# Patient Record
Sex: FEMALE | Race: WHITE | NOT HISPANIC OR LATINO | Employment: OTHER | URBAN - METROPOLITAN AREA
[De-identification: names, ages, dates, MRNs, and addresses within clinical notes are randomized per-mention and may not be internally consistent; named-entity substitution may affect disease eponyms.]

---

## 2017-01-03 ENCOUNTER — OFFICE VISIT (OUTPATIENT)
Dept: INTERNAL MEDICINE | Facility: CLINIC | Age: 72
End: 2017-01-03
Payer: COMMERCIAL

## 2017-01-03 VITALS
RESPIRATION RATE: 12 BRPM | HEART RATE: 76 BPM | HEIGHT: 62 IN | TEMPERATURE: 98.5 F | DIASTOLIC BLOOD PRESSURE: 60 MMHG | OXYGEN SATURATION: 99 % | BODY MASS INDEX: 22.26 KG/M2 | WEIGHT: 121 LBS | SYSTOLIC BLOOD PRESSURE: 120 MMHG

## 2017-01-03 DIAGNOSIS — M72.0 DUPUYTREN'S CONTRACTURE OF RIGHT HAND: ICD-10-CM

## 2017-01-03 DIAGNOSIS — Z01.818 PREOP GENERAL PHYSICAL EXAM: Primary | ICD-10-CM

## 2017-01-03 PROCEDURE — 93000 ELECTROCARDIOGRAM COMPLETE: CPT | Performed by: INTERNAL MEDICINE

## 2017-01-03 PROCEDURE — 99214 OFFICE O/P EST MOD 30 MIN: CPT | Performed by: INTERNAL MEDICINE

## 2017-01-03 NOTE — PATIENT INSTRUCTIONS
Before Your Surgery      Call your surgeon if there is any change in your health. This includes signs of a cold or flu (such as a sore throat, runny nose, cough, rash or fever).    Do not smoke, drink alcohol or take over the counter medicine (unless your surgeon or primary care doctor tells you to) for the 24 hours before and after surgery.    If you take prescribed drugs: Follow your doctor s orders about which medicines to take and which to stop until after surgery.    Eating and drinking prior to surgery: follow the instructions from your surgeon    Take a shower or bath the night before surgery. Use the soap your surgeon gave you to gently clean your skin. If you do not have soap from your surgeon, use your regular soap. Do not shave or scrub the surgery site.  Wear clean pajamas and have clean sheets on your bed.       Okay to stop aspirin for one week prior to surgery.   Okay to skip any morning medications the morning of surgery.   Everything looks fine to go ahead with surgery.

## 2017-01-03 NOTE — MR AVS SNAPSHOT
After Visit Summary   1/3/2017    Danielle Bryan    MRN: [id number]           Patient Information     Date Of Birth          1945        Visit Information        Provider Department      1/3/2017 9:00 AM Colt Riley MD [facility]        Today's Diagnoses     Preop general physical exam    -  1     Dupuytren's contracture of right hand           Care Instructions      Before Your Surgery      Call your surgeon if there is any change in your health. This includes signs of a cold or flu (such as a sore throat, runny nose, cough, rash or fever).    Do not smoke, drink alcohol or take over the counter medicine (unless your surgeon or primary care doctor tells you to) for the 24 hours before and after surgery.    If you take prescribed drugs: Follow your doctor s orders about which medicines to take and which to stop until after surgery.    Eating and drinking prior to surgery: follow the instructions from your surgeon    Take a shower or bath the night before surgery. Use the soap your surgeon gave you to gently clean your skin. If you do not have soap from your surgeon, use your regular soap. Do not shave or scrub the surgery site.  Wear clean pajamas and have clean sheets on your bed.       Okay to stop aspirin for one week prior to surgery.   Okay to skip any morning medications the morning of surgery.   Everything looks fine to go ahead with surgery.         Follow-ups after your visit        Your next 10 appointments already scheduled     Jan 09, 2017  8:30 AM   Nurse [place] with [place]UR[place]   [facility] [place]    [address]  [facility] [address]   [phone]            Jan 24, 2017  8:30 AM   N[place] with [place] NURSE   [facility] [place]    [address]  [facility] [address]   [phone]              Who to contact     If you have questions or need follow up  "information about today's clinic visit or your schedule please contact [facility] directly at [phone].  Normal or non-critical lab and imaging results will be communicated to you by MyChart, letter or phone within 4 business days after the clinic has received the results. If you do not hear from us within 7 days, please contact the clinic through [employer] or phone. If you have a critical or abnormal lab result, we will notify you by phone as soon as possible.  Submit refill requests through [employer] or call your pharmacy and they will forward the refill request to us. Please allow 3 business days for your refill to be completed.          Additional Information About Your Visit        [employer] Information     [employer] gives you secure access to your electronic health record. If you see a primary care provider, you can also send messages to your care team and make appointments. If you have questions, please call your primary care clinic.  If you do not have a primary care provider, please call [phone] and they will assist you.        Your Vitals Were     Pulse Temperature Respirations Height BMI (Body Mass Index) Pulse Oximetry    76 98.5  F (36.9  C) (Oral) 12 5' 2\" (1.575 m) 22.13 kg/m2 99%    Breastfeeding?                   No            Blood Pressure from Last 3 Encounters:   01/03/17 120/60   12/14/16 114/60   11/14/16 94/62    Weight from Last 3 Encounters:   01/03/17 121 lb (54.885 kg)   12/14/16 119 lb (53.978 kg)   11/14/16 118 lb 8 oz (53.751 kg)              We Performed the Following     EKG 12-lead complete w/read - Clinics        Primary Care Provider Office Phone # Fax #    Colt Riley [phone]       [facility] [address]   [facility] [address]        Thank you!     Thank you for choosing [facility]  for your care. Our goal is always to provide you with excellent care. Hearing back from our patients is one way we " can continue to improve our services. Please take a few minutes to complete the written survey that you may receive in the mail after your visit with us. Thank you!             Your Updated Medication List - Protect others around you: Learn how to safely use, store and throw away your medicines at www.disposemymeds.org.          This list is accurate as of: 1/3/17  9:18 AM.  Always use your most recent med list.                   Brand Name Dispense Instructions for use    aspirin 81 MG tablet     30 tablet    Take 1 tablet (81 mg) by mouth daily       cetirizine 10 MG tablet    zyrTEC     Take 10 mg by mouth as needed for allergies       cyanocobalamin 1000 MCG/ML injection    VITAMIN B12     Inject 1 mL into the muscle every 14 days       docusate sodium 100 MG tablet    COLACE     Take 100 mg by mouth as needed       ipratropium 0.06 % spray    ATROVENT    1 Box    Spray 2 sprays into both nostrils 4 times daily as needed for rhinitis       order for DME     2 Units    Equipment being ordered: Wigs       polyethylene glycol 0.4%- propylene glycol 0.3% 0.4-0.3 % Soln ophthalmic solution    SYSTANE ULTRA     Place 1 drop into both eyes every hour as needed for dry eyes       pravastatin 20 MG tablet    PRAVACHOL    90 tablet    Take 1 tablet (20 mg) by mouth daily       ranitidine 150 MG tablet    ZANTAC     Take 150 mg by mouth as needed       sodium chloride 0.65 % nasal spray    OCEAN     Spray 1 spray into both nostrils daily as needed for congestion

## 2017-01-03 NOTE — PROGRESS NOTES
[place] [address]  [facility] [address]  [phone]  Dept: [phone]    PRE-OP EVALUATION:  Today's date: 1/3/2017    Danielle Bryan (: 1945) presents for pre-operative evaluation assessment as requested by Dr. Velazquez.  She requires evaluation and anesthesia risk assessment prior to undergoing surgery/procedure for treatment of Dupuytren's contracture.  Proposed procedure: right thumb, middle, ring Dupuytren's release    Date of Surgery/ Procedure: 2017  Time of Surgery/ Procedure: 6am  [facility]  Fax number for surgical facility: [phone]  Primary Physician: Colt Riley  Type of Anesthesia Anticipated: to be determined    Patient has a Health Care Directive or Living Will:  NO    1. YES - DO YOU HAVE A HISTORY OF HEART ATTACK, STROKE, STENT, BYPASS OR SURGERY ON AN ARTERY IN THE HEAD, NECK, HEART OR LEG?   2. NO - Do you ever have any pain or discomfort in your chest?  3. NO - Do you have a history of  Heart Failure?  4. YES - ARE YOUR TROUBLED BY SHORTNESS OF BREATH WHEN WALKING ON THE LEVEL, UP A SLIGHT HILL OR AT NIGHT? With exertion  5. YES - DO YOU CURRENTLY HAVE A COLD, BRONCHITIS OR OTHER RESPIRATORY INFECTION? Rhinorrhea, improving.   6. YES - DO YOU HAVE A COUGH, SHORTNESS OF BREATH OR WHEEZING? SOB with exertion (walking)  7. NO - Do you sometimes get pains in the calves of your legs when you walk?  8. NO - Do you or anyone in your family have previous history of blood clots?  9. NO - Do you or does anyone in your family have a serious bleeding problem such as prolonged bleeding following surgeries or cuts?  10. NO - Have you ever had problems with anemia or been told to take iron pills?  11. NO - Have you had any abnormal blood loss such as black, tarry or bloody stools, or abnormal vaginal bleeding?  12. NO - Have you ever had a blood transfusion?  13. NO - Have you or any of your relatives  ever had problems with anesthesia?  14. NO - Do you have sleep apnea, excessive snoring or daytime drowsiness?  15. NO - Do you have any prosthetic heart valves?  16. NO - Do you have prosthetic joints?  17. NO - Is there any chance that you may be pregnant?      HPI:                                                      Brief HPI related to upcoming procedure: Danielle reported to the clinic for a pre-operative examination to treat a right 1st, 3rd and 4th fingers Dupuytren's Contracture via release procedure.       See problem list for active medical problems.  Problems all longstanding and stable, except as noted/documented.  See ROS for pertinent symptoms related to these conditions.                                            Noted SOB with exertion. Noted recent rhinorrhea. Noted intermittent heartburn.     MEDICAL HISTORY:                                                      Patient Active Problem List    Diagnosis Date Noted     Osteoporosis 12/14/2016     Priority: Medium     Vitamin B12 deficiency (non anemic) 08/11/2015     Priority: Medium     Hyperlipidemia with target LDL less than 100 03/05/2015     Diagnosis updated by automated process. Provider to review and confirm.       Transient cerebral ischemia 12/18/2014     Diagnosis updated by automated process. Provider to review and confirm.       Allergic rhinitis 07/13/2014     Bartholin's cyst 04/09/2014     Alopecia areata 03/21/2013     Advanced directives, counseling/discussion 03/21/2013     Patient states has Advance Directive and will bring in a copy to clinic.         Sprain of jaw 02/06/2008      Past Medical History   Diagnosis Date     PONV (postoperative nausea and vomiting)      Hyperlipemia      Alopecia areata      Requires a wig now     Dysphagia      Botox/EGD dilation at [place], most recent 05/2013     Recurrent UTI      Believed related in part to atrophic vaginitis     Past Surgical History   Procedure Laterality Date     Gyn surgery        TUBAL PREGNANCY, D&C,  X 2     Release trigger finger  2012     Procedure: RELEASE TRIGGER FINGER;  RIGHT THUMB, MIDDLE AND RING TRIGGER RELEASES;  Surgeon: Deniz Velazquez MD;  Location: [facility]     Colonoscopy  1/15/2014     Dr. Henry [facility]     Head & neck surgery  1983     THYROIDECTOMY - partial-right side     Eye surgery  2013     bilateral cataract surgery     Colonoscopy  1/15/2014     Procedure: COLONOSCOPY;  Colonoscopy ;  Surgeon: Esteban Henry MD;  Location: [facility]     Current Outpatient Prescriptions   Medication Sig Dispense Refill     pravastatin (PRAVACHOL) 20 MG tablet Take 1 tablet (20 mg) by mouth daily 90 tablet 3     polyethylene glycol 0.4%- propylene glycol 0.3% (SYSTANE ULTRA) 0.4-0.3 % SOLN ophthalmic solution Place 1 drop into both eyes every hour as needed for dry eyes       aspirin 81 MG tablet Take 1 tablet (81 mg) by mouth daily 30 tablet 11     cyanocobalamin (VITAMIN B12) 1000 MCG/ML injection Inject 1 mL into the muscle every 14 days       order for DME Equipment being ordered: Wigs 2 Units 0     cetirizine (ZYRTEC) 10 MG tablet Take 10 mg by mouth as needed for allergies       ipratropium (ATROVENT) 0.06 % nasal spray Spray 2 sprays into both nostrils 4 times daily as needed for rhinitis 1 Box 1     sodium chloride (OCEAN) 0.65 % nasal spray Spray 1 spray into both nostrils daily as needed for congestion       ranitidine (ZANTAC) 150 MG tablet Take 150 mg by mouth as needed        docusate sodium 100 MG tablet Take 100 mg by mouth as needed       [DISCONTINUED] ORDER FOR DME Equipment being ordered: Wigs 2 Units 0     OTC products: None, except as noted above    Allergies   Allergen Reactions     Ciprofloxacin      Sores in mouth and throat felt funny      Latex Allergy: NO    Social History   Substance Use Topics     Smoking status: Never Smoker      Smokeless tobacco: Never Used     Alcohol Use: Yes      Comment: Rare.      History   Drug Use No       REVIEW OF  "SYSTEMS:                                                    REVIEW OF SYSTEMS: The following systems have been completely reviewed and are negative except as noted above:   Constitutional, HEENT, respiratory, cardiovascular, gastrointestinal, genitourinary, musculoskeletal, dermatologic, hematologic, endocrine, psychiatric, and neurologic systems.     EXAM:                                                    /60 mmHg  Pulse 76  Temp(Src) 98.5  F (36.9  C) (Oral)  Resp 12  Ht 1.575 m (5' 2\")  Wt 54.885 kg (121 lb)  BMI 22.13 kg/m2  SpO2 99%  Breastfeeding? No    GENERAL APPEARANCE: healthy, alert and no distress     EYES: EOMI,- PERRL     HENT: ear canals and TM's normal with impacted cerumen and nose and mouth without ulcers or lesions     NECK: no adenopathy, no asymmetry, masses, or scars and thyroid normal to palpation     RESP: lungs clear to auscultation - no rales, rhonchi or wheezes     CV: regular rates and rhythm, normal S1 S2, no S3 or S4 and no murmur, click or rub -     ABDOMEN:  soft, nontender, no HSM or masses and bowel sounds normal     MS: extremities normal- no gross deformities noted, no evidence of inflammation in joints, FROM in all extremities.     SKIN: no suspicious lesions or rashes     Back: no CVA tenderness      NEURO: Normal strength and tone, sensory exam grossly normal, mentation intact and speech normal     PSYCH: mentation appears normal. and affect normal/bright    DIAGNOSTICS:                                                    EKG: Normal Sinus Rhythm, normal axis, normal intervals, no acute ST/T changes c/w ischemia, no LVH by voltage criteria, unchanged from previous tracings    Recent Labs   Lab Test  11/25/16   0800  11/16/15   0937   12/18/14   1000   HGB  12.8  12.7   --   13.0   PLT  205  190   --   232   INR   --    --    --   0.97   NA  146*  143   < >  143   POTASSIUM  4.0  4.0   < >  4.1   CR  0.79  0.72   < >  0.78    < > = values in this interval not " displayed.      IMPRESSION:                                                    Diagnosis/reason for consult: dupuytren's contracture right hand, 1st, 3rd, 4th fingers    The proposed surgical procedure is considered LOW risk.    REVISED CARDIAC RISK INDEX  The patient has the following serious cardiovascular risks for perioperative complications such as (MI, PE, VFib and 3  AV Block):  Cerebrovascular Disease (TIA or CVA)  INTERPRETATION: 0 risks: Class I (very low risk - 0.4% complication rate)    The patient has the following additional risks for perioperative complications:  [place] 10-year CHD Risk Score: 3% (15 Total Points)   Values used to calculate score:     Age: 71 years -- Points: 14     Total Cholesterol: 198 mg/dL -- Points: 1     HDL Cholesterol: 88 mg/dL -- Points: -1     Systolic BP (untreated): 120 mmHg -- Points: 1     The patient is not a smoker. -- Points: 0     The patient has not been diagnosed with diabetes. -- Points: 0     The patient does not have a family history of CHD. -- Points:0       ICD-10-CM    1. Preop general physical exam Z01.818 EKG 12-lead complete w/read - Clinics   2. Dupuytren's contracture of right hand M72.0        RECOMMENDATIONS:                                                      --Patient is to take all scheduled medications on the day of surgery EXCEPT for modifications listed below.    D/c aspirin one week prior to surgery  Skip morning medications on day of surgery    APPROVAL GIVEN to proceed with proposed procedure, without further diagnostic evaluation     Patient Instructions     Before Your Surgery      Call your surgeon if there is any change in your health. This includes signs of a cold or flu (such as a sore throat, runny nose, cough, rash or fever).    Do not smoke, drink alcohol or take over the counter medicine (unless your surgeon or primary care doctor tells you to) for the 24 hours before and after surgery.    If you take prescribed drugs: Follow  your doctor s orders about which medicines to take and which to stop until after surgery.    Eating and drinking prior to surgery: follow the instructions from your surgeon    Take a shower or bath the night before surgery. Use the soap your surgeon gave you to gently clean your skin. If you do not have soap from your surgeon, use your regular soap. Do not shave or scrub the surgery site.  Wear clean pajamas and have clean sheets on your bed.       Okay to stop aspirin for one week prior to surgery.   Okay to skip any morning medications the morning of surgery.   Everything looks fine to go ahead with surgery.           This document serves as a record of the services and decisions personally performed and made by Colt Riley MD. It was created on their behalf by Ellis Cardenas, a trained medical scribe. The creation of this document is based the provider's statements to the medical scribe.  Ellis Cardenas January 3, 2017 9:12 AM         Signed Electronically by: Colt Riley MD    Copy of this evaluation report is provided to requesting physician.    [facility] Preop Guidelines

## 2017-01-09 ENCOUNTER — ALLIED HEALTH/NURSE VISIT (OUTPATIENT)
Dept: NURSING | Facility: CLINIC | Age: 72
End: 2017-01-09
Payer: COMMERCIAL

## 2017-01-09 DIAGNOSIS — R79.89 LOW VITAMIN B12 LEVEL: ICD-10-CM

## 2017-01-09 PROCEDURE — 96372 THER/PROPH/DIAG INJ SC/IM: CPT

## 2017-01-12 ENCOUNTER — HOSPITAL PATHOLOGY (OUTPATIENT)
Dept: OTHER | Facility: CLINIC | Age: 72
End: 2017-01-12

## 2017-01-17 LAB — COPATH REPORT: NORMAL

## 2017-01-24 ENCOUNTER — ALLIED HEALTH/NURSE VISIT (OUTPATIENT)
Dept: NURSING | Facility: CLINIC | Age: 72
End: 2017-01-24
Payer: COMMERCIAL

## 2017-01-24 DIAGNOSIS — R79.89 LOW VITAMIN B12 LEVEL: ICD-10-CM

## 2017-01-24 PROCEDURE — 96372 THER/PROPH/DIAG INJ SC/IM: CPT

## 2017-02-07 ENCOUNTER — ALLIED HEALTH/NURSE VISIT (OUTPATIENT)
Dept: NURSING | Facility: CLINIC | Age: 72
End: 2017-02-07
Payer: COMMERCIAL

## 2017-02-07 DIAGNOSIS — R79.89 LOW VITAMIN B12 LEVEL: ICD-10-CM

## 2017-02-07 PROCEDURE — 96372 THER/PROPH/DIAG INJ SC/IM: CPT

## 2017-02-21 ENCOUNTER — ALLIED HEALTH/NURSE VISIT (OUTPATIENT)
Dept: NURSING | Facility: CLINIC | Age: 72
End: 2017-02-21
Payer: COMMERCIAL

## 2017-02-21 VITALS — WEIGHT: 116.2 LBS | BODY MASS INDEX: 21.25 KG/M2

## 2017-02-21 DIAGNOSIS — R79.89 LOW VITAMIN B12 LEVEL: ICD-10-CM

## 2017-02-21 PROCEDURE — 96372 THER/PROPH/DIAG INJ SC/IM: CPT

## 2017-03-06 ENCOUNTER — ALLIED HEALTH/NURSE VISIT (OUTPATIENT)
Dept: NURSING | Facility: CLINIC | Age: 72
End: 2017-03-06
Payer: COMMERCIAL

## 2017-03-06 DIAGNOSIS — R79.89 LOW VITAMIN B12 LEVEL: ICD-10-CM

## 2017-03-06 PROCEDURE — 96372 THER/PROPH/DIAG INJ SC/IM: CPT

## 2017-03-22 ENCOUNTER — ALLIED HEALTH/NURSE VISIT (OUTPATIENT)
Dept: NURSING | Facility: CLINIC | Age: 72
End: 2017-03-22
Payer: COMMERCIAL

## 2017-03-22 DIAGNOSIS — R79.89 LOW VITAMIN B12 LEVEL: ICD-10-CM

## 2017-03-22 PROCEDURE — 96372 THER/PROPH/DIAG INJ SC/IM: CPT

## 2017-04-03 ENCOUNTER — ALLIED HEALTH/NURSE VISIT (OUTPATIENT)
Dept: NURSING | Facility: CLINIC | Age: 72
End: 2017-04-03
Payer: COMMERCIAL

## 2017-04-03 DIAGNOSIS — R79.89 LOW VITAMIN B12 LEVEL: ICD-10-CM

## 2017-04-03 PROCEDURE — 96372 THER/PROPH/DIAG INJ SC/IM: CPT

## 2017-04-17 ENCOUNTER — ALLIED HEALTH/NURSE VISIT (OUTPATIENT)
Dept: NURSING | Facility: CLINIC | Age: 72
End: 2017-04-17
Payer: COMMERCIAL

## 2017-04-17 DIAGNOSIS — R79.89 LOW VITAMIN B12 LEVEL: ICD-10-CM

## 2017-04-17 PROCEDURE — 96372 THER/PROPH/DIAG INJ SC/IM: CPT

## 2017-05-01 ENCOUNTER — ALLIED HEALTH/NURSE VISIT (OUTPATIENT)
Dept: NURSING | Facility: CLINIC | Age: 72
End: 2017-05-01
Payer: COMMERCIAL

## 2017-05-01 DIAGNOSIS — R79.89 LOW VITAMIN B12 LEVEL: ICD-10-CM

## 2017-05-01 PROCEDURE — 96372 THER/PROPH/DIAG INJ SC/IM: CPT

## 2017-05-19 ENCOUNTER — ALLIED HEALTH/NURSE VISIT (OUTPATIENT)
Dept: NURSING | Facility: CLINIC | Age: 72
End: 2017-05-19
Payer: COMMERCIAL

## 2017-05-19 DIAGNOSIS — R79.89 LOW VITAMIN B12 LEVEL: ICD-10-CM

## 2017-05-19 PROCEDURE — 96372 THER/PROPH/DIAG INJ SC/IM: CPT

## 2017-05-19 NOTE — NURSING NOTE
"Chief Complaint   Patient presents with     Allied Health Visit     b 12      initial There were no vitals taken for this visit. Estimated body mass index is 21.25 kg/(m^2) as calculated from the following:    Height as of 1/3/17: 5' 2\" (1.575 m).    Weight as of 2/21/17: 116 lb 3.2 oz (52.7 kg)..  bp completed using cuff size NA (Not Taken)    "

## 2017-05-19 NOTE — MR AVS SNAPSHOT
After Visit Summary   5/19/2017    Danielle Bryan    MRN: [id number]           Patient Information     Date Of Birth          1945        Visit Information        Provider Department      5/19/2017 9:30 AM RI IM NURSE [facility]        Today's Diagnoses     Low vitamin B12 level           Follow-ups after your visit        Your next 10 appointments already scheduled     Jun 02, 2017  9:30 AM CDT   Nurse Only with [place] IM NURSE   [facility]    [address]  [facility] [id number] [phone]              Who to contact     If you have questions or need follow up information about today's clinic visit or your schedule please contact [facility] directly at [phone].  Normal or non-critical lab and imaging results will be communicated to you by MyChart, letter or phone within 4 business days after the clinic has received the results. If you do not hear from us within 7 days, please contact the clinic through [employer] or phone. If you have a critical or abnormal lab result, we will notify you by phone as soon as possible.  Submit refill requests through [employer] or call your pharmacy and they will forward the refill request to us. Please allow 3 business days for your refill to be completed.          Additional Information About Your Visit        MyChart Information     [employer] gives you secure access to your electronic health record. If you see a primary care provider, you can also send messages to your care team and make appointments. If you have questions, please call your primary care clinic.  If you do not have a primary care provider, please call [phone] and they will assist you.        Care EveryWhere ID     This is your Care EveryWhere ID. This could be used by other organizations to access your [place] medical records  [id number]         Blood Pressure from Last 3 Encounters:   01/03/17  120/60   12/14/16 114/60   11/14/16 94/62    Weight from Last 3 Encounters:   02/21/17 116 lb 3.2 oz (52.7 kg)   01/03/17 121 lb (54.9 kg)   12/14/16 119 lb (54 kg)              We Performed the Following     VITAMIN B12 INJ /1000MCG        Primary Care Provider Office Phone # Fax #    Colt Riley [phone]       [facility] [address] [facility] [address]  [facility] [address]        Thank you!     Thank you for choosing [facility]  for your care. Our goal is always to provide you with excellent care. Hearing back from our patients is one way we can continue to improve our services. Please take a few minutes to complete the written survey that you may receive in the mail after your visit with us. Thank you!             Your Updated Medication List - Protect others around you: Learn how to safely use, store and throw away your medicines at www.disposemymeds.org.          This list is accurate as of: 5/19/17  9:56 AM.  Always use your most recent med list.                   Brand Name Dispense Instructions for use    aspirin 81 MG tablet     30 tablet    Take 1 tablet (81 mg) by mouth daily       cetirizine 10 MG tablet    zyrTEC     Take 10 mg by mouth as needed for allergies       cyanocobalamin 1000 MCG/ML injection    VITAMIN B12     Inject 1 mL into the muscle every 14 days       docusate sodium 100 MG tablet    COLACE     Take 100 mg by mouth as needed       ipratropium 0.06 % spray    ATROVENT    1 Box    Spray 2 sprays into both nostrils 4 times daily as needed for rhinitis       order for DME     2 Units    Equipment being ordered: Wigs       polyethylene glycol 0.4%- propylene glycol 0.3% 0.4-0.3 % Soln ophthalmic solution    SYSTANE ULTRA     Place 1 drop into both eyes every hour as needed for dry eyes       pravastatin 20 MG tablet    PRAVACHOL    90 tablet    Take 1 tablet (20 mg) by mouth daily       ranitidine 150 MG tablet    ZANTAC     Take 150 mg by mouth  as needed       sodium chloride 0.65 % nasal spray    OCEAN     Spray 1 spray into both nostrils daily as needed for congestion

## 2017-06-02 ENCOUNTER — ALLIED HEALTH/NURSE VISIT (OUTPATIENT)
Dept: NURSING | Facility: CLINIC | Age: 72
End: 2017-06-02
Payer: COMMERCIAL

## 2017-06-02 DIAGNOSIS — E53.8 VITAMIN B12 DEFICIENCY (NON ANEMIC): Primary | ICD-10-CM

## 2017-06-02 PROCEDURE — 96372 THER/PROPH/DIAG INJ SC/IM: CPT

## 2017-06-02 NOTE — MR AVS SNAPSHOT
After Visit Summary   6/2/2017    Danielle Bryan    MRN: [id number]           Patient Information     Date Of Birth          1945        Visit Information        Provider Department      6/2/2017 9:30 AM RI IM NURSE [facility]        Today's Diagnoses     Vitamin B12 deficiency (non anemic)    -  1       Follow-ups after your visit        Your next 10 appointments already scheduled     Jun 16, 2017  8:30 AM CDT   Nurse Only with RI IM NURSE   [facility] ([facility])    [address]  [facility] [address] [phone]            Jun 29, 2017  8:30 AM CDT   Nurse Only with RI IM NURSE   [facility] ([facility])    [address]  [facility] [address] [phone]            Jul 13, 2017  8:30 AM CDT   Nurse Only with RI IM NURSE   [facility] ([facility])    [address]  [facility] [id number] [phone]            Jul 27, 2017  8:30 AM CDT   Nurse Only with RI IM NURSE   [facility] ([facility])    [address]  [facility] [id number] [phone]              Who to contact     If you have questions or need follow up information about today's clinic visit or your schedule please contact [facility] directly at [phone].  Normal or non-critical lab and imaging results will be communicated to you by MyChart, letter or phone within 4 business days after the clinic has received the results. If you do not hear from us within 7 days, please contact the clinic through MyChart or phone. If you have a critical or abnormal lab result, we will notify you by phone as soon as possible.  Submit refill requests through [email or web address] or call your pharmacy and they will forward the refill request to us. Please allow 3 business days for your refill to be completed.          Additional  Information About Your Visit        [employer] Information     LookMedBook gives you secure access to your electronic health record. If you see a primary care provider, you can also send messages to your care team and make appointments. If you have questions, please call your primary care clinic.  If you do not have a primary care provider, please call [phone] and they will assist you.        Care EveryWhere ID     This is your Care EveryWhere ID. This could be used by other organizations to access your [place] medical records  [id number]         Blood Pressure from Last 3 Encounters:   01/03/17 120/60   12/14/16 114/60   11/14/16 94/62    Weight from Last 3 Encounters:   02/21/17 116 lb 3.2 oz (52.7 kg)   01/03/17 121 lb (54.9 kg)   12/14/16 119 lb (54 kg)              Today, you had the following     No orders found for display       Primary Care Provider Office Phone # Fax #    Colt Riley [phone]       [facility] [address]  [facility] [address]        Thank you!     Thank you for choosing [facility]  for your care. Our goal is always to provide you with excellent care. Hearing back from our patients is one way we can continue to improve our services. Please take a few minutes to complete the written survey that you may receive in the mail after your visit with us. Thank you!             Your Updated Medication List - Protect others around you: Learn how to safely use, store and throw away your medicines at www.disposemymeds.org.          This list is accurate as of: 6/2/17  9:44 AM.  Always use your most recent med list.                   Brand Name Dispense Instructions for use    aspirin 81 MG tablet     30 tablet    Take 1 tablet (81 mg) by mouth daily       cetirizine 10 MG tablet    zyrTEC     Take 10 mg by mouth as needed for allergies       cyanocobalamin 1000 MCG/ML injection    VITAMIN B12     Inject 1 mL into the muscle every 14 days        docusate sodium 100 MG tablet    COLACE     Take 100 mg by mouth as needed       ipratropium 0.06 % spray    ATROVENT    1 Box    Spray 2 sprays into both nostrils 4 times daily as needed for rhinitis       order for DME     2 Units    Equipment being ordered: Wigs       polyethylene glycol 0.4%- propylene glycol 0.3% 0.4-0.3 % Soln ophthalmic solution    SYSTANE ULTRA     Place 1 drop into both eyes every hour as needed for dry eyes       pravastatin 20 MG tablet    PRAVACHOL    90 tablet    Take 1 tablet (20 mg) by mouth daily       ranitidine 150 MG tablet    ZANTAC     Take 150 mg by mouth as needed       sodium chloride 0.65 % nasal spray    OCEAN     Spray 1 spray into both nostrils daily as needed for congestion

## 2017-06-09 ENCOUNTER — OFFICE VISIT (OUTPATIENT)
Dept: INTERNAL MEDICINE | Facility: CLINIC | Age: 72
End: 2017-06-09
Payer: COMMERCIAL

## 2017-06-09 VITALS
HEART RATE: 99 BPM | HEIGHT: 62 IN | WEIGHT: 115 LBS | SYSTOLIC BLOOD PRESSURE: 104 MMHG | TEMPERATURE: 98.7 F | BODY MASS INDEX: 21.16 KG/M2 | RESPIRATION RATE: 12 BRPM | OXYGEN SATURATION: 98 % | DIASTOLIC BLOOD PRESSURE: 68 MMHG

## 2017-06-09 DIAGNOSIS — M81.0 AGE-RELATED OSTEOPOROSIS WITHOUT CURRENT PATHOLOGICAL FRACTURE: Primary | ICD-10-CM

## 2017-06-09 DIAGNOSIS — R63.4 LOSS OF WEIGHT: ICD-10-CM

## 2017-06-09 DIAGNOSIS — Z87.19 HISTORY OF ESOPHAGEAL STRICTURE: ICD-10-CM

## 2017-06-09 PROCEDURE — 99214 OFFICE O/P EST MOD 30 MIN: CPT | Performed by: INTERNAL MEDICINE

## 2017-06-09 NOTE — PATIENT INSTRUCTIONS
"Your last bone density test in 2014 did show osteoporosis.    The treatment for this condition is to start a \"bisphosphonate\" medication, which may be given orally or in IV form.     It would be wise to try to get this approved by insurance for IV form, due to previous esophageal stricture problems.     I will place an order for an IV bisphosphonate. Someone should be calling you to schedule. If you want to defer a few months, that would be fine.     Meanwhile, let's update a new bone density test to get a more current baseline.   "

## 2017-06-09 NOTE — NURSING NOTE
"Chief Complaint   Patient presents with     Recheck Medication     discuss osteoporosis       Initial /68 (BP Location: Right arm, Patient Position: Sitting, Cuff Size: Adult Regular)  Pulse 99  Temp 98.7  F (37.1  C) (Oral)  Resp 12  Ht 5' 2\" (1.575 m)  Wt 115 lb (52.2 kg)  SpO2 98%  Breastfeeding? No  BMI 21.03 kg/m2 Estimated body mass index is 21.03 kg/(m^2) as calculated from the following:    Height as of this encounter: 5' 2\" (1.575 m).    Weight as of this encounter: 115 lb (52.2 kg).  Medication Reconciliation: complete   Roxanna TRAYLOR      "

## 2017-06-09 NOTE — PROGRESS NOTES
"  SUBJECTIVE:                                                    Danielle Bryan is a 72 year old female who presents to clinic today for several health issues.       Osteoporosis  The patient's last dexa scan in 1/20/2014 - impression per report \"Osteoporosis, possible severe osteoporosis with vertebral fracture\". She was recommended to have IV injection for treatment of osteoporosis as she has a history of esophageal stricture. She had esophageal sphincter stretching done about 3 and 5 years ago. No current symptoms.     Weight Loss   The patient's  inquires about the patient's weight loss. Current weight is 115. She endorses lack of appetite. She cut soda out of her diet.    Post op  Patient relates her surgery went \"so so\". She states the trigger finger and dupuytren symptoms have returned in one of the fingers. She is following up next month with Dr Velazquez at [facility].     Past/recent records reviewed and discussed for:  -Colonoscopy up to date.  -Mammogram due.  -Recurrent vaginal cyst  -Recurrent hemorrhoids   Problem list and histories reviewed & adjusted, as indicated.  Additional history: as documented    Reviewed and updated as needed this visit by clinical staff  Tobacco  Allergies  Meds  Med Hx  Surg Hx  Fam Hx  Soc Hx      Reviewed and updated as needed this visit by Provider         ROS:  Constitutional, ENT, pulmonary, cardiovascular, GI,  musculoskeletal, neuro systems are negative, except as otherwise noted.    This document serves as a record of the services and decisions personally performed and made by Colt Riley MD. It was created on his behalf by Nenita Waters, a trained medical scribe. The creation of this document is based on the provider's statements to the medical scribe.  Nenita Waters June 9, 2017 8:54 AM       OBJECTIVE:                                                    /68 (BP Location: Right arm, Patient Position: Sitting, Cuff Size: " "Adult Regular)  Pulse 99  Temp 98.7  F (37.1  C) (Oral)  Resp 12  Ht 1.575 m (5' 2\")  Wt 52.2 kg (115 lb)  SpO2 98%  Breastfeeding? No  BMI 21.03 kg/m2  Body mass index is 21.03 kg/(m^2).     GENERAL: healthy, alert and no distress  MS: no gross musculoskeletal defects noted, no edema       ASSESSMENT/PLAN:                                                    (M81.0) Age-related osteoporosis without current pathological fracture  (primary encounter diagnosis)  Comment: Diagnosed with osteoporosis in 2014. Advised patient to have repeat dexa scan within the next couple of months after potential surgery on trigger finger.   Plan: DX Hip/Pelvis/Spine          (Z87.19) History of esophageal stricture--will need to avoid oral use of bisphosphonates.     (R63.4) loss of weight--does not appear pathologic. Reassurance.       Patient Instructions   Your last bone density test in 2014 did show osteoporosis.    The treatment for this condition is to start a \"bisphosphonate\" medication, which may be given orally or in IV form.     It would be wise to try to get this approved by insurance for IV form, due to previous esophageal stricture problems.     I will place an order for an IV bisphosphonate. Someone should be calling you to schedule. If you want to defer a few months, that would be fine.     Meanwhile, let's update a new bone density test to get a more current baseline.       The information in this document, created by the medical scribe for me, accurately reflects the services I personally performed and the decisions made by me. I have reviewed and approved this document for accuracy prior to leaving the patient care area.  June 9, 2017 8:58 AM    Colt Riley MD,   [facility]    "

## 2017-06-09 NOTE — MR AVS SNAPSHOT
"              After Visit Summary   6/9/2017    Danielle Bryan    MRN: [id number]           Patient Information     Date Of Birth          1945        Visit Information        Provider Department      6/9/2017 8:20 AM Colt Riley MD [facility]        Today's Diagnoses     Age-related osteoporosis without current pathological fracture    -  1    History of esophageal stricture          Care Instructions    Your last bone density test in 2014 did show osteoporosis.    The treatment for this condition is to start a \"bisphosphonate\" medication, which may be given orally or in IV form.     It would be wise to try to get this approved by insurance for IV form, due to previous esophageal stricture problems.     I will place an order for an IV bisphosphonate. Someone should be calling you to schedule. If you want to defer a few months, that would be fine.     Meanwhile, let's update a new bone density test to get a more current baseline.           Follow-ups after your visit        Your next 10 appointments already scheduled     Jun 19, 2017  9:15 AM CDT   Nurse Only with RI IM NURSE   [facility] ([facility]    [address]  [facility] [address]   [phone]            Jun 21, 2017  8:35 AM CDT   MA SCREENING DIGITAL BILATERAL with [id number]   [facility] ([facility])    [address]  [facility] [address]   [phone]           Do not use any powder, lotion or deodorant under your arms or on your breast. If you do, we will ask you to remove it before your exam.  Wear comfortable, two-piece clothing.  If you have any allergies, tell your care team.  Bring any previous mammograms from other facilities or have them mailed to the breast center. This mammogram location, [facility], now offers 3D mammography. It doesn't replace a screening mammogram and can be done with a regular " screening mammogram. It is optional and not all insurances will pay for it. 3D mammography is a special kind of mammogram that produces a three-dimensional image of the breast by using low dose-xrays. 3D allows the radiologist to see the breast tissue differently from 2D, which reduces the chance of repeat testing due to overlapping breast tissue. If you are interested in have a 3D mammogram, please check with your insurance before you arrive for your exam. On the day of your exam you will be asked if you would like 3D imaging.            Jul 13, 2017  8:30 AM CDT   Nurse Only with RI IM NURSE   [facility] ([facility])    [address]  [facility] [address]   [phone]            Jul 27, 2017  8:30 AM CDT   Nurse Only with RI IM NURSE   [facility] ([facility])    [address]  [facility] [address]   [phone]              Future tests that were ordered for you today     Open Future Orders        Priority Expected Expires Ordered    DX Hip/Pelvis/Spine Routine  6/9/2018 6/9/2017            Who to contact     If you have questions or need follow up information about today's clinic visit or your schedule please contact [facility] directly at [phone].  Normal or non-critical lab and imaging results will be communicated to you by Cars[employer], letter or phone within 4 business days after the clinic has received the results. If you do not hear from us within 7 days, please contact the clinic through Cars[employer] or phone. If you have a critical or abnormal lab result, we will notify you by phone as soon as possible.  Submit refill requests through [employer] or call your pharmacy and they will forward the refill request to us. Please allow 3 business days for your refill to be completed.          Additional Information About Your Visit        [employer] Information     [employer] gives you secure access to your  "electronic health record. If you see a primary care provider, you can also send messages to your care team and make appointments. If you have questions, please call your primary care clinic.  If you do not have a primary care provider, please call [phone] and they will assist you.        Care EveryWhere ID     This is your Care EveryWhere ID. This could be used by other organizations to access your [place] medical records  [id number]        Your Vitals Were     Pulse Temperature Respirations Height Pulse Oximetry Breastfeeding?    99 98.7  F (37.1  C) (Oral) 12 5' 2\" (1.575 m) 98% No    BMI (Body Mass Index)                   21.03 kg/m2            Blood Pressure from Last 3 Encounters:   06/09/17 104/68   01/03/17 120/60   12/14/16 114/60    Weight from Last 3 Encounters:   06/09/17 115 lb (52.2 kg)   02/21/17 116 lb 3.2 oz (52.7 kg)   01/03/17 121 lb (54.9 kg)               Primary Care Provider Office Phone # Fax #    Colt Riley [phone]       [facility] [address] [place] [address]        Thank you!     Thank you for choosing [facility]  for your care. Our goal is always to provide you with excellent care. Hearing back from our patients is one way we can continue to improve our services. Please take a few minutes to complete the written survey that you may receive in the mail after your visit with us. Thank you!             Your Updated Medication List - Protect others around you: Learn how to safely use, store and throw away your medicines at www.disposemymeds.org.          This list is accurate as of: 6/9/17  8:58 AM.  Always use your most recent med list.                   Brand Name Dispense Instructions for use    aspirin 81 MG tablet     30 tablet    Take 1 tablet (81 mg) by mouth daily       cetirizine 10 MG tablet    zyrTEC     Take 10 mg by mouth as needed for allergies       cyanocobalamin 1000 MCG/ML injection    VITAMIN B12 "     Inject 1 mL into the muscle every 14 days       docusate sodium 100 MG tablet    COLACE     Take 100 mg by mouth as needed       ipratropium 0.06 % spray    ATROVENT    1 Box    Spray 2 sprays into both nostrils 4 times daily as needed for rhinitis       order for DME     2 Units    Equipment being ordered: Wigs       polyethylene glycol 0.4%- propylene glycol 0.3% 0.4-0.3 % Soln ophthalmic solution    SYSTANE ULTRA     Place 1 drop into both eyes every hour as needed for dry eyes       pravastatin 20 MG tablet    PRAVACHOL    90 tablet    Take 1 tablet (20 mg) by mouth daily       ranitidine 150 MG tablet    ZANTAC     Take 150 mg by mouth as needed       sodium chloride 0.65 % nasal spray    OCEAN     Spray 1 spray into both nostrils daily as needed for congestion

## 2017-06-19 ENCOUNTER — ALLIED HEALTH/NURSE VISIT (OUTPATIENT)
Dept: NURSING | Facility: CLINIC | Age: 72
End: 2017-06-19
Payer: COMMERCIAL

## 2017-06-19 DIAGNOSIS — R79.89 LOW VITAMIN B12 LEVEL: ICD-10-CM

## 2017-06-19 PROCEDURE — 96372 THER/PROPH/DIAG INJ SC/IM: CPT

## 2017-06-19 NOTE — MR AVS SNAPSHOT
After Visit Summary   6/19/2017    Danielle Bryan    MRN: [id number]           Patient Information     Date Of Birth          1945        Visit Information        Provider Department      6/19/2017 9:15 AM RI IM NURSE [facility]        Today's Diagnoses     Low vitamin B12 level           Follow-ups after your visit        Your next 10 appointments already scheduled     Jun 21, 2017  8:35 AM CDT   MA SCREENING DIGITAL BILATERAL with RHBCMA1   [facility] ([facility])    [address]  [facility] [address]   [phone]           Do not use any powder, lotion or deodorant under your arms or on your breast. If you do, we will ask you to remove it before your exam.  Wear comfortable, two-piece clothing.  If you have any allergies, tell your care team.  Bring any previous mammograms from other facilities or have them mailed to the breast center. This mammogram location, [facility], now offers 3D mammography. It doesn't replace a screening mammogram and can be done with a regular screening mammogram. It is optional and not all insurances will pay for it. 3D mammography is a special kind of mammogram that produces a three-dimensional image of the breast by using low dose-xrays. 3D allows the radiologist to see the breast tissue differently from 2D, which reduces the chance of repeat testing due to overlapping breast tissue. If you are interested in have a 3D mammogram, please check with your insurance before you arrive for your exam. On the day of your exam you will be asked if you would like 3D imaging.            Jul 13, 2017  8:30 AM CDT   Nurse Only with RI IM NURSE   [facility] ([facility])    [address]  [facility] [address]   [phone]            Jul 25, 2017  8:45 AM CDT   ESAU with Ghazala Christensen,    [facility] ([facility]    [address]  [place] [id number] [phone]            Jul 27, 2017  8:30 AM CDT   Nurse Only with RI IM NURSE   [facility] ([facility])    [address]  [place] [address] [phone]              Who to contact     If you have questions or need follow up information about today's clinic visit or your schedule please contact [facility] directly at [phone].  Normal or non-critical lab and imaging results will be communicated to you by [facility], letter or phone within 4 business days after the clinic has received the results. If you do not hear from us within 7 days, please contact the clinic through [email or web address] or phone. If you have a critical or abnormal lab result, we will notify you by phone as soon as possible.  Submit refill requests through [employer] or call your pharmacy and they will forward the refill request to us. Please allow 3 business days for your refill to be completed.          Additional Information About Your Visit        [employer] Information     [employer] gives you secure access to your electronic health record. If you see a primary care provider, you can also send messages to your care team and make appointments. If you have questions, please call your primary care clinic.  If you do not have a primary care provider, please call [phone] and they will assist you.        Care EveryWhere ID     This is your Care EveryWhere ID. This could be used by other organizations to access your [place] medical records  [id number]         Blood Pressure from Last 3 Encounters:   06/09/17 104/68   01/03/17 120/60   12/14/16 114/60    Weight from Last 3 Encounters:   06/09/17 115 lb (52.2 kg)   02/21/17 116 lb 3.2 oz (52.7 kg)   01/03/17 121 lb (54.9 kg)              We Performed the Following     VITAMIN B12 INJ /1000MCG        Primary Care Provider Office Phone # Fax #    Colt Riley [phone]  [phone]       [facility] [address]  [facility] [address]        Thank you!     Thank you for choosing [facility]  for your care. Our goal is always to provide you with excellent care. Hearing back from our patients is one way we can continue to improve our services. Please take a few minutes to complete the written survey that you may receive in the mail after your visit with us. Thank you!             Your Updated Medication List - Protect others around you: Learn how to safely use, store and throw away your medicines at www.disposemymeds.org.          This list is accurate as of: 6/19/17 10:21 AM.  Always use your most recent med list.                   Brand Name Dispense Instructions for use    aspirin 81 MG tablet     30 tablet    Take 1 tablet (81 mg) by mouth daily       cetirizine 10 MG tablet    zyrTEC     Take 10 mg by mouth as needed for allergies       cyanocobalamin 1000 MCG/ML injection    VITAMIN B12     Inject 1 mL into the muscle every 14 days       docusate sodium 100 MG tablet    COLACE     Take 100 mg by mouth as needed       ipratropium 0.06 % spray    ATROVENT    1 Box    Spray 2 sprays into both nostrils 4 times daily as needed for rhinitis       order for DME     2 Units    Equipment being ordered: Wigs       polyethylene glycol 0.4%- propylene glycol 0.3% 0.4-0.3 % Soln ophthalmic solution    SYSTANE ULTRA     Place 1 drop into both eyes every hour as needed for dry eyes       pravastatin 20 MG tablet    PRAVACHOL    90 tablet    Take 1 tablet (20 mg) by mouth daily       ranitidine 150 MG tablet    ZANTAC     Take 150 mg by mouth as needed       sodium chloride 0.65 % nasal spray    OCEAN     Spray 1 spray into both nostrils daily as needed for congestion

## 2017-06-19 NOTE — NURSING NOTE
"Chief Complaint   Patient presents with     Allied Health Visit     B12       Initial There were no vitals taken for this visit. Estimated body mass index is 21.03 kg/(m^2) as calculated from the following:    Height as of 6/9/17: 5' 2\" (1.575 m).    Weight as of 6/9/17: 115 lb (52.2 kg).  Medication Reconciliation: complete     LAI Wong      "

## 2017-06-21 ENCOUNTER — HOSPITAL ENCOUNTER (OUTPATIENT)
Dept: MAMMOGRAPHY | Facility: CLINIC | Age: 72
Discharge: HOME | End: 2017-06-21
Attending: INTERNAL MEDICINE | Admitting: INTERNAL MEDICINE
Payer: MEDICARE

## 2017-06-21 DIAGNOSIS — Z12.31 VISIT FOR SCREENING MAMMOGRAM: ICD-10-CM

## 2017-06-21 PROCEDURE — G0202 SCR MAMMO BI INCL CAD: HCPCS

## 2017-07-12 ENCOUNTER — RADIANT APPOINTMENT (OUTPATIENT)
Dept: BONE DENSITY | Facility: CLINIC | Age: 72
End: 2017-07-12
Payer: COMMERCIAL

## 2017-07-12 DIAGNOSIS — M81.0 AGE-RELATED OSTEOPOROSIS WITHOUT CURRENT PATHOLOGICAL FRACTURE: ICD-10-CM

## 2017-07-12 PROCEDURE — 77085 DXA BONE DENSITY AXL VRT FX: CPT | Performed by: INTERNAL MEDICINE

## 2017-07-13 ENCOUNTER — ALLIED HEALTH/NURSE VISIT (OUTPATIENT)
Dept: NURSING | Facility: CLINIC | Age: 72
End: 2017-07-13
Payer: COMMERCIAL

## 2017-07-13 DIAGNOSIS — E53.8 VITAMIN B12 DEFICIENCY (NON ANEMIC): Primary | ICD-10-CM

## 2017-07-13 PROCEDURE — 96372 THER/PROPH/DIAG INJ SC/IM: CPT

## 2017-07-13 NOTE — MR AVS SNAPSHOT
After Visit Summary   7/13/2017    Danielle Bryan    MRN: [id number]           Patient Information     Date Of Birth          1945        Visit Information        Provider Department      7/13/2017 8:30 AM RI IM NURSE [facility]        Today's Diagnoses     Vitamin B12 deficiency (non anemic)    -  1       Follow-ups after your visit        Your next 10 appointments already scheduled     Jul 25, 2017  8:45 AM CDT   SHORT with Ghazala Christensen, DO   [facility] ([facility]    [address]  [facility] [place] [address] [phone]            Jul 27, 2017  8:30 AM CDT   Nurse Only with RI IM NURSE   [facility] ([facility])    [address]  [facility] [address] [phone]              Who to contact     If you have questions or need follow up information about today's clinic visit or your schedule please contact [facility] directly at [phone].  Normal or non-critical lab and imaging results will be communicated to you by Windeln.dehart, letter or phone within 4 business days after the clinic has received the results. If you do not hear from us within 7 days, please contact the clinic through MOF Technologiest or phone. If you have a critical or abnormal lab result, we will notify you by phone as soon as possible.  Submit refill requests through SyndicatePlus or call your pharmacy and they will forward the refill request to us. Please allow 3 business days for your refill to be completed.          Additional Information About Your Visit        Windeln.dehart Information     SyndicatePlus gives you secure access to your electronic health record. If you see a primary care provider, you can also send messages to your care team and make appointments. If you have questions, please call your primary care clinic.  If you do not have a primary care provider, please call [phone] and they will assist  you.        Care EveryWhere ID     This is your Care EveryWhere ID. This could be used by other organizations to access your [place] medical records  [id number]         Blood Pressure from Last 3 Encounters:   06/09/17 104/68   01/03/17 120/60   12/14/16 114/60    Weight from Last 3 Encounters:   06/09/17 115 lb (52.2 kg)   02/21/17 116 lb 3.2 oz (52.7 kg)   01/03/17 121 lb (54.9 kg)              Today, you had the following     No orders found for display       Primary Care Provider Office Phone # Fax #    Colt Riley [phone]       [facility] [address]  [facility] [address]        Equal Access to Services     GLENYS ENGLISH : Hadii param marsh hadasho Soomaali, waaxda luqadaha, qaybta kaalmada adeegyada, waxay idiin haycarlos a ladd . So [facility] [phone].    ATENCIÓN: Si habla español, tiene a moore disposición servicios gratuitos de asistencia lingüística. Adonisame al [phone].    We comply with applicable federal civil rights laws and [place] laws. We do not discriminate on the basis of race, color, national origin, age, disability sex, sexual orientation or gender identity.            Thank you!     Thank you for choosing [facility]  for your care. Our goal is always to provide you with excellent care. Hearing back from our patients is one way we can continue to improve our services. Please take a few minutes to complete the written survey that you may receive in the mail after your visit with us. Thank you!             Your Updated Medication List - Protect others around you: Learn how to safely use, store and throw away your medicines at www.disposemymeds.org.          This list is accurate as of: 7/13/17  9:01 AM.  Always use your most recent med list.                   Brand Name Dispense Instructions for use Diagnosis    aspirin 81 MG tablet     30 tablet    Take 1 tablet (81 mg) by mouth daily        cetirizine 10 MG tablet     zyrTEC     Take 10 mg by mouth as needed for allergies        cyanocobalamin 1000 MCG/ML injection    VITAMIN B12     Inject 1 mL into the muscle every 14 days        docusate sodium 100 MG tablet    COLACE     Take 100 mg by mouth as needed        ipratropium 0.06 % spray    ATROVENT    1 Box    Spray 2 sprays into both nostrils 4 times daily as needed for rhinitis    Chronic rhinitis       order for DME     2 Units    Equipment being ordered: Wigs    Alopecia areata       polyethylene glycol 0.4%- propylene glycol 0.3% 0.4-0.3 % Soln ophthalmic solution    SYSTANE ULTRA     Place 1 drop into both eyes every hour as needed for dry eyes        pravastatin 20 MG tablet    PRAVACHOL    90 tablet    Take 1 tablet (20 mg) by mouth daily    Hyperlipidemia LDL goal <100       ranitidine 150 MG tablet    ZANTAC     Take 150 mg by mouth as needed        sodium chloride 0.65 % nasal spray    OCEAN     Spray 1 spray into both nostrils daily as needed for congestion

## 2017-07-25 ENCOUNTER — OFFICE VISIT (OUTPATIENT)
Dept: OBGYN | Facility: CLINIC | Age: 72
End: 2017-07-25
Payer: COMMERCIAL

## 2017-07-25 VITALS
DIASTOLIC BLOOD PRESSURE: 60 MMHG | SYSTOLIC BLOOD PRESSURE: 106 MMHG | HEIGHT: 62 IN | WEIGHT: 115 LBS | TEMPERATURE: 97.6 F | BODY MASS INDEX: 21.16 KG/M2

## 2017-07-25 DIAGNOSIS — N95.2 VAGINAL ATROPHY: Primary | ICD-10-CM

## 2017-07-25 PROCEDURE — 99213 OFFICE O/P EST LOW 20 MIN: CPT | Performed by: FAMILY MEDICINE

## 2017-07-25 RX ORDER — GLYCERIN/MIN OIL/POLYCARBOPHIL
1 GEL WITH APPLICATOR (GRAM) VAGINAL DAILY
Qty: 70 G | Refills: 11 | Status: SHIPPED | OUTPATIENT
Start: 2017-07-25 | End: 2019-01-30

## 2017-07-25 NOTE — NURSING NOTE
"Chief Complaint   Patient presents with     Consult     pain with intercourse--noticed dark spot inside vagina       Initial /60  Temp 97.6  F (36.4  C) (Oral)  Ht 5' 2\" (1.575 m)  Wt 115 lb (52.2 kg)  BMI 21.03 kg/m2 Estimated body mass index is 21.03 kg/(m^2) as calculated from the following:    Height as of this encounter: 5' 2\" (1.575 m).    Weight as of this encounter: 115 lb (52.2 kg).  Medication Reconciliation: complete   Radha Rivera CMA      "

## 2017-07-25 NOTE — PROGRESS NOTES
"S:  Danielle Bryan is a 72 year old  woman who presents to the clinic for dyspareunia and dark spot noticed inside vaginal area. Today patient notes she has noticed increased pain during intercourse and is unsure if related to previous bartholin cyst. Patient notes she is having intercourse several times per week and is using lubrication with some relief. Denies dysuria. Denies use of hormonal vaginal cream due to hx of TIA. Of note, patient had bartholin cyst/ hematoma treated in 2013.     This document serves as a record of the services and decisions personally performed and made by Ghazala Christensen DO. It was created on his/her behalf by Susan Simmons, a trained medical scribe. The creation of this document is based the provider's statements to the medical scribe.  Beltran Simmons 8:53 AM, 2017       O: /60  Temp 97.6  F (36.4  C) (Oral)  Ht 1.575 m (5' 2\")  Wt 52.2 kg (115 lb)  BMI 21.03 kg/m2   General appearance: healthy, alert, no distress  GYN PELVIC: External genitalia normal   and vagina normal rugatted not atrophic  Examination of urethra  normal no masses, tenderness, scarring  bladder, no masses or tenderness  Bimanual exam Normal, no masses       Assessment/Plan:   1) Dyspareunia, vaginal atrophy: Rx vaginal replens gel daily, add to lubrication, discussed dilators  Will prefer to continue regular intercourse.   2) Mass she is seeing with mirror is urethra, and the anatomy is entirely normal.   3) Return in 3 months      The information in this document, created by the medical scribe for me, accurately reflects the services I personally performed and the decisions made by me. I have reviewed and approved this document for accuracy prior to leaving the patient care area.  Ghazala Christensen DO  8:53 AM, 17       Dr. Ghazala Christensen DO    Obstetrics and Gynecology  [facility]     "

## 2017-07-25 NOTE — MR AVS SNAPSHOT
After Visit Summary   7/25/2017    Danielle Bryan    MRN: [id number]           Patient Information     Date Of Birth          1945        Visit Information        Provider Department      7/25/2017 8:45 AM Ghazala Christensen, DO [facility]        Today's Diagnoses     Vaginal atrophy    -  1      Care Instructions    astroglide with intercourse     Add replens daily           Follow-ups after your visit        Your next 10 appointments already scheduled     Jul 27, 2017  8:30 AM CDT   Nurse Only with RI IM NURSE   [facility] ([facility]    [address]  [facility] [id number] [phone]              Who to contact     If you have questions or need follow up information about today's clinic visit or your schedule please contact [facility] directly at [phone].  Normal or non-critical lab and imaging results will be communicated to you by BalaBithart, letter or phone within 4 business days after the clinic has received the results. If you do not hear from us within 7 days, please contact the clinic through BalaBithart or phone. If you have a critical or abnormal lab result, we will notify you by phone as soon as possible.  Submit refill requests through Towergate or call your pharmacy and they will forward the refill request to us. Please allow 3 business days for your refill to be completed.          Additional Information About Your Visit        MyChart Information     Towergate gives you secure access to your electronic health record. If you see a primary care provider, you can also send messages to your care team and make appointments. If you have questions, please call your primary care clinic.  If you do not have a primary care provider, please call [phone] and they will assist you.        Care EveryWhere ID     This is your Care EveryWhere ID. This could be used by other organizations to access your  "[place] medical records  [id number]        Your Vitals Were     Temperature Height BMI (Body Mass Index)             97.6  F (36.4  C) (Oral) 5' 2\" (1.575 m) 21.03 kg/m2          Blood Pressure from Last 3 Encounters:   07/25/17 106/60   06/09/17 104/68   01/03/17 120/60    Weight from Last 3 Encounters:   07/25/17 115 lb (52.2 kg)   06/09/17 115 lb (52.2 kg)   02/21/17 116 lb 3.2 oz (52.7 kg)              Today, you had the following     No orders found for display         Today's Medication Changes          These changes are accurate as of: 7/25/17  9:09 AM.  If you have any questions, ask your nurse or doctor.               Start taking these medicines.        Dose/Directions    replens Gel   Used for:  Vaginal atrophy   Started by:  Ghazala Christensen,         Dose:  1 Dose   Place 1 Dose vaginally daily   Quantity:  70 g   Refills:  11            Where to get your medicines      These medications were sent to [id number] [address] [address]  [id number] [place] [address]     Phone:  [phone]     replens Gel                Primary Care Provider Office Phone # Fax #    Colt Riley [phone]       [facility] [address]  [facility] [address]        Equal Access to Services     GLENYS ENGLISH AH: Hadii param ku hadasho Soomaali, waaxda luqadaha, qaybta kaalmada adeegyada, pinky ladd . So [facility] [phone].    ATENCIÓN: Si habla español, tiene a moore disposición servicios gratuitos de asistencia lingüística. Llame al [phone].    We comply with applicable federal civil rights laws and [place] laws. We do not discriminate on the basis of race, color, national origin, age, disability sex, sexual orientation or gender identity.            Thank you!     Thank you for choosing [facility]  for your care. Our goal is always to provide you with excellent care. Hearing back from our " patients is one way we can continue to improve our services. Please take a few minutes to complete the written survey that you may receive in the mail after your visit with us. Thank you!             Your Updated Medication List - Protect others around you: Learn how to safely use, store and throw away your medicines at www.disposemymeds.org.          This list is accurate as of: 7/25/17  9:09 AM.  Always use your most recent med list.                   Brand Name Dispense Instructions for use Diagnosis    aspirin 81 MG tablet     30 tablet    Take 1 tablet (81 mg) by mouth daily        cetirizine 10 MG tablet    zyrTEC     Take 10 mg by mouth as needed for allergies        cyanocobalamin 1000 MCG/ML injection    VITAMIN B12     Inject 1 mL into the muscle every 14 days        docusate sodium 100 MG tablet    COLACE     Take 100 mg by mouth as needed        ipratropium 0.06 % spray    ATROVENT    1 Box    Spray 2 sprays into both nostrils 4 times daily as needed for rhinitis    Chronic rhinitis       order for DME     2 Units    Equipment being ordered: Wigs    Alopecia areata       polyethylene glycol 0.4%- propylene glycol 0.3% 0.4-0.3 % Soln ophthalmic solution    SYSTANE ULTRA     Place 1 drop into both eyes every hour as needed for dry eyes        pravastatin 20 MG tablet    PRAVACHOL    90 tablet    Take 1 tablet (20 mg) by mouth daily    Hyperlipidemia LDL goal <100       ranitidine 150 MG tablet    ZANTAC     Take 150 mg by mouth as needed        replens Gel     70 g    Place 1 Dose vaginally daily    Vaginal atrophy       sodium chloride 0.65 % nasal spray    OCEAN     Spray 1 spray into both nostrils daily as needed for congestion

## 2017-07-27 ENCOUNTER — TELEPHONE (OUTPATIENT)
Dept: OBGYN | Facility: CLINIC | Age: 72
End: 2017-07-27

## 2017-07-27 ENCOUNTER — ALLIED HEALTH/NURSE VISIT (OUTPATIENT)
Dept: NURSING | Facility: CLINIC | Age: 72
End: 2017-07-27
Payer: COMMERCIAL

## 2017-07-27 DIAGNOSIS — R79.89 LOW VITAMIN B12 LEVEL: ICD-10-CM

## 2017-07-27 PROCEDURE — 96372 THER/PROPH/DIAG INJ SC/IM: CPT

## 2017-07-27 NOTE — TELEPHONE ENCOUNTER
Pt stopped by [facility] to leave a note for Dr. Christensen. She wanted her to know that she has been using [place] Jelly Lubricant.    Radha Rivera CMA

## 2017-07-27 NOTE — MR AVS SNAPSHOT
After Visit Summary   7/27/2017    Danielle Bryan    MRN: [id number]           Patient Information     Date Of Birth          1945        Visit Information        Provider Department      7/27/2017 8:30 AM RI IM NURSE [facility]        Today's Diagnoses     Low vitamin B12 level           Follow-ups after your visit        Your next 10 appointments already scheduled     Aug 10, 2017  8:30 AM CDT   Nurse Only with RI IM NURSE   [facility] ([facility])    [address]  [facility] [address] [phone]            Aug 24, 2017  8:30 AM CDT   Nurse Only with RI IM NURSE   [facility] ([facility])    [address]  [facility] [address] [phone]            Sep 07, 2017  8:30 AM CDT   Nurse Only with RI IM NURSE   [facility] ([facility])    [address]  [facility] [address] [phone]              Future tests that were ordered for you today     Open Standing Orders        Priority Remaining Interval Expires Ordered    INJECTION INTRAMUSCULAR OR SUB-Q Routine 24/24 7/27/2018 7/27/2017            Who to contact     If you have questions or need follow up information about today's clinic visit or your schedule please contact [facility] directly at [phone].  Normal or non-critical lab and imaging results will be communicated to you by MyChart, letter or phone within 4 business days after the clinic has received the results. If you do not hear from us within 7 days, please contact the clinic through MyChart or phone. If you have a critical or abnormal lab result, we will notify you by phone as soon as possible.  Submit refill requests through BookitNow! or call your pharmacy and they will forward the refill request to us. Please allow 3 business days for your refill to be completed.          Additional Information  About Your Visit        Lang Mahart Information     Emtrics gives you secure access to your electronic health record. If you see a primary care provider, you can also send messages to your care team and make appointments. If you have questions, please call your primary care clinic.  If you do not have a primary care provider, please call [phone] and they will assist you.        Care EveryWhere ID     This is your Care EveryWhere ID. This could be used by other organizations to access your [place] medical records  [id number]         Blood Pressure from Last 3 Encounters:   07/25/17 106/60   06/09/17 104/68   01/03/17 120/60    Weight from Last 3 Encounters:   07/25/17 115 lb (52.2 kg)   06/09/17 115 lb (52.2 kg)   02/21/17 116 lb 3.2 oz (52.7 kg)              We Performed the Following     VITAMIN B12 INJ /1000MCG        Primary Care Provider Office Phone # Fax #    Colt Riley [phone]       [facility] [address]  [place] [id number]        Equal Access to Services     [facility] : Hadii aad ku hadasho Soomaali, waaxda luqadaha, qaybta kaalmada adesantoshyastaci, pinky ladd . So [facility] [phone].    ATENCIÓN: Si habla español, tiene a moore disposición servicios gratuitos de asistencia lingüística. Dylon al [phone].    We comply with applicable federal civil rights laws and [place] laws. We do not discriminate on the basis of race, color, national origin, age, disability sex, sexual orientation or gender identity.            Thank you!     Thank you for choosing [facility]  for your care. Our goal is always to provide you with excellent care. Hearing back from our patients is one way we can continue to improve our services. Please take a few minutes to complete the written survey that you may receive in the mail after your visit with us. Thank you!             Your Updated Medication List - Protect others around you:  Learn how to safely use, store and throw away your medicines at www.disposemymeds.org.          This list is accurate as of: 7/27/17  9:03 AM.  Always use your most recent med list.                   Brand Name Dispense Instructions for use Diagnosis    aspirin 81 MG tablet     30 tablet    Take 1 tablet (81 mg) by mouth daily        cetirizine 10 MG tablet    zyrTEC     Take 10 mg by mouth as needed for allergies        cyanocobalamin 1000 MCG/ML injection    VITAMIN B12     Inject 1 mL into the muscle every 14 days        docusate sodium 100 MG tablet    COLACE     Take 100 mg by mouth as needed        ipratropium 0.06 % spray    ATROVENT    1 Box    Spray 2 sprays into both nostrils 4 times daily as needed for rhinitis    Chronic rhinitis       order for DME     2 Units    Equipment being ordered: Wigs    Alopecia areata       polyethylene glycol 0.4%- propylene glycol 0.3% 0.4-0.3 % Soln ophthalmic solution    SYSTANE ULTRA     Place 1 drop into both eyes every hour as needed for dry eyes        pravastatin 20 MG tablet    PRAVACHOL    90 tablet    Take 1 tablet (20 mg) by mouth daily    Hyperlipidemia LDL goal <100       ranitidine 150 MG tablet    ZANTAC     Take 150 mg by mouth as needed        replens Gel     70 g    Place 1 Dose vaginally daily    Vaginal atrophy       sodium chloride 0.65 % nasal spray    OCEAN     Spray 1 spray into both nostrils daily as needed for congestion

## 2017-08-10 ENCOUNTER — ALLIED HEALTH/NURSE VISIT (OUTPATIENT)
Dept: NURSING | Facility: CLINIC | Age: 72
End: 2017-08-10
Payer: COMMERCIAL

## 2017-08-10 DIAGNOSIS — R79.89 LOW VITAMIN B12 LEVEL: ICD-10-CM

## 2017-08-10 DIAGNOSIS — M81.0 OSTEOPOROSIS: Primary | ICD-10-CM

## 2017-08-10 PROCEDURE — 99207 ZZC NO CHARGE NURSE ONLY: CPT

## 2017-08-10 PROCEDURE — 96372 THER/PROPH/DIAG INJ SC/IM: CPT

## 2017-08-10 NOTE — MR AVS SNAPSHOT
After Visit Summary   8/10/2017    Danielle Bryan    MRN: [id number]           Patient Information     Date Of Birth          1945        Visit Information        Provider Department      8/10/2017 9:00 AM Rn, Ri [facility]        Today's Diagnoses     Osteoporosis    -  1       Follow-ups after your visit        Your next 10 appointments already scheduled     Aug 24, 2017  8:30 AM CDT   Nurse Only with RI IM NURSE   [facility] ([facility])    [address]  [facility] [address] [phone]            Sep 07, 2017  8:30 AM CDT   Nurse Only with RI IM NURSE   [facility] ([facility])    [address]  [facility] [address] [phone]            Oct 18, 2017 11:20 AM CDT   SHORT with Colt Riley MD   [facility] ([facility])    [address]  [facility] [address] [phone]              Who to contact     If you have questions or need follow up information about today's clinic visit or your schedule please contact [facility] directly at [phone].  Normal or non-critical lab and imaging results will be communicated to you by MyChart, letter or phone within 4 business days after the clinic has received the results. If you do not hear from us within 7 days, please contact the clinic through MyChart or phone. If you have a critical or abnormal lab result, we will notify you by phone as soon as possible.  Submit refill requests through Leadformance or call your pharmacy and they will forward the refill request to us. Please allow 3 business days for your refill to be completed.          Additional Information About Your Visit        Southern Alphahart Information     Leadformance gives you secure access to your electronic health record. If you see a primary care provider, you can also send messages to your care team and make  appointments. If you have questions, please call your primary care clinic.  If you do not have a primary care provider, please call [phone] and they will assist you.        Care EveryWhere ID     This is your Care EveryWhere ID. This could be used by other organizations to access your [place] medical records  [id number]         Blood Pressure from Last 3 Encounters:   07/25/17 106/60   06/09/17 104/68   01/03/17 120/60    Weight from Last 3 Encounters:   07/25/17 115 lb (52.2 kg)   06/09/17 115 lb (52.2 kg)   02/21/17 116 lb 3.2 oz (52.7 kg)              Today, you had the following     No orders found for display       Primary Care Provider Office Phone # Fax #    Colt Riley [phone]       [address] [address]        Equal Access to Services     [facility] : Hadii aad ku hadasho Soomaali, waaxda luqadaha, qaybta kaalmada adeegyada, waxay idiin hayaan benjamin kharatoney farrisn . So [facility] [phone].    ATENCIÓN: Si habla español, tiene a moore disposición servicios gratuitos de asistencia lingüística. Adonismer al [phone].    We comply with applicable federal civil rights laws and [place] laws. We do not discriminate on the basis of race, color, national origin, age, disability sex, sexual orientation or gender identity.            Thank you!     Thank you for choosing [facility]  for your care. Our goal is always to provide you with excellent care. Hearing back from our patients is one way we can continue to improve our services. Please take a few minutes to complete the written survey that you may receive in the mail after your visit with us. Thank you!             Your Updated Medication List - Protect others around you: Learn how to safely use, store and throw away your medicines at www.disposemymeds.org.          This list is accurate as of: 8/10/17  3:14 PM.  Always use your most recent med list.                   Brand Name Dispense  Instructions for use Diagnosis    aspirin 81 MG tablet     30 tablet    Take 1 tablet (81 mg) by mouth daily        cetirizine 10 MG tablet    zyrTEC     Take 10 mg by mouth as needed for allergies        cyanocobalamin 1000 MCG/ML injection    VITAMIN B12     Inject 1 mL into the muscle every 14 days        docusate sodium 100 MG tablet    COLACE     Take 100 mg by mouth as needed        ipratropium 0.06 % spray    ATROVENT    1 Box    Spray 2 sprays into both nostrils 4 times daily as needed for rhinitis    Chronic rhinitis       order for DME     2 Units    Equipment being ordered: Wigs    Alopecia areata       polyethylene glycol 0.4%- propylene glycol 0.3% 0.4-0.3 % Soln ophthalmic solution    SYSTANE ULTRA     Place 1 drop into both eyes every hour as needed for dry eyes        pravastatin 20 MG tablet    PRAVACHOL    90 tablet    Take 1 tablet (20 mg) by mouth daily    Hyperlipidemia LDL goal <100       ranitidine 150 MG tablet    ZANTAC     Take 150 mg by mouth as needed        replens Gel     70 g    Place 1 Dose vaginally daily    Vaginal atrophy       sodium chloride 0.65 % nasal spray    OCEAN     Spray 1 spray into both nostrils daily as needed for congestion

## 2017-08-10 NOTE — NURSING NOTE
Pt walks into clinic today for her Vit B12 injection and with questions about Dr Riley's recommendation for Alendronate.    She has questions because she hasn't been taking Calcium and Vit D.  Nurse explained the importance of every post menopausal woman taking Calcium and Vit D.  We looked at different formulations and figured that if she used 500mg calcium tablet, she'd need to take if tid.  Depending on the amt of Vit D in each pill, she also may have to supplement with Vit D cap.    She will keep Vitamins at her kitchen table so she remembers to take them.    She doesn't want to start the Alendronate until after she talks with Dr Riley.  She plans to get used to taking the calcium/Vit D first.  She says she has a touchy stomach, so she wants to make changes slowly.   She has an appt with Dr Riley 10/10/17, and refuses to start Alendronate before talking to him.

## 2017-08-24 ENCOUNTER — TELEPHONE (OUTPATIENT)
Dept: INTERNAL MEDICINE | Facility: CLINIC | Age: 72
End: 2017-08-24

## 2017-08-24 ENCOUNTER — ALLIED HEALTH/NURSE VISIT (OUTPATIENT)
Dept: NURSING | Facility: CLINIC | Age: 72
End: 2017-08-24
Payer: COMMERCIAL

## 2017-08-24 DIAGNOSIS — R79.89 LOW VITAMIN B12 LEVEL: ICD-10-CM

## 2017-08-24 PROCEDURE — 96372 THER/PROPH/DIAG INJ SC/IM: CPT

## 2017-08-24 PROCEDURE — 99207 ZZC NO CHARGE NURSE ONLY: CPT

## 2017-08-24 NOTE — TELEPHONE ENCOUNTER
Reason for Call:  Other appointment    Detailed comments: Pt is wondering if Dr. Riley will work her in on his schedule for a pre-op. Patients DOS is 10/26/17.  Please advise    Phone Number Patient can be reached at: Cell number on file:    Telephone Information:   Mobile [phone]       Best Time: anytime    Can we leave a detailed message on this number? YES    Call taken on 8/24/2017 at 8:26 AM by Judy Castro

## 2017-09-07 ENCOUNTER — ALLIED HEALTH/NURSE VISIT (OUTPATIENT)
Dept: NURSING | Facility: CLINIC | Age: 72
End: 2017-09-07
Payer: COMMERCIAL

## 2017-09-07 DIAGNOSIS — R79.89 LOW VITAMIN B12 LEVEL: ICD-10-CM

## 2017-09-07 DIAGNOSIS — E53.8 VITAMIN B12 DEFICIENCY (NON ANEMIC): Primary | ICD-10-CM

## 2017-09-07 PROCEDURE — 99207 ZZC NO CHARGE NURSE ONLY: CPT | Mod: 25

## 2017-09-07 PROCEDURE — 96372 THER/PROPH/DIAG INJ SC/IM: CPT

## 2017-09-07 NOTE — TELEPHONE ENCOUNTER
Spoke with pt.  Offered appt but didn't work with her schedule.  She will schedule preop appt with diff provider.

## 2017-09-07 NOTE — TELEPHONE ENCOUNTER
Routed to PCP.    Please advise if ok to work pt into schedule or schedule preop appt with partner.    Thanks.

## 2017-09-18 ENCOUNTER — OFFICE VISIT (OUTPATIENT)
Dept: INTERNAL MEDICINE | Facility: CLINIC | Age: 72
End: 2017-09-18
Payer: COMMERCIAL

## 2017-09-18 VITALS
OXYGEN SATURATION: 100 % | BODY MASS INDEX: 20.52 KG/M2 | TEMPERATURE: 98 F | HEIGHT: 62 IN | DIASTOLIC BLOOD PRESSURE: 58 MMHG | HEART RATE: 87 BPM | WEIGHT: 111.5 LBS | SYSTOLIC BLOOD PRESSURE: 106 MMHG

## 2017-09-18 DIAGNOSIS — R63.4 WEIGHT LOSS: ICD-10-CM

## 2017-09-18 DIAGNOSIS — K59.00 CONSTIPATION, UNSPECIFIED CONSTIPATION TYPE: Primary | ICD-10-CM

## 2017-09-18 LAB — TSH SERPL DL<=0.005 MIU/L-ACNC: 1.63 MU/L (ref 0.4–4)

## 2017-09-18 PROCEDURE — 36415 COLL VENOUS BLD VENIPUNCTURE: CPT | Performed by: INTERNAL MEDICINE

## 2017-09-18 PROCEDURE — 99214 OFFICE O/P EST MOD 30 MIN: CPT | Performed by: INTERNAL MEDICINE

## 2017-09-18 PROCEDURE — 84443 ASSAY THYROID STIM HORMONE: CPT | Performed by: INTERNAL MEDICINE

## 2017-09-18 NOTE — PROGRESS NOTES
SUBJECTIVE:   Danielle Bryan is a 72 year old female who presents to clinic today for the following health issues:       Constipation: this started about a month ago after she started taking calcium supplements. She is taking 2 citrical bid but not sure what the dose is. She was told to take 1500 mg and one of the nurses told her to take that all in supplement form.  She has 2-3 servings dairy per day. She is taking colace one a day.   She stopped the calcium 10-14 days ago and the past 2-3 days her bowels are starting to work better. The stool is coming more frequently and not as hard.   She was not having any pain associated with this. No blood, no rectal pain.       Other concern is she has lost some weight. She does not think her diet has changed and she is eating protein.   No tremor, hair loss. She is cold all the time. No other symptoms.         Patient Active Problem List   Diagnosis     Sprain of jaw     Alopecia areata     Advanced directives, counseling/discussion     Bartholin's cyst     Allergic rhinitis     Transient cerebral ischemia     Hyperlipidemia with target LDL less than 100     Vitamin B12 deficiency (non anemic)     Osteoporosis     Current Outpatient Prescriptions   Medication Sig Dispense Refill     Vaginal Lubricant (REPLENS) GEL Place 1 Dose vaginally daily 70 g 11     pravastatin (PRAVACHOL) 20 MG tablet Take 1 tablet (20 mg) by mouth daily 90 tablet 3     polyethylene glycol 0.4%- propylene glycol 0.3% (SYSTANE ULTRA) 0.4-0.3 % SOLN ophthalmic solution Place 1 drop into both eyes every hour as needed for dry eyes       aspirin 81 MG tablet Take 1 tablet (81 mg) by mouth daily 30 tablet 11     cyanocobalamin (VITAMIN B12) 1000 MCG/ML injection Inject 1 mL into the muscle every 14 days       order for DME Equipment being ordered: Wigs 2 Units 0     cetirizine (ZYRTEC) 10 MG tablet Take 10 mg by mouth as needed for allergies       ipratropium (ATROVENT) 0.06 % nasal spray Spray 2  "sprays into both nostrils 4 times daily as needed for rhinitis 1 Box 1     sodium chloride (OCEAN) 0.65 % nasal spray Spray 1 spray into both nostrils daily as needed for congestion       docusate sodium 100 MG tablet Take 100 mg by mouth as needed       ranitidine (ZANTAC) 150 MG tablet Take 150 mg by mouth as needed        [DISCONTINUED] ORDER FOR DME Equipment being ordered: Wigs 2 Units 0      Social History   Substance Use Topics     Smoking status: Never Smoker     Smokeless tobacco: Never Used     Alcohol use Yes      Comment: Rare.         Reviewed and updated as needed this visit by clinical staff  Tobacco  Allergies  Meds  Med Hx  Surg Hx  Fam Hx  Soc Hx      Reviewed and updated as needed this visit by Provider         ROS:  No nausea, vomiting, blood in stool    OBJECTIVE:     /58 (BP Location: Right arm, Patient Position: Sitting, Cuff Size: Adult Regular)  Pulse 87  Temp 98  F (36.7  C) (Oral)  Ht 5' 2\" (1.575 m)  Wt 111 lb 8 oz (50.6 kg)  LMP  (Exact Date)  SpO2 100%  Breastfeeding? No  BMI 20.39 kg/m2  Body mass index is 20.39 kg/(m^2).    Not examined.     Wt Readings from Last 10 Encounters:   09/18/17 111 lb 8 oz (50.6 kg)   07/25/17 115 lb (52.2 kg)   06/09/17 115 lb (52.2 kg)   02/21/17 116 lb 3.2 oz (52.7 kg)   01/03/17 121 lb (54.9 kg)   12/14/16 119 lb (54 kg)   11/14/16 118 lb 8 oz (53.8 kg)   06/09/16 121 lb (54.9 kg)   03/24/16 122 lb (55.3 kg)   12/02/15 120 lb (54.4 kg)         ASSESSMENT/PLAN:     (K59.00) Constipation, unspecified constipation type  (primary encounter diagnosis)  Comment: due to calcium. She was told to take more than necessary and did not take into account her diet. Total calcium goal is 1200 mg.   Plan: continue the colace once daily unless stools get loose. Wait until working well before starting back on calcium then work it up gradually.  Consider increasing stool softener to bid, adding fiber. If still issues use calcium with magnesium or " miralax.    recommend consider getting the calcium in diet with 4 servings. If only 2-3, take only one supplement.   Take a Vitamin D 6815-3270 unit supplement daily.     (R63.4) Weight loss  Comment: very mild loss but 10 pounds in 9 months  Plan: TSH with free T4 reflex       Watch diet to be sure getting protein, follow up with primary for any new symptoms.      [facility]    25 minutes spent with the patient, >50% of time spent counseling about calcium amounts, supplements, bowel medications.

## 2017-09-18 NOTE — PATIENT INSTRUCTIONS
Total calcium about 1200 mg per day.   4 servings of dairy or fortified juice would be enough to reach this.   If you are taking just 2 servings, try to add back calcium supplements by taking every other day at first and adjusting fiber, stool softener to help.   You can consider getting the calcium with magnesium if the bowels are still a problem.   Check the dose of the calcium: if you get 2 servings of dairy you want 500-600 mg. If you are taking 3 servings, you would only need 250-300 mg supplement.     Take Vitamin D 1000 or 2000 unit capsule once a day.

## 2017-09-18 NOTE — NURSING NOTE
"Chief Complaint   Patient presents with     Constipation     one month, at the time also started vit D and calcium, stopped still constipated, has good fluid intake including prune jose, past 3 days is better       Initial /58 (BP Location: Right arm, Patient Position: Sitting, Cuff Size: Adult Regular)  Pulse 87  Temp 98  F (36.7  C) (Oral)  Ht 5' 2\" (1.575 m)  Wt 111 lb 8 oz (50.6 kg)  LMP  (Exact Date)  SpO2 100%  Breastfeeding? No  BMI 20.39 kg/m2 Estimated body mass index is 20.39 kg/(m^2) as calculated from the following:    Height as of this encounter: 5' 2\" (1.575 m).    Weight as of this encounter: 111 lb 8 oz (50.6 kg).  Medication Reconciliation: complete     Italia Campbell, KYMBERLY      "

## 2017-09-18 NOTE — MR AVS SNAPSHOT
After Visit Summary   9/18/2017    Danielle Bryan    MRN: [id number]           Patient Information     Date Of Birth          1945        Visit Information        Provider Department      9/18/2017 8:40 AM Caroline Casper MD [facility]        Care Instructions    Total calcium about 1200 mg per day.   4 servings of dairy or fortified juice would be enough to reach this.   If you are taking just 2 servings, try to add back calcium supplements by taking every other day at first and adjusting fiber, stool softener to help.   You can consider getting the calcium with magnesium if the bowels are still a problem.   Check the dose of the calcium: if you get 2 servings of dairy you want 500-600 mg. If you are taking 3 servings, you would only need 250-300 mg supplement.     Take Vitamin D 1000 or 2000 unit capsule once a day.           Follow-ups after your visit        Your next 10 appointments already scheduled     Sep 20, 2017  8:30 AM CDT   Nurse Only with RI IM NURSE   [facility]    [address]  [facility] [address]   [phone]            Oct 05, 2017  8:30 AM CDT   Nurse Only with RI IM NURSE   [facility]    [address]  [facility] [address]   [phone]            Oct 18, 2017 11:20 AM CDT   SHORT with Colt Riley MD   [facility]    [address]  [facility] [address]   [phone]            Oct 19, 2017  7:40 AM CDT   Pre-Op physical with Caroline Casper MD   [facility]    [address]  [facility] [address]   [phone]            Nov 02, 2017  8:30 AM CDT   Nurse Only with RI IM NURSE   [facility]    [address]  [facility] [address]   [phone]            Nov 16,  "2017  8:30 AM CST   Nurse Only with RI IM NURSE   [facility]    [address]  [facility] [id number] [phone]            Nov 30, 2017  8:30 AM CST   Nurse Only with RI IM NURSE   [facility] ([facility])    [address]  [facility] [id number] [phone]              Who to contact     If you have questions or need follow up information about today's clinic visit or your schedule please contact [facility] directly at [phone].  Normal or non-critical lab and imaging results will be communicated to you by MyChart, letter or phone within 4 business days after the clinic has received the results. If you do not hear from us within 7 days, please contact the clinic through [employer] or phone. If you have a critical or abnormal lab result, we will notify you by phone as soon as possible.  Submit refill requests through [employer] or call your pharmacy and they will forward the refill request to us. Please allow 3 business days for your refill to be completed.          Additional Information About Your Visit        [employer] Information     [employer] gives you secure access to your electronic health record. If you see a primary care provider, you can also send messages to your care team and make appointments. If you have questions, please call your primary care clinic.  If you do not have a primary care provider, please call [phone] and they will assist you.        Care EveryWhere ID     This is your Care EveryWhere ID. This could be used by other organizations to access your [facility] medical records  [id number]        Your Vitals Were     Pulse Temperature Height Last Period Pulse Oximetry Breastfeeding?    87 98  F (36.7  C) (Oral) 5' 2\" (1.575 m) (Exact Date) 100% No    BMI (Body Mass Index)                   20.39 kg/m2            Blood Pressure from Last 3 Encounters:   09/18/17 106/58 "   07/25/17 106/60   06/09/17 104/68    Weight from Last 3 Encounters:   09/18/17 111 lb 8 oz (50.6 kg)   07/25/17 115 lb (52.2 kg)   06/09/17 115 lb (52.2 kg)              Today, you had the following     No orders found for display       Primary Care Provider Office Phone # Fax #    Colt Riley [phone]       [address] [facility] [address]  [facility] [address]        Equal Access to Services     [facility] : Hadii aad ku hadasho Soomaali, waaxda luqadaha, qaybta kaalmada adeegyada, waxay idiin hayaan adesantosh ladd . So [facility] [phone].    ATENCIÓN: Si habla español, tiene a moore disposición servicios gratuitos de asistencia lingüística. Ll[facility] [phone].    We comply with applicable federal civil rights laws and [place] laws. We do not discriminate on the basis of race, color, national origin, age, disability sex, sexual orientation or gender identity.            Thank you!     Thank you for choosing [facility]  for your care. Our goal is always to provide you with excellent care. Hearing back from our patients is one way we can continue to improve our services. Please take a few minutes to complete the written survey that you may receive in the mail after your visit with us. Thank you!             Your Updated Medication List - Protect others around you: Learn how to safely use, store and throw away your medicines at www.disposemymeds.org.          This list is accurate as of: 9/18/17  9:32 AM.  Always use your most recent med list.                   Brand Name Dispense Instructions for use Diagnosis    aspirin 81 MG tablet     30 tablet    Take 1 tablet (81 mg) by mouth daily        cetirizine 10 MG tablet    zyrTEC     Take 10 mg by mouth as needed for allergies        cyanocobalamin 1000 MCG/ML injection    VITAMIN B12     Inject 1 mL into the muscle every 14 days        docusate sodium 100 MG tablet    COLACE     Take 100 mg by mouth as needed         ipratropium 0.06 % spray    ATROVENT    1 Box    Spray 2 sprays into both nostrils 4 times daily as needed for rhinitis    Chronic rhinitis       order for DME     2 Units    Equipment being ordered: Wigs    Alopecia areata       polyethylene glycol 0.4%- propylene glycol 0.3% 0.4-0.3 % Soln ophthalmic solution    SYSTANE ULTRA     Place 1 drop into both eyes every hour as needed for dry eyes        pravastatin 20 MG tablet    PRAVACHOL    90 tablet    Take 1 tablet (20 mg) by mouth daily    Hyperlipidemia LDL goal <100       ranitidine 150 MG tablet    ZANTAC     Take 150 mg by mouth as needed        replens Gel     70 g    Place 1 Dose vaginally daily    Vaginal atrophy       sodium chloride 0.65 % nasal spray    OCEAN     Spray 1 spray into both nostrils daily as needed for congestion

## 2017-09-20 ENCOUNTER — ALLIED HEALTH/NURSE VISIT (OUTPATIENT)
Dept: NURSING | Facility: CLINIC | Age: 72
End: 2017-09-20
Payer: COMMERCIAL

## 2017-09-20 DIAGNOSIS — E53.8 VITAMIN B12 DEFICIENCY (NON ANEMIC): ICD-10-CM

## 2017-09-20 PROCEDURE — 96372 THER/PROPH/DIAG INJ SC/IM: CPT

## 2017-09-20 NOTE — NURSING NOTE
Prior to injection verified patient identity using patient's name and date of birth.    Patient was told to wait 15 minutes after B12 injection to make sure there's no reaction prior to leaving the clinic and to report any allergic reaction to me immediately.

## 2017-09-20 NOTE — MR AVS SNAPSHOT
After Visit Summary   9/20/2017    Danielle Bryan    MRN: [id number]           Patient Information     Date Of Birth          1945        Visit Information        Provider Department      9/20/2017 8:30 AM RI IM NURSE [facility]        Today's Diagnoses     Vitamin B12 deficiency (non anemic)           Follow-ups after your visit        Your next 10 appointments already scheduled     Oct 05, 2017  8:30 AM CDT   Nurse Only with RI IM NURSE   [facility]    [address]  [facility] [address]   [phone]            Oct 18, 2017 11:20 AM CDT   SHORT with Colt Riley MD   [facility]    [address]  [facility] [address]   [phone]            Oct 19, 2017  7:40 AM CDT   Pre-Op physical with Caroline Casper MD   [facility]    [address]  [facility] [address]   [phone]            Nov 02, 2017  8:30 AM CDT   Nurse Only with RI IM NURSE   [facility]    [address]  [facility] [address]   [phone]            Nov 16, 2017  8:30 AM CST   Nurse Only with RI IM NURSE   [facility]    [address]  [facility] [address]   [phone]            Nov 30, 2017  8:30 AM CST   Nurse Only with RI IM NURSE   [facility]    [address]  [facility] [address]   [phone]              Who to contact     If you have questions or need follow up information about today's clinic visit or your schedule please contact [facility] directly at [phone].  Normal or non-critical lab and imaging results will be communicated to you by MyChart, letter or phone within 4 business days after the clinic has received the  results. If you do not hear from us within 7 days, please contact the clinic through [employer] or phone. If you have a critical or abnormal lab result, we will notify you by phone as soon as possible.  Submit refill requests through [employer] or call your pharmacy and they will forward the refill request to us. Please allow 3 business days for your refill to be completed.          Additional Information About Your Visit        [employer] Information     [employer] gives you secure access to your electronic health record. If you see a primary care provider, you can also send messages to your care team and make appointments. If you have questions, please call your primary care clinic.  If you do not have a primary care provider, please call [phone] and they will assist you.        Care EveryWhere ID     This is your Care EveryWhere ID. This could be used by other organizations to access your [place] medical records  [id number]        Your Vitals Were     Last Period                   (Exact Date)            Blood Pressure from Last 3 Encounters:   09/18/17 106/58   07/25/17 106/60   06/09/17 104/68    Weight from Last 3 Encounters:   09/18/17 111 lb 8 oz (50.6 kg)   07/25/17 115 lb (52.2 kg)   06/09/17 115 lb (52.2 kg)              We Performed the Following     VITAMIN B12 1000 mcg (standing order with a count of 15)        Primary Care Provider Office Phone # Fax #    Colt Riley [phone]       [address] [facility] [address]  [facility] [address]        Equal Access to Services     [facility] : Hadii aad ku hadasho Soomaali, waaxda luqadaha, qaybta kaalmada adeegyastaci, pinky ladd . So [facility] [phone].    ATENCIÓN: Si habla español, tiene a moore disposición servicios gratuitos de asistencia lingüística. Llame al [phone].    We comply with applicable federal civil rights laws and [place] laws. We do not discriminate on the basis of race, color, national origin, age,  disability sex, sexual orientation or gender identity.            Thank you!     Thank you for choosing [facility]  for your care. Our goal is always to provide you with excellent care. Hearing back from our patients is one way we can continue to improve our services. Please take a few minutes to complete the written survey that you may receive in the mail after your visit with us. Thank you!             Your Updated Medication List - Protect others around you: Learn how to safely use, store and throw away your medicines at www.disposemymeds.org.          This list is accurate as of: 9/20/17  8:50 AM.  Always use your most recent med list.                   Brand Name Dispense Instructions for use Diagnosis    aspirin 81 MG tablet     30 tablet    Take 1 tablet (81 mg) by mouth daily        cetirizine 10 MG tablet    zyrTEC     Take 10 mg by mouth as needed for allergies        cyanocobalamin 1000 MCG/ML injection    VITAMIN B12     Inject 1 mL into the muscle every 14 days        docusate sodium 100 MG tablet    COLACE     Take 100 mg by mouth as needed        ipratropium 0.06 % spray    ATROVENT    1 Box    Spray 2 sprays into both nostrils 4 times daily as needed for rhinitis    Chronic rhinitis       order for DME     2 Units    Equipment being ordered: Wigs    Alopecia areata       polyethylene glycol 0.4%- propylene glycol 0.3% 0.4-0.3 % Soln ophthalmic solution    SYSTANE ULTRA     Place 1 drop into both eyes every hour as needed for dry eyes        pravastatin 20 MG tablet    PRAVACHOL    90 tablet    Take 1 tablet (20 mg) by mouth daily    Hyperlipidemia LDL goal <100       ranitidine 150 MG tablet    ZANTAC     Take 150 mg by mouth as needed        replens Gel     70 g    Place 1 Dose vaginally daily    Vaginal atrophy       sodium chloride 0.65 % nasal spray    OCEAN     Spray 1 spray into both nostrils daily as needed for congestion

## 2017-10-05 ENCOUNTER — ALLIED HEALTH/NURSE VISIT (OUTPATIENT)
Dept: NURSING | Facility: CLINIC | Age: 72
End: 2017-10-05
Payer: COMMERCIAL

## 2017-10-05 DIAGNOSIS — E53.8 VITAMIN B12 DEFICIENCY (NON ANEMIC): ICD-10-CM

## 2017-10-05 PROCEDURE — 96372 THER/PROPH/DIAG INJ SC/IM: CPT

## 2017-10-06 ENCOUNTER — TRANSFERRED RECORDS (OUTPATIENT)
Dept: HEALTH INFORMATION MANAGEMENT | Facility: CLINIC | Age: 72
End: 2017-10-06

## 2017-10-18 ENCOUNTER — OFFICE VISIT (OUTPATIENT)
Dept: INTERNAL MEDICINE | Facility: CLINIC | Age: 72
End: 2017-10-18
Payer: COMMERCIAL

## 2017-10-18 VITALS
TEMPERATURE: 98.2 F | BODY MASS INDEX: 20.8 KG/M2 | OXYGEN SATURATION: 99 % | DIASTOLIC BLOOD PRESSURE: 72 MMHG | HEIGHT: 62 IN | HEART RATE: 78 BPM | SYSTOLIC BLOOD PRESSURE: 110 MMHG | WEIGHT: 113 LBS

## 2017-10-18 DIAGNOSIS — R79.89 LOW VITAMIN B12 LEVEL: ICD-10-CM

## 2017-10-18 DIAGNOSIS — E53.8 VITAMIN B12 DEFICIENCY (NON ANEMIC): ICD-10-CM

## 2017-10-18 DIAGNOSIS — Z01.818 PREOPERATIVE EXAMINATION: Primary | ICD-10-CM

## 2017-10-18 DIAGNOSIS — E78.5 HYPERLIPIDEMIA WITH TARGET LDL LESS THAN 100: ICD-10-CM

## 2017-10-18 DIAGNOSIS — K59.00 CONSTIPATION, UNSPECIFIED CONSTIPATION TYPE: ICD-10-CM

## 2017-10-18 DIAGNOSIS — M65.30 TRIGGER FINGER, ACQUIRED: ICD-10-CM

## 2017-10-18 DIAGNOSIS — M72.0 DUPUYTREN'S CONTRACTURE OF RIGHT HAND: ICD-10-CM

## 2017-10-18 DIAGNOSIS — M81.0 AGE-RELATED OSTEOPOROSIS WITHOUT CURRENT PATHOLOGICAL FRACTURE: ICD-10-CM

## 2017-10-18 LAB
ERYTHROCYTE [DISTWIDTH] IN BLOOD BY AUTOMATED COUNT: 14 % (ref 10–15)
HCT VFR BLD AUTO: 38.6 % (ref 35–47)
HGB BLD-MCNC: 12.4 G/DL (ref 11.7–15.7)
MCH RBC QN AUTO: 30 PG (ref 26.5–33)
MCHC RBC AUTO-ENTMCNC: 32.1 G/DL (ref 31.5–36.5)
MCV RBC AUTO: 93 FL (ref 78–100)
PLATELET # BLD AUTO: 210 10E9/L (ref 150–450)
RBC # BLD AUTO: 4.14 10E12/L (ref 3.8–5.2)
WBC # BLD AUTO: 5.4 10E9/L (ref 4–11)

## 2017-10-18 PROCEDURE — 96372 THER/PROPH/DIAG INJ SC/IM: CPT | Performed by: INTERNAL MEDICINE

## 2017-10-18 PROCEDURE — 80061 LIPID PANEL: CPT | Performed by: INTERNAL MEDICINE

## 2017-10-18 PROCEDURE — 80053 COMPREHEN METABOLIC PANEL: CPT | Performed by: INTERNAL MEDICINE

## 2017-10-18 PROCEDURE — 85027 COMPLETE CBC AUTOMATED: CPT | Performed by: INTERNAL MEDICINE

## 2017-10-18 PROCEDURE — 36415 COLL VENOUS BLD VENIPUNCTURE: CPT | Performed by: INTERNAL MEDICINE

## 2017-10-18 PROCEDURE — 99214 OFFICE O/P EST MOD 30 MIN: CPT | Mod: 25 | Performed by: INTERNAL MEDICINE

## 2017-10-18 PROCEDURE — 93000 ELECTROCARDIOGRAM COMPLETE: CPT | Performed by: INTERNAL MEDICINE

## 2017-10-18 RX ORDER — AMOXICILLIN 250 MG
1-2 CAPSULE ORAL 2 TIMES DAILY PRN
Qty: 120 TABLET | Refills: 3 | Status: SHIPPED | OUTPATIENT
Start: 2017-10-18 | End: 2018-09-18

## 2017-10-18 NOTE — PROGRESS NOTES
[place] [address]  [place] [address]  [phone]  Dept: [phone]    PRE-OP EVALUATION:  Today's date: 10/18/2017    Danielle Bryan (: 1945) presents for pre-operative evaluation assessment as requested by Dr. Deniz Velazquez.  She requires evaluation and anesthesia risk assessment prior to undergoing surgery/procedure for treatment of right hand/fingers .  Proposed procedure: trigger finger release, z plasty including excision of dupuytren cord    Date of Surgery/ Procedure: 10/26/17  Time of Surgery/ Procedure:   Hospital/Surgical Facility: [facility] [place]  Fax number for surgical facility:   Primary Physician: Colt Riley  Type of Anesthesia Anticipated: General    Patient has a Health Care Directive or Living Will:  YES     1. YES - Do you have a history of heart attack, stroke, stent, bypass or surgery on an artery in the head, neck, heart or legs? TIA in   2. NO - Do you ever have any pain or discomfort in your chest?  3. NO - Do you have a history of  Heart Failure?  4. YES - Are you troubled by shortness of breath when: walking on the level, up a slight hill or at night? Sometimes notes dyspnea with more vigorous exertion  5. NO - Do you currently have a cold, bronchitis or other respiratory infection?  6. NO - Do you have a cough, shortness of breath or wheezing?  7. YES - Do you sometimes get pains in the calves of your legs when you walk? sometimes  8. NO - Do you or anyone in your family have previous history of blood clots?  9. NO - Do you or does anyone in your family have a serious bleeding problem such as prolonged bleeding following surgeries or cuts?  10. NO - Have you ever had problems with anemia or been told to take iron pills?  11. NO - Have you had any abnormal blood loss such as black, tarry or bloody stools, or abnormal vaginal bleeding?  12. YES - Have you ever had a blood transfusion? With an ectopic pregnancy in  1972  13. NO - Have you or any of your relatives ever had problems with anesthesia?  14. NO - Do you have sleep apnea, excessive snoring or daytime drowsiness?  15. NO - Do you have any prosthetic heart valves?  16. NO - Do you have prosthetic joints?  17. NO - Is there any chance that you may be pregnant?    HPI:                                                      Brief HPI related to upcoming procedure: trigger finger release, z plasty including excision of dupuytren cord    The trigger finger is on her right hand. Surgery will be performed by Dr. Deniz Velazquez.     SOB  Patient reports SOB with activity, mostly when going up hills. Last echocardiogram was 12/18/2014 and was normal.    Allergic rhinitis   Patient takes OTC generic Zyrtec for allergy symptoms.      Constipation  She uses a stool softener and eats prunes to help alleviate constipation. She will occasionally use miralax if needed.      Osteoporosis  Patient has not taken any medication in the past for the osteoporosis. She started taking Citracal but discontinued because it was making her constipated. She has previously required dilation at her lower esophageal sphincter, and we've agreed that she should avoid oral bisphosphonates. Suggested intravenous options and patient agreed if it is covered by insurance.     Past/recent records reviewed and discussed for:  -She reports occasional buzzing in the ears.   -Patient takes 81 mg of aspirin daily and reports bruising on the arms bilaterally.     See problem list for active medical problems.  Problems all longstanding and stable, except as noted/documented.  See ROS for pertinent symptoms related to these conditions.                                                                                                    MEDICAL HISTORY:                                                    Patient Active Problem List    Diagnosis Date Noted     Osteoporosis 12/14/2016     Priority: Medium     Vitamin B12  deficiency (non anemic) 2015     Priority: Medium     Hyperlipidemia with target LDL less than 100 2015     Priority: Medium     Diagnosis updated by automated process. Provider to review and confirm.       Transient cerebral ischemia 2014     Priority: Medium     Diagnosis updated by automated process. Provider to review and confirm.       Allergic rhinitis 2014     Priority: Medium     Bartholin's cyst 2014     Priority: Medium     Alopecia areata 2013     Priority: Medium     Advanced directives, counseling/discussion 2013     Priority: Medium     Patient states has Advance Directive and will bring in a copy to clinic.         Sprain of jaw 2008     Priority: Medium      Past Medical History:   Diagnosis Date     Alopecia areata     Requires a wig now     Cerebral infarction (H)     TIA     Dysphagia     Botox/EGD dilation at [facility], most recent 2013     Hyperlipemia      PONV (postoperative nausea and vomiting)      Recurrent UTI     Believed related in part to atrophic vaginitis     Past Surgical History:   Procedure Laterality Date     ABDOMEN SURGERY  1975    [facility]     COLONOSCOPY  1/15/2014    Dr. Henry [facility]     COLONOSCOPY  1/15/2014    Procedure: COLONOSCOPY;  Colonoscopy ;  Surgeon: Esteban Henry MD;  Location:  GI     EYE SURGERY      bilateral cataract surgery     GYN SURGERY      TUBAL PREGNANCY, D&C,  X 2     HEAD & NECK SURGERY      THYROIDECTOMY - partial-right side     RELEASE TRIGGER FINGER  2012    Procedure: RELEASE TRIGGER FINGER;  RIGHT THUMB, MIDDLE AND RING TRIGGER RELEASES;  Surgeon: Deniz Velazquez MD;  Location: [facility]     Current Outpatient Prescriptions   Medication Sig Dispense Refill     senna-docusate (SENOKOT-S;PERICOLACE) 8.6-50 MG per tablet Take 1-2 tablets by mouth 2 times daily as needed for constipation 120 tablet 3     Vaginal Lubricant (REPLENS) GEL Place 1 Dose vaginally daily 70 g 11      pravastatin (PRAVACHOL) 20 MG tablet Take 1 tablet (20 mg) by mouth daily 90 tablet 3     polyethylene glycol 0.4%- propylene glycol 0.3% (SYSTANE ULTRA) 0.4-0.3 % SOLN ophthalmic solution Place 1 drop into both eyes every hour as needed for dry eyes       aspirin 81 MG tablet Take 1 tablet (81 mg) by mouth daily 30 tablet 11     cyanocobalamin (VITAMIN B12) 1000 MCG/ML injection Inject 1 mL into the muscle every 14 days       order for DME Equipment being ordered: Wigs 2 Units 0     cetirizine (ZYRTEC) 10 MG tablet Take 10 mg by mouth as needed for allergies       ipratropium (ATROVENT) 0.06 % nasal spray Spray 2 sprays into both nostrils 4 times daily as needed for rhinitis 1 Box 1     sodium chloride (OCEAN) 0.65 % nasal spray Spray 1 spray into both nostrils daily as needed for congestion       ranitidine (ZANTAC) 150 MG tablet Take 150 mg by mouth as needed        [DISCONTINUED] ORDER FOR DME Equipment being ordered: Wigs 2 Units 0     OTC products: None, except as noted above    Allergies   Allergen Reactions     Ciprofloxacin      Sores in mouth and throat felt funny      Latex Allergy: NO    Social History   Substance Use Topics     Smoking status: Never Smoker     Smokeless tobacco: Never Used     Alcohol use Yes      Comment: Rare.      History   Drug Use No     REVIEW OF SYSTEMS:                                                    C: NEGATIVE for fever, chills, change in weight  I: NEGATIVE for worrisome rashes, moles or lesions  E: NEGATIVE for vision changes or irritation  E/M: NEGATIVE for ear, mouth and throat problems  R: POSITIVE for SOB with exertion NEGATIVE for significant cough   B: NEGATIVE for masses, tenderness or discharge  CV: NEGATIVE for chest pain, palpitations or peripheral edema  GI: POSITIVE for constipation NEGATIVE for nausea, abdominal pain, heartburn  : NEGATIVE for frequency, dysuria, or hematuria  M: NEGATIVE for significant arthralgias or myalgia  N: NEGATIVE for weakness,  "dizziness or paresthesias  E: NEGATIVE for temperature intolerance, skin/hair changes  H: NEGATIVE for bleeding problems  P: NEGATIVE for changes in mood or affect    This document serves as a record of the services and decisions personally performed and made by Colt Riley MD. It was created on his behalf by Maya Ulloa, a trained medical scribe. The creation of this document is based on the provider's statements to the medical scribe.  Maya Ulloa October 18, 2017 11:56 AM     EXAM:                                                    /72 (BP Location: Left arm, Cuff Size: Adult Regular)  Pulse 78  Temp 98.2  F (36.8  C) (Oral)  Ht 1.575 m (5' 2\")  Wt 51.3 kg (113 lb)  LMP  (Exact Date)  SpO2 99%  Breastfeeding? No  BMI 20.67 kg/m2      GENERAL APPEARANCE: healthy, alert and no distress     EYES: EOMI, PERRL     HENT: ear canals and TM's normal and nose and mouth without ulcers or lesions     NECK: no adenopathy, no asymmetry, masses, or scars and thyroid normal to palpation     RESP: lungs clear to auscultation - no rales, rhonchi or wheezes     CV: regular rates and rhythm, normal S1 S2, no S3 or S4 and no murmur, click or rub     ABDOMEN:  soft, nontender, no HSM or masses and bowel sounds normal        MS: extremities normal- no gross deformities noted, no evidence of inflammation in joints, FROM in all extremities.     SKIN: no suspicious lesions or rashes     NEURO: Normal strength and tone, sensory exam grossly normal, mentation intact and speech normal     PSYCH: mentation appears normal. and affect normal/bright     LYMPHATICS: No axillary, cervical, or supraclavicular nodes    DIAGNOSTICS:                                                      EKG: appears normal, NSR, normal axis, normal intervals, no acute ST/T changes c/w ischemia, no LVH by voltage criteria, unchanged from previous tracings  Labs Resulted Today:   Results for orders placed or performed in visit on 09/18/17   TSH " with free T4 reflex   Result Value Ref Range    TSH 1.63 0.40 - 4.00 mU/L     Labs Drawn and in Process:   Unresulted Labs Ordered in the Past 30 Days of this Admission     No orders found from 8/19/2017 to 10/19/2017.        Recent Labs   Lab Test  11/25/16   0800  11/16/15   0937   12/18/14   1000   HGB  12.8  12.7   --   13.0   PLT  205  190   --   232   INR   --    --    --   0.97   NA  146*  143   < >  143   POTASSIUM  4.0  4.0   < >  4.1   CR  0.79  0.72   < >  0.78    < > = values in this interval not displayed.      IMPRESSION:                                                    Reason for surgery/procedure: trigger finger release, z plasty including excision of dupuytren cord  Diagnosis/reason for consult: Clearance for surgery    The proposed surgical procedure is considered INTERMEDIATE risk.    REVISED CARDIAC RISK INDEX  The patient has the following serious cardiovascular risks for perioperative complications such as (MI, PE, VFib and 3  AV Block):  Cerebrovascular Disease (TIA or CVA)  INTERPRETATION: 1 risks: Class II (low risk - 0.9% complication rate)    The patient has the following additional risks for perioperative complications:  No identified additional risks      ICD-10-CM    1. Preoperative examination Z01.818 EKG 12-lead complete w/read - Clinics     Comprehensive metabolic panel     CBC with platelets   2. Dupuytren's contracture of right hand M72.0    3. Trigger finger, acquired M65.30    4. Hyperlipidemia with target LDL less than 100 E78.5 Comprehensive metabolic panel     Lipid panel reflex to direct LDL   5. Constipation, unspecified constipation type K59.00 senna-docusate (SENOKOT-S;PERICOLACE) 8.6-50 MG per tablet   6. Vitamin B12 deficiency (non anemic) E53.8 VITAMIN B12 1000 mcg (standing order with a count of 15)   7. Low vitamin B12 level E53.8 INJECTION INTRAMUSCULAR OR SUB-Q     Osteoporosis: appears to be a candidate for IV bisphosphonates.     RECOMMENDATIONS:                                                         APPROVAL GIVEN to proceed with proposed procedure, without further diagnostic evaluation    Dr Riley to investigate IV options for osteoporosis treatment.     Okay to go ahead with surgery as planned. May skip any morning medications the morning of surgery.     Stop by the lab (suite 120) to update blood work before you leave the clinic today.      The information in this document, created by the medical scribe for me, accurately reflects the services I personally performed and the decisions made by me. I have reviewed and approved this document for accuracy prior to leaving the patient care area.  October 18, 2017 11:55 AM    Signed Electronically by: Colt Riley MD    Copy of this evaluation report is provided to requesting physician.    [place] Preop Guidelines

## 2017-10-18 NOTE — PATIENT INSTRUCTIONS
Dr Riley to investigate IV options for osteoporosis treatment.     Okay to go ahead with surgery as planned. May skip any morning medications the morning of surgery.     Stop by the lab (suite 120) to update blood work before you leave the clinic today.

## 2017-10-18 NOTE — MR AVS SNAPSHOT
After Visit Summary   10/18/2017    Danielle Bryan    MRN: [id number]           Patient Information     Date Of Birth          1945        Visit Information        Provider Department      10/18/2017 11:20 AM Colt Riley MD [facility]        Today's Diagnoses     Preoperative examination    -  1    Dupuytren's contracture of right hand        Trigger finger, acquired        Hyperlipidemia with target LDL less than 100        Constipation, unspecified constipation type        Vitamin B12 deficiency (non anemic)        Low vitamin B12 level          Care Instructions    Dr Riley to investigate IV options for osteoporosis treatment.     Okay to go ahead with surgery as planned. May skip any morning medications the morning of surgery.     Stop by the lab (suite 120) to update blood work before you leave the clinic today.             Follow-ups after your visit        Your next 10 appointments already scheduled     Nov 02, 2017  8:30 AM CDT   Nurse Only with RI IM NURSE   [facility] [id number] [phone]            Nov 16, 2017  8:30 AM CST   Nurse Only with RI IM NURSE   [facility] ([facility])    [address] [facility] [id number] [phone]            Nov 30, 2017  8:30 AM CST   Nurse Only with RI IM NURSE   [facility]    [address] [facility] [address] [phone]              Who to contact     If you have questions or need follow up information about today's clinic visit or your schedule please contact [facility] directly at [phone].  Normal or non-critical lab and imaging results will be communicated to you by MyChart, letter or phone within 4 business days after the clinic has received the results. If you do not hear from us within 7 days,  "please contact the clinic through [facility] or phone. If you have a critical or abnormal lab result, we will notify you by phone as soon as possible.  Submit refill requests through [employer] or call your pharmacy and they will forward the refill request to us. Please allow 3 business days for your refill to be completed.          Additional Information About Your Visit        [facility] [employer] [facility] [employer] Information     [facility] gives you secure access to your electronic health record. If you see a primary care provider, you can also send messages to your care team and make appointments. If you have questions, please call your primary care clinic.  If you do not have a primary care provider, please call [phone] and they will assist you.        Care EveryWhere ID     This is your Care EveryWhere ID. This could be used by other organizations to access your [place] medical records  [id number]        Your Vitals Were     Pulse Temperature Height Last Period Pulse Oximetry Breastfeeding?    78 98.2  F (36.8  C) (Oral) 5' 2\" (1.575 m) (Exact Date) 99% No    BMI (Body Mass Index)                   20.67 kg/m2            Blood Pressure from Last 3 Encounters:   10/18/17 110/72   09/18/17 106/58   07/25/17 106/60    Weight from Last 3 Encounters:   10/18/17 113 lb (51.3 kg)   09/18/17 111 lb 8 oz (50.6 kg)   07/25/17 115 lb (52.2 kg)              We Performed the Following     CBC with platelets     Comprehensive metabolic panel     EKG 12-lead complete w/read - Clinics     INJECTION INTRAMUSCULAR OR SUB-Q     Lipid panel reflex to direct LDL     VITAMIN B12 1000 mcg (standing order with a count of 15)          Today's Medication Changes          These changes are accurate as of: 10/18/17 12:15 PM.  If you have any questions, ask your nurse or doctor.               Start taking these medicines.        Dose/Directions    senna-docusate 8.6-50 MG per tablet   Commonly known as:  SENOKOT-S;PERICOLACE   Used for:  Constipation, unspecified " constipation type   Started by:  Colt Riley MD        Dose:  1-2 tablet   Take 1-2 tablets by mouth 2 times daily as needed for constipation   Quantity:  120 tablet   Refills:  3         Stop taking these medicines if you haven't already. Please contact your care team if you have questions.     docusate sodium 100 MG tablet   Commonly known as:  COLACE   Stopped by:  Colt Riley MD                Where to get your medicines      These medications [place] [id number] [place] [facility] [place] [address] [address]  [address] [address] [address]     Phone:  [phone]     senna-docusate 8.6-50 MG per tablet                Primary Care Provider Office Phone # Fax #    Colt Riley [phone]       [address] [facility] [address]  [facility] [address]        Equal Access to Services     [facility]: Hadii param marsh hadasho Soomaali, waaxda luqadaha, qaybta kaalmada adeegyada, pinky ladd . [facility] [phone].    ATENCIÓN: Si habla español, tiene a moore disposición servicios gratuitos de asistencia lingüística. [facility] [phone].    We comply with applicable federal civil rights laws and [place] laws. We do not discriminate on the basis of race, color, national origin, age, disability, sex, sexual orientation, or gender identity.            Thank you!     Thank you for choosing [facility]  for your care. Our goal is always to provide you with excellent care. Hearing back from our patients is one way we can continue to improve our services. Please take a few minutes to complete the written survey that you may receive in the mail after your visit with us. Thank you!             Your Updated Medication List - Protect others around you: Learn how to safely use, store and throw away your medicines at www.disposemymeds.org.          This list is accurate as of: 10/18/17 12:15 PM.  Always use your most recent med list.                    Brand Name Dispense Instructions for use Diagnosis    aspirin 81 MG tablet     30 tablet    Take 1 tablet (81 mg) by mouth daily        cetirizine 10 MG tablet    zyrTEC     Take 10 mg by mouth as needed for allergies        cyanocobalamin 1000 MCG/ML injection    VITAMIN B12     Inject 1 mL into the muscle every 14 days        ipratropium 0.06 % spray    ATROVENT    1 Box    Spray 2 sprays into both nostrils 4 times daily as needed for rhinitis    Chronic rhinitis       order for DME     2 Units    Equipment being ordered: Wigs    Alopecia areata       polyethylene glycol 0.4%- propylene glycol 0.3% 0.4-0.3 % Soln ophthalmic solution    SYSTANE ULTRA     Place 1 drop into both eyes every hour as needed for dry eyes        pravastatin 20 MG tablet    PRAVACHOL    90 tablet    Take 1 tablet (20 mg) by mouth daily    Hyperlipidemia LDL goal <100       ranitidine 150 MG tablet    ZANTAC     Take 150 mg by mouth as needed        replens Gel     70 g    Place 1 Dose vaginally daily    Vaginal atrophy       senna-docusate 8.6-50 MG per tablet    SENOKOT-S;PERICOLACE    120 tablet    Take 1-2 tablets by mouth 2 times daily as needed for constipation    Constipation, unspecified constipation type       sodium chloride 0.65 % nasal spray    OCEAN     Spray 1 spray into both nostrils daily as needed for congestion

## 2017-10-18 NOTE — NURSING NOTE
"Chief Complaint   Patient presents with     Pre-Op Exam     Dr Velazquez, hand surgery-[facility]       Initial /72 (BP Location: Left arm, Cuff Size: Adult Regular)  Pulse 78  Temp 98.2  F (36.8  C) (Oral)  Ht 5' 2\" (1.575 m)  Wt 113 lb (51.3 kg)  LMP  (Exact Date)  SpO2 99%  Breastfeeding? No  BMI 20.67 kg/m2 Estimated body mass index is 20.67 kg/(m^2) as calculated from the following:    Height as of this encounter: 5' 2\" (1.575 m).    Weight as of this encounter: 113 lb (51.3 kg).  Medication Reconciliation: complete   Berna Choi CMA      "

## 2017-10-19 ENCOUNTER — TELEPHONE (OUTPATIENT)
Dept: INTERNAL MEDICINE | Facility: CLINIC | Age: 72
End: 2017-10-19

## 2017-10-19 LAB
ALBUMIN SERPL-MCNC: 3.7 G/DL (ref 3.4–5)
ALP SERPL-CCNC: 46 U/L (ref 40–150)
ALT SERPL W P-5'-P-CCNC: 32 U/L (ref 0–50)
ANION GAP SERPL CALCULATED.3IONS-SCNC: 8 MMOL/L (ref 3–14)
AST SERPL W P-5'-P-CCNC: 22 U/L (ref 0–45)
BILIRUB SERPL-MCNC: 0.6 MG/DL (ref 0.2–1.3)
BUN SERPL-MCNC: 15 MG/DL (ref 7–30)
CALCIUM SERPL-MCNC: 9.1 MG/DL (ref 8.5–10.1)
CHLORIDE SERPL-SCNC: 110 MMOL/L (ref 94–109)
CHOLEST SERPL-MCNC: 213 MG/DL
CO2 SERPL-SCNC: 25 MMOL/L (ref 20–32)
CREAT SERPL-MCNC: 0.74 MG/DL (ref 0.52–1.04)
GFR SERPL CREATININE-BSD FRML MDRD: 77 ML/MIN/1.7M2
GLUCOSE SERPL-MCNC: 82 MG/DL (ref 70–99)
HDLC SERPL-MCNC: 112 MG/DL
LDLC SERPL CALC-MCNC: 82 MG/DL
NONHDLC SERPL-MCNC: 101 MG/DL
POTASSIUM SERPL-SCNC: 4.5 MMOL/L (ref 3.4–5.3)
PROT SERPL-MCNC: 6.7 G/DL (ref 6.8–8.8)
SODIUM SERPL-SCNC: 143 MMOL/L (ref 133–144)
TRIGL SERPL-MCNC: 93 MG/DL

## 2017-10-19 NOTE — TELEPHONE ENCOUNTER
Patient appears to be a candidate for IV bisphosphonates, either Reclast or Boniva.   Please place order.

## 2017-10-26 ENCOUNTER — HOSPITAL PATHOLOGY (OUTPATIENT)
Dept: OTHER | Facility: CLINIC | Age: 72
End: 2017-10-26

## 2017-10-27 LAB — COPATH REPORT: NORMAL

## 2017-10-27 RX ORDER — ZOLEDRONIC ACID 5 MG/100ML
5 INJECTION, SOLUTION INTRAVENOUS ONCE
Status: CANCELLED
Start: 2017-10-27 | End: 2017-10-27

## 2017-10-27 NOTE — TELEPHONE ENCOUNTER
Patient would like to receive IV Reclast, but would like to defer for a while until she recovers from hand surgery (done yesterday).   We could put in future order for IV Reclast.   She wants to contact us later to schedule, and wait until she recovers a bit from hand surgery.

## 2017-10-27 NOTE — TELEPHONE ENCOUNTER
Orders placed for Reclast infusion. Sent [facility] [employer] message to pt with Infusion Center contact information to schedule when she is ready.

## 2017-11-02 ENCOUNTER — ALLIED HEALTH/NURSE VISIT (OUTPATIENT)
Dept: NURSING | Facility: CLINIC | Age: 72
End: 2017-11-02
Payer: COMMERCIAL

## 2017-11-02 DIAGNOSIS — E53.8 VITAMIN B12 DEFICIENCY (NON ANEMIC): ICD-10-CM

## 2017-11-02 PROCEDURE — 96372 THER/PROPH/DIAG INJ SC/IM: CPT

## 2017-11-16 ENCOUNTER — ALLIED HEALTH/NURSE VISIT (OUTPATIENT)
Dept: NURSING | Facility: CLINIC | Age: 72
End: 2017-11-16
Payer: COMMERCIAL

## 2017-11-16 DIAGNOSIS — R79.89 LOW VITAMIN B12 LEVEL: ICD-10-CM

## 2017-11-16 DIAGNOSIS — E53.8 VITAMIN B12 DEFICIENCY (NON ANEMIC): ICD-10-CM

## 2017-11-16 PROCEDURE — 99207 ZZC NO CHARGE NURSE ONLY: CPT | Mod: 25

## 2017-11-16 PROCEDURE — 96372 THER/PROPH/DIAG INJ SC/IM: CPT

## 2017-11-30 ENCOUNTER — ALLIED HEALTH/NURSE VISIT (OUTPATIENT)
Dept: NURSING | Facility: CLINIC | Age: 72
End: 2017-11-30
Payer: COMMERCIAL

## 2017-11-30 DIAGNOSIS — E53.8 VITAMIN B12 DEFICIENCY (NON ANEMIC): ICD-10-CM

## 2017-11-30 PROCEDURE — 96372 THER/PROPH/DIAG INJ SC/IM: CPT

## 2017-12-12 ENCOUNTER — TELEPHONE (OUTPATIENT)
Dept: INTERNAL MEDICINE | Facility: CLINIC | Age: 72
End: 2017-12-12

## 2017-12-12 NOTE — TELEPHONE ENCOUNTER
Pt calls, reports she's checking with her insurance on coverage of Reclast. Gave generic name and dx code to assist checking coverage.

## 2017-12-13 NOTE — TELEPHONE ENCOUNTER
Pt calls with additional question regarding Reclast. She asks how often this is given and who bills for it. Informed pt infusion is given once a year and the Infusion Center would submit charges to billing and then [facility] would send her a bill for anything not covered by insurance. Pt will call back with any further questions.

## 2017-12-14 ENCOUNTER — ALLIED HEALTH/NURSE VISIT (OUTPATIENT)
Dept: NURSING | Facility: CLINIC | Age: 72
End: 2017-12-14
Payer: COMMERCIAL

## 2017-12-14 DIAGNOSIS — Z23 NEED FOR PROPHYLACTIC VACCINATION AND INOCULATION AGAINST INFLUENZA: Primary | ICD-10-CM

## 2017-12-14 PROCEDURE — 90662 IIV NO PRSV INCREASED AG IM: CPT

## 2017-12-14 PROCEDURE — G0008 ADMIN INFLUENZA VIRUS VAC: HCPCS

## 2017-12-14 NOTE — MR AVS SNAPSHOT
After Visit Summary   12/14/2017    Danielle Bryan    MRN: [id number]           Patient Information     Date Of Birth          1945        Visit Information        Provider Department      12/14/2017 8:30 AM RI IM NURSE [facility]        Today's Diagnoses     Need for prophylactic vaccination and inoculation against influenza    -  1       Follow-ups after your visit        Your next 10 appointments already scheduled     Dec 15, 2017  9:00 AM CST   Level 1 with RH INFUSION CHAIR 1   [facility] ([facility])    [facility]  [address]  [facility] [address]   [phone]            Dec 28, 2017  8:30 AM CST   Nurse Only with RI IM NURSE   [facility]    [address]  [facility] [address] [phone]            Jan 11, 2018  8:30 AM CST   Nurse Only with RI IM NURSE   [facility]    [address]  [facility] [address]   [phone]            Jan 25, 2018  8:30 AM CST   Nurse Only with RI IM NURSE   [facility]    [address]  [facility] [address]   [phone]            Feb 08, 2018  8:30 AM CST   Nurse Only with RI IM NURSE   [facility]    [address]  [facility] [address]   [phone]            Feb 22, 2018  8:30 AM CST   Nurse Only with RI IM NURSE   [facility]    [address]  [facility] [address] [phone]              Who to contact     If you have questions or need follow up information about today's clinic visit or your schedule please contact [facility] directly at [phone].  Normal or non-critical lab and imaging results will be communicated to you by  MyChart, letter or phone within 4 business days after the clinic has received the results. If you do not hear from us within 7 days, please contact the clinic through [employer] or phone. If you have a critical or abnormal lab result, we will notify you by phone as soon as possible.  Submit refill requests through [employer] or call your pharmacy and they will forward the refill request to us. Please allow 3 business days for your refill to be completed.          Additional Information About Your Visit        [employer] Information     [employer] gives you secure access to your electronic health record. If you see a primary care provider, you can also send messages to your care team and make appointments. If you have questions, please call your primary care clinic.  If you do not have a primary care provider, please call [phone] and they will assist you.        Care EveryWhere ID     This is your Care EveryWhere ID. This could be used by other organizations to access your [facility] medical records  [id number]        Your Vitals Were     Last Period                   (Exact Date)            Blood Pressure from Last 3 Encounters:   10/18/17 110/72   09/18/17 106/58   07/25/17 106/60    Weight from Last 3 Encounters:   10/18/17 113 lb (51.3 kg)   09/18/17 111 lb 8 oz (50.6 kg)   07/25/17 115 lb (52.2 kg)              We Performed the Following     FLU VACCINE, INCREASED ANTIGEN, PRESV FREE, AGE 65+ [63721]     Vaccine Administration, Each Additional [27552]     Vaccine Administration, Initial [34290]        Primary Care Provider Office Phone # Fax #    Colt Riley [phone]       [address] [facility] [address]  [facility] [address]        Equal Access to Services     [facility]: Hadii parma Hughes, waaxda luqadaha, qaybta kaalmapinky esquivel. So [facility] [phone].    ATENCIÓN: Si habla español, tiene a moore disposición servicios gratuitos de asistencia  lingüísticaAnju Osborne al [phone].    We comply with applicable federal civil rights laws and [place] laws. We do not discriminate on the basis of race, color, national origin, age, disability, sex, sexual orientation, or gender identity.            Thank you!     Thank you for choosing [facility]  for your care. Our goal is always to provide you with excellent care. Hearing back from our patients is one way we can continue to improve our services. Please take a few minutes to complete the written survey that you may receive in the mail after your visit with us. Thank you!             Your Updated Medication List - Protect others around you: Learn how to safely use, store and throw away your medicines at www.disposemymeds.org.          This list is accurate as of: 12/14/17  9:09 AM.  Always use your most recent med list.                   Brand Name Dispense Instructions for use Diagnosis    aspirin 81 MG tablet     30 tablet    Take 1 tablet (81 mg) by mouth daily        cetirizine 10 MG tablet    zyrTEC     Take 10 mg by mouth as needed for allergies        cyanocobalamin 1000 MCG/ML injection    VITAMIN B12     Inject 1 mL into the muscle every 14 days        ipratropium 0.06 % spray    ATROVENT    1 Box    Spray 2 sprays into both nostrils 4 times daily as needed for rhinitis    Chronic rhinitis       order for DME     2 Units    Equipment being ordered: Wigs    Alopecia areata       polyethylene glycol 0.4%- propylene glycol 0.3% 0.4-0.3 % Soln ophthalmic solution    SYSTANE ULTRA     Place 1 drop into both eyes every hour as needed for dry eyes        pravastatin 20 MG tablet    PRAVACHOL    90 tablet    Take 1 tablet (20 mg) by mouth daily    Hyperlipidemia LDL goal <100       ranitidine 150 MG tablet    ZANTAC     Take 150 mg by mouth as needed        replens Gel     70 g    Place 1 Dose vaginally daily    Vaginal atrophy       senna-docusate 8.6-50 MG per tablet    SENOKOT-S;PERICOLACE     120 tablet    Take 1-2 tablets by mouth 2 times daily as needed for constipation    Constipation, unspecified constipation type       sodium chloride 0.65 % nasal spray    OCEAN     Spray 1 spray into both nostrils daily as needed for congestion

## 2017-12-14 NOTE — PROGRESS NOTES
2.Injectable Influenza Immunization Documentation    1.  Is the person to be vaccinated sick today?   No    2. Does the person to be vaccinated have an allergy to a component   of the vaccine?   No  Egg Allergy Algorithm Link    3. Has the person to be vaccinated ever had a serious reaction   to influenza vaccine in the past?   No    4. Has the person to be vaccinated ever had Guillain-Barré syndrome?   No    Form completed by Italia Campbell [facility]

## 2017-12-14 NOTE — NURSING NOTE
Prior to injection verified patient identity using patient's name and date of birth.  Screening Questionnaire for Adult Immunization    Are you sick today?   No   Do you have allergies to medications, food, a vaccine component or latex?   No   Have you ever had a serious reaction after receiving a vaccination?   No   Do you have a long-term health problem with heart disease, lung disease, asthma, kidney disease, metabolic disease (e.g. diabetes), anemia, or other blood disorder?   No   Do you have cancer, leukemia, HIV/AIDS, or any other immune system problem?   No   In the past 3 months, have you taken medications that affect  your immune system, such as prednisone, other steroids, or anticancer drugs; drugs for the treatment of rheumatoid arthritis, Crohn s disease, or psoriasis; or have you had radiation treatments?   No   Have you had a seizure, or a brain or other nervous system problem?   No   During the past year, have you received a transfusion of blood or blood     products, or been given immune (gamma) globulin or antiviral drug?   No   For women: Are you pregnant or is there a chance you could become        pregnant during the next month?   No   Have you received any vaccinations in the past 4 weeks?   No     Immunization questionnaire answers were all negative.        Per orders of Dr. Riley, injection of B12 and Flu vaccine given by Italia Campbell. Patient instructed to remain in clinic for 15 minutes afterwards, and to report any adverse reaction to me immediately.       Screening performed by Italia Campbell on 12/14/2017 at 9:02 AM.

## 2017-12-15 ENCOUNTER — INFUSION THERAPY VISIT (OUTPATIENT)
Dept: INFUSION THERAPY | Facility: CLINIC | Age: 72
End: 2017-12-15
Attending: INTERNAL MEDICINE
Payer: MEDICARE

## 2017-12-15 VITALS — RESPIRATION RATE: 18 BRPM | HEART RATE: 77 BPM | DIASTOLIC BLOOD PRESSURE: 56 MMHG | SYSTOLIC BLOOD PRESSURE: 121 MMHG

## 2017-12-15 DIAGNOSIS — M81.0 AGE-RELATED OSTEOPOROSIS WITHOUT CURRENT PATHOLOGICAL FRACTURE: Primary | ICD-10-CM

## 2017-12-15 PROCEDURE — 25000128 H RX IP 250 OP 636: Performed by: INTERNAL MEDICINE

## 2017-12-15 PROCEDURE — 96374 THER/PROPH/DIAG INJ IV PUSH: CPT

## 2017-12-15 RX ORDER — ZOLEDRONIC ACID 5 MG/100ML
5 INJECTION, SOLUTION INTRAVENOUS ONCE
Status: COMPLETED | OUTPATIENT
Start: 2017-12-15 | End: 2017-12-15

## 2017-12-15 RX ORDER — ZOLEDRONIC ACID 5 MG/100ML
5 INJECTION, SOLUTION INTRAVENOUS ONCE
Status: CANCELLED
Start: 2017-12-15 | End: 2017-12-15

## 2017-12-15 RX ADMIN — ZOLEDRONIC ACID 5 MG: 0.05 INJECTION, SOLUTION INTRAVENOUS at 09:08

## 2017-12-15 NOTE — MR AVS SNAPSHOT
After Visit Summary   12/15/2017    Danielle Bryan    MRN: [id number]           Patient Information     Date Of Birth          1945        Visit Information        Provider Department      12/15/2017 9:00 AM RH INFUSION CHAIR 1 [facility]        Today's Diagnoses     Age-related osteoporosis without current pathological fracture    -  1       Follow-ups after your visit        Your next 10 appointments already scheduled     Dec 28, 2017  8:30 AM CST   Nurse Only with RI IM NURSE   [facility]    [address]  [facility] [address] [phone]            Jan 11, 2018  8:30 AM CST   Nurse Only with RI IM NURSE   [facility] ([facility]    [address]  [facility] [address] [phone]            Jan 25, 2018  8:30 AM CST   Nurse Only with RI IM NURSE   [facility] ([facility]    [address] [facility] [address] [phone]            Feb 08, 2018  8:30 AM CST   Nurse Only with RI IM NURSE   [facility] ([facility])    [address]  [facility] [address] [phone]            Feb 22, 2018  8:30 AM CST   Nurse Only with RI IM NURSE   [facility] ([facility])    [address]  [facility] [address] [phone]              Who to contact     If you have questions or need follow up information about today's clinic visit or your schedule please contact [facility] INF[facility]ION [facility] directly at [phone].  Normal or non-critical lab and imaging results will be communicated to you by MyChart, letter or phone within 4 business days after the clinic has received the results. If you do not hear from us within 7 days, please contact the clinic through MyChart or phone. If you have a critical  or abnormal lab result, we will notify you by phone as soon as possible.  Submit refill requests through [employer] or call your pharmacy and they will forward the refill request to us. Please allow 3 business days for your refill to be completed.          Additional Information About Your Visit        [employer] Information     [employer] gives you secure access to your electronic health record. If you see a primary care provider, you can also send messages to your care team and make appointments. If you have questions, please call your primary care clinic.  If you do not have a primary care provider, please call [phone] and they will assist you.        Care EveryWhere ID     This is your Care EveryWhere ID. This could be used by other organizations to access your [facility] medical records  [id number]        Your Vitals Were     Pulse Respirations Last Period             77 18 (Exact Date)          Blood Pressure from Last 3 Encounters:   12/15/17 121/56   10/18/17 110/72   09/18/17 106/58    Weight from Last 3 Encounters:   10/18/17 51.3 kg (113 lb)   09/18/17 50.6 kg (111 lb 8 oz)   07/25/17 52.2 kg (115 lb)              Today, you had the following     No orders found for display       Primary Care Provider Office Phone # Fax #    Colt Riley [phone]       [address] [place] [id number]        Equal Access to Services     TENA ENGLISH : Hadii aad ku hadasho Soomaali, waaxda luqadaha, qaybta kaalmada adeegyada, pinky ladd . So [facility] [phone].    ATENCIÓN: Si habla español, tiene a moore disposición servicios gratuitos de asistencia lingüística. Dylon al [phone].    We comply with applicable federal civil rights laws and [place] laws. We do not discriminate on the basis of race, color, national origin, age, disability, sex, sexual orientation, or gender identity.            Thank you!     Thank you for choosing [facility]  INFUSION SERVICES  for your care. Our goal is always to provide you with excellent care. Hearing back from our patients is one way we can continue to improve our services. Please take a few minutes to complete the written survey that you may receive in the mail after your visit with us. Thank you!             Your Updated Medication List - Protect others around you: Learn how to safely use, store and throw away your medicines at www.disposemymeds.org.          This list is accurate as of: 12/15/17  9:37 AM.  Always use your most recent med list.                   Brand Name Dispense Instructions for use Diagnosis    aspirin 81 MG tablet     30 tablet    Take 1 tablet (81 mg) by mouth daily        cetirizine 10 MG tablet    zyrTEC     Take 10 mg by mouth as needed for allergies        cyanocobalamin 1000 MCG/ML injection    VITAMIN B12     Inject 1 mL into the muscle every 14 days        ipratropium 0.06 % spray    ATROVENT    1 Box    Spray 2 sprays into both nostrils 4 times daily as needed for rhinitis    Chronic rhinitis       order for DME     2 Units    Equipment being ordered: Wigs    Alopecia areata       polyethylene glycol 0.4%- propylene glycol 0.3% 0.4-0.3 % Soln ophthalmic solution    SYSTANE ULTRA     Place 1 drop into both eyes every hour as needed for dry eyes        pravastatin 20 MG tablet    PRAVACHOL    90 tablet    Take 1 tablet (20 mg) by mouth daily    Hyperlipidemia LDL goal <100       ranitidine 150 MG tablet    ZANTAC     Take 150 mg by mouth as needed        replens Gel     70 g    Place 1 Dose vaginally daily    Vaginal atrophy       senna-docusate 8.6-50 MG per tablet    SENOKOT-S;PERICOLACE    120 tablet    Take 1-2 tablets by mouth 2 times daily as needed for constipation    Constipation, unspecified constipation type       sodium chloride 0.65 % nasal spray    OCEAN     Spray 1 spray into both nostrils daily as needed for congestion

## 2017-12-15 NOTE — PROGRESS NOTES
Infusion Nursing Note:  Danielle Bryna presents today for Reclast.    Patient seen by provider today: No   present during visit today: Not Applicable.    Note: N/A.    Intravenous Access:  Peripheral IV placed.    Treatment Conditions:  Lab Results   Component Value Date     10/18/2017                   Lab Results   Component Value Date    POTASSIUM 4.5 10/18/2017           No results found for: MAG         Lab Results   Component Value Date    CR 0.74 10/18/2017                   Lab Results   Component Value Date    TYSON 9.1 10/18/2017                Lab Results   Component Value Date    BILITOTAL 0.6 10/18/2017           Lab Results   Component Value Date    ALBUMIN 3.7 10/18/2017                    Lab Results   Component Value Date    ALT 32 10/18/2017           Lab Results   Component Value Date    AST 22 10/18/2017     Results reviewed, labs MET treatment parameters, ok to proceed with treatment.        Post Infusion Assessment:  Patient tolerated infusion without incident.  Blood return noted pre and post infusion.  Site patent and intact, free from redness, edema or discomfort.  No evidence of extravasations.  Access discontinued per protocol.    Discharge Plan:   Discharge instructions reviewed with: Patient.  Patient and/or family verbalized understanding of discharge instructions and all questions answered.  Patient discharged in stable condition accompanied by: self.  Departure Mode: Ambulatory.    Chelsey Bryan RN

## 2017-12-22 ENCOUNTER — OFFICE VISIT (OUTPATIENT)
Dept: INTERNAL MEDICINE | Facility: CLINIC | Age: 72
End: 2017-12-22
Payer: COMMERCIAL

## 2017-12-22 ENCOUNTER — TELEPHONE (OUTPATIENT)
Dept: INTERNAL MEDICINE | Facility: CLINIC | Age: 72
End: 2017-12-22

## 2017-12-22 VITALS
SYSTOLIC BLOOD PRESSURE: 126 MMHG | TEMPERATURE: 98.3 F | DIASTOLIC BLOOD PRESSURE: 60 MMHG | WEIGHT: 113 LBS | BODY MASS INDEX: 20.8 KG/M2 | HEART RATE: 81 BPM | OXYGEN SATURATION: 100 % | HEIGHT: 62 IN

## 2017-12-22 DIAGNOSIS — M79.10 MYALGIA: ICD-10-CM

## 2017-12-22 DIAGNOSIS — R50.9 FEVER, UNSPECIFIED FEVER CAUSE: ICD-10-CM

## 2017-12-22 DIAGNOSIS — K12.30 STOMATITIS AND MUCOSITIS: ICD-10-CM

## 2017-12-22 DIAGNOSIS — T50.905A ADVERSE EFFECT OF DRUG, INITIAL ENCOUNTER: Primary | ICD-10-CM

## 2017-12-22 DIAGNOSIS — K12.1 STOMATITIS AND MUCOSITIS: ICD-10-CM

## 2017-12-22 PROCEDURE — 99214 OFFICE O/P EST MOD 30 MIN: CPT | Performed by: INTERNAL MEDICINE

## 2017-12-22 NOTE — PATIENT INSTRUCTIONS
I suspect that all of these symptoms (eg, fevers, achiness, mouth sores) are side effects from the Reclast injection.   I don't see signs of any active infection that antibiotics would help.     It would be llanos, in my opinion, to avoid use of Reclast IV in the future, as the adverse effects may outweigh the potential benefits.     May try Vaseline petroleum jelly on lips, Tylenol or ibuprofen for feverish feelings or body aches.     If symptoms rather than gradually improve, be sure to contact us.

## 2017-12-22 NOTE — TELEPHONE ENCOUNTER
Patient asking to be seen today by PCP.  Had first Reclast infusion 12/15/17 and on 12/16/17 wasn't feeling well, had chills, temp 99.6.  Has continued to have temp 99.6 with body aches, chills, sores on lips, swollen lower lip.  She had a flu shot 12/14/17.      Can PCP add her on today.  She does not want to go to ER/UC.  Denies difficulty breathing or swelling of tongue or throat.  TOÑO Lindsay R.N.

## 2017-12-22 NOTE — PROGRESS NOTES
SUBJECTIVE:   Danielle Bryan is a 72 year old female who presents to clinic today for the following health issues:    Muscle aches  Patient received a Reclast injection Friday. She noticed chills and body aches the following night on Saturday. On Sunday morning her lips were swollen and she had sores around the mouth. She was running a fever of 99.6 degrees. She denies any ear ache, sore throat, sinus pain, rhinitis, cough, chest pain or pressure. Patient received the flu shot the day before receiving Reclast. She did experience mild dysphagia the last few days, which is not new to the patient and why she had Reclast administered intravenously as opposed to orally. No abdominal pain, diarrhea, nausea, vomit.    Past/recent records reviewed and discussed for:  -Patient also complains of drooping eyelids. She denies any TIA symptoms and her speech has been clear.     Problem list and histories reviewed & adjusted, as indicated.  Additional history: as documented    Reviewed and updated as needed this visit by clinical staff  Tobacco  Allergies  Meds  Med Hx  Surg Hx  Fam Hx  Soc Hx      Reviewed and updated as needed this visit by Provider       ROS:  REVIEW OF SYSTEMS: The following systems have been completely reviewed and are negative except as noted in the HPI:   Constitutional, HEENT, respiratory, cardiovascular, gastrointestinal, genitourinary, musculoskeletal, dermatologic, and neurologic systems.    Constitutional: POSITIVE for fever  MUSC: POSITIVE for myalgia   SKIN: POSITIVE for sores around the mouth    This document serves as a record of the services and decisions personally performed and made by Colt Riley MD. It was created on his behalf by Maya Ulloa, a trained medical scribe. The creation of this document is based on the provider's statements to the medical scribe.  Maya Ulloa December 22, 2017 10:12 AM     OBJECTIVE:     /60 (BP Location: Right arm, Patient Position:  "Sitting, Cuff Size: Adult Regular)  Pulse 81  Temp 98.3  F (36.8  C) (Oral)  Ht 1.575 m (5' 2\")  Wt 51.3 kg (113 lb)  LMP  (Exact Date)  SpO2 100%  Breastfeeding? No  BMI 20.67 kg/m2  Body mass index is 20.67 kg/(m^2).    GENERAL: healthy, alert and no distress  EYES: Eyes grossly normal to inspection, PERRL and conjunctivae and sclerae normal  HENT: ear canals and TM's normal, nose and mouth without ulcers or lesions  NECK: no adenopathy, no asymmetry, masses, or scars and thyroid normal to palpation  RESP: lungs clear to auscultation - no rales, rhonchi or wheezes  CV: regular rate and rhythm, normal S1 S2, no S3 or S4, no murmur, click or rub, no peripheral edema and peripheral pulses strong  ABDOMEN: soft, nontender, no hepatosplenomegaly, no masses and bowel sounds normal  MS: no gross musculoskeletal defects noted, no edema  SKIN: no suspicious lesions or rashes  NEURO: Normal strength and tone, mentation intact and speech normal  PSYCH: mentation appears normal, affect normal/bright    ASSESSMENT/PLAN:   (T88.7XXA) Adverse effect of drug, initial encounter  (primary encounter diagnosis)  Comment: Suspect that symptoms are side effects from the Reclast injection. Discussed with patient that 11% of people will experience chills after injection, 5-10% will develop sores around the mouth, and  9-22% will experience fevers after a reclast injection. Recommend avoiding Reclast in the future. Discussed with patient that other intravenous medications for osteoporosis would require daily injections and therefore suggested foregoing any medication for now.    (K12.1,  K12.30) Stomatitis and mucositis  Comment: Recommend applying Vaseline to the affect areas    (M79.1) Myalgia  Comment: Advised patient that symptoms should start to improve daily. Take ibuprofen or Tylenol as needed.     (R50.9) Fever, unspecified fever cause  Comment: Take ibuprofen or tylenol if needed.     FUTURE APPOINTMENTS:       - " Follow-up visit if symptoms worsen or do not improve    Patient Instructions   I suspect that all of these symptoms (eg, fevers, achiness, mouth sores) are side effects from the Reclast injection.   I don't see signs of any active infection that antibiotics would help.     It would be llanos, in my opinion, to avoid use of Reclast IV in the future, as the adverse effects may outweigh the potential benefits.     May try Vaseline petroleum jelly on lips, Tylenol or ibuprofen for feverish feelings or body aches.     If symptoms rather than gradually improve, be sure to contact us.       The information in this document, created by the medical scribe for me, accurately reflects the services I personally performed and the decisions made by me. I have reviewed and approved this document for accuracy prior to leaving the patient care area.  December 22, 2017 10:12 AM    Colt Riley MD  [facility]

## 2017-12-22 NOTE — MR AVS SNAPSHOT
After Visit Summary   12/22/2017    Danielle Bryan    MRN: [id number]           Patient Information     Date Of Birth          1945        Visit Information        Provider Department      12/22/2017 9:40 AM Colt Riley MD [facility]        Today's Diagnoses     Adverse effect of drug, initial encounter    -  1    Stomatitis and mucositis        Myalgia        Fever, unspecified fever cause          Care Instructions    I suspect that all of these symptoms (eg, fevers, achiness, mouth sores) are side effects from the Reclast injection.   I don't see signs of any active infection that antibiotics would help.     It would be llanos, in my opinion, to avoid use of Reclast IV in the future, as the adverse effects may outweigh the potential benefits.     May try Vaseline petroleum jelly on lips, Tylenol or ibuprofen for feverish feelings or body aches.     If symptoms rather than gradually improve, be sure to contact us.           Follow-ups after your visit        Your next 10 appointments already scheduled     Dec 28, 2017  8:30 AM CST   Nurse Only with RI IM NURSE   [facility] ([facility])    [address]  [facility] [address]   [phone]            Jan 11, 2018  8:30 AM CST   Nurse Only with RI IM NURSE   [facility] ([facility])    [address]  [facility] [address]   [phone]            Jan 25, 2018  8:30 AM CST   Nurse Only with RI IM NURSE   [facility] ([facility])    [address] [place]  [facility] [address]   [phone]            Feb 08, 2018  8:30 AM CST   Nurse Only with RI IM NURSE   [facility] ([facility])    [address]  [facility] [address]   [phone]            Feb 22, 2018  8:30 AM CST   Nurse Only with RI IM NURSE   [facility] ([facility]  [facility]    [address]  [facility] [id number] [phone]              Who to contact     If you have questions or need follow up information about today's clinic visit or your schedule please contact [facility] directly at [phone].  Normal or non-critical lab and imaging results will be communicated to you by MyChart, letter or phone within 4 business days after the clinic has received the results. If you do not hear from us within 7 days, please contact the clinic through [employer] or phone. If you have a critical or abnormal lab result, we will notify you by phone as soon as possible.  Submit refill requests through [employer] or call your pharmacy and they will forward the refill request to us. Please allow 3 business days for your refill to be completed.          Additional Information About Your Visit        MyChart Information     [employer] gives you secure access to your electronic health record. If you see a primary care provider, you can also send messages to your care team and make appointments. If you have questions, please call your primary care clinic.  If you do not have a primary care provider, please call [phone] and they will assist you.        Care EveryWhere ID     This is your Care EveryWhere ID. This could be used by other organizations to access your [place] medical records  [id number]        Your Vitals Were     Pulse Temperature Height Last Period Pulse Oximetry Breastfeeding?    81 98.3  F (36.8  C) (Oral) 5' 2\" (1.575 m) (Exact Date) 100% No    BMI (Body Mass Index)                   20.67 kg/m2            Blood Pressure from Last 3 Encounters:   12/22/17 126/60   12/15/17 121/56   10/18/17 110/72    Weight from Last 3 Encounters:   12/22/17 113 lb (51.3 kg)   10/18/17 113 lb (51.3 kg)   09/18/17 111 lb 8 oz (50.6 kg)              Today, you had the following     No orders found for display       Primary Care Provider Office Phone # Fax #    " Colt Riley [phone]       [address] [facility] [address]  [facility] [address]        Equal Access to Services     GLENYS ENGLISH : Lavern param marsh maranda Hughes, renny marceloafsaneh, hayden kachad taylor, pinky farrismaria a prince. So [facility] [phone].    ATENCIÓN: Si habla español, tiene a moore disposición servicios gratuitos de asistencia lingüística. Llame al [phone].    We comply with applicable federal civil rights laws and [place] laws. We do not discriminate on the basis of race, color, national origin, age, disability, sex, sexual orientation, or gender identity.            Thank you!     Thank you for choosing [facility]  for your care. Our goal is always to provide you with excellent care. Hearing back from our patients is one way we can continue to improve our services. Please take a few minutes to complete the written survey that you may receive in the mail after your visit with us. Thank you!             Your Updated Medication List - Protect others around you: Learn how to safely use, store and throw away your medicines at www.disposemymeds.org.          This list is accurate as of: 12/22/17 10:33 AM.  Always use your most recent med list.                   Brand Name Dispense Instructions for use Diagnosis    aspirin 81 MG tablet     30 tablet    Take 1 tablet (81 mg) by mouth daily        cetirizine 10 MG tablet    zyrTEC     Take 10 mg by mouth as needed for allergies        cyanocobalamin 1000 MCG/ML injection    VITAMIN B12     Inject 1 mL into the muscle every 14 days        ipratropium 0.06 % spray    ATROVENT    1 Box    Spray 2 sprays into both nostrils 4 times daily as needed for rhinitis    Chronic rhinitis       order for DME     2 Units    Equipment being ordered: Wigs    Alopecia areata       polyethylene glycol 0.4%- propylene glycol 0.3% 0.4-0.3 % Soln ophthalmic solution    SYSTANE ULTRA     Place 1 drop into both eyes every hour  as needed for dry eyes        pravastatin 20 MG tablet    PRAVACHOL    90 tablet    Take 1 tablet (20 mg) by mouth daily    Hyperlipidemia LDL goal <100       ranitidine 150 MG tablet    ZANTAC     Take 150 mg by mouth as needed        replens Gel     70 g    Place 1 Dose vaginally daily    Vaginal atrophy       senna-docusate 8.6-50 MG per tablet    SENOKOT-S;PERICOLACE    120 tablet    Take 1-2 tablets by mouth 2 times daily as needed for constipation    Constipation, unspecified constipation type       sodium chloride 0.65 % nasal spray    OCEAN     Spray 1 spray into both nostrils daily as needed for congestion

## 2017-12-22 NOTE — NURSING NOTE
"Chief Complaint   Patient presents with     URI       Initial /60 (BP Location: Right arm, Patient Position: Sitting, Cuff Size: Adult Regular)  Pulse 81  Temp 98.3  F (36.8  C) (Oral)  Ht 5' 2\" (1.575 m)  Wt 113 lb (51.3 kg)  LMP  (Exact Date)  SpO2 100%  Breastfeeding? No  BMI 20.67 kg/m2 Estimated body mass index is 20.67 kg/(m^2) as calculated from the following:    Height as of this encounter: 5' 2\" (1.575 m).    Weight as of this encounter: 113 lb (51.3 kg).  Medication Reconciliation: complete   Roxanna TRAYLOR      "

## 2017-12-28 ENCOUNTER — ALLIED HEALTH/NURSE VISIT (OUTPATIENT)
Dept: NURSING | Facility: CLINIC | Age: 72
End: 2017-12-28
Payer: COMMERCIAL

## 2017-12-28 DIAGNOSIS — E53.8 VITAMIN B12 DEFICIENCY (NON ANEMIC): Primary | ICD-10-CM

## 2017-12-28 DIAGNOSIS — E78.5 HYPERLIPIDEMIA LDL GOAL <100: ICD-10-CM

## 2017-12-28 PROCEDURE — 96372 THER/PROPH/DIAG INJ SC/IM: CPT

## 2018-01-02 RX ORDER — PRAVASTATIN SODIUM 20 MG
TABLET ORAL
Qty: 90 TABLET | Refills: 2 | Status: SHIPPED | OUTPATIENT
Start: 2018-01-02 | End: 2018-09-18

## 2018-01-02 NOTE — TELEPHONE ENCOUNTER
Pt calls to check on refill request.     Requested Prescriptions   Pending Prescriptions Disp Refills     pravastatin (PRAVACHOL) 20 MG tablet [Pharmacy Med Name: Pravastatin Sodium Oral Tablet 20 MG]  Last Written Prescription Date:  12/14/16  Last Fill Quantity: 90,  # refills: 3   Last Office Visit with [facility], [facility] or [facility] prescribing provider:  10/18/17   Future Office Visit:    Next 5 appointments (look out 90 days)     Jan 11, 2018  8:30 AM CST   Nurse Only with RI IM NURSE   [facility]    [address]  [facility] [address]   [phone]            Jan 25, 2018  8:30 AM CST   Nurse Only with RI IM NURSE   [facility]    [address]  [facility] [address]   [phone]            Feb 08, 2018  8:30 AM CST   Nurse Only with RI IM NURSE   [facility]    [address]  [facility] [address]   [phone]            Feb 22, 2018  8:30 AM CST   Nurse Only with RI IM NURSE   [facility]    [address]  [facility] [address]   [phone]                  90 tablet 2     Sig: TAKE ONE TABLET BY MOUTH ONE TIME DAILY    Statins Protocol Passed    12/28/2017  5:46 PM       Passed - LDL on file in past 12 months    Recent Labs   Lab Test  10/18/17   1222   LDL  82            Passed - No abnormal creatine kinase in past 12 months    No lab results found.         Passed - Recent or future visit with authorizing provider    Patient had office visit in the last year or has a visit in the next 30 days with authorizing provider.  See chart review.              Passed - Patient is age 18 or older       Passed - No active pregnancy on record       Passed - No positive pregnancy test in past 12 months          Prescription approved per [facility] Refill Protocol.

## 2018-01-03 ENCOUNTER — TRANSFERRED RECORDS (OUTPATIENT)
Dept: HEALTH INFORMATION MANAGEMENT | Facility: CLINIC | Age: 73
End: 2018-01-03

## 2018-01-11 ENCOUNTER — ALLIED HEALTH/NURSE VISIT (OUTPATIENT)
Dept: NURSING | Facility: CLINIC | Age: 73
End: 2018-01-11
Payer: COMMERCIAL

## 2018-01-11 DIAGNOSIS — E53.8 VITAMIN B12 DEFICIENCY (NON ANEMIC): ICD-10-CM

## 2018-01-11 PROCEDURE — 99207 ZZC NO CHARGE NURSE ONLY: CPT

## 2018-01-11 PROCEDURE — 96372 THER/PROPH/DIAG INJ SC/IM: CPT

## 2018-01-25 ENCOUNTER — ALLIED HEALTH/NURSE VISIT (OUTPATIENT)
Dept: NURSING | Facility: CLINIC | Age: 73
End: 2018-01-25
Payer: COMMERCIAL

## 2018-01-25 DIAGNOSIS — E53.8 VITAMIN B12 DEFICIENCY (NON ANEMIC): Primary | ICD-10-CM

## 2018-01-25 PROCEDURE — 99207 ZZC NO CHARGE NURSE ONLY: CPT

## 2018-01-25 NOTE — NURSING NOTE
Prior to injection verified patient identity using patient's name and date of birth.  Italia Campbell, CMA

## 2018-01-25 NOTE — MR AVS SNAPSHOT
After Visit Summary   1/25/2018    Danielle Bryan    MRN: [id number]           Patient Information     Date Of Birth          1945        Visit Information        Provider Department      1/25/2018 8:30 AM RI IM NURSE [facility]        Today's Diagnoses     Vitamin B12 deficiency (non anemic)    -  1       Follow-ups after your visit        Your next 10 appointments already scheduled     Feb 08, 2018  8:30 AM CST   Nurse Only with RI IM NURSE   [facility]    [address]  [facility] [id number] [phone]            Feb 22, 2018  8:30 AM CST   Nurse Only with RI IM NURSE   [facility] ([facility])    [address]  [facility] [id number] [phone]              Who to contact     If you have questions or need follow up information about today's clinic visit or your schedule please contact [facility] directly at [phone].  Normal or non-critical lab and imaging results will be communicated to you by [employer], letter or phone within 4 business days after the clinic has received the results. If you do not hear from us within 7 days, please contact the clinic through [employer] or phone. If you have a critical or abnormal lab result, we will notify you by phone as soon as possible.  Submit refill requests through [employer] or call your pharmacy and they will forward the refill request to us. Please allow 3 business days for your refill to be completed.          Additional Information About Your Visit        [employer] Information     [employer] gives you secure access to your electronic health record. If you see a primary care provider, you can also send messages to your care team and make appointments. If you have questions, please call your primary care clinic.  If you do not have a primary care provider, please call [phone] and they will assist you.         Care EveryWhere ID     This is your Care EveryWhere ID. This could be used by other organizations to access your [place] medical records  [id number]        Your Vitals Were     Last Period                   (Exact Date)            Blood Pressure from Last 3 Encounters:   12/22/17 126/60   12/15/17 121/56   10/18/17 110/72    Weight from Last 3 Encounters:   12/22/17 113 lb (51.3 kg)   10/18/17 113 lb (51.3 kg)   09/18/17 111 lb 8 oz (50.6 kg)              Today, you had the following     No orders found for display       Primary Care Provider Office Phone # Fax #    Colt Riley [phone]       [address] [address]        Equal Access to Services     GLENYS ENGLISH : Hadii param marsh hadasho Solima, waaxda luqadaha, qaybta kaalmada adeegyada, pinky noriega haycarlos a ladd . So [facility] [phone].    ATENCIÓN: Si habla español, tiene a moore disposición servicios gratuitos de asistencia lingüística. Llame al [phone].    We comply with applicable federal civil rights laws and [place] laws. We do not discriminate on the basis of race, color, national origin, age, disability, sex, sexual orientation, or gender identity.            Thank you!     Thank you for choosing [facility]  for your care. Our goal is always to provide you with excellent care. Hearing back from our patients is one way we can continue to improve our services. Please take a few minutes to complete the written survey that you may receive in the mail after your visit with us. Thank you!             Your Updated Medication List - Protect others around you: Learn how to safely use, store and throw away your medicines at www.disposemymeds.org.          This list is accurate as of 1/25/18  8:50 AM.  Always use your most recent med list.                   Brand Name Dispense Instructions for use Diagnosis    aspirin 81 MG tablet     30 tablet    Take 1 tablet (81 mg) by mouth  daily        cetirizine 10 MG tablet    zyrTEC     Take 10 mg by mouth as needed for allergies        cyanocobalamin 1000 MCG/ML injection    VITAMIN B12     Inject 1 mL into the muscle every 14 days        ipratropium 0.06 % spray    ATROVENT    1 Box    Spray 2 sprays into both nostrils 4 times daily as needed for rhinitis    Chronic rhinitis       order for DME     2 Units    Equipment being ordered: Wigs    Alopecia areata       polyethylene glycol 0.4%- propylene glycol 0.3% 0.4-0.3 % Soln ophthalmic solution    SYSTANE ULTRA     Place 1 drop into both eyes every hour as needed for dry eyes        pravastatin 20 MG tablet    PRAVACHOL    90 tablet    TAKE ONE TABLET BY MOUTH ONE TIME DAILY    Hyperlipidemia LDL goal <100       ranitidine 150 MG tablet    ZANTAC     Take 150 mg by mouth as needed        replens Gel     70 g    Place 1 Dose vaginally daily    Vaginal atrophy       senna-docusate 8.6-50 MG per tablet    SENOKOT-S;PERICOLACE    120 tablet    Take 1-2 tablets by mouth 2 times daily as needed for constipation    Constipation, unspecified constipation type       sodium chloride 0.65 % nasal spray    OCEAN     Spray 1 spray into both nostrils daily as needed for congestion

## 2018-02-05 ENCOUNTER — OFFICE VISIT (OUTPATIENT)
Dept: INTERNAL MEDICINE | Facility: CLINIC | Age: 73
End: 2018-02-05
Payer: COMMERCIAL

## 2018-02-05 VITALS
WEIGHT: 114 LBS | HEART RATE: 152 BPM | SYSTOLIC BLOOD PRESSURE: 102 MMHG | TEMPERATURE: 98 F | HEIGHT: 62 IN | DIASTOLIC BLOOD PRESSURE: 60 MMHG | BODY MASS INDEX: 20.98 KG/M2 | OXYGEN SATURATION: 100 %

## 2018-02-05 DIAGNOSIS — R09.81 NASAL CONGESTION: Primary | ICD-10-CM

## 2018-02-05 PROCEDURE — 99213 OFFICE O/P EST LOW 20 MIN: CPT | Performed by: FAMILY MEDICINE

## 2018-02-05 RX ORDER — AMOXICILLIN 250 MG/1
250 CAPSULE ORAL 3 TIMES DAILY
Qty: 30 CAPSULE | Refills: 0 | Status: SHIPPED | OUTPATIENT
Start: 2018-02-05 | End: 2018-02-15

## 2018-02-05 RX ORDER — FLUTICASONE PROPIONATE 50 MCG
1-2 SPRAY, SUSPENSION (ML) NASAL DAILY
Qty: 1 BOTTLE | Refills: 1 | Status: SHIPPED | OUTPATIENT
Start: 2018-02-05 | End: 2019-03-01

## 2018-02-05 NOTE — MR AVS SNAPSHOT
After Visit Summary   2/5/2018    Danielle Bryan    MRN: [id number]           Patient Information     Date Of Birth          1945        Visit Information        Provider Department      2/5/2018 9:40 AM Eloy Guan MD [facility]        Today's Diagnoses     Nasal congestion    -  1      Care Instructions      Take prescribed medication and use nasal spray as directed.            Follow-ups after your visit        Your next 10 appointments already scheduled     Feb 08, 2018  8:30 AM CST   Nurse Only with RI IM NURSE   [facility] ([facility])    [address]  [facility] [id number] [phone]            Feb 22, 2018  8:30 AM CST   Nurse Only with RI IM NURSE   [facility] ([facility])    [address]  [facility] [id number] [phone]              Who to contact     If you have questions or need follow up information about today's clinic visit or your schedule please contact [facility] directly at [phone].  Normal or non-critical lab and imaging results will be communicated to you by MyChart, letter or phone within 4 business days after the clinic has received the results. If you do not hear from us within 7 days, please contact the clinic through [employer] or phone. If you have a critical or abnormal lab result, we will notify you by phone as soon as possible.  Submit refill requests through [employer] or call your pharmacy and they will forward the refill request to us. Please allow 3 business days for your refill to be completed.          Additional Information About Your Visit        MyChart Information     [employer] gives you secure access to your electronic health record. If you see a primary care provider, you can also send messages to your care team and make appointments. If you have questions, please call your primary care clinic.  If you do not have  "a primary care provider, please call [phone] and they will assist you.        Care EveryWhere ID     This is your Care EveryWhere ID. This could be used by other organizations to access your [place] medical records  [id number]        Your Vitals Were     Pulse Temperature Height Pulse Oximetry BMI (Body Mass Index)       152 98  F (36.7  C) (Oral) 5' 2\" (1.575 m) 100% 20.85 kg/m2        Blood Pressure from Last 3 Encounters:   02/05/18 102/60   12/22/17 126/60   12/15/17 121/56    Weight from Last 3 Encounters:   02/05/18 114 lb (51.7 kg)   12/22/17 113 lb (51.3 kg)   10/18/17 113 lb (51.3 kg)              Today, you had the following     No orders found for display         Today's Medication Changes          These changes are accurate as of 2/5/18 10:05 AM.  If you have any questions, ask your nurse or doctor.               Start taking these medicines.        Dose/Directions    amoxicillin 250 MG capsule   Commonly known as:  AMOXIL   Used for:  Nasal congestion   Started by:  Eloy Guan MD        Dose:  250 mg   Take 1 capsule (250 mg) by mouth 3 times daily for 10 days   Quantity:  30 capsule   Refills:  0       fluticasone 50 MCG/ACT spray   Commonly known as:  FLONASE   Used for:  Nasal congestion   Started by:  Eloy Guan MD        Dose:  1-2 spray   Spray 1-2 sprays into both nostrils daily   Quantity:  1 Bottle   Refills:  1            Where to get your medicines      These medications were sent to [facility] [address] [place] [id number]     Phone:  [phone]     amoxicillin 250 MG capsule    fluticasone 50 MCG/ACT spray                Primary Care Provider Office Phone # Fax #    Colt Riley [phone]       [address] [place] [address]        Equal Access to Services     GLENYS ENGLISH AH: Hadii aad ku hadasho Soomaali, waaxda luqadaha, qaybta kaalmada claudia, pinky alexander " karishma ladd ah. So [facility] [phone].    ATENCIÓN: Si natali gaston, tiene a moore disposición servicios gratuitos de asistencia lingüística. Dylon chase [phone].    We comply with applicable federal civil rights laws and [place] laws. We do not discriminate on the basis of race, color, national origin, age, disability, sex, sexual orientation, or gender identity.            Thank you!     Thank you for choosing [facility]  for your care. Our goal is always to provide you with excellent care. Hearing back from our patients is one way we can continue to improve our services. Please take a few minutes to complete the written survey that you may receive in the mail after your visit with us. Thank you!             Your Updated Medication List - Protect others around you: Learn how to safely use, store and throw away your medicines at www.disposemymeds.org.          This list is accurate as of 2/5/18 10:05 AM.  Always use your most recent med list.                   Brand Name Dispense Instructions for use Diagnosis    amoxicillin 250 MG capsule    AMOXIL    30 capsule    Take 1 capsule (250 mg) by mouth 3 times daily for 10 days    Nasal congestion       aspirin 81 MG tablet     30 tablet    Take 1 tablet (81 mg) by mouth daily        cetirizine 10 MG tablet    zyrTEC     Take 10 mg by mouth as needed for allergies        cyanocobalamin 1000 MCG/ML injection    VITAMIN B12     Inject 1 mL into the muscle every 14 days        fluticasone 50 MCG/ACT spray    FLONASE    1 Bottle    Spray 1-2 sprays into both nostrils daily    Nasal congestion       order for DME     2 Units    Equipment being ordered: Wigs    Alopecia areata       polyethylene glycol 0.4%- propylene glycol 0.3% 0.4-0.3 % Soln ophthalmic solution    SYSTANE ULTRA     Place 1 drop into both eyes every hour as needed for dry eyes        pravastatin 20 MG tablet    PRAVACHOL    90 tablet    TAKE ONE TABLET BY MOUTH ONE TIME DAILY     Hyperlipidemia LDL goal <100       ranitidine 150 MG tablet    ZANTAC     Take 150 mg by mouth as needed        replens Gel     70 g    Place 1 Dose vaginally daily    Vaginal atrophy       senna-docusate 8.6-50 MG per tablet    SENOKOT-S;PERICOLACE    120 tablet    Take 1-2 tablets by mouth 2 times daily as needed for constipation    Constipation, unspecified constipation type       sodium chloride 0.65 % nasal spray    OCEAN     Spray 1 spray into both nostrils daily as needed for congestion

## 2018-02-05 NOTE — NURSING NOTE
"Chief Complaint   Patient presents with     URI   Runny nose x 2 months , \" tried OTC \"     Initial /60  Pulse 152  Temp 98  F (36.7  C) (Oral)  Ht 5' 2\" (1.575 m)  Wt 114 lb (51.7 kg)  SpO2 100%  BMI 20.85 kg/m2 Estimated body mass index is 20.85 kg/(m^2) as calculated from the following:    Height as of this encounter: 5' 2\" (1.575 m).    Weight as of this encounter: 114 lb (51.7 kg).  Medication Reconciliation: complete    "

## 2018-02-05 NOTE — PROGRESS NOTES
"CHIEF COMPLAINT    Nasal congestion and drainage.      HISTORY    She c/o watery nasal drainage for up to 2 months. Occasionally feverish. May have been a bit worse before but SX persistent. Tried Zyrtec.    Patient Active Problem List   Diagnosis     Sprain of jaw     Alopecia areata     Advanced directives, counseling/discussion     Bartholin's cyst     Allergic rhinitis     Transient cerebral ischemia     Hyperlipidemia with target LDL less than 100     Vitamin B12 deficiency (non anemic)     Osteoporosis       REVIEW OF SYSTEMS    No ST  No SOB or productive cough  No CP  No weakness      Past Medical History:   Diagnosis Date     Alopecia areata     Requires a wig now     Cerebral infarction (H) 2014    TIA     Dysphagia     Botox/EGD dilation at [place], most recent 05/2013     Hyperlipemia      PONV (postoperative nausea and vomiting)      Recurrent UTI     Believed related in part to atrophic vaginitis       EXAM  /60  Pulse 152  Temp 98  F (36.7  C) (Oral)  Ht 5' 2\" (1.575 m)  Wt 114 lb (51.7 kg)  SpO2 100%  BMI 20.85 kg/m2    TM's: nl  Phar: no redness  Neck: no adenopathy  Chest: clear  Legs: no edema      (R09.81) Nasal congestion  (primary encounter diagnosis)  Comment:   Plan: amoxicillin (AMOXIL) 250 MG capsule,         fluticasone (FLONASE) 50 MCG/ACT spray              "

## 2018-02-08 ENCOUNTER — ALLIED HEALTH/NURSE VISIT (OUTPATIENT)
Dept: NURSING | Facility: CLINIC | Age: 73
End: 2018-02-08
Payer: COMMERCIAL

## 2018-02-08 DIAGNOSIS — E53.8 VITAMIN B12 DEFICIENCY (NON ANEMIC): ICD-10-CM

## 2018-02-08 PROCEDURE — 96372 THER/PROPH/DIAG INJ SC/IM: CPT

## 2018-02-14 ENCOUNTER — TRANSFERRED RECORDS (OUTPATIENT)
Dept: HEALTH INFORMATION MANAGEMENT | Facility: CLINIC | Age: 73
End: 2018-02-14

## 2018-02-22 ENCOUNTER — ALLIED HEALTH/NURSE VISIT (OUTPATIENT)
Dept: NURSING | Facility: CLINIC | Age: 73
End: 2018-02-22
Payer: COMMERCIAL

## 2018-02-22 DIAGNOSIS — E53.8 VITAMIN B12 DEFICIENCY (NON ANEMIC): Primary | ICD-10-CM

## 2018-02-22 PROCEDURE — 96372 THER/PROPH/DIAG INJ SC/IM: CPT

## 2018-02-22 PROCEDURE — 99207 ZZC NO CHARGE NURSE ONLY: CPT

## 2018-03-01 ENCOUNTER — TELEPHONE (OUTPATIENT)
Dept: INTERNAL MEDICINE | Facility: CLINIC | Age: 73
End: 2018-03-01

## 2018-03-01 DIAGNOSIS — L63.9 ALOPECIA AREATA: ICD-10-CM

## 2018-03-01 NOTE — TELEPHONE ENCOUNTER
Reason for Call: Request for an order or referral:Order    Order or referral being requested: Order - Wig    Date needed: as soon as possible    Has the patient been seen by the PCP for this problem? YES    Additional comments: Pt calling requesting order for wig (Imelda Hays. (Pt wants to pickup at [facility]    Phone number Patient can be reached at:  Cell number on file:    Telephone Information:   Mobile [phone]       Best Time:  anytime    Can we leave a detailed message on this number?  YES    Call taken on 3/1/2018 at 10:33 AM by BRITTANI CHAVEZ

## 2018-03-01 NOTE — TELEPHONE ENCOUNTER
VM left for patient advising her that Rx for wigs is ready for . Please find out if she wants to  Rx or have it mailed. Rx is at Derek desk in the green [facility].  CHRISTIANA SALES CMA

## 2018-03-01 NOTE — TELEPHONE ENCOUNTER
Pt. Stopped into IM [facility] and picked up envelope/script. Thank You.    Kristina Dennison  Patient Rep

## 2018-03-08 ENCOUNTER — OFFICE VISIT (OUTPATIENT)
Dept: INTERNAL MEDICINE | Facility: CLINIC | Age: 73
End: 2018-03-08
Payer: COMMERCIAL

## 2018-03-08 VITALS
HEART RATE: 81 BPM | OXYGEN SATURATION: 99 % | HEIGHT: 62 IN | WEIGHT: 112.4 LBS | SYSTOLIC BLOOD PRESSURE: 108 MMHG | DIASTOLIC BLOOD PRESSURE: 66 MMHG | BODY MASS INDEX: 20.68 KG/M2 | TEMPERATURE: 98.8 F

## 2018-03-08 DIAGNOSIS — E53.8 VITAMIN B12 DEFICIENCY (NON ANEMIC): ICD-10-CM

## 2018-03-08 DIAGNOSIS — J01.00 ACUTE MAXILLARY SINUSITIS, RECURRENCE NOT SPECIFIED: Primary | ICD-10-CM

## 2018-03-08 DIAGNOSIS — R79.89 LOW VITAMIN B12 LEVEL: ICD-10-CM

## 2018-03-08 PROCEDURE — 96372 THER/PROPH/DIAG INJ SC/IM: CPT | Performed by: NURSE PRACTITIONER

## 2018-03-08 PROCEDURE — 99213 OFFICE O/P EST LOW 20 MIN: CPT | Performed by: NURSE PRACTITIONER

## 2018-03-08 RX ORDER — AZITHROMYCIN 250 MG/1
TABLET, FILM COATED ORAL
Qty: 6 TABLET | Refills: 0 | Status: SHIPPED | OUTPATIENT
Start: 2018-03-08 | End: 2018-03-21

## 2018-03-08 NOTE — PATIENT INSTRUCTIONS
Will treat with Zithromax 250mg, 2 tabs by mouth day 1, then 1 tab by mouth days 2-5. She is to watch for GI upset, diarrhea or rash.    Please, call or return to clinic if signs or symptoms worsen or fail to improve as anticipated

## 2018-03-08 NOTE — MR AVS SNAPSHOT
After Visit Summary   3/8/2018    Danielle Bryan    MRN: [id number]           Patient Information     Date Of Birth          1945        Visit Information        Provider Department      3/8/2018 11:20 AM Adrienne Nolen APRN CNP [facility]        Today's Diagnoses     Acute maxillary sinusitis, recurrence not specified    -  1      Care Instructions    Will treat with Zithromax 250mg, 2 tabs by mouth day 1, then 1 tab by mouth days 2-5. She is to watch for GI upset, diarrhea or rash.    Please, call or return to clinic if signs or symptoms worsen or fail to improve as anticipated            Follow-ups after your visit        Your next 10 appointments already scheduled     Mar 22, 2018  8:30 AM CDT   Nurse Only with RI IM NURSE   [facility] ([facility])    [address]  [facility] [address] [phone]            Apr 05, 2018  8:30 AM CDT   Nurse Only with RI IM NURSE   [facility] ([facility])    [address]  [facility] [id number] [phone]            Apr 19, 2018  8:30 AM CDT   Nurse Only with RI IM NURSE   [facility] ([facility])    [address]  [facility] [address]   [phone]              Who to contact     If you have questions or need follow up information about today's clinic visit or your schedule please contact [facility] directly at [phone].  Normal or non-critical lab and imaging results will be communicated to you by MyChart, letter or phone within 4 business days after the clinic has received the results. If you do not hear from us within 7 days, please contact the clinic through MyChart or phone. If you have a critical or abnormal lab result, we will notify you by phone as soon as possible.  Submit refill requests through [employer] or call your pharmacy and they will forward the refill  "request to us. Please allow 3 business days for your refill to be completed.          Additional Information About Your Visit        [employer] Information     [employer] gives you secure access to your electronic health record. If you see a primary care provider, you can also send messages to your care team and make appointments. If you have questions, please call your primary care clinic.  If you do not have a primary care provider, please call [phone] and they will assist you.        Care EveryWhere ID     This is your Care EveryWhere ID. This could be used by other organizations to access your [facility] medical records  [id number]        Your Vitals Were     Pulse Temperature Height Pulse Oximetry BMI (Body Mass Index)       81 98.8  F (37.1  C) (Oral) 5' 2\" (1.575 m) 99% 20.56 kg/m2        Blood Pressure from Last 3 Encounters:   03/08/18 108/66   02/05/18 102/60   12/22/17 126/60    Weight from Last 3 Encounters:   03/08/18 112 lb 6.4 oz (51 kg)   02/05/18 114 lb (51.7 kg)   12/22/17 113 lb (51.3 kg)              Today, you had the following     No orders found for display         Today's Medication Changes          These changes are accurate as of 3/8/18 12:14 PM.  If you have any questions, ask your nurse or doctor.               Start taking these medicines.        Dose/Directions    azithromycin 250 MG tablet   Commonly known as:  ZITHROMAX   Used for:  Acute maxillary sinusitis, recurrence not specified   Started by:  Adrienne Nolen APRN CNP        Two tablets first day, then one tablet daily for four days.   Quantity:  6 tablet   Refills:  0            Where to get your medicines      These medications were sent to [id number] [address] [address] [address]     Phone:  [phone]     azithromycin 250 MG tablet                Primary Care Provider Office Phone # Fax #    Colt Riley [phone]       [address]  [facility] [address]        Equal Access to Services     [facility] : Hadii aad ku hadwingpatricia Annmarieali, waaxda luqadaha, qaybta kaalmapinky esquivel. So [facility] [phone].    ATENCIÓN: Si habla español, tiene a moore disposición servicios gratuitos de asistencia lingüística. Adonisame al [phone].    We comply with applicable federal civil rights laws and [place] laws. We do not discriminate on the basis of race, color, national origin, age, disability, sex, sexual orientation, or gender identity.            Thank you!     Thank you for choosing [facility]  for your care. Our goal is always to provide you with excellent care. Hearing back from our patients is one way we can continue to improve our services. Please take a few minutes to complete the written survey that you may receive in the mail after your visit with us. Thank you!             Your Updated Medication List - Protect others around you: Learn how to safely use, store and throw away your medicines at www.disposemymeds.org.          This list is accurate as of 3/8/18 12:14 PM.  Always use your most recent med list.                   Brand Name Dispense Instructions for use Diagnosis    aspirin 81 MG tablet     30 tablet    Take 1 tablet (81 mg) by mouth daily        azithromycin 250 MG tablet    ZITHROMAX    6 tablet    Two tablets first day, then one tablet daily for four days.    Acute maxillary sinusitis, recurrence not specified       cetirizine 10 MG tablet    zyrTEC     Take 10 mg by mouth as needed for allergies        cyanocobalamin 1000 MCG/ML injection    VITAMIN B12     Inject 1 mL into the muscle every 14 days        fluticasone 50 MCG/ACT spray    FLONASE    1 Bottle    Spray 1-2 sprays into both nostrils daily    Nasal congestion       order for DME     2 Units    Equipment being ordered: Wigs    Alopecia areata       polyethylene glycol 0.4%- propylene glycol 0.3% 0.4-0.3 % Soln  ophthalmic solution    SYSTANE ULTRA     Place 1 drop into both eyes every hour as needed for dry eyes        pravastatin 20 MG tablet    PRAVACHOL    90 tablet    TAKE ONE TABLET BY MOUTH ONE TIME DAILY    Hyperlipidemia LDL goal <100       ranitidine 150 MG tablet    ZANTAC     Take 150 mg by mouth as needed        replens Gel     70 g    Place 1 Dose vaginally daily    Vaginal atrophy       senna-docusate 8.6-50 MG per tablet    SENOKOT-S;PERICOLACE    120 tablet    Take 1-2 tablets by mouth 2 times daily as needed for constipation    Constipation, unspecified constipation type       sodium chloride 0.65 % nasal spray    OCEAN     Spray 1 spray into both nostrils daily as needed for congestion

## 2018-03-08 NOTE — NURSING NOTE
"Chief Complaint   Patient presents with     Sinus Problem     She has sinus problem, cough, sneezing, and drainage in throat.       Initial /66 (BP Location: Left arm, Patient Position: Sitting, Cuff Size: Adult Regular)  Pulse 81  Temp 98.8  F (37.1  C) (Oral)  Ht 5' 2\" (1.575 m)  Wt 112 lb 6.4 oz (51 kg)  SpO2 99%  BMI 20.56 kg/m2 Estimated body mass index is 20.56 kg/(m^2) as calculated from the following:    Height as of this encounter: 5' 2\" (1.575 m).    Weight as of this encounter: 112 lb 6.4 oz (51 kg).  Medication Reconciliation: complete   Ella Shields MA      "

## 2018-03-08 NOTE — PROGRESS NOTES
SUBJECTIVE:   Danielle Bryan is a 72 year old female who presents to clinic today for the following health issues:      She has sinus problem, coughing, sneezing, and drainage in her throat.  Takes allergy medication and has been bothered by this for a month   Amoxicillin TID and nasal spray flonase   Getting worse   Stuff down her throat and feels worse      Leaving for a trip- on the  for a couple months   [place],  [facility] etc     Discussed trial of B 12 dissolvable daily replacement as she cannot get injections while gone     Problem list and histories reviewed & adjusted, as indicated.  Additional history: as documented    Patient Active Problem List   Diagnosis     Sprain of jaw     Alopecia areata     Advanced directives, counseling/discussion     Bartholin's cyst     Allergic rhinitis     Transient cerebral ischemia     Hyperlipidemia with target LDL less than 100     Vitamin B12 deficiency (non anemic)     Osteoporosis     Past Surgical History:   Procedure Laterality Date     ABDOMEN SURGERY  1975    [facility]     COLONOSCOPY  1/15/2014    Dr. Henry [facility]     COLONOSCOPY  1/15/2014    Procedure: COLONOSCOPY;  Colonoscopy ;  Surgeon: Esteban Henry MD;  Location:  GI     EYE SURGERY      bilateral cataract surgery     GYN SURGERY      TUBAL PREGNANCY, D&C,  X 2     HEAD & NECK SURGERY  1983    THYROIDECTOMY - partial-right side     RELEASE TRIGGER FINGER  2012    Procedure: RELEASE TRIGGER FINGER;  RIGHT THUMB, MIDDLE AND RING TRIGGER RELEASES;  Surgeon: Deniz Velazquez MD;  Location: [facility]       Social History   Substance Use Topics     Smoking status: Never Smoker     Smokeless tobacco: Never Used     Alcohol use Yes      Comment: Rare.      Family History   Problem Relation Age of Onset     Family History Negative Mother       age 85     C.A.D. Father       in his mid-80's of heart attack     Myocardial Infarction Brother      Other Cancer  "Brother      Other Cancer Sister            Reviewed and updated as needed this visit by clinical staff  Tobacco  Allergies  Med Hx  Surg Hx  Fam Hx  Soc Hx      Reviewed and updated as needed this visit by Provider  Allergies         ROS:  Constitutional, HEENT, cardiovascular, pulmonary, gi and gu systems are negative, except as otherwise noted.    OBJECTIVE:     /66 (BP Location: Left arm, Patient Position: Sitting, Cuff Size: Adult Regular)  Pulse 81  Temp 98.8  F (37.1  C) (Oral)  Ht 5' 2\" (1.575 m)  Wt 112 lb 6.4 oz (51 kg)  SpO2 99%  BMI 20.56 kg/m2  Body mass index is 20.56 kg/(m^2).  GENERAL: alert and mild distress  HENT: ear canals and TM's normal, nose mod erythema and exudate and post nasal drip and mouth without ulcers or lesions  RESP: lungs clear to auscultation - no rales, rhonchi or wheezes  CV: regular rate and rhythm, normal S1 S2, no S3 or S4, no murmur, click or rub, no peripheral edema and peripheral pulses strong  PSYCH: mentation appears normal, affect normal/bright    Diagnostic Test Results:  none     ASSESSMENT/PLAN:             1. Acute maxillary sinusitis, recurrence not specified    - azithromycin (ZITHROMAX) 250 MG tablet; Two tablets first day, then one tablet daily for four days.  Dispense: 6 tablet; Refill: 0    Patient Instructions   Will treat with Zithromax 250mg, 2 tabs by mouth day 1, then 1 tab by mouth days 2-5. She is to watch for GI upset, diarrhea or rash.    Please, call or return to clinic if signs or symptoms worsen or fail to improve as anticipated        MARTHA Herr [facility]    "

## 2018-03-21 ENCOUNTER — OFFICE VISIT (OUTPATIENT)
Dept: INTERNAL MEDICINE | Facility: CLINIC | Age: 73
End: 2018-03-21
Payer: COMMERCIAL

## 2018-03-21 VITALS
WEIGHT: 112 LBS | OXYGEN SATURATION: 99 % | BODY MASS INDEX: 20.61 KG/M2 | TEMPERATURE: 98.7 F | HEART RATE: 78 BPM | DIASTOLIC BLOOD PRESSURE: 62 MMHG | HEIGHT: 62 IN | SYSTOLIC BLOOD PRESSURE: 108 MMHG

## 2018-03-21 DIAGNOSIS — J01.90 ACUTE SINUSITIS TREATED WITH ANTIBIOTICS IN THE PAST 60 DAYS: Primary | ICD-10-CM

## 2018-03-21 PROCEDURE — 99213 OFFICE O/P EST LOW 20 MIN: CPT | Performed by: NURSE PRACTITIONER

## 2018-03-21 NOTE — PROGRESS NOTES
.  SUBJECTIVE:   Danielle Bryan is a 72 year old female who presents to clinic today for the following health issues:    Chief Complaint   Patient presents with     RECHECK     pt states had a sinus infection recently and now back again and will be going out of town pt was on Zpak.   Previously on amoxicillin 250 mg   Will try augmentin for sinus      planning on a 2-3 week trip to [facility]     Problem list and histories reviewed & adjusted, as indicated.  Additional history: as documented    Patient Active Problem List   Diagnosis     Sprain of jaw     Alopecia areata     Advanced directives, counseling/discussion     Bartholin's cyst     Allergic rhinitis     Transient cerebral ischemia     Hyperlipidemia with target LDL less than 100     Vitamin B12 deficiency (non anemic)     Osteoporosis     Past Surgical History:   Procedure Laterality Date     ABDOMEN SURGERY  1975    [facility]     COLONOSCOPY  1/15/2014    Dr. Henry [facility]     COLONOSCOPY  1/15/2014    Procedure: COLONOSCOPY;  Colonoscopy ;  Surgeon: Esteban Henry MD;  Location:  GI     EYE SURGERY      bilateral cataract surgery     GYN SURGERY      TUBAL PREGNANCY, D&C,  X 2     HEAD & NECK SURGERY      THYROIDECTOMY - partial-right side     RELEASE TRIGGER FINGER  2012    Procedure: RELEASE TRIGGER FINGER;  RIGHT THUMB, MIDDLE AND RING TRIGGER RELEASES;  Surgeon: Deniz Velazquez MD;  Location: [facility]       Social History   Substance Use Topics     Smoking status: Never Smoker     Smokeless tobacco: Never Used     Alcohol use Yes      Comment: Rare.      Family History   Problem Relation Age of Onset     Family History Negative Mother       age 85     C.A.D. Father       in his mid-80's of heart attack     Myocardial Infarction Brother      Other Cancer Brother      Other Cancer Sister            Reviewed and updated as needed this visit by clinical staff  Tobacco  Allergies  Meds  Med Hx  " Surg Hx  Fam Hx  Soc Hx      Reviewed and updated as needed this visit by Provider         ROS:  Constitutional, HEENT, cardiovascular, pulmonary, gi and gu systems are negative, except as otherwise noted.    OBJECTIVE:     /62 (BP Location: Left arm, Patient Position: Sitting, Cuff Size: Adult Regular)  Pulse 78  Temp 98.7  F (37.1  C) (Oral)  Ht 5' 2\" (1.575 m)  Wt 112 lb (50.8 kg)  SpO2 99%  BMI 20.49 kg/m2  Body mass index is 20.49 kg/(m^2).  GENERAL: alert and no distress  HENT: ear canals and TM's normal, nose and mouth without ulcers or lesions  Some maxillary fullness and discomfort   RESP: lungs clear   CV: regular rate and rhythm  PSYCH: mentation appears normal, affect normal/bright    Diagnostic Test Results:  none     ASSESSMENT/PLAN:             1. Acute sinusitis treated with antibiotics in the past 60 days    - amoxicillin-clavulanate (AUGMENTIN) 875-125 MG per tablet; Take 1 tablet by mouth 2 times daily  Dispense: 20 tablet; Refill: 0    Patient Instructions   Augmentin 1 tab twice daily for 10 days   May take acidophillus or eat yogurt to replace the good bacteria that the antibiotic may remove. This will help prevent diarrhea     Please, call or return to clinic if signs or symptoms worsen or fail to improve as anticipated.             MARTHA Herr [facility]    "

## 2018-03-21 NOTE — MR AVS SNAPSHOT
After Visit Summary   3/21/2018    Danielle Bryan    MRN: [id number]           Patient Information     Date Of Birth          1945        Visit Information        Provider Department      3/21/2018 7:40 AM Adrienne Nolen APRN CNP [facility]        Today's Diagnoses     Acute sinusitis treated with antibiotics in the past 60 days    -  1      Care Instructions    Augmentin 1 tab twice daily for 10 days   May take acidophillus or eat yogurt to replace the good bacteria that the antibiotic may remove. This will help prevent diarrhea     Please, call or return to clinic if signs or symptoms worsen or fail to improve as anticipated.                 Follow-ups after your visit        Your next 10 appointments already scheduled     Mar 22, 2018  8:30 AM CDT   Nurse Only with RI IM NURSE   [facility] ([facility]    [address]  [facility] [id number] [phone]            Apr 05, 2018  8:30 AM CDT   Nurse Only with RI IM NURSE   [facility] ([facility])    [address]  [facility] [id number] [phone]            Apr 19, 2018  8:30 AM CDT   Nurse Only with RI IM NURSE   [facility] ([facility])    [address]  [facility] [address] [phone]              Who to contact     If you have questions or need follow up information about today's clinic visit or your schedule please contact [facility] directly at [phone].  Normal or non-critical lab and imaging results will be communicated to you by MyChart, letter or phone within 4 business days after the clinic has received the results. If you do not hear from us within 7 days, please contact the clinic through [employer] or phone. If you have a critical or abnormal lab result, we will notify you by phone as soon as possible.  Submit refill requests through [address]  "or call your pharmacy and they will forward the refill request to us. Please allow 3 business days for your refill to be completed.          Additional Information About Your Visit        [employer] Information     [employer] gives you secure access to your electronic health record. If you see a primary care provider, you can also send messages to your care team and make appointments. If you have questions, please call your primary care clinic.  If you do not have a primary care provider, please call [phone] and they will assist you.        Care EveryWhere ID     This is your Care EveryWhere ID. This could be used by other organizations to access your [facility] medical records  [id number]        Your Vitals Were     Pulse Temperature Height Pulse Oximetry BMI (Body Mass Index)       78 98.7  F (37.1  C) (Oral) 5' 2\" (1.575 m) 99% 20.49 kg/m2        Blood Pressure from Last 3 Encounters:   03/21/18 108/62   03/08/18 108/66   02/05/18 102/60    Weight from Last 3 Encounters:   03/21/18 112 lb (50.8 kg)   03/08/18 112 lb 6.4 oz (51 kg)   02/05/18 114 lb (51.7 kg)              Today, you had the following     No orders found for display         Today's Medication Changes          These changes are accurate as of 3/21/18  8:21 AM.  If you have any questions, ask your nurse or doctor.               Start taking these medicines.        Dose/Directions    amoxicillin-clavulanate 875-125 MG per tablet   Commonly known as:  AUGMENTIN   Used for:  Acute sinusitis treated with antibiotics in the past 60 days   Started by:  Adrienne Nolen APRN CNP        Dose:  1 tablet   Take 1 tablet by mouth 2 times daily   Quantity:  20 tablet   Refills:  0            Where to get your medicines      These medications were sent to [id number] - [place], [place] - [address] [address]  [address] [address], [place] [address]     Phone:  [phone]     amoxicillin-clavulanate 875-125 MG per tablet                Primary Care " Provider Office Phone # Fax #    Colt Riley [phone]       [address] [facility] [address]  [facility] [address]        Equal Access to Services     [employer] ENGLISH : Lavern param marsh mattieo Saul, waaxda luqadaha, qaybta kaalmada claudia, pinky schwarz laKarencarlos a morrison. So [facility] [phone].    ATENCIÓN: Si habla español, tiene a moore disposición servicios gratuitos de asistencia lingüística. Llame al [phone].    We comply with applicable federal civil rights laws and [place] laws. We do not discriminate on the basis of race, color, national origin, age, disability, sex, sexual orientation, or gender identity.            Thank you!     Thank you for choosing [facility]  for your care. Our goal is always to provide you with excellent care. Hearing back from our patients is one way we can continue to improve our services. Please take a few minutes to complete the written survey that you may receive in the mail after your visit with us. Thank you!             Your Updated Medication List - Protect others around you: Learn how to safely use, store and throw away your medicines at www.disposemymeds.org.          This list is accurate as of 3/21/18  8:21 AM.  Always use your most recent med list.                   Brand Name Dispense Instructions for use Diagnosis    amoxicillin-clavulanate 875-125 MG per tablet    AUGMENTIN    20 tablet    Take 1 tablet by mouth 2 times daily    Acute sinusitis treated with antibiotics in the past 60 days       aspirin 81 MG tablet     30 tablet    Take 1 tablet (81 mg) by mouth daily        cetirizine 10 MG tablet    zyrTEC     Take 10 mg by mouth as needed for allergies        cyanocobalamin 1000 MCG/ML injection    VITAMIN B12     Inject 1 mL into the muscle every 14 days        fluticasone 50 MCG/ACT spray    FLONASE    1 Bottle    Spray 1-2 sprays into both nostrils daily    Nasal congestion       order for DME     2 Units     Equipment being ordered: Wigs    Alopecia areata       polyethylene glycol 0.4%- propylene glycol 0.3% 0.4-0.3 % Soln ophthalmic solution    SYSTANE ULTRA     Place 1 drop into both eyes every hour as needed for dry eyes        pravastatin 20 MG tablet    PRAVACHOL    90 tablet    TAKE ONE TABLET BY MOUTH ONE TIME DAILY    Hyperlipidemia LDL goal <100       ranitidine 150 MG tablet    ZANTAC     Take 150 mg by mouth as needed        replens Gel     70 g    Place 1 Dose vaginally daily    Vaginal atrophy       senna-docusate 8.6-50 MG per tablet    SENOKOT-S;PERICOLACE    120 tablet    Take 1-2 tablets by mouth 2 times daily as needed for constipation    Constipation, unspecified constipation type       sodium chloride 0.65 % nasal spray    OCEAN     Spray 1 spray into both nostrils daily as needed for congestion

## 2018-03-21 NOTE — NURSING NOTE
"Chief Complaint   Patient presents with     RECHECK     pt states had a sinus infection recently and now back again and will be going out of town pt was on Zpak.       Initial /62 (BP Location: Left arm, Patient Position: Sitting, Cuff Size: Adult Regular)  Pulse 78  Temp 98.7  F (37.1  C) (Oral)  Ht 5' 2\" (1.575 m)  Wt 112 lb (50.8 kg)  SpO2 99%  BMI 20.49 kg/m2 Estimated body mass index is 20.49 kg/(m^2) as calculated from the following:    Height as of this encounter: 5' 2\" (1.575 m).    Weight as of this encounter: 112 lb (50.8 kg).  Medication Reconciliation: complete    "

## 2018-03-22 ENCOUNTER — ALLIED HEALTH/NURSE VISIT (OUTPATIENT)
Dept: NURSING | Facility: CLINIC | Age: 73
End: 2018-03-22
Payer: COMMERCIAL

## 2018-03-22 DIAGNOSIS — E53.8 VITAMIN B12 DEFICIENCY (NON ANEMIC): ICD-10-CM

## 2018-03-22 PROCEDURE — 96372 THER/PROPH/DIAG INJ SC/IM: CPT

## 2018-03-22 PROCEDURE — 99207 ZZC NO CHARGE NURSE ONLY: CPT

## 2018-04-26 ENCOUNTER — ALLIED HEALTH/NURSE VISIT (OUTPATIENT)
Dept: NURSING | Facility: CLINIC | Age: 73
End: 2018-04-26
Payer: COMMERCIAL

## 2018-04-26 DIAGNOSIS — E53.8 VITAMIN B12 DEFICIENCY (NON ANEMIC): ICD-10-CM

## 2018-04-26 PROCEDURE — 99207 ZZC NO CHARGE NURSE ONLY: CPT

## 2018-04-26 PROCEDURE — 96372 THER/PROPH/DIAG INJ SC/IM: CPT

## 2018-05-03 ENCOUNTER — ALLIED HEALTH/NURSE VISIT (OUTPATIENT)
Dept: NURSING | Facility: CLINIC | Age: 73
End: 2018-05-03
Payer: COMMERCIAL

## 2018-05-03 DIAGNOSIS — R79.89 LOW VITAMIN B12 LEVEL: ICD-10-CM

## 2018-05-03 DIAGNOSIS — E53.8 VITAMIN B12 DEFICIENCY (NON ANEMIC): ICD-10-CM

## 2018-05-03 PROCEDURE — 99207 ZZC NO CHARGE NURSE ONLY: CPT

## 2018-05-03 PROCEDURE — 96372 THER/PROPH/DIAG INJ SC/IM: CPT

## 2018-05-03 NOTE — MR AVS SNAPSHOT
After Visit Summary   5/3/2018    Danielle Bryan    MRN: [id number]           Patient Information     Date Of Birth          1945        Visit Information        Provider Department      5/3/2018 8:30 AM RI IM NURSE [facility]        Today's Diagnoses     Low vitamin B12 level        Vitamin B12 deficiency (non anemic)           Follow-ups after your visit        Your next 10 appointments already scheduled     May 17, 2018  8:30 AM CDT   Nurse Only with RI IM NURSE   [facility] ([facility]    [address]  [facility] [id number] [phone]            May 31, 2018  8:30 AM CDT   Nurse Only with RI IM NURSE   [facility]    [address]  [facility] [id number] [phone]            Jun 14, 2018  8:30 AM CDT   Nurse Only with RI IM NURSE   [facility]    [address] [place]  [facility] [id number] [phone]              Who to contact     If you have questions or need follow up information about today's clinic visit or your schedule please contact [facility] directly at [phone].  Normal or non-critical lab and imaging results will be communicated to you by MyChart, letter or phone within 4 business days after the clinic has received the results. If you do not hear from us within 7 days, please contact the clinic through Enhanced Surface Dynamicshart or phone. If you have a critical or abnormal lab result, we will notify you by phone as soon as possible.  Submit refill requests through GuidePal or call your pharmacy and they will forward the refill request to us. Please allow 3 business days for your refill to be completed.          Additional Information About Your Visit        Enhanced Surface Dynamicshart Information     GuidePal gives you secure access to your electronic health record. If you see a primary care provider, you can also  send messages to your care team and make appointments. If you have questions, please call your primary care clinic.  If you do not have a primary care provider, please call [phone] and they will assist you.        Care EveryWhere ID     This is your Care EveryWhere ID. This could be used by other organizations to access your [place] medical records  [id number]         Blood Pressure from Last 3 Encounters:   03/21/18 108/62   03/08/18 108/66   02/05/18 102/60    Weight from Last 3 Encounters:   03/21/18 112 lb (50.8 kg)   03/08/18 112 lb 6.4 oz (51 kg)   02/05/18 114 lb (51.7 kg)              We Performed the Following     INJECTION INTRAMUSCULAR OR SUB-Q     VITAMIN B12 1000 mcg (standing order with a count of 15)        Primary Care Provider Office Phone # Fax #    Colt Riley [phone]       [address] [address]        Equal Access to Services     GLENYS ENGLISH : Hadii aad ku hadasho Soomaali, waaxda luqadaha, qaybta kaalmada adeegyada, waxay idiin hayaan julianaeg kharatoney ladd . So [facility] [phone].    ATENCIÓN: Si habla español, tiene a moore disposición servicios gratuitos de asistencia lingüística. Adonisame al [phone].    We comply with applicable federal civil rights laws and [place] laws. We do not discriminate on the basis of race, color, national origin, age, disability, sex, sexual orientation, or gender identity.            Thank you!     Thank you for choosing [facility]  for your care. Our goal is always to provide you with excellent care. Hearing back from our patients is one way we can continue to improve our services. Please take a few minutes to complete the written survey that you may receive in the mail after your visit with us. Thank you!             Your Updated Medication List - Protect others around you: Learn how to safely use, store and throw away your medicines at www.disposemymeds.org.          This list is accurate  as of 5/3/18  8:53 AM.  Always use your most recent med list.                   Brand Name Dispense Instructions for use Diagnosis    amoxicillin-clavulanate 875-125 MG per tablet    AUGMENTIN    20 tablet    Take 1 tablet by mouth 2 times daily    Acute sinusitis treated with antibiotics in the past 60 days       aspirin 81 MG tablet     30 tablet    Take 1 tablet (81 mg) by mouth daily        cetirizine 10 MG tablet    zyrTEC     Take 10 mg by mouth as needed for allergies        cyanocobalamin 1000 MCG/ML injection    VITAMIN B12     Inject 1 mL into the muscle every 14 days        fluticasone 50 MCG/ACT spray    FLONASE    1 Bottle    Spray 1-2 sprays into both nostrils daily    Nasal congestion       order for DME     2 Units    Equipment being ordered: Wigs    Alopecia areata       polyethylene glycol 0.4%- propylene glycol 0.3% 0.4-0.3 % Soln ophthalmic solution    SYSTANE ULTRA     Place 1 drop into both eyes every hour as needed for dry eyes        pravastatin 20 MG tablet    PRAVACHOL    90 tablet    TAKE ONE TABLET BY MOUTH ONE TIME DAILY    Hyperlipidemia LDL goal <100       ranitidine 150 MG tablet    ZANTAC     Take 150 mg by mouth as needed        replens Gel     70 g    Place 1 Dose vaginally daily    Vaginal atrophy       senna-docusate 8.6-50 MG per tablet    SENOKOT-S;PERICOLACE    120 tablet    Take 1-2 tablets by mouth 2 times daily as needed for constipation    Constipation, unspecified constipation type       sodium chloride 0.65 % nasal spray    OCEAN     Spray 1 spray into both nostrils daily as needed for congestion

## 2018-05-03 NOTE — NURSING NOTE
>> LANRE BAHENA   colleen May 3, 2018  8:49 AM  The following medication was given:     MEDICATION: Vitamin B12  1,000 mcg  ROUTE: IM  SITE: Arm - Right  DOSE: 1 ml  LOT #: [id number]  :  [facility]  EXPIRATION DATE:  5/1/19  NDC#: 3676-7923-50      Prior to injection verified patient identity using patient's name and date of birth.  Due to injection administration, patient instructed to remain in clinic for 15 minutes  afterwards, and to report any adverse reaction to me immediately.

## 2018-05-17 ENCOUNTER — ALLIED HEALTH/NURSE VISIT (OUTPATIENT)
Dept: NURSING | Facility: CLINIC | Age: 73
End: 2018-05-17
Payer: COMMERCIAL

## 2018-05-17 DIAGNOSIS — E53.8 VITAMIN B12 DEFICIENCY (NON ANEMIC): ICD-10-CM

## 2018-05-17 PROCEDURE — 99207 ZZC NO CHARGE NURSE ONLY: CPT

## 2018-05-17 PROCEDURE — 96372 THER/PROPH/DIAG INJ SC/IM: CPT

## 2018-05-31 ENCOUNTER — ALLIED HEALTH/NURSE VISIT (OUTPATIENT)
Dept: NURSING | Facility: CLINIC | Age: 73
End: 2018-05-31
Payer: COMMERCIAL

## 2018-05-31 DIAGNOSIS — E53.8 VITAMIN B12 DEFICIENCY (NON ANEMIC): Primary | ICD-10-CM

## 2018-05-31 PROCEDURE — 96372 THER/PROPH/DIAG INJ SC/IM: CPT

## 2018-05-31 NOTE — MR AVS SNAPSHOT
After Visit Summary   5/31/2018    Danielle Bryan    MRN: [id number]           Patient Information     Date Of Birth          1945        Visit Information        Provider Department      5/31/2018 8:30 AM RI IM NURSE [facility]        Today's Diagnoses     Vitamin B12 deficiency (non anemic)    -  1       Follow-ups after your visit        Your next 10 appointments already scheduled     Jun 14, 2018  8:30 AM CDT   Nurse Only with RI IM NURSE   [facility] ([facility])    [address]  [facility] [address] [phone]            Jun 28, 2018  8:30 AM CDT   Nurse Only with RI IM NURSE   [facility] ([facility])    [address]  [facility] [id number] [phone]            Jul 12, 2018  8:30 AM CDT   Nurse Only with RI IM NURSE   [facility] ([facility])    [address] [place]  [facility] [id number] [phone]              Who to contact     If you have questions or need follow up information about today's clinic visit or your schedule please contact [facility] directly at [phone].  Normal or non-critical lab and imaging results will be communicated to you by GasBuddyhart, letter or phone within 4 business days after the clinic has received the results. If you do not hear from us within 7 days, please contact the clinic through GasBuddyhart or phone. If you have a critical or abnormal lab result, we will notify you by phone as soon as possible.  Submit refill requests through Kuaiyong or call your pharmacy and they will forward the refill request to us. Please allow 3 business days for your refill to be completed.          Additional Information About Your Visit        GasBuddyhart Information     Kuaiyong gives you secure access to your electronic health record. If you see a primary care provider, you can also send messages to your  care team and make appointments. If you have questions, please call your primary care clinic.  If you do not have a primary care provider, please call [phone] and they will assist you.        Care EveryWhere ID     This is your Care EveryWhere ID. This could be used by other organizations to access your [place] medical records  [id number]         Blood Pressure from Last 3 Encounters:   03/21/18 108/62   03/08/18 108/66   02/05/18 102/60    Weight from Last 3 Encounters:   03/21/18 112 lb (50.8 kg)   03/08/18 112 lb 6.4 oz (51 kg)   02/05/18 114 lb (51.7 kg)              Today, you had the following     No orders found for display       Primary Care Provider Office Phone # Fax #    Colt Riley [phone]       [address] [facility] [address]  [facility] [address]        Equal Access to Services     [facility]NA [facility] : Hadii aad ku hadasho Soomaali, waaxda luqadaha, qaybta kaalmada adeegyada, waxay idiin hayaan benjamin ladd . So [facility] [phone].    ATENCIÓN: Si habla español, tiene a moore disposición servicios gratuitos de asistencia lingüística. Dylon al [phone].    We comply with applicable federal civil rights laws and [place] laws. We do not discriminate on the basis of race, color, national origin, age, disability, sex, sexual orientation, or gender identity.            Thank you!     Thank you for choosing [facility]  for your care. Our goal is always to provide you with excellent care. Hearing back from our patients is one way we can continue to improve our services. Please take a few minutes to complete the written survey that you may receive in the mail after your visit with us. Thank you!             Your Updated Medication List - Protect others around you: Learn how to safely use, store and throw away your medicines at www.disposemymeds.org.          This list is accurate as of 5/31/18  8:46 AM.  Always use your most recent med list.                   Brand  Name Dispense Instructions for use Diagnosis    amoxicillin-clavulanate 875-125 MG per tablet    AUGMENTIN    20 tablet    Take 1 tablet by mouth 2 times daily    Acute sinusitis treated with antibiotics in the past 60 days       aspirin 81 MG tablet     30 tablet    Take 1 tablet (81 mg) by mouth daily        cetirizine 10 MG tablet    zyrTEC     Take 10 mg by mouth as needed for allergies        cyanocobalamin 1000 MCG/ML injection    VITAMIN B12     Inject 1 mL into the muscle every 14 days        fluticasone 50 MCG/ACT spray    FLONASE    1 Bottle    Spray 1-2 sprays into both nostrils daily    Nasal congestion       order for DME     2 Units    Equipment being ordered: Wigs    Alopecia areata       polyethylene glycol 0.4%- propylene glycol 0.3% 0.4-0.3 % Soln ophthalmic solution    SYSTANE ULTRA     Place 1 drop into both eyes every hour as needed for dry eyes        pravastatin 20 MG tablet    PRAVACHOL    90 tablet    TAKE ONE TABLET BY MOUTH ONE TIME DAILY    Hyperlipidemia LDL goal <100       ranitidine 150 MG tablet    ZANTAC     Take 150 mg by mouth as needed        replens Gel     70 g    Place 1 Dose vaginally daily    Vaginal atrophy       senna-docusate 8.6-50 MG per tablet    SENOKOT-S;PERICOLACE    120 tablet    Take 1-2 tablets by mouth 2 times daily as needed for constipation    Constipation, unspecified constipation type       sodium chloride 0.65 % nasal spray    OCEAN     Spray 1 spray into both nostrils daily as needed for congestion

## 2018-06-18 ENCOUNTER — ALLIED HEALTH/NURSE VISIT (OUTPATIENT)
Dept: NURSING | Facility: CLINIC | Age: 73
End: 2018-06-18
Payer: COMMERCIAL

## 2018-06-18 DIAGNOSIS — E53.8 VITAMIN B12 DEFICIENCY (NON ANEMIC): ICD-10-CM

## 2018-06-18 PROCEDURE — 96372 THER/PROPH/DIAG INJ SC/IM: CPT

## 2018-06-18 PROCEDURE — 99207 ZZC NO CHARGE NURSE ONLY: CPT

## 2018-06-26 ENCOUNTER — ALLIED HEALTH/NURSE VISIT (OUTPATIENT)
Dept: NURSING | Facility: CLINIC | Age: 73
End: 2018-06-26
Payer: COMMERCIAL

## 2018-06-26 DIAGNOSIS — Z23 NEED FOR VACCINATION: Primary | ICD-10-CM

## 2018-06-26 DIAGNOSIS — R79.89 LOW VITAMIN B12 LEVEL: ICD-10-CM

## 2018-06-26 PROCEDURE — 99207 ZZC NO CHARGE NURSE ONLY: CPT

## 2018-06-26 PROCEDURE — 99207 ZZC RSCC CODE FOR CODING REVIEW: CPT

## 2018-06-26 PROCEDURE — 96372 THER/PROPH/DIAG INJ SC/IM: CPT

## 2018-07-12 ENCOUNTER — ALLIED HEALTH/NURSE VISIT (OUTPATIENT)
Dept: NURSING | Facility: CLINIC | Age: 73
End: 2018-07-12
Payer: COMMERCIAL

## 2018-07-12 DIAGNOSIS — R79.89 LOW VITAMIN B12 LEVEL: ICD-10-CM

## 2018-07-12 PROCEDURE — 96372 THER/PROPH/DIAG INJ SC/IM: CPT

## 2018-07-12 PROCEDURE — 99207 ZZC NO CHARGE NURSE ONLY: CPT

## 2018-07-26 ENCOUNTER — ALLIED HEALTH/NURSE VISIT (OUTPATIENT)
Dept: NURSING | Facility: CLINIC | Age: 73
End: 2018-07-26
Payer: COMMERCIAL

## 2018-07-26 DIAGNOSIS — R79.89 LOW VITAMIN B12 LEVEL: ICD-10-CM

## 2018-07-26 PROCEDURE — 99207 ZZC NO CHARGE NURSE ONLY: CPT

## 2018-07-26 PROCEDURE — 96372 THER/PROPH/DIAG INJ SC/IM: CPT

## 2018-07-26 NOTE — NURSING NOTE
"Chief Complaint   Patient presents with     Allied Health Visit     B12     initial There were no vitals taken for this visit. Estimated body mass index is 20.49 kg/(m^2) as calculated from the following:    Height as of 3/21/18: 5' 2\" (1.575 m).    Weight as of 3/21/18: 112 lb (50.8 kg)..  bp completed using cuff size NA (Not Taken)  JANE NICOLE LPN  "

## 2018-07-26 NOTE — MR AVS SNAPSHOT
After Visit Summary   7/26/2018    Danielle Bryan    MRN: [id number]           Patient Information     Date Of Birth          1945        Visit Information        Provider Department      7/26/2018 8:30 [facility]        Today's Diagnoses     Low vitamin B12 level           Follow-ups after your visit        Your next 10 appointments already scheduled     Aug 09, 2018  8:30 AM CDT   [facility]    [address]  [facility] [address]   [phone]            Aug 23, 2018  8:30 AM CDT   [facility]    [address]  [facility] [address]   [phone]            Sep 06, 2018  8:30 AM CDT   [facility]    [address]  [facility] [address]   [phone]            Sep 18, 2018  9:20 AM CDT   [facility]    [address]  [facility] [address]   [phone]            Sep 20, 2018  8:30 AM CDT   [facility]    [address]  [facility] [address]   [phone]            Oct 04, 2018  8:30 AM CDT   [facility]    [address]  [facility] [address]   [phone]              Who to contact     If you have questions or need follow up information about today's clinic visit or your schedule please contact [facility] directly at [phone].  Normal or non-critical lab and imaging results will be communicated to you by MyChart, letter or phone within 4 business days after the clinic has received the results. If you do  not hear from us within 7 days, please contact the clinic through SlideJar or phone. If you have a critical or abnormal lab result, we will notify you by phone as soon as possible.  Submit refill requests through SlideJar or call your pharmacy and they will forward the refill request to us. Please allow 3 business days for your refill to be completed.          Additional Information About Your Visit        Pipettehart Information     SlideJar gives you secure access to your electronic health record. If you see a primary care provider, you can also send messages to your care team and make appointments. If you have questions, please call your primary care clinic.  If you do not have a primary care provider, please call [phone] and they will assist you.        Care EveryWhere ID     This is your Care EveryWhere ID. This could be used by other organizations to access your [place] medical records  [id number]         Blood Pressure from Last 3 Encounters:   03/21/18 108/62   03/08/18 108/66   02/05/18 102/60    Weight from Last 3 Encounters:   03/21/18 112 lb (50.8 kg)   03/08/18 112 lb 6.4 oz (51 kg)   02/05/18 114 lb (51.7 kg)              We Performed the Following     INJECTION INTRAMUSCULAR OR SUB-Q     VITAMIN B12 INJ /1000MCG        Primary Care Provider Office Phone # Fax #    Colt Riley [phone]       [address] [address]        Equal Access to Services     [facility]: Hadii aad ku hadasho Soomaali, waaxda luqadaha, qaybta kaalmada adeegyada, pinky noriega haycarlos a ladd . So [facility] [phone].    ATENCIÓN: Si habla español, tiene a moore disposición servicios gratuitos de asistencia lingüística. Llame al [phone].    We comply with applicable federal civil rights laws and [place] laws. We do not discriminate on the basis of race, color, national origin, age, disability, sex, sexual orientation, or gender identity.            Thank you!     Thank  you for choosing [facility]  for your care. Our goal is always to provide you with excellent care. Hearing back from our patients is one way we can continue to improve our services. Please take a few minutes to complete the written survey that you may receive in the mail after your visit with us. Thank you!             Your Updated Medication List - Protect others around you: Learn how to safely use, store and throw away your medicines at www.disposemymeds.org.          This list is accurate as of 7/26/18  8:50 AM.  Always use your most recent med list.                   Brand Name Dispense Instructions for use Diagnosis    amoxicillin-clavulanate 875-125 MG per tablet    AUGMENTIN    20 tablet    Take 1 tablet by mouth 2 times daily    Acute sinusitis treated with antibiotics in the past 60 days       aspirin 81 MG tablet     30 tablet    Take 1 tablet (81 mg) by mouth daily        cetirizine 10 MG tablet    zyrTEC     Take 10 mg by mouth as needed for allergies        cyanocobalamin 1000 MCG/ML injection    VITAMIN B12     Inject 1 mL into the muscle every 14 days        fluticasone 50 MCG/ACT spray    FLONASE    1 Bottle    Spray 1-2 sprays into both nostrils daily    Nasal congestion       order for DME     2 Units    Equipment being ordered: Wigs    Alopecia areata       polyethylene glycol 0.4%- propylene glycol 0.3% 0.4-0.3 % Soln ophthalmic solution    SYSTANE ULTRA     Place 1 drop into both eyes every hour as needed for dry eyes        pravastatin 20 MG tablet    PRAVACHOL    90 tablet    TAKE ONE TABLET BY MOUTH ONE TIME DAILY    Hyperlipidemia LDL goal <100       ranitidine 150 MG tablet    ZANTAC     Take 150 mg by mouth as needed        replens Gel     70 g    Place 1 Dose vaginally daily    Vaginal atrophy       senna-docusate 8.6-50 MG per tablet    SENOKOT-S;PERICOLACE    120 tablet    Take 1-2 tablets by mouth 2 times daily as needed for constipation    Constipation, unspecified  constipation type       sodium chloride 0.65 % nasal spray    OCEAN     Spray 1 spray into both nostrils daily as needed for congestion

## 2018-08-20 ENCOUNTER — ALLIED HEALTH/NURSE VISIT (OUTPATIENT)
Dept: NURSING | Facility: CLINIC | Age: 73
End: 2018-08-20
Payer: COMMERCIAL

## 2018-08-20 DIAGNOSIS — Z23 ENCOUNTER FOR IMMUNIZATION: Primary | ICD-10-CM

## 2018-08-20 NOTE — MR AVS SNAPSHOT
After Visit Summary   8/20/2018    Danielle Bryan    MRN: [id number]           Patient Information     Date Of Birth          1945        Visit Information        Provider Department      8/20/2018 9:30 AM RI IM NURSE [facility]        Today's Diagnoses     Encounter for immunization    -  1       Follow-ups after your visit        Your next 10 appointments already scheduled     Aug 30, 2018  8:30 AM CDT   Nurse Only with RI IM NURSE   [facility]    [address]  [facility] [id number] [phone]            Sep 18, 2018  9:20 AM CDT   PHYSICAL with Colt Riley MD   [facility]    [address]  [facility] [address] [phone]              Who to contact     If you have questions or need follow up information about today's clinic visit or your schedule please contact [facility] directly at [phone].  Normal or non-critical lab and imaging results will be communicated to you by MobiKwikhart, letter or phone within 4 business days after the clinic has received the results. If you do not hear from us within 7 days, please contact the clinic through Vappst or phone. If you have a critical or abnormal lab result, we will notify you by phone as soon as possible.  Submit refill requests through Fuzmo or call your pharmacy and they will forward the refill request to us. Please allow 3 business days for your refill to be completed.          Additional Information About Your Visit        MobiKwikhart Information     Fuzmo gives you secure access to your electronic health record. If you see a primary care provider, you can also send messages to your care team and make appointments. If you have questions, please call your primary care clinic.  If you do not have a primary care provider, please call [phone] and they will assist you.         Care EveryWhere ID     This is your Care EveryWhere ID. This could be used by other organizations to access your [place] medical records  [id number]         Blood Pressure from Last 3 Encounters:   03/21/18 108/62   03/08/18 108/66   02/05/18 102/60    Weight from Last 3 Encounters:   03/21/18 112 lb (50.8 kg)   03/08/18 112 lb 6.4 oz (51 kg)   02/05/18 114 lb (51.7 kg)              We Performed the Following     ADMIN 1st VACCINE     Vitamin B12        Primary Care Provider Office Phone # Fax #    Colt Riley [phone]       [address] [facility] [address]  [facility] [address]        Equal Access to Services     TENA ENGLISH : Hadii param marsh hadasho Somarquitaali, waaxda luqadaha, qaybta kaalmada adeegyada, pinky ladd . So [facility] [phone].    ATENCIÓN: Si habla español, tiene a moore disposición servicios gratuitos de asistencia lingüística. [facility] [phone].    We comply with applicable federal civil rights laws and [place] laws. We do not discriminate on the basis of race, color, national origin, age, disability, sex, sexual orientation, or gender identity.            Thank you!     Thank you for choosing [facility]  for your care. Our goal is always to provide you with excellent care. Hearing back from our patients is one way we can continue to improve our services. Please take a few minutes to complete the written survey that you may receive in the mail after your visit with us. Thank you!             Your Updated Medication List - Protect others around you: Learn how to safely use, store and throw away your medicines at www.disposemymeds.org.          This list is accurate as of 8/20/18 10:52 AM.  Always use your most recent med list.                   Brand Name Dispense Instructions for use Diagnosis    amoxicillin-clavulanate 875-125 MG per tablet    AUGMENTIN    20 tablet    Take 1 tablet by mouth 2 times daily    Acute sinusitis treated  with antibiotics in the past 60 days       aspirin 81 MG tablet     30 tablet    Take 1 tablet (81 mg) by mouth daily        cetirizine 10 MG tablet    zyrTEC     Take 10 mg by mouth as needed for allergies        cyanocobalamin 1000 MCG/ML injection    VITAMIN B12     Inject 1 mL into the muscle every 14 days        fluticasone 50 MCG/ACT spray    FLONASE    1 Bottle    Spray 1-2 sprays into both nostrils daily    Nasal congestion       order for DME     2 Units    Equipment being ordered: Wigs    Alopecia areata       polyethylene glycol 0.4%- propylene glycol 0.3% 0.4-0.3 % Soln ophthalmic solution    SYSTANE ULTRA     Place 1 drop into both eyes every hour as needed for dry eyes        pravastatin 20 MG tablet    PRAVACHOL    90 tablet    TAKE ONE TABLET BY MOUTH ONE TIME DAILY    Hyperlipidemia LDL goal <100       ranitidine 150 MG tablet    ZANTAC     Take 150 mg by mouth as needed        replens Gel     70 g    Place 1 Dose vaginally daily    Vaginal atrophy       senna-docusate 8.6-50 MG per tablet    SENOKOT-S;PERICOLACE    120 tablet    Take 1-2 tablets by mouth 2 times daily as needed for constipation    Constipation, unspecified constipation type       sodium chloride 0.65 % nasal spray    OCEAN     Spray 1 spray into both nostrils daily as needed for congestion

## 2018-08-22 ENCOUNTER — TRANSFERRED RECORDS (OUTPATIENT)
Dept: HEALTH INFORMATION MANAGEMENT | Facility: CLINIC | Age: 73
End: 2018-08-22

## 2018-08-30 ENCOUNTER — ALLIED HEALTH/NURSE VISIT (OUTPATIENT)
Dept: NURSING | Facility: CLINIC | Age: 73
End: 2018-08-30
Payer: COMMERCIAL

## 2018-08-30 DIAGNOSIS — Z23 ENCOUNTER FOR IMMUNIZATION: ICD-10-CM

## 2018-08-30 NOTE — MR AVS SNAPSHOT
After Visit Summary   8/30/2018    Danielle Bryan    MRN: [id number]           Patient Information     Date Of Birth          1945        Visit Information        Provider Department      8/30/2018 8:30 AM RI IM NURSE [facility]        Today's Diagnoses     Encounter for immunization           Follow-ups after your visit        Your next 10 appointments already scheduled     Aug 30, 2018  8:30 AM CDT   Nurse Only with RI IM NURSE   [facility]    [address]  [facility] [id number] [phone]            Sep 18, 2018  9:20 AM CDT   PHYSICAL with Colt Riley MD   [facility]    [address]  [facility] [id number] [phone]            Oct 04, 2018  8:30 AM CDT   Nurse Only with RI IM NURSE   [facility]    [address]  [facility] [id number] [phone]            Oct 18, 2018  8:30 AM CDT   Nurse Only with RI IM NURSE   [facility]    [address]  [facility] [id number] [phone]              Who to contact     If you have questions or need follow up information about today's clinic visit or your schedule please contact [facility] directly at [phone].  Normal or non-critical lab and imaging results will be communicated to you by MyChart, letter or phone within 4 business days after the clinic has received the results. If you do not hear from us within 7 days, please contact the clinic through MyChart or phone. If you have a critical or abnormal lab result, we will notify you by phone as soon as possible.  Submit refill requests through Quarterly or call your pharmacy and they will forward the refill request to us. Please allow 3 business days for your refill to be completed.          Additional Information  About Your Visit        [employer] Information     [employer] gives you secure access to your electronic health record. If you see a primary care provider, you can also send messages to your care team and make appointments. If you have questions, please call your primary care clinic.  If you do not have a primary care provider, please call [phone] and they will assist you.        Care EveryWhere ID     This is your Care EveryWhere ID. This could be used by other organizations to access your [facility] medical records  [id number]         Blood Pressure from Last 3 Encounters:   03/21/18 108/62   03/08/18 108/66   02/05/18 102/60    Weight from Last 3 Encounters:   03/21/18 112 lb (50.8 kg)   03/08/18 112 lb 6.4 oz (51 kg)   02/05/18 114 lb (51.7 kg)              We Performed the Following     Vitamin B12        Primary Care Provider Office Phone # Fax #    Colt Riley [phone]       [address] [place] [id number]        Equal Access to Services     [facility]: Hadii aad ku hadasho Soomaali, waaxda luqadaha, qaybta kaalmada adeegyada, waxay idiin haynohemyn benjamin ladd . So [facility] [phone].    ATENCIÓN: Si habla español, tiene a moore disposición servicios gratuitos de asistencia lingüística. Llame al [phone].    We comply with applicable federal civil rights laws and [place] laws. We do not discriminate on the basis of race, color, national origin, age, disability, sex, sexual orientation, or gender identity.            Thank you!     Thank you for choosing [facility]  for your care. Our goal is always to provide you with excellent care. Hearing back from our patients is one way we can continue to improve our services. Please take a few minutes to complete the written survey that you may receive in the mail after your visit with us. Thank you!             Your Updated Medication List - Protect others around you: Learn how to safely use, store and  throw away your medicines at www.disposemymeds.org.          This list is accurate as of 8/30/18  8:25 AM.  Always use your most recent med list.                   Brand Name Dispense Instructions for use Diagnosis    amoxicillin-clavulanate 875-125 MG per tablet    AUGMENTIN    20 tablet    Take 1 tablet by mouth 2 times daily    Acute sinusitis treated with antibiotics in the past 60 days       aspirin 81 MG tablet     30 tablet    Take 1 tablet (81 mg) by mouth daily        cetirizine 10 MG tablet    zyrTEC     Take 10 mg by mouth as needed for allergies        cyanocobalamin 1000 MCG/ML injection    VITAMIN B12     Inject 1 mL into the muscle every 14 days        fluticasone 50 MCG/ACT spray    FLONASE    1 Bottle    Spray 1-2 sprays into both nostrils daily    Nasal congestion       order for DME     2 Units    Equipment being ordered: Wigs    Alopecia areata       polyethylene glycol 0.4%- propylene glycol 0.3% 0.4-0.3 % Soln ophthalmic solution    SYSTANE ULTRA     Place 1 drop into both eyes every hour as needed for dry eyes        pravastatin 20 MG tablet    PRAVACHOL    90 tablet    TAKE ONE TABLET BY MOUTH ONE TIME DAILY    Hyperlipidemia LDL goal <100       ranitidine 150 MG tablet    ZANTAC     Take 150 mg by mouth as needed        replens Gel     70 g    Place 1 Dose vaginally daily    Vaginal atrophy       senna-docusate 8.6-50 MG per tablet    SENOKOT-S;PERICOLACE    120 tablet    Take 1-2 tablets by mouth 2 times daily as needed for constipation    Constipation, unspecified constipation type       sodium chloride 0.65 % nasal spray    OCEAN     Spray 1 spray into both nostrils daily as needed for congestion

## 2018-09-18 ENCOUNTER — OFFICE VISIT (OUTPATIENT)
Dept: INTERNAL MEDICINE | Facility: CLINIC | Age: 73
End: 2018-09-18
Payer: COMMERCIAL

## 2018-09-18 VITALS
TEMPERATURE: 98.5 F | SYSTOLIC BLOOD PRESSURE: 108 MMHG | DIASTOLIC BLOOD PRESSURE: 62 MMHG | WEIGHT: 110 LBS | HEIGHT: 62 IN | BODY MASS INDEX: 20.24 KG/M2 | RESPIRATION RATE: 14 BRPM | OXYGEN SATURATION: 99 % | HEART RATE: 77 BPM

## 2018-09-18 DIAGNOSIS — M81.0 AGE-RELATED OSTEOPOROSIS WITHOUT CURRENT PATHOLOGICAL FRACTURE: ICD-10-CM

## 2018-09-18 DIAGNOSIS — E53.8 VITAMIN B12 DEFICIENCY (NON ANEMIC): ICD-10-CM

## 2018-09-18 DIAGNOSIS — Z23 NEED FOR PROPHYLACTIC VACCINATION AND INOCULATION AGAINST INFLUENZA: ICD-10-CM

## 2018-09-18 DIAGNOSIS — K59.00 CONSTIPATION, UNSPECIFIED CONSTIPATION TYPE: ICD-10-CM

## 2018-09-18 DIAGNOSIS — J30.0 CHRONIC VASOMOTOR RHINITIS: ICD-10-CM

## 2018-09-18 DIAGNOSIS — Z12.31 VISIT FOR SCREENING MAMMOGRAM: ICD-10-CM

## 2018-09-18 DIAGNOSIS — R32 URINARY INCONTINENCE, UNSPECIFIED TYPE: ICD-10-CM

## 2018-09-18 DIAGNOSIS — G45.9 TRANSIENT CEREBRAL ISCHEMIA, UNSPECIFIED TYPE: ICD-10-CM

## 2018-09-18 DIAGNOSIS — Z00.00 ROUTINE GENERAL MEDICAL EXAMINATION AT A HEALTH CARE FACILITY: Primary | ICD-10-CM

## 2018-09-18 DIAGNOSIS — E78.5 HYPERLIPIDEMIA WITH TARGET LDL LESS THAN 100: ICD-10-CM

## 2018-09-18 LAB
ALBUMIN UR-MCNC: NEGATIVE MG/DL
APPEARANCE UR: CLEAR
BILIRUB UR QL STRIP: NEGATIVE
COLOR UR AUTO: YELLOW
ERYTHROCYTE [DISTWIDTH] IN BLOOD BY AUTOMATED COUNT: 13.7 % (ref 10–15)
GLUCOSE UR STRIP-MCNC: NEGATIVE MG/DL
HCT VFR BLD AUTO: 38.6 % (ref 35–47)
HGB BLD-MCNC: 12.2 G/DL (ref 11.7–15.7)
HGB UR QL STRIP: NEGATIVE
KETONES UR STRIP-MCNC: NEGATIVE MG/DL
LEUKOCYTE ESTERASE UR QL STRIP: NEGATIVE
MCH RBC QN AUTO: 29.6 PG (ref 26.5–33)
MCHC RBC AUTO-ENTMCNC: 31.6 G/DL (ref 31.5–36.5)
MCV RBC AUTO: 94 FL (ref 78–100)
NITRATE UR QL: NEGATIVE
PH UR STRIP: 6 PH (ref 5–7)
PLATELET # BLD AUTO: 209 10E9/L (ref 150–450)
RBC # BLD AUTO: 4.12 10E12/L (ref 3.8–5.2)
SOURCE: NORMAL
SP GR UR STRIP: 1.02 (ref 1–1.03)
UROBILINOGEN UR STRIP-ACNC: 1 EU/DL (ref 0.2–1)
WBC # BLD AUTO: 5.2 10E9/L (ref 4–11)

## 2018-09-18 PROCEDURE — 36415 COLL VENOUS BLD VENIPUNCTURE: CPT | Performed by: INTERNAL MEDICINE

## 2018-09-18 PROCEDURE — G0008 ADMIN INFLUENZA VIRUS VAC: HCPCS | Performed by: INTERNAL MEDICINE

## 2018-09-18 PROCEDURE — 84443 ASSAY THYROID STIM HORMONE: CPT | Performed by: INTERNAL MEDICINE

## 2018-09-18 PROCEDURE — 99397 PER PM REEVAL EST PAT 65+ YR: CPT | Mod: 25 | Performed by: INTERNAL MEDICINE

## 2018-09-18 PROCEDURE — 82306 VITAMIN D 25 HYDROXY: CPT | Performed by: INTERNAL MEDICINE

## 2018-09-18 PROCEDURE — 80053 COMPREHEN METABOLIC PANEL: CPT | Performed by: INTERNAL MEDICINE

## 2018-09-18 PROCEDURE — 80061 LIPID PANEL: CPT | Performed by: INTERNAL MEDICINE

## 2018-09-18 PROCEDURE — 81003 URINALYSIS AUTO W/O SCOPE: CPT | Performed by: INTERNAL MEDICINE

## 2018-09-18 PROCEDURE — 85027 COMPLETE CBC AUTOMATED: CPT | Performed by: INTERNAL MEDICINE

## 2018-09-18 PROCEDURE — 90662 IIV NO PRSV INCREASED AG IM: CPT | Performed by: INTERNAL MEDICINE

## 2018-09-18 RX ORDER — IPRATROPIUM BROMIDE 42 UG/1
2 SPRAY, METERED NASAL 4 TIMES DAILY PRN
Qty: 1 BOX | Refills: 11 | Status: SHIPPED | OUTPATIENT
Start: 2018-09-18 | End: 2019-12-06

## 2018-09-18 RX ORDER — PRAVASTATIN SODIUM 20 MG
20 TABLET ORAL DAILY
Qty: 90 TABLET | Refills: 3 | Status: SHIPPED | OUTPATIENT
Start: 2018-09-18 | End: 2019-10-28

## 2018-09-18 RX ORDER — AMOXICILLIN 250 MG
1-2 CAPSULE ORAL 2 TIMES DAILY PRN
Qty: 120 TABLET | Refills: 5 | Status: SHIPPED | OUTPATIENT
Start: 2018-09-18 | End: 2019-01-30

## 2018-09-18 NOTE — PROGRESS NOTES
"  SUBJECTIVE:   Danielle Bryan is a 73 year old female who presents for Preventive Visit.    Are you in the first 12 months of your Medicare Part B coverage?  No    Healthy Habits:  Answers for HPI/ROS submitted by the patient on 9/18/2018   Annual Exam:  Getting at least 3 servings of Calcium per day:: Yes  Bi-annual eye exam:: Yes  Dental care twice a year:: Yes  Sleep apnea or symptoms of sleep apnea:: None  Frequency of exercise:: 4-5 days/week  PHQ-2 Score: Incomplete  Taking medications regularly:: Yes  Duration of exercise:: 15-30 minutes      COGNITIVE SCREEN  1) Repeat 3 items (Leader, Season, Table)    2) Clock draw: NORMAL  3) 3 item recall: Recalls 1 object   Results: NORMAL clock, 1-2 items recalled: COGNITIVE IMPAIRMENT LESS LIKELY    Mini-CogTM Copyright S Vanessa. Licensed by the author for use in [place] [employer]; reprinted with permission (raghav@[facility]). All rights reserved.        Fall   Patient relates she fell while walking on steps on 8/21. She experienced left ankle pain following the fall which has since resolved. She notes this was an isolated event and she has not been prone to falls in the past.     Osteoporosis  Patient continues on calcium and Vitamin D. Last DEXA scan was completed on 7/12/17 showing \"trend toward significant decrease in bone density of the lumbar spine. There has been no significant change in bone density of the hip(s).\" She did one round of Reclast infusion and felt ill afterwards, thus has not proceeded to subsequent infusions.    Chronic rhinitis  Danielle reports chronic rhinorrhea. Symptoms are present year round. She takes OTC antihistamine. She has taken Flonase and Atrovent nasal spray. She doesn't recall if either was effective, but per chart review of Dr Chaparro of [facility] noted Atrovent had proven effective. She mentions right side of face is swollen intermittently.     Past/recent records reviewed and discussed for:  Past medical, " family and social histories as well as medications reviewed and updated as needed.       Reviewed and updated as needed this visit by clinical staff  Tobacco  Allergies  Meds  Med Hx  Surg Hx  Fam Hx  Soc Hx        Reviewed and updated as needed this visit by Provider        Social History   Substance Use Topics     Smoking status: Never Smoker     Smokeless tobacco: Never Used     Alcohol use Yes      Comment: Rare.        If you drink alcohol do you typically have >3 drinks per day or >7 drinks per week? No                        Today's PHQ-2 Score:   PHQ-2 ( 1999 [employer]) 9/17/2018 3/8/2018   Q1: Little interest or pleasure in doing things - 0   Q2: Feeling down, depressed or hopeless - 0   PHQ-2 Score - 0   PHQ-2 Score Incomplete -       Do you feel safe in your environment - Yes    Do you have a Health Care Directive?: No: Advance care planning reviewed with patient; information given to patient to review.    Current providers sharing in care for this patient include:   Patient Care Team:  oClt Riley MD as PCP - General (Internal Medicine)    The following health maintenance items are reviewed in Epic and correct as of today:  Health Maintenance   Topic Date Due     ADVANCE DIRECTIVE PLANNING Q5 YRS  03/21/2018     INFLUENZA VACCINE (1) 09/01/2018     FALL RISK ASSESSMENT  03/08/2019     PHQ-2 Q1 YR  03/08/2019     MAMMO SCREEN Q2 YR (SYSTEM ASSIGNED)  06/21/2019     TETANUS IMMUNIZATION (SYSTEM ASSIGNED)  10/28/2021     LIPID SCREEN Q5 YR FEMALE (SYSTEM ASSIGNED)  10/18/2022     COLON CANCER SCREEN (SYSTEM ASSIGNED)  01/15/2024     DEXA SCAN SCREENING (SYSTEM ASSIGNED)  Completed     PNEUMOCOCCAL  Completed             ROS:  CONSTITUTIONAL: NEGATIVE for fever, chills, change in weight  INTEGUMENTARY/SKIN: NEGATIVE for worrisome rashes, moles or lesions  EYES: POSITIVE for chronic rhinorrhea NEGATIVE for vision changes or irritation  ENT/MOUTH: NEGATIVE for ear, mouth and throat problems  RESP:  "NEGATIVE for significant cough or SOB  BREAST: NEGATIVE for masses, tenderness or discharge  CV: NEGATIVE for chest pain, palpitations or peripheral edema  GI: POSITIVE for heartburn - primarily triggered by greasy foods. She uses antacids as needed. NEGATIVE for nausea, abdominal pain, heartburn, or change in bowel habits  : POSITIVE for urinary incontinence, she wears pads. NEGATIVE for frequency, dysuria, or hematuria  MUSCULOSKELETAL: NEGATIVE for significant arthralgias or myalgia  NEURO: NEGATIVE for weakness, dizziness or paresthesias  ENDOCRINE: NEGATIVE for temperature intolerance, skin/hair changes  HEME: NEGATIVE for bleeding problems  PSYCHIATRIC: NEGATIVE for changes in mood or affect    This document serves as a record of the services and decisions personally performed and made by Colt Riley MD. It was created on his behalf by Nenita Waters, a trained medical scribe. The creation of this document is based on the provider's statements to the medical scribe.  Nenita Waters September 18, 2018 9:45 AM       OBJECTIVE:   /62 (BP Location: Left arm, Patient Position: Sitting, Cuff Size: Adult Regular)  Pulse 77  Temp 98.5  F (36.9  C) (Oral)  Resp 14  Ht 5' 1.81\" (1.57 m)  Wt 110 lb (49.9 kg)  SpO2 99%  Breastfeeding? No  BMI 20.24 kg/m2 Estimated body mass index is 20.24 kg/(m^2) as calculated from the following:    Height as of this encounter: 5' 1.81\" (1.57 m).    Weight as of this encounter: 110 lb (49.9 kg).  EXAM:   GENERAL APPEARANCE: healthy, alert and no distress  EYES: Eyes grossly normal to inspection, PERRL and conjunctivae and sclerae normal  HENT: ear canals and TM's normal, nose and mouth without ulcers or lesions, oropharynx clear and oral mucous membranes moist  NECK: no adenopathy, no asymmetry, masses, or scars and thyroid normal to palpation  RESP: lungs clear to auscultation - no rales, rhonchi or wheezes  BREAST: deferred  CV: regular rate and rhythm, normal " S1 S2, no S3 or S4, no murmur, click or rub, no peripheral edema and peripheral pulses strong  ABDOMEN: soft, nontender, no hepatosplenomegaly, no masses and bowel sounds normal   (female): deferred  MS: no musculoskeletal defects are noted and gait is age appropriate without ataxia  SKIN: no suspicious lesions or rashes  NEURO: Normal strength and tone, sensory exam grossly normal, mentation intact and speech normal  PSYCH: mentation appears normal and affect normal/bright    Diagnostic Test Results:  No results found for this or any previous visit (from the past 24 hour(s)).    ASSESSMENT / PLAN:   (Z00.00) Routine general medical examination at a health care facility  (primary encounter diagnosis)  Comment: Stable health. See epic orders.  Plan: Comprehensive metabolic panel, Lipid panel         reflex to direct LDL Fasting, TSH with free T4         reflex, CBC with platelets, UA reflex to         Microscopic          (M81.0) Age-related osteoporosis without current pathological fracture  Comment: Referral placed to endocrinology to discuss treatment of osteoporosis.  Plan: ENDOCRINOLOGY ADULT REFERRAL, Vitamin D         Deficiency          (E53.8) Vitamin B12 deficiency (non anemic)  Comment: B12 injection today     (E78.5) Hyperlipidemia with target LDL less than 100  Comment: Updating fasting lipids today. Continue current meds.  Plan: Comprehensive metabolic panel, Lipid panel         reflex to direct LDL Fasting, pravastatin (PRAVACHOL) 20 MG tablet          (G45.9) Transient cerebral ischemia, unspecified type  Comment: No recent symptoms.    (Z23) Need for prophylactic vaccination and inoculation against influenza  Comment: Administered in clinic.  Plan: FLU VACCINE, INCREASED ANTIGEN, PRESV FREE, AGE        65+ [90855], ADMIN INFLUENZA (For MEDICARE         Patients ONLY) []          (Z12.31) Visit for screening mammogram  Comment: Routine screening  Plan: *MA Screening Digital Bilateral         "  (J30.0) Chronic vasomotor rhinitis  Comment: Reviewed ENT notes. Recommended re-trial of Atrovent nasal spray as needed and OTC antihistamine daily. Consider referral to ENT for evaluation again, patient defers for now.   Plan: ipratropium (ATROVENT) 0.06 % spray          (R32) Urinary incontinence, unspecified type  Comment: Urinary incontinence noted, wears a pad. Patient declines urology referral.   Plan: UA reflex to Microscopic          (K59.00) Constipation, unspecified constipation type  Comment: Refill  Plan: senna-docusate (SENOKOT-S;PERICOLACE) 8.6-50 MG        per tablet          End of Life Planning:  Patient currently has an advanced directive: No.  I have verified the patient's ablity to prepare an advanced directive/make health care decisions.  Literature was provided to assist patient in preparing an advanced directive.    COUNSELING:  Reviewed preventive health counseling, as reflected in patient instructions       Regular exercise       Healthy diet/nutrition       Immunizations    Vaccinated for: Influenza             Colon cancer screening    BP Readings from Last 1 Encounters:   09/18/18 108/62     Estimated body mass index is 20.24 kg/(m^2) as calculated from the following:    Height as of this encounter: 5' 1.81\" (1.57 m).    Weight as of this encounter: 110 lb (49.9 kg).    Weight management plan noted, stable and monitoring     reports that she has never smoked. She has never used smokeless tobacco.      Appropriate preventive services were discussed with this patient, including applicable screening as appropriate for cardiovascular disease, diabetes, osteopenia/osteoporosis, and glaucoma.  As appropriate for age/gender, discussed screening for colorectal cancer, prostate cancer, breast cancer, and cervical cancer. Checklist reviewing preventive services available has been given to the patient.    Reviewed patients plan of care and provided an AVS. The Basic Care Plan (routine screening as " documented in Health Maintenance) for Danielle meets the Care Plan requirement. This Care Plan has been established and reviewed with the Patient.    Counseling Resources:  ATP IV Guidelines  Pooled Cohorts Equation Calculator  Breast Cancer Risk Calculator  FRAX Risk Assessment  ICSI Preventive Guidelines  Dietary Guidelines for Americans, 2010  USDA's MyPlate  ASA Prophylaxis  Lung CA Screening    The information in this document, created by the medical scribe for me, accurately reflects the services I personally performed and the decisions made by me. I have reviewed and approved this document for accuracy prior to leaving the patient care area.  September 18, 2018 10:26 AM    Colt Riley MD, MD  [facility]    Injectable Influenza Immunization Documentation    1.  Is the person to be vaccinated sick today?   No    2. Does the person to be vaccinated have an allergy to a component   of the vaccine?   No  Egg Allergy Algorithm Link    3. Has the person to be vaccinated ever had a serious reaction   to influenza vaccine in the past?   No    4. Has the person to be vaccinated ever had Guillain-Barré syndrome?   No    Form completed by Roxanna TRAYLOR

## 2018-09-18 NOTE — MR AVS SNAPSHOT
After Visit Summary   9/18/2018    Danielle Bryan    MRN: [id number]           Patient Information     Date Of Birth          1945        Visit Information        Provider Department      9/18/2018 9:20 AM Colt Riley MD [facility]        Today's Diagnoses     Routine general medical examination at a health care facility    -  1    Age-related osteoporosis without current pathological fracture        Vitamin B12 deficiency (non anemic)        Hyperlipidemia with target LDL less than 100        Transient cerebral ischemia, unspecified type        Need for prophylactic vaccination and inoculation against influenza        Visit for screening mammogram        Chronic vasomotor rhinitis        Urinary incontinence, unspecified type        Hyperlipidemia LDL goal <100        Constipation, unspecified constipation type          Care Instructions      Preventive Health Recommendations    Female Ages 65 +    Yearly exam:     See your health care provider every year in order to  o Review health changes.   o Discuss preventive care.    o Review your medicines if your doctor has prescribed any.      You no longer need a yearly Pap test unless you've had an abnormal Pap test in the past 10 years. If you have vaginal symptoms, such as bleeding or discharge, be sure to talk with your provider about a Pap test.      Every 1 to 2 years, have a mammogram.  If you are over 69, talk with your health care provider about whether or not you want to continue having screening mammograms.      Every 10 years, have a colonoscopy. Or, have a yearly FIT test (stool test). These exams will check for colon cancer.       Have a cholesterol test every 5 years, or more often if your doctor advises it.       Have a diabetes test (fasting glucose) every three years. If you are at risk for diabetes, you should have this test more often.       At age 65, have a bone density scan (DEXA) to check for  osteoporosis (brittle bone disease).    Shots:    Get a flu shot each year.    Get a tetanus shot every 10 years.    Talk to your doctor about your pneumonia vaccines. There are now two you should receive - Pneumovax (PPSV 23) and Prevnar (PCV 13).    Talk to your pharmacist about the shingles vaccine.    Talk to your doctor about the hepatitis B vaccine.    Nutrition:     Eat at least 5 servings of fruits and vegetables each day.      Eat whole-grain bread, whole-wheat pasta and brown rice instead of white grains and rice.      Get adequate Calcium and Vitamin D.     Lifestyle    Exercise at least 150 minutes a week (30 minutes a day, 5 days a week). This will help you control your weight and prevent disease.      Limit alcohol to one drink per day.      No smoking.       Wear sunscreen to prevent skin cancer.       See your dentist twice a year for an exam and cleaning.      See your eye doctor every 1 to 2 years to screen for conditions such as glaucoma, macular degeneration and cataracts.      My understanding about Reclast infusions is that people often do better after subsequent infusions, even if they noted adverse effects from the first infusion.  However, it might be wise to consult with our endocrinology specialist, Dr Hatch, who is in our [place] office.    For the persistent runny nose, you could re-try Atrovent (ipratropium) nasal spray four times a day as needed, along with an oral antihistamine daily (such as cetirizine 10 mg daily).   If interested, we could get a second ENT opinion (saw Dr Chaparro in 2016) or an Allergist opinion.   The facial swelling is subtle to me, and I think not necessarily related.     Otherwise everything looks fine!    Refills of medications have been faxed to your pharmacy.     I'll get back to you with lab results soon, especially if there is anything of concern.      See you in a year, sooner if problems.            Follow-ups after your visit        Additional  Services     ENDOCRINOLOGY ADULT REFERRAL       Your provider has referred you to: [facility]: [facility] [phone]   [email or web address]      Please be aware that coverage of these services is subject to the terms and limitations of your health insurance plan.  Call member services at your health plan with any benefit or coverage questions.      Please bring the following to your appointment:    >>   Any x-rays, CTs or MRIs which have been performed.  Contact the facility where they were done to arrange for  prior to your scheduled appointment.  Any new CT, MRI or other procedures ordered by your specialist must be performed at a [place] facility or coordinated by your clinic's referral office.    >>   List of current medications   >>   This referral request   >>   Any documents/labs given to you for this referral                  Follow-up notes from your care team     Return in about 1 year (around 9/18/2019) for Physical Exam.      Your next 10 appointments already scheduled     Oct 04, 2018  8:30 AM CDT   Nurse Only with RI IM NURSE   [facility] ([facility])    [address]  [facility] [id number] [phone]            Oct 18, 2018  8:30 AM CDT   Nurse Only with RI IM NURSE   [facility] ([facility])    [address]  [facility] [id number] [phone]              Future tests that were ordered for you today     Open Future Orders        Priority Expected Expires Ordered    *MA Screening Digital Bilateral Routine  9/18/2019 9/18/2018            Who to contact     If you have questions or need follow up information about today's clinic visit or your schedule please contact [facility] directly at [phone].  Normal or non-critical lab and imaging results will be communicated to you by MyChart, letter or phone within 4 business days after  "the clinic has received the results. If you do not hear from us within 7 days, please contact the clinic through [employer] or phone. If you have a critical or abnormal lab result, we will notify you by phone as soon as possible.  Submit refill requests through [employer] or call your pharmacy and they will forward the refill request to us. Please allow 3 business days for your refill to be completed.          Additional Information About Your Visit        [employer] Information     [employer] gives you secure access to your electronic health record. If you see a primary care provider, you can also send messages to your care team and make appointments. If you have questions, please call your primary care clinic.  If you do not have a primary care provider, please call [phone] and they will assist you.        Care EveryWhere ID     This is your Care EveryWhere ID. This could be used by other organizations to access your [place] medical records  [id number]        Your Vitals Were     Pulse Temperature Respirations Height Pulse Oximetry Breastfeeding?    77 98.5  F (36.9  C) (Oral) 14 5' 1.81\" (1.57 m) 99% No    BMI (Body Mass Index)                   20.24 kg/m2            Blood Pressure from Last 3 Encounters:   09/18/18 108/62   03/21/18 108/62   03/08/18 108/66    Weight from Last 3 Encounters:   09/18/18 110 lb (49.9 kg)   03/21/18 112 lb (50.8 kg)   03/08/18 112 lb 6.4 oz (51 kg)              We Performed the Following     ADMIN INFLUENZA (For MEDICARE Patients ONLY) []     CBC with platelets     Comprehensive metabolic panel     ENDOCRINOLOGY ADULT REFERRAL     FLU VACCINE, INCREASED ANTIGEN, PRESV FREE, AGE 65+ [22239]     Lipid panel reflex to direct LDL Fasting     TSH with free T4 reflex     UA reflex to Microscopic     Vitamin D Deficiency          Today's Medication Changes          These changes are accurate as of 9/18/18 10:27 AM.  If you have any questions, ask your nurse or doctor.               Start " taking these medicines.        Dose/Directions    ipratropium 0.06 % spray   Commonly known as:  ATROVENT   Used for:  Chronic vasomotor rhinitis   Started by:  Colt Riley MD        Dose:  2 spray   Spray 2 sprays into both nostrils 4 times daily as needed for rhinitis   Quantity:  1 Box   Refills:  11         These medicines have changed or have updated prescriptions.        Dose/Directions    pravastatin 20 MG tablet   Commonly known as:  PRAVACHOL   This may have changed:  See the new instructions.   Used for:  Hyperlipidemia LDL goal <100   Changed by:  Colt Riley MD        Dose:  20 mg   Take 1 tablet (20 mg) by mouth daily   Quantity:  90 tablet   Refills:  3            Where to get your medicines      These medications were sent to [id number] - [facility] [address]  [address] [place] [address]     Phone:  [phone]     ipratropium 0.06 % spray    pravastatin 20 MG tablet    senna-docusate 8.6-50 MG per tablet                Primary Care Provider Office Phone # Fax #    Colt Riley [phone]       [address]  [facility] [address]        Equal Access to Services     [facility]: Hadii aad ku hadasho Soomaali, waaxda luqadaha, qaybta kaalmada adeegyada, waxay idiin hayaan adesantosh ladd . So [facility] [place] [phone].    ATENCIÓN: Si habla español, tiene a moore disposición servicios gratuitos de asistencia lingüística. [facility] [phone].    We comply with applicable federal civil rights laws and [place] laws. We do not discriminate on the basis of race, color, national origin, age, disability, sex, sexual orientation, or gender identity.            Thank you!     Thank you for choosing [facility]  for your care. Our goal is always to provide you with excellent care. Hearing back from our patients is one way we can continue to improve our services. Please take a few minutes to complete the written survey  that you may receive in the mail after your visit with us. Thank you!             Your Updated Medication List - Protect others around you: Learn how to safely use, store and throw away your medicines at www.disposemymeds.org.          This list is accurate as of 9/18/18 10:27 AM.  Always use your most recent med list.                   Brand Name Dispense Instructions for use Diagnosis    aspirin 81 MG tablet     30 tablet    Take 1 tablet (81 mg) by mouth daily        cetirizine 10 MG tablet    zyrTEC     Take 10 mg by mouth as needed for allergies        cyanocobalamin 1000 MCG/ML injection    VITAMIN B12     Inject 1 mL into the muscle every 14 days        fluticasone 50 MCG/ACT spray    FLONASE    1 Bottle    Spray 1-2 sprays into both nostrils daily    Nasal congestion       ipratropium 0.06 % spray    ATROVENT    1 Box    Spray 2 sprays into both nostrils 4 times daily as needed for rhinitis    Chronic vasomotor rhinitis       order for DME     2 Units    Equipment being ordered: Wigs    Alopecia areata       polyethylene glycol 0.4%- propylene glycol 0.3% 0.4-0.3 % Soln ophthalmic solution    SYSTANE ULTRA     Place 1 drop into both eyes every hour as needed for dry eyes        pravastatin 20 MG tablet    PRAVACHOL    90 tablet    Take 1 tablet (20 mg) by mouth daily    Hyperlipidemia LDL goal <100       ranitidine 150 MG tablet    ZANTAC     Take 150 mg by mouth as needed        replens Gel     70 g    Place 1 Dose vaginally daily    Vaginal atrophy       senna-docusate 8.6-50 MG per tablet    SENOKOT-S;PERICOLACE    120 tablet    Take 1-2 tablets by mouth 2 times daily as needed for constipation    Constipation, unspecified constipation type       sodium chloride 0.65 % nasal spray    OCEAN     Spray 1 spray into both nostrils daily as needed for congestion

## 2018-09-18 NOTE — PATIENT INSTRUCTIONS
Preventive Health Recommendations    Female Ages 65 +    Yearly exam:     See your health care provider every year in order to  o Review health changes.   o Discuss preventive care.    o Review your medicines if your doctor has prescribed any.      You no longer need a yearly Pap test unless you've had an abnormal Pap test in the past 10 years. If you have vaginal symptoms, such as bleeding or discharge, be sure to talk with your provider about a Pap test.      Every 1 to 2 years, have a mammogram.  If you are over 69, talk with your health care provider about whether or not you want to continue having screening mammograms.      Every 10 years, have a colonoscopy. Or, have a yearly FIT test (stool test). These exams will check for colon cancer.       Have a cholesterol test every 5 years, or more often if your doctor advises it.       Have a diabetes test (fasting glucose) every three years. If you are at risk for diabetes, you should have this test more often.       At age 65, have a bone density scan (DEXA) to check for osteoporosis (brittle bone disease).    Shots:    Get a flu shot each year.    Get a tetanus shot every 10 years.    Talk to your doctor about your pneumonia vaccines. There are now two you should receive - Pneumovax (PPSV 23) and Prevnar (PCV 13).    Talk to your pharmacist about the shingles vaccine.    Talk to your doctor about the hepatitis B vaccine.    Nutrition:     Eat at least 5 servings of fruits and vegetables each day.      Eat whole-grain bread, whole-wheat pasta and brown rice instead of white grains and rice.      Get adequate Calcium and Vitamin D.     Lifestyle    Exercise at least 150 minutes a week (30 minutes a day, 5 days a week). This will help you control your weight and prevent disease.      Limit alcohol to one drink per day.      No smoking.       Wear sunscreen to prevent skin cancer.       See your dentist twice a year for an exam and cleaning.      See your eye doctor  every 1 to 2 years to screen for conditions such as glaucoma, macular degeneration and cataracts.      My understanding about Reclast infusions is that people often do better after subsequent infusions, even if they noted adverse effects from the first infusion.  However, it might be wise to consult with our endocrinology specialist, Dr Hatch, who is in our [place] office.    For the persistent runny nose, you could re-try Atrovent (ipratropium) nasal spray four times a day as needed, along with an oral antihistamine daily (such as cetirizine 10 mg daily).   If interested, we could get a second ENT opinion (saw Dr Chaparro in 2016) or an Allergist opinion.   The facial swelling is subtle to me, and I think not necessarily related.     Otherwise everything looks fine!    Refills of medications have been faxed to your pharmacy.     I'll get back to you with lab results soon, especially if there is anything of concern.      See you in a year, sooner if problems.

## 2018-09-19 LAB
ALBUMIN SERPL-MCNC: 3.5 G/DL (ref 3.4–5)
ALP SERPL-CCNC: 36 U/L (ref 40–150)
ALT SERPL W P-5'-P-CCNC: 29 U/L (ref 0–50)
ANION GAP SERPL CALCULATED.3IONS-SCNC: 7 MMOL/L (ref 3–14)
AST SERPL W P-5'-P-CCNC: 23 U/L (ref 0–45)
BILIRUB SERPL-MCNC: 0.6 MG/DL (ref 0.2–1.3)
BUN SERPL-MCNC: 11 MG/DL (ref 7–30)
CALCIUM SERPL-MCNC: 8.1 MG/DL (ref 8.5–10.1)
CHLORIDE SERPL-SCNC: 109 MMOL/L (ref 94–109)
CHOLEST SERPL-MCNC: 183 MG/DL
CO2 SERPL-SCNC: 28 MMOL/L (ref 20–32)
CREAT SERPL-MCNC: 0.77 MG/DL (ref 0.52–1.04)
DEPRECATED CALCIDIOL+CALCIFEROL SERPL-MC: 19 UG/L (ref 20–75)
GFR SERPL CREATININE-BSD FRML MDRD: 73 ML/MIN/1.7M2
GLUCOSE SERPL-MCNC: 87 MG/DL (ref 70–99)
HDLC SERPL-MCNC: 86 MG/DL
LDLC SERPL CALC-MCNC: 80 MG/DL
NONHDLC SERPL-MCNC: 97 MG/DL
POTASSIUM SERPL-SCNC: 4 MMOL/L (ref 3.4–5.3)
PROT SERPL-MCNC: 6.4 G/DL (ref 6.8–8.8)
SODIUM SERPL-SCNC: 144 MMOL/L (ref 133–144)
TRIGL SERPL-MCNC: 86 MG/DL
TSH SERPL DL<=0.005 MIU/L-ACNC: 0.89 MU/L (ref 0.4–4)

## 2018-10-04 ENCOUNTER — ALLIED HEALTH/NURSE VISIT (OUTPATIENT)
Dept: NURSING | Facility: CLINIC | Age: 73
End: 2018-10-04
Payer: COMMERCIAL

## 2018-10-04 DIAGNOSIS — D51.8 OTHER VITAMIN B12 DEFICIENCY ANEMIA: Primary | ICD-10-CM

## 2018-10-04 PROCEDURE — 96372 THER/PROPH/DIAG INJ SC/IM: CPT

## 2018-10-18 ENCOUNTER — ALLIED HEALTH/NURSE VISIT (OUTPATIENT)
Dept: NURSING | Facility: CLINIC | Age: 73
End: 2018-10-18
Payer: COMMERCIAL

## 2018-10-18 DIAGNOSIS — Z23 ENCOUNTER FOR IMMUNIZATION: ICD-10-CM

## 2018-10-18 DIAGNOSIS — R79.89 LOW VITAMIN B12 LEVEL: ICD-10-CM

## 2018-10-18 PROCEDURE — 96372 THER/PROPH/DIAG INJ SC/IM: CPT

## 2018-10-18 NOTE — MR AVS SNAPSHOT
After Visit Summary   10/18/2018    Danielle Bryan    MRN: [id number]           Patient Information     Date Of Birth          1945        Visit Information        Provider Department      10/18/2018 8:30 AM RI IM NURSE [facility]        Today's Diagnoses     Low vitamin B12 level        Encounter for immunization           Follow-ups after your visit        Your next 10 appointments already scheduled     Nov 01, 2018  8:30 AM CDT   Nurse Only with RI IM NURSE   [facility] ([facility])    [address]  [facility] [id number] [phone]            Nov 15, 2018  9:30 AM CST   Nurse Only with RI IM NURSE   [facility] ([facility])    [address]  [facility] [id number] [phone]            Nov 29, 2018 12:30 PM CST   New Visit with Rosibel Hatch MD   [facility] ([facility])    [address]  [facility] [phone] [phone]              Who to contact     If you have questions or need follow up information about today's clinic visit or your schedule please contact [facility] directly at [phone].  Normal or non-critical lab and imaging results will be communicated to you by MyChart, letter or phone within 4 business days after the clinic has received the results. If you do not hear from us within 7 days, please contact the clinic through MyChart or phone. If you have a critical or abnormal lab result, we will notify you by phone as soon as possible.  Submit refill requests through [employer] or call your pharmacy and they will forward the refill request to us. Please allow 3 business days for your refill to be completed.          Additional Information About Your Visit        [employer] Information     [employer] gives you secure access to your electronic health record. If you see a primary care provider, you  can also send messages to your care team and make appointments. If you have questions, please call your primary care clinic.  If you do not have a primary care provider, please call [phone] and they will assist you.        Care EveryWhere ID     This is your Care EveryWhere ID. This could be used by other organizations to access your [place] medical records  [id number]         Blood Pressure from Last 3 Encounters:   09/18/18 108/62   03/21/18 108/62   03/08/18 108/66    Weight from Last 3 Encounters:   09/18/18 110 lb (49.9 kg)   03/21/18 112 lb (50.8 kg)   03/08/18 112 lb 6.4 oz (51 kg)              We Performed the Following     INJECTION INTRAMUSCULAR OR SUB-Q     Vitamin B12        Primary Care Provider Office Phone # Fax #    Colt Riley [phone]       [address] [address]        Equal Access to Services     [employer] ENGLISH : Hadii aad ku hadasho Soomaali, waaxda luqadaha, qaybta kaalmada adeegyada, waxay idiin hayaan benjamin kharatoney ladd . So [facility] [phone].    ATENCIÓN: Si mendella español, tiene a moore disposición servicios gratuitos de asistencia lingüística. Llame al [phone].    We comply with applicable federal civil rights laws and [place] laws. We do not discriminate on the basis of race, color, national origin, age, disability, sex, sexual orientation, or gender identity.            Thank you!     Thank you for choosing [facility]  for your care. Our goal is always to provide you with excellent care. Hearing back from our patients is one way we can continue to improve our services. Please take a few minutes to complete the written survey that you may receive in the mail after your visit with us. Thank you!             Your Updated Medication List - Protect others around you: Learn how to safely use, store and throw away your medicines at www.disposemymeds.org.          This list is accurate as of 10/18/18  8:48 AM.  Always use  your most recent med list.                   Brand Name Dispense Instructions for use Diagnosis    aspirin 81 MG tablet     30 tablet    Take 1 tablet (81 mg) by mouth daily        cetirizine 10 MG tablet    zyrTEC     Take 10 mg by mouth as needed for allergies        cyanocobalamin 1000 MCG/ML injection    VITAMIN B12     Inject 1 mL into the muscle every 14 days        fluticasone 50 MCG/ACT spray    FLONASE    1 Bottle    Spray 1-2 sprays into both nostrils daily    Nasal congestion       ipratropium 0.06 % spray    ATROVENT    1 Box    Spray 2 sprays into both nostrils 4 times daily as needed for rhinitis    Chronic vasomotor rhinitis       order for DME     2 Units    Equipment being ordered: Wigs    Alopecia areata       polyethylene glycol 0.4%- propylene glycol 0.3% 0.4-0.3 % Soln ophthalmic solution    SYSTANE ULTRA     Place 1 drop into both eyes every hour as needed for dry eyes        pravastatin 20 MG tablet    PRAVACHOL    90 tablet    Take 1 tablet (20 mg) by mouth daily    Hyperlipidemia with target LDL less than 100       ranitidine 150 MG tablet    ZANTAC     Take 150 mg by mouth as needed        replens Gel     70 g    Place 1 Dose vaginally daily    Vaginal atrophy       senna-docusate 8.6-50 MG per tablet    SENOKOT-S;PERICOLACE    120 tablet    Take 1-2 tablets by mouth 2 times daily as needed for constipation    Constipation, unspecified constipation type       sodium chloride 0.65 % nasal spray    OCEAN     Spray 1 spray into both nostrils daily as needed for congestion

## 2018-11-01 ENCOUNTER — ALLIED HEALTH/NURSE VISIT (OUTPATIENT)
Dept: NURSING | Facility: CLINIC | Age: 73
End: 2018-11-01
Payer: COMMERCIAL

## 2018-11-01 DIAGNOSIS — R79.89 LOW VITAMIN B12 LEVEL: ICD-10-CM

## 2018-11-01 PROCEDURE — 96372 THER/PROPH/DIAG INJ SC/IM: CPT

## 2018-11-14 ENCOUNTER — ALLIED HEALTH/NURSE VISIT (OUTPATIENT)
Dept: NURSING | Facility: CLINIC | Age: 73
End: 2018-11-14
Payer: COMMERCIAL

## 2018-11-14 DIAGNOSIS — R79.89 LOW VITAMIN B12 LEVEL: ICD-10-CM

## 2018-11-14 PROCEDURE — 96372 THER/PROPH/DIAG INJ SC/IM: CPT

## 2018-11-29 ENCOUNTER — OFFICE VISIT (OUTPATIENT)
Dept: ENDOCRINOLOGY | Facility: CLINIC | Age: 73
End: 2018-11-29
Payer: COMMERCIAL

## 2018-11-29 VITALS
HEART RATE: 92 BPM | DIASTOLIC BLOOD PRESSURE: 72 MMHG | SYSTOLIC BLOOD PRESSURE: 122 MMHG | TEMPERATURE: 98.6 F | WEIGHT: 116.3 LBS | RESPIRATION RATE: 24 BRPM | BODY MASS INDEX: 21.4 KG/M2 | OXYGEN SATURATION: 98 %

## 2018-11-29 DIAGNOSIS — R79.89 LOW VITAMIN B12 LEVEL: ICD-10-CM

## 2018-11-29 DIAGNOSIS — M81.0 AGE-RELATED OSTEOPOROSIS WITHOUT CURRENT PATHOLOGICAL FRACTURE: Primary | ICD-10-CM

## 2018-11-29 DIAGNOSIS — E55.9 VITAMIN D DEFICIENCY: ICD-10-CM

## 2018-11-29 PROCEDURE — 99204 OFFICE O/P NEW MOD 45 MIN: CPT | Mod: 25 | Performed by: INTERNAL MEDICINE

## 2018-11-29 PROCEDURE — 96372 THER/PROPH/DIAG INJ SC/IM: CPT | Performed by: INTERNAL MEDICINE

## 2018-11-29 NOTE — LETTER
2018         RE: Danielle Bryan  [address] [facility] [address]        Dear Colleague,    Thank you for referring your patient, Danielle Bryan, to the [facility]. Please see a copy of my visit note below.    Name: Danielle Bryan  Date: 2018  Seen in consultation with Colt Riley for osteoporosis.     HPI:  Danielle Bryan is a 73 year old female who presents for the evaluation of osteoperosis .  Was diagnosed with osteoporosis in  based on osteoporosis.  H/o esophageal stricture so plan was to avoid oral medications.  She Received RECLAST 2016 X 1 time  After that she flet sick ( chills, body aches, swollen lips, fever and mouth sores) after that and did not follow up for RECLAST after that.  DEXA 2017 c/w osteoporosis.    Smoke:No  Family History:No  Menstrual history/Birthing: s/p menopause in 50s. Was on HRT for few years  Fractures:No  Kidney stones: No  GI Surgery:No  Duration of therapy:  As noted above.  Exercise:Not much  Diet:   Milk: 3 servings a day   Cheese: sometimes   Yogurt/Cottage Cheese: not much  Ca/Vitamin D: not taking calcium or vit D supplement  Alcohol:  No  Eating Disorder: No  Steroid Use:  No  PMH/PSH:  Past Medical History:   Diagnosis Date     Alopecia areata     Requires a wig now     Cerebral infarction (H)     TIA     Dysphagia     Botox/EGD dilation at [place], most recent 2013     Hyperlipemia      PONV (postoperative nausea and vomiting)      Recurrent UTI     Believed related in part to atrophic vaginitis     Past Surgical History:   Procedure Laterality Date     ABDOMEN SURGERY   1982    [facility]     COLONOSCOPY  1/15/2014    Dr. Henry [facility]     COLONOSCOPY  1/15/2014    Normal, no further colonoscopy felt required. Procedure: COLONOSCOPY;  Colonoscopy ;  Surgeon: Esteban Henry MD;  Location:  GI     EYE SURGERY  2013    bilateral cataract surgery     GYN SURGERY      TUBAL PREGNANCY, D&C,  X 2  "    HEAD & NECK SURGERY  1983    THYROIDECTOMY - partial-right side     RELEASE TRIGGER FINGER  2012    Procedure: RELEASE TRIGGER FINGER;  RIGHT THUMB, MIDDLE AND RING TRIGGER RELEASES;  Surgeon: Deniz Velazquez MD;  Location: [facility]     Family Hx:  Family History   Problem Relation Age of Onset     Family History Negative Mother       age 85     C.A.D. Father       in his mid-80's of heart attack     Myocardial Infarction Brother      Other Cancer Brother      Other Cancer Sister      Social Hx:  Social History     Social History     Marital status:      Spouse name: N/A     Number of children: 3     Years of education: N/A     Occupational History     Copier repair at [employer]      Retired     Social History Main Topics     Smoking status: Never Smoker     Smokeless tobacco: Never Used     Alcohol use Yes      Comment: Rare.      Drug use: No     Sexual activity: Yes     Partners: Male     Birth control/ protection: None     Other Topics Concern     Parent/Sibling W/ Cabg, Mi Or Angioplasty Before 65f 55m? No     Social History Narrative    Raised in [place]. 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     Walking regularly in summer months.    Will also be exercising at the [place] in winter months.         Three children, two in [facility], one in [place], [place].    Five grandchildren.          MEDICATIONS:  has a current medication list which includes the following prescription(s): aspirin, cetirizine, vitamin b-12, fluticasone, ipratropium, order for dme, polyethylene glycol 0.4%- propylene glycol 0.3%, pravastatin, ranitidine, senna-docusate, sodium chloride, and replens.    ROS     ROS: 10 point ROS neg other than the symptoms noted above in the HPI.    Physical Exam   VS: /72  Pulse 92  Temp 98.6  F (37  C) (Oral)  Resp 24  Wt 52.8 kg (116 lb 4.8 oz)  SpO2 98%  BMI 21.4 kg/m2  GENERAL: AXOX3, NAD, well dressed, answering questions appropriately, appears stated age.  HEENT: No " exopthalmous, no proptosis, EOMI, no lig lag, no retraction  NECK: Thyroid normal in size, non tender, no nodules were palpated.  CV: RRR  LUNGS: CTAB  ABDOMEN: +BS  NEUROLOGY: CN grossly intact, no tremors  PSYCH: normal affect and mood  SPINE: midline, No TTP.    LABS:  BMP:  Last Basic Metabolic Panel:  Lab Results   Component Value Date     09/18/2018      Lab Results   Component Value Date    POTASSIUM 4.0 09/18/2018     Lab Results   Component Value Date    CHLORIDE 109 09/18/2018     Lab Results   Component Value Date    TYSON 8.1 09/18/2018     Lab Results   Component Value Date    CO2 28 09/18/2018     Lab Results   Component Value Date    BUN 11 09/18/2018     Lab Results   Component Value Date    CR 0.77 09/18/2018     Lab Results   Component Value Date    GLC 87 09/18/2018       TFTs:  Lab Results   Component Value Date    TSH 0.89 09/18/2018       LFTs:  Liver Function Studies -   Recent Labs   Lab Test  09/18/18   1031   PROTTOTAL  6.4*   ALBUMIN  3.5   BILITOTAL  0.6   ALKPHOS  36*   AST  23   ALT  29       PTH: NA    Vitamin D:  Vitamin D Deficiency Screening Results:  Lab Results   Component Value Date    VITDT 19 (L) 09/18/2018       DEXA 2017:  RISK FACTORS:  Post-menopausal, Height loss 2 inches, follow up osteoporosis     CURRENT TREATMENT:  none listed      FINDINGS:               Lumbar Spine (L1-L4) T-score:  -1.0, degenerative changes present               Left Femoral Neck T-score:  -2.7               Right Femoral Neck  T-score:  -2.7                             Lumbar (L1-L4) BMD: 1.068            Previous: 1.115                                              Total Hip Mean BMD: 0.744            Previous: 0.768      Comparison is made to another DXA performed on the same Lunar Prodigy  machine on 1/20/2014.       LATERAL VERTEBRAL ASSESSMENT  Procedure:  Vertebral fracture assessment was performed in the lateral decubitus position using a Lunar Prodigy  densitometer.  Indications for  "VFA: T-score of -1.0 or worse, age (female>69) and height loss > 1.5\"  Confounding factors for VFA: None.  The LVA scan is interpretable from T5 to L5.     VFA Findings: Using the semi-quantitative analysis of Genant there was evidence of no spinal deformity  VFA Impression: Danielle Bryan has no vertebral fractures identified on the VFA.         IMPRESSION  Osteoporosis., Degenerative changes of the lumbar spine which may falsely elevate results.     There has been a trend toward  significant decrease in bone density of the lumbar spine. There has been no significant change in bone density of the hip(s).     Recommendations include ensuring adequate Calcium and Vitamin D.     The current NOF Guidelines recommend treatment for patients with prior hip or vertebral fracture, T-score -2.5 or below, or 10 year risk of any major osteoporotic fracture >20% or 10 year risk of hip fracture >3%, as calculated using the FRAX calculator (www.shef.ac.uk/FRAX or you can google \"FRAX\").       Based on these guidelines, treatment (in addition to calcium and vitamin D) is recommended for this patient, after ruling out other causes of osteoporosis.  This is meant as an aid to clinical decision making; one must still use clinical judgement.        All pertinent notes, labs, and images personally reviewed by me.     A/P  Ms.Janice FAITH Bryan is a 73 year old here for the evaluation of:    #1 Osteoporosis:  Risk factors for low bone density include , advanced age, female, low BMI, Estrogen deficiency, low Ca intake.  No prior h/o vertebral fractures.  DEXA 2014, 2016 c/w osteopenia  Received Reclast 2016 but did not tolerate.  Vit D deficiency- not on replacement at this time.  Not taking calcium or vit D supplement.  H/o esophageal strictures so will hold off from oral medications.  Plan:  Discussed diagnosis, pathophysiology, management and treatment options of condition with pt.  First will optimize calcium and vit D " levels  Consider medication after that  [facility] referral.    #2.  Vitamin D deficiency:  Plan to start high-dose vitamin D replacement 50,000 international units once a week  Labs in 3-4 months.    The pt was advised to    Maintain an adequate calcium and vitamin D intake and to supplement vitamin D if needed to maintain serum levels of 25 hydroxy D (25 OH D) between 30-60 ng/ml.    Limit alcohol intake to no more than 2 servings per day.    Limit caffeine intake.    Maintain an active lifestyle including weight-bearing exercises for at least 30 mins daily.    Take measures to reduce the risk of falling.    The 24-hour urine calcium value, if low (< 50 mg/24 hour), would suggest the presence of vitamin D deficiency due to poor dietary intake or malabsorption. A low 24-hour urine calcium should be followed by a serum 25-hydroxyvitamin D level to test for possible vitamin D deficiency. The identification of vitamin D deficiency should prompt the search for possible occult malabsorption due sprue. Twenty-four hour urine calcium values over 300 mg should prompt an evaluation for possible causes of hypercalciuria.    Bone turnover markers can also be helpful in determing duration of therapy and compliance.  Osteocalcin is a bone formation marker (serum) and N-telopeptide of collagen cross links (NTx) is found in the urine and is a bone resorption maker.    More than 50% of the time spent with Ms. Bryan on counseling / coordinating her care.      Follow-up:  3-4 months.    Rosibel Hatch MD  Endocrinology  [facility]  CC: Colt Riley    Addendum to above note and clinic visit:    Labs reviewed.    See result note/telephone encounter.            Again, thank you for allowing me to participate in the care of your patient.        Sincerely,        Rosibel Hatch MD

## 2018-11-29 NOTE — NURSING NOTE
/72  Pulse 92  Temp 98.6  F (37  C) (Oral)  Resp 24  Wt 116 lb 4.8 oz (52.8 kg)  SpO2 98%  BMI 21.4 kg/m2

## 2018-11-29 NOTE — PATIENT INSTRUCTIONS
[facility]   Dr Hatch, Endocrinology Department      [facility]   [address]  [place] [address]  Appointment [phone]  Fax: [phone]  [place]: Monday and Tuesday         [facility]   [address]  [place] [address]  Appointment [phone]  Fax: [phone]  [place]: Wednesday and Thursday            Start vit D 19737 international unit(s) ONCE a week  Labs in 3-4 months  Please make a lab appointment for blood work and follow up clinic appointment in 1 week after that to discuss results.    [facility]NA referral-- [phone].-- FOR OSTEOPOROSIS    You should get 1200 mg/day calcium in divided doses of no more than 500 mg/dose.  This INCLUDES what is in your food as well as what is in any supplements you may be taking.    Vit D about 1000 IU/day ( unless you have vit D deficiency- in that case higher dose)  Dietary sources of calcium:: These also contain vitamin D  Milk                            8 oz            300 mg calcium  Yogurt                          1 cup           400 mg calcium   Hard cheese                     1.5 oz          300 mg  Cottage cheese                  2 cup           300 mg  Orange juice with Calcium       8 oz            300 mg  Low fat dairy sources are recommended      You should get 30 minutes of moderate weight bearing exercise on most days of the week .  Weight bearing exercise includes such things as walking, jogging, hiking, dancing.  You should also get Strength training 2 or more times/week in addition to other weight -being exercise. Strength training uses weight or resistance beyond that seen in everyday activities -(pilates, weight training with free weights, weight machines or resistance bands)      The pt was advised to    Maintain an adequate calcium and vitamin D intake and to supplement vitamin D if needed to maintain  serum levels of 25 hydroxy D (25 OH D) between 30-60 ng/ml.    Limit alcohol intake to no more than 2 servings per day.    Limit caffeine intake.    Maintain an active lifestyle including weight-bearing exercises for at least 30 mins daily.    Take measures to reduce the risk of falling.

## 2018-11-29 NOTE — NURSING NOTE
ENDOCRINOLOGY INTAKE FORM    Patient Name:  Danielle Bryan  :  1945    Is patient Diabetic?   No  Does patient have non-diabetic or other endocrine issues?  Yes: osteoporosis    Vitals: There were no vitals taken for this visit.  BMI= There is no height or weight on file to calculate BMI.    Flu vaccine:  Yes:   Pneumonia vaccine:  Yes: both    Smoking and Alcohol use:  Social History   Substance Use Topics     Smoking status: Never Smoker     Smokeless tobacco: Never Used     Alcohol use Yes      Comment: Rare.        Glory Gross CMA

## 2018-11-29 NOTE — MR AVS SNAPSHOT
After Visit Summary   2018    Danielle Bryan    MRN: [id number]           Patient Information     Date Of Birth          1945        Visit Information        Provider Department      2018 12:30 PM Rosibel Hatch MD [facility]        Today's Diagnoses     Age-related osteoporosis without current pathological fracture    -  1    Low vitamin B12 level        Vitamin D deficiency          Care Instructions    [facility] [place] [facility] [place] locations   Dr Hatch, Endocrinology Department      [facility]   [address]  [place] [address]  Appointment [phone]  Fax: [phone]  [place]: Monday and Tuesday         [facility]   [address]  [place] [address]  Appointment [phone]  Fax: [phone]  [place]: Wednesday and Thursday            Start vit D 20875 international unit(s) ONCE a week  Labs in 3-4 months  Please make a lab appointment for blood work and follow up clinic appointment in 1 week after that to discuss results.    KIANNA referral-- [phone].-- FOR OSTEOPOROSIS    You should get 1200 mg/day calcium in divided doses of no more than 500 mg/dose.  This INCLUDES what is in your food as well as what is in any supplements you may be taking.    Vit D about 1000 IU/day ( unless you have vit D deficiency- in that case higher dose)  Dietary sources of calcium:: These also contain vitamin D  Milk                            8 oz            300 mg calcium  Yogurt                          1 cup           400 mg calcium   Hard cheese                     1.5 oz          300 mg  Cottage cheese                  2 cup           300 mg  Orange juice with Calcium       8 oz            300 mg  Low fat dairy sources are recommended      You should get 30 minutes of moderate weight bearing exercise on most days of the week .  Weight bearing  exercise includes such things as walking, jogging, hiking, dancing.  You should also get Strength training 2 or more times/week in addition to other weight -being exercise. Strength training uses weight or resistance beyond that seen in everyday activities -(pilates, weight training with free weights, weight machines or resistance bands)      The pt was advised to    Maintain an adequate calcium and vitamin D intake and to supplement vitamin D if needed to maintain serum levels of 25 hydroxy D (25 OH D) between 30-60 ng/ml.    Limit alcohol intake to no more than 2 servings per day.    Limit caffeine intake.    Maintain an active lifestyle including weight-bearing exercises for at least 30 mins daily.    Take measures to reduce the risk of falling.            Follow-ups after your visit        Additional Services     [facility] PT, HAND, AND CHIROPRACTIC REFERRAL       Physical Therapy, Hand Therapy and Chiropractic Care are available through:  *[facility]  *Hand Therapy (Occupational Therapy or Physical Therapy)  *[facility]    Call one number to schedule at any of the above locations: [phone].-- FOR OSTEOPOROSIS    Physical therapy, Hand therapy and/or Chiropractic care has been recommended by your physician as an excellent treatment option to reduce pain and help people return to normal activities, including sports.  Therapy and/or chiropractic care services are a great complement or alternative to expensive and invasive surgery, injections, or long-term use of prescription medications. The primary goal is to identify the underlying problem and provide you the tools to manage your condition on your own.     Please be aware that coverage of these services is subject to the terms and limitations of your health insurance plan.  Call member services at your health plan with any benefit or coverage questions.      Please bring the following to your appointment:  *Your  personal calendar for scheduling future appointments  *Comfortable clothing                  Your next 10 appointments already scheduled     Dec 13, 2018  8:30 AM CST   Nurse Only with [facility] IM NURSE   [facility]    [address]  [facility] [id number] [phone]              Future tests that were ordered for you today     Open Future Orders        Priority Expected Expires Ordered    [facility] PT, HAND, AND CHIROPRACTIC REFERRAL Routine  11/29/2019 11/29/2018    Calcium Routine  10/29/2019 11/29/2018    Creatinine Routine  10/29/2019 11/29/2018    Vitamin D Deficiency Routine  10/29/2019 11/29/2018    Parathyroid Hormone Intact Routine  10/29/2019 11/29/2018            Who to contact     If you have questions or need follow up information about today's clinic visit or your schedule please contact [facility] directly at [phone].  Normal or non-critical lab and imaging results will be communicated to you by MyChart, letter or phone within 4 business days after the clinic has received the results. If you do not hear from us within 7 days, please contact the clinic through [employer] or phone. If you have a critical or abnormal lab result, we will notify you by phone as soon as possible.  Submit refill requests through [employer] or call your pharmacy and they will forward the refill request to us. Please allow 3 business days for your refill to be completed.          Additional Information About Your Visit        [employer] Information     [employer] gives you secure access to your electronic health record. If you see a primary care provider, you can also send messages to your care team and make appointments. If you have questions, please call your primary care clinic.  If you do not have a primary care provider, please call [phone] and they will assist you.        Care EveryWhere ID     This is your Care EveryWhere ID. This could be used by other  organizations to access your [place] medical records  [id number]        Your Vitals Were     Pulse Temperature Respirations Pulse Oximetry BMI (Body Mass Index)       92 98.6  F (37  C) (Oral) 24 98% 21.4 kg/m2        Blood Pressure from Last 3 Encounters:   11/29/18 122/72   09/18/18 108/62   03/21/18 108/62    Weight from Last 3 Encounters:   11/29/18 52.8 kg (116 lb 4.8 oz)   09/18/18 49.9 kg (110 lb)   03/21/18 50.8 kg (112 lb)              We Performed the Following     INJECTION INTRAMUSCULAR OR SUB-Q          Today's Medication Changes          These changes are accurate as of 11/29/18  1:14 PM.  If you have any questions, ask your nurse or doctor.               Start taking these medicines.        Dose/Directions    vitamin D3 09717 units capsule   Commonly known as:  CHOLECALCIFEROL   Used for:  Age-related osteoporosis without current pathological fracture   Started by:  Rosibel Hatch MD        Dose:  91898 Units   Take 1 capsule (50,000 Units) by mouth every 7 days   Quantity:  12 capsule   Refills:  1            Where to get your medicines      These medications were sent to [id number] [address] [address]  [id number] [place] [address]     Phone:  [phone]     vitamin D3 49537 units capsule                Primary Care Provider Office Phone # Fax #    Colt Riley [phone]       [address]  [facility] [address]        Equal Access to Services     TENA ENGLISH AH: Hadii param phelpso Solima, waaxda luqadaha, qaybta kaalmada adeegyada, waxaminta hari morrison. So [facility] [phone].    ATENCIÓN: Si habla español, tiene a moore disposición servicios gratuitos de asistencia lingüística. Llame al [phone].    We comply with applicable federal civil rights laws and [place] laws. We do not discriminate on the basis of race, color, national origin, age, disability, sex, sexual orientation, or gender  identity.            Thank you!     Thank you for choosing [facility]  for your care. Our goal is always to provide you with excellent care. Hearing back from our patients is one way we can continue to improve our services. Please take a few minutes to complete the written survey that you may receive in the mail after your visit with us. Thank you!             Your Updated Medication List - Protect others around you: Learn how to safely use, store and throw away your medicines at www.disposemymeds.org.          This list is accurate as of 11/29/18  1:14 PM.  Always use your most recent med list.                   Brand Name Dispense Instructions for use Diagnosis    aspirin 81 MG tablet    ASA    30 tablet    Take 1 tablet (81 mg) by mouth daily        cetirizine 10 MG tablet    zyrTEC     Take 10 mg by mouth as needed for allergies        fluticasone 50 MCG/ACT nasal spray    FLONASE    1 Bottle    Spray 1-2 sprays into both nostrils daily    Nasal congestion       ipratropium 0.06 % nasal spray    ATROVENT    1 Box    Spray 2 sprays into both nostrils 4 times daily as needed for rhinitis    Chronic vasomotor rhinitis       order for DME     2 Units    Equipment being ordered: Wigs    Alopecia areata       polyethylene glycol 0.4%- propylene glycol 0.3% 0.4-0.3 % Soln ophthalmic solution    SYSTANE ULTRA     Place 1 drop into both eyes every hour as needed for dry eyes        pravastatin 20 MG tablet    PRAVACHOL    90 tablet    Take 1 tablet (20 mg) by mouth daily    Hyperlipidemia with target LDL less than 100       ranitidine 150 MG tablet    ZANTAC     Take 150 mg by mouth as needed        replens Gel     70 g    Place 1 Dose vaginally daily    Vaginal atrophy       senna-docusate 8.6-50 MG tablet    SENOKOT-S/PERICOLACE    120 tablet    Take 1-2 tablets by mouth 2 times daily as needed for constipation    Constipation, unspecified constipation type       sodium chloride 0.65 % nasal spray     OCEAN     Spray 1 spray into both nostrils daily as needed for congestion        vitamin B-12 1000 MCG/ML injection    CYANOCOBALAMIN     Inject 1 mL into the muscle every 14 days        vitamin D3 52263 units capsule    CHOLECALCIFEROL    12 capsule    Take 1 capsule (50,000 Units) by mouth every 7 days    Age-related osteoporosis without current pathological fracture

## 2018-11-29 NOTE — PROGRESS NOTES
Name: Danielle Bryan  Date: 2018  Seen in consultation with Colt Riley for osteoporosis.     HPI:  Danielle Bryan is a 73 year old female who presents for the evaluation of osteoperosis .  Was diagnosed with osteoporosis in  based on osteoporosis.  H/o esophageal stricture so plan was to avoid oral medications.  She Received RECLAST 2016 X 1 time  After that she flet sick ( chills, body aches, swollen lips, fever and mouth sores) after that and did not follow up for RECLAST after that.  DEXA 2017 c/w osteoporosis.    Smoke:No  Family History:No  Menstrual history/Birthing: s/p menopause in 50s. Was on HRT for few years  Fractures:No  Kidney stones: No  GI Surgery:No  Duration of therapy:  As noted above.  Exercise:Not much  Diet:   Milk: 3 servings a day   Cheese: sometimes   Yogurt/Cottage Cheese: not much  Ca/Vitamin D: not taking calcium or vit D supplement  Alcohol:  No  Eating Disorder: No  Steroid Use:  No  PMH/PSH:  Past Medical History:   Diagnosis Date     Alopecia areata     Requires a wig now     Cerebral infarction (H)     TIA     Dysphagia     Botox/EGD dilation at [facility], most recent 2013     Hyperlipemia      PONV (postoperative nausea and vomiting)      Recurrent UTI     Believed related in part to atrophic vaginitis     Past Surgical History:   Procedure Laterality Date     ABDOMEN SURGERY  1975    [facility]     COLONOSCOPY  1/15/2014    Dr. Henry [facility]     COLONOSCOPY  1/15/2014    Normal, no further colonoscopy felt required. Procedure: COLONOSCOPY;  Colonoscopy ;  Surgeon: Esteban Henry MD;  Location:  GI     EYE SURGERY      bilateral cataract surgery     GYN SURGERY      TUBAL PREGNANCY, D&C,  X 2     HEAD & NECK SURGERY  1983    THYROIDECTOMY - partial-right side     RELEASE TRIGGER FINGER  2012    Procedure: RELEASE TRIGGER FINGER;  RIGHT THUMB, MIDDLE AND RING TRIGGER RELEASES;  Surgeon: Deniz Velazquez MD;  Location: [facility]  "Hx:  Family History   Problem Relation Age of Onset     Family History Negative Mother       age 85     C.A.D. Father       in his mid-80's of heart attack     Myocardial Infarction Brother      Other Cancer Brother      Other Cancer Sister      Social Hx:  Social History     Social History     Marital status:      Spouse name: N/A     Number of children: 3     Years of education: N/A     Occupational History     Copier repair at [employer]      Retired     Social History Main Topics     Smoking status: Never Smoker     Smokeless tobacco: Never Used     Alcohol use Yes      Comment: Rare.      Drug use: No     Sexual activity: Yes     Partners: Male     Birth control/ protection: None     Other Topics Concern     Parent/Sibling W/ Cabg, Mi Or Angioplasty Before 65f 55m? No     Social History Narrative    [place] 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     Walking regularly in summer months.    Will also be exercising at the [place] in winter months.         Three children, two in [facility], one in [place], [place].    Five grandchildren.          MEDICATIONS:  has a current medication list which includes the following prescription(s): aspirin, cetirizine, vitamin b-12, fluticasone, ipratropium, order for dme, polyethylene glycol 0.4%- propylene glycol 0.3%, pravastatin, ranitidine, senna-docusate, sodium chloride, and replens.    ROS     ROS: 10 point ROS neg other than the symptoms noted above in the HPI.    Physical Exam   VS: /72  Pulse 92  Temp 98.6  F (37  C) (Oral)  Resp 24  Wt 52.8 kg (116 lb 4.8 oz)  SpO2 98%  BMI 21.4 kg/m2  GENERAL: AXOX3, NAD, well dressed, answering questions appropriately, appears stated age.  HEENT: No exopthalmous, no proptosis, EOMI, no lig lag, no retraction  NECK: Thyroid normal in size, non tender, no nodules were palpated.  CV: RRR  LUNGS: CTAB  ABDOMEN: +BS  NEUROLOGY: CN grossly intact, no tremors  PSYCH: normal affect and mood  SPINE: " "midline, No TTP.    LABS:  BMP:  Last Basic Metabolic Panel:  Lab Results   Component Value Date     09/18/2018      Lab Results   Component Value Date    POTASSIUM 4.0 09/18/2018     Lab Results   Component Value Date    CHLORIDE 109 09/18/2018     Lab Results   Component Value Date    TYSON 8.1 09/18/2018     Lab Results   Component Value Date    CO2 28 09/18/2018     Lab Results   Component Value Date    BUN 11 09/18/2018     Lab Results   Component Value Date    CR 0.77 09/18/2018     Lab Results   Component Value Date    GLC 87 09/18/2018       TFTs:  Lab Results   Component Value Date    TSH 0.89 09/18/2018       LFTs:  Liver Function Studies -   Recent Labs   Lab Test  09/18/18   1031   PROTTOTAL  6.4*   ALBUMIN  3.5   BILITOTAL  0.6   ALKPHOS  36*   AST  23   ALT  29       PTH: NA    Vitamin D:  Vitamin D Deficiency Screening Results:  Lab Results   Component Value Date    VITDT 19 (L) 09/18/2018       DEXA 2017:  RISK FACTORS:  Post-menopausal, Height loss 2 inches, follow up osteoporosis     CURRENT TREATMENT:  none listed      FINDINGS:               Lumbar Spine (L1-L4) T-score:  -1.0, degenerative changes present               Left Femoral Neck T-score:  -2.7               Right Femoral Neck  T-score:  -2.7                             Lumbar (L1-L4) BMD: 1.068            Previous: 1.115                                              Total Hip Mean BMD: 0.744            Previous: 0.768      Comparison is made to another DXA performed on the same Lunar Prodigy  machine on 1/20/2014.       LATERAL VERTEBRAL ASSESSMENT  Procedure:  Vertebral fracture assessment was performed in the lateral decubitus position using a Lunar Prodigy  densitometer.  Indications for VFA: T-score of -1.0 or worse, age (female>69) and height loss > 1.5\"  Confounding factors for VFA: None.  The LVA scan is interpretable from T5 to L5.     VFA Findings: Using the semi-quantitative analysis of [place]isha there was evidence of no spinal " "deformity  VFA Impression: Danielle Bryan has no vertebral fractures identified on the VFA.         IMPRESSION  Osteoporosis., Degenerative changes of the lumbar spine which may falsely elevate results.     There has been a trend toward  significant decrease in bone density of the lumbar spine. There has been no significant change in bone density of the hip(s).     Recommendations include ensuring adequate Calcium and Vitamin D.     The current NOF Guidelines recommend treatment for patients with prior hip or vertebral fracture, T-score -2.5 or below, or 10 year risk of any major osteoporotic fracture >20% or 10 year risk of hip fracture >3%, as calculated using the FRAX calculator (www.shef.ac.uk/FRAX or you can google \"FRAX\").       Based on these guidelines, treatment (in addition to calcium and vitamin D) is recommended for this patient, after ruling out other causes of osteoporosis.  This is meant as an aid to clinical decision making; one must still use clinical judgement.        All pertinent notes, labs, and images personally reviewed by me.     A/P  Ms.Janice FAITH Bryan is a 73 year old here for the evaluation of:    #1 Osteoporosis:  Risk factors for low bone density include , advanced age, female, low BMI, Estrogen deficiency, low Ca intake.  No prior h/o vertebral fractures.  DEXA 2014, 2016 c/w osteopenia  Received Reclast 2016 but did not tolerate.  Vit D deficiency- not on replacement at this time.  Not taking calcium or vit D supplement.  H/o esophageal strictures so will hold off from oral medications.  Plan:  Discussed diagnosis, pathophysiology, management and treatment options of condition with pt.  First will optimize calcium and vit D levels  Consider medication after that  [facility] referral.    #2.  Vitamin D deficiency:  Plan to start high-dose vitamin D replacement 50,000 international units once a week  Labs in 3-4 months.    The pt was advised to    Maintain an adequate calcium and " vitamin D intake and to supplement vitamin D if needed to maintain serum levels of 25 hydroxy D (25 OH D) between 30-60 ng/ml.    Limit alcohol intake to no more than 2 servings per day.    Limit caffeine intake.    Maintain an active lifestyle including weight-bearing exercises for at least 30 mins daily.    Take measures to reduce the risk of falling.    The 24-hour urine calcium value, if low (< 50 mg/24 hour), would suggest the presence of vitamin D deficiency due to poor dietary intake or malabsorption. A low 24-hour urine calcium should be followed by a serum 25-hydroxyvitamin D level to test for possible vitamin D deficiency. The identification of vitamin D deficiency should prompt the search for possible occult malabsorption due sprue. Twenty-four hour urine calcium values over 300 mg should prompt an evaluation for possible causes of hypercalciuria.    Bone turnover markers can also be helpful in determing duration of therapy and compliance.  Osteocalcin is a bone formation marker (serum) and N-telopeptide of collagen cross links (NTx) is found in the urine and is a bone resorption maker.    More than 50% of the time spent with Ms. Bryan on counseling / coordinating her care.      Follow-up:  3-4 months.    Rosibel Hatch MD  Endocrinology  [facility]  CC: Colt Riley    Addendum to above note and clinic visit:    Labs reviewed.    See result note/telephone encounter.

## 2018-12-10 ENCOUNTER — OFFICE VISIT (OUTPATIENT)
Dept: INTERNAL MEDICINE | Facility: CLINIC | Age: 73
End: 2018-12-10
Payer: COMMERCIAL

## 2018-12-10 VITALS
HEART RATE: 82 BPM | OXYGEN SATURATION: 99 % | SYSTOLIC BLOOD PRESSURE: 96 MMHG | RESPIRATION RATE: 16 BRPM | BODY MASS INDEX: 20.92 KG/M2 | DIASTOLIC BLOOD PRESSURE: 62 MMHG | HEIGHT: 62 IN | TEMPERATURE: 98.2 F | WEIGHT: 113.7 LBS

## 2018-12-10 DIAGNOSIS — M54.50 ACUTE MIDLINE LOW BACK PAIN WITHOUT SCIATICA: Primary | ICD-10-CM

## 2018-12-10 DIAGNOSIS — R55 SYNCOPE, NEAR: ICD-10-CM

## 2018-12-10 PROCEDURE — 99214 OFFICE O/P EST MOD 30 MIN: CPT | Performed by: NURSE PRACTITIONER

## 2018-12-10 ASSESSMENT — MIFFLIN-ST. JEOR: SCORE: 965.99

## 2018-12-10 NOTE — PROGRESS NOTES
"  SUBJECTIVE:   Danielle Bryan is a 73 year old female who presents to clinic today for the following health issues:      Musculoskeletal problem/pain      Duration: 2 months    Description  Location: R low back, dull ache, intermmitent in intensity    Intensity:  mild, moderate    Accompanying signs and symptoms: weakness of both legs    History  Previous similar problem: no   Previous evaluation:  none    Precipitating or alleviating factors:  Trauma or overuse: no   Aggravating factors include: sitting and standing for long periods    Therapies tried and outcome: ice    Near syncopal episodes      Duration: last 2 weeks    Description (location/character/radiation): 1-3 episodes of near fainting, weakness and \"buckling of legs\",  helped her to chair.    Intensity:  mild    Accompanying signs and symptoms: none    History (similar episodes/previous evaluation): None    Precipitating or alleviating factors: changing positions quickly, decreased oral intake.        Therapies tried and outcome: none           Problem list and histories reviewed & adjusted, as indicated.  Additional history: as documented    Patient Active Problem List   Diagnosis     Sprain of jaw     Alopecia areata     Advanced directives, counseling/discussion     Bartholin's cyst     Allergic rhinitis     Transient cerebral ischemia     Hyperlipidemia with target LDL less than 100     Vitamin B12 deficiency (non anemic)     Osteoporosis     Vitamin D deficiency     Past Surgical History:   Procedure Laterality Date     ABDOMEN SURGERY  1975    [facility]     COLONOSCOPY  1/15/2014    Dr. Henry [facility]     COLONOSCOPY  1/15/2014    Normal, no further colonoscopy felt required. Procedure: COLONOSCOPY;  Colonoscopy ;  Surgeon: Esteban Henry MD;  Location:  GI     EYE SURGERY      bilateral cataract surgery     GYN SURGERY      TUBAL PREGNANCY, D&C,  X 2     HEAD & NECK SURGERY      THYROIDECTOMY - partial-right " side     RELEASE TRIGGER FINGER  2012    Procedure: RELEASE TRIGGER FINGER;  RIGHT THUMB, MIDDLE AND RING TRIGGER RELEASES;  Surgeon: Deniz Velazquez MD;  Location: [facility]       Social History     Tobacco Use     Smoking status: Never Smoker     Smokeless tobacco: Never Used   Substance Use Topics     Alcohol use: Yes     Comment: Rare.      Family History   Problem Relation Age of Onset     Family History Negative Mother          age 85     C.A.D. Father          in his mid-80's of heart attack     Myocardial Infarction Brother      Other Cancer Brother      Other Cancer Sister          Current Outpatient Medications   Medication Sig Dispense Refill     aspirin 81 MG tablet Take 1 tablet (81 mg) by mouth daily 30 tablet 11     cetirizine (ZYRTEC) 10 MG tablet Take 10 mg by mouth as needed for allergies       cyanocobalamin (VITAMIN B12) 1000 MCG/ML injection Inject 1 mL into the muscle every 14 days       fluticasone (FLONASE) 50 MCG/ACT spray Spray 1-2 sprays into both nostrils daily 1 Bottle 1     ipratropium (ATROVENT) 0.06 % spray Spray 2 sprays into both nostrils 4 times daily as needed for rhinitis 1 Box 11     order for DME Equipment being ordered: Wigs 2 Units 0     polyethylene glycol 0.4%- propylene glycol 0.3% (SYSTANE ULTRA) 0.4-0.3 % SOLN ophthalmic solution Place 1 drop into both eyes every hour as needed for dry eyes       pravastatin (PRAVACHOL) 20 MG tablet Take 1 tablet (20 mg) by mouth daily 90 tablet 3     ranitidine (ZANTAC) 150 MG tablet Take 150 mg by mouth as needed        senna-docusate (SENOKOT-S;PERICOLACE) 8.6-50 MG per tablet Take 1-2 tablets by mouth 2 times daily as needed for constipation 120 tablet 5     sodium chloride (OCEAN) 0.65 % nasal spray Spray 1 spray into both nostrils daily as needed for congestion       Vaginal Lubricant (REPLENS) GEL Place 1 Dose vaginally daily (Patient not taking: Reported on 2018) 70 g 11     vitamin D3 (CHOLECALCIFEROL) 99276 units  "capsule Take 1 capsule (50,000 Units) by mouth every 7 days (Patient not taking: Reported on 12/10/2018) 12 capsule 1     BP Readings from Last 3 Encounters:   12/10/18 96/62   11/29/18 122/72   09/18/18 108/62    Wt Readings from Last 3 Encounters:   12/10/18 51.6 kg (113 lb 11.2 oz)   11/29/18 52.8 kg (116 lb 4.8 oz)   09/18/18 49.9 kg (110 lb)                    Reviewed and updated as needed this visit by clinical staff  Tobacco  Allergies  Med Hx  Surg Hx  Fam Hx  Soc Hx      Reviewed and updated as needed this visit by Provider         ROS:  Constitutional, HEENT, cardiovascular, pulmonary, gi and gu systems are negative, except as otherwise noted.    OBJECTIVE:     BP 96/62   Pulse 82   Temp 98.2  F (36.8  C) (Oral)   Resp 16   Ht 1.57 m (5' 1.81\")   Wt 51.6 kg (113 lb 11.2 oz)   SpO2 99%   BMI 20.92 kg/m    Body mass index is 20.92 kg/m .  GENERAL: alert, frail and elderly female with dry skin, chapped lips  Comprehensive back pain exam:  No tenderness, Pain limits the following motions: all due to inflexibility, Lower extremity strength functional and equal on both sides, Lower extremity reflexes within normal limits bilaterally and Straight leg raise negative bilaterally        ASSESSMENT/PLAN:               ICD-10-CM    1. Acute midline low back pain without sciatica M54.5 [facility] PT, HAND, AND CHIROPRACTIC REFERRAL   2. Syncope, near R55        Hydration, position change slowly  NSAIDs, heat, PT    Renata Foss NP  [facility]    "

## 2018-12-12 RX ORDER — CYANOCOBALAMIN 1000 UG/ML
1000 INJECTION, SOLUTION INTRAMUSCULAR; SUBCUTANEOUS
Status: DISCONTINUED | OUTPATIENT
Start: 2018-12-13 | End: 2021-01-28

## 2018-12-12 RX ORDER — CYANOCOBALAMIN 1000 UG/ML
1000 INJECTION, SOLUTION INTRAMUSCULAR; SUBCUTANEOUS
Status: DISCONTINUED | OUTPATIENT
Start: 2018-12-13 | End: 2018-12-12

## 2018-12-13 ENCOUNTER — ALLIED HEALTH/NURSE VISIT (OUTPATIENT)
Dept: NURSING | Facility: CLINIC | Age: 73
End: 2018-12-13
Payer: COMMERCIAL

## 2018-12-13 DIAGNOSIS — E53.8 VITAMIN B12 DEFICIENCY (NON ANEMIC): Primary | ICD-10-CM

## 2018-12-13 PROCEDURE — 96372 THER/PROPH/DIAG INJ SC/IM: CPT

## 2018-12-13 RX ADMIN — CYANOCOBALAMIN 1000 MCG: 1000 INJECTION, SOLUTION INTRAMUSCULAR; SUBCUTANEOUS at 09:01

## 2018-12-24 ENCOUNTER — ALLIED HEALTH/NURSE VISIT (OUTPATIENT)
Dept: NURSING | Facility: CLINIC | Age: 73
End: 2018-12-24
Payer: COMMERCIAL

## 2018-12-24 DIAGNOSIS — E53.8 VITAMIN B12 DEFICIENCY (NON ANEMIC): Primary | ICD-10-CM

## 2018-12-24 DIAGNOSIS — R79.89 LOW VITAMIN B12 LEVEL: ICD-10-CM

## 2018-12-24 PROCEDURE — 96372 THER/PROPH/DIAG INJ SC/IM: CPT

## 2018-12-24 RX ADMIN — CYANOCOBALAMIN 1000 MCG: 1000 INJECTION, SOLUTION INTRAMUSCULAR; SUBCUTANEOUS at 09:54

## 2019-01-07 ENCOUNTER — ALLIED HEALTH/NURSE VISIT (OUTPATIENT)
Dept: NURSING | Facility: CLINIC | Age: 74
End: 2019-01-07
Payer: COMMERCIAL

## 2019-01-07 VITALS — BODY MASS INDEX: 21.11 KG/M2 | WEIGHT: 114.7 LBS

## 2019-01-07 DIAGNOSIS — E53.8 VITAMIN B12 DEFICIENCY (NON ANEMIC): ICD-10-CM

## 2019-01-07 DIAGNOSIS — R79.89 LOW VITAMIN B12 LEVEL: ICD-10-CM

## 2019-01-07 PROCEDURE — 96372 THER/PROPH/DIAG INJ SC/IM: CPT

## 2019-01-07 RX ADMIN — CYANOCOBALAMIN 1000 MCG: 1000 INJECTION, SOLUTION INTRAMUSCULAR; SUBCUTANEOUS at 11:32

## 2019-01-07 NOTE — NURSING NOTE
Prior to injection, verified patient identity using patient's name and date of birth.  Due to injection administration, patient instructed to remain in clinic for 15 minutes afterwards, and to report any adverse reaction to me or the  staff immediately.    B12    Drug Amount Wasted:  None.  Vial/Syringe: Single dose vial  Expiration Date:  5/30/20    Screening Questionnaire for Adult Immunization     Are you sick today?   No    Do you have allergies to medications, food or any vaccine?   No    Have you ever had a serious reaction after receiving a vaccination?   No    Do you have a long-term health problem with heart disease, lung disease,  asthma, kidney disease, diabetes, anemia, metabolic or blood disease?   No    Do you have cancer, leukemia, AIDS, or any immune system problem?   No    Do you take cortisone, prednisone, other steroids, or anticancer drugs, or  have you had any x-ray (radiation) treatments?   No    Have you had a seizure, brain, or other nervous system problem?   No    During the past year, have you received a transfusion of blood or blood       products, or been given a medicine called immune (gamma) globulin?   No    For women: Are you pregnant or is there a chance you could become         pregnant during the next month?   No    Have you received any vaccinations in the past 4 weeks?   No     Immunization questionnaire answers were all negative.           Screening performed by Italia Campbell on 1/7/2019 at 9:21 AM.

## 2019-01-16 ENCOUNTER — THERAPY VISIT (OUTPATIENT)
Dept: PHYSICAL THERAPY | Facility: CLINIC | Age: 74
End: 2019-01-16
Payer: COMMERCIAL

## 2019-01-16 DIAGNOSIS — M81.0 AGE-RELATED OSTEOPOROSIS WITHOUT CURRENT PATHOLOGICAL FRACTURE: ICD-10-CM

## 2019-01-16 PROCEDURE — 97162 PT EVAL MOD COMPLEX 30 MIN: CPT | Mod: GP | Performed by: PHYSICAL THERAPIST

## 2019-01-16 PROCEDURE — 97112 NEUROMUSCULAR REEDUCATION: CPT | Mod: GP | Performed by: PHYSICAL THERAPIST

## 2019-01-16 PROCEDURE — 97110 THERAPEUTIC EXERCISES: CPT | Mod: GP | Performed by: PHYSICAL THERAPIST

## 2019-01-20 ENCOUNTER — HOSPITAL ENCOUNTER (OUTPATIENT)
Facility: CLINIC | Age: 74
Setting detail: OBSERVATION
Discharge: HOME | End: 2019-01-21
Attending: EMERGENCY MEDICINE | Admitting: HOSPITALIST
Payer: COMMERCIAL

## 2019-01-20 ENCOUNTER — APPOINTMENT (OUTPATIENT)
Dept: CT IMAGING | Facility: CLINIC | Age: 74
End: 2019-01-20
Payer: COMMERCIAL

## 2019-01-20 ENCOUNTER — APPOINTMENT (OUTPATIENT)
Dept: CARDIOLOGY | Facility: CLINIC | Age: 74
End: 2019-01-20
Payer: COMMERCIAL

## 2019-01-20 ENCOUNTER — APPOINTMENT (OUTPATIENT)
Dept: GENERAL RADIOLOGY | Facility: CLINIC | Age: 74
End: 2019-01-20
Payer: COMMERCIAL

## 2019-01-20 DIAGNOSIS — I48.0 PAROXYSMAL ATRIAL FIBRILLATION (H): ICD-10-CM

## 2019-01-20 PROBLEM — I48.91 ATRIAL FIBRILLATION (H): Status: ACTIVE | Noted: 2019-01-20

## 2019-01-20 LAB
ANION GAP SERPL CALCULATED.3IONS-SCNC: 5 MMOL/L (ref 3–14)
BASOPHILS # BLD AUTO: 0 10E9/L (ref 0–0.2)
BASOPHILS NFR BLD AUTO: 0.3 %
BUN SERPL-MCNC: 16 MG/DL (ref 7–30)
CALCIUM SERPL-MCNC: 8.5 MG/DL (ref 8.5–10.1)
CHLORIDE SERPL-SCNC: 111 MMOL/L (ref 94–109)
CO2 SERPL-SCNC: 30 MMOL/L (ref 20–32)
CREAT SERPL-MCNC: 0.89 MG/DL (ref 0.52–1.04)
DIFFERENTIAL METHOD BLD: NORMAL
EOSINOPHIL # BLD AUTO: 0 10E9/L (ref 0–0.7)
EOSINOPHIL NFR BLD AUTO: 0.6 %
ERYTHROCYTE [DISTWIDTH] IN BLOOD BY AUTOMATED COUNT: 13 % (ref 10–15)
GFR SERPL CREATININE-BSD FRML MDRD: 63 ML/MIN/{1.73_M2}
GLUCOSE SERPL-MCNC: 75 MG/DL (ref 70–99)
HCT VFR BLD AUTO: 40.6 % (ref 35–47)
HGB BLD-MCNC: 13.1 G/DL (ref 11.7–15.7)
IMM GRANULOCYTES # BLD: 0 10E9/L (ref 0–0.4)
IMM GRANULOCYTES NFR BLD: 0.3 %
LYMPHOCYTES # BLD AUTO: 0.9 10E9/L (ref 0.8–5.3)
LYMPHOCYTES NFR BLD AUTO: 13.4 %
MAGNESIUM SERPL-MCNC: 2.2 MG/DL (ref 1.6–2.3)
MCH RBC QN AUTO: 29.7 PG (ref 26.5–33)
MCHC RBC AUTO-ENTMCNC: 32.3 G/DL (ref 31.5–36.5)
MCV RBC AUTO: 92 FL (ref 78–100)
MONOCYTES # BLD AUTO: 0.6 10E9/L (ref 0–1.3)
MONOCYTES NFR BLD AUTO: 8.2 %
NEUTROPHILS # BLD AUTO: 5.4 10E9/L (ref 1.6–8.3)
NEUTROPHILS NFR BLD AUTO: 77.2 %
NRBC # BLD AUTO: 0 10*3/UL
NRBC BLD AUTO-RTO: 0 /100
PLATELET # BLD AUTO: 215 10E9/L (ref 150–450)
POTASSIUM SERPL-SCNC: 3.3 MMOL/L (ref 3.4–5.3)
POTASSIUM SERPL-SCNC: 4.1 MMOL/L (ref 3.4–5.3)
RBC # BLD AUTO: 4.41 10E12/L (ref 3.8–5.2)
SODIUM SERPL-SCNC: 146 MMOL/L (ref 133–144)
TROPONIN I SERPL-MCNC: 0.01 UG/L (ref 0–0.04)
TROPONIN I SERPL-MCNC: 0.02 UG/L (ref 0–0.04)
TROPONIN I SERPL-MCNC: <0.015 UG/L (ref 0–0.04)
TROPONIN I SERPL-MCNC: <0.015 UG/L (ref 0–0.04)
TSH SERPL DL<=0.005 MIU/L-ACNC: 2.95 MU/L (ref 0.4–4)
WBC # BLD AUTO: 6.9 10E9/L (ref 4–11)

## 2019-01-20 PROCEDURE — 84132 ASSAY OF SERUM POTASSIUM: CPT | Mod: 91 | Performed by: PHYSICIAN ASSISTANT

## 2019-01-20 PROCEDURE — 25000128 H RX IP 250 OP 636: Performed by: EMERGENCY MEDICINE

## 2019-01-20 PROCEDURE — 93005 ELECTROCARDIOGRAM TRACING: CPT

## 2019-01-20 PROCEDURE — 99285 EMERGENCY DEPT VISIT HI MDM: CPT | Mod: 25

## 2019-01-20 PROCEDURE — 25000132 ZZH RX MED GY IP 250 OP 250 PS 637: Performed by: HOSPITALIST

## 2019-01-20 PROCEDURE — 80048 BASIC METABOLIC PNL TOTAL CA: CPT | Performed by: EMERGENCY MEDICINE

## 2019-01-20 PROCEDURE — 25000125 ZZHC RX 250: Performed by: EMERGENCY MEDICINE

## 2019-01-20 PROCEDURE — 70450 CT HEAD/BRAIN W/O DYE: CPT

## 2019-01-20 PROCEDURE — 99207 ZZC CDG-MDM COMPONENT: MEETS LOW - DOWN CODED: CPT | Performed by: PHYSICIAN ASSISTANT

## 2019-01-20 PROCEDURE — 81001 URINALYSIS AUTO W/SCOPE: CPT | Performed by: PHYSICIAN ASSISTANT

## 2019-01-20 PROCEDURE — 96361 HYDRATE IV INFUSION ADD-ON: CPT

## 2019-01-20 PROCEDURE — 84484 ASSAY OF TROPONIN QUANT: CPT | Mod: 91 | Performed by: PHYSICIAN ASSISTANT

## 2019-01-20 PROCEDURE — 25000128 H RX IP 250 OP 636: Performed by: PHYSICIAN ASSISTANT

## 2019-01-20 PROCEDURE — 36415 COLL VENOUS BLD VENIPUNCTURE: CPT | Performed by: PHYSICIAN ASSISTANT

## 2019-01-20 PROCEDURE — 96374 THER/PROPH/DIAG INJ IV PUSH: CPT | Mod: 59

## 2019-01-20 PROCEDURE — 93306 TTE W/DOPPLER COMPLETE: CPT

## 2019-01-20 PROCEDURE — 99219 ZZC INITIAL OBSERVATION CARE,LEVL II: CPT | Performed by: PHYSICIAN ASSISTANT

## 2019-01-20 PROCEDURE — 84484 ASSAY OF TROPONIN QUANT: CPT | Performed by: PHYSICIAN ASSISTANT

## 2019-01-20 PROCEDURE — 85025 COMPLETE CBC W/AUTO DIFF WBC: CPT | Performed by: EMERGENCY MEDICINE

## 2019-01-20 PROCEDURE — 84484 ASSAY OF TROPONIN QUANT: CPT | Performed by: EMERGENCY MEDICINE

## 2019-01-20 PROCEDURE — 25000132 ZZH RX MED GY IP 250 OP 250 PS 637: Performed by: PHYSICIAN ASSISTANT

## 2019-01-20 PROCEDURE — 84443 ASSAY THYROID STIM HORMONE: CPT | Performed by: EMERGENCY MEDICINE

## 2019-01-20 PROCEDURE — G0378 HOSPITAL OBSERVATION PER HR: HCPCS

## 2019-01-20 PROCEDURE — 71046 X-RAY EXAM CHEST 2 VIEWS: CPT

## 2019-01-20 PROCEDURE — 93306 TTE W/DOPPLER COMPLETE: CPT | Mod: 26 | Performed by: INTERNAL MEDICINE

## 2019-01-20 PROCEDURE — 83735 ASSAY OF MAGNESIUM: CPT | Performed by: EMERGENCY MEDICINE

## 2019-01-20 RX ORDER — POLYETHYLENE GLYCOL 3350 17 G/17G
17 POWDER, FOR SOLUTION ORAL DAILY PRN
Status: DISCONTINUED | OUTPATIENT
Start: 2019-01-20 | End: 2019-01-21 | Stop reason: HOSPADM

## 2019-01-20 RX ORDER — POTASSIUM CHLORIDE 1500 MG/1
20-40 TABLET, EXTENDED RELEASE ORAL
Status: DISCONTINUED | OUTPATIENT
Start: 2019-01-20 | End: 2019-01-21 | Stop reason: HOSPADM

## 2019-01-20 RX ORDER — PRAVASTATIN SODIUM 20 MG
20 TABLET ORAL AT BEDTIME
Status: DISCONTINUED | OUTPATIENT
Start: 2019-01-20 | End: 2019-01-21 | Stop reason: HOSPADM

## 2019-01-20 RX ORDER — SODIUM CHLORIDE 9 MG/ML
1000 INJECTION, SOLUTION INTRAVENOUS CONTINUOUS
Status: DISCONTINUED | OUTPATIENT
Start: 2019-01-20 | End: 2019-01-20

## 2019-01-20 RX ORDER — BISACODYL 10 MG
10 SUPPOSITORY, RECTAL RECTAL DAILY PRN
Status: DISCONTINUED | OUTPATIENT
Start: 2019-01-20 | End: 2019-01-21 | Stop reason: HOSPADM

## 2019-01-20 RX ORDER — ONDANSETRON 2 MG/ML
4 INJECTION INTRAMUSCULAR; INTRAVENOUS EVERY 6 HOURS PRN
Status: DISCONTINUED | OUTPATIENT
Start: 2019-01-20 | End: 2019-01-21 | Stop reason: HOSPADM

## 2019-01-20 RX ORDER — OXYCODONE HYDROCHLORIDE 5 MG/1
5-10 TABLET ORAL
Status: DISCONTINUED | OUTPATIENT
Start: 2019-01-20 | End: 2019-01-21 | Stop reason: HOSPADM

## 2019-01-20 RX ORDER — LIDOCAINE 40 MG/G
CREAM TOPICAL
Status: DISCONTINUED | OUTPATIENT
Start: 2019-01-20 | End: 2019-01-21 | Stop reason: HOSPADM

## 2019-01-20 RX ORDER — ACETAMINOPHEN 325 MG/1
650 TABLET ORAL EVERY 4 HOURS PRN
Status: DISCONTINUED | OUTPATIENT
Start: 2019-01-20 | End: 2019-01-21 | Stop reason: HOSPADM

## 2019-01-20 RX ORDER — ONDANSETRON 4 MG/1
4 TABLET, ORALLY DISINTEGRATING ORAL EVERY 6 HOURS PRN
Status: DISCONTINUED | OUTPATIENT
Start: 2019-01-20 | End: 2019-01-21 | Stop reason: HOSPADM

## 2019-01-20 RX ORDER — AMOXICILLIN 250 MG
1 CAPSULE ORAL 2 TIMES DAILY PRN
Status: DISCONTINUED | OUTPATIENT
Start: 2019-01-20 | End: 2019-01-21 | Stop reason: HOSPADM

## 2019-01-20 RX ORDER — NALOXONE HYDROCHLORIDE 0.4 MG/ML
.1-.4 INJECTION, SOLUTION INTRAMUSCULAR; INTRAVENOUS; SUBCUTANEOUS
Status: DISCONTINUED | OUTPATIENT
Start: 2019-01-20 | End: 2019-01-21 | Stop reason: HOSPADM

## 2019-01-20 RX ORDER — POTASSIUM CL/LIDO/0.9 % NACL 10MEQ/0.1L
10 INTRAVENOUS SOLUTION, PIGGYBACK (ML) INTRAVENOUS
Status: DISCONTINUED | OUTPATIENT
Start: 2019-01-20 | End: 2019-01-21 | Stop reason: HOSPADM

## 2019-01-20 RX ORDER — METOPROLOL TARTRATE 1 MG/ML
5 INJECTION, SOLUTION INTRAVENOUS ONCE
Status: COMPLETED | OUTPATIENT
Start: 2019-01-20 | End: 2019-01-20

## 2019-01-20 RX ORDER — POTASSIUM CHLORIDE 1.5 G/1.58G
20-40 POWDER, FOR SOLUTION ORAL
Status: DISCONTINUED | OUTPATIENT
Start: 2019-01-20 | End: 2019-01-21 | Stop reason: HOSPADM

## 2019-01-20 RX ORDER — ACETAMINOPHEN 650 MG/1
650 SUPPOSITORY RECTAL EVERY 4 HOURS PRN
Status: DISCONTINUED | OUTPATIENT
Start: 2019-01-20 | End: 2019-01-21 | Stop reason: HOSPADM

## 2019-01-20 RX ORDER — AMOXICILLIN 250 MG
2 CAPSULE ORAL 2 TIMES DAILY PRN
Status: DISCONTINUED | OUTPATIENT
Start: 2019-01-20 | End: 2019-01-21 | Stop reason: HOSPADM

## 2019-01-20 RX ORDER — METOPROLOL TARTRATE 1 MG/ML
5-15 INJECTION, SOLUTION INTRAVENOUS EVERY 6 HOURS PRN
Status: DISCONTINUED | OUTPATIENT
Start: 2019-01-20 | End: 2019-01-21 | Stop reason: HOSPADM

## 2019-01-20 RX ORDER — POTASSIUM CHLORIDE 29.8 MG/ML
20 INJECTION INTRAVENOUS
Status: DISCONTINUED | OUTPATIENT
Start: 2019-01-20 | End: 2019-01-20

## 2019-01-20 RX ORDER — POTASSIUM CHLORIDE 7.45 MG/ML
10 INJECTION INTRAVENOUS
Status: DISCONTINUED | OUTPATIENT
Start: 2019-01-20 | End: 2019-01-21 | Stop reason: HOSPADM

## 2019-01-20 RX ORDER — SODIUM CHLORIDE 9 MG/ML
INJECTION, SOLUTION INTRAVENOUS CONTINUOUS
Status: DISCONTINUED | OUTPATIENT
Start: 2019-01-20 | End: 2019-01-21 | Stop reason: HOSPADM

## 2019-01-20 RX ADMIN — POTASSIUM CHLORIDE 40 MEQ: 1500 TABLET, EXTENDED RELEASE ORAL at 14:42

## 2019-01-20 RX ADMIN — RIVAROXABAN 15 MG: 15 TABLET, FILM COATED ORAL at 16:43

## 2019-01-20 RX ADMIN — POTASSIUM CHLORIDE 20 MEQ: 1500 TABLET, EXTENDED RELEASE ORAL at 16:43

## 2019-01-20 RX ADMIN — PRAVASTATIN SODIUM 20 MG: 20 TABLET ORAL at 21:32

## 2019-01-20 RX ADMIN — SODIUM CHLORIDE: 9 INJECTION, SOLUTION INTRAVENOUS at 20:15

## 2019-01-20 RX ADMIN — SODIUM CHLORIDE 1000 ML: 9 INJECTION, SOLUTION INTRAVENOUS at 10:45

## 2019-01-20 RX ADMIN — SODIUM CHLORIDE 1000 ML: 9 INJECTION, SOLUTION INTRAVENOUS at 09:27

## 2019-01-20 RX ADMIN — METOPROLOL TARTRATE 3 MG: 1 INJECTION, SOLUTION INTRAVENOUS at 10:45

## 2019-01-20 SDOH — HEALTH STABILITY: MENTAL HEALTH: HOW OFTEN DO YOU HAVE A DRINK CONTAINING ALCOHOL?: NEVER

## 2019-01-20 ASSESSMENT — ENCOUNTER SYMPTOMS
HEADACHES: 1
CHEST TIGHTNESS: 1
DIZZINESS: 1

## 2019-01-20 ASSESSMENT — MIFFLIN-ST. JEOR
SCORE: 967.18
SCORE: 945.41

## 2019-01-20 NOTE — PROVIDER NOTIFICATION
Per tele tech, -150's. Primary nurse and MD Estevez notified. Patient was up to bathroom. Pt resting comfortably now. Will continue to monitor closely.

## 2019-01-20 NOTE — PLAN OF CARE
Patient No change    PRIMARY DIAGNOSIS: CHEST PAIN  OUTPATIENT/OBSERVATION GOALS TO BE MET BEFORE DISCHARGE:  1. Ruled out acute coronary syndrome (negative or stable Troponin):  Yes  2. Pain Status: Pain free.  3. Appropriate provocative testing performed: Yes  - Stress Test Procedure: Echo ordered  - Interpretation of cardiac rhythm per telemetry tech: awaiting echo to be completed then will hook    4. Cleared by Consultants (if applicable):N/A  5. Return to near baseline physical activity: Yes  Discharge Planner Nurse   Safe discharge environment identified: Yes  Barriers to discharge: No       Entered by: Ghazala Barr 01/20/2019 12:00PM     Please review provider order for any additional goals.   Nurse to notify provider when observation goals have been met and patient is ready for discharge.    Up with SBA, reports mild dizziness, tolerating regular diet, denies pain, BS A&Ax4, passing gas, LS clear/diminished, room air, denies SOB, plan for echo, will continue to monitor and provide supportive cares.

## 2019-01-20 NOTE — ED PROVIDER NOTES
History     Chief Complaint:  Dizziness, Headache    HPI   Danielle Bryan is a 73 year old female with a history of hyperlipidemia and a cerebral infarction among others who presents to the emergency department today for evaluation of a headache and dizziness. The patient reports that she woke this morning around 0800 with a headache, dizziness, and chest tightness, noting the sensation of near syncope. She explains that her symptoms persisted for approximately twenty minutes before resolving without intervention. Here the patient endorses dry eyes but states she feels otherwise back to baseline. Her father expresses concern for decreased appetite and liquid intake but states this is not new. The patient notes a similar episode approximately one month ago but states she was not evaluated for this. She denies any history of palpitations.     Allergies:  Ciprofloxacin     Medications:    Aspirin  Atrovent  Systane  Pravachol  Zantac  Senokot    Past Medical History:    Alopecia areata  Cerebral infarction  Dysphagia  Hyperlipidemia  Vitamin D deficiency  Recurrent UTIs  Osteoporosis  Vitamin B12 deficiency  TIA  Bartholin's cyst    Past Surgical History:     section x2  Bilateral cataract surgery  Tubal pregnancy  Dilation and curettage  Thyroidectomy  Release trigger finger    Family History:    Father: CAD  Brother: myocardial infarction, cancer  Sister: cancer    Social History:  The patient was accompanied to the ED by her family.  Smoking Status: Never Smoker  Smokeless Tobacco: Never Used  Alcohol Use: Negative  Drug Use: Negative  Marital Status:        Review of Systems   Constitutional:        Decreased appetite and fluid intake   Eyes:        Dry eyes   Respiratory: Positive for chest tightness.    Neurological: Positive for dizziness, syncope (near) and headaches.   All other systems reviewed and are negative.    Physical Exam     Patient Vitals for the past 24 hrs:   BP Temp Temp src  "Pulse Heart Rate Resp SpO2 Height Weight   01/20/19 1000 (!) 125/92 -- -- 101 110 -- -- -- --   01/20/19 0957 -- -- -- -- -- -- 99 % -- --   01/20/19 0909 135/85 97.9  F (36.6  C) Temporal -- 128 18 100 % -- --   01/20/19 0905 -- -- -- -- -- -- -- 1.549 m (5' 1\") 50.8 kg (112 lb)     Physical Exam  General: Patient is alert and interactive when I enter the room  Head:  The scalp, face, and head appear normal  Eyes:  Conjunctivae are normal  ENT:    The nose is normal    Pinnae are normal    External acoustic canals are normal  Neck:  Trachea midline  CV:  Pulses are normal, irregularly irregular  Resp:  No respiratory distress, CTAB   Abdomen:      Soft, non-tender, non-distended  Musc:  Normal muscular tone    No major joint effusions    No asymmetric leg swelling  Skin:  No rash or lesions noted  Neuro: Speech is normal and fluent. Face is symmetric.     Moving all extremities well.   Psych:  Awake. Alert.  Normal affect.  Appropriate interactions.    Emergency Department Course     ECG:  ECG taken at 0907, ECG read at 0907  Atrial fibrillation with rapid ventricular response  Nonspecific ST and T wave abnormality  Abnormal ECG  Rate 109 bpm. SC interval * ms. QRS duration 82 ms. QT/QTc 326/439 ms. P-R-T axes * 15 -52.    Imaging:  Radiology findings were communicated with the patient who voiced understanding of the findings.    Head CT w/o contrast  1. No acute abnormality.  2. Atrophy of the brain. White matter changes consistent with sequelae  of small vessel ischemic disease.  ZAHEER ROJAS MD  Reading per radiology    XR Chest 2 Views  Right apical calcified granuloma and right azygous  calcified lymph nodes. The lungs otherwise appear clear and the  mediastinum and que are otherwise grossly unremarkable. No pleural  effusion.  Reading per radiology    Laboratory:  Laboratory findings were communicated with the patient who voiced understanding of the findings.    CBC: WBC 6.9, HGB 13.1,   BMP:  " (H), Potassium 3.3 (L), Chloride 111 (H) o/w WNL (Creatinine 0.89)  Troponin (Collected: 0912): <0.015     Interventions:  0927 NS 1000 ml IV  1045 NS 1000  Ml IV  1045 Lopressor 3 mg IV    Emergency Department Course:    0907 EKG obtained as noted above.    0908 Nursing notes and vitals reviewed.    0910 I performed an exam of the patient as documented above.     0912 IV was inserted and blood was drawn for laboratory testing, results above.    0939 The patient was sent for a head CT while in the emergency department, results above.     0947 The patient was sent for a chest xray while in the emergency department, results above.     1013 I spoke with KARLEE Persaud for Dr. Estevez of the hospitalist service from [facility] regarding patient's presentation, findings, and plan of care.    1019 Recheck and update.    1149 I personally reviewed the laboratory and imaging results with the patient and answered all related questions prior to admission.    Impression & Plan      Medical Decision Making:  Danielle Bryan is a 73 year old female with a history of TIA who presents with a headache and dizziness. The patient's symptoms started this morning, however they have since resolved and she is essentially asymptomatic here. Her EKG revealed afib with mild rvr with a heart rate in the 110s. After assessment her heart rate was stable in the 90s. She is chest pain free. Her initial blood work was normal. Her chest xray and CT were normal. She no longer has a headache. After some time here she flipped into afib with RVR with a heart rate in the 140s-150s. I have her 3 mg metoprolol IV and her heart rate has since been controlled in the 70s-80s. I am not sure whether she has had afib for the past month, given that she is asymptomatic now. It is possible that she has been in afib this whole month and flips with RVR, which is when she is symptomatic. She definitely needs to be on blood thinners given her CHADVASc2 score of  4. I think it would be reasonable to admit her to observation for a chest pain rule out and management of her afib. I think she can probably be started on xarelto at this point. Patient admitted to observation in stable condition.     Diagnosis:    ICD-10-CM    1. Paroxysmal atrial fibrillation (H) I48.0      Disposition:   The patient is admitted into the care of Dr. Estevez.    Scribe Disclosure:  I, Hetal Fulton, am serving as a scribe at 9:19 AM on 1/20/2019 to document services personally performed by Meghann Bliss MD based on my observations and the provider's statements to me.     [facility] EMERGENCY DEPARTMENT       Meghann Bliss MD  01/20/19 9994

## 2019-01-20 NOTE — H&P
[facility] Outpatient / Observation Unit  History and Physical Exam     Danielle Bryan MRN# [id number]   YOB: 1945 Age: 73 year old      Date of Admission:  1/20/2019    Primary care provider: Colt Riley   Danielle Bryan is a 73 year old female with a PMH significant for HLD, osteoporosis and possible TIA, who presented to the Emergency Room with complaints of headache, dizziness and chest tightness. We have been asked to admit this patient for CP rule out.   Work up in the ED reveals: VSS except pulse was 128 and patient noted to be in a fib. BMP revealed a K of 3.3, otherwise was within normal limits. CBC also within normal limits. Troponin was <0.015. CT of the head was negative. CXR negative. 12 lead showed a fib with VR of 109. Patient was given Metoprolol 5mg IV x 1 with improvement of her VR and resolution of her symptoms.  Patient will be registered to Observation for further work-up and evaluation.     1. A Fib - newly diagnosed a fib noted in ER with VR up to 135bpm. Currently rate controlled following a dose of IV Metoprolol in the ER. Unclear how long patient has been in a fib, as she is currently in a fib with a CVR and is feeling fine and at her baseline.   -- monitor on telemetry  -- obtain a ECHO  -- will check a TSH/T4, Mg and UA  -- CHADS2 score is 4 and has possible history of TIA in 2014, so will recommend that she start Xarelto for stroke prophylaxis. Patient would like to think about which anticoagulant she would like to take. Will provide handouts. Agreed to start Xarelto at this time.   -- consider Ziopatch on discharge to assess a fib burden   -- will monitor rate and add in BB if needed    2. Chest Pressure - likely secondary to a fib with RVR. Now resolved. She has a distant history of a negative stress test 15 years ago. She does have HLD and has a family history of CAD in her dad in his 80s. Will monitor on tele and obtain serial trops. Would  "recommend outpatient stress testing as well.     3. Hypokalemia - 3.3 on admission, will replace and monitor.     4. DVT prophylaxis: pt initiated on Xarelto , encourage ambulation   5. FULL CODE  6. Exp Dispo - to home, tomorrow                Chief Complaint:   Headache, dizziness, chest pressure         History of Present Illness:   Danielle Bryan is a 73 year old female with a PMH significant for HLD, osteoporosis and possible TIA, who presented to the Emergency Room with complaints of headache, dizziness and chest tightness. Danielle states that when she woke this morning she felt \"off\", she reports feeling lightheaded and mentally foggy. She also noted a mild pressure like sensation in her chest, no pain or shortness of breath or nausea or diaphoresis. She laid back down and her symptoms subsided but her  thought that she did not \"look well\" so they came to the ER for evaluation. She states that she had a similar episode about 1 month ago as well - she woke in the AM and felt off and had to lay down to rest for a bit until it passed. She denies any history of a fib. She has had no syncope, palpitations, shortness of breath, VACA, LE edema, nausea. No new medications or supplements. No recent fever, chills, upper respiratory infection symptoms, abdominal or  symptoms. She has no known CAD. She had a stress test many years ago that was normal. She did have a possible TIA in 2014 and was admitted here for that. She had an ECHO with bubble at that time that was normal/negative. She has been taking an ASA 81mg since then. She has a family history of CAD in her dad in his 80s, no other family cardiac hx.     Work up in the ED reveals: VSS except pulse was 128 and patient noted to be in a fib. BMP revealed a K of 3.3, otherwise was within normal limits. CBC also within normal limits. Troponin was <0.015. CT of the head was negative. CXR negative. 12 lead showed a fib with VR of 109. Patient was given " Metoprolol 5mg IV x 1 with improvement of her VR and resolution of her symptoms.  Patient will be registered to Observation for further work-up and evaluation.              Past Medical History:     Past Medical History:   Diagnosis Date     Alopecia areata     Requires a wig now     Atrial fibrillation (H) 2019     Cerebral infarction (H)     TIA     Dysphagia     Botox/EGD dilation at [place], most recent 2013     Hyperlipemia      PONV (postoperative nausea and vomiting)      Recurrent UTI     Believed related in part to atrophic vaginitis     Vitamin D deficiency 2018             Past Surgical History:     Past Surgical History:   Procedure Laterality Date     ABDOMEN SURGERY  1975    [facility]     COLONOSCOPY  1/15/2014    Dr. Henry [facility]     COLONOSCOPY  1/15/2014    Normal, no further colonoscopy felt required. Procedure: COLONOSCOPY;  Colonoscopy ;  Surgeon: Esteban Henry MD;  Location:  GI     EYE SURGERY      bilateral cataract surgery     GYN SURGERY      TUBAL PREGNANCY, D&C,  X 2     HEAD & NECK SURGERY      THYROIDECTOMY - partial-right side     RELEASE TRIGGER FINGER  2012    Procedure: RELEASE TRIGGER FINGER;  RIGHT THUMB, MIDDLE AND RING TRIGGER RELEASES;  Surgeon: Deniz Velazquez MD;  Location: [facility]             Social History:     Social History     Socioeconomic History     Marital status:      Spouse name: Not on file     Number of children: 3     Years of education: Not on file     Highest education level: Not on file   Social Needs     Financial resource strain: Not on file     Food insecurity - worry: Not on file     Food insecurity - inability: Not on file     Transportation needs - medical: Not on file     Transportation needs - non-medical: Not on file   Occupational History     Occupation: Copier repair at [employer]     Comment: Retired   Tobacco Use     Smoking status: Never Smoker     Smokeless tobacco: Never Used   Substance and  "Sexual Activity     Alcohol use: No     Frequency: Never     Drug use: No     Sexual activity: Yes     Partners: Male     Birth control/protection: None   Other Topics Concern     Parent/sibling w/ CABG, MI or angioplasty before 65F 55M? No   Social History Narrative    Raised in [place]. 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     Walking regularly in summer months.    Will also be exercising at the [place] in winter months.         Three children, two in [place], one in [place], [place].    Five grandchildren.             Family History:     Family History   Problem Relation Age of Onset     Family History Negative Mother          age 85     C.A.D. Father          in his mid-80's of heart attack     Myocardial Infarction Brother      Other Cancer Brother      Other Cancer Sister             Allergies:      Allergies   Allergen Reactions     Ciprofloxacin      Sores in mouth and throat felt funny             Medications:     Prior to Admission medications    Medication Sig Last Dose Taking? Auth Provider   aspirin 81 MG tablet Take 1 tablet (81 mg) by mouth daily 2019 at Unknown time Yes Renata Foss NP   polyethylene glycol 0.4%- propylene glycol 0.3% (SYSTANE ULTRA) 0.4-0.3 % SOLN ophthalmic solution Place 1 drop into both eyes 3 times daily as needed for dry eyes  Past Week at Unknown time Yes Reported, Patient   pravastatin (PRAVACHOL) 20 MG tablet Take 1 tablet (20 mg) by mouth daily  Patient taking differently: Take 20 mg by mouth At Bedtime  2019 at 2000 Yes Colt Riley MD   senna-docusate (SENOKOT-S;PERICOLACE) 8.6-50 MG per tablet Take 1-2 tablets by mouth 2 times daily as needed for constipation Past Month at Unknown time Yes Colt Riley MD   vitamin D3 (CHOLECALCIFEROL) 40816 units capsule Take 1 capsule (50,000 Units) by mouth every 7 days 2019 at Unknown time Yes Rosibel Hatch MD   cetirizine (ZYRTEC) 10 MG tablet Take 10 mg by mouth as needed for " "allergies More than a month at Unknown time  Reported, Patient   fluticasone (FLONASE) 50 MCG/ACT spray Spray 1-2 sprays into both nostrils daily More than a month at Unknown time  Eloy Guan MD   ipratropium (ATROVENT) 0.06 % spray Spray 2 sprays into both nostrils 4 times daily as needed for rhinitis More than a month at Unknown time  Colt Riley MD   order for DME Equipment being ordered: Colt Shoemaker MD   ranitidine (ZANTAC) 150 MG tablet Take 150 mg by mouth as needed for heartburn  More than a month at Unknown time  Reported, Patient   sodium chloride (OCEAN) 0.65 % nasal spray Spray 1 spray into both nostrils daily as needed for congestion More than a month at Unknown time  Unknown, Entered By History   Vaginal Lubricant (REPLENS) GEL Place 1 Dose vaginally daily  Patient not taking: Reported on 9/18/2018 More than a month at Unknown time  Ghazala Christensen DO            Review of Systems:   A Comprehensive greater than 10 system review of systems was carried out.  Pertinent positives and negatives are noted above.  Otherwise negative for contributory information.     CONSTITUTIONAL:NEGATIVE for fever, chills, change in weight  ENT/MOUTH: NEGATIVE for ear, mouth and throat problems  RESP: NEGATIVE for significant cough or SOB  CV: NEGATIVE for chest pain, palpitations or peripheral edema  GI: NEGATIVE for nausea, abdominal pain, heartburn, or change in bowel habits  : negative for, dysuria, frequency, hematuria, kidney stone and bleeding  MUSCULOSKELETAL: NEGATIVE for significant arthralgias or myalgia  NEURO: NEGATIVE for weakness, dizziness or paresthesias  ENDOCRINE: NEGATIVE for temperature intolerance, skin/hair changes    NEURO ROS: negative except for dizziness             Physical Exam:   Blood pressure 127/80, pulse 91, temperature 96.1  F (35.6  C), temperature source Oral, resp. rate 15, height 1.575 m (5' 2\"), weight 51.4 kg (113 lb 4.8 oz), SpO2 98 %, not currently " breastfeeding.    GENERAL: healthy, alert and no distress  SPEECH: Patient's speech is normal.  EYES: Eyes grossly normal to inspection and PERRL  HENT: ear canals- normal; TMs- normal; Nose- normal; Mouth- no ulcers, no lesions. Oral Mucosa-normal  NECK: no tenderness, no adenopathy, no asymmetry, no masses, no stiffness; thyroid- normal to palpation  RESP: lungs clear to auscultation - no rales, no rhonchi, no wheezes  CV: no murmur, click or rub and irregularly irregular rhythm  ABDOMEN: soft, no tenderness, no  hepatosplenomegaly, no masses, normal bowel sounds  MS: extremities- no gross deformities noted, no peripheral edema  SKIN: no suspicious lesions, no rashes  NEURO: Normal strength and tone, sensory exam grossly normal, mentation intact and speech normal  Cranial nerves 2 through 12 grossly intact  PSYCH: Alert and oriented times 3; coherent speech, normal rate and volume, able to articulate logical thoughts, able to abstract reason, no tangential thoughts, no hallucinations or delusions. Affect is normal.           Data:     EKG demonstrates: Atrial fibrillation with rapid ventricular response  Nonspecific ST and T wave abnormality  Abnormal ECG  Rate 109 bpm. OH interval * ms. QRS duration 82 ms. QT/QTc 326/439 ms. P-R-T axes * 15 -52.      No results for input(s): PH, PHV, PO2, PO2V, SAT, PCO2, PCO2V, HCO3, HCO3V in the last 168 hours.  Recent Labs   Lab 01/20/19  0912   WBC 6.9   HGB 13.1   HCT 40.6   MCV 92             Lab Results   Component Value Date     01/20/2019     09/18/2018     10/18/2017    Lab Results   Component Value Date    CHLORIDE 111 01/20/2019    CHLORIDE 109 09/18/2018    CHLORIDE 110 10/18/2017    Lab Results   Component Value Date    BUN 16 01/20/2019    BUN 11 09/18/2018    BUN 15 10/18/2017      Lab Results   Component Value Date    POTASSIUM 3.3 01/20/2019    POTASSIUM 4.0 09/18/2018    POTASSIUM 4.5 10/18/2017    Lab Results   Component Value Date     CO2 30 01/20/2019    CO2 28 09/18/2018    CO2 25 10/18/2017    Lab Results   Component Value Date    CR 0.89 01/20/2019    CR 0.77 09/18/2018    CR 0.74 10/18/2017        No results for input(s): CULT in the last 168 hours.  Recent Labs   Lab 01/20/19  0912   *   POTASSIUM 3.3*   CHLORIDE 111*   CO2 30   ANIONGAP 5   GLC 75   BUN 16   CR 0.89   GFRESTIMATED 63   GFRESTBLACK 74   TYSON 8.5   MAG 2.2     No results for input(s): NTBNPI, NTBNP in the last 168 hours.  No results for input(s): INR in the last 168 hours.  No results for input(s): CHOL, HDL, LDL, TRIG, CHOLHDLRATIO in the last 168 hours.  Recent Labs   Lab 01/20/19  0912   TSH 2.95     Recent Labs   Lab 01/20/19  1242 01/20/19  0912   TROPI <0.015 <0.015     No results for input(s): COLOR, APPEARANCE, URINEGLC, URINEBILI, URINEKETONE, SG, UBLD, URINEPH, PROTEIN, UROBILINOGEN, NITRITE, LEUKEST, RBCU, WBCU in the last 168 hours.    Recent Results (from the past 48 hour(s))   Head CT w/o contrast    Narrative    CT SCAN OF THE HEAD WITHOUT CONTRAST  1/20/2019  9:50 AM     HISTORY: New onset of headache.    TECHNIQUE: Axial images of the head and coronal reformations without  IV contrast material. Radiation dose for this scan was reduced using  automated exposure control, adjustment of the mA and/or kV according  to patient size, or iterative reconstruction technique.    COMPARISON: 12/18/2014    FINDINGS: There is generalized atrophy of the brain. There is low  attenuation in the white matter of the cerebral hemispheres consistent  with sequelae of small vessel ischemic disease. There is no evidence  of intracranial hemorrhage, mass, acute infarct or anomaly.     The visualized portions of the sinuses and mastoids appear normal.  There is no evidence of trauma.       Impression    IMPRESSION:   1. No acute abnormality.  2. Atrophy of the brain. White matter changes consistent with sequelae  of small vessel ischemic disease.    ZAHEER ROJAS MD   XR Chest  2 Views    Narrative    XR CHEST TWO VIEWS 1/20/2019 9:50 AM     HISTORY: Chest pain.       Impression    IMPRESSION: Right apical calcified granuloma and right azygous  calcified lymph nodes. The lungs otherwise appear clear and the  mediastinum and que are otherwise grossly unremarkable. No pleural  effusion.    MD Janelle LARA PA-C

## 2019-01-20 NOTE — PLAN OF CARE
ROOM # 206-2    Living Situation (if not independent, order SW consult): lives with  in [facility] level home  Facility name: N/A  : [facility] ()    Activity level at baseline: independent  Activity level on admit: SBA      Patient registered to observation; given Patient Bill of Rights; given the opportunity to ask questions about observation status and their plan of care.  Patient has been oriented to the observation room, bathroom and call light is in place.    Discussed discharge goals and expectations with patient/family.

## 2019-01-20 NOTE — ED TRIAGE NOTES
Headache and dizziness starting one hour ago with some chest tightness.  Same thing happened one month ago, then went away in about 30 minutes.  Thought this is the second time this has happened so thought they'd have it checked out. Denies chest tightness, dizziness or headache at this time. ABCD's intact; A/O x 4.

## 2019-01-20 NOTE — PHARMACY-ADMISSION MEDICATION HISTORY
.Admission medication history interview status for this patient is complete. See [facility] admission navigator for allergy information, prior to admission medications and immunization status.     Medication history interview source(s):Patient and   Medication history resources (including written lists, pill bottles, clinic record):none  Primary pharmacy:[facility]    Changes made to PTA medication list:  Added: none  Deleted: none  Changed: none    Actions taken by pharmacist (provider contacted, etc):None     Additional medication history information:None    Medication reconciliation/reorder completed by provider prior to medication history? No    Do you take OTC medications (eg tylenol, ibuprofen, fish oil, eye/ear drops, etc)? y(Y/N)    For patients on insulin therapy: n (Y/N)  Lantus/levemir/NPH/Mix 70/30 dose:   (Y/N) (see Med list for doses)   Sliding scale Novolog Y/N  If Yes, do you have a baseline novolog pre-meal dose:  units with meals  Patients eat three meals a day:   Y/N    How many episodes of hypoglycemia do you have per week: _______  How many missed doses do you have per week: ______  How many times do you check your blood glucose per day: _______  Do you have a Continuous glucose monitor (CGM)   Y/N (remind pt that not approved for hospital use)   Any Barriers to therapy - Be specific :  cost of medications, comfortable with giving injections (if applicable), comfortable and confident with current diabetes regimen: Y/N ______________      Prior to Admission medications    Medication Sig Last Dose Taking? Auth Provider   aspirin 81 MG tablet Take 1 tablet (81 mg) by mouth daily 1/19/2019 at Unknown time Yes Renata Foss NP   polyethylene glycol 0.4%- propylene glycol 0.3% (SYSTANE ULTRA) 0.4-0.3 % SOLN ophthalmic solution Place 1 drop into both eyes 3 times daily as needed for dry eyes  Past Week at Unknown time Yes Reported, Patient   pravastatin (PRAVACHOL) 20 MG tablet Take 1  tablet (20 mg) by mouth daily  Patient taking differently: Take 20 mg by mouth At Bedtime  1/19/2019 at 2000 Yes Colt Riley MD   senna-docusate (SENOKOT-S;PERICOLACE) 8.6-50 MG per tablet Take 1-2 tablets by mouth 2 times daily as needed for constipation Past Month at Unknown time Yes Colt Riley MD   vitamin D3 (CHOLECALCIFEROL) 79224 units capsule Take 1 capsule (50,000 Units) by mouth every 7 days 1/14/2019 at Unknown time Yes Rosibel Hatch MD   cetirizine (ZYRTEC) 10 MG tablet Take 10 mg by mouth as needed for allergies More than a month at Unknown time  Reported, Patient   fluticasone (FLONASE) 50 MCG/ACT spray Spray 1-2 sprays into both nostrils daily More than a month at Unknown time  Eloy Guan MD   ipratropium (ATROVENT) 0.06 % spray Spray 2 sprays into both nostrils 4 times daily as needed for rhinitis More than a month at Unknown time  Colt Riley MD   order for DME Equipment being ordered: Colt Shoemaker MD   ranitidine (ZANTAC) 150 MG tablet Take 150 mg by mouth as needed for heartburn  More than a month at Unknown time  Reported, Patient   sodium chloride (OCEAN) 0.65 % nasal spray Spray 1 spray into both nostrils daily as needed for congestion More than a month at Unknown time  Unknown, Entered By History   Vaginal Lubricant (REPLENS) GEL Place 1 Dose vaginally daily  Patient not taking: Reported on 9/18/2018 More than a month at Unknown time  Ghazala Christensen DO

## 2019-01-20 NOTE — ED NOTES
.  [facility]  ED Nurse Handoff Report    Danielle Bryan is a 73 year old female   ED Chief complaint: headache, dizziness  . ED Diagnosis:   Final diagnoses:   Paroxysmal atrial fibrillation (H)     Allergies:   Allergies   Allergen Reactions     Ciprofloxacin      Sores in mouth and throat felt funny       Code Status: Full Code  Activity level - Baseline/Home:  Independent. Activity Level - Current:   Independent. Lift room needed: No. Bariatric: No   Needed: No   Isolation: No. Infection: Not Applicable.     Vital Signs:   Vitals:    01/20/19 1051 01/20/19 1055 01/20/19 1056 01/20/19 1057   BP: (!) 121/95 (!) 111/92     Pulse: 93 85     Resp: 12 12 17 15   Temp:       TempSrc:       SpO2:       Weight:       Height:           Cardiac Rhythm:  ,   Cardiac  Cardiac Rhythm: Atrial fibrillation  Pain level:    Patient confused: No. Patient Falls Risk: Yes.   Elimination Status: Has voided   Patient Report - Initial Complaint: 73 year old female with a history of hyperlipidemia and a cerebral infarction among others who presents to the emergency department today for evaluation of a headache and dizziness. The patient reports that she woke this morning around 0800 with a headache, dizziness, and chest tightness, noting the sensation of near syncope. She explains that her symptoms persisted for approximately twenty minutes before resolving without intervention. Here the patient endorses dry eyes but states she feels otherwise back to baseline. Her father expresses concern for decreased appetite and liquid intake but states this is not new. The patient notes a similar episode approximately one month ago but states she was not evaluated for this. She denies any history of palpitations.           Focused Assessment: Cognitive - Cognitive/Neuro/Behavioral WDL: -WDL except (right hand grasp weaker than left - this is baseline for patient due to hand surgery) Level Of Consciousness: alert Arousal  Level: opens eyes spontaneously Orientation: oriented x 4 Follows Commands: yes Mood/Behavior: calm; cooperative  Pitts Coma Scale - Best Eye Response: 4-->(E4) spontaneous Best Motor Response: 6-->(M6) obeys commands Best Verbal Response: 5-->(V5) oriented Fernanda Coma Scale Score: 15  Cardiac - Cardiac WDL: -WDL except; all Cardiac Rhythm: apical pulse irregular; radial pulse irregular  Review of Systems (Cardiac) - Cardiovascular Symptoms/Conditions:  (reports chest tightness earlier today lasting 20 minutes; denies at this time)  Chest Pain Assessment - Rating (0-10): 0  Cardiac Monitoring - EKG Monitoring: Yes Cardiac Regularity: Irregular Cardiac Rhythm: Atrial fibrillation  Pupils (CN II) - Pupil PERRLA: yes  Motor Strength - Left Upper: 5 - active movement against gravity and full resistance Right Upper: 5 - active movement against gravity and full resistance Left Lower: 5 - active movement against gravity and full resistance Right Lower: 5 - active movement against gravity and full resistance   Tests Performed: EKG, labs, head CT, CXR. Abnormal Results: .  Labs Ordered and Resulted from Time of ED Arrival Up to the Time of Departure from the ED   BASIC METABOLIC PANEL - Abnormal; Notable for the following components:       Result Value    Sodium 146 (*)     Potassium 3.3 (*)     Chloride 111 (*)     All other components within normal limits   CBC WITH PLATELETS DIFFERENTIAL   TROPONIN I     .  Head CT w/o contrast   Final Result   IMPRESSION:    1. No acute abnormality.   2. Atrophy of the brain. White matter changes consistent with sequelae   of small vessel ischemic disease.      ZAHEER ROJAS MD      XR Chest 2 Views   Preliminary Result   IMPRESSION: Right apical calcified granuloma and right azygous   calcified lymph nodes. The lungs otherwise appear clear and the   mediastinum and que are otherwise grossly unremarkable. No pleural   effusion.        .   Treatments provided: TOBI Cowan L  Family  Comments: NA  OBS brochure/video discussed/provided to patient:  No  ED Medications:   Medications   0.9% sodium chloride BOLUS (0 mLs Intravenous Stopped 1/20/19 1042)     Followed by   sodium chloride 0.9% infusion (1,000 mLs Intravenous New Bag 1/20/19 1045)   metoprolol (LOPRESSOR) injection 5 mg (3 mg Intravenous Given 1/20/19 1045)   **please note only rcvd 3mg lopressor as HR=80's after the 3mg given    Drips infusing:  No  For the majority of the shift, the patient's behavior Green. Interventions performed were NA.     Severe Sepsis OR Septic Shock Diagnosis Present: No      ED Nurse Name/Phone Number: SANTA Waggoner  10:26 AM    RECEIVING UNIT ED HANDOFF REVIEW    Above ED Nurse Handoff Report was reviewed: Yes  Reviewed by: Ghazala Barr on January 20, 2019 at 11:17 AM

## 2019-01-21 ENCOUNTER — APPOINTMENT (OUTPATIENT)
Dept: CARDIOLOGY | Facility: CLINIC | Age: 74
End: 2019-01-21
Payer: COMMERCIAL

## 2019-01-21 VITALS
BODY MASS INDEX: 21.66 KG/M2 | SYSTOLIC BLOOD PRESSURE: 146 MMHG | TEMPERATURE: 96.5 F | RESPIRATION RATE: 16 BRPM | DIASTOLIC BLOOD PRESSURE: 73 MMHG | HEIGHT: 62 IN | OXYGEN SATURATION: 98 % | HEART RATE: 86 BPM | WEIGHT: 117.7 LBS

## 2019-01-21 LAB
ALBUMIN UR-MCNC: NEGATIVE MG/DL
ANION GAP SERPL CALCULATED.3IONS-SCNC: 4 MMOL/L (ref 3–14)
APPEARANCE UR: CLEAR
BILIRUB UR QL STRIP: NEGATIVE
BUN SERPL-MCNC: 13 MG/DL (ref 7–30)
CALCIUM SERPL-MCNC: 8 MG/DL (ref 8.5–10.1)
CHLORIDE SERPL-SCNC: 114 MMOL/L (ref 94–109)
CO2 SERPL-SCNC: 27 MMOL/L (ref 20–32)
COLOR UR AUTO: YELLOW
CREAT SERPL-MCNC: 0.86 MG/DL (ref 0.52–1.04)
GFR SERPL CREATININE-BSD FRML MDRD: 66 ML/MIN/{1.73_M2}
GLUCOSE SERPL-MCNC: 77 MG/DL (ref 70–99)
GLUCOSE UR STRIP-MCNC: NEGATIVE MG/DL
HGB UR QL STRIP: NEGATIVE
INTERPRETATION ECG - MUSE: NORMAL
KETONES UR STRIP-MCNC: NEGATIVE MG/DL
LEUKOCYTE ESTERASE UR QL STRIP: NEGATIVE
NITRATE UR QL: NEGATIVE
PH UR STRIP: 6 PH (ref 5–7)
POTASSIUM SERPL-SCNC: 3.8 MMOL/L (ref 3.4–5.3)
RBC #/AREA URNS AUTO: 0 /HPF (ref 0–2)
SODIUM SERPL-SCNC: 145 MMOL/L (ref 133–144)
SOURCE: ABNORMAL
SP GR UR STRIP: 1.01 (ref 1–1.03)
SQUAMOUS #/AREA URNS AUTO: 4 /HPF (ref 0–1)
UROBILINOGEN UR STRIP-MCNC: NORMAL MG/DL (ref 0–2)
WBC #/AREA URNS AUTO: 3 /HPF (ref 0–5)

## 2019-01-21 PROCEDURE — 80048 BASIC METABOLIC PNL TOTAL CA: CPT | Performed by: PHYSICIAN ASSISTANT

## 2019-01-21 PROCEDURE — 99217 ZZC OBSERVATION CARE DISCHARGE: CPT | Performed by: PHYSICIAN ASSISTANT

## 2019-01-21 PROCEDURE — 0296T ZIO PATCH HOLTER ADULT PEDIATRIC GREATER THAN 48 HRS: CPT

## 2019-01-21 PROCEDURE — 25000132 ZZH RX MED GY IP 250 OP 250 PS 637: Performed by: PHYSICIAN ASSISTANT

## 2019-01-21 PROCEDURE — 25000128 H RX IP 250 OP 636: Performed by: PHYSICIAN ASSISTANT

## 2019-01-21 PROCEDURE — G0378 HOSPITAL OBSERVATION PER HR: HCPCS

## 2019-01-21 PROCEDURE — 0298T ZIO PATCH HOLTER ADULT PEDIATRIC GREATER THAN 48 HRS: CPT | Performed by: INTERNAL MEDICINE

## 2019-01-21 PROCEDURE — 36415 COLL VENOUS BLD VENIPUNCTURE: CPT | Performed by: PHYSICIAN ASSISTANT

## 2019-01-21 RX ORDER — METOPROLOL TARTRATE 25 MG/1
12.5 TABLET, FILM COATED ORAL 2 TIMES DAILY
Qty: 30 TABLET | Refills: 0 | Status: SHIPPED | OUTPATIENT
Start: 2019-01-21 | End: 2019-01-30

## 2019-01-21 RX ADMIN — METOPROLOL TARTRATE 12.5 MG: 25 TABLET ORAL at 10:30

## 2019-01-21 RX ADMIN — SODIUM CHLORIDE: 9 INJECTION, SOLUTION INTRAVENOUS at 05:53

## 2019-01-21 ASSESSMENT — MIFFLIN-ST. JEOR: SCORE: 987.13

## 2019-01-21 NOTE — DISCHARGE SUMMARY
[facility]  Hospitalist Discharge Summary       Date of Admission:  1/20/2019  Date of Discharge:  1/21/2019  Discharging Provider: Hanny Mistry PA-C    Discharge Diagnoses   1. A fib, converted back to NSR  2. Chest pressure (likely secondary to a fib with RVR) - arranged outpatient stress test  3. Hypokalemia, resolved with replacement    Follow-ups Needed After Discharge   Follow-up Appointments     Follow-up and recommended labs and tests       Follow up with primary care provider, Colt Riley MD, within 7 days for hospital follow- up.  The following labs/tests are recommended: Get a BMP checked at follow up. Outpatient stress test.             Hospital Course   Danielle Bryan is a 73 year old female with a PMH significant for HLD, osteoporosis and possible TIA, who presented to the Emergency Room with complaints of headache, dizziness and chest tightness.     Work up in the ED revealed: VSS except pulse was 128 and patient noted to be in a fib. BMP revealed a K of 3.3, otherwise was within normal limits. CBC also within normal limits. Troponin was <0.015. CT of the head was negative. CXR was negative. 12 lead showed a fib with VR of 109. She received 1 dose of IV metoprolol 5 mg with improvement in her ventricular rate and resolution of her symptoms. She was registered to observation.    She was monitored on telemetry. Overnight she converted to NSR in the 60-70 bpm range. Serial troponins were negative. TSH, Mg, and UA were obtained and within normal limits. Her potassium was replaced per protocol and recheck K was 3.8 the following morning. Her CHADSVASC score was noted to be 4 with possible TIA history in 2014 and she was started on Xarelto for stroke prophylaxis. Information regarding pricing of Xarelto vs Warfarin for her was provided, and the patient discussed with her  and decided to start Xarelto.  A Zio patch was placed on discharge to assess a fib burden. Given that her  BPs were in the systolic 120-140 range and HR was in the 60-70s, started the patient on metoprolol tartrate 12.5 mg BID with hold parameters for SBP < 110 or HR < 60. Her chest pressure did not recur throughout her observation stay. She was noted to have a distant history of a negative stress test 15 years ago and does have HLD as well as a family history of CAD in her dad in his 80s, so an outpatient stress test was recommended and future order placed. Recommended a BMP check in follow up with his PCP within the next week since starting Xarelto.     Consultations This Hospital Stay   PHARMACY LIAISON FOR MEDICATION COVERAGE CONSULT    Code Status   Full Code    Time Spent on this Encounter   I, Hanny Mistry, personally saw the patient today and spent greater than 30 minutes discharging this patient.       Hanny Mistry PA-C  [facility]  ______________________________________________________________________    Physical Exam   Vital Signs: Temp: 96.5  F (35.8  C) Temp src: Oral BP: 146/73 Pulse: 86 Heart Rate: 65 Resp: 16 SpO2: 98 % O2 Device: None (Room air)    Weight: 117 lbs 11.2 oz  GENERAL:  Comfortable.  PSYCH: pleasant, oriented, No acute distress.  HEENT:  Atraumatic, normocephalic. PERRLA. Normal conjunctiva, normal hearing, and oropharynx is normal.  NECK:  Supple, no neck vein distention, adenopathy or bruits, normal thyroid.  HEART:  Normal S1, S2 with no murmur, no pericardial rub, gallops or S3 or S4.  LUNGS:  Clear to auscultation, normal respiratory effort. No wheezing, rales or ronchi.  GI:  Soft, no hepatosplenomegaly, normal bowel sounds. Non-tender, non distended.   EXTREMITIES:  No pedal edema, +2 pulses bilateral and equal.  SKIN:  Dry to touch, No rash, wound or ulcerations.  NEUROLOGIC:  CN 2-12 intact, BL 5/5 symmetric upper and lower extremity strength, sensation is intact with no focal deficits.        Primary Care Physician   Colt Riley MD    Discharge Disposition    Discharged to home  Condition at discharge: Satisfactory    Significant Results and Procedures   Results for orders placed or performed during the hospital encounter of 01/20/19   XR Chest 2 Views    Narrative    XR CHEST TWO VIEWS 1/20/2019 9:50 AM     HISTORY: Chest pain.       Impression    IMPRESSION: Right apical calcified granuloma and right azygous  calcified lymph nodes. The lungs otherwise appear clear and the  mediastinum and que are otherwise grossly unremarkable. No pleural  effusion.    EVELYN THOMPSON MD   Head CT w/o contrast    Narrative    CT SCAN OF THE HEAD WITHOUT CONTRAST  1/20/2019  9:50 AM     HISTORY: New onset of headache.    TECHNIQUE: Axial images of the head and coronal reformations without  IV contrast material. Radiation dose for this scan was reduced using  automated exposure control, adjustment of the mA and/or kV according  to patient size, or iterative reconstruction technique.    COMPARISON: 12/18/2014    FINDINGS: There is generalized atrophy of the brain. There is low  attenuation in the white matter of the cerebral hemispheres consistent  with sequelae of small vessel ischemic disease. There is no evidence  of intracranial hemorrhage, mass, acute infarct or anomaly.     The visualized portions of the sinuses and mastoids appear normal.  There is no evidence of trauma.       Impression    IMPRESSION:   1. No acute abnormality.  2. Atrophy of the brain. White matter changes consistent with sequelae  of small vessel ischemic disease.    ZAHEER ROJAS MD   CBC with platelets differential   Result Value Ref Range    WBC 6.9 4.0 - 11.0 10e9/L    RBC Count 4.41 3.8 - 5.2 10e12/L    Hemoglobin 13.1 11.7 - 15.7 g/dL    Hematocrit 40.6 35.0 - 47.0 %    MCV 92 78 - 100 fl    MCH 29.7 26.5 - 33.0 pg    MCHC 32.3 31.5 - 36.5 g/dL    RDW 13.0 10.0 - 15.0 %    Platelet Count 215 150 - 450 10e9/L    Diff Method Automated Method     % Neutrophils 77.2 %    % Lymphocytes 13.4 %    % Monocytes  8.2 %    % Eosinophils 0.6 %    % Basophils 0.3 %    % Immature Granulocytes 0.3 %    Nucleated RBCs 0 0 /100    Absolute Neutrophil 5.4 1.6 - 8.3 10e9/L    Absolute Lymphocytes 0.9 0.8 - 5.3 10e9/L    Absolute Monocytes 0.6 0.0 - 1.3 10e9/L    Absolute Eosinophils 0.0 0.0 - 0.7 10e9/L    Absolute Basophils 0.0 0.0 - 0.2 10e9/L    Abs Immature Granulocytes 0.0 0 - 0.4 10e9/L    Absolute Nucleated RBC 0.0    Basic metabolic panel   Result Value Ref Range    Sodium 146 (H) 133 - 144 mmol/L    Potassium 3.3 (L) 3.4 - 5.3 mmol/L    Chloride 111 (H) 94 - 109 mmol/L    Carbon Dioxide 30 20 - 32 mmol/L    Anion Gap 5 3 - 14 mmol/L    Glucose 75 70 - 99 mg/dL    Urea Nitrogen 16 7 - 30 mg/dL    Creatinine 0.89 0.52 - 1.04 mg/dL    GFR Estimate 63 >60 mL/min/[1.73_m2]    GFR Estimate If Black 74 >60 mL/min/[1.73_m2]    Calcium 8.5 8.5 - 10.1 mg/dL   Troponin I   Result Value Ref Range    Troponin I ES <0.015 0.000 - 0.045 ug/L   Troponin I   Result Value Ref Range    Troponin I ES <0.015 0.000 - 0.045 ug/L   Troponin I   Result Value Ref Range    Troponin I ES 0.019 0.000 - 0.045 ug/L   Magnesium   Result Value Ref Range    Magnesium 2.2 1.6 - 2.3 mg/dL   TSH with free T4 reflex   Result Value Ref Range    TSH 2.95 0.40 - 4.00 mU/L   UA with Microscopic reflex to Culture   Result Value Ref Range    Color Urine Yellow     Appearance Urine Clear     Glucose Urine Negative NEG^Negative mg/dL    Bilirubin Urine Negative NEG^Negative    Ketones Urine Negative NEG^Negative mg/dL    Specific Gravity Urine 1.015 1.003 - 1.035    Blood Urine Negative NEG^Negative    pH Urine 6.0 5.0 - 7.0 pH    Protein Albumin Urine Negative NEG^Negative mg/dL    Urobilinogen mg/dL Normal 0.0 - 2.0 mg/dL    Nitrite Urine Negative NEG^Negative    Leukocyte Esterase Urine Negative NEG^Negative    Source Unspecified Urine     WBC Urine 3 0 - 5 /HPF    RBC Urine 0 0 - 2 /HPF    Squamous Epithelial /HPF Urine 4 (H) 0 - 1 /HPF   Potassium   Result Value  Ref Range    Potassium 4.1 3.4 - 5.3 mmol/L   Troponin I   Result Value Ref Range    Troponin I ES 0.015 0.000 - 0.045 ug/L   Basic metabolic panel   Result Value Ref Range    Sodium 145 (H) 133 - 144 mmol/L    Potassium 3.8 3.4 - 5.3 mmol/L    Chloride 114 (H) 94 - 109 mmol/L    Carbon Dioxide 27 20 - 32 mmol/L    Anion Gap 4 3 - 14 mmol/L    Glucose 77 70 - 99 mg/dL    Urea Nitrogen 13 7 - 30 mg/dL    Creatinine 0.86 0.52 - 1.04 mg/dL    GFR Estimate 66 >60 mL/min/[1.73_m2]    GFR Estimate If Black 77 >60 mL/min/[1.73_m2]    Calcium 8.0 (L) 8.5 - 10.1 mg/dL   EKG 12 lead   Result Value Ref Range    Interpretation ECG Click View Image link to view waveform and result    Echocardiogram Complete    Narrative    [id number]  [id number]  [id number]  [id number]^MERRITT^YOLANDE^[facility]  Echocardiography Laboratory  [address] [place] [address]        Name: REY STEVENS  MRN: [id number]  : 1945  Study Date: 2019 12:54 PM  Age: 73 yrs  Gender: Female  Patient Location: [facility]  Reason For Study: Syncope  Ordering Physician: YOLANDE BANG  Referring Physician: Colt Riley  Performed By: Umu Morales     BSA: 1.5 m2  Height: 61 in  Weight: 112 lb  BP: 127/80 mmHg  _____________________________________________________________________________  __        Procedure  Complete Portable Echo Adult.  _____________________________________________________________________________  __        Interpretation Summary     The left ventricle is normal in size. There is normal left ventricular wall  thickness. Left ventricular systolic function is normal. The visual ejection  fraction is estimated at 55-60%. Left ventricular diastolic function is  indeterminate. No regional wall motion abnormalities noted.  The right ventricle is normal size. The right ventricular systolic function is  normal.  Mild to moderate tricuspid regurgitation.  Trivial posterior pericardial  effusion.  In comparison to the previous report dated 12/18/2014, the patient is now in  atrial fibrillation. The other findings are similar.  _____________________________________________________________________________  __        Left Ventricle  The left ventricle is normal in size. There is normal left ventricular wall  thickness. Left ventricular systolic function is normal. The visual ejection  fraction is estimated at 55-60%. Left ventricular diastolic function is  indeterminate. No regional wall motion abnormalities noted.     Right Ventricle  The right ventricle is normal size. The right ventricular systolic function is  normal.     Atria  Normal left atrial size. Right atrial size is normal. There is no color  Doppler evidence of an atrial shunt.     Mitral Valve  There is trace mitral regurgitation.        Tricuspid Valve  There is mild to moderate (1-2+) tricuspid regurgitation. The right  ventricular systolic pressure is approximated at 15.8 mmHg plus the right  atrial pressure.     Aortic Valve  There is mild trileaflet aortic sclerosis. No aortic regurgitation is present.  No aortic stenosis is present.     Pulmonic Valve  There is no pulmonic valvular stenosis.     Vessels  The aortic root is normal size. Normal size ascending aorta. Dilated inferior  vena cava.     Pericardium  Trivial posterior pericardial effusion.        Rhythm  The rhythm was atrial fibrillation.  _____________________________________________________________________________  __  MMode/2D Measurements & Calculations     IVSd: 0.70 cm  LVIDd: 4.1 cm  LVIDs: 2.7 cm  LVPWd: 0.72 cm  FS: 32.9 %  LV mass(C)d: 83.0 grams  LV mass(C)dI: 56.2 grams/m2  Ao root diam: 3.4 cm  asc Aorta Diam: 3.2 cm  LVOT diam: 1.9 cm  LVOT area: 2.8 cm2  LA Volume (BP): 27.4 ml  LA Volume Index (BP): 18.5 ml/m2  RWT: 0.35           Doppler Measurements & Calculations  MV E max daniel: 62.1 cm/sec  MV dec time: 0.10 sec  TR max daniel: 198.6 cm/sec  TR max PG:  15.8 mmHg  E/E' av.3  Lateral E/e': 5.4  Medial E/e': 5.2           _____________________________________________________________________________  __           Report approved by: Nichole Livingston 2019 03:03 PM          Discharge Orders      NM Lexiscan stress test     Basic metabolic panel     Reason for your hospital stay    You were evaluated in the hospital for chest discomfort and dizziness and found to have an irregular heart rhythm called atrial fibrillation. You were started on medications to help control your heart rate and on a blood thinner because given your medical history you have a higher risk for stroke with documented atrial fibrillation. Overnight, your heart rhythm switched back to normal sinus rhythm. The rest of your lab work and echocardiogram looked normal. We will have you discharge on a daily blood thinner called Xarelto. We will also have you discharge on a low dose of a heart rate and blood pressure management medication called metoprolol to take twice a day (morning and evening). You should get an outpatient stress test done to assess if there is any significant coronary artery disease contributing to you having this episode of atrial fibrillation.     Follow-up and recommended labs and tests     Follow up with primary care provider, Colt Riley MD, within 7 days for hospital follow- up.  The following labs/tests are recommended: Get a BMP checked at follow up. Outpatient stress test.     Activity    Your activity upon discharge: activity as tolerated     When to contact your care team    Call your primary doctor if you have any of the following: temperature greater than 101, increased shortness of breath or increased pain.     Discharge Instructions    1. Check your blood pressure and heart rate before taking your metoprolol and write down the numbers you get.  2. If your heart rate is less than 60 beats per minute or less, or if the top number of your blood pressure is 110  or less, then don't take the metoprolol. If your heart rate is greater than 60 beats per minute and the top number of your blood pressure is over 110, then take the metoprolol.  3. Take Xarelto 15 mg daily.     Diet    Follow this diet upon discharge: Orders Placed This Encounter      Regular Diet Adult     Discharge Medications   Current Discharge Medication List      START taking these medications    Details   metoprolol tartrate (LOPRESSOR) 25 MG tablet Take 0.5 tablets (12.5 mg) by mouth 2 times daily  Qty: 30 tablet, Refills: 0    Associated Diagnoses: Paroxysmal atrial fibrillation (H)      rivaroxaban ANTICOAGULANT (XARELTO) 15 MG TABS tablet Take 1 tablet (15 mg) by mouth daily (with dinner)  Qty: 30 tablet, Refills: 0    Associated Diagnoses: Paroxysmal atrial fibrillation (H)         CONTINUE these medications which have NOT CHANGED    Details   polyethylene glycol 0.4%- propylene glycol 0.3% (SYSTANE ULTRA) 0.4-0.3 % SOLN ophthalmic solution Place 1 drop into both eyes 3 times daily as needed for dry eyes       pravastatin (PRAVACHOL) 20 MG tablet Take 1 tablet (20 mg) by mouth daily  Qty: 90 tablet, Refills: 3    Associated Diagnoses: Hyperlipidemia with target LDL less than 100      senna-docusate (SENOKOT-S;PERICOLACE) 8.6-50 MG per tablet Take 1-2 tablets by mouth 2 times daily as needed for constipation  Qty: 120 tablet, Refills: 5    Comments: Profile Rx: patient will contact pharmacy when needed  Associated Diagnoses: Constipation, unspecified constipation type      vitamin D3 (CHOLECALCIFEROL) 77884 units capsule Take 1 capsule (50,000 Units) by mouth every 7 days  Qty: 12 capsule, Refills: 1    Associated Diagnoses: Age-related osteoporosis without current pathological fracture      cetirizine (ZYRTEC) 10 MG tablet Take 10 mg by mouth as needed for allergies      fluticasone (FLONASE) 50 MCG/ACT spray Spray 1-2 sprays into both nostrils daily  Qty: 1 Bottle, Refills: 1    Associated Diagnoses:  Nasal congestion      ipratropium (ATROVENT) 0.06 % spray Spray 2 sprays into both nostrils 4 times daily as needed for rhinitis  Qty: 1 Box, Refills: 11    Associated Diagnoses: Chronic vasomotor rhinitis      order for DME Equipment being ordered: Wigs  Qty: 2 Units, Refills: 0    Associated Diagnoses: Alopecia areata      ranitidine (ZANTAC) 150 MG tablet Take 150 mg by mouth as needed for heartburn       sodium chloride (OCEAN) 0.65 % nasal spray Spray 1 spray into both nostrils daily as needed for congestion      Vaginal Lubricant (REPLENS) GEL Place 1 Dose vaginally daily  Qty: 70 g, Refills: 11    Associated Diagnoses: Vaginal atrophy           Allergies   Allergies   Allergen Reactions     Ciprofloxacin      Sores in mouth and throat felt funny

## 2019-01-21 NOTE — PLAN OF CARE
PRIMARY DIAGNOSIS: CHEST PAIN  OUTPATIENT/OBSERVATION GOALS TO BE MET BEFORE DISCHARGE:  1. Ruled out acute coronary syndrome (negative or stable Troponin):  Trops have been negative so far, Troponin's ordered Q4H..   2. Pain Status: Pain free.  3. Appropriate provocative testing performed: Yes  - Stress Test Procedure: Echo  - Interpretation of cardiac rhythm per telemetry tech: Controlled A-Fib. Tachycardic, -110s.     4. Cleared by Consultants (if applicable):No  5. Return to near baseline physical activity: Yes. SBA.   Discharge Planner Nurse   Safe discharge environment identified: Yes  Barriers to discharge: No       Entered by: Kiesha Smith 01/20/2019   A&Ox4. VSS. Up w/ SBA in room. Denies chest pain. Denies SOB. Remote tele - Controlled A-fib w/ HR in 100-110s. Troponin's Q4H, negative so far. ECHO was done today. Will continue to monitor.   Please review provider order for any additional goals.   Nurse to notify provider when observation goals have been met and patient is ready for discharge.

## 2019-01-21 NOTE — PLAN OF CARE
"PRIMARY DIAGNOSIS: afib  OUTPATIENT/OBSERVATION GOALS TO BE MET BEFORE DISCHARGE:  1. Ruled out acute coronary syndrome (negative or stable Troponin):  Yes, troponins <0.015x2, 0.019, 0.015  2. Pain Status: continues to deny pain.  3. Appropriate provocative testing performed: Yes  - Stress Test Procedure: none ordered, per PA note, recommended outpatient stress test.   - Interpretation of cardiac rhythm per telemetry tech: NSR HR 50-60s, pt has not converted to afib yet tonight.    4. Cleared by Consultants (if applicable):No  5. Return to near baseline physical activity: Yes, SBA for activity  Discharge Planner Nurse   Safe discharge environment identified: Yes  Barriers to discharge: Yes       Entered by: Olga Dang 01/21/2019 4:10 AM     Please review provider order for any additional goals.   Nurse to notify provider when observation goals have been met and patient is ready for discharge.    /68 (BP Location: Right arm)   Pulse 86   Temp 96.5  F (35.8  C) (Oral)   Resp 16   Ht 1.575 m (5' 2\")   Wt 51.4 kg (113 lb 4.8 oz)   SpO2 95%   BMI 20.72 kg/m    Continues to deny pain, chest pain, heart palpitations, dizziness or lightheadedness. Plan for daily weights, per MD note, pt is starting xarelto for stroke prophylaxis, to consider Ziopatch on discharge, expected discharge 1/21.  "

## 2019-01-21 NOTE — PLAN OF CARE
"PRIMARY DIAGNOSIS: Afib new diagnosis   OUTPATIENT/OBSERVATION GOALS TO BE MET BEFORE DISCHARGE:  1. Ruled out acute coronary syndrome (negative or stable Troponin):  Yes  2. Pain Status: Pain free.  3. Appropriate provocative testing performed: Yes  - Stress Test Procedure: outpatient stress test   - Interpretation of cardiac rhythm per telemetry tech: NSR HR 65    4. Cleared by Consultants (if applicable):No  5. Return to near baseline physical activity: Yes  Discharge Planner Nurse   Safe discharge environment identified: Yes  Barriers to discharge: Yes       Entered by: Paty Thapa 01/21/2019 8:27 AM     Please review provider order for any additional goals.   Nurse to notify provider when observation goals have been met and patient is ready for discharge.  Patient is alert and oriented x4. VS WNL and documented on the FS. Lung sounds clear in all lobes. Apical HR regular. Active bowel sounds in all 4 quadrants with LBM last night. Patient denies any pain, urgency and frequency when voiding. Neuro's WNL. Denies chest pain/pressure. IV fluids infusing at 100 ml/hr. Patient is a SBA when up.  in room. Plan: Ziopatch and outpatient stress test if patient remain asymptomatic.   /70 (BP Location: Left arm)   Pulse 86   Temp 96.3  F (35.7  C) (Oral)   Resp 16   Ht 1.575 m (5' 2\")   Wt 53.4 kg (117 lb 11.2 oz)   SpO2 98%   BMI 21.53 kg/m        "

## 2019-01-21 NOTE — PLAN OF CARE
PRIMARY DIAGNOSIS: CHEST PAIN  OUTPATIENT/OBSERVATION GOALS TO BE MET BEFORE DISCHARGE:  1. Ruled out acute coronary syndrome (negative or stable Troponin):  Trops have been negative so far, Troponin's ordered Q4H..   2. Pain Status: Pain free.  3. Appropriate provocative testing performed: Yes  - Stress Test Procedure: Echo  - Interpretation of cardiac rhythm per telemetry tech: Controlled A-Fib. Tachycardic, -110s per tele tech.      4. Cleared by Consultants (if applicable):No  5. Return to near baseline physical activity: Yes. SBA.     Discharge Planner Nurse   Safe discharge environment identified: Yes  Barriers to discharge: No       Entered by: Kiesha Smith 01/20/2019     A&Ox4. VSS. Up w/ SBA in room. Denies chest pain. Denies SOB. Remote tele - Controlled A-fib w/ HR in 100-110s. Troponin's Q4H, negative so far. ECHO complete. K+ 3.3, replaced per protocol, recheck due at 8:45 PM. BMx2 this evening. U/A to be collected. Will continue to monitor.    Please review provider order for any additional goals.     Nurse to notify provider when observation goals have been met and patient is ready for discharge.

## 2019-01-21 NOTE — PLAN OF CARE
"Patient's After Visit Summary was reviewed with patient and/or spouse.   Patient verbalized understanding of After Visit Summary, recommended follow up and was given an opportunity to ask questions.   Discharge medications sent home with patient/family: YES, Metoprolol and Xarelto     Discharged with spouse and nurse with all belongings. Ziopatch on. All questions answered. Patient medically cleared to discharge.  /73 (BP Location: Left arm)   Pulse 86   Temp 96.5  F (35.8  C) (Oral)   Resp 16   Ht 1.575 m (5' 2\")   Wt 53.4 kg (117 lb 11.2 oz)   SpO2 98%   BMI 21.53 kg/m    OBSERVATION patient END time: 1245       "

## 2019-01-21 NOTE — PLAN OF CARE
"PRIMARY DIAGNOSIS: afib  OUTPATIENT/OBSERVATION GOALS TO BE MET BEFORE DISCHARGE:  1. Ruled out acute coronary syndrome (negative or stable Troponin):  Yes, troponins <0.015x2, 0.019, 0.015  2. Pain Status: Pain free.  3. Appropriate provocative testing performed: Yes  - Stress Test Procedure: none ordered, per PA note, recommended outpatient stress test.   - Interpretation of cardiac rhythm per telemetry tech: NSR HR 60s, tele tech to notify if pt converts to afib.    4. Cleared by Consultants (if applicable):No  5. Return to near baseline physical activity: Yes, SBA for activity  Discharge Planner Nurse   Safe discharge environment identified: Yes  Barriers to discharge: Yes       Entered by: Olga Dang 01/20/2019 11:57 PM     Please review provider order for any additional goals.   Nurse to notify provider when observation goals have been met and patient is ready for discharge.    /68 (BP Location: Right arm)   Pulse 86   Temp 96.8  F (36  C) (Oral)   Resp 18   Ht 1.575 m (5' 2\")   Wt 51.4 kg (113 lb 4.8 oz)   SpO2 97%   BMI 20.72 kg/m    Pt A&Ox4, VSS, currently denying pain, chest pain, feeling heart palpitations, dizziness. Plan for daily weights, per MD note, pt is starting xarelto for stroke prophylaxis, to consider Ziopatch on discharge, expected discharge 1/21.  "

## 2019-01-22 ENCOUNTER — TELEPHONE (OUTPATIENT)
Dept: INTERNAL MEDICINE | Facility: CLINIC | Age: 74
End: 2019-01-22

## 2019-01-22 NOTE — TELEPHONE ENCOUNTER
IP F/U    Date: 01/21/19  Diagnosis: Paroxysmal Atrial Fibrillation  Is patient active in care coordination? No  Was patient in TCU? No    Next 5 appointments (look out 90 days)    Feb 04, 2019  8:30 AM CST  Nurse Only with [facility]SE  [facility] ([facility]) [address]  [facility] [id number]  [phone]

## 2019-01-23 ENCOUNTER — THERAPY VISIT (OUTPATIENT)
Dept: PHYSICAL THERAPY | Facility: CLINIC | Age: 74
End: 2019-01-23
Payer: COMMERCIAL

## 2019-01-23 ENCOUNTER — ALLIED HEALTH/NURSE VISIT (OUTPATIENT)
Dept: NURSING | Facility: CLINIC | Age: 74
End: 2019-01-23
Payer: COMMERCIAL

## 2019-01-23 DIAGNOSIS — M81.0 AGE-RELATED OSTEOPOROSIS WITHOUT CURRENT PATHOLOGICAL FRACTURE: ICD-10-CM

## 2019-01-23 DIAGNOSIS — E53.8 VITAMIN B12 DEFICIENCY (NON ANEMIC): Primary | ICD-10-CM

## 2019-01-23 PROCEDURE — 96372 THER/PROPH/DIAG INJ SC/IM: CPT

## 2019-01-23 PROCEDURE — 97110 THERAPEUTIC EXERCISES: CPT | Mod: GP | Performed by: PHYSICAL THERAPIST

## 2019-01-23 PROCEDURE — 97112 NEUROMUSCULAR REEDUCATION: CPT | Mod: GP | Performed by: PHYSICAL THERAPIST

## 2019-01-23 RX ADMIN — CYANOCOBALAMIN 1000 MCG: 1000 INJECTION, SOLUTION INTRAMUSCULAR; SUBCUTANEOUS at 10:05

## 2019-01-23 NOTE — PROGRESS NOTES
Prior to injection, verified patient identity using patient's name and date of birth.  Due to injection administration, patient instructed to remain in clinic for 15 minutes afterwards, and to report any adverse reaction to me or the  staff immediately.    b12    Drug Amount Wasted:  None.  Vial/Syringe: Single dose vial  Expiration Date:  5/1/20    Screening Questionnaire for Adult Immunization     Are you sick today?   No    Do you have allergies to medications, food or any vaccine?   No    Have you ever had a serious reaction after receiving a vaccination?   No    Do you have a long-term health problem with heart disease, lung disease,  asthma, kidney disease, diabetes, anemia, metabolic or blood disease?   No    Do you have cancer, leukemia, AIDS, or any immune system problem?   No    Do you take cortisone, prednisone, other steroids, or anticancer drugs, or  have you had any x-ray (radiation) treatments?   No    Have you had a seizure, brain, or other nervous system problem?   No    During the past year, have you received a transfusion of blood or blood       products, or been given a medicine called immune (gamma) globulin?   No    For women: Are you pregnant or is there a chance you could become         pregnant during the next month?   No    Have you received any vaccinations in the past 4 weeks?   No     Immunization questionnaire answers were all negative.      MNVFC doesn't apply on this patient     Screening performed by Maegan Cloud on 1/23/2019 at 10:05 AM.

## 2019-01-30 ENCOUNTER — OFFICE VISIT (OUTPATIENT)
Dept: INTERNAL MEDICINE | Facility: CLINIC | Age: 74
End: 2019-01-30
Payer: COMMERCIAL

## 2019-01-30 VITALS
HEIGHT: 62 IN | RESPIRATION RATE: 14 BRPM | HEART RATE: 75 BPM | BODY MASS INDEX: 20.98 KG/M2 | DIASTOLIC BLOOD PRESSURE: 62 MMHG | WEIGHT: 114 LBS | TEMPERATURE: 98.3 F | OXYGEN SATURATION: 100 % | SYSTOLIC BLOOD PRESSURE: 112 MMHG

## 2019-01-30 DIAGNOSIS — I48.0 PAROXYSMAL ATRIAL FIBRILLATION (H): Primary | ICD-10-CM

## 2019-01-30 DIAGNOSIS — G45.9 TRANSIENT CEREBRAL ISCHEMIA, UNSPECIFIED TYPE: ICD-10-CM

## 2019-01-30 DIAGNOSIS — E87.6 HYPOKALEMIA: ICD-10-CM

## 2019-01-30 DIAGNOSIS — K59.00 CONSTIPATION, UNSPECIFIED CONSTIPATION TYPE: ICD-10-CM

## 2019-01-30 DIAGNOSIS — E78.5 HYPERLIPIDEMIA WITH TARGET LDL LESS THAN 100: ICD-10-CM

## 2019-01-30 PROCEDURE — 99214 OFFICE O/P EST MOD 30 MIN: CPT | Performed by: INTERNAL MEDICINE

## 2019-01-30 PROCEDURE — 80048 BASIC METABOLIC PNL TOTAL CA: CPT | Performed by: INTERNAL MEDICINE

## 2019-01-30 PROCEDURE — 36415 COLL VENOUS BLD VENIPUNCTURE: CPT | Performed by: INTERNAL MEDICINE

## 2019-01-30 RX ORDER — METOPROLOL TARTRATE 25 MG/1
12.5 TABLET, FILM COATED ORAL DAILY
Qty: 45 TABLET | Refills: 2 | Status: SHIPPED | OUTPATIENT
Start: 2019-01-30 | End: 2019-10-28

## 2019-01-30 RX ORDER — AMOXICILLIN 250 MG
1-2 CAPSULE ORAL 2 TIMES DAILY PRN
Qty: 120 TABLET | Refills: 5 | Status: SHIPPED | OUTPATIENT
Start: 2019-01-30 | End: 2019-12-06

## 2019-01-30 ASSESSMENT — MIFFLIN-ST. JEOR: SCORE: 970.35

## 2019-01-30 NOTE — PROGRESS NOTES
SUBJECTIVE:   Danielle Bryan is a 73 year old female who presents to clinic today for the following health issues:    Hospital Follow-up Visit:    Hospital/Nursing Home/IP Rehab Facility: [facility]  Date of Admission: 1/20/2019  Date of Discharge: 1/21/2019  Reason(s) for Admission: A Fib            Problems taking medications regularly:  None       Medication changes since discharge: None       Problems adhering to non-medication therapy:  None    Summary of hospitalization:  [facility] discharge summary reviewed  Diagnostic Tests/Treatments reviewed.  Follow up needed: HAILEY today. Lexiscan 2/21/2019.  Other Healthcare Providers Involved in Patient s Care:         None  Update since discharge: improved.     Post Discharge Medication Reconciliation: discharge medications reconciled and changed, per note/orders (see AVS).  Plan of care communicated with patient and      Coding guidelines for this visit:  Type of Medical   Decision Making Face-to-Face Visit       within 7 Days of discharge Face-to-Face Visit        within 14 days of discharge   Moderate Complexity 37098 30217   High Complexity 15179 17153          Patient was hospitalized 1/20/2019-1/21/2019 for further evaluation of symptoms including dizziness, headache, and chest tightness. ED work up revealed VSS except pulse was 128 bpm and she was in a-fib. Her potassium levels were 3.3, otherwise lab work up was unremarkable. She reverted to sinus rhythm overnight with potassium replacement and IV metoprolol.     Per patient this is her third episode of a-fib. She has never been hospitalized for symptoms before. She has a stress test scheduled on 2/21/2019. She was discharged with a zio patch monitor to assess a-fib burden.     Upon discharge patient was started on Xarelto due to her hx of TIA and on metoprolol 12.5 mg twice daily, holding the second dose if systolic pressure is less than 110 or if pulse is less than 60. Patient  "reports that she has mostly been taking 1 half tab (12.5 mg) daily and taking a second half tab only if BP or HR are high. Overall systolic pressure has remained 110-115 and HR has averaged 60-70 bpm.     She was on aspirin prior to hospitalization and she inquires if she should resume taking it.     Constipation  Notes that yesterday she had her first bowel movement since discharge on 1/21/2019. Prior to hospital admission, she had discontinued senokot because bowel movements were regular. She does have a hx of constipation.     Problem list and histories reviewed & adjusted, as indicated.  Additional history: as documented    Labs reviewed in EPIC    Reviewed and updated as needed this visit by clinical staff  Tobacco  Allergies  Meds  Med Hx  Surg Hx  Fam Hx  Soc Hx      Reviewed and updated as needed this visit by Provider         ROS:  No dyspnea or cough. No chest discomfort, dizziness or palpitations. No diarrhea, abdominal pain or rectal bleeding.   No acute problems with vision or speech, lateralizing weakness or paresthesias.    ROS: as above or negative for Respiratory, CV, GI, endocrine, neuro systems.    This document serves as a record of the services and decisions personally performed and made by Colt Riley MD. It was created on his behalf by Maya Ulloa, a trained medical scribe. The creation of this document is based on the provider's statements to the medical scribe.  Maya Ulloa January 30, 2019 3:05 PM     OBJECTIVE:     /62 (BP Location: Right arm, Patient Position: Sitting, Cuff Size: Adult Regular)   Pulse 75   Temp 98.3  F (36.8  C) (Oral)   Resp 14   Ht 1.575 m (5' 2\")   Wt 51.7 kg (114 lb)   SpO2 100%   Breastfeeding? No   BMI 20.85 kg/m    Body mass index is 20.85 kg/m .     GENERAL: healthy, alert and no distress  RESP: lungs clear to auscultation - no rales, rhonchi or wheezes  CV: regular rate and rhythm, normal S1 S2, no S3 or S4, no murmur, click or rub, " no peripheral edema and peripheral pulses strong  MS: no gross musculoskeletal defects noted, no edema  SKIN: no suspicious lesions or rashes  NEURO: Normal strength and tone, mentation intact and speech normal  PSYCH: mentation appears normal, affect normal/bright    Diagnostic Test Results:  No results found for this or any previous visit (from the past 24 hour(s)).    ASSESSMENT/PLAN:   (I48.0) Paroxysmal atrial fibrillation (H)  (primary encounter diagnosis)  Comment: BP and pulse have remained controlled. Okay to continue with half a tab (12.5 mg) of metoprolol daily. Take extra half tab if pulse or HR are too high. Follow up with cardiology as recommended.   Plan: metoprolol tartrate (LOPRESSOR) 25 MG tablet,         rivaroxaban ANTICOAGULANT (XARELTO) 15 MG TABS         tablet          (G45.9) Transient cerebral ischemia, unspecified type  Comment: May continue taking aspirin 81 mg daily. Discussed and reviewed risks of aspirin plus Xarelto use and advised patient to discontinue aspirin if noticing any symptoms (see patient instructions below).   Plan: aspirin (ASA) 81 MG tablet          (E78.5) Hyperlipidemia with target LDL less than 100  Comment: Stable. Continue current meds.     (K59.00) Constipation, unspecified constipation type  Comment: Resume taking senokot. Refilled rx  Plan: senna-docusate (SENOKOT-S/PERICOLACE) 8.6-50 MG        tablet          (E87.6) Hypokalemia  Comment: Potassium levels low during hospitalization. Rechecking today.   Plan: Basic metabolic panel  (Ca, Cl, CO2, Creat,         Gluc, K, Na, BUN)          FUTURE APPOINTMENTS:       - Follow-up visit in 8 months     Patient Instructions   Take the metoprolol tartrate 25 mg at ONE-HALF tablet daily. This is a twelve-hour medication, not a full 24-hour medication. However, if your blood pressures are around 110 or less, maybe for now we should stick with one-half tablet ONCE a day.     Stay on the Xarelto. This is for stroke  prevention.   Okay for now to continue taking a baby aspirin (81 mg) each day. Stop taking aspirin if you note bleeding problems, such as nosebleeds, stomach or intestinal bleeding, etc.     We can check your potasium and kidney tests today in [address].   We will make sure that you don't need to stay on a daily potassium pill.     Your stress test is scheduled for 2/21/2019. Don't take the metoprolol on February 19, 20, or 21st.   If there are concerns for either stress test, Zio-patch monitor, or you have atrial fibrillation problems, we will have you see cardiology.     Otherwise if doing well, see me for your annual physical in around September 2019.    The information in this document, created by the medical scribe for me, accurately reflects the services I personally performed and the decisions made by me. I have reviewed and approved this document for accuracy prior to leaving the patient care area.  January 30, 2019 3:35 PM    Colt Riley MD,   [facility]

## 2019-01-30 NOTE — PATIENT INSTRUCTIONS
Take the metoprolol tartrate 25 mg at ONE-HALF tablet daily. This is a twelve-hour medication, not a full 24-hour medication. However, if your blood pressures are around 110 or less, maybe for now we should stick with one-half tablet ONCE a day.     Stay on the Xarelto. This is for stroke prevention.   Okay for now to continue taking a baby aspirin (81 mg) each day. Stop taking aspirin if you note bleeding problems, such as nosebleeds, stomach or intestinal bleeding, etc.     We can check your potasium and kidney tests today in [address].   We will make sure that you don't need to stay on a daily potassium pill.     Your stress test is scheduled for 2/21/2019. Don't take the metoprolol on February 19, 20, or 21st.   If there are concerns for either stress test, Zio-patch monitor, or you have atrial fibrillation problems, we will have you see cardiology.     Otherwise if doing well, see me for your annual physical in around September 2019.

## 2019-01-31 LAB
ANION GAP SERPL CALCULATED.3IONS-SCNC: 5 MMOL/L (ref 3–14)
BUN SERPL-MCNC: 15 MG/DL (ref 7–30)
CALCIUM SERPL-MCNC: 9.1 MG/DL (ref 8.5–10.1)
CHLORIDE SERPL-SCNC: 108 MMOL/L (ref 94–109)
CO2 SERPL-SCNC: 30 MMOL/L (ref 20–32)
CREAT SERPL-MCNC: 0.75 MG/DL (ref 0.52–1.04)
GFR SERPL CREATININE-BSD FRML MDRD: 78 ML/MIN/{1.73_M2}
GLUCOSE SERPL-MCNC: 89 MG/DL (ref 70–99)
POTASSIUM SERPL-SCNC: 4.1 MMOL/L (ref 3.4–5.3)
SODIUM SERPL-SCNC: 143 MMOL/L (ref 133–144)

## 2019-02-04 ENCOUNTER — ALLIED HEALTH/NURSE VISIT (OUTPATIENT)
Dept: NURSING | Facility: CLINIC | Age: 74
End: 2019-02-04
Payer: COMMERCIAL

## 2019-02-04 DIAGNOSIS — E53.8 VITAMIN B12 DEFICIENCY (NON ANEMIC): Primary | ICD-10-CM

## 2019-02-04 PROCEDURE — 96372 THER/PROPH/DIAG INJ SC/IM: CPT

## 2019-02-04 RX ADMIN — CYANOCOBALAMIN 1000 MCG: 1000 INJECTION, SOLUTION INTRAMUSCULAR; SUBCUTANEOUS at 08:57

## 2019-02-06 ENCOUNTER — THERAPY VISIT (OUTPATIENT)
Dept: PHYSICAL THERAPY | Facility: CLINIC | Age: 74
End: 2019-02-06
Payer: COMMERCIAL

## 2019-02-06 ENCOUNTER — TELEPHONE (OUTPATIENT)
Dept: INTERNAL MEDICINE | Facility: CLINIC | Age: 74
End: 2019-02-06

## 2019-02-06 DIAGNOSIS — M81.0 AGE-RELATED OSTEOPOROSIS WITHOUT CURRENT PATHOLOGICAL FRACTURE: ICD-10-CM

## 2019-02-06 PROCEDURE — 97110 THERAPEUTIC EXERCISES: CPT | Mod: GP | Performed by: PHYSICAL THERAPIST

## 2019-02-06 PROCEDURE — 97112 NEUROMUSCULAR REEDUCATION: CPT | Mod: GP | Performed by: PHYSICAL THERAPIST

## 2019-02-07 NOTE — TELEPHONE ENCOUNTER
Contacted patient.   She states that she had been very fatigued a few days ago and started to improve late in the day yesterday and today. She did have some chills with the fatigue and wonders if she may have had a mild virus. Patient states she feels fine now and will call back if symptoms return.

## 2019-02-19 ENCOUNTER — ALLIED HEALTH/NURSE VISIT (OUTPATIENT)
Dept: NURSING | Facility: CLINIC | Age: 74
End: 2019-02-19
Payer: COMMERCIAL

## 2019-02-19 DIAGNOSIS — E53.8 VITAMIN B12 DEFICIENCY (NON ANEMIC): Primary | ICD-10-CM

## 2019-02-19 PROCEDURE — 96372 THER/PROPH/DIAG INJ SC/IM: CPT

## 2019-02-19 RX ADMIN — CYANOCOBALAMIN 1000 MCG: 1000 INJECTION, SOLUTION INTRAMUSCULAR; SUBCUTANEOUS at 09:39

## 2019-02-19 NOTE — PROGRESS NOTES
Prior to injection, verified patient identity using patient's name and date of birth.  Due to injection administration, patient instructed to remain in clinic for 15 minutes afterwards, and to report any adverse reaction to me or the  staff immediately.    b12    Drug Amount Wasted:  None.  Vial/Syringe: Single dose vial  Expiration Date:  5/1/20    Screening Questionnaire for Adult Immunization     Are you sick today?   No    Do you have allergies to medications, food or any vaccine?   No    Have you ever had a serious reaction after receiving a vaccination?   No    Do you have a long-term health problem with heart disease, lung disease,  asthma, kidney disease, diabetes, anemia, metabolic or blood disease?   No    Do you have cancer, leukemia, AIDS, or any immune system problem?   No    Do you take cortisone, prednisone, other steroids, or anticancer drugs, or  have you had any x-ray (radiation) treatments?   No    Have you had a seizure, brain, or other nervous system problem?   No    During the past year, have you received a transfusion of blood or blood       products, or been given a medicine called immune (gamma) globulin?   No    For women: Are you pregnant or is there a chance you could become         pregnant during the next month?   No    Have you received any vaccinations in the past 4 weeks?   No     Immunization questionnaire answers were all negative.      MNVFC doesn't apply on this patient     Screening performed by Maegan Cloud on 2/19/2019 at 9:41 AM.

## 2019-02-21 ENCOUNTER — HOSPITAL ENCOUNTER (OUTPATIENT)
Dept: NUCLEAR MEDICINE | Facility: CLINIC | Age: 74
Setting detail: NUCLEAR MEDICINE
End: 2019-02-21
Attending: PHYSICIAN ASSISTANT
Payer: COMMERCIAL

## 2019-02-21 ENCOUNTER — HOSPITAL ENCOUNTER (OUTPATIENT)
Dept: CARDIOLOGY | Facility: CLINIC | Age: 74
Discharge: HOME | End: 2019-02-21
Attending: PHYSICIAN ASSISTANT | Admitting: PHYSICIAN ASSISTANT
Payer: COMMERCIAL

## 2019-02-21 DIAGNOSIS — I48.0 PAROXYSMAL ATRIAL FIBRILLATION (H): ICD-10-CM

## 2019-02-21 PROCEDURE — 93018 CV STRESS TEST I&R ONLY: CPT | Performed by: INTERNAL MEDICINE

## 2019-02-21 PROCEDURE — A9502 TC99M TETROFOSMIN: HCPCS | Performed by: PHYSICIAN ASSISTANT

## 2019-02-21 PROCEDURE — 78452 HT MUSCLE IMAGE SPECT MULT: CPT | Performed by: INTERNAL MEDICINE

## 2019-02-21 PROCEDURE — 93017 CV STRESS TEST TRACING ONLY: CPT

## 2019-02-21 PROCEDURE — 34300033 ZZH RX 343: Performed by: PHYSICIAN ASSISTANT

## 2019-02-21 PROCEDURE — 25000128 H RX IP 250 OP 636: Performed by: PHYSICIAN ASSISTANT

## 2019-02-21 PROCEDURE — 78452 HT MUSCLE IMAGE SPECT MULT: CPT

## 2019-02-21 RX ORDER — ACYCLOVIR 200 MG/1
0-1 CAPSULE ORAL
Status: DISCONTINUED | OUTPATIENT
Start: 2019-02-21 | End: 2019-02-22 | Stop reason: HOSPADM

## 2019-02-21 RX ORDER — CAFFEINE 200 MG
200 TABLET ORAL
Status: DISCONTINUED | OUTPATIENT
Start: 2019-02-21 | End: 2019-02-22 | Stop reason: HOSPADM

## 2019-02-21 RX ORDER — CAFFEINE CITRATE 20 MG/ML
60 SOLUTION INTRAVENOUS
Status: DISCONTINUED | OUTPATIENT
Start: 2019-02-21 | End: 2019-02-22 | Stop reason: HOSPADM

## 2019-02-21 RX ORDER — AMINOPHYLLINE 25 MG/ML
INJECTION, SOLUTION INTRAVENOUS
Status: DISCONTINUED
Start: 2019-02-21 | End: 2019-02-21 | Stop reason: WASHOUT

## 2019-02-21 RX ORDER — REGADENOSON 0.08 MG/ML
INJECTION, SOLUTION INTRAVENOUS
Status: DISPENSED
Start: 2019-02-21 | End: 2019-02-21

## 2019-02-21 RX ORDER — REGADENOSON 0.08 MG/ML
0.4 INJECTION, SOLUTION INTRAVENOUS ONCE
Status: COMPLETED | OUTPATIENT
Start: 2019-02-21 | End: 2019-02-21

## 2019-02-21 RX ORDER — AMINOPHYLLINE 25 MG/ML
50-100 INJECTION, SOLUTION INTRAVENOUS
Status: DISCONTINUED | OUTPATIENT
Start: 2019-02-21 | End: 2019-02-22 | Stop reason: HOSPADM

## 2019-02-21 RX ORDER — ALBUTEROL SULFATE 90 UG/1
2 AEROSOL, METERED RESPIRATORY (INHALATION) EVERY 5 MIN PRN
Status: DISCONTINUED | OUTPATIENT
Start: 2019-02-21 | End: 2019-02-22 | Stop reason: HOSPADM

## 2019-02-21 RX ADMIN — TETROFOSMIN 31.6 MCI.: 1.38 INJECTION, POWDER, LYOPHILIZED, FOR SOLUTION INTRAVENOUS at 09:09

## 2019-02-21 RX ADMIN — REGADENOSON 0.4 MG: 0.08 INJECTION, SOLUTION INTRAVENOUS at 09:00

## 2019-02-21 RX ADMIN — TETROFOSMIN 11 MCI.: 1.38 INJECTION, POWDER, LYOPHILIZED, FOR SOLUTION INTRAVENOUS at 07:46

## 2019-02-21 NOTE — PROGRESS NOTES
Pre-procedure:  Are you having any pain or shortness of breath (prior to starting)? denies  Initial vital signs: /64, HR 62 , RR 16  Allergies reviewed: yes   Rhythm: sinus  Medications taken within 48 hours of procedure: no   Sublingual nitro within the last 48 hours:no  Last Caffeine: over a week ago  Lung sounds: CTA, no wheezing, crackles or rtx  Health History (COPD, Asthma, etc): denies           Procedure: Lexiscan  Reaction/symptoms after receiving Shelly injection: Shortness of breath  Intensity of Pain: 6  Rhythm: sinus  1. Vital Signs:/70, , RR 18  2. Vital Signs:/53, HR 78, RR 16     Reversal agent: Aminophylline injection and N/A    Post:   Resolution of symptoms?: YES  Vital signs: /66, HR 79, RR 16  Rhythm: sinus  Walk: YES  Comment: symptoms subsiding. Tolerated well.   Return to Radiology

## 2019-02-26 ENCOUNTER — TELEPHONE (OUTPATIENT)
Dept: INTERNAL MEDICINE | Facility: CLINIC | Age: 74
End: 2019-02-26

## 2019-02-26 NOTE — TELEPHONE ENCOUNTER
Both stress test and Zio Patch were ordered for patient by hospitalist for 1/20/19 admission. Ziopatch results state to sent to PCP. No notes recorded for stress test.    Routed to provider to review.

## 2019-02-26 NOTE — TELEPHONE ENCOUNTER
Patient is calling, would like the results of her stress test and holter monitor. Please call back. [phone]. Ok to leave a detailed message.

## 2019-03-01 ENCOUNTER — TELEPHONE (OUTPATIENT)
Dept: INTERNAL MEDICINE | Facility: CLINIC | Age: 74
End: 2019-03-01

## 2019-03-01 ENCOUNTER — OFFICE VISIT (OUTPATIENT)
Dept: INTERNAL MEDICINE | Facility: CLINIC | Age: 74
End: 2019-03-01
Payer: COMMERCIAL

## 2019-03-01 VITALS
HEIGHT: 62 IN | HEART RATE: 72 BPM | WEIGHT: 112 LBS | TEMPERATURE: 98.3 F | DIASTOLIC BLOOD PRESSURE: 60 MMHG | RESPIRATION RATE: 14 BRPM | BODY MASS INDEX: 20.61 KG/M2 | OXYGEN SATURATION: 99 % | SYSTOLIC BLOOD PRESSURE: 98 MMHG

## 2019-03-01 DIAGNOSIS — E55.9 VITAMIN D DEFICIENCY: ICD-10-CM

## 2019-03-01 DIAGNOSIS — R09.81 NASAL CONGESTION: ICD-10-CM

## 2019-03-01 DIAGNOSIS — J06.9 UPPER RESPIRATORY TRACT INFECTION, UNSPECIFIED TYPE: Primary | ICD-10-CM

## 2019-03-01 DIAGNOSIS — M81.0 AGE-RELATED OSTEOPOROSIS WITHOUT CURRENT PATHOLOGICAL FRACTURE: ICD-10-CM

## 2019-03-01 DIAGNOSIS — I48.0 PAROXYSMAL ATRIAL FIBRILLATION (H): ICD-10-CM

## 2019-03-01 LAB
CALCIUM SERPL-MCNC: 9.2 MG/DL (ref 8.5–10.1)
CREAT SERPL-MCNC: 0.84 MG/DL (ref 0.52–1.04)
GFR SERPL CREATININE-BSD FRML MDRD: 68 ML/MIN/{1.73_M2}
PTH-INTACT SERPL-MCNC: 44 PG/ML (ref 18–80)

## 2019-03-01 PROCEDURE — 82565 ASSAY OF CREATININE: CPT | Performed by: INTERNAL MEDICINE

## 2019-03-01 PROCEDURE — 36415 COLL VENOUS BLD VENIPUNCTURE: CPT | Performed by: INTERNAL MEDICINE

## 2019-03-01 PROCEDURE — 83970 ASSAY OF PARATHORMONE: CPT | Performed by: INTERNAL MEDICINE

## 2019-03-01 PROCEDURE — 82306 VITAMIN D 25 HYDROXY: CPT | Performed by: INTERNAL MEDICINE

## 2019-03-01 PROCEDURE — 99214 OFFICE O/P EST MOD 30 MIN: CPT | Performed by: INTERNAL MEDICINE

## 2019-03-01 PROCEDURE — 82310 ASSAY OF CALCIUM: CPT | Performed by: INTERNAL MEDICINE

## 2019-03-01 RX ORDER — FLUTICASONE PROPIONATE 50 MCG
1-2 SPRAY, SUSPENSION (ML) NASAL DAILY
Qty: 16 G | Refills: 3 | Status: SHIPPED | OUTPATIENT
Start: 2019-03-01 | End: 2019-12-06

## 2019-03-01 ASSESSMENT — MIFFLIN-ST. JEOR: SCORE: 961.28

## 2019-03-01 NOTE — TELEPHONE ENCOUNTER
Reason for Call:  Medication or medication refill:    Do you use a [facility]?  Name of the pharmacy and phone number for the current request:  [facility] - [phone]    Name of the medication requested: Flonas 50MCG/ACT spray    Other request: none    Can we leave a detailed message on this number? YES    Phone number patient can be reached at: Home number on file [phone] (home)    Best Time: any    Call taken on 3/1/2019 at 1:12 PM by Flora Hurt

## 2019-03-01 NOTE — PATIENT INSTRUCTIONS
Labs today from Dr. Hatch    Take flonase at home for upper respiratory symptoms.      Call if you change your mind regarding coumadin.

## 2019-03-01 NOTE — NURSING NOTE
"Vital signs:  Temp: 98.3  F (36.8  C) Temp src: Oral BP: 98/60 Pulse: 72   Resp: 14 SpO2: 99 %     Height: 157.5 cm (5' 2\") Weight: 50.8 kg (112 lb)  Estimated body mass index is 20.49 kg/m  as calculated from the following:    Height as of this encounter: 1.575 m (5' 2\").    Weight as of this encounter: 50.8 kg (112 lb).          "

## 2019-03-01 NOTE — PROGRESS NOTES
"  SUBJECTIVE:   Danielle Bryan is a 73 year old female who presents to clinic today for the following health issues:      URI.  On Monday she started to feel phlegm in her throat.  Then she started coughing.  She feels like the cough from postnasal drip. She some nasal congestion. No sinus pain or headache.  No fevers.  She did have chills initially, but now improved.    Of note, she has not been using flonase.    She if overall feeling well.  Reports that she is able to sleep okay now.   The symptoms have improved.       Atrial fibrillation. She had a stress test and holter monitor on about a week ago and she wants to go over the results as well.  Reviewed with patient that the stress test was normal and the holter monitor revealed no atrial fibrillation.   Patient is on xarelto and metoprolol.    Patient would like her vitamin D rechecked today.       Problem list and histories reviewed & adjusted, as indicated.      Labs reviewed in EPIC    Reviewed and updated as needed this visit by clinical staff  Tobacco  Allergies  Meds  Med Hx  Surg Hx  Fam Hx  Soc Hx      Reviewed and updated as needed this visit by Provider         ROS:  CONSTITUTIONAL: NEGATIVE for fever, chills, change in weight  RESP: NEGATIVE for significant cough or SOB  CV: NEGATIVE for chest pain, palpitations or peripheral edema    OBJECTIVE:     BP 98/60 (BP Location: Right arm, Patient Position: Sitting, Cuff Size: Adult Regular)   Pulse 72   Temp 98.3  F (36.8  C) (Oral)   Resp 14   Ht 1.575 m (5' 2\")   Wt 50.8 kg (112 lb)   SpO2 99%   BMI 20.49 kg/m    Body mass index is 20.49 kg/m .  GENERAL: healthy, alert and no distress  HEENT: oropharynx with cobblestone appearance  NECK: no adenopathy, no asymmetry, masses, or scars and thyroid normal to palpation  RESP: lungs clear to auscultation - no rales, rhonchi or wheezes  CV: regular rate and rhythm, normal S1 S2, no S3 or S4, no murmur, click or rub    ASSESSMENT/PLAN: "       (J06.9) Upper respiratory tract infection, unspecified type  (primary encounter diagnosis)  Comment: likely viral  Plan: monitor and recommend flonase    (I48.0) Paroxysmal atrial fibrillation (H)  Comment:   Plan: continue on xarelto and metoprolol  If too expensive, can consider coumadin    (M81.0) Age-related osteoporosis without current pathological fracture  Comment:   Plan:     (E55.9) Vitamin D deficiency  Comment: assess  Plan: labs pending- released        Mamie Lehman MD  [facility]

## 2019-03-01 NOTE — TELEPHONE ENCOUNTER
"Requested Prescriptions   Pending Prescriptions Disp Refills     fluticasone (FLONASE) 50 MCG/ACT nasal spray  Last Written Prescription Date:  2/5/18  Last Fill Quantity: 1,  # refills: 1   Last office visit: 3/1/2019 with prescribing provider:  Dominik   Future Office Visit:   Next 5 appointments (look out 90 days)    Mar 06, 2019  8:30 AM CST  Nurse Only with [facility]  [facility] [address]  [facility] [id number] [phone]               Sig: Spray 1-2 sprays into both nostrils daily    Inhaled Steroids Protocol Passed - 3/1/2019  5:01 PM       Passed - Patient is age 12 or older       Passed - Recent (12 mo) or future (30 days) visit within the authorizing provider's specialty    Patient had office visit in the last 12 months or has a visit in the next 30 days with authorizing provider or within the authorizing provider's specialty.  See \"Patient Info\" tab in inbasket, or \"Choose Columns\" in Meds & Orders section of the refill encounter.             Passed - Medication is active on med list      Prescription approved per [facility] [place].   "

## 2019-03-04 ENCOUNTER — OFFICE VISIT (OUTPATIENT)
Dept: ENDOCRINOLOGY | Facility: CLINIC | Age: 74
End: 2019-03-04
Payer: COMMERCIAL

## 2019-03-04 VITALS
SYSTOLIC BLOOD PRESSURE: 150 MMHG | BODY MASS INDEX: 21.09 KG/M2 | WEIGHT: 114.6 LBS | DIASTOLIC BLOOD PRESSURE: 70 MMHG | TEMPERATURE: 97.6 F | OXYGEN SATURATION: 100 % | HEIGHT: 62 IN | HEART RATE: 63 BPM

## 2019-03-04 DIAGNOSIS — M81.0 AGE-RELATED OSTEOPOROSIS WITHOUT CURRENT PATHOLOGICAL FRACTURE: ICD-10-CM

## 2019-03-04 LAB — DEPRECATED CALCIDIOL+CALCIFEROL SERPL-MC: 81 UG/L (ref 20–75)

## 2019-03-04 PROCEDURE — 99214 OFFICE O/P EST MOD 30 MIN: CPT | Performed by: INTERNAL MEDICINE

## 2019-03-04 ASSESSMENT — MIFFLIN-ST. JEOR: SCORE: 973.07

## 2019-03-04 NOTE — PATIENT INSTRUCTIONS
[facility] & [place] locations   Dr Hatch, Endocrinology Department      [facility]   [address]  [place] [address]  Appointment [phone]  Fax: [phone]  [place]: Monday and Tuesday         [facility]   [address]  [place] [address]  Appointment [phone]  Fax: [phone]  [place]: Wednesday and Thursday          Plan for prolia through infusion center.  IT IS EVERY 6 months.    Infusion center- [facility].                [phone]   Infusion center- [facility].                [phone]     The pt was advised to    Maintain an adequate calcium and vitamin D intake and to supplement vitamin D if needed to maintain serum levels of 25 hydroxy D (25 OH D) between 30-60 ng/ml.    Limit alcohol intake to no more than 2 servings per day.    Limit caffeine intake.    Maintain an active lifestyle including weight-bearing exercises for at least 30 mins daily.    Take measures to reduce the risk of falling.    All possible major side effects of Denosumab (PROLIA) were discussed including risk for atypical femur fractures, hypersensitivity, low calcium levels, osteonecrosis of jaw (which can manifest as jaw pain, osteomyelitis, osteitis, bone erosion, tooth/periodontal infection, toothache, gingival ulceration/erosion). Other s/e include but not limited to Hypertension, Headache and peripheral edema. I also told her to let her dentist lnow about the medication if plan for major dental procedure (currently no plans for dental procedure)  Indication: Prolia  (denosumab) is a prescription medicine used to treat osteoporosis in patients who:     Are at high risk for fracture, meaning patients who have had a fracture related to osteoporosis, or who have multiple risk factors for fracture     Cannot use another osteoporosis medicine or other osteoporosis  medicines did not work well   The timeline for early/late injections would be 4 weeks early and any time after the 6 month cecilia. If a patient receives their injection late, then the subsequent injection would be 6 months from the date that they actually received the injection      You should get 1200 mg/day calcium in divided doses of no more than 500 mg/dose.  This INCLUDES what is in your food as well as what is in any supplements you may be taking.    Vit D about 2000 IU/day. Stop high dose vit D supplement.  Dietary sources of calcium:: These also contain vitamin D  Milk                            8 oz            300 mg calcium  Yogurt                          1 cup           400 mg calcium   Hard cheese                     1.5 oz          300 mg  Cottage cheese                  2 cup           300 mg  Orange juice with Calcium       8 oz            300 mg  Low fat dairy sources are recommended      You should get 30 minutes of moderate weight bearing exercise on most days of the week .  Weight bearing exercise includes such things as walking, jogging, hiking, dancing.  You should also get Strength training 2 or more times/week in addition to other weight -being exercise. Strength training uses weight or resistance beyond that seen in everyday activities -(pilates, weight training with free weights, weight machines or resistance bands)

## 2019-03-04 NOTE — PROGRESS NOTES
Name: Danielle Bryan  Seen for f/u of  osteoporosis.     HPI:  Danielle Bryan is a 73 year old female who presents for the evaluation of osteoperosis .  Was diagnosed with osteoporosis in  based on osteoporosis.  H/o esophageal stricture so plan was to avoid oral medications.  She Received RECLAST 2016 X 1 time  After that she flet sick ( chills, body aches, swollen lips, fever and mouth sores) after that and did not follow up for RECLAST after that.  DEXA 2017 c/w osteoporosis.  Noted to have vit D deficiency-was started on vitamin D replacement 50,000 international units once a week.  Following that vitamin D improved and is in normal range now.  Here to discuss management of osteoporosis.    Smoke:No  Family History:No  Menstrual history/Birthing: s/p menopause in 50s. Was on HRT for few years  Fractures:No  Kidney stones: No  GI Surgery:No  Duration of therapy:  As noted above.  Exercise:Not much  Diet:   Milk: 3 servings a day   Cheese: sometimes   Yogurt/Cottage Cheese: not much  Ca/Vitamin D: not taking calcium or vit D supplement  Alcohol:  No  Eating Disorder: No  Steroid Use:  No  PMH/PSH:  Past Medical History:   Diagnosis Date     Alopecia areata     Requires a wig now     Atrial fibrillation (H) 2019     Cerebral infarction (H)     TIA     Dysphagia     Botox/EGD dilation at [facility], most recent 2013     Hyperlipemia      PONV (postoperative nausea and vomiting)      Recurrent UTI     Believed related in part to atrophic vaginitis     Vitamin D deficiency 2018     Past Surgical History:   Procedure Laterality Date     ABDOMEN SURGERY  1975    [facility]     [facility]  [facility]     COLONOSCOPY  1/15/2014    Normal, no further colonoscopy felt required. Procedure: COLONOSCOPY;  Colonoscopy ;  Surgeon: Esteban Henry MD;  Location:  GI     EYE SURGERY  2013    bilateral cataract surgery     GYN SURGERY      TUBAL PREGNANCY, D&C,  X 2      HEAD & NECK SURGERY      THYROIDECTOMY - partial-right side     RELEASE TRIGGER FINGER  2012    Procedure: RELEASE TRIGGER FINGER;  RIGHT THUMB, MIDDLE AND RING TRIGGER RELEASES;  Surgeon: Deniz Velazquez MD;  Location: [facility]     Family Hx:  Family History   Problem Relation Age of Onset     Family History Negative Mother          age 85     C.A.D. Father          in his mid-80's of heart attack     Myocardial Infarction Brother      Other Cancer Brother      Other Cancer Sister      Social Hx:  Social History     Socioeconomic History     Marital status:      Spouse name: Not on file     Number of children: 3     Years of education: Not on file     Highest education level: Not on file   Occupational History     Occupation: Copier repair at [employer]     Comment: Retired   Social Needs     Financial resource strain: Not on file     Food insecurity:     Worry: Not on file     Inability: Not on file     Transportation needs:     Medical: Not on file     Non-medical: Not on file   Tobacco Use     Smoking status: Never Smoker     Smokeless tobacco: Never Used   Substance and Sexual Activity     Alcohol use: No     Frequency: Never     Drug use: No     Sexual activity: Yes     Partners: Male     Birth control/protection: None   Lifestyle     Physical activity:     Days per week: Not on file     Minutes per session: Not on file     Stress: Not on file   Relationships     Social connections:     Talks on phone: Not on file     Gets together: Not on file     Attends [employer] service: Not on file     Active member of club or organization: Not on file     Attends meetings of clubs or organizations: Not on file     Relationship status: Not on file     Intimate partner violence:     Fear of current or ex partner: Not on file     Emotionally abused: Not on file     Physically abused: Not on file     Forced sexual activity: Not on file   Other Topics Concern     Parent/sibling w/ CABG, MI or angioplasty before  "65F 55M? No   Social History Narrative    Raised in [place]. 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     Walking regularly in summer months.    Will also be exercising at the [place] in winter months.         Three children, two in [facility], one in [place], [place].    Five grandchildren.          MEDICATIONS:  has a current medication list which includes the following prescription(s): aspirin, cetirizine, fluticasone, ipratropium, metoprolol tartrate, order for dme, polyethylene glycol 0.4%- propylene glycol 0.3%, pravastatin, ranitidine, rivaroxaban anticoagulant, senna-docusate, sodium chloride, and vitamin d3, and the following Facility-Administered Medications: vitamin b-12.    ROS     ROS: 10 point ROS neg other than the symptoms noted above in the HPI.    Physical Exam   VS: /70 (BP Location: Right arm, Patient Position: Chair, Cuff Size: Adult Regular)   Pulse 63   Temp 97.6  F (36.4  C) (Oral)   Ht 1.575 m (5' 2\")   Wt 52 kg (114 lb 9.6 oz)   SpO2 100%   Breastfeeding? No   BMI 20.96 kg/m    GENERAL: AXOX3, NAD, well dressed, answering questions appropriately, appears stated age.  HEENT: No exopthalmous, no proptosis, EOMI, no lig lag, no retraction  NECK: Thyroid normal in size, non tender, no nodules were palpated.  CV: RRR  LUNGS: CTAB  ABDOMEN: +BS  NEUROLOGY: CN grossly intact, no tremors  PSYCH: normal affect and mood  SPINE: midline, No TTP.    LABS:  BMP:  Last Basic Metabolic Panel:  Lab Results   Component Value Date     09/18/2018      Lab Results   Component Value Date    POTASSIUM 4.0 09/18/2018     Lab Results   Component Value Date    CHLORIDE 109 09/18/2018     Lab Results   Component Value Date    TYSON 8.1 09/18/2018     Lab Results   Component Value Date    CO2 28 09/18/2018     Lab Results   Component Value Date    BUN 11 09/18/2018     Lab Results   Component Value Date    CR 0.77 09/18/2018     Lab Results   Component Value Date    GLC 87 09/18/2018 " "      TFTs:  Lab Results   Component Value Date    TSH 0.89 09/18/2018       LFTs:  Liver Function Studies -   Recent Labs   Lab Test  09/18/18   1031   PROTTOTAL  6.4*   ALBUMIN  3.5   BILITOTAL  0.6   ALKPHOS  36*   AST  23   ALT  29       PTH:  ENDO CALCIUM LABS-[facility] Latest Ref Rng & Units 3/1/2019   PARATHYROID HORMONE INTACT 18 - 80 pg/mL 44       Vitamin D:  Vitamin D Deficiency Screening Results:  Lab Results   Component Value Date    VITDT 81 (H) 03/01/2019    VITDT 19 (L) 09/18/2018       DEXA 2017:  RISK FACTORS:  Post-menopausal, Height loss 2 inches, follow up osteoporosis     CURRENT TREATMENT:  none listed      FINDINGS:               Lumbar Spine (L1-L4) T-score:  -1.0, degenerative changes present               Left Femoral Neck T-score:  -2.7               Right Femoral Neck  T-score:  -2.7                             Lumbar (L1-L4) BMD: 1.068            Previous: 1.115                                              Total Hip Mean BMD: 0.744            Previous: 0.768      Comparison is made to another DXA performed on the same Lunar Prodigy  machine on 1/20/2014.       LATERAL VERTEBRAL ASSESSMENT  Procedure:  Vertebral fracture assessment was performed in the lateral decubitus position using a Lunar Prodigy  densitometer.  Indications for VFA: T-score of -1.0 or worse, age (female>69) and height loss > 1.5\"  Confounding factors for VFA: None.  The LVA scan is interpretable from T5 to L5.     VFA Findings: Using the semi-quantitative analysis of Lakeisha there was evidence of no spinal deformity  VFA Impression: Danielle Bryan has no vertebral fractures identified on the VFA.         IMPRESSION  Osteoporosis., Degenerative changes of the lumbar spine which may falsely elevate results.     There has been a trend toward  significant decrease in bone density of the lumbar spine. There has been no significant change in bone density of the hip(s).     Recommendations include ensuring adequate Calcium and " "Vitamin D.     The current NOF Guidelines recommend treatment for patients with prior hip or vertebral fracture, T-score -2.5 or below, or 10 year risk of any major osteoporotic fracture >20% or 10 year risk of hip fracture >3%, as calculated using the FRAX calculator (www.shef.ac.uk/FRAX or you can google \"FRAX\").       Based on these guidelines, treatment (in addition to calcium and vitamin D) is recommended for this patient, after ruling out other causes of osteoporosis.  This is meant as an aid to clinical decision making; one must still use clinical judgement.        All pertinent notes, labs, and images personally reviewed by me.     A/P  Ms.Janice FAITH Bryan is a 73 year old here for the evaluation of:    #1 Osteoporosis:  Risk factors for low bone density include , advanced age, female, low BMI, Estrogen deficiency, low Ca intake.  No prior h/o vertebral fractures.  DEXA 2014, 2016 c/w osteopenia  Received Reclast 2016 but did not tolerate.  H/o esophageal strictures so will hold off from oral medications.  No vitamin D, calcium, kidney function is in normal range  Workup for secondary causes showed normal thyroid function test, parathyroid hormone levels.  Plan:  Discussed diagnosis, pathophysiology, management and treatment options of condition with pt.  Plan to start Prolia.  ( ordered through infusion center)  As noted above she did not tolerate Reclast in 2016  Has follow-up with dentist and no major dental procedure is planned at this time.  Is wearing dentures.  Recommend adequate calcium and vitamin D intake  Fall precautions  Discussed indications, risks and benefits of all medications prescribed, and answered questions to patient's satisfaction.    #2. Vitamin D deficiency, resolved:  Completed 50,000 international units/week dose of high-dose vitamin D replacement.  Vitamin D levels are in normal range now.  Plan:  Stop high-dose vitamin D replacement and start over-the-counter vitamin D " 2000 international units/day    The pt was advised to    Maintain an adequate calcium and vitamin D intake and to supplement vitamin D if needed to maintain serum levels of 25 hydroxy D (25 OH D) between 30-60 ng/ml.    Limit alcohol intake to no more than 2 servings per day.    Limit caffeine intake.    Maintain an active lifestyle including weight-bearing exercises for at least 30 mins daily.    Take measures to reduce the risk of falling.    All possible major side effects of Denosumab (PROLIA) were discussed including risk for atypical femur fractures, hypersensitivity, low calcium levels, osteonecrosis of jaw (which can manifest as jaw pain, osteomyelitis, osteitis, bone erosion, tooth/periodontal infection, toothache, gingival ulceration/erosion). Other s/e include but not limited to Hypertension, Headache and peripheral edema. I also told her to let her dentist lnow about the medication if plan for major dental procedure (currently no plans for dental procedure)  Indication: Prolia  (denosumab) is a prescription medicine used to treat osteoporosis in patients who:     Are at high risk for fracture, meaning patients who have had a fracture related to osteoporosis, or who have multiple risk factors for fracture     Cannot use another osteoporosis medicine or other osteoporosis medicines did not work well   The timeline for early/late injections would be 4 weeks early and any time after the 6 month cecilia. If a patient receives their injection late, then the subsequent injection would be 6 months from the date that they actually received the injection      More than 50% of the time spent with Ms. Bryan on counseling / coordinating her care.      Follow-up:  1 year    Rosibel Hatch MD  Endocrinology  [facility]  CC: Colt Riley    Addendum to above note and clinic visit:    Labs reviewed.    See result note/telephone encounter.

## 2019-03-04 NOTE — NURSING NOTE
"ENDOCRINOLOGY INTAKE FORM    Patient Name:  Danielle Bryan  :  1945    Is patient Diabetic?   No  Does patient have non-diabetic or other endocrine issues?  Yes: osteoporosis    Vitals: /70 (BP Location: Right arm, Patient Position: Chair, Cuff Size: Adult Regular)   Pulse 63   Temp 97.6  F (36.4  C) (Oral)   Ht 1.575 m (5' 2\")   Wt 52 kg (114 lb 9.6 oz)   SpO2 100%   Breastfeeding? No   BMI 20.96 kg/m    BMI= Body mass index is 20.96 kg/m .    Flu vaccine:  Yes: 18  Pneumonia vaccine:  Yes: both    Smoking and Alcohol use:  Social History     Tobacco Use     Smoking status: Never Smoker     Smokeless tobacco: Never Used   Substance Use Topics     Alcohol use: No     Frequency: Never     Drug use: No       Glory Gross CMA    "

## 2019-03-04 NOTE — LETTER
3/4/2019         RE: Danielle Bryan  [address] [facility] [id number]        Dear Colleague,    [facility] you [facility]. Please see a copy of my visit note below.    Name: Danielle Bryan  Seen for f/u of  osteoporosis.     HPI:  Danielle Bryan is a 73 year old female who presents for the evaluation of osteoperosis .  Was diagnosed with osteoporosis in 2014 based on osteoporosis.  H/o esophageal stricture so plan was to avoid oral medications.  She Received RECLAST 2016 X 1 time  After that she flet sick ( chills, body aches, swollen lips, fever and mouth sores) after that and did not follow up for RECLAST after that.  DEXA 2017 c/w osteoporosis.  Noted to have vit D deficiency-was started on vitamin D replacement 50,000 international units once a week.  Following that vitamin D improved and is in normal range now.  Here to discuss management of osteoporosis.    Smoke:No  Family History:No  Menstrual history/Birthing: s/p menopause in 50s. Was on HRT for few years  Fractures:No  Kidney stones: No  GI Surgery:No  Duration of therapy:  As noted above.  Exercise:Not much  Diet:   Milk: 3 servings a day   Cheese: sometimes   Yogurt/Cottage Cheese: not much  Ca/Vitamin D: not taking calcium or vit D supplement  Alcohol:  No  Eating Disorder: No  Steroid Use:  No  PMH/PSH:  Past Medical History:   Diagnosis Date     Alopecia areata     Requires a wig now     Atrial fibrillation (H) 1/20/2019     Cerebral infarction (H) 2014    TIA     Dysphagia     Botox/EGD dilation at [facility], most recent 05/2013     Hyperlipemia      PONV (postoperative nausea and vomiting)      Recurrent UTI     Believed related in part to atrophic vaginitis     Vitamin D deficiency 11/29/2018     Past Surgical History:   Procedure Laterality Date     ABDOMEN SURGERY  1975 1982    [facility]rean     COLONOSCOPY  1/15/2014    Dr. Henry [facility]     COLONOSCOPY  1/15/2014     Normal, no further colonoscopy felt required. Procedure: COLONOSCOPY;  Colonoscopy ;  Surgeon: Esteban Henry MD;  Location:  GI     EYE SURGERY      bilateral cataract surgery     GYN SURGERY      TUBAL PREGNANCY, D&C,  X 2     HEAD & NECK SURGERY      THYROIDECTOMY - partial-right side     RELEASE TRIGGER FINGER  2012    Procedure: RELEASE TRIGGER FINGER;  RIGHT THUMB, MIDDLE AND RING TRIGGER RELEASES;  Surgeon: Deniz Velazquez MD;  Location: [facility]     Family Hx:  Family History   Problem Relation Age of Onset     Family History Negative Mother          age 85     C.A.D. Father          in his mid-80's of heart attack     Myocardial Infarction Brother      Other Cancer Brother      Other Cancer Sister      Social Hx:  Social History     Socioeconomic History     Marital status:      Spouse name: Not on file     Number of children: 3     Years of education: Not on file     Highest education level: Not on file   Occupational History     Occupation: Copier repair at [employer]     Comment: Retired   Social Needs     Financial resource strain: Not on file     Food insecurity:     Worry: Not on file     Inability: Not on file     Transportation needs:     Medical: Not on file     Non-medical: Not on file   Tobacco Use     Smoking status: Never Smoker     Smokeless tobacco: Never Used   Substance and Sexual Activity     Alcohol use: No     Frequency: Never     Drug use: No     Sexual activity: Yes     Partners: Male     Birth control/protection: None   Lifestyle     Physical activity:     Days per week: Not on file     Minutes per session: Not on file     Stress: Not on file   Relationships     Social connections:     Talks on phone: Not on file     Gets together: Not on file     Attends Yazdanism service: Not on file     Active member of club or organization: Not on file     Attends meetings of clubs or organizations: Not on file     Relationship status: Not on file     Intimate  "partner violence:     Fear of current or ex partner: Not on file     Emotionally abused: Not on file     Physically abused: Not on file     Forced sexual activity: Not on file   Other Topics Concern     Parent/sibling w/ CABG, MI or angioplasty before 65F 55M? No   Social History Narrative    Raised in [place]. 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     Walking regularly in summer months.    Will also be exercising at the [place] in winter months.         Three children, two in [facility], one in [place], [place].    Five grandchildren.          MEDICATIONS:  has a current medication list which includes the following prescription(s): aspirin, cetirizine, fluticasone, ipratropium, metoprolol tartrate, order for dme, polyethylene glycol 0.4%- propylene glycol 0.3%, pravastatin, ranitidine, rivaroxaban anticoagulant, senna-docusate, sodium chloride, and vitamin d3, and the following Facility-Administered Medications: vitamin b-12.    ROS     ROS: 10 point ROS neg other than the symptoms noted above in the HPI.    Physical Exam   VS: /70 (BP Location: Right arm, Patient Position: Chair, Cuff Size: Adult Regular)   Pulse 63   Temp 97.6  F (36.4  C) (Oral)   Ht 1.575 m (5' 2\")   Wt 52 kg (114 lb 9.6 oz)   SpO2 100%   Breastfeeding? No   BMI 20.96 kg/m     GENERAL: AXOX3, NAD, well dressed, answering questions appropriately, appears stated age.  HEENT: No exopthalmous, no proptosis, EOMI, no lig lag, no retraction  NECK: Thyroid normal in size, non tender, no nodules were palpated.  CV: RRR  LUNGS: CTAB  ABDOMEN: +BS  NEUROLOGY: CN grossly intact, no tremors  PSYCH: normal affect and mood  SPINE: midline, No TTP.    LABS:  BMP:  Last Basic Metabolic Panel:  Lab Results   Component Value Date     09/18/2018      Lab Results   Component Value Date    POTASSIUM 4.0 09/18/2018     Lab Results   Component Value Date    CHLORIDE 109 09/18/2018     Lab Results   Component Value Date    TYSON 8.1 09/18/2018 " "    Lab Results   Component Value Date    CO2 28 09/18/2018     Lab Results   Component Value Date    BUN 11 09/18/2018     Lab Results   Component Value Date    CR 0.77 09/18/2018     Lab Results   Component Value Date    GLC 87 09/18/2018       TFTs:  Lab Results   Component Value Date    TSH 0.89 09/18/2018       LFTs:  Liver Function Studies -   Recent Labs   Lab Test  09/18/18   1031   PROTTOTAL  6.4*   ALBUMIN  3.5   BILITOTAL  0.6   ALKPHOS  36*   AST  23   ALT  29       PTH:  ENDO CALCIUM LABS-[facility] Latest Ref Rng & Units 3/1/2019   PARATHYROID HORMONE INTACT 18 - 80 pg/mL 44       Vitamin D:  Vitamin D Deficiency Screening Results:  Lab Results   Component Value Date    VITDT 81 (H) 03/01/2019    VITDT 19 (L) 09/18/2018       DEXA 2017:  RISK FACTORS:  Post-menopausal, Height loss 2 inches, follow up osteoporosis     CURRENT TREATMENT:  none listed      FINDINGS:               Lumbar Spine (L1-L4) T-score:  -1.0, degenerative changes present               Left Femoral Neck T-score:  -2.7               Right Femoral Neck  T-score:  -2.7                             Lumbar (L1-L4) BMD: 1.068            Previous: 1.115                                              Total Hip Mean BMD: 0.744            Previous: 0.768      Comparison is made to another DXA performed on the same Lunar Prodigy  machine on 1/20/2014.       LATERAL VERTEBRAL ASSESSMENT  Procedure:  Vertebral fracture assessment was performed in the lateral decubitus position using a Lunar Prodigy  densitometer.  Indications for VFA: T-score of -1.0 or worse, age (female>69) and height loss > 1.5\"  Confounding factors for VFA: None.  The LVA scan is interpretable from T5 to L5.     VFA Findings: Using the semi-quantitative analysis of Lakeisha there was evidence of no spinal deformity  VFA Impression: Danielle Bryan has no vertebral fractures identified on the VFA.         IMPRESSION  Osteoporosis., Degenerative changes of the lumbar spine which may " "falsely elevate results.     There has been a trend toward  significant decrease in bone density of the lumbar spine. There has been no significant change in bone density of the hip(s).     Recommendations include ensuring adequate Calcium and Vitamin D.     The current NOF Guidelines recommend treatment for patients with prior hip or vertebral fracture, T-score -2.5 or below, or 10 year risk of any major osteoporotic fracture >20% or 10 year risk of hip fracture >3%, as calculated using the FRAX calculator (www.shef.ac.uk/FRAX or you can google \"FRAX\").       Based on these guidelines, treatment (in addition to calcium and vitamin D) is recommended for this patient, after ruling out other causes of osteoporosis.  This is meant as an aid to clinical decision making; one must still use clinical judgement.        All pertinent notes, labs, and images personally reviewed by me.     A/P  Ms.Janice FAITH Bryan is a 73 year old here for the evaluation of:    #1 Osteoporosis:  Risk factors for low bone density include , advanced age, female, low BMI, Estrogen deficiency, low Ca intake.  No prior h/o vertebral fractures.  DEXA 2014, 2016 c/w osteopenia  Received Reclast 2016 but did not tolerate.  H/o esophageal strictures so will hold off from oral medications.  No vitamin D, calcium, kidney function is in normal range  Workup for secondary causes showed normal thyroid function test, parathyroid hormone levels.  Plan:  Discussed diagnosis, pathophysiology, management and treatment options of condition with pt.  Plan to start Prolia.  ( ordered through infusion center)  As noted above she did not tolerate Reclast in 2016  Has follow-up with dentist and no major dental procedure is planned at this time.  Is wearing dentures.  Recommend adequate calcium and vitamin D intake  Fall precautions  Discussed indications, risks and benefits of all medications prescribed, and answered questions to patient's " satisfaction.    #2. Vitamin D deficiency, resolved:  Completed 50,000 international units/week dose of high-dose vitamin D replacement.  Vitamin D levels are in normal range now.  Plan:  Stop high-dose vitamin D replacement and start over-the-counter vitamin D 2000 international units/day    The pt was advised to    Maintain an adequate calcium and vitamin D intake and to supplement vitamin D if needed to maintain serum levels of 25 hydroxy D (25 OH D) between 30-60 ng/ml.    Limit alcohol intake to no more than 2 servings per day.    Limit caffeine intake.    Maintain an active lifestyle including weight-bearing exercises for at least 30 mins daily.    Take measures to reduce the risk of falling.    All possible major side effects of Denosumab (PROLIA) were discussed including risk for atypical femur fractures, hypersensitivity, low calcium levels, osteonecrosis of jaw (which can manifest as jaw pain, osteomyelitis, osteitis, bone erosion, tooth/periodontal infection, toothache, gingival ulceration/erosion). Other s/e include but not limited to Hypertension, Headache and peripheral edema. I also told her to let her dentist lnow about the medication if plan for major dental procedure (currently no plans for dental procedure)  Indication: Prolia  (denosumab) is a prescription medicine used to treat osteoporosis in patients who:     Are at high risk for fracture, meaning patients who have had a fracture related to osteoporosis, or who have multiple risk factors for fracture     Cannot use another osteoporosis medicine or other osteoporosis medicines did not work well   The timeline for early/late injections would be 4 weeks early and any time after the 6 month cecilia. If a patient receives their injection late, then the subsequent injection would be 6 months from the date that they actually received the injection      More than 50% of the time spent with Ms. Bryan on counseling / coordinating her care.       Follow-up:  1 year    Rosibel Hatch MD  Endocrinology  [facility]  CC: Colt Riley    Addendum to above note and clinic visit:    Labs reviewed.    See result note/telephone encounter.            Again, thank you for allowing me to participate in the care of your patient.        Sincerely,        Rosibel Hatch MD

## 2019-03-06 ENCOUNTER — ALLIED HEALTH/NURSE VISIT (OUTPATIENT)
Dept: NURSING | Facility: CLINIC | Age: 74
End: 2019-03-06
Payer: COMMERCIAL

## 2019-03-06 DIAGNOSIS — E55.9 VITAMIN D DEFICIENCY: Primary | ICD-10-CM

## 2019-03-06 PROCEDURE — 96372 THER/PROPH/DIAG INJ SC/IM: CPT

## 2019-03-06 NOTE — TELEPHONE ENCOUNTER
Contacted patient, informed her results were sent through [employer]. She is at the gym right now, she will review the message when she gets home and either call back or respond to [employer] message with any questions.

## 2019-03-06 NOTE — NURSING NOTE
Prior to injection, verified patient identity using patient's name and date of birth.  Due to injection administration, patient instructed to remain in clinic for 15 minutes afterwards, and to report any adverse reaction to me or the  staff immediately.    B12    Drug Amount Wasted:  None.  Vial/Syringe: Single dose vial  Exp- 05/2020  Screening Questionnaire for Adult Immunization     Are you sick today?   No    Do you have allergies to medications, food or any vaccine?   No    Have you ever had a serious reaction after receiving a vaccination?   No    Do you have a long-term health problem with heart disease, lung disease,  asthma, kidney disease, diabetes, anemia, metabolic or blood disease?   No    Do you have cancer, leukemia, AIDS, or any immune system problem?   No    Do you take cortisone, prednisone, other steroids, or anticancer drugs, or  have you had any x-ray (radiation) treatments?   No    Have you had a seizure, brain, or other nervous system problem?   No    During the past year, have you received a transfusion of blood or blood       products, or been given a medicine called immune (gamma) globulin?   No    For women: Are you pregnant or is there a chance you could become         pregnant during the next month?   No    Have you received any vaccinations in the past 4 weeks?   No     Immunization questionnaire answers were all negative.           Screening performed by Glory Araiza on 3/6/2019 at 8:41 AM.

## 2019-03-06 NOTE — TELEPHONE ENCOUNTER
Sent patient a [employer] message discussing results of both tests.   Please advise patient by phone.   May review content of [employer] message with her if she dos not have access to the note.

## 2019-03-11 ENCOUNTER — TELEPHONE (OUTPATIENT)
Dept: INTERNAL MEDICINE | Facility: CLINIC | Age: 74
End: 2019-03-11

## 2019-03-11 NOTE — TELEPHONE ENCOUNTER
Contacted patient. She states that she had nose bleed last night from 5pm-11pm. Put Kleenex in her nose and leaned her head back.     Saw Dr. Lehman on 3/1/19 for URI and she was advised to use Flonase, last used Flonase about 4 or 5 days ago. She has not been blowing her nose a lot.   Denies dizziness and lightheadedness.    Nose is not bleeding now.   Patient advised to use humidifier in bedroom and saline nasal spray to moisten the nasal passages and avoid blowing nose. If she develops bloody nose, sit with head slightly forward and pinch nose closed for 15 min with cold wash cloth. If bleeding resumes, repeat process for another 15 minutes. Advised patient to go to the ER if nose bleed continues after 30 minutes of applying pressure. Asked patient to call the office if she is having repeated nose bleeds. Patient verbalizes understanding and agrees with plan.     Thank you,  SANTA Kim

## 2019-03-11 NOTE — TELEPHONE ENCOUNTER
Pt calling because she is on blood thinners and her nose was bleeding and had a difficult time stopping it last night.   She's on Metoprolol tartrate and Rivaroxaban.      Pt had questions about this medication.    Pt phone number is [phone] and can leave  a detailed message on VM if pt doesn't answer.     Flora Hurt  Pt Service Rep

## 2019-03-12 ENCOUNTER — ALLIED HEALTH/NURSE VISIT (OUTPATIENT)
Dept: INFUSION THERAPY | Facility: CLINIC | Age: 74
End: 2019-03-12
Attending: INTERNAL MEDICINE
Payer: COMMERCIAL

## 2019-03-12 ENCOUNTER — HOSPITAL ENCOUNTER (OUTPATIENT)
Facility: CLINIC | Age: 74
Setting detail: SPECIMEN
Discharge: HOME | End: 2019-03-12
Attending: INTERNAL MEDICINE | Admitting: INTERNAL MEDICINE
Payer: COMMERCIAL

## 2019-03-12 VITALS — SYSTOLIC BLOOD PRESSURE: 100 MMHG | DIASTOLIC BLOOD PRESSURE: 63 MMHG | HEART RATE: 68 BPM | TEMPERATURE: 97.6 F

## 2019-03-12 DIAGNOSIS — M81.0 AGE-RELATED OSTEOPOROSIS WITHOUT CURRENT PATHOLOGICAL FRACTURE: Primary | ICD-10-CM

## 2019-03-12 LAB — PHOSPHATE SERPL-MCNC: 4.2 MG/DL (ref 2.5–4.5)

## 2019-03-12 PROCEDURE — 84100 ASSAY OF PHOSPHORUS: CPT | Performed by: INTERNAL MEDICINE

## 2019-03-12 PROCEDURE — 96372 THER/PROPH/DIAG INJ SC/IM: CPT

## 2019-03-12 PROCEDURE — 25000128 H RX IP 250 OP 636: Performed by: INTERNAL MEDICINE

## 2019-03-12 RX ADMIN — DENOSUMAB 60 MG: 60 INJECTION SUBCUTANEOUS at 13:17

## 2019-03-12 NOTE — PROGRESS NOTES
Infusion Nursing Note:  Danielle Bryan presents today for labs and Prolia.    Patient seen by provider today: No   present during visit today: Not Applicable.    Note: Reviewed potential side effects and reason for monitoring labs.  UpToDate drug info sheet given to pt.  Pt agreeable to proceed.    Intravenous Access:  Labs drawn without difficulty.    Treatment Conditions:  Results reviewed, labs MET treatment parameters, ok to proceed with treatment.  Labs noted from 3/1/19: Creat 0.84, Calcium 9.2.      Post Infusion Assessment:  Patient tolerated injection without incident.  Site patent and intact, free from redness, edema or discomfort.    Discharge Plan:   Discharge instructions reviewed with: Patient.  Patient and/or family verbalized understanding of discharge instructions and all questions answered.  AVS to patient via [employer].  Patient will return in 6 months for next dose of Prolia. Pt will schedule this prior to leaving clinic today  Patient discharged in stable condition accompanied by: .  Departure Mode: Ambulatory.    Rosa Maria Rivera RN

## 2019-03-20 ENCOUNTER — ALLIED HEALTH/NURSE VISIT (OUTPATIENT)
Dept: NURSING | Facility: CLINIC | Age: 74
End: 2019-03-20
Payer: COMMERCIAL

## 2019-03-20 DIAGNOSIS — E53.8 VITAMIN B12 DEFICIENCY (NON ANEMIC): Primary | ICD-10-CM

## 2019-03-20 PROCEDURE — 96372 THER/PROPH/DIAG INJ SC/IM: CPT

## 2019-03-20 RX ADMIN — CYANOCOBALAMIN 1000 MCG: 1000 INJECTION, SOLUTION INTRAMUSCULAR; SUBCUTANEOUS at 09:52

## 2019-03-21 RX ADMIN — CYANOCOBALAMIN 1000 MCG: 1000 INJECTION, SOLUTION INTRAMUSCULAR; SUBCUTANEOUS at 14:12

## 2019-04-03 ENCOUNTER — ALLIED HEALTH/NURSE VISIT (OUTPATIENT)
Dept: NURSING | Facility: CLINIC | Age: 74
End: 2019-04-03
Payer: COMMERCIAL

## 2019-04-03 DIAGNOSIS — E53.8 VITAMIN B12 DEFICIENCY (NON ANEMIC): Primary | ICD-10-CM

## 2019-04-03 PROCEDURE — 96372 THER/PROPH/DIAG INJ SC/IM: CPT

## 2019-04-03 RX ADMIN — CYANOCOBALAMIN 1000 MCG: 1000 INJECTION, SOLUTION INTRAMUSCULAR; SUBCUTANEOUS at 09:12

## 2019-04-17 ENCOUNTER — ALLIED HEALTH/NURSE VISIT (OUTPATIENT)
Dept: NURSING | Facility: CLINIC | Age: 74
End: 2019-04-17
Payer: COMMERCIAL

## 2019-04-17 DIAGNOSIS — E53.8 VITAMIN B12 DEFICIENCY (NON ANEMIC): ICD-10-CM

## 2019-04-17 PROCEDURE — 96372 THER/PROPH/DIAG INJ SC/IM: CPT

## 2019-04-17 RX ADMIN — CYANOCOBALAMIN 1000 MCG: 1000 INJECTION, SOLUTION INTRAMUSCULAR; SUBCUTANEOUS at 10:18

## 2019-05-01 ENCOUNTER — ALLIED HEALTH/NURSE VISIT (OUTPATIENT)
Dept: NURSING | Facility: CLINIC | Age: 74
End: 2019-05-01
Payer: COMMERCIAL

## 2019-05-01 DIAGNOSIS — E53.8 VITAMIN B12 DEFICIENCY (NON ANEMIC): Primary | ICD-10-CM

## 2019-05-01 PROCEDURE — 96372 THER/PROPH/DIAG INJ SC/IM: CPT

## 2019-05-01 RX ADMIN — CYANOCOBALAMIN 1000 MCG: 1000 INJECTION, SOLUTION INTRAMUSCULAR; SUBCUTANEOUS at 08:34

## 2019-05-15 ENCOUNTER — ALLIED HEALTH/NURSE VISIT (OUTPATIENT)
Dept: NURSING | Facility: CLINIC | Age: 74
End: 2019-05-15
Payer: COMMERCIAL

## 2019-05-15 DIAGNOSIS — E53.8 VITAMIN B12 DEFICIENCY (NON ANEMIC): ICD-10-CM

## 2019-05-15 DIAGNOSIS — R79.89 LOW VITAMIN B12 LEVEL: ICD-10-CM

## 2019-05-15 PROCEDURE — 96372 THER/PROPH/DIAG INJ SC/IM: CPT

## 2019-05-15 RX ADMIN — CYANOCOBALAMIN 1000 MCG: 1000 INJECTION, SOLUTION INTRAMUSCULAR; SUBCUTANEOUS at 08:30

## 2019-05-29 ENCOUNTER — ALLIED HEALTH/NURSE VISIT (OUTPATIENT)
Dept: NURSING | Facility: CLINIC | Age: 74
End: 2019-05-29
Payer: COMMERCIAL

## 2019-05-29 DIAGNOSIS — E53.8 VITAMIN B12 DEFICIENCY (NON ANEMIC): Primary | ICD-10-CM

## 2019-05-29 PROCEDURE — 96372 THER/PROPH/DIAG INJ SC/IM: CPT

## 2019-05-29 RX ADMIN — CYANOCOBALAMIN 1000 MCG: 1000 INJECTION, SOLUTION INTRAMUSCULAR; SUBCUTANEOUS at 08:57

## 2019-06-03 ENCOUNTER — TRANSFERRED RECORDS (OUTPATIENT)
Dept: HEALTH INFORMATION MANAGEMENT | Facility: CLINIC | Age: 74
End: 2019-06-03

## 2019-06-12 ENCOUNTER — ALLIED HEALTH/NURSE VISIT (OUTPATIENT)
Dept: NURSING | Facility: CLINIC | Age: 74
End: 2019-06-12
Payer: COMMERCIAL

## 2019-06-12 DIAGNOSIS — R79.89 LOW VITAMIN B12 LEVEL: ICD-10-CM

## 2019-06-12 DIAGNOSIS — E53.8 VITAMIN B12 DEFICIENCY (NON ANEMIC): ICD-10-CM

## 2019-06-12 PROCEDURE — 96372 THER/PROPH/DIAG INJ SC/IM: CPT

## 2019-06-12 RX ADMIN — CYANOCOBALAMIN 1000 MCG: 1000 INJECTION, SOLUTION INTRAMUSCULAR; SUBCUTANEOUS at 11:13

## 2019-06-20 ENCOUNTER — TRANSFERRED RECORDS (OUTPATIENT)
Dept: HEALTH INFORMATION MANAGEMENT | Facility: CLINIC | Age: 74
End: 2019-06-20

## 2019-06-24 ENCOUNTER — TRANSFERRED RECORDS (OUTPATIENT)
Dept: HEALTH INFORMATION MANAGEMENT | Facility: CLINIC | Age: 74
End: 2019-06-24

## 2019-06-24 ENCOUNTER — ALLIED HEALTH/NURSE VISIT (OUTPATIENT)
Dept: NURSING | Facility: CLINIC | Age: 74
End: 2019-06-24
Payer: COMMERCIAL

## 2019-06-24 DIAGNOSIS — E53.8 VITAMIN B12 DEFICIENCY (NON ANEMIC): Primary | ICD-10-CM

## 2019-06-24 PROCEDURE — 96372 THER/PROPH/DIAG INJ SC/IM: CPT

## 2019-06-24 RX ADMIN — CYANOCOBALAMIN 1000 MCG: 1000 INJECTION, SOLUTION INTRAMUSCULAR; SUBCUTANEOUS at 10:14

## 2019-06-24 NOTE — PROGRESS NOTES
Prior to injection, verified patient identity using patient's name and date of birth.  Due to injection administration, patient instructed to remain in clinic for 15 minutes  afterwards, and to report any adverse reaction to me immediately.    Vitamin B12    Drug Amount Wasted:  None.  Vial/Syringe: Single dose vial  Expiration Date:  10/2020  Lisa Mac CMA

## 2019-07-10 ENCOUNTER — ALLIED HEALTH/NURSE VISIT (OUTPATIENT)
Dept: NURSING | Facility: CLINIC | Age: 74
End: 2019-07-10
Payer: COMMERCIAL

## 2019-07-10 DIAGNOSIS — E53.8 VITAMIN B12 DEFICIENCY (NON ANEMIC): Primary | ICD-10-CM

## 2019-07-10 PROCEDURE — 96372 THER/PROPH/DIAG INJ SC/IM: CPT

## 2019-07-10 NOTE — PROGRESS NOTES
.Clinic Administered Medication Documentation      Injectable Medication Documentation    Patient was given Cyanocobalamin (B-12). Prior to medication administration, verified patients identity using patient s name and date of birth. Please see MAR and medication order for additional information. Patient instructed to remain in clinic for 15 minutes.      Was entire vial of medication used? Yes  Vial/Syringe: Single dose vial  Expiration Date:  10/20  Was this medication supplied by the patient? No

## 2019-07-23 ENCOUNTER — OFFICE VISIT (OUTPATIENT)
Dept: INTERNAL MEDICINE | Facility: CLINIC | Age: 74
End: 2019-07-23
Payer: COMMERCIAL

## 2019-07-23 VITALS
HEART RATE: 70 BPM | TEMPERATURE: 97.8 F | DIASTOLIC BLOOD PRESSURE: 68 MMHG | OXYGEN SATURATION: 98 % | WEIGHT: 111.9 LBS | RESPIRATION RATE: 19 BRPM | SYSTOLIC BLOOD PRESSURE: 102 MMHG | BODY MASS INDEX: 20.59 KG/M2 | HEIGHT: 62 IN

## 2019-07-23 DIAGNOSIS — R25.1 TREMOR: Primary | ICD-10-CM

## 2019-07-23 PROCEDURE — 99213 OFFICE O/P EST LOW 20 MIN: CPT | Performed by: NURSE PRACTITIONER

## 2019-07-23 ASSESSMENT — MIFFLIN-ST. JEOR: SCORE: 955.83

## 2019-07-23 NOTE — PROGRESS NOTES
Subjective     Danielle Bryan is a 74 year old female who presents to clinic today for the following health issues:    HPI   tremors       Duration: 5 months    Description (location/character/radiation): R>L hand temors, intermmitent.    Intensity:  mild, moderate    Accompanying signs and symptoms: swelling around R eye with drooping led     History (similar episodes/previous evaluation): had EMG on R arm due to recurrent  Finger contractures.      Precipitating or alleviating factors: None    Therapies tried and outcome: None         Patient Active Problem List   Diagnosis     Sprain of jaw     Alopecia areata     Advanced directives, counseling/discussion     Bartholin's cyst     Allergic rhinitis     Transient cerebral ischemia     Hyperlipidemia with target LDL less than 100     Vitamin B12 deficiency (non anemic)     Osteoporosis     Vitamin D deficiency     Syncope, near     Atrial fibrillation (H)     Past Surgical History:   Procedure Laterality Date     ABDOMEN SURGERY  1975    Ce[facility]rean     COLONOSCOPY  1/15/2014    Dr. Henry [facility]     COLONOSCOPY  1/15/2014    Normal, no further colonoscopy felt required. Procedure: COLONOSCOPY;  Colonoscopy ;  Surgeon: Esteban Henry MD;  Location:  GI     EYE SURGERY      bilateral cataract surgery     GYN SURGERY      TUBAL PREGNANCY, D&C,  X 2     HEAD & NECK SURGERY      THYROIDECTOMY - partial-right side     RELEASE TRIGGER FINGER  2012    Procedure: RELEASE TRIGGER FINGER;  RIGHT THUMB, MIDDLE AND RING TRIGGER RELEASES;  Surgeon: Deniz Velazquez MD;  Location: [facility]       Social History     Tobacco Use     Smoking status: Never Smoker     Smokeless tobacco: Never Used   Substance Use Topics     Alcohol use: No     Frequency: Never     Family History   Problem Relation Age of Onset     Family History Negative Mother          age 85     C.A.D. Father          in his mid-80's of heart attack     Myocardial Infarction  Brother      Other Cancer Brother      Other Cancer Sister          Current Outpatient Medications   Medication Sig Dispense Refill     ASPIRIN 81 PO Take 81 mg by mouth daily       cetirizine (ZYRTEC) 10 MG tablet Take 10 mg by mouth as needed for allergies       fluticasone (FLONASE) 50 MCG/ACT nasal spray Spray 1-2 sprays into both nostrils daily 16 g 3     ipratropium (ATROVENT) 0.06 % spray Spray 2 sprays into both nostrils 4 times daily as needed for rhinitis 1 Box 11     metoprolol tartrate (LOPRESSOR) 25 MG tablet Take 0.5 tablets (12.5 mg) by mouth daily 45 tablet 2     order for DME Equipment being ordered: Wigs (Patient taking differently: Pt reports: need new Rx when new wig is needed per insurance.) 2 Units 0     polyethylene glycol 0.4%- propylene glycol 0.3% (SYSTANE ULTRA) 0.4-0.3 % SOLN ophthalmic solution Place 1 drop into both eyes 3 times daily as needed for dry eyes        pravastatin (PRAVACHOL) 20 MG tablet Take 1 tablet (20 mg) by mouth daily (Patient taking differently: Take 20 mg by mouth At Bedtime ) 90 tablet 3     ranitidine (ZANTAC) 150 MG tablet Take 150 mg by mouth as needed for heartburn        rivaroxaban ANTICOAGULANT (XARELTO) 15 MG TABS tablet Take 1 tablet (15 mg) by mouth daily (with dinner) 90 tablet 2     senna-docusate (SENOKOT-S/PERICOLACE) 8.6-50 MG tablet Take 1-2 tablets by mouth 2 times daily as needed for constipation 120 tablet 5     sodium chloride (OCEAN) 0.65 % nasal spray Spray 1 spray into both nostrils daily as needed for congestion       vitamin D3 (CHOLECALCIFEROL) 96894 units capsule Take 1 capsule (50,000 Units) by mouth every 7 days 12 capsule 1     BP Readings from Last 3 Encounters:   07/23/19 102/68   03/12/19 100/63   03/04/19 150/70    Wt Readings from Last 3 Encounters:   07/23/19 50.8 kg (111 lb 14.4 oz)   03/04/19 52 kg (114 lb 9.6 oz)   03/01/19 50.8 kg (112 lb)                      Reviewed and updated as needed this visit by Provider      "    Review of Systems         Objective    /68   Pulse 70   Temp 97.8  F (36.6  C) (Oral)   Resp 19   Ht 1.575 m (5' 2\")   Wt 50.8 kg (111 lb 14.4 oz)   SpO2 98%   BMI 20.47 kg/m    Body mass index is 20.47 kg/m .  Physical Exam   GENERAL: alert, no distress, frail and elderly  EYES: Eyes grossly normal to inspection  NEURO: tremor R hand and mentation intact  PSYCH: mentation appears normal, affect flat and anxious            Assessment & Plan       ICD-10-CM    1. Tremor R25.1 NEUROLOGY ADULT REFERRAL          F/u pending neuro eval.    Renata Foss, TIEN  [facility]        "

## 2019-07-23 NOTE — NURSING NOTE
"/68   Pulse 70   Temp 97.8  F (36.6  C) (Oral)   Resp 19   Ht 1.575 m (5' 2\")   Wt 50.8 kg (111 lb 14.4 oz)   SpO2 98%   BMI 20.47 kg/m    Patient in for consultstation on:   Tender, swollen Rt side of face x's 1 yr. Rt tremor x's 5 months.  Judy Andino, KYMBERLY    "

## 2019-07-24 ENCOUNTER — ALLIED HEALTH/NURSE VISIT (OUTPATIENT)
Dept: NURSING | Facility: CLINIC | Age: 74
End: 2019-07-24
Payer: COMMERCIAL

## 2019-07-24 DIAGNOSIS — E53.8 VITAMIN B12 DEFICIENCY (NON ANEMIC): Primary | ICD-10-CM

## 2019-07-24 PROCEDURE — 96372 THER/PROPH/DIAG INJ SC/IM: CPT

## 2019-07-24 RX ADMIN — CYANOCOBALAMIN 1000 MCG: 1000 INJECTION, SOLUTION INTRAMUSCULAR; SUBCUTANEOUS at 08:57

## 2019-07-25 ENCOUNTER — TRANSFERRED RECORDS (OUTPATIENT)
Dept: HEALTH INFORMATION MANAGEMENT | Facility: CLINIC | Age: 74
End: 2019-07-25

## 2019-07-31 ENCOUNTER — TELEPHONE (OUTPATIENT)
Dept: INTERNAL MEDICINE | Facility: CLINIC | Age: 74
End: 2019-07-31

## 2019-07-31 DIAGNOSIS — L63.9 ALOPECIA AREATA: ICD-10-CM

## 2019-07-31 DIAGNOSIS — R25.1 TREMOR: Primary | ICD-10-CM

## 2019-07-31 NOTE — TELEPHONE ENCOUNTER
Patient calling and needs a new order for a wig.    Sig: Equipment being ordered: Wigs   Patient taking differently: Pt reports: need new Rx when new wig is needed per insurance.          Ok to call and [phone]

## 2019-07-31 NOTE — TELEPHONE ENCOUNTER
Rx DME for wigs. Patient notified. Patient will stop in to pick it up today. Rx is at the  for patient to pick it up.

## 2019-08-07 ENCOUNTER — ALLIED HEALTH/NURSE VISIT (OUTPATIENT)
Dept: NURSING | Facility: CLINIC | Age: 74
End: 2019-08-07
Payer: COMMERCIAL

## 2019-08-07 DIAGNOSIS — E53.8 VITAMIN B 12 DEFICIENCY: Primary | ICD-10-CM

## 2019-08-07 PROCEDURE — 96372 THER/PROPH/DIAG INJ SC/IM: CPT

## 2019-08-12 RX ADMIN — CYANOCOBALAMIN 1000 MCG: 1000 INJECTION, SOLUTION INTRAMUSCULAR; SUBCUTANEOUS at 13:56

## 2019-08-21 ENCOUNTER — ALLIED HEALTH/NURSE VISIT (OUTPATIENT)
Dept: NURSING | Facility: CLINIC | Age: 74
End: 2019-08-21
Payer: COMMERCIAL

## 2019-08-21 DIAGNOSIS — E53.8 VITAMIN B12 DEFICIENCY (NON ANEMIC): Primary | ICD-10-CM

## 2019-08-21 PROCEDURE — 96372 THER/PROPH/DIAG INJ SC/IM: CPT

## 2019-08-21 RX ADMIN — CYANOCOBALAMIN 1000 MCG: 1000 INJECTION, SOLUTION INTRAMUSCULAR; SUBCUTANEOUS at 08:53

## 2019-09-04 ENCOUNTER — ALLIED HEALTH/NURSE VISIT (OUTPATIENT)
Dept: NURSING | Facility: CLINIC | Age: 74
End: 2019-09-04
Payer: COMMERCIAL

## 2019-09-04 DIAGNOSIS — E53.8 VITAMIN B12 DEFICIENCY (NON ANEMIC): Primary | ICD-10-CM

## 2019-09-04 PROCEDURE — 96372 THER/PROPH/DIAG INJ SC/IM: CPT

## 2019-09-04 RX ADMIN — CYANOCOBALAMIN 1000 MCG: 1000 INJECTION, SOLUTION INTRAMUSCULAR; SUBCUTANEOUS at 08:43

## 2019-09-12 ENCOUNTER — HOSPITAL ENCOUNTER (OUTPATIENT)
Facility: CLINIC | Age: 74
Setting detail: SPECIMEN
Discharge: HOME | End: 2019-09-12
Attending: INTERNAL MEDICINE | Admitting: INTERNAL MEDICINE
Payer: COMMERCIAL

## 2019-09-12 ENCOUNTER — ALLIED HEALTH/NURSE VISIT (OUTPATIENT)
Dept: INFUSION THERAPY | Facility: CLINIC | Age: 74
End: 2019-09-12
Attending: INTERNAL MEDICINE
Payer: COMMERCIAL

## 2019-09-12 VITALS
RESPIRATION RATE: 18 BRPM | SYSTOLIC BLOOD PRESSURE: 130 MMHG | TEMPERATURE: 97 F | HEART RATE: 61 BPM | OXYGEN SATURATION: 97 % | DIASTOLIC BLOOD PRESSURE: 71 MMHG

## 2019-09-12 DIAGNOSIS — M81.0 AGE-RELATED OSTEOPOROSIS WITHOUT CURRENT PATHOLOGICAL FRACTURE: Primary | ICD-10-CM

## 2019-09-12 LAB
CALCIUM SERPL-MCNC: 8.8 MG/DL (ref 8.5–10.1)
CREAT SERPL-MCNC: 0.78 MG/DL (ref 0.52–1.04)
GFR SERPL CREATININE-BSD FRML MDRD: 74 ML/MIN/{1.73_M2}
PHOSPHATE SERPL-MCNC: 3.4 MG/DL (ref 2.5–4.5)

## 2019-09-12 PROCEDURE — 25000128 H RX IP 250 OP 636: Performed by: INTERNAL MEDICINE

## 2019-09-12 PROCEDURE — 82310 ASSAY OF CALCIUM: CPT | Performed by: INTERNAL MEDICINE

## 2019-09-12 PROCEDURE — 96372 THER/PROPH/DIAG INJ SC/IM: CPT

## 2019-09-12 PROCEDURE — 82565 ASSAY OF CREATININE: CPT | Performed by: INTERNAL MEDICINE

## 2019-09-12 PROCEDURE — 84100 ASSAY OF PHOSPHORUS: CPT | Performed by: INTERNAL MEDICINE

## 2019-09-12 PROCEDURE — 36415 COLL VENOUS BLD VENIPUNCTURE: CPT

## 2019-09-12 RX ADMIN — DENOSUMAB 60 MG: 60 INJECTION SUBCUTANEOUS at 08:34

## 2019-09-12 NOTE — PROGRESS NOTES
Infusion Nursing Note:  Danielle Bryan presents today for labs and prolia.    Patient seen by provider today: No   present during visit today: Not Applicable.    Note: N/A.    Intravenous Access:  Labs drawn without difficulty.    Treatment Conditions:  Lab Results   Component Value Date     01/30/2019                   Lab Results   Component Value Date    POTASSIUM 4.1 01/30/2019           Lab Results   Component Value Date    MAG 2.2 01/20/2019            Lab Results   Component Value Date    CR 0.84 03/01/2019                   Lab Results   Component Value Date    TYSON 9.2 03/01/2019                Lab Results   Component Value Date    BILITOTAL 0.6 09/18/2018           Lab Results   Component Value Date    ALBUMIN 3.5 09/18/2018                    Lab Results   Component Value Date    ALT 29 09/18/2018           Lab Results   Component Value Date    AST 23 09/18/2018           Post Infusion Assessment:  Patient tolerated injection without incident.  Site patent and intact, free from redness, edema or discomfort.       Discharge Plan:   Patient declined prescription refills.  Discharge instructions reviewed with: Patient.  Patient and/or family verbalized understanding of discharge instructions and all questions answered.  Copy of AVS reviewed with patient and/or family.  Patient will return in 6 months for next appointment.  Patient discharged in stable condition accompanied by: .  Departure Mode: Ambulatory.    Theodora Martinez, RN, RN

## 2019-09-18 ENCOUNTER — ALLIED HEALTH/NURSE VISIT (OUTPATIENT)
Dept: NURSING | Facility: CLINIC | Age: 74
End: 2019-09-18
Payer: COMMERCIAL

## 2019-09-18 DIAGNOSIS — E53.8 VITAMIN B 12 DEFICIENCY: Primary | ICD-10-CM

## 2019-09-18 PROCEDURE — 96372 THER/PROPH/DIAG INJ SC/IM: CPT

## 2019-09-18 RX ADMIN — CYANOCOBALAMIN 1000 MCG: 1000 INJECTION, SOLUTION INTRAMUSCULAR; SUBCUTANEOUS at 09:00

## 2019-10-02 ENCOUNTER — ALLIED HEALTH/NURSE VISIT (OUTPATIENT)
Dept: NURSING | Facility: CLINIC | Age: 74
End: 2019-10-02
Payer: COMMERCIAL

## 2019-10-02 DIAGNOSIS — E53.8 VITAMIN B12 DEFICIENCY (NON ANEMIC): Primary | ICD-10-CM

## 2019-10-02 PROCEDURE — 96372 THER/PROPH/DIAG INJ SC/IM: CPT

## 2019-10-02 RX ADMIN — CYANOCOBALAMIN 1000 MCG: 1000 INJECTION, SOLUTION INTRAMUSCULAR; SUBCUTANEOUS at 08:40

## 2019-10-16 ENCOUNTER — ALLIED HEALTH/NURSE VISIT (OUTPATIENT)
Dept: NURSING | Facility: CLINIC | Age: 74
End: 2019-10-16
Payer: COMMERCIAL

## 2019-10-16 DIAGNOSIS — E53.8 VITAMIN B12 DEFICIENCY (NON ANEMIC): Primary | ICD-10-CM

## 2019-10-16 PROCEDURE — 96372 THER/PROPH/DIAG INJ SC/IM: CPT

## 2019-10-16 RX ADMIN — CYANOCOBALAMIN 1000 MCG: 1000 INJECTION, SOLUTION INTRAMUSCULAR; SUBCUTANEOUS at 11:00

## 2019-10-29 ENCOUNTER — ALLIED HEALTH/NURSE VISIT (OUTPATIENT)
Dept: NURSING | Facility: CLINIC | Age: 74
End: 2019-10-29
Payer: COMMERCIAL

## 2019-10-29 DIAGNOSIS — E53.8 VITAMIN B12 DEFICIENCY (NON ANEMIC): Primary | ICD-10-CM

## 2019-10-29 PROCEDURE — 99207 ZZC NO CHARGE NURSE ONLY: CPT

## 2019-10-29 PROCEDURE — 96372 THER/PROPH/DIAG INJ SC/IM: CPT

## 2019-10-29 RX ADMIN — CYANOCOBALAMIN 1000 MCG: 1000 INJECTION, SOLUTION INTRAMUSCULAR; SUBCUTANEOUS at 08:47

## 2019-10-30 ENCOUNTER — HEALTH MAINTENANCE LETTER (OUTPATIENT)
Age: 74
End: 2019-10-30

## 2019-11-13 ENCOUNTER — ALLIED HEALTH/NURSE VISIT (OUTPATIENT)
Dept: NURSING | Facility: CLINIC | Age: 74
End: 2019-11-13
Payer: COMMERCIAL

## 2019-11-13 DIAGNOSIS — E53.8 VITAMIN B 12 DEFICIENCY: Primary | ICD-10-CM

## 2019-11-13 DIAGNOSIS — E78.5 HYPERLIPIDEMIA LDL GOAL <100: ICD-10-CM

## 2019-11-13 LAB
ALBUMIN SERPL-MCNC: 3.5 G/DL (ref 3.4–5)
ALP SERPL-CCNC: 38 U/L (ref 40–150)
ALT SERPL W P-5'-P-CCNC: 28 U/L (ref 0–50)
ANION GAP SERPL CALCULATED.3IONS-SCNC: 6 MMOL/L (ref 3–14)
AST SERPL W P-5'-P-CCNC: 27 U/L (ref 0–45)
BILIRUB SERPL-MCNC: 0.7 MG/DL (ref 0.2–1.3)
BUN SERPL-MCNC: 17 MG/DL (ref 7–30)
CALCIUM SERPL-MCNC: 8.8 MG/DL (ref 8.5–10.1)
CHLORIDE SERPL-SCNC: 110 MMOL/L (ref 94–109)
CHOLEST SERPL-MCNC: 205 MG/DL
CO2 SERPL-SCNC: 27 MMOL/L (ref 20–32)
CREAT SERPL-MCNC: 0.77 MG/DL (ref 0.52–1.04)
GFR SERPL CREATININE-BSD FRML MDRD: 76 ML/MIN/{1.73_M2}
GLUCOSE SERPL-MCNC: 86 MG/DL (ref 70–99)
HDLC SERPL-MCNC: 90 MG/DL
LDLC SERPL CALC-MCNC: 96 MG/DL
NONHDLC SERPL-MCNC: 115 MG/DL
POTASSIUM SERPL-SCNC: 3.5 MMOL/L (ref 3.4–5.3)
PROT SERPL-MCNC: 6.6 G/DL (ref 6.8–8.8)
SODIUM SERPL-SCNC: 143 MMOL/L (ref 133–144)
TRIGL SERPL-MCNC: 93 MG/DL

## 2019-11-13 PROCEDURE — 80053 COMPREHEN METABOLIC PANEL: CPT | Performed by: INTERNAL MEDICINE

## 2019-11-13 PROCEDURE — 36415 COLL VENOUS BLD VENIPUNCTURE: CPT | Performed by: INTERNAL MEDICINE

## 2019-11-13 PROCEDURE — 80061 LIPID PANEL: CPT | Performed by: INTERNAL MEDICINE

## 2019-11-13 PROCEDURE — 96372 THER/PROPH/DIAG INJ SC/IM: CPT

## 2019-11-13 RX ADMIN — CYANOCOBALAMIN 1000 MCG: 1000 INJECTION, SOLUTION INTRAMUSCULAR; SUBCUTANEOUS at 08:48

## 2019-11-13 NOTE — NURSING NOTE
"Chief Complaint   Patient presents with     Allied Health Visit     B12     initial There were no vitals taken for this visit. Estimated body mass index is 20.47 kg/m  as calculated from the following:    Height as of 7/23/19: 1.575 m (5' 2\").    Weight as of 7/23/19: 50.8 kg (111 lb 14.4 oz)..  bp completed using cuff size NA (Not Taken)  JANE NICOLE LPN  "

## 2019-11-21 ENCOUNTER — TELEPHONE (OUTPATIENT)
Dept: INTERNAL MEDICINE | Facility: CLINIC | Age: 74
End: 2019-11-21

## 2019-11-21 DIAGNOSIS — R13.10 DYSPHAGIA, UNSPECIFIED TYPE: ICD-10-CM

## 2019-11-21 DIAGNOSIS — K22.4 HYPERTENSIVE LOWER ESOPHAGEAL SPHINCTER: Primary | ICD-10-CM

## 2019-11-21 NOTE — TELEPHONE ENCOUNTER
Patient underwent lower esophageal sphincter dilation at [place] in 2010 and in 2013.  Referred for EGD at [facility].     RN: please advise patient.

## 2019-11-21 NOTE — TELEPHONE ENCOUNTER
[facility] [phone]    Patient informed of referral info.

## 2019-11-21 NOTE — TELEPHONE ENCOUNTER
Patients  was calling to request a referral to [facility] for an upper endoscopy. Danielle is having trouble swallowing food. She had this procedure done at [facility] about 5 or 6 years ago.  I did explain to Karl that we do not refer to [facility].  Ruth place a referral for this procedure within the [facility].   Patient has a managed care insurance and needs to stay in network unless you feel it more appropriate for her care to continue at [facility].

## 2019-11-27 ENCOUNTER — ALLIED HEALTH/NURSE VISIT (OUTPATIENT)
Dept: NURSING | Facility: CLINIC | Age: 74
End: 2019-11-27
Payer: COMMERCIAL

## 2019-11-27 DIAGNOSIS — E53.8 VITAMIN B 12 DEFICIENCY: Primary | ICD-10-CM

## 2019-11-27 PROCEDURE — 96372 THER/PROPH/DIAG INJ SC/IM: CPT

## 2019-11-27 RX ADMIN — CYANOCOBALAMIN 1000 MCG: 1000 INJECTION, SOLUTION INTRAMUSCULAR; SUBCUTANEOUS at 15:03

## 2019-12-06 ENCOUNTER — OFFICE VISIT (OUTPATIENT)
Dept: INTERNAL MEDICINE | Facility: CLINIC | Age: 74
End: 2019-12-06
Payer: COMMERCIAL

## 2019-12-06 VITALS
TEMPERATURE: 98 F | HEIGHT: 62 IN | OXYGEN SATURATION: 100 % | BODY MASS INDEX: 20.8 KG/M2 | WEIGHT: 113 LBS | HEART RATE: 64 BPM | SYSTOLIC BLOOD PRESSURE: 138 MMHG | RESPIRATION RATE: 12 BRPM | DIASTOLIC BLOOD PRESSURE: 64 MMHG

## 2019-12-06 DIAGNOSIS — R68.89 COLD INTOLERANCE: ICD-10-CM

## 2019-12-06 DIAGNOSIS — L60.0 INGROWN NAIL: Primary | ICD-10-CM

## 2019-12-06 DIAGNOSIS — R51.9 RIGHT SIDED TEMPORAL HEADACHE: ICD-10-CM

## 2019-12-06 LAB — ERYTHROCYTE [SEDIMENTATION RATE] IN BLOOD BY WESTERGREN METHOD: 9 MM/H (ref 0–30)

## 2019-12-06 PROCEDURE — 99214 OFFICE O/P EST MOD 30 MIN: CPT | Performed by: INTERNAL MEDICINE

## 2019-12-06 PROCEDURE — 85652 RBC SED RATE AUTOMATED: CPT | Performed by: INTERNAL MEDICINE

## 2019-12-06 PROCEDURE — 86140 C-REACTIVE PROTEIN: CPT | Performed by: INTERNAL MEDICINE

## 2019-12-06 PROCEDURE — 36415 COLL VENOUS BLD VENIPUNCTURE: CPT | Performed by: INTERNAL MEDICINE

## 2019-12-06 PROCEDURE — 84443 ASSAY THYROID STIM HORMONE: CPT | Performed by: INTERNAL MEDICINE

## 2019-12-06 RX ORDER — CHOLECALCIFEROL (VITAMIN D3) 50 MCG
1 TABLET ORAL DAILY
COMMUNITY
Start: 2019-12-06 | End: 2021-01-28

## 2019-12-06 ASSESSMENT — MIFFLIN-ST. JEOR: SCORE: 960.81

## 2019-12-06 NOTE — PATIENT INSTRUCTIONS
"Recommend seeing the Podiatry specialist for the right great toenail problem.     Feeling colder than others around you is a common problem without an obvious medical answer, unfortunately. Low thyroid can be a cause, but yours has always been normal.     You may schedule a Medicare Wellness exam at any time, they need to be at least one year apart, but your last one was in September of 2018.     The shingles shots (called \"Shingrix\") are a series of two injections that are given four months apart. Out of pocket cost is around $150 to $200 per shot if insurance doesn't cover. It is often cheaper at an outside pharmacy than at our doctors office.     There are lab tests to check for one condition called \"temporal arteritis\" that can be a treatable cause of right temple headaches. If today's lab tests are normal, we can be confident that you don't have this condition.   If abnormal, we may need to do further tests and/or start prednisone.     You may schedule a wellness visit at any time.   "

## 2019-12-06 NOTE — PROGRESS NOTES
"Subjective     Danielle Bryan is a 74 year old female who presents to clinic today for the following health issues:    HPI     Right great toe discomfort  She has concerns about her right great toe. It is tender along the upper corner of the nail. It has bled a few times. There has been no drainage.     Cold intolerance   wonders why patient always feels cold and why her hands are always cold. They always keep their house at 74 degrees.     Right temple pain  She occasionally has right temple pains, that also bother her right eye. Her right eye will also droop at times. Denies any vision changes. Denies any difficulties with chewing--no jaw claudication.     Past/recent records reviewed and discussed for:  -She has questions about the influenza and shingles vaccine.   -Notes that she has constant rhinitis    Reviewed and updated as needed this visit by Provider         Review of Systems   ROS: as above or negative for Respiratory, CV, endocrine, musculoskeletal, neuro systems.    This document serves as a record of the services and decisions personally performed and made by Colt Riley MD. It was created on his behalf by Maya Ulloa, a trained medical scribe. The creation of this document is based on the provider's statements to the medical scribe.  Maya Ulloa December 6, 2019 3:30 PM         Objective    /64   Pulse 64   Temp 98  F (36.7  C) (Oral)   Resp 12   Ht 1.575 m (5' 2\")   Wt 51.3 kg (113 lb)   SpO2 100%   BMI 20.67 kg/m    Body mass index is 20.67 kg/m .     Physical Exam   GENERAL: healthy, alert and no distress  MS: no gross musculoskeletal defects noted, no edema. Normal temporal artery pulses. Reported tenderness over right temporal artery. Ingrown right great toenail.   SKIN: no suspicious lesions or rashes  NEURO: Normal strength and tone, mentation intact and speech normal  PSYCH: mentation appears normal, affect normal/bright    Diagnostic Test Results:  Labs " "reviewed in Epic  No results found for this or any previous visit (from the past 24 hour(s)).        Assessment & Plan     (L60.0) Ingrown nail  (primary encounter diagnosis)  Comment: on right great toenail, referred patient to podiatry   Plan: PODIATRY/FOOT & ANKLE SURGERY REFERRAL          (R67.89) Cold intolerance  Comment: Last thyroid levels were normal, updating today. Discussed that unfortunately it is not uncommon for temperature intolerances to occur with age, especially after menopause.   Plan: TSH with free T4 reflex          (R51) Right sided temporal headache  Comment: checking sed rate and CRP today to screen for possible temporal arteritis   Plan: ESR: Erythrocyte sedimentation rate, CRP,         inflammation            FUTURE APPOINTMENTS:       - Follow-up visit in 9 months or sooner     Patient Instructions   Recommend seeing the Podiatry specialist for the right great toenail problem.     Feeling colder than others around you is a common problem without an obvious medical answer, unfortunately. Low thyroid can be a cause, but yours has always been normal.     You may schedule a Medicare Wellness exam at any time, they need to be at least one year apart, but your last one was in September of 2018.     The shingles shots (called \"Shingrix\") are a series of two injections that are given four months apart. Out of pocket cost is around $150 to $200 per shot if insurance doesn't cover. It is often cheaper at an outside pharmacy than at our doctors office.     There are lab tests to check for one condition called \"temporal arteritis\" that can be a treatable cause of right temple headaches. If today's lab tests are normal, we can be confident that you don't have this condition.   If abnormal, we may need to do further tests and/or start prednisone.     You may schedule a wellness visit at any time.       The information in this document, created by the medical scribe for me, accurately reflects the " services I personally performed and the decisions made by me. I have reviewed and approved this document for accuracy prior to leaving the patient care area.  December 6, 2019 3:48 PM    Colt Riley MD,   [facility]

## 2019-12-07 LAB
CRP SERPL-MCNC: <2.9 MG/L (ref 0–8)
TSH SERPL DL<=0.005 MIU/L-ACNC: 2.04 MU/L (ref 0.4–4)

## 2019-12-10 ENCOUNTER — ALLIED HEALTH/NURSE VISIT (OUTPATIENT)
Dept: NURSING | Facility: CLINIC | Age: 74
End: 2019-12-10
Payer: COMMERCIAL

## 2019-12-10 DIAGNOSIS — E53.8 VITAMIN B12 DEFICIENCY (NON ANEMIC): Primary | ICD-10-CM

## 2019-12-10 PROCEDURE — 96372 THER/PROPH/DIAG INJ SC/IM: CPT

## 2019-12-10 RX ADMIN — CYANOCOBALAMIN 1000 MCG: 1000 INJECTION, SOLUTION INTRAMUSCULAR; SUBCUTANEOUS at 11:01

## 2019-12-15 ENCOUNTER — HEALTH MAINTENANCE LETTER (OUTPATIENT)
Age: 74
End: 2019-12-15

## 2019-12-17 ENCOUNTER — OFFICE VISIT (OUTPATIENT)
Dept: PODIATRY | Facility: CLINIC | Age: 74
End: 2019-12-17
Payer: COMMERCIAL

## 2019-12-17 VITALS
WEIGHT: 113 LBS | HEIGHT: 62 IN | BODY MASS INDEX: 20.8 KG/M2 | DIASTOLIC BLOOD PRESSURE: 68 MMHG | SYSTOLIC BLOOD PRESSURE: 118 MMHG

## 2019-12-17 DIAGNOSIS — L60.0 ONYCHOCRYPTOSIS: Primary | ICD-10-CM

## 2019-12-17 PROCEDURE — 99203 OFFICE O/P NEW LOW 30 MIN: CPT | Mod: 25 | Performed by: PODIATRIST

## 2019-12-17 PROCEDURE — 11730 AVULSION NAIL PLATE SIMPLE 1: CPT | Mod: T5 | Performed by: PODIATRIST

## 2019-12-17 ASSESSMENT — MIFFLIN-ST. JEOR: SCORE: 960.81

## 2019-12-17 NOTE — PATIENT INSTRUCTIONS
Thank you for choosing [facility] Podiatry / Foot & Ankle Surgery!    DR. PEACE'S CLINIC LOCATIONS:   MONDAY - [place] TUESDAY AM - [place]   [address] [facility]  [address]   [place] [address] [place] [address]   [phone]       THURSDAY AM - [place] THURSDAY PM - [place]   [address]   [place] [address] [place] [address]   [phone]       FRIDAY AM - [place] SET UP SURGERY: [phone] [address] APPOINTMENTS: [phone]   [place] [address] BILLING QUESTIONS: [phone] FAX NUMBER: [phone]     INGROWN TOENAIL REMOVAL AFTERCARE  1. After the procedure, go home and elevate the foot/feet for the remainder of the day/evening as able. This is to minimize swelling, control pain, and limit post-procedural complications. The pre-procedural injection may cause your toe to be numb anywhere from 1-2 hours.    2. You can take Tylenol, Ibuprofen, Advil, etc as needed for pain if tolerated. Follow label instructions.     3. If you have been given a prescription for antibiotics, take them as instructed and complete the entire prescription.    4. Keep dressing intact until the following morning. Then remove the bandage (you may need to soak it in warm soapy water as the bandage will likely adhere to your skin).    5. Start soaking in warm soapy water for 5-10 minutes twice a day. Wash the toe thoroughly, dry the toe thoroughly. Apply antibiotic wound ointment to base of wound and cover with gauze and Coban dressing (not too tightly) until it stops draining. This may take a few days to weeks, but at that point, you may continue with antibiotic ointment and a band-aid, or you may stop applying a dressing all together. Dressing changes should be done twice daily if you had the permanent/chemical procedure done.    6. You may do activities as tolerated the following day. Find a shoe that is  comfortable and minimizes the amount of rubbing on your toe, as this may increase pain, swelling, etc.    7. Monitor for signs of infection. With this procedure, it is common to have mild surrounding redness and drainage. If the redness involves the entire toe or if you notice red streaks on top of your foot, or if you experience any nausea, vomiting, chills, fevers > 101 degrees, call clinic for a quick appointment.

## 2019-12-17 NOTE — PROGRESS NOTES
"Foot & Ankle Surgery  2019    CC: \"ingrown toe nail\"    I was asked to see Danielle Bryan regarding the chief complaint by:  Dr. ANABELLA Riley    HPI:  Pt is a 74 year old female who presents with above complaint.  Right great toe pain x 4 weeks.  Looking for \"no pain\".  Throbbing, 8/10 pain, worse when bumping it.  She has taken aspirin for treatment.  Previous total permanent L great toe removal    ROS:   Pos for CC.  The patient denies current nausea, vomiting, chills, fevers, belly pain, calf pain, chest pain or SOB.  Complete remainder of ROS is otherwise neg.    VITALS:    Vitals:    19 1114   BP: 118/68   Weight: 51.3 kg (113 lb)   Height: 1.575 m (5' 2\")       PMH:    Past Medical History:   Diagnosis Date     Alopecia areata     Requires a wig now     Atrial fibrillation (H) 2019     Cerebral infarction (H)     TIA     Dysphagia     Botox/EGD dilation at [place], most recent 2013     Hyperlipemia      PONV (postoperative nausea and vomiting)      Recurrent UTI     Believed related in part to atrophic vaginitis     Vitamin D deficiency 2018       SXHX:    Past Surgical History:   Procedure Laterality Date     ABDOMEN SURGERY  1975    [facility]     COLONOSCOPY  1/15/2014    Dr. Henry [facility]     COLONOSCOPY  1/15/2014    Normal, no further colonoscopy felt required. Procedure: COLONOSCOPY;  Colonoscopy ;  Surgeon: Esteban Henry MD;  Location:  GI     EYE SURGERY      bilateral cataract surgery     GYN SURGERY      TUBAL PREGNANCY, D&C,  X 2     HEAD & NECK SURGERY  1983    THYROIDECTOMY - partial-right side     RELEASE TRIGGER FINGER  2012    Procedure: RELEASE TRIGGER FINGER;  RIGHT THUMB, MIDDLE AND RING TRIGGER RELEASES;  Surgeon: Deniz Velazquez MD;  Location: [facility]        MEDS:    Current Outpatient Medications   Medication     ASPIRIN 81 PO     cetirizine (ZYRTEC) 10 MG tablet     metoprolol tartrate (LOPRESSOR) 25 MG tablet     order for DME "     polyethylene glycol 0.4%- propylene glycol 0.3% (SYSTANE ULTRA) 0.4-0.3 % SOLN ophthalmic solution     pravastatin (PRAVACHOL) 20 MG tablet     sodium chloride (OCEAN) 0.65 % nasal spray     vitamin D3 (CHOLECALCIFEROL) 2000 units (50 mcg) tablet     XARELTO ANTICOAGULANT 15 MG TABS tablet     Current Facility-Administered Medications   Medication     vitamin B-12 (CYANOCOBALAMIN) injection 1,000 mcg       ALL:     Allergies   Allergen Reactions     Ciprofloxacin      Sores in mouth and throat felt funny     Reclast [Zoledronic Acid]      dizziness       FMH:    Family History   Problem Relation Age of Onset     Family History Negative Mother          age 85     C.A.D. Father          in his mid-80's of heart attack     Myocardial Infarction Brother      Other Cancer Brother      Other Cancer Sister        SocHx:    Social History     Socioeconomic History     Marital status:      Spouse name: Not on file     Number of children: 3     Years of education: Not on file     Highest education level: Not on file   Occupational History     Occupation: Copier repair at [email or web address]     Comment: Retired   Social Needs     Financial resource strain: Not on file     Food insecurity:     Worry: Not on file     Inability: Not on file     Transportation needs:     Medical: Not on file     Non-medical: Not on file   Tobacco Use     Smoking status: Never Smoker     Smokeless tobacco: Never Used   Substance and Sexual Activity     Alcohol use: No     Frequency: Never     Drug use: No     Sexual activity: Yes     Partners: Male     Birth control/protection: None   Lifestyle     Physical activity:     Days per week: Not on file     Minutes per session: Not on file     Stress: Not on file   Relationships     Social connections:     Talks on phone: Not on file     Gets together: Not on file     Attends Jain service: Not on file     Active member of club or organization: Not on file     Attends meetings of clubs or  "organizations: Not on file     Relationship status: Not on file     Intimate partner violence:     Fear of current or ex partner: Not on file     Emotionally abused: Not on file     Physically abused: Not on file     Forced sexual activity: Not on file   Other Topics Concern     Parent/sibling w/ CABG, MI or angioplasty before 65F 55M? No   Social History Narrative    Raised in [place]. 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     Walking regularly in summer months.    Will also be exercising at the [facility] in winter months.         Three children, two in [place], one in [place], [place].    Five grandchildren.           EXAMINATION:  Gen:   No apparent distress  Neuro:   A&Ox3, no deficits  Psych:    Answering questions appropriately for age and situation with normal affect  Head:    NCAT  Eye:    Visual scanning without deficit  Ear:    Response to auditory stimuli wnl  Lung:    Non-labored breathing on RA noted  Abd:    NTND per patient report  Lymph:    Neg for pitting/non-pitting edema BLE  Vasc:    Pulses palpable, CFT minimally delayed  Neuro:    Light touch sensation intact to all sensory nerve distributions without paresthesias  Derm:    Onychocryptosis lateral R.  There is a punctate wound at the distal lateral R nail fold.  I suspect there may be a nail spicule slicing through the skin.  No paronychia or SOI are noted, however  MSK:    ROM, strength wnl without limitation, no pain on palpation noted.  Calf:    Neg for redness, swelling or tenderness    Assessment:  74 year old female with onychocryptosis lateral R hallux      Plan:  Discussed etiologies, anatomy and options  1.  Onychocryptosis lateral R hallux  -Regarding the nail, treatment options were discussed.  They elected to proceed with a procedure, Partial temporary avulsion.  See procedure note for details.  Risks that were discussed include but are not limited to infection, wound healing complications, nerve irritation, recurrence of the " ingrown nail and the need for further procedures.  Antibiotic:  None needed    After discussing the procedure, as well as risks, complications and post-procedure instructions, informed consent was obtained.    Anesthesia:  4 cc's of  1% lidocaine plain    Procedure:  After adequate prep, and with anesthesia achieved,  attention was directed to the lateral border of the R hallux where the nail plate was freed from surrounding soft tissue.  The offending border was  using an English Anvil and then removed in total.  The base of the wound was explored and showed no necrotic tissue, purulence or debris.   A clean dressing was applied loosely to prevent vascular insult.  The patient tolerated the procedure well without complications.    Post-procedural instructions were dispensed and discussed with the patient.  All questions were answered.      Follow up:  prn or sooner with acute issues      Patient's medical history was reviewed today    Body mass index is 20.67 kg/m .  Weight management plan Patient was referred to their PCP to discuss a diet and exercise plan.        Mikal Venegas DPM FACFAS FACFAOM  Podiatric Foot & Ankle Surgeon  [facility]  [phone]

## 2019-12-24 ENCOUNTER — ALLIED HEALTH/NURSE VISIT (OUTPATIENT)
Dept: NURSING | Facility: CLINIC | Age: 74
End: 2019-12-24
Payer: COMMERCIAL

## 2019-12-24 DIAGNOSIS — E53.8 VITAMIN B12 DEFICIENCY (NON ANEMIC): Primary | ICD-10-CM

## 2019-12-24 PROCEDURE — 96372 THER/PROPH/DIAG INJ SC/IM: CPT

## 2019-12-24 RX ADMIN — CYANOCOBALAMIN 1000 MCG: 1000 INJECTION, SOLUTION INTRAMUSCULAR; SUBCUTANEOUS at 08:45

## 2020-01-06 ENCOUNTER — ALLIED HEALTH/NURSE VISIT (OUTPATIENT)
Dept: NURSING | Facility: CLINIC | Age: 75
End: 2020-01-06
Payer: COMMERCIAL

## 2020-01-06 DIAGNOSIS — E53.8 VITAMIN B12 DEFICIENCY (NON ANEMIC): Primary | ICD-10-CM

## 2020-01-06 PROCEDURE — 96372 THER/PROPH/DIAG INJ SC/IM: CPT

## 2020-01-06 RX ADMIN — CYANOCOBALAMIN 1000 MCG: 1000 INJECTION, SOLUTION INTRAMUSCULAR; SUBCUTANEOUS at 11:23

## 2020-01-06 NOTE — PROGRESS NOTES
Clinic Administered Medication Documentation    MEDICATION LIST:   Injectable Medication Documentation    Patient was given Cyanocobalamin (B-12). Prior to medication administration, verified patients identity using patient s name and date of birth. Please see MAR and medication order for additional information. Patient instructed to remain in clinic for 15 minutes and report any adverse reaction to staff immediately .      Was entire vial of medication used? Yes  Vial/Syringe: Single dose vial  Expiration Date:  04/30/21  Was this medication supplied by the patient? No

## 2020-01-12 ENCOUNTER — TRANSFERRED RECORDS (OUTPATIENT)
Dept: HEALTH INFORMATION MANAGEMENT | Facility: CLINIC | Age: 75
End: 2020-01-12

## 2020-01-17 ENCOUNTER — TELEPHONE (OUTPATIENT)
Dept: INTERNAL MEDICINE | Facility: CLINIC | Age: 75
End: 2020-01-17

## 2020-01-17 NOTE — TELEPHONE ENCOUNTER
Patient went to [facility] for her hand. She would like to talk with Dr Riley about their recommendations. Ok to call and wilda [phone]

## 2020-01-17 NOTE — TELEPHONE ENCOUNTER
Contacted patient, she requests this RN speak to her  Karl. Karl informed of message from Dr. Riley and verbalizes understanding.

## 2020-01-17 NOTE — TELEPHONE ENCOUNTER
RN: Please advise pt:    Okay with me to hold the Xarelto for up to five days if this is required for the procedure.     She would need to check with her insurance to see whether this procedure would be covered if done at [place].   If not, we could probably find a surgeon in the [facility] area that could also do this procedure.     Okay to use diclofenac sodium 1% ointment.

## 2020-01-17 NOTE — TELEPHONE ENCOUNTER
Contacted patient, she asked this RN to speak to her  regarding the [facility] appointment.     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 [facility] recommends treating by injecting an enzyme into her hand to attack the cord in her hand and release this. The procedure is not very invasive and Karl states [facility] didn't say she needed to stop Xarelto, but they instructed patient to check with PCP for approval before having this done.     Patient was also given prescription for diclofenac sodium 1% ointment. They were concerned about the possible side effects listed with the medication and wanted to check with Dr. Riley if this was safe to use.     Routed to provider to review.

## 2020-01-20 ENCOUNTER — ALLIED HEALTH/NURSE VISIT (OUTPATIENT)
Dept: NURSING | Facility: CLINIC | Age: 75
End: 2020-01-20
Payer: COMMERCIAL

## 2020-01-20 DIAGNOSIS — D51.8 OTHER VITAMIN B12 DEFICIENCY ANEMIA: Primary | ICD-10-CM

## 2020-01-20 PROCEDURE — 96372 THER/PROPH/DIAG INJ SC/IM: CPT

## 2020-01-20 RX ADMIN — CYANOCOBALAMIN 1000 MCG: 1000 INJECTION, SOLUTION INTRAMUSCULAR; SUBCUTANEOUS at 09:45

## 2020-02-03 ENCOUNTER — ALLIED HEALTH/NURSE VISIT (OUTPATIENT)
Dept: NURSING | Facility: CLINIC | Age: 75
End: 2020-02-03
Payer: COMMERCIAL

## 2020-02-03 DIAGNOSIS — E53.8 VITAMIN B12 DEFICIENCY (NON ANEMIC): Primary | ICD-10-CM

## 2020-02-03 PROCEDURE — 96372 THER/PROPH/DIAG INJ SC/IM: CPT

## 2020-02-03 RX ADMIN — CYANOCOBALAMIN 1000 MCG: 1000 INJECTION, SOLUTION INTRAMUSCULAR; SUBCUTANEOUS at 09:58

## 2020-02-18 ENCOUNTER — ALLIED HEALTH/NURSE VISIT (OUTPATIENT)
Dept: NURSING | Facility: CLINIC | Age: 75
End: 2020-02-18
Payer: COMMERCIAL

## 2020-02-18 DIAGNOSIS — E53.8 VITAMIN B12 DEFICIENCY (NON ANEMIC): Primary | ICD-10-CM

## 2020-02-18 PROCEDURE — 96372 THER/PROPH/DIAG INJ SC/IM: CPT

## 2020-03-03 ENCOUNTER — ALLIED HEALTH/NURSE VISIT (OUTPATIENT)
Dept: NURSING | Facility: CLINIC | Age: 75
End: 2020-03-03
Payer: COMMERCIAL

## 2020-03-03 DIAGNOSIS — E53.8 VITAMIN B12 DEFICIENCY (NON ANEMIC): Primary | ICD-10-CM

## 2020-03-03 PROCEDURE — 96372 THER/PROPH/DIAG INJ SC/IM: CPT

## 2020-03-17 ENCOUNTER — TELEPHONE (OUTPATIENT)
Dept: INTERNAL MEDICINE | Facility: CLINIC | Age: 75
End: 2020-03-17

## 2020-03-17 ENCOUNTER — ALLIED HEALTH/NURSE VISIT (OUTPATIENT)
Dept: NURSING | Facility: CLINIC | Age: 75
End: 2020-03-17
Payer: COMMERCIAL

## 2020-03-17 DIAGNOSIS — E53.8 VITAMIN B 12 DEFICIENCY: Primary | ICD-10-CM

## 2020-03-17 PROCEDURE — 96372 THER/PROPH/DIAG INJ SC/IM: CPT

## 2020-03-17 PROCEDURE — 99207 ZZC NO CHARGE NURSE ONLY: CPT

## 2020-03-17 RX ADMIN — CYANOCOBALAMIN 1000 MCG: 1000 INJECTION, SOLUTION INTRAMUSCULAR; SUBCUTANEOUS at 11:34

## 2020-03-17 NOTE — TELEPHONE ENCOUNTER
She may be able to defer B12 injections for at least two months.   Let's skip the April injection and re-evaluate need and community virus status in two months.   Please advise pt.

## 2020-03-18 ENCOUNTER — NURSE TRIAGE (OUTPATIENT)
Dept: INTERNAL MEDICINE | Facility: CLINIC | Age: 75
End: 2020-03-18

## 2020-03-18 NOTE — TELEPHONE ENCOUNTER
Patient notes she has had a runny nose the last two days. Patient believes it is allergy related as she has forgotten to take her allergy medication the last two days. Patient seeking home care advice. Patient feels otherwise fine, denies other symptoms including cough and fever.    Care advice given per protocol. Patient verbalized understanding. Patient agrees to call back with further concerns.     Additional Information    Runny nose or congested nose    Negative: Severe difficulty breathing (e.g., struggling for each breath, speaks in single words)    Negative: Sounds like a life-threatening emergency to the triager    Negative: Runny nose is caused by pollen or other allergies    Negative: Cough is main symptom    Negative: Severe sore throat    Negative: Fever > 104 F (40 C)    Negative: [1] Difficulty breathing AND [2] not severe AND [3] not from stuffy nose (e.g., not relieved by cleaning out the nose)    Negative: Patient sounds very sick or weak to the triager    Negative: [1] Fever > 101 F (38.3 C) AND [2] age > 60    Negative: [1] Fever > 100.0 F (37.8 C) AND [2] bedridden (e.g., nursing home patient, CVA, chronic illness, recovering from surgery)    Negative: [1] Fever > 100.0 F (37.8 C) AND [2] diabetes mellitus or weak immune system (e.g., HIV positive, cancer chemo, splenectomy, chronic steroids)    Negative: Fever present > 3 days (72 hours)    Negative: [1] Fever returns after gone for over 24 hours AND [2] symptoms worse or not improved    Negative: [1] Sinus pain (not just congestion) AND [2] fever    Negative: Earache    Negative: [1] SEVERE sore throat AND [2] present > 24 hours    Negative: [1] Sinus congestion (pressure, fullness) AND [2] present > 10 days    Negative: [1] Nasal discharge AND [2] present > 10 days    Negative: [1] Using nasal washes and pain medicine > 24 hours AND [2] sinus pain (lower forehead, cheekbone, or eye) persists    Negative: Sores with yellow scabs around the  nasal opening    Cold with no complications    Protocols used: RESPIRATORY MULTIPLE SYMPTOMS - GUIDELINE [id number], COMMON COLD-A-AH

## 2020-03-18 NOTE — TELEPHONE ENCOUNTER
Called patient and advised of primary care provider's recommendations below. Patient agrees to plan.

## 2020-05-04 DIAGNOSIS — I48.0 PAROXYSMAL ATRIAL FIBRILLATION (H): ICD-10-CM

## 2020-05-04 RX ORDER — RIVAROXABAN 15 MG/1
TABLET, FILM COATED ORAL
Qty: 90 TABLET | Refills: 0 | Status: SHIPPED | OUTPATIENT
Start: 2020-05-04 | End: 2020-05-08

## 2020-05-04 NOTE — TELEPHONE ENCOUNTER
Patient calls to check on status of refill request, she is leaving town the end of this week and only has a few days of pills left.     Pending Prescriptions:                       Disp   Refills    XARELTO ANTICOAGULANT 15 MG TABS tablet [*90 tab*0            Sig: Take 1 tablet (15 mg) by mouth, daily (with           dinner)     Routing refill request to provider for review/approval because:  Labs out of range:  Platelet count, creatinine, creatinine clearance

## 2020-05-05 ENCOUNTER — ALLIED HEALTH/NURSE VISIT (OUTPATIENT)
Dept: NURSING | Facility: CLINIC | Age: 75
End: 2020-05-05
Payer: COMMERCIAL

## 2020-05-05 DIAGNOSIS — E53.8 VITAMIN B12 DEFICIENCY (NON ANEMIC): Primary | ICD-10-CM

## 2020-05-05 PROCEDURE — 96372 THER/PROPH/DIAG INJ SC/IM: CPT

## 2020-05-05 RX ADMIN — CYANOCOBALAMIN 1000 MCG: 1000 INJECTION, SOLUTION INTRAMUSCULAR; SUBCUTANEOUS at 10:47

## 2020-05-07 ENCOUNTER — TELEPHONE (OUTPATIENT)
Dept: INTERNAL MEDICINE | Facility: CLINIC | Age: 75
End: 2020-05-07

## 2020-05-07 DIAGNOSIS — I48.0 PAROXYSMAL ATRIAL FIBRILLATION (H): ICD-10-CM

## 2020-05-07 RX ORDER — RIVAROXABAN 15 MG/1
TABLET, FILM COATED ORAL
Qty: 90 TABLET | Refills: 0 | OUTPATIENT
Start: 2020-05-07

## 2020-05-07 NOTE — TELEPHONE ENCOUNTER
Pharmacist calls to request a 1 week supply of XARELTO because patient left town and forgot her medication at home. Son can stop by the pharmacy and  the short supply and bring to patient, he does not have access to patient's original medication. Send to [facility] pharmacy in [place] [phone]

## 2020-05-08 NOTE — TELEPHONE ENCOUNTER
Xarelto last filled 5-4-20 #90 tabs with 0 refills.    Medication e-scribed to pharmacy for a 1 wk supply.

## 2020-05-19 ENCOUNTER — ALLIED HEALTH/NURSE VISIT (OUTPATIENT)
Dept: NURSING | Facility: CLINIC | Age: 75
End: 2020-05-19
Payer: COMMERCIAL

## 2020-05-19 DIAGNOSIS — E53.8 VITAMIN B12 DEFICIENCY (NON ANEMIC): Primary | ICD-10-CM

## 2020-05-19 PROCEDURE — 96372 THER/PROPH/DIAG INJ SC/IM: CPT

## 2020-05-19 RX ADMIN — CYANOCOBALAMIN 1000 MCG: 1000 INJECTION, SOLUTION INTRAMUSCULAR; SUBCUTANEOUS at 09:51

## 2020-06-02 ENCOUNTER — ALLIED HEALTH/NURSE VISIT (OUTPATIENT)
Dept: NURSING | Facility: CLINIC | Age: 75
End: 2020-06-02
Payer: COMMERCIAL

## 2020-06-02 DIAGNOSIS — E55.9 VITAMIN D DEFICIENCY: Primary | ICD-10-CM

## 2020-06-02 PROCEDURE — 96372 THER/PROPH/DIAG INJ SC/IM: CPT

## 2020-06-02 RX ADMIN — CYANOCOBALAMIN 1000 MCG: 1000 INJECTION, SOLUTION INTRAMUSCULAR; SUBCUTANEOUS at 08:36

## 2020-06-11 ENCOUNTER — TELEPHONE (OUTPATIENT)
Dept: INTERNAL MEDICINE | Facility: CLINIC | Age: 75
End: 2020-06-11

## 2020-06-11 NOTE — TELEPHONE ENCOUNTER
Karl calling. NOT ON C2C  He is worried about patients memory  She wanders around all day looking for things  She is starting to forget where she is in the house  Patient has a nurse appt 6/16/2020 and left a message on appt notes to get a C2C    Karl wants Dr Riley to call him and ok to  [phone]  He is aware that we dont have a C2C and this may need to wait until 6/16/2020

## 2020-06-15 NOTE — TELEPHONE ENCOUNTER
Advised Karl that Dr. Riley recommends virtual or in-person appointment, could do video visit for Wellness exam if they wanted and he should be present for the visit. Karl verbalizes understanding and states he will set up appointment when patient comes for B12 injection tomorrow.

## 2020-06-15 NOTE — TELEPHONE ENCOUNTER
Recommend that he have her schedule either a video visit or an office visit, and have him in attendance.   She is due for a video Annual Wellness visit--it could be easily done in that context if they would like.   Please advise Mr Bryan.

## 2020-06-16 ENCOUNTER — ALLIED HEALTH/NURSE VISIT (OUTPATIENT)
Dept: NURSING | Facility: CLINIC | Age: 75
End: 2020-06-16
Payer: COMMERCIAL

## 2020-06-16 DIAGNOSIS — E55.9 VITAMIN D DEFICIENCY: Primary | ICD-10-CM

## 2020-06-16 PROCEDURE — 96372 THER/PROPH/DIAG INJ SC/IM: CPT

## 2020-06-16 RX ADMIN — CYANOCOBALAMIN 1000 MCG: 1000 INJECTION, SOLUTION INTRAMUSCULAR; SUBCUTANEOUS at 09:31

## 2020-06-30 ENCOUNTER — ALLIED HEALTH/NURSE VISIT (OUTPATIENT)
Dept: NURSING | Facility: CLINIC | Age: 75
End: 2020-06-30
Payer: COMMERCIAL

## 2020-06-30 DIAGNOSIS — E53.8 VITAMIN B12 DEFICIENCY (NON ANEMIC): Primary | ICD-10-CM

## 2020-06-30 PROCEDURE — 96372 THER/PROPH/DIAG INJ SC/IM: CPT

## 2020-06-30 RX ADMIN — CYANOCOBALAMIN 1000 MCG: 1000 INJECTION, SOLUTION INTRAMUSCULAR; SUBCUTANEOUS at 09:52

## 2020-07-15 ENCOUNTER — ALLIED HEALTH/NURSE VISIT (OUTPATIENT)
Dept: NURSING | Facility: CLINIC | Age: 75
End: 2020-07-15
Payer: COMMERCIAL

## 2020-07-15 DIAGNOSIS — E53.8 VITAMIN B12 DEFICIENCY (NON ANEMIC): Primary | ICD-10-CM

## 2020-07-15 PROCEDURE — 96372 THER/PROPH/DIAG INJ SC/IM: CPT

## 2020-07-15 RX ADMIN — CYANOCOBALAMIN 1000 MCG: 1000 INJECTION, SOLUTION INTRAMUSCULAR; SUBCUTANEOUS at 10:21

## 2020-07-23 DIAGNOSIS — E78.5 HYPERLIPIDEMIA WITH TARGET LDL LESS THAN 100: ICD-10-CM

## 2020-07-24 ENCOUNTER — ALLIED HEALTH/NURSE VISIT (OUTPATIENT)
Dept: NURSING | Facility: CLINIC | Age: 75
End: 2020-07-24
Payer: COMMERCIAL

## 2020-07-24 DIAGNOSIS — E53.8 VITAMIN B12 DEFICIENCY (NON ANEMIC): Primary | ICD-10-CM

## 2020-07-24 PROCEDURE — 96372 THER/PROPH/DIAG INJ SC/IM: CPT

## 2020-07-24 RX ORDER — PRAVASTATIN SODIUM 20 MG
20 TABLET ORAL AT BEDTIME
Qty: 90 TABLET | Refills: 0 | Status: SHIPPED | OUTPATIENT
Start: 2020-07-24 | End: 2020-08-26

## 2020-07-24 RX ADMIN — CYANOCOBALAMIN 1000 MCG: 1000 INJECTION, SOLUTION INTRAMUSCULAR; SUBCUTANEOUS at 13:33

## 2020-07-24 NOTE — TELEPHONE ENCOUNTER
Next 5 appointments (look out 90 days)    Aug 10, 2020  9:30 AM CDT  Nurse Only with [facility] IM NURSE  [facility] [address]  [facility] [address]  [phone]   Aug 24, 2020  8:30 AM CDT  Nurse Only with RI IM NURSE  [facility] [address]  [facility] [address]  [phone]   Sep 08, 2020  8:30 AM CDT  Nurse Only with RI IM NURSE  [facility] [address]  [facility] [address]  [phone]   Sep 23, 2020 10:00 AM CDT  PHYSICAL with Colt Riley MD  [facility] [address]  [facility] [address]  [phone]         Medication is being filled for 1 time refill only due to:  Future appointment scheduled

## 2020-08-10 ENCOUNTER — TRANSFERRED RECORDS (OUTPATIENT)
Dept: HEALTH INFORMATION MANAGEMENT | Facility: CLINIC | Age: 75
End: 2020-08-10

## 2020-08-10 ENCOUNTER — TRANSCRIBE ORDERS (OUTPATIENT)
Dept: OTHER | Age: 75
End: 2020-08-10

## 2020-08-10 ENCOUNTER — ALLIED HEALTH/NURSE VISIT (OUTPATIENT)
Dept: NURSING | Facility: CLINIC | Age: 75
End: 2020-08-10
Payer: COMMERCIAL

## 2020-08-10 DIAGNOSIS — H57.03 MIOSIS: Primary | ICD-10-CM

## 2020-08-10 DIAGNOSIS — E53.8 VITAMIN B12 DEFICIENCY (NON ANEMIC): Primary | ICD-10-CM

## 2020-08-10 DIAGNOSIS — D51.8 OTHER VITAMIN B12 DEFICIENCY ANEMIA: ICD-10-CM

## 2020-08-10 PROCEDURE — 96372 THER/PROPH/DIAG INJ SC/IM: CPT

## 2020-08-10 RX ADMIN — CYANOCOBALAMIN 1000 MCG: 1000 INJECTION, SOLUTION INTRAMUSCULAR; SUBCUTANEOUS at 09:58

## 2020-08-14 ENCOUNTER — TELEPHONE (OUTPATIENT)
Dept: INTERNAL MEDICINE | Facility: CLINIC | Age: 75
End: 2020-08-14

## 2020-08-14 NOTE — TELEPHONE ENCOUNTER
Please see message below.    Last office visit 12-6-19    Fax letter to number listed below.    Please advise, thanks.

## 2020-08-14 NOTE — LETTER
[facility]  [address] 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       If you have any further questions or need for clarification, please contact our office.    Sincerely,        Colt Riley MD

## 2020-08-14 NOTE — TELEPHONE ENCOUNTER
Pt calling requesting letter stating she needs to wear wigs due to her Alopecia diagnosis. Her previous insurance would cover them but she has since switched to [facility] and they will not cover them without a letter. Letter can be faxed to [facility] at [phone]. Pt can be reached at [phone]. Please advise. Thanks.

## 2020-08-24 ENCOUNTER — ALLIED HEALTH/NURSE VISIT (OUTPATIENT)
Dept: NURSING | Facility: CLINIC | Age: 75
End: 2020-08-24
Payer: COMMERCIAL

## 2020-08-24 DIAGNOSIS — E53.8 VITAMIN B12 DEFICIENCY (NON ANEMIC): Primary | ICD-10-CM

## 2020-08-24 PROCEDURE — 96372 THER/PROPH/DIAG INJ SC/IM: CPT

## 2020-08-24 RX ADMIN — CYANOCOBALAMIN 1000 MCG: 1000 INJECTION, SOLUTION INTRAMUSCULAR; SUBCUTANEOUS at 09:16

## 2020-08-25 DIAGNOSIS — E78.5 HYPERLIPIDEMIA WITH TARGET LDL LESS THAN 100: ICD-10-CM

## 2020-08-26 RX ORDER — PRAVASTATIN SODIUM 20 MG
20 TABLET ORAL AT BEDTIME
Qty: 90 TABLET | Refills: 0 | Status: SHIPPED | OUTPATIENT
Start: 2020-08-26 | End: 2021-02-24

## 2020-08-27 NOTE — TELEPHONE ENCOUNTER
Letter printed up and placed on provide's desk for sig (no one on team in clinic to sign).    Primary care provider to return to clinic 8-28-20.

## 2020-09-04 NOTE — TELEPHONE ENCOUNTER
Megan sent PA form for the wigs.   Need to know where patient gets her wigs from for the form.     Left voice message for patient to call back.

## 2020-09-04 NOTE — TELEPHONE ENCOUNTER
Patient calling  [employer] [facility] in [place]    [employer] [facility]  [email or web address]  [address], [place] [address]   ~41.4 mi  [phone]  Open   Closes 7 PM

## 2020-09-09 NOTE — TELEPHONE ENCOUNTER
Received form back from [facility] for prior authorization needing CPT or HCPCS code. Form at MARYJO Presley  desk. Message left for Imelda Joshi needing CPT or HCPCS code for wigs.

## 2020-09-09 NOTE — TELEPHONE ENCOUNTER
Patient's  called, received HCPCS code from [facility]. Code is , entered on prior auth form and refaxed to [facility].

## 2020-09-09 NOTE — TELEPHONE ENCOUNTER
"Received call from Caroline El ([phone])  She states a CPT code is needed, or more specifically an HCPC (\"hicpic\" code) which is like a CPT for DMEs.  5 digits, both numbers and letters.   May be able to ask vendor/salon for code.  TOÑO Lindsay R.N.    "

## 2020-09-10 ENCOUNTER — OFFICE VISIT (OUTPATIENT)
Dept: OPHTHALMOLOGY | Facility: CLINIC | Age: 75
End: 2020-09-10
Attending: OPHTHALMOLOGY
Payer: COMMERCIAL

## 2020-09-10 DIAGNOSIS — H53.10 SUBJECTIVE VISUAL DISTURBANCE: Primary | ICD-10-CM

## 2020-09-10 DIAGNOSIS — H57.02 ANISOCORIA: ICD-10-CM

## 2020-09-10 DIAGNOSIS — H02.403 PTOSIS OF BOTH EYELIDS: Primary | ICD-10-CM

## 2020-09-10 PROCEDURE — G0463 HOSPITAL OUTPT CLINIC VISIT: HCPCS | Mod: ZF

## 2020-09-10 ASSESSMENT — VISUAL ACUITY
OD_CC: 20/25
CORRECTION_TYPE: GLASSES
METHOD: SNELLEN - LINEAR
OS_CC: 20/25

## 2020-09-10 ASSESSMENT — TONOMETRY
OD_IOP_MMHG: 11
IOP_METHOD: ICARE
OS_IOP_MMHG: 10

## 2020-09-10 ASSESSMENT — SLIT LAMP EXAM - LIDS
COMMENTS: NORMAL
COMMENTS: NORMAL

## 2020-09-10 ASSESSMENT — CONF VISUAL FIELD
OD_NORMAL: 1
OS_NORMAL: 1

## 2020-09-10 ASSESSMENT — EXTERNAL EXAM - RIGHT EYE: OD_EXAM: NORMAL

## 2020-09-10 ASSESSMENT — EXTERNAL EXAM - LEFT EYE: OS_EXAM: NORMAL

## 2020-09-10 ASSESSMENT — CUP TO DISC RATIO
OD_RATIO: 0.2
OS_RATIO: 0.25

## 2020-09-10 ASSESSMENT — MARGIN REFLEX DISTANCE
OD_MRD1: 1
OS_MRD1: 1.5

## 2020-09-10 NOTE — NURSING NOTE
Chief Complaints and History of Present Illnesses   Patient presents with     anisocoria     Chief Complaint(s) and History of Present Illness(es)     anisocoria               Comments     Danielle Bryan is a 75 year old female who presents today for    Anisocoria  Was seen at [facility] for bilateral ptosis, right worse than left. The doctor discovered anisocoria.   Wanted pupils checked before blepharoplasty    Mack GR 9:54 AM September 10, 2020

## 2020-09-10 NOTE — Clinical Note
9/10/2020       RE: Danielle Bryan  [address]  [place] [address]     Dear Colleague,    Thank you for referring your patient, Danielle Bryan, to the EYE CLINIC at [facility]. Please see a copy of my visit note below.         Assessment & Plan     Danielle Bryan is a 75 year old female with the following diagnoses:   1. Ptosis of both eyelids    2. Anisocoria         Danielle Bryan is a 75 year old White female who presents to neuro-ophthalmology clinic today for consultation from Dr. Stevens for right ptosis and miotic pupil. Per patient, her left eyelid is droopy too but right is worse. This has been present for many years but the miotic pupil was found at last visit with Dr. Stevens on 8/10/2020. Patient was never aware of anisocoria. Pt believes that the ptosis worsens in the evening. She reports right temporal headaches that are increasing in frequency. She denies jaw pain, diplopia, but endorses intermittent blurry vision, anhydrosis on the right eye. She had Transient ischemic attack about 7 years ago. She denies cervical pain, history of lung cancer, lung surgery.     POH: refractive error, cataract surgery in both eyes about 10 years ago  PMH: TIA, thyroidectomy 40 years ago, hyperlipedemia, osteoporosis  FH: none  Meds: pravastatin, Xarelto, Metoprolol, injection for osteoporisis    Visual acuity is 20/25 in each eye. Intraocular pressure is within normal limit. Right pupil is 5mm in dark and constricts to 3mm in light. Left pupil is 5 mm in dark and constricts to 2 mm in light. There is no afferent pupillary defect. After the administration of Iopidine, her pupils are 4.5mm and 2.5 in bright light in both eyes. Color plates in 10/11 in each eye. Mariana's are symmetrical at 10mm in both eyes with a base of 95mm. MRD1 is 1 mm in right eye and 1.5 mm in left eye. It does not change after patient has been in upgaze for 1 minute. Confrontational visual  fields are full. Slit lamp exam shows intraocular lens in both eyes. Fundus exam is normal.     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  I think it is reasonable for her to pursue ptosis repair.  I will have her follow-up with Dr. Stevens for this.  Follow-up with me as needed.         Attending Physician Attestation:  Complete documentation of historical and exam elements from today's encounter can be found in the full encounter summary report (not reduplicated in this progress note).  I personally obtained the chief complaint(s) and history of present illness.  I confirmed and edited as necessary the review of systems, past medical/surgical history, family history, social history, and examination findings as documented by others; and I examined the patient myself.  I personally reviewed the relevant tests, images, and reports as documented above.  I formulated and edited as necessary the assessment and plan and discussed the findings and management plan with the patient and family. - Aaron Mckeon MD  Ophthalmology PGY-3  [facility]    Again, thank you for allowing me to participate in the care of your patient.      Sincerely,    Aaron Batista MD

## 2020-09-10 NOTE — LETTER
9/10/2020         RE:  :  MRN: Danielle Bryna  1945  [id number]     Dear Dr. Stevens,    Thank you for asking me to see your very pleasant patient, Danielle Bryan, in neuro-ophthalmic consultation.  I would like to thank you for sending your records and I have summarized them in the history of present illness. She presented with her spouse who provided additional history.  My assessment and plan are below.  For further details, please see my attached clinic note.      Assessment & Plan     Danielle Bryan is a 75 year old female with the following diagnoses:   1. Ptosis of both eyelids    2. Anisocoria         Danielle Bryan is a 75 year old White female who presents to neuro-ophthalmology clinic today for consultation from Dr. Stevens for right ptosis and miotic pupil. Per patient, her left eyelid is droopy too but right is worse. This has been present for many years but the miotic pupil was found at last visit with Dr. Stevens on 8/10/2020. Patient was never aware of anisocoria. Pt believes that the ptosis worsens in the evening. She reports right temporal headaches that are increasing in frequency. She denies jaw pain, diplopia, but endorses intermittent blurry vision, anhydrosis on the right eye. She had Transient ischemic attack about 7 years ago. She denies cervical pain, history of lung cancer, lung surgery.     POH: refractive error, cataract surgery in both eyes about 10 years ago  PMH: TIA, thyroidectomy 40 years ago, hyperlipedemia, osteoporosis  FH: none  Meds: pravastatin, Xarelto, Metoprolol, injection for osteoporisis    Visual acuity is 20/25 in each eye. Intraocular pressure is within normal limit. Right pupil is 5mm in dark and constricts to 3mm in light. Left pupil is 5 mm in dark and constricts to 2 mm in light. There is no afferent pupillary defect. After the administration of Iopidine, her pupils are 4.5mm and 2.5 in bright light in both eyes. Color plates in 10/11 in each eye.  Mariana's are symmetrical at 10mm in both eyes with a base of 95mm. MRD1 is 1 mm in right eye and 1.5 mm in left eye. It does not change after patient has been in upgaze for 1 minute. Confrontational visual fields are full. Slit lamp exam shows intraocular lens in both eyes. Fundus exam is normal.     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  I think it is reasonable for her to pursue ptosis repair.  I will have her follow-up with Dr. Stevens for this.  Follow-up with me as needed.        Again, thank you for allowing me to participate in the care of your patient.      Sincerely,    Aaron Batista MD  Professor  Ophthalmology Residency   Director of Neuro-Ophthalmology  [facility] - Scheie Endow Chair  Departments of Ophthalmology, Neurology, and Neurosurgery  [facility] [address] [place] [address]  [address]  T - [phone]  F - [phone]  MARIXA [email or web address]      CC: Mehul Stevens MD  [facility]  [address]  [facility] [address]  VIA Facsimile: [phone]     Colt Riley MD  [address]  [facility] [address]  VIA In Basket

## 2020-09-11 ENCOUNTER — ALLIED HEALTH/NURSE VISIT (OUTPATIENT)
Dept: NURSING | Facility: CLINIC | Age: 75
End: 2020-09-11
Payer: COMMERCIAL

## 2020-09-11 DIAGNOSIS — E53.8 VITAMIN B12 DEFICIENCY (NON ANEMIC): Primary | ICD-10-CM

## 2020-09-11 PROCEDURE — 96372 THER/PROPH/DIAG INJ SC/IM: CPT

## 2020-09-11 RX ADMIN — CYANOCOBALAMIN 1000 MCG: 1000 INJECTION, SOLUTION INTRAMUSCULAR; SUBCUTANEOUS at 08:56

## 2020-09-11 NOTE — TELEPHONE ENCOUNTER
[facility] called stating they need to receive the prior authorization request from [employer], not from us. This PA request is now void. They will contact [employer] to send PA to [facility]. At that time they will request a letter and chart notes from Dr Riley.

## 2020-09-23 ENCOUNTER — OFFICE VISIT (OUTPATIENT)
Dept: INTERNAL MEDICINE | Facility: CLINIC | Age: 75
End: 2020-09-23
Payer: COMMERCIAL

## 2020-09-23 ENCOUNTER — TELEPHONE (OUTPATIENT)
Dept: INTERNAL MEDICINE | Facility: CLINIC | Age: 75
End: 2020-09-23

## 2020-09-23 VITALS
OXYGEN SATURATION: 98 % | TEMPERATURE: 98.3 F | HEART RATE: 74 BPM | RESPIRATION RATE: 14 BRPM | BODY MASS INDEX: 20.46 KG/M2 | DIASTOLIC BLOOD PRESSURE: 68 MMHG | WEIGHT: 111.2 LBS | SYSTOLIC BLOOD PRESSURE: 120 MMHG | HEIGHT: 62 IN

## 2020-09-23 DIAGNOSIS — M81.0 AGE-RELATED OSTEOPOROSIS WITHOUT CURRENT PATHOLOGICAL FRACTURE: Primary | ICD-10-CM

## 2020-09-23 DIAGNOSIS — R41.3 MEMORY DIFFICULTIES: ICD-10-CM

## 2020-09-23 DIAGNOSIS — I48.0 PAROXYSMAL ATRIAL FIBRILLATION (H): ICD-10-CM

## 2020-09-23 DIAGNOSIS — Z23 NEED FOR PROPHYLACTIC VACCINATION AND INOCULATION AGAINST INFLUENZA: ICD-10-CM

## 2020-09-23 DIAGNOSIS — H02.36 REDUNDANT EYELID OF BOTH EYES: ICD-10-CM

## 2020-09-23 DIAGNOSIS — H02.33 REDUNDANT EYELID OF BOTH EYES: ICD-10-CM

## 2020-09-23 DIAGNOSIS — M81.0 AGE-RELATED OSTEOPOROSIS WITHOUT CURRENT PATHOLOGICAL FRACTURE: ICD-10-CM

## 2020-09-23 DIAGNOSIS — E78.5 HYPERLIPIDEMIA WITH TARGET LDL LESS THAN 100: ICD-10-CM

## 2020-09-23 DIAGNOSIS — E78.5 HYPERLIPIDEMIA LDL GOAL <100: ICD-10-CM

## 2020-09-23 DIAGNOSIS — Z01.818 PREOP GENERAL PHYSICAL EXAM: Primary | ICD-10-CM

## 2020-09-23 DIAGNOSIS — G45.9 TRANSIENT CEREBRAL ISCHEMIA, UNSPECIFIED TYPE: ICD-10-CM

## 2020-09-23 DIAGNOSIS — E53.8 VITAMIN B12 DEFICIENCY: ICD-10-CM

## 2020-09-23 PROCEDURE — 90662 IIV NO PRSV INCREASED AG IM: CPT | Performed by: INTERNAL MEDICINE

## 2020-09-23 PROCEDURE — 90471 IMMUNIZATION ADMIN: CPT | Performed by: INTERNAL MEDICINE

## 2020-09-23 PROCEDURE — 99215 OFFICE O/P EST HI 40 MIN: CPT | Mod: 25 | Performed by: INTERNAL MEDICINE

## 2020-09-23 PROCEDURE — 96372 THER/PROPH/DIAG INJ SC/IM: CPT | Performed by: INTERNAL MEDICINE

## 2020-09-23 RX ADMIN — CYANOCOBALAMIN 1000 MCG: 1000 INJECTION, SOLUTION INTRAMUSCULAR; SUBCUTANEOUS at 11:33

## 2020-09-23 ASSESSMENT — MIFFLIN-ST. JEOR: SCORE: 947.65

## 2020-09-23 NOTE — PATIENT INSTRUCTIONS
PROLIA  Risk Evaluation and Mitigation Strategy Best Practice Advisory    In May 2015, the FDA required an update to the PROLIA  (denosumab) REMS (Risk Evaluation and Mitigation Strategy) to inform both providers and patients about potential serious risks related to PROLIA .    These potential serious risks include:    Hypocalcemia    Osteonecrosis of the jaw    Atypical femoral fractures    Serious Infections    Dermatologic reactions    To ensure compliance with these requirements [facility] has developed a workflow for those patients who will receive PROLIA  at clinic.  This workflow includes insurance verification, patient scheduling, patient education, and documentation. Registered Nurses in the clinic should have completed eLearning related to the REMS and the clinic workflow.  Refer to them to initiate this process.  This workflow does not need to be executed if the patient is to receive PROLIA  injection at [facility].       The  has put together a Patient Education Toolkit.  Copies of these handouts may be available your clinic. Patients must receive the Patient Brochure and Medication Guide when receiving injection at clinic.  These will be attached to the injection ordered from [facility] [employer] to the clinic. Links to handouts:  Patient Counseling Chart for Healthcare Providers  Patient Brochure  Prescribing Information  Medication Guide    If you are having trouble accessing the above links, these documents can be found at: http://www.proliahcp.com/cfja-wqyqhnmhcv-qdoyqrfaym-strategy/index.html    The role of healthcare provider includes reviewing patient education materials with the patient, advising patients to seek medical attention if they have signs or symptoms of one of the serious risks, and provide patients a copy of the Patient Brochure and Medication Guide when receiving their injection.      Due to the risk of hypocalcemia the Prescribing  Information for PROLIA  recommends that calcium and mineral levels (magnesium and phosphorus) be checked within 14 days of injection for those predisposed to hypocalcemia.

## 2020-09-23 NOTE — PATIENT INSTRUCTIONS
"  Preparing for Your Surgery  Getting started  A surgery nurse will call you to review your health history and instructions. They will give you an arrival time based on your scheduled surgery time.  Please be ready to share the following:    Your doctor's clinic name and phone number    Your medical, surgical and anesthesia history    A list of allergies and sensitivities    A list of medicines, including herbal treatments and over-the-counter drugs    Whether the patient has a legal guardian (ask how to send us the papers in advance)  If your child is having surgery, please ask for a copy of Preparing for Your Child's Surgery.    Preparing for surgery    Within 30 days of surgery: Have an exam at your family clinic (primary care clinic), or go to a pre-operative clinic. This exam is called a \"History and Physical,\" or H&P.    At your H&P exam, talk to your care team about all medicines you take. If you need to stop any medicines before surgery, ask when to start taking them again.  ? We do this for your safety. Many medicines can make you bleed too much during surgery. Some change how well surgery (anesthesia) drugs work.    Call your insurance company to see what it will and won't pay for. Ask if they need to pre-approve the surgery. (If no insurance, call [phone].)    Call your surgeon's clinic if there's any change in your health. This includes signs of a cold or flu (sore throat, runny nose, cough, rash, fever). It also includes a scrape or scratch near the surgery site.    If you have questions on the day of surgery, call your surgery center.  Eating and drinking guidelines  For your safety: Unless your surgeon tells you otherwise, follow the guidelines below.    Eat and drink as usual until 8 hours before surgery. After that, no food or milk.    Drink clear liquids until 2 hours before surgery. These are liquids you can see through, like water, Gatorade and Propel Water. You may also have black coffee " and tea (no cream or milk).    Nothing by mouth within 2 hours of surgery. This includes gum, candy and breath mints.    Stop alcohol the midnight before surgery.    If your family clinic tells you to take medicine on the morning of surgery, it's okay to take it with a sip of water.  Preventing infection    Shower or bathe the night before and morning of your surgery. Follow the instructions your clinic gave you. (If no instructions, use regular soap.)    Don't shave or clip hair near your surgery site. This can lead to skin infection.    Don't smoke the morning of surgery. Smoking increases the risk of infection. You may chew nicotine gum up to 2 hours before surgery. A nicotine patch is okay.  ? Note: Some surgeries require you to completely quit smoking and nicotine. Check with your surgeon.    Your care team will make every effort to keep you safe from infection. We will:  ? Clean our hands often with soap and water (or an alcohol-based hand rub).  ? Clean the skin at your surgery site with a special soap that kills germs. We'll also remove hair from the site as needed.  ? Wear special hair covers, masks, gowns and gloves during surgery.  ? Give antibiotic medicine, if prescribed. Not all surgeries need antibiotics.  What to bring on the day of surgery    Photo ID and insurance card    Copy of your health care directive, if you have one    Glasses and hearing aides (bring cases)  ? You can't wear contacts during surgery    Inhaler and eye drops, if you use them (tell us about these when you arrive)    CPAP machine or breathing device, if you use them    A few personal items, if spending the night    If you have . . .  ? A pacemaker or ICD (cardiac defibrillator): Bring the ID card.  ? An implanted stimulator: Bring the remote control.  ? A legal guardian: Bring a copy of the certified (court-stamped) guardianship papers.  Please remove any jewelry, including body piercings. Leave jewelry and other valuables at  home.  If you're going home the day of surgery  Important: If you don't follow the rules below, we must cancel your surgery.     Arrange for someone to drive you home after surgery. You may not drive, take a taxi or take public transportation by yourself (unless you'll have local anesthesia only).    Arrange for a responsible adult to stay with you overnight. If you don't, we may keep you in the hospital overnight, and you may need to pay the costs yourself.  Questions?   If you have any questions for your care team, list them here: _________________________________________________________________________________________________________________________________________________________________________________________________________________________________________________________________________________________________________________________  For informational purposes only. Not to replace the advice of your health care provider. Copyright   7170-9492 [employer]. All rights reserved. Clinically reviewed by Laura De Leon MD. [employer] 561828 - REV 07/19.      Dr Riley will speak with Dr Stevens about Xarelto. If we must stop Xarelto for a few days, you may need to take twice daily injections of a blood thinner (Lovenox) during the time off of Xarelto.     No medications needed the morning of surgery.     Everything fine to go ahead with surgery once Dr Riley communicates with Dr Stevens about blood thinners.     Will look into resuming Prolia injections, and updating a bone density test.   We can wait on the mammogram for now.     Recommend seeing Dr Ledesma again to see if anything more should be done about memory concerns.     We tried to address most of the issues done during a Wellness Visit today, although you are being billed only for a preop exam.     If you would still like to schedule an Annual Wellness Visit, would recommend rescheduling this.     If you don't schedule a Wellness Visit, recommend  scheduling an office follow up appointment in 3-4 months.

## 2020-09-23 NOTE — TELEPHONE ENCOUNTER
Patient is past due for Prolia injection (had appointment today with Dr. Riley). Previously ordered by Endocrinology. Last given 9/12/[facility].    [facility] would require COVID testing for patient 4 days prior to appointment to be seen there. We could order this through the clinic instead and then COVID testing would not be needed. Contacted patient and spoke to her and her . Informed them that the Outpatient Infusion would require a COVID test for appointment. Offered to do the injection in our office instead. Patient and  would like to have Prolia through this clinic. Advised them this RN will get orders entered and call them after we hear back from insurance on coverage.     Verbal order received from Dr. Riley for Prolia injection and Mg, Ca and Phosphorus labs. Orders placed. Insurance verification initiated via [employer] web site.

## 2020-09-23 NOTE — PROGRESS NOTES
[place] [address]  [facility] [address]  [phone]  Dept: [phone]    PRE-OP EVALUATION:  Today's date: 2020    Danielle Bryan (: 1945) presents for pre-operative evaluation assessment as requested by Dr. Stevens  She requires evaluation and anesthesia risk assessment prior to undergoing surgery/procedure for treatment of blepharoplasty .    Proposed Surgery/ Procedure: blepharoplasty  Date of Surgery/ Procedure: 10/10/2020  Time of Surgery/ Procedure: 10:00 am  Hospital/Surgical Facility: [facility]  Surgery Fax Number: [phone]  Primary Physician: Colt Riley  Type of Anesthesia Anticipated: General    Preoperative Questionnaire:   Yes TIA a few years ago. - Have you ever had a heart attack or stroke?  No - Have you ever had surgery on your heart or blood vessels, such as a stent, coronary (heart) bypass, or surgery on an artery in the head, neck, heart, or legs?  No - Do you have chest pain when you are physically active?  YES - Do you have a history of heart failure?  No - Do you currently have a cold, bronchitis, or symptoms of other respiratory (head and chest) infections?  No - Do you have a cough, shortness of breath, or wheezing?  No - Do you or anyone in your family have a history of blood clots?  No - Do you or anyone in your family have a serious bleeding problem, such as long-lasting bleeding after surgeries or cuts?  yes - Have you ever had anemia or been told to take iron pills?  No - Have you had any abnormal blood loss such as black, tarry or bloody stools, or abnormal vaginal bleeding?  YES CHILDBIRTH - Have you ever had a blood transfusion?  Yes - Are you willing to have a blood transfusion if it is medically needed before, during, or after your surgery?  No - Have you or anyone in your family ever had problems with anesthesia (sedation for surgery)?  No - Do you have sleep apnea, excessive snoring, or daytime  "drowsiness?   No - Do you have any artifical heart valves or other implanted medical devices, such as a pacemaker, defibrillator, or continuous glucose monitor?  No - Do you have any artifical joints?  No - Are you allergic to latex?  No - Is there any chance that you may be pregnant?    Patient has a Health Care Directive or Living Will:  NO    HPI:     HPI related to upcoming procedure: No recent acute illnesses.   We need to sort out whether her anticoagulation may be continued through surgery or whether it must be held or adjusted.   She has had paroxysmal atrial fibrillation in 1/2019 at which time she was started on chronic oral anticoagulation.     She has a history of transient left leg weakness, left visual field disturbance, and \"dizziness\" in 12/2014. She was found to be in sinus rhythm. Brain MRI showed no acute abnormalities. She saw Dr Regino Ledesma of Neurology the following spring for the above as well as for memory loss. She was started on Vitamin B12 injections due to a low-normal Vitamin B12 level.     'Her  is present today and expresses concern that her memory continues to decline. He reports that she is more confused and \"loses things\". She is visibly frustrated as he discusses this and reports that her memory is stable, and that his hearing is impaired and that this influences his ability to hear or understand her.     No known coronary artery disease. Normal NM Lexiscan on 2/21/2019.     She is due for Prolia injection--overdue as she had difficulty scheduling this due to the pandemic.    See problem list for active medical problems.  Problems all longstanding and stable, except as noted/documented.  See ROS for pertinent symptoms related to these conditions.      MEDICAL HISTORY:     Patient Active Problem List    Diagnosis Date Noted     Atrial fibrillation (H) 01/20/2019     Priority: Medium     Syncope, near 12/10/2018     Priority: Medium     Vitamin D deficiency 11/29/2018     " Priority: Medium     Osteoporosis 2016     Priority: Medium     Vitamin B12 deficiency (non anemic) 2015     Priority: Medium     Hyperlipidemia with target LDL less than 100 2015     Priority: Medium     Diagnosis updated by automated process. Provider to review and confirm.       Transient cerebral ischemia 2014     Priority: Medium     Diagnosis updated by automated process. Provider to review and confirm.       Allergic rhinitis 2014     Priority: Medium     Bartholin's cyst 2014     Priority: Medium     Alopecia areata 2013     Priority: Medium     Advanced directives, counseling/discussion 2013     Priority: Medium     Patient states has Advance Directive and will bring in a copy to clinic.         Sprain of jaw 2008     Priority: Medium      Past Medical History:   Diagnosis Date     Alopecia areata     Requires a wig now     Atrial fibrillation (H) 2019     Cerebral infarction (H)     TIA     Dysphagia     [facility], most recent 2013     Hyperlipemia      PONV (postoperative nausea and vomiting)      Recurrent UTI     Believed related in part to atrophic vaginitis     Vitamin D deficiency 2018     Past Surgical History:   Procedure Laterality Date     ABDOMEN SURGERY   1982    Ceaserean     COLONOSCOPY  1/15/2014    Dr. Henry [facility]     COLONOSCOPY  1/15/2014    Normal, no further colonoscopy felt required. Procedure: COLONOSCOPY;  Colonoscopy ;  Surgeon: Esteban Henry MD;  Location:  GI     EYE SURGERY      bilateral cataract surgery     GYN SURGERY      TUBAL PREGNANCY, D&C,  X 2     HEAD & NECK SURGERY      THYROIDECTOMY - partial-right side     RELEASE TRIGGER FINGER  2012    Procedure: RELEASE TRIGGER FINGER;  RIGHT THUMB, MIDDLE AND RING TRIGGER RELEASES;  Surgeon: Deniz Velazquez MD;  Location: [facility]     Current Outpatient Medications   Medication Sig Dispense Refill     ASPIRIN 81  "PO Take 81 mg by mouth daily       cetirizine (ZYRTEC) 10 MG tablet Take 10 mg by mouth as needed for allergies       metoprolol tartrate (LOPRESSOR) 25 MG tablet Take 0.5 tablets (12.5 mg) by mouth daily 45 tablet 0     order for DME Equipment being ordered: Wigs 2 Units 1     polyethylene glycol 0.4%- propylene glycol 0.3% (SYSTANE ULTRA) 0.4-0.3 % SOLN ophthalmic solution Place 1 drop into both eyes 3 times daily as needed for dry eyes        pravastatin (PRAVACHOL) 20 MG tablet Take 1 tablet (20 mg) by mouth At Bedtime +++need annual exam+++ 90 tablet 0     sodium chloride (OCEAN) 0.65 % nasal spray Spray 1 spray into both nostrils daily as needed for congestion       vitamin D3 (CHOLECALCIFEROL) 2000 units (50 mcg) tablet Take 1 tablet (2,000 Units) by mouth daily       XARELTO ANTICOAGULANT 15 MG TABS tablet Take 1 tablet (15 mg) by mouth, daily (with dinner) 90 tablet 0     OTC products: none    Allergies   Allergen Reactions     Ciprofloxacin      Sores in mouth and throat felt funny     Reclast [Zoledronic Acid]      dizziness      Latex Allergy: NO    Social History     Tobacco Use     Smoking status: Never Smoker     Smokeless tobacco: Never Used   Substance Use Topics     Alcohol use: No     Frequency: Never     History   Drug Use No       REVIEW OF SYSTEMS:   ROS: She has noted chills, no fevers or rigors. Some recent nasal congestion, unsure whether due to allergies.     REVIEW OF SYSTEMS: The following systems have been completely reviewed and are negative except as noted above:   Constitutional, HEENT, respiratory, cardiovascular, gastrointestinal, genitourinary, musculoskeletal, dermatologic, hematologic, endocrine, psychiatric, and neurologic systems.      EXAM:       Vitals: /68   Pulse 74   Temp 98.3  F (36.8  C) (Oral)   Resp 14   Ht 1.575 m (5' 2\")   Wt 50.4 kg (111 lb 3.2 oz)   LMP  (LMP Unknown)   SpO2 98%   BMI 20.34 kg/m    BMI= Body mass index is 20.34 kg/m .    GENERAL " APPEARANCE: healthy, alert and no distress  HENT: ear canals and TM's normal and nose and mouth without ulcers or lesions  RESP: lungs clear to auscultation - no rales, rhonchi or wheezes  CV: regular rate and rhythm, normal S1 S2, no S3 or S4 and no murmur, click or rub   ABDOMEN: soft, nontender, no HSM or masses and bowel sounds normal  MS: extremities normal- no gross deformities noted  NEURO: Normal strength and tone, sensory exam grossly normal, mentation intact and speech normal    DIAGNOSTICS:   EKG: sinus bradycardia, with 1st degree aV block and PAC's. Left axis deviation with inferior Q-waves. Mild QT interval prolongation. , normal axis, normal intervals, no acute ST/T changes c/w ischemia, no LVH by voltage criteria, unchanged from previous tracings    Recent Labs   Lab Test 11/13/19  0847 09/12/19  0801  01/30/19  1536  01/20/19  0912 09/18/18  1031  12/18/14  1000   HGB  --   --   --   --   --  13.1 12.2   < > 13.0   PLT  --   --   --   --   --  215 209   < > 232   INR  --   --   --   --   --   --   --   --  0.97     --   --  143   < > 146* 144   < > 143   POTASSIUM 3.5  --   --  4.1   < > 3.3* 4.0   < > 4.1   CR 0.77 0.78   < > 0.75   < > 0.89 0.77   < > 0.78    < > = values in this interval not displayed.        IMPRESSION:   Reason for surgery/procedure: Blepharoplasty  Diagnosis/reason for consult:  Preoperative risk assessment.     The proposed surgical procedure is considered INTERMEDIATE risk.    REVISED CARDIAC RISK INDEX  The patient has the following serious cardiovascular risks for perioperative complications such as (MI, PE, VFib and 3  AV Block):  No serious cardiac risks  INTERPRETATION: 0 risks: Class I (very low risk - 0.4% complication rate)    The patient has the following additional risks for perioperative complications:  No identified additional risks      ICD-10-CM    1. Preop general physical exam  Z01.818    (Z01.818) Preop general physical exam  (primary encounter  diagnosis)  Comment: Need to clarify whether oral anticoagulation must be held prior to surgery--will attempt to reach surgeon to discuss.   Plan: Otherwise satisfactory operative candidate with anesthesia as required.     (H02.403) Redundant eyelid of both eyes  Comment: Reason for surgery.     (R41.3) Memory difficulties  Comment: Advised following up with Neurology.   Plan: NEUROLOGY ADULT REFERRAL          (G45.9) Transient cerebral ischemia, unspecified type  Comment: Questionable episode in 2014, self-limited neurologic symptoms and no evidence of cerebral infarction.     (M81.0) Age-related osteoporosis without current pathological fracture  Comment: Update DEXA. Update Prolia injection.   Plan: DX Hip/Pelvis/Spine          (I48.0) Paroxysmal atrial fibrillation (H)  Comment: Patient in sinus rhythm.     (E78.5) Hyperlipidemia with target LDL less than 100  Comment: Continue pravastatin.     (E53.8) Vitamin B12 deficiency  Comment: Continue with Vitamin B12 injections.     (Z23) Need for prophylactic vaccination and inoculation against influenza  Plan: FLUZONE HIGH DOSE 65+  [86895]              RECOMMENDATIONS:     Patient Instructions     Dr Riley will speak with Dr Lou about Xarelto. If we must stop Xarelto for a few days, you may need to take twice daily injections of a blood thinner (Lovenox) during the time off of Xarelto.     No medications needed the morning of surgery.     Everything fine to go ahead with surgery once Dr Riley communicates with Dr Lou about blood thinners.     Will look into resuming Prolia injections, and updating a bone density test.   We can wait on the mammogram for now.     Recommend seeing Dr Ledesma again to see if anything more should be done about memory concerns.     We tried to address most of the issues done during a Wellness Visit today, although you are being billed only for a preop exam.     If you would still like to schedule an Annual Wellness Visit, would  recommend rescheduling this.     If you don't schedule a Wellness Visit, recommend scheduling an office follow up appointment in 3-4 months.        APPROVAL GIVEN to proceed with proposed procedure, without further diagnostic evaluation       Signed Electronically by: Colt Riley MD,     Copy of this evaluation report is provided to requesting physician.    [place] Preop Guidelines    Revised Cardiac Risk Index

## 2020-09-28 NOTE — TELEPHONE ENCOUNTER
Contacted Karl and he confirms insurance said Prolia was covered. Scheduled lab only appointment for tomorrow in the [facility] lab. Informed him as long as lab results are okay, she could get the Prolia injection done with her next B12 injection on 10/6.     Will follow-up on lab results later this week and call Karl back to confirm if she can get Prolia injection on 10/6.

## 2020-09-28 NOTE — TELEPHONE ENCOUNTER
calls and he spoke with insurance and the Prolia has been approved.  They do want to have donaldson here at the clinic.  When should patient do lab only appointment and when will vaccine be her to give?  Please advise.

## 2020-09-29 DIAGNOSIS — M81.0 AGE-RELATED OSTEOPOROSIS WITHOUT CURRENT PATHOLOGICAL FRACTURE: ICD-10-CM

## 2020-09-29 PROCEDURE — 36415 COLL VENOUS BLD VENIPUNCTURE: CPT | Performed by: INTERNAL MEDICINE

## 2020-09-29 PROCEDURE — 82310 ASSAY OF CALCIUM: CPT | Performed by: INTERNAL MEDICINE

## 2020-09-29 PROCEDURE — 83735 ASSAY OF MAGNESIUM: CPT | Performed by: INTERNAL MEDICINE

## 2020-09-29 PROCEDURE — 84100 ASSAY OF PHOSPHORUS: CPT | Performed by: INTERNAL MEDICINE

## 2020-09-30 LAB
CALCIUM SERPL-MCNC: 9.2 MG/DL (ref 8.5–10.1)
MAGNESIUM SERPL-MCNC: 2.3 MG/DL (ref 1.6–2.3)
PHOSPHATE SERPL-MCNC: 3.5 MG/DL (ref 2.5–4.5)

## 2020-10-01 ENCOUNTER — TELEPHONE (OUTPATIENT)
Dept: INTERNAL MEDICINE | Facility: CLINIC | Age: 75
End: 2020-10-01

## 2020-10-01 NOTE — TELEPHONE ENCOUNTER
Lab results are back. Routed to Dr. Riley to review and advise if okay to proceed with Prolia injection.

## 2020-10-01 NOTE — TELEPHONE ENCOUNTER
RN: please advise patient:    Spoke with Dr Stevens's nurse.   She said Xarelto must be held for 3 days prior to procedure.   So she may take Xarelto daily through 10/6, don't take it beginning 10/7, resume taking it the night of the surgery on 10/10.   She does not require Lovenox bridging.

## 2020-10-01 NOTE — TELEPHONE ENCOUNTER
Patient having eye surgery 10-8/2020 and she needs to know if she should stop the XARELTO ANTICOAGULANT 15 MG TABS tablet  Ok to call and lm [phone]

## 2020-10-02 NOTE — TELEPHONE ENCOUNTER
Patient has Nurse only appointment on 10/6 for B12 injection and would like to get Prolia at the same time. Please review labs and advise if okay to proceed with injection.

## 2020-10-12 ENCOUNTER — ALLIED HEALTH/NURSE VISIT (OUTPATIENT)
Dept: NURSING | Facility: CLINIC | Age: 75
End: 2020-10-12
Payer: COMMERCIAL

## 2020-10-12 DIAGNOSIS — E53.8 VITAMIN B12 DEFICIENCY (NON ANEMIC): Primary | ICD-10-CM

## 2020-10-12 DIAGNOSIS — M81.0 AGE-RELATED OSTEOPOROSIS WITHOUT CURRENT PATHOLOGICAL FRACTURE: ICD-10-CM

## 2020-10-12 PROCEDURE — 96372 THER/PROPH/DIAG INJ SC/IM: CPT

## 2020-10-12 RX ADMIN — CYANOCOBALAMIN 1000 MCG: 1000 INJECTION, SOLUTION INTRAMUSCULAR; SUBCUTANEOUS at 11:32

## 2020-10-12 NOTE — NURSING NOTE
Clinic Administered Medication Documentation      Injectable Medication Documentation    Patient was given Cyanocobalamin (B-12). Prior to medication administration, verified patients identity using patient s name and date of birth. Please see MAR and medication order for additional information. Patient instructed to remain in clinic for 15 minutes.      Was entire vial of medication used? Yes  Vial/Syringe: Single dose vial  Expiration Date:  9/2021  Was this medication supplied by the patient? No  Prolia Documentation    Prior to injection, verified patient identity using patient's name and date of birth. Medication was administered. Please see MAR and medication order for additional information. Patient instructed to remain in clinic for 15 minutes and report any adverse reaction to staff immediately .    Indication: Prolia  (denosumab) is a prescription medicine used to treat osteoporosis in patients who:     Are at high risk for fracture, meaning patients who have had a fracture related to osteoporosis, or who have multiple risk factors for fracture.    Cannot use another osteoporosis medicine or other osteoporosis medicines did not work well.    The timeline for early/late injections would be 4 weeks early and any time after the 6 month cecilia. If a patient receives their injection late, then the subsequent injection would be 6 months from the date that they actually received the injection.    1.  When was the last injection?  9/12/19  2.  Did they check with their insurance for this calendar year?  yes  3.  Is there an order in the chart?  yes  4.  Has the patient had dental work involving the bone in the past month or will have work in the next 6 months?  no    The following steps were completed to comply with the REMS program for Prolia:    Reviewed information in the Medication Guide and Patient Counseling Chart, including the serious risks of Prolia  and the symptoms of each risk.    Advised patient to seek  prompt medical attention if they have signs or symptoms of any of the serious risks.    Provided each patient a copy of the Medication Guide and Patient Brochure.    Was entire vial of medication used? Yes  Vial/Syringe: Syringe  Expiration Date:  11/2022  Was this medication supplied by the patient? No

## 2020-10-13 ENCOUNTER — TELEPHONE (OUTPATIENT)
Dept: INTERNAL MEDICINE | Facility: CLINIC | Age: 75
End: 2020-10-13

## 2020-10-13 NOTE — TELEPHONE ENCOUNTER
RN: please call patient'  (if cleared to serve as ).  We have medication options for significant agitation if he feels this is required, otherwise would hang on until appt on 11/3/2020.

## 2020-10-13 NOTE — TELEPHONE ENCOUNTER
Patient's son not on C2C calling because he and patients  are having a hard time with Danielle and her memory and getting angry. They have an appt with a neurologist on 11/3/2020. She is forgetting and losing her pill bottles. They would like to know what they can do while waiting for this appt. Please call  and ok to  [phone]

## 2020-10-15 PROBLEM — R13.10 DYSPHAGIA, UNSPECIFIED TYPE: Status: ACTIVE | Noted: 2020-10-15

## 2020-10-15 PROBLEM — T45.8X5S: Status: ACTIVE | Noted: 2020-10-15

## 2020-10-16 NOTE — TELEPHONE ENCOUNTER
Contacted patient's , on consent to communicate. Advised him of Dr. Riley's recommendations. He states they will try to wait until her appointment with the Neurologist to get their recommendations. This RN advised him to call if her agitation worsens prior to that appointment, he agrees with plan.

## 2020-10-26 ENCOUNTER — TELEPHONE (OUTPATIENT)
Dept: INTERNAL MEDICINE | Facility: CLINIC | Age: 75
End: 2020-10-26

## 2020-10-26 ENCOUNTER — ALLIED HEALTH/NURSE VISIT (OUTPATIENT)
Dept: NURSING | Facility: CLINIC | Age: 75
End: 2020-10-26
Payer: COMMERCIAL

## 2020-10-26 DIAGNOSIS — E53.8 VITAMIN B12 DEFICIENCY (NON ANEMIC): Primary | ICD-10-CM

## 2020-10-26 PROCEDURE — 96372 THER/PROPH/DIAG INJ SC/IM: CPT

## 2020-10-26 RX ADMIN — CYANOCOBALAMIN 1000 MCG: 1000 INJECTION, SOLUTION INTRAMUSCULAR; SUBCUTANEOUS at 16:43

## 2020-10-26 NOTE — TELEPHONE ENCOUNTER
is calling because he is having a really hard time getting Danielle to take her medication. She becomes very aggressive and does not comply with taking them. Karl said they have discussed in the past there may be a medication she could take to help with her aggression.  Karl has an appointment himself this Friday and they could discuss this then if that would work.

## 2020-11-03 ENCOUNTER — TRANSFERRED RECORDS (OUTPATIENT)
Dept: HEALTH INFORMATION MANAGEMENT | Facility: CLINIC | Age: 75
End: 2020-11-03

## 2020-11-03 ENCOUNTER — MEDICAL CORRESPONDENCE (OUTPATIENT)
Dept: HEALTH INFORMATION MANAGEMENT | Facility: CLINIC | Age: 75
End: 2020-11-03

## 2020-11-03 DIAGNOSIS — E53.8 BIOTIN-(PROPIONYL-COA-CARBOXYLASE) LIGASE DEFICIENCY: Primary | ICD-10-CM

## 2020-11-03 DIAGNOSIS — R41.3 MEMORY LOSS: ICD-10-CM

## 2020-11-03 DIAGNOSIS — R51.9 FACIAL PAIN: ICD-10-CM

## 2020-11-03 LAB — ERYTHROCYTE [SEDIMENTATION RATE] IN BLOOD BY WESTERGREN METHOD: 8 MM/H (ref 0–30)

## 2020-11-03 PROCEDURE — 85652 RBC SED RATE AUTOMATED: CPT | Performed by: PSYCHIATRY & NEUROLOGY

## 2020-11-03 PROCEDURE — 86431 RHEUMATOID FACTOR QUANT: CPT | Performed by: PSYCHIATRY & NEUROLOGY

## 2020-11-03 PROCEDURE — 86039 ANTINUCLEAR ANTIBODIES (ANA): CPT | Performed by: PSYCHIATRY & NEUROLOGY

## 2020-11-03 PROCEDURE — 86038 ANTINUCLEAR ANTIBODIES: CPT | Performed by: PSYCHIATRY & NEUROLOGY

## 2020-11-03 PROCEDURE — 86255 FLUORESCENT ANTIBODY SCREEN: CPT | Mod: 90 | Performed by: PSYCHIATRY & NEUROLOGY

## 2020-11-03 PROCEDURE — 83519 RIA NONANTIBODY: CPT | Mod: 90 | Performed by: PSYCHIATRY & NEUROLOGY

## 2020-11-03 PROCEDURE — 36415 COLL VENOUS BLD VENIPUNCTURE: CPT | Performed by: PSYCHIATRY & NEUROLOGY

## 2020-11-03 PROCEDURE — 99000 SPECIMEN HANDLING OFFICE-LAB: CPT | Performed by: PSYCHIATRY & NEUROLOGY

## 2020-11-03 PROCEDURE — 83520 IMMUNOASSAY QUANT NOS NONAB: CPT | Mod: 90 | Performed by: PSYCHIATRY & NEUROLOGY

## 2020-11-04 LAB
ANA PAT SER IF-IMP: ABNORMAL
ANA SER QL IF: POSITIVE
ANA TITR SER IF: ABNORMAL {TITER}
RHEUMATOID FACT SER NEPH-ACNC: <7 IU/ML (ref 0–20)

## 2020-11-09 ENCOUNTER — ANCILLARY PROCEDURE (OUTPATIENT)
Dept: BONE DENSITY | Facility: CLINIC | Age: 75
End: 2020-11-09
Attending: INTERNAL MEDICINE
Payer: COMMERCIAL

## 2020-11-09 DIAGNOSIS — M81.0 AGE-RELATED OSTEOPOROSIS WITHOUT CURRENT PATHOLOGICAL FRACTURE: ICD-10-CM

## 2020-11-09 PROCEDURE — 77085 DXA BONE DENSITY AXL VRT FX: CPT | Performed by: INTERNAL MEDICINE

## 2020-11-13 ENCOUNTER — ALLIED HEALTH/NURSE VISIT (OUTPATIENT)
Dept: NURSING | Facility: CLINIC | Age: 75
End: 2020-11-13
Payer: COMMERCIAL

## 2020-11-13 DIAGNOSIS — E53.8 VITAMIN B12 DEFICIENCY (NON ANEMIC): Primary | ICD-10-CM

## 2020-11-13 PROCEDURE — 96372 THER/PROPH/DIAG INJ SC/IM: CPT

## 2020-11-13 RX ADMIN — CYANOCOBALAMIN 1000 MCG: 1000 INJECTION, SOLUTION INTRAMUSCULAR; SUBCUTANEOUS at 09:59

## 2020-11-19 LAB — PNP ABY SERUM: NORMAL

## 2020-11-25 ENCOUNTER — TRANSFERRED RECORDS (OUTPATIENT)
Dept: HEALTH INFORMATION MANAGEMENT | Facility: CLINIC | Age: 75
End: 2020-11-25

## 2020-11-27 ENCOUNTER — ALLIED HEALTH/NURSE VISIT (OUTPATIENT)
Dept: NURSING | Facility: CLINIC | Age: 75
End: 2020-11-27
Payer: COMMERCIAL

## 2020-11-27 DIAGNOSIS — Z23 NEED FOR VACCINATION: Primary | ICD-10-CM

## 2020-11-27 PROCEDURE — 96372 THER/PROPH/DIAG INJ SC/IM: CPT

## 2020-11-27 RX ADMIN — CYANOCOBALAMIN 1000 MCG: 1000 INJECTION, SOLUTION INTRAMUSCULAR; SUBCUTANEOUS at 09:58

## 2020-12-11 ENCOUNTER — ALLIED HEALTH/NURSE VISIT (OUTPATIENT)
Dept: NURSING | Facility: CLINIC | Age: 75
End: 2020-12-11
Payer: COMMERCIAL

## 2020-12-11 DIAGNOSIS — E53.8 VITAMIN B12 DEFICIENCY (NON ANEMIC): Primary | ICD-10-CM

## 2020-12-11 PROCEDURE — 96372 THER/PROPH/DIAG INJ SC/IM: CPT

## 2020-12-11 RX ADMIN — CYANOCOBALAMIN 1000 MCG: 1000 INJECTION, SOLUTION INTRAMUSCULAR; SUBCUTANEOUS at 10:22

## 2020-12-23 ENCOUNTER — ALLIED HEALTH/NURSE VISIT (OUTPATIENT)
Dept: NURSING | Facility: CLINIC | Age: 75
End: 2020-12-23
Payer: COMMERCIAL

## 2020-12-23 DIAGNOSIS — E53.8 VITAMIN B12 DEFICIENCY (NON ANEMIC): Primary | ICD-10-CM

## 2020-12-23 PROCEDURE — 99207 PR NO CHARGE NURSE ONLY: CPT

## 2020-12-23 PROCEDURE — 96372 THER/PROPH/DIAG INJ SC/IM: CPT

## 2020-12-23 RX ADMIN — CYANOCOBALAMIN 1000 MCG: 1000 INJECTION, SOLUTION INTRAMUSCULAR; SUBCUTANEOUS at 02:00

## 2021-01-06 ENCOUNTER — ALLIED HEALTH/NURSE VISIT (OUTPATIENT)
Dept: NURSING | Facility: CLINIC | Age: 76
End: 2021-01-06
Payer: COMMERCIAL

## 2021-01-06 DIAGNOSIS — E53.8 VITAMIN B12 DEFICIENCY (NON ANEMIC): Primary | ICD-10-CM

## 2021-01-06 PROCEDURE — 96372 THER/PROPH/DIAG INJ SC/IM: CPT

## 2021-01-06 RX ADMIN — CYANOCOBALAMIN 1000 MCG: 1000 INJECTION, SOLUTION INTRAMUSCULAR; SUBCUTANEOUS at 09:18

## 2021-01-06 NOTE — NURSING NOTE
Here for B 12 see MAR ,     Due to injection administration, patient instructed to remain in clinic for 15 minutes  afterwards, and to report any adverse reaction to me immediately.    REMY Balderas LPN

## 2021-01-15 ENCOUNTER — HEALTH MAINTENANCE LETTER (OUTPATIENT)
Age: 76
End: 2021-01-15

## 2021-01-20 ENCOUNTER — ALLIED HEALTH/NURSE VISIT (OUTPATIENT)
Dept: NURSING | Facility: CLINIC | Age: 76
End: 2021-01-20
Payer: COMMERCIAL

## 2021-01-20 DIAGNOSIS — D51.8 OTHER VITAMIN B12 DEFICIENCY ANEMIA: Primary | ICD-10-CM

## 2021-01-20 PROCEDURE — 96372 THER/PROPH/DIAG INJ SC/IM: CPT

## 2021-01-20 RX ADMIN — CYANOCOBALAMIN 1000 MCG: 1000 INJECTION, SOLUTION INTRAMUSCULAR; SUBCUTANEOUS at 08:44

## 2021-01-27 ENCOUNTER — TELEPHONE (OUTPATIENT)
Dept: INTERNAL MEDICINE | Facility: CLINIC | Age: 76
End: 2021-01-27

## 2021-01-27 NOTE — TELEPHONE ENCOUNTER
Pt  (on consent to communicate) calling with questions about pt get COVID vaccine. He stated that pt had a reaction after getting Reclast infusion in the past and he is wondering if getting a COVID vaccination would be a concern. He does does not remember what her reaction was specifically but believes there was dizziness. Pt can be reached at [phone].. OK to leave a detailed message. Please advise. Thanks.

## 2021-01-28 ENCOUNTER — OFFICE VISIT (OUTPATIENT)
Dept: INTERNAL MEDICINE | Facility: CLINIC | Age: 76
End: 2021-01-28
Payer: COMMERCIAL

## 2021-01-28 VITALS
OXYGEN SATURATION: 100 % | TEMPERATURE: 97.8 F | WEIGHT: 113 LBS | SYSTOLIC BLOOD PRESSURE: 132 MMHG | BODY MASS INDEX: 20.8 KG/M2 | HEART RATE: 54 BPM | HEIGHT: 62 IN | DIASTOLIC BLOOD PRESSURE: 74 MMHG | RESPIRATION RATE: 20 BRPM

## 2021-01-28 DIAGNOSIS — T14.8XXA SKIN ABRASION: Primary | ICD-10-CM

## 2021-01-28 PROCEDURE — 99213 OFFICE O/P EST LOW 20 MIN: CPT | Performed by: NURSE PRACTITIONER

## 2021-01-28 RX ORDER — DONEPEZIL HYDROCHLORIDE 5 MG/1
10 TABLET, FILM COATED ORAL AT BEDTIME
COMMUNITY
Start: 2021-01-27

## 2021-01-28 RX ORDER — MUPIROCIN 20 MG/G
OINTMENT TOPICAL 3 TIMES DAILY
Qty: 30 G | Refills: 0 | Status: SHIPPED | OUTPATIENT
Start: 2021-01-28 | End: 2021-05-26

## 2021-01-28 RX ORDER — ESCITALOPRAM OXALATE 10 MG/1
20 TABLET ORAL EVERY EVENING
COMMUNITY
Start: 2021-01-19

## 2021-01-28 ASSESSMENT — MIFFLIN-ST. JEOR: SCORE: 955.81

## 2021-01-28 NOTE — NURSING NOTE
"Chief Complaint   Patient presents with     Pain     pt states she thinks she may have a cyst on her tailbone and is painful to sit onset x 1 week.     initial /74   Pulse 54   Temp 97.8  F (36.6  C) (Oral)   Resp 20   Ht 1.575 m (5' 2\")   Wt 51.3 kg (113 lb)   LMP  (LMP Unknown)   SpO2 100%   BMI 20.67 kg/m   Estimated body mass index is 20.67 kg/m  as calculated from the following:    Height as of this encounter: 1.575 m (5' 2\").    Weight as of this encounter: 51.3 kg (113 lb)..  bp completed using cuff size regular  JANE NICOLE LPN  "
No

## 2021-01-28 NOTE — PROGRESS NOTES
Assessment & Plan     Skin abrasion  Buttock   - mupirocin (BACTROBAN) 2 % external ointment; Apply topically 3 times daily              12 minutes spent on the date of the encounter doing chart review, history and exam, documentation and further activities as noted above           Patient Instructions   Bactroban to area of pain in bottom area     Should in improve in 7-10 days        No follow-ups on file.    MARTHA Herr CNP  [facility] NUZHAT Santos is a 75 year old who presents to clinic today for the following health issues     HPI   Chief Complaint   Patient presents with     Pain     pt states she thinks she may have a cyst on her tailbone and is painful to sit onset x 1 week.             Review of Systems   Constitutional, HEENT, cardiovascular, pulmonary, GI, , musculoskeletal, neuro, skin, endocrine and psych systems are negative, except as otherwise noted.      Objective    LMP  (LMP Unknown)   There is no height or weight on file to calculate BMI.  Physical Exam   GENERAL: alert and no distress- here with    RESP: lungs clear to auscultation - no rales, rhonchi or wheezes  CV: regular rate and rhythm, normal S1 S2, no S3 or S4, no murmur, click or rub, no peripheral edema and peripheral pulses strong  SKIN: no suspicious lesions or rashes and skin abrasion x 2 in buttock cheek above rectal area   No drainage- slight pink around the scraped area     PSYCH: mentation appears normal, affect normal/bright

## 2021-02-02 ENCOUNTER — ALLIED HEALTH/NURSE VISIT (OUTPATIENT)
Dept: NURSING | Facility: CLINIC | Age: 76
End: 2021-02-02
Payer: COMMERCIAL

## 2021-02-02 DIAGNOSIS — E53.8 VITAMIN B12 DEFICIENCY (NON ANEMIC): Primary | ICD-10-CM

## 2021-02-02 PROCEDURE — 96372 THER/PROPH/DIAG INJ SC/IM: CPT

## 2021-02-02 PROCEDURE — 99207 PR NO CHARGE NURSE ONLY: CPT

## 2021-02-02 RX ADMIN — CYANOCOBALAMIN 1000 MCG: 1000 INJECTION, SOLUTION INTRAMUSCULAR; SUBCUTANEOUS at 09:02

## 2021-02-16 ENCOUNTER — ALLIED HEALTH/NURSE VISIT (OUTPATIENT)
Dept: NURSING | Facility: CLINIC | Age: 76
End: 2021-02-16
Payer: COMMERCIAL

## 2021-02-16 DIAGNOSIS — R79.89 LOW VITAMIN B12 LEVEL: Primary | ICD-10-CM

## 2021-02-16 PROCEDURE — 96372 THER/PROPH/DIAG INJ SC/IM: CPT

## 2021-02-16 RX ADMIN — CYANOCOBALAMIN 1000 MCG: 1000 INJECTION, SOLUTION INTRAMUSCULAR; SUBCUTANEOUS at 09:53

## 2021-02-24 ENCOUNTER — OFFICE VISIT (OUTPATIENT)
Dept: INTERNAL MEDICINE | Facility: CLINIC | Age: 76
End: 2021-02-24
Payer: COMMERCIAL

## 2021-02-24 VITALS
OXYGEN SATURATION: 100 % | HEIGHT: 62 IN | RESPIRATION RATE: 18 BRPM | TEMPERATURE: 97.6 F | BODY MASS INDEX: 20.08 KG/M2 | WEIGHT: 109.1 LBS | HEART RATE: 69 BPM | DIASTOLIC BLOOD PRESSURE: 82 MMHG | SYSTOLIC BLOOD PRESSURE: 142 MMHG

## 2021-02-24 DIAGNOSIS — N39.41 URGE INCONTINENCE OF URINE: ICD-10-CM

## 2021-02-24 DIAGNOSIS — E78.5 HYPERLIPIDEMIA WITH TARGET LDL LESS THAN 100: ICD-10-CM

## 2021-02-24 DIAGNOSIS — R41.3 MEMORY DIFFICULTIES: ICD-10-CM

## 2021-02-24 DIAGNOSIS — I48.0 PAROXYSMAL ATRIAL FIBRILLATION (H): ICD-10-CM

## 2021-02-24 DIAGNOSIS — Z00.00 ENCOUNTER FOR MEDICARE ANNUAL WELLNESS EXAM: Primary | ICD-10-CM

## 2021-02-24 DIAGNOSIS — Z79.899 MEDICATION MANAGEMENT: ICD-10-CM

## 2021-02-24 DIAGNOSIS — R68.89 COLD INTOLERANCE: ICD-10-CM

## 2021-02-24 DIAGNOSIS — M81.0 AGE-RELATED OSTEOPOROSIS WITHOUT CURRENT PATHOLOGICAL FRACTURE: ICD-10-CM

## 2021-02-24 DIAGNOSIS — R13.10 DYSPHAGIA, UNSPECIFIED TYPE: ICD-10-CM

## 2021-02-24 PROBLEM — Z74.09 REDUCED MOBILITY: Status: ACTIVE | Noted: 2021-02-24

## 2021-02-24 PROBLEM — M72.0 DUPUYTREN'S CONTRACTURE: Status: ACTIVE | Noted: 2020-03-15

## 2021-02-24 LAB
ERYTHROCYTE [DISTWIDTH] IN BLOOD BY AUTOMATED COUNT: 13.9 % (ref 10–15)
HCT VFR BLD AUTO: 42.2 % (ref 35–47)
HGB BLD-MCNC: 12.9 G/DL (ref 11.7–15.7)
MCH RBC QN AUTO: 29.1 PG (ref 26.5–33)
MCHC RBC AUTO-ENTMCNC: 30.6 G/DL (ref 31.5–36.5)
MCV RBC AUTO: 95 FL (ref 78–100)
PLATELET # BLD AUTO: 185 10E9/L (ref 150–450)
RBC # BLD AUTO: 4.44 10E12/L (ref 3.8–5.2)
VIT B12 SERPL-MCNC: 572 PG/ML (ref 193–986)
WBC # BLD AUTO: 5.9 10E9/L (ref 4–11)

## 2021-02-24 PROCEDURE — 36415 COLL VENOUS BLD VENIPUNCTURE: CPT | Performed by: INTERNAL MEDICINE

## 2021-02-24 PROCEDURE — 80061 LIPID PANEL: CPT | Performed by: INTERNAL MEDICINE

## 2021-02-24 PROCEDURE — 99397 PER PM REEVAL EST PAT 65+ YR: CPT | Performed by: INTERNAL MEDICINE

## 2021-02-24 PROCEDURE — 84443 ASSAY THYROID STIM HORMONE: CPT | Performed by: INTERNAL MEDICINE

## 2021-02-24 PROCEDURE — 99214 OFFICE O/P EST MOD 30 MIN: CPT | Mod: 25 | Performed by: INTERNAL MEDICINE

## 2021-02-24 PROCEDURE — 82607 VITAMIN B-12: CPT | Performed by: INTERNAL MEDICINE

## 2021-02-24 PROCEDURE — 85027 COMPLETE CBC AUTOMATED: CPT | Performed by: INTERNAL MEDICINE

## 2021-02-24 PROCEDURE — 80053 COMPREHEN METABOLIC PANEL: CPT | Performed by: INTERNAL MEDICINE

## 2021-02-24 RX ORDER — PRAVASTATIN SODIUM 20 MG
20 TABLET ORAL AT BEDTIME
Qty: 90 TABLET | Refills: 3 | Status: SHIPPED | OUTPATIENT
Start: 2021-02-24 | End: 2021-05-26

## 2021-02-24 RX ORDER — METOPROLOL TARTRATE 25 MG/1
12.5 TABLET, FILM COATED ORAL DAILY
Qty: 45 TABLET | Refills: 3 | Status: SHIPPED | OUTPATIENT
Start: 2021-02-24 | End: 2021-07-21

## 2021-02-24 SDOH — SOCIAL STABILITY: SOCIAL INSECURITY: WITHIN THE LAST YEAR, HAVE YOU BEEN AFRAID OF YOUR PARTNER OR EX-PARTNER?: NO

## 2021-02-24 SDOH — SOCIAL STABILITY: SOCIAL NETWORK
DO YOU BELONG TO ANY CLUBS OR ORGANIZATIONS SUCH AS CHURCH GROUPS UNIONS, FRATERNAL OR ATHLETIC GROUPS, OR SCHOOL GROUPS?: NOT ASKED

## 2021-02-24 SDOH — SOCIAL STABILITY: SOCIAL NETWORK: HOW OFTEN DO YOU ATTEND CHURCH OR RELIGIOUS SERVICES?: NOT ASKED

## 2021-02-24 SDOH — SOCIAL STABILITY: SOCIAL INSECURITY
WITHIN THE LAST YEAR, HAVE YOU BEEN RAPED OR FORCED TO HAVE ANY KIND OF SEXUAL ACTIVITY BY YOUR PARTNER OR EX-PARTNER?: NO

## 2021-02-24 SDOH — SOCIAL STABILITY: SOCIAL NETWORK: IN A TYPICAL WEEK, HOW MANY TIMES DO YOU TALK ON THE PHONE WITH FAMILY, FRIENDS, OR NEIGHBORS?: NOT ASKED

## 2021-02-24 SDOH — ECONOMIC STABILITY: TRANSPORTATION INSECURITY
IN THE PAST 12 MONTHS, HAS LACK OF TRANSPORTATION KEPT YOU FROM MEETINGS, WORK, OR FROM GETTING THINGS NEEDED FOR DAILY LIVING?: NO

## 2021-02-24 SDOH — SOCIAL STABILITY: SOCIAL NETWORK: ARE YOU MARRIED, WIDOWED, DIVORCED, SEPARATED, NEVER MARRIED, OR LIVING WITH A PARTNER?: MARRIED

## 2021-02-24 SDOH — SOCIAL STABILITY: SOCIAL INSECURITY: WITHIN THE LAST YEAR, HAVE YOU BEEN HUMILIATED OR EMOTIONALLY ABUSED IN OTHER WAYS BY YOUR PARTNER OR EX-PARTNER?: NO

## 2021-02-24 SDOH — SOCIAL STABILITY: SOCIAL NETWORK: HOW OFTEN DO YOU GET TOGETHER WITH FRIENDS OR RELATIVES?: NOT ASKED

## 2021-02-24 SDOH — SOCIAL STABILITY: SOCIAL INSECURITY
WITHIN THE LAST YEAR, HAVE YOU BEEN KICKED, HIT, SLAPPED, OR OTHERWISE PHYSICALLY HURT BY YOUR PARTNER OR EX-PARTNER?: NO

## 2021-02-24 SDOH — SOCIAL STABILITY: SOCIAL NETWORK: HOW OFTEN DO YOU ATTEND MEETINGS OF THE CLUBS OR ORGANIZATIONS YOU BELONG TO?: NOT ASKED

## 2021-02-24 SDOH — SOCIAL STABILITY: SOCIAL NETWORK: HOW OFTEN DO YOU ATTENT MEETINGS OF THE CLUB OR ORGANIZATION YOU BELONG TO?: NOT ASKED

## 2021-02-24 SDOH — SOCIAL STABILITY: SOCIAL NETWORK
DO YOU BELONG TO ANY CLUBS OR ORGANIZATIONS SUCH AS CHURCH GROUPS, UNIONS, FRATERNAL OR ATHLETIC GROUPS, OR SCHOOL GROUPS?: NOT ASKED

## 2021-02-24 SDOH — ECONOMIC STABILITY: FOOD INSECURITY: WITHIN THE PAST 12 MONTHS, THE FOOD YOU BOUGHT JUST DIDN'T LAST AND YOU DIDN'T HAVE MONEY TO GET MORE.: NEVER TRUE

## 2021-02-24 SDOH — SOCIAL STABILITY: SOCIAL INSECURITY
WITHIN THE LAST YEAR, HAVE TO BEEN RAPED OR FORCED TO HAVE ANY KIND OF SEXUAL ACTIVITY BY YOUR PARTNER OR EX-PARTNER?: NO

## 2021-02-24 SDOH — SOCIAL STABILITY: SOCIAL INSECURITY: ARE YOU MARRIED, WIDOWED, DIVORCED, SEPARATED, NEVER MARRIED, OR LIVING WITH A PARTNER?: MARRIED

## 2021-02-24 SDOH — ECONOMIC STABILITY: FOOD INSECURITY: WITHIN THE PAST 12 MONTHS, YOU WORRIED THAT YOUR FOOD WOULD RUN OUT BEFORE YOU GOT THE MONEY TO BUY MORE.: NEVER TRUE

## 2021-02-24 SDOH — ECONOMIC STABILITY: TRANSPORTATION INSECURITY: IN THE PAST 12 MONTHS, HAS LACK OF TRANSPORTATION KEPT YOU FROM MEDICAL APPOINTMENTS OR FROM GETTING MEDICATIONS?: NO

## 2021-02-24 SDOH — ECONOMIC STABILITY: TRANSPORTATION INSECURITY
IN THE PAST 12 MONTHS, HAS THE LACK OF TRANSPORTATION KEPT YOU FROM MEDICAL APPOINTMENTS OR FROM GETTING MEDICATIONS?: NO

## 2021-02-24 SDOH — ECONOMIC STABILITY: FOOD INSECURITY: HOW HARD IS IT FOR YOU TO PAY FOR THE VERY BASICS LIKE FOOD, HOUSING, MEDICAL CARE, AND HEATING?: NOT HARD AT ALL

## 2021-02-24 SDOH — ECONOMIC STABILITY: FOOD INSECURITY: WITHIN THE PAST 12 MONTHS, YOU WORRIED THAT YOUR FOOD WOULD RUN OUT BEFORE YOU GOT MONEY TO BUY MORE.: NEVER TRUE

## 2021-02-24 SDOH — ECONOMIC STABILITY: INCOME INSECURITY: HOW HARD IS IT FOR YOU TO PAY FOR THE VERY BASICS LIKE FOOD, HOUSING, MEDICAL CARE, AND HEATING?: NOT HARD AT ALL

## 2021-02-24 ASSESSMENT — MIFFLIN-ST. JEOR: SCORE: 938.12

## 2021-02-24 ASSESSMENT — ACTIVITIES OF DAILY LIVING (ADL): LACK_OF_TRANSPORTATION: NO

## 2021-02-24 NOTE — PROGRESS NOTES
SUBJECTIVE:   Danielle Bryan is a 75 year old female who presents for Preventive Visit.      Patient has been advised of split billing requirements and indicates understanding: Yes   Are you in the first 12 months of your Medicare coverage?  No    HPI  Do you feel safe in your environment? Yes    Have you ever done Advance Care Planning? (For example, a Health Directive, POLST, or a discussion with a medical provider or your loved ones about your wishes): No, advance care planning information given to patient to review.  Patient declined advance care planning discussion at this time.       Fall risk  Fallen 2 or more times in the past year?: No  Any fall with injury in the past year?: No    Cognitive Screening   Patient has known cognitive decline and is being followed by Neurology  1) Repeat 3 items (Leader, Season, Table)    2) Clock draw: ABNORMAL   3) 3 item recall: Recalls 3 objects  Results: 3 items recalled: COGNITIVE IMPAIRMENT NOTED  Mini-CogTM Copyright CHELSEA Mendez. Licensed by the author for use in [facility]; reprinted with permission (raghav@[facility]). All rights reserved.      Do you have sleep apnea, excessive snoring or daytime drowsiness?: no    Reviewed and updated as needed this visit by clinical staff                 Reviewed and updated as needed this visit by Provider                Social History     Tobacco Use     Smoking status: Never Smoker     Smokeless tobacco: Never Used   Substance Use Topics     Alcohol use: No     Frequency: Never         Alcohol Use 12/14/2016   Prescreen: >3 drinks/day or >7 drinks/week? The patient does not drink >3 drinks per day nor >7 drinks per week.         PROBLEMS TO ADD ON...In addition to an Annual Wellness Exam, we addressed paroxysmal atrial fibrillation, urge incontinence, dysphagia, osteoporosis, hyperlipidemia, and memory impairment.      She remains on chronic oral anticoagulation due to paroxysmal atrial fibrillation. No recent sustained  palpitations.     The patient is tolerating her current medications without any adverse effects.   We agreed to update her lipid panel.   She has had cold intolerance, and we are checking a TSH.     Her  notes that her cognition and behavior has been stable. She follows with Neurology. Her neurologist prescribes Lexapro and Aricept. We agreed to check some labs to assure no reversible causes.      She has chronic urinary urgency to the point of incontinence. She wears a pad. She is offered Urology consult if interested in further evaluation.     She has chronic dysphagia rendering her intolerant of oral bisphosphonates. She continues on Prolia which was started by endocrinology.       Current providers sharing in care for this patient include:   Patient Care Team:  Colt Riley MD as PCP - General (Internal Medicine)  Colt Riley MD as Assigned PCP  Mikal Venegas DPM as Assigned Musculoskeletal Provider  Aaron Batista MD as Assigned Surgical Provider    The following health maintenance items are reviewed in Epic and correct as of today:  Health Maintenance   Topic Date Due     ZOSTER IMMUNIZATION (2 of 3) 02/26/2008     MEDICARE ANNUAL WELLNESS VISIT  09/18/2019     COVID-19 Vaccine (2 of 2 - Moderna series) 02/25/2021     FALL RISK ASSESSMENT  09/23/2021     COLORECTAL CANCER SCREENING  01/15/2024     LIPID  11/13/2024     ADVANCE CARE PLANNING  09/23/2025     DTAP/TDAP/TD IMMUNIZATION (6 - Td) 12/10/2028     DEXA  11/09/2035     HEPATITIS C SCREENING  Completed     PHQ-2  Completed     INFLUENZA VACCINE  Completed     Pneumococcal Vaccine: 65+ Years  Completed     Pneumococcal Vaccine: Pediatrics (0 to 5 Years) and At-Risk Patients (6 to 64 Years)  Aged Out     IPV IMMUNIZATION  Aged Out     MENINGITIS IMMUNIZATION  Aged Out     HEPATITIS B IMMUNIZATION  Aged Out     Mammogram Screening - Patient over age 75, has elected to discontinue screenings.    Review of Systems  Review of  "systems: Likely unreliable due to cognitive limitations. Patient tends to answer no to all questions and has done so for years. Attendant provides no concerns on her behalf except as noted above.     OBJECTIVE:   Vitals: BP (!) 142/82   Pulse 69   Temp 97.6  F (36.4  C) (Oral)   Resp 18   Ht 1.575 m (5' 2\")   Wt 49.5 kg (109 lb 1.6 oz)   LMP  (LMP Unknown)   SpO2 100%   BMI 19.95 kg/m    BMI= Body mass index is 19.95 kg/m .     Physical Exam  GENERAL: healthy, alert and no distress  EYES: Eyes grossly normal to inspection, PERRL and conjunctivae and sclerae normal  HENT: ear canals and TM's normal, nose and mouth without ulcers or lesions  NECK: no adenopathy, no asymmetry, masses, or scars and thyroid normal to palpation  RESP: lungs clear to auscultation - no rales, rhonchi or wheezes  CV: regular rate and rhythm, normal S1 S2, no S3 or S4, no murmur, click or rub, no peripheral edema and peripheral pulses strong  ABDOMEN: soft, nontender, no hepatosplenomegaly, no masses and bowel sounds normal  MS: no gross musculoskeletal defects noted, no edema  SKIN: no suspicious lesions or rashes  NEURO: Normal strength and tone, mentation intact and speech normal  PSYCH: mentation appears normal, affect normal/bright    Diagnostic Test Results:  Labs reviewed in Epic    ASSESSMENT / PLAN:   (Z00.00) Encounter for Medicare annual wellness exam  (primary encounter diagnosis)  Comment: Stable health. See epic orders.     (I48.0) Paroxysmal atrial fibrillation (H)  Comment: Stable. In sinus rhythm. Continue current meds.   Plan: metoprolol tartrate (LOPRESSOR) 25 MG tablet,         rivaroxaban ANTICOAGULANT (XARELTO         ANTICOAGULANT) 15 MG TABS tablet, OFFICE/OUTPT         VISIT,EST,LEVL IV          (E78.5) Hyperlipidemia with target LDL less than 100  Comment: Update lipids. Continue current meds.  Plan: pravastatin (PRAVACHOL) 20 MG tablet,         OFFICE/OUTPT VISIT,EST,LEVL IV, Comprehensive         metabolic " "panel, Lipid panel reflex to direct         LDL Fasting          (N39.41) Urge incontinence of urine  Comment: Offered Urology consult.   Plan: UROLOGY ADULT REFERRAL, OFFICE/OUTPT         VISIT,EST,LEVL IV          (R13.10) Dysphagia, unspecified type  Comment: F/u with GI if symptoms worsen.   Plan: OFFICE/OUTPT VISIT,EST,LEVL IV          (M81.0) Age-related osteoporosis without current pathological fracture  Comment: Continue current measures.   Plan: OFFICE/OUTPT VISIT,EST,LEVL IV          (R41.3) Memory difficulties  Comment: See epic orders. F/u with Neurology as they advise.   Plan: OFFICE/OUTPT VISIT,EST,LEVL IV, TSH with free         T4 reflex, CBC with platelets, Vitamin B12          (R68.89) Cold intolerance  Plan: TSH with free T4 reflex          (Z79.899) Medication management      Patient has been advised of split billing requirements and indicates understanding: Yes  COUNSELING:  Reviewed preventive health counseling, as reflected in patient instructions    Estimated body mass index is 20.67 kg/m  as calculated from the following:    Height as of 1/28/21: 1.575 m (5' 2\").    Weight as of 1/28/21: 51.3 kg (113 lb).    Weight management plan noted, stable and monitoring    She reports that she has never smoked. She has never used smokeless tobacco.      Appropriate preventive services were discussed with this patient, including applicable screening as appropriate for cardiovascular disease, diabetes, osteopenia/osteoporosis, and glaucoma.  As appropriate for age/gender, discussed screening for colorectal cancer, prostate cancer, breast cancer, and cervical cancer. Checklist reviewing preventive services available has been given to the patient.    Reviewed patients plan of care and provided an AVS. The Basic Care Plan (routine screening as documented in Health Maintenance) for Danielle meets the Care Plan requirement. This Care Plan has been established and reviewed with the Patient and " spouse.    Counseling Resources:  ATP IV Guidelines  Pooled Cohorts Equation Calculator  Breast Cancer Risk Calculator  Breast Cancer: Medication to Reduce Risk  FRAX Risk Assessment  ICSI Preventive Guidelines  Dietary Guidelines for Americans, 2010  USDA's MyPlate  ASA Prophylaxis  Lung CA Screening    Colt Riley MD,   [facility]      Patient Instructions     You might try either crushing the pills, putting them in food, applesauce or pudding, or just taking large amounts of water. The GI specialist at [facility] might have other ideas.     Contact information provided for Urology if interested in more evaluation of leaky urine.     Everything looks fine!    Refills of medications have been faxed to your pharmacy.     Lab results should be available soon on [employer].     See me in a year, sooner if problems.

## 2021-02-24 NOTE — PATIENT INSTRUCTIONS
Patient Education   Personalized Prevention Plan  You are due for the preventive services outlined below.  Your care team is available to assist you in scheduling these services.  If you have already completed any of these items, please share that information with your care team to update in your medical record.  Health Maintenance Due   Topic Date Due     Zoster (Shingles) Vaccine (2 of 3) 02/26/2008     Annual Wellness Visit  09/18/2019     COVID-19 Vaccine (2 of 2 - Moderna series) 02/25/2021           You might try either crushing the pills, putting them in food, applesauce or pudding, or just taking large amounts of water. The GI specialist at [facility] might have other ideas.     Contact information provided for Urology if interested in more evaluation of leaky urine.     Everything looks fine!    Refills of medications have been faxed to your pharmacy.     Lab results should be available soon on [employer].     See me in a year, sooner if problems.

## 2021-02-25 LAB
ALBUMIN SERPL-MCNC: 3.9 G/DL (ref 3.4–5)
ALP SERPL-CCNC: 41 U/L (ref 40–150)
ALT SERPL W P-5'-P-CCNC: 31 U/L (ref 0–50)
ANION GAP SERPL CALCULATED.3IONS-SCNC: 7 MMOL/L (ref 3–14)
AST SERPL W P-5'-P-CCNC: 26 U/L (ref 0–45)
BILIRUB SERPL-MCNC: 0.8 MG/DL (ref 0.2–1.3)
BUN SERPL-MCNC: 14 MG/DL (ref 7–30)
CALCIUM SERPL-MCNC: 9.4 MG/DL (ref 8.5–10.1)
CHLORIDE SERPL-SCNC: 110 MMOL/L (ref 94–109)
CHOLEST SERPL-MCNC: 239 MG/DL
CO2 SERPL-SCNC: 26 MMOL/L (ref 20–32)
CREAT SERPL-MCNC: 0.75 MG/DL (ref 0.52–1.04)
GFR SERPL CREATININE-BSD FRML MDRD: 77 ML/MIN/{1.73_M2}
GLUCOSE SERPL-MCNC: 89 MG/DL (ref 70–99)
HDLC SERPL-MCNC: 107 MG/DL
LDLC SERPL CALC-MCNC: 105 MG/DL
NONHDLC SERPL-MCNC: 132 MG/DL
POTASSIUM SERPL-SCNC: 3.8 MMOL/L (ref 3.4–5.3)
PROT SERPL-MCNC: 7.2 G/DL (ref 6.8–8.8)
SODIUM SERPL-SCNC: 143 MMOL/L (ref 133–144)
TRIGL SERPL-MCNC: 134 MG/DL
TSH SERPL DL<=0.005 MIU/L-ACNC: 1.73 MU/L (ref 0.4–4)

## 2021-03-04 ENCOUNTER — ALLIED HEALTH/NURSE VISIT (OUTPATIENT)
Dept: NURSING | Facility: CLINIC | Age: 76
End: 2021-03-04
Payer: COMMERCIAL

## 2021-03-04 DIAGNOSIS — E53.8 VITAMIN B12 DEFICIENCY (NON ANEMIC): Primary | ICD-10-CM

## 2021-03-04 PROCEDURE — 96372 THER/PROPH/DIAG INJ SC/IM: CPT

## 2021-03-04 RX ORDER — CYANOCOBALAMIN 1000 UG/ML
1000 INJECTION, SOLUTION INTRAMUSCULAR; SUBCUTANEOUS
Status: DISCONTINUED | OUTPATIENT
Start: 2021-03-04 | End: 2021-05-26

## 2021-03-04 RX ADMIN — CYANOCOBALAMIN 1000 MCG: 1000 INJECTION, SOLUTION INTRAMUSCULAR; SUBCUTANEOUS at 09:56

## 2021-03-12 ENCOUNTER — TELEPHONE (OUTPATIENT)
Dept: INTERNAL MEDICINE | Facility: CLINIC | Age: 76
End: 2021-03-12

## 2021-03-12 DIAGNOSIS — T45.8X5S ADVERSE EFFECT OF BISPHOSPHONATE, SEQUELA: ICD-10-CM

## 2021-03-12 DIAGNOSIS — Z92.29 HISTORY OF BISPHOSPHONATE THERAPY: ICD-10-CM

## 2021-03-12 DIAGNOSIS — M81.0 AGE-RELATED OSTEOPOROSIS WITHOUT CURRENT PATHOLOGICAL FRACTURE: Primary | ICD-10-CM

## 2021-03-12 DIAGNOSIS — T50.995D ADVERSE EFFECT OF OTHER DRUGS, MEDICAMENTS AND BIOLOGICAL SUBSTANCES, SUBSEQUENT ENCOUNTER: ICD-10-CM

## 2021-03-12 DIAGNOSIS — T50.995S ADVERSE EFFECT OF OTHER DRUGS, MEDICAMENTS AND BIOLOGICAL SUBSTANCES, SEQUELA: ICD-10-CM

## 2021-03-12 NOTE — TELEPHONE ENCOUNTER
Due for next Prolia injection 4/12/21. Previous lab results copied below. Please advise if lab needed before next injection.     Component      Latest Ref Rng & Units 2/24/2021   Calcium      8.5 - 10.1 mg/dL 9.4     Component      Latest Ref Rng & Units 9/29/2020   Magnesium      1.6 - 2.3 mg/dL 2.3   Phosphorus      2.5 - 4.5 mg/dL 3.5

## 2021-03-16 ENCOUNTER — ALLIED HEALTH/NURSE VISIT (OUTPATIENT)
Dept: NURSING | Facility: CLINIC | Age: 76
End: 2021-03-16
Payer: COMMERCIAL

## 2021-03-16 DIAGNOSIS — E53.8 VITAMIN B12 DEFICIENCY (NON ANEMIC): Primary | ICD-10-CM

## 2021-03-16 PROCEDURE — 96372 THER/PROPH/DIAG INJ SC/IM: CPT

## 2021-03-16 RX ADMIN — CYANOCOBALAMIN 1000 MCG: 1000 INJECTION, SOLUTION INTRAMUSCULAR; SUBCUTANEOUS at 09:37

## 2021-03-30 ENCOUNTER — ALLIED HEALTH/NURSE VISIT (OUTPATIENT)
Dept: NURSING | Facility: CLINIC | Age: 76
End: 2021-03-30
Payer: COMMERCIAL

## 2021-03-30 DIAGNOSIS — E53.8 VITAMIN B12 DEFICIENCY (NON ANEMIC): Primary | ICD-10-CM

## 2021-03-30 PROCEDURE — 96372 THER/PROPH/DIAG INJ SC/IM: CPT

## 2021-03-30 RX ADMIN — CYANOCOBALAMIN 1000 MCG: 1000 INJECTION, SOLUTION INTRAMUSCULAR; SUBCUTANEOUS at 08:34

## 2021-04-13 ENCOUNTER — ALLIED HEALTH/NURSE VISIT (OUTPATIENT)
Dept: NURSING | Facility: CLINIC | Age: 76
End: 2021-04-13
Payer: COMMERCIAL

## 2021-04-13 DIAGNOSIS — E53.8 VITAMIN B12 DEFICIENCY (NON ANEMIC): Primary | ICD-10-CM

## 2021-04-13 PROCEDURE — 96372 THER/PROPH/DIAG INJ SC/IM: CPT

## 2021-04-13 RX ADMIN — CYANOCOBALAMIN 1000 MCG: 1000 INJECTION, SOLUTION INTRAMUSCULAR; SUBCUTANEOUS at 09:33

## 2021-04-14 ENCOUNTER — TRANSFERRED RECORDS (OUTPATIENT)
Dept: HEALTH INFORMATION MANAGEMENT | Facility: CLINIC | Age: 76
End: 2021-04-14

## 2021-04-15 ENCOUNTER — TELEPHONE (OUTPATIENT)
Dept: INTERNAL MEDICINE | Facility: CLINIC | Age: 76
End: 2021-04-15

## 2021-04-15 NOTE — TELEPHONE ENCOUNTER
Patient's  Karl calling (on C2C) to ask if patient should be taking pravastatin. Patient had an appointment with her neurologist yesterday, Dr Ledesma at [facility], who thought she did not need to be on this medication. Karl would like to get Dr Riley's thoughts on that. Please call Karl back at [phone]   no

## 2021-04-15 NOTE — TELEPHONE ENCOUNTER
Please see message below.    Recent Labs   Lab Test 02/24/21  1128 11/13/19  0847 11/16/15  0937 11/16/15  0937 03/02/15  0812   CHOL 239* 205*   < > 199 175    90   < > 96 98   * 96   < > 89 58   TRIG 134 93   < > 72 94   CHOLHDLRATIO  --   --   --  2.1 1.8    < > = values in this interval not displayed.       Please advise, thanks.

## 2021-04-16 NOTE — TELEPHONE ENCOUNTER
Contacted patient and spoke to her  Karl, on consent to communicate. Patient has B12 injection on 5/11, offered to do Prolia the same day. He spoke to patient and they agree to do Prolia on 5/11/21. Appointment scheduled.

## 2021-04-16 NOTE — TELEPHONE ENCOUNTER
Patient due for next Prolia injection and needs orders placed. Order pended and sent to provider to review.

## 2021-04-16 NOTE — PATIENT INSTRUCTIONS
PROLIA  Risk Evaluation and Mitigation Strategy Best Practice Advisory    In May 2015, the FDA required an update to the PROLIA  (denosumab) REMS (Risk Evaluation and Mitigation Strategy) to inform both providers and patients about potential serious risks related to PROLIA .    These potential serious risks include:    Hypocalcemia    Osteonecrosis of the jaw    Atypical femoral fractures    Serious Infections    Dermatologic reactions    To ensure compliance with these requirements [facility] has developed a workflow for those patients who will receive PROLIA  at clinic.  This workflow includes insurance verification, patient scheduling, patient education, and documentation. Registered Nurses in the clinic should have completed eLearning related to the REMS and the clinic workflow.  Refer to them to initiate this process.  This workflow does not need to be executed if the patient is to receive PROLIA  injection at [facility] [place].       The  has put together a Patient Education Toolkit.  Copies of these handouts may be available your clinic. Patients must receive the Patient Brochure and Medication Guide when receiving injection at clinic.  These will be attached to the injection ordered from [facility] [facility] to the clinic. Links to handouts:  Patient Counseling Chart for Healthcare Providers  Patient Brochure  Prescribing Information  Medication Guide    If you are having trouble accessing the above links, these documents can be found at: [email or web address]    The role of healthcare provider includes reviewing patient education materials with the patient, advising patients to seek medical attention if they have signs or symptoms of one of the serious risks, and provide patients a copy of the Patient Brochure and Medication Guide when receiving their injection.      Due to the risk of  hypocalcemia the Prescribing Information for PROLIA  recommends that calcium and mineral levels (magnesium and phosphorus) be checked within 14 days of injection for those predisposed to hypocalcemia.

## 2021-04-27 ENCOUNTER — ALLIED HEALTH/NURSE VISIT (OUTPATIENT)
Dept: NURSING | Facility: CLINIC | Age: 76
End: 2021-04-27
Payer: COMMERCIAL

## 2021-04-27 DIAGNOSIS — E53.8 VITAMIN B12 DEFICIENCY (NON ANEMIC): Primary | ICD-10-CM

## 2021-04-27 PROCEDURE — 96372 THER/PROPH/DIAG INJ SC/IM: CPT

## 2021-04-27 RX ADMIN — CYANOCOBALAMIN 1000 MCG: 1000 INJECTION, SOLUTION INTRAMUSCULAR; SUBCUTANEOUS at 13:47

## 2021-04-29 ENCOUNTER — TELEPHONE (OUTPATIENT)
Dept: INTERNAL MEDICINE | Facility: CLINIC | Age: 76
End: 2021-04-29

## 2021-04-29 DIAGNOSIS — T50.995D ADVERSE EFFECT OF OTHER DRUGS, MEDICAMENTS AND BIOLOGICAL SUBSTANCES, SUBSEQUENT ENCOUNTER: ICD-10-CM

## 2021-04-29 DIAGNOSIS — T45.8X5S ADVERSE EFFECT OF BISPHOSPHONATE, SEQUELA: Primary | ICD-10-CM

## 2021-04-29 DIAGNOSIS — T50.995S ADVERSE EFFECT OF OTHER DRUGS, MEDICAMENTS AND BIOLOGICAL SUBSTANCES, SEQUELA: ICD-10-CM

## 2021-04-29 DIAGNOSIS — Z92.29 HISTORY OF BISPHOSPHONATE THERAPY: ICD-10-CM

## 2021-04-29 DIAGNOSIS — M81.0 AGE-RELATED OSTEOPOROSIS WITHOUT CURRENT PATHOLOGICAL FRACTURE: ICD-10-CM

## 2021-04-29 NOTE — TELEPHONE ENCOUNTER
Patient's order for Prolia requires a second diagnosis for insurance, patient scheduled for Prolia appointment on 5/11/21.     Per message from Angela with [employer] team:   Hello,   This patient is scheduled for prolia.  With their insurance coverage, it follows Medicare's NGS policy.  Currently we have the DX M81.0 but this request will need an additional diagnosis to support why this patient is not appropriate for other therapies.       M81.8 (ICD-10-CM) - Other osteoporosis without current pathological fracture (**covered as a single code)     Z92.29 - Personal history of other drug therapy   T50.995S - Adverse effect of other drugs, medicaments and biological substances, sequela   T88.7XXS - Unspecified adverse effect of drug or medicament, sequela   Z88.8 - Allergy status to other drugs, medicaments and biological substances status     N18.3 - Chronic kidney disease, stage 3 (moderate)   N18.4 - Chronic kidney disease, stage 4 (severe)   N18.5 - Chronic kidney disease, stage 5     Would a second diagnosis code apply to this patient? If so, can you please have it added to the order?   Thank you,   Kimberly

## 2021-05-11 ENCOUNTER — ALLIED HEALTH/NURSE VISIT (OUTPATIENT)
Dept: NURSING | Facility: CLINIC | Age: 76
End: 2021-05-11
Payer: COMMERCIAL

## 2021-05-11 DIAGNOSIS — M81.0 AGE-RELATED OSTEOPOROSIS WITHOUT CURRENT PATHOLOGICAL FRACTURE: Primary | ICD-10-CM

## 2021-05-11 DIAGNOSIS — E53.8 VITAMIN B12 DEFICIENCY (NON ANEMIC): Primary | ICD-10-CM

## 2021-05-11 PROCEDURE — 96372 THER/PROPH/DIAG INJ SC/IM: CPT | Mod: 59

## 2021-05-11 PROCEDURE — 96372 THER/PROPH/DIAG INJ SC/IM: CPT

## 2021-05-11 RX ADMIN — CYANOCOBALAMIN 1000 MCG: 1000 INJECTION, SOLUTION INTRAMUSCULAR; SUBCUTANEOUS at 09:00

## 2021-05-11 NOTE — PROGRESS NOTES
Clinic Administered Medication Documentation      Prolia Documentation    Prior to injection, verified patient identity using patient's name and date of birth. Medication was administered. Please see MAR and medication order for additional information. Patient instructed to remain in clinic for 15 minutes.    Indication: Prolia  (denosumab) is a prescription medicine used to treat osteoporosis in patients who:     Are at high risk for fracture, meaning patients who have had a fracture related to osteoporosis, or who have multiple risk factors for fracture.    Cannot use another osteoporosis medicine or other osteoporosis medicines did not work well.    The timeline for early/late injections would be 4 weeks early and any time after the 6 month cecilia. If a patient receives their injection late, then the subsequent injection would be 6 months from the date that they actually received the injection.    1.  When was the last injection?  10/12/20  2.  Did they check with their insurance for this calendar year?  yes  3.  Is there an order in the chart?  Yes  4.  Has the patient had dental work involving the bone in the past month or will have work in the next 6 months?  no    The following steps were completed to comply with the REMS program for Prolia:    Reviewed information in the Medication Guide and Patient Counseling Chart, including the serious risks of Prolia  and the symptoms of each risk.    Advised patient to seek prompt medical attention if they have signs or symptoms of any of the serious risks.    Provided each patient a copy of the Medication Guide and Patient Brochure.    Was entire vial of medication used? Yes  Vial/Syringe: Syringe  Expiration Date:  05/23  Was this medication supplied by the patient? No

## 2021-05-11 NOTE — NURSING NOTE
Here for B 12 injection .    See MAR .Due to injection administration, patient instructed to remain in clinic for 15 minutes  afterwards, and to report any adverse reaction to me immediately.    REMY Balderas LPN

## 2021-05-25 ENCOUNTER — ALLIED HEALTH/NURSE VISIT (OUTPATIENT)
Dept: NURSING | Facility: CLINIC | Age: 76
End: 2021-05-25
Payer: COMMERCIAL

## 2021-05-25 DIAGNOSIS — E53.8 VITAMIN B12 DEFICIENCY (NON ANEMIC): Primary | ICD-10-CM

## 2021-05-25 PROCEDURE — 96372 THER/PROPH/DIAG INJ SC/IM: CPT

## 2021-05-25 RX ADMIN — CYANOCOBALAMIN 1000 MCG: 1000 INJECTION, SOLUTION INTRAMUSCULAR; SUBCUTANEOUS at 09:08

## 2021-05-25 NOTE — NURSING NOTE
Clinic Administered Medication Documentation      Injectable Medication Documentation    Patient was given Cyanocobalamin (B-12). Prior to medication administration, verified patients identity using patient s name and date of birth. Please see MAR and medication order for additional information. Patient instructed to stay in clinic after the injection but patient declined.      Was entire vial of medication used? Yes  Vial/Syringe: Single dose vial  Expiration Date:  09/01/2022  Was this medication supplied by the patient? No

## 2021-05-26 ENCOUNTER — OFFICE VISIT (OUTPATIENT)
Dept: INTERNAL MEDICINE | Facility: CLINIC | Age: 76
End: 2021-05-26
Payer: COMMERCIAL

## 2021-05-26 ENCOUNTER — ANCILLARY PROCEDURE (OUTPATIENT)
Dept: GENERAL RADIOLOGY | Facility: CLINIC | Age: 76
End: 2021-05-26
Attending: NURSE PRACTITIONER
Payer: COMMERCIAL

## 2021-05-26 VITALS
BODY MASS INDEX: 21.35 KG/M2 | OXYGEN SATURATION: 98 % | DIASTOLIC BLOOD PRESSURE: 78 MMHG | HEIGHT: 62 IN | RESPIRATION RATE: 18 BRPM | HEART RATE: 62 BPM | SYSTOLIC BLOOD PRESSURE: 136 MMHG | TEMPERATURE: 98.3 F | WEIGHT: 116 LBS

## 2021-05-26 DIAGNOSIS — L03.116 CELLULITIS OF LEFT LOWER EXTREMITY: ICD-10-CM

## 2021-05-26 DIAGNOSIS — M79.672 LEFT FOOT PAIN: Primary | ICD-10-CM

## 2021-05-26 PROCEDURE — 99214 OFFICE O/P EST MOD 30 MIN: CPT | Performed by: NURSE PRACTITIONER

## 2021-05-26 PROCEDURE — 73630 X-RAY EXAM OF FOOT: CPT | Mod: LT | Performed by: RADIOLOGY

## 2021-05-26 RX ORDER — CEPHALEXIN 500 MG/1
500 CAPSULE ORAL 2 TIMES DAILY
Qty: 14 CAPSULE | Refills: 0 | Status: SHIPPED | OUTPATIENT
Start: 2021-05-26 | End: 2022-07-11

## 2021-05-26 ASSESSMENT — MIFFLIN-ST. JEOR: SCORE: 964.42

## 2021-05-26 NOTE — PATIENT INSTRUCTIONS
Go to radiology for left foot x-ray    Soak foot in warm Epsom salt    Appears to have Cellulitis (infection under the skin) left shin area so will treat with antibiotic called Keflex (Cephlexiin) 500 mg twice daily x 1 week.  Monitor the redness and make sure it is going down (marked on skin).    Ok to take Tylenol as needed for pain.

## 2021-05-26 NOTE — NURSING NOTE
"Chief Complaint   Patient presents with     Bleeding/Bruising     pt has bruising on her left foot and leg x 1 week ago     initial BP (!) 140/82   Pulse 62   Temp 98.3  F (36.8  C) (Oral)   Resp 18   Ht 1.575 m (5' 2\")   Wt 52.6 kg (116 lb)   LMP  (LMP Unknown)   SpO2 98%   BMI 21.22 kg/m   Estimated body mass index is 21.22 kg/m  as calculated from the following:    Height as of this encounter: 1.575 m (5' 2\").    Weight as of this encounter: 52.6 kg (116 lb)..  bp completed using cuff size regular  JANE NICOLE LPN  "

## 2021-05-26 NOTE — LETTER
June 1, 2021      Danielle Bryan  [address]  [place] [address]              Dear Danielle,      No evidence of broken bone but she does have some arthritis in her foot.      Results for orders placed or performed in visit on 05/26/21   XR Foot Left G/E 3 Views     Status: None    Narrative    FOOT LEFT THREE OR MORE VIEWS   5/26/2021 9:26 AM     HISTORY: Bruising on foot;  suspects she kicked something (has  memory issues). Left foot pain.    COMPARISON: None.      Impression    IMPRESSION: Moderate first MTP degenerative changes. No evidence of  acute fracture. Vascular calcifications.    KYLER LIEBERMAN MD         Sincerely,      Angela Tinajero CNP

## 2021-05-26 NOTE — PROGRESS NOTES
"    Assessment & Plan     Left foot pain  - etiology unknown so will R/o fracture  - suspect just bruising as she is on Xarelto (a blood thinner)  - XR Foot Left G/E 3 Views  - soak in warm Epsom salt  - ok for Tylenol as needed for pain    Cellulitis of left lower extremity  - left shin area so will treat with antibiotic:  - cephALEXin (KEFLEX) 500 MG capsule; Take 1 capsule (500 mg) by mouth 2 times daily for 7 days    :[id number]}     Patient Instructions   Go to radiology for left foot x-ray    Soak foot in warm Epsom salt    Appears to have Cellulitis (infection under the skin) left shin area so will treat with antibiotic called Keflex (Cephlexiin) 500 mg twice daily x 1 week.  Monitor the redness and make sure it is going down (marked on skin).    Ok to take Tylenol as needed for pain.      Return if symptoms worsen or fail to improve.    Angela Tinajero CNP  [facility] NUZHAT Santos is a 76 year old who presents for the following health issues     HPI     Today's visit is for concerns about bruising on left leg and foot x 1 week.  Accompanied by  due to her memory; on Aricept.  Per , she can get upset with me and will kick.  Afraid she might have injured her foot so requesting an x-ray.  Also has a red warm spot on the shin.    No fever. No shortness of breathe or chest pain.     Review of Systems   Constitutional, HEENT, cardiovascular, pulmonary, gi and gu systems are negative, except as otherwise noted.      Objective    BP (!) 140/82   Pulse 62   Temp 98.3  F (36.8  C) (Oral)   Resp 18   Ht 1.575 m (5' 2\")   Wt 52.6 kg (116 lb)   LMP  (LMP Unknown)   SpO2 98%   BMI 21.22 kg/m    Body mass index is 21.22 kg/m .     Physical Exam   GENERAL: alert and no distress; accompanied by  who is her caregiver  RESP: lungs clear to auscultation - no rales, rhonchi or wheezes  CV: regular rate and rhythm, normal S1 S2, no S3 or S4, no murmur, click or rub, no " peripheral edema and peripheral pulses strong  MS: no gross musculoskeletal defects noted,   SKIN: large area on left shin with erythema and warmth (marked with pen) with some swelling; medial aspect around malleolus swollen and bruised that then spreads to medial arch; walks without antalgic gait.  Unable to reproduce pain on left foot.

## 2021-05-27 ENCOUNTER — TRANSFERRED RECORDS (OUTPATIENT)
Dept: HEALTH INFORMATION MANAGEMENT | Facility: CLINIC | Age: 76
End: 2021-05-27

## 2021-06-02 ENCOUNTER — TELEPHONE (OUTPATIENT)
Dept: INTERNAL MEDICINE | Facility: CLINIC | Age: 76
End: 2021-06-02

## 2021-06-02 DIAGNOSIS — F02.80 ALZHEIMER'S DISEASE OF OTHER ONSET WITHOUT BEHAVIORAL DISTURBANCE: Primary | ICD-10-CM

## 2021-06-02 DIAGNOSIS — N39.41 URGE INCONTINENCE OF URINE: ICD-10-CM

## 2021-06-02 DIAGNOSIS — G30.8 ALZHEIMER'S DISEASE OF OTHER ONSET WITHOUT BEHAVIORAL DISTURBANCE: Primary | ICD-10-CM

## 2021-06-02 NOTE — TELEPHONE ENCOUNTER
Patient's spouse calling and needs home healthcare to help him with his wife. Please advise. Ok to call and  [phone]

## 2021-06-08 ENCOUNTER — OFFICE VISIT (OUTPATIENT)
Dept: URGENT CARE | Facility: URGENT CARE | Age: 76
End: 2021-06-08
Payer: COMMERCIAL

## 2021-06-08 ENCOUNTER — ALLIED HEALTH/NURSE VISIT (OUTPATIENT)
Dept: NURSING | Facility: CLINIC | Age: 76
End: 2021-06-08
Payer: COMMERCIAL

## 2021-06-08 VITALS
DIASTOLIC BLOOD PRESSURE: 77 MMHG | HEART RATE: 57 BPM | TEMPERATURE: 99.3 F | BODY MASS INDEX: 22.08 KG/M2 | RESPIRATION RATE: 18 BRPM | OXYGEN SATURATION: 98 % | WEIGHT: 120 LBS | SYSTOLIC BLOOD PRESSURE: 136 MMHG | HEIGHT: 62 IN

## 2021-06-08 DIAGNOSIS — L03.116 CELLULITIS OF LEFT LOWER EXTREMITY: ICD-10-CM

## 2021-06-08 DIAGNOSIS — M79.89 LEFT LEG SWELLING: ICD-10-CM

## 2021-06-08 DIAGNOSIS — M79.662 PAIN OF LEFT LOWER LEG: Primary | ICD-10-CM

## 2021-06-08 DIAGNOSIS — E53.8 VITAMIN B12 DEFICIENCY (NON ANEMIC): Primary | ICD-10-CM

## 2021-06-08 LAB
D DIMER PPP FEU-MCNC: 0.5 UG/ML FEU (ref 0–0.5)
WBC # BLD AUTO: 6.3 10E9/L (ref 4–11)

## 2021-06-08 PROCEDURE — 96372 THER/PROPH/DIAG INJ SC/IM: CPT

## 2021-06-08 PROCEDURE — 85048 AUTOMATED LEUKOCYTE COUNT: CPT | Performed by: FAMILY MEDICINE

## 2021-06-08 PROCEDURE — 36415 COLL VENOUS BLD VENIPUNCTURE: CPT | Performed by: FAMILY MEDICINE

## 2021-06-08 PROCEDURE — 99214 OFFICE O/P EST MOD 30 MIN: CPT | Performed by: FAMILY MEDICINE

## 2021-06-08 PROCEDURE — 85379 FIBRIN DEGRADATION QUANT: CPT | Performed by: FAMILY MEDICINE

## 2021-06-08 RX ORDER — CEPHALEXIN 500 MG/1
500 CAPSULE ORAL 4 TIMES DAILY
Qty: 40 CAPSULE | Refills: 0 | Status: SHIPPED | OUTPATIENT
Start: 2021-06-08 | End: 2021-06-18

## 2021-06-08 RX ADMIN — CYANOCOBALAMIN 1000 MCG: 1000 INJECTION, SOLUTION INTRAMUSCULAR; SUBCUTANEOUS at 09:02

## 2021-06-08 ASSESSMENT — MIFFLIN-ST. JEOR: SCORE: 982.57

## 2021-06-09 ENCOUNTER — TELEPHONE (OUTPATIENT)
Dept: INTERNAL MEDICINE | Facility: CLINIC | Age: 76
End: 2021-06-09

## 2021-06-11 ENCOUNTER — TELEPHONE (OUTPATIENT)
Dept: INTERNAL MEDICINE | Facility: CLINIC | Age: 76
End: 2021-06-11

## 2021-06-11 NOTE — TELEPHONE ENCOUNTER
[facility] home care RN calling to report Pt is having increased leg edema and fluid filled blisters that are leaking. Sonia reports Vital signs stable, NO SOB or breathing issues. Pt is to be reportedly sleeping in upright position. Sonia requesting PCP be notified and inquired if edema wraps or compression sock needed vs medication implemented.     Routing to PCP to review and advise. Sonia would like call back at [phone] to be updated, noted can measure when out next for compression socks.    Hesham ARTEAGA RN   [facility] Triage

## 2021-06-11 NOTE — TELEPHONE ENCOUNTER
Sonia with [facility] states her boss told her they don't have an order for patient to have physical therapy evaluation and requesting order for this. She states that signing this form should be sufficient for physical therapy to see patient. She asks if they can get the signed form back ASAP.

## 2021-06-15 NOTE — TELEPHONE ENCOUNTER
Called Sonia, she states she is still waiting to hear back from the supervisor if they are able to see patient for this. She made a note in the chart to contact our clinic with an update once they get a response.

## 2021-06-16 ENCOUNTER — MEDICAL CORRESPONDENCE (OUTPATIENT)
Dept: HEALTH INFORMATION MANAGEMENT | Facility: CLINIC | Age: 76
End: 2021-06-16

## 2021-06-17 ENCOUNTER — TELEPHONE (OUTPATIENT)
Dept: INTERNAL MEDICINE | Facility: CLINIC | Age: 76
End: 2021-06-17

## 2021-06-17 DIAGNOSIS — F02.80 ALZHEIMER'S DISEASE OF OTHER ONSET WITHOUT BEHAVIORAL DISTURBANCE: Primary | ICD-10-CM

## 2021-06-17 DIAGNOSIS — G30.8 ALZHEIMER'S DISEASE OF OTHER ONSET WITHOUT BEHAVIORAL DISTURBANCE: Primary | ICD-10-CM

## 2021-06-17 NOTE — TELEPHONE ENCOUNTER
Would usually order [facility] and Hospice eval, but she already is working with [facility].   Recommend that he discuss this with the [facility] RN to see whether they provide Hospice services and whether they feel she would be a Hospice Candidate.     Please advise patient's  of this recommendation.

## 2021-06-17 NOTE — TELEPHONE ENCOUNTER
calls asking if patient can be evaluated to see if she would qualify for Hospice Care.   states she has not done any PT or OT at this point and he is thinking she needs daily hospice care.  Please address and advise blaine.

## 2021-06-21 ENCOUNTER — TELEPHONE (OUTPATIENT)
Dept: INTERNAL MEDICINE | Facility: CLINIC | Age: 76
End: 2021-06-21

## 2021-06-22 ENCOUNTER — TELEPHONE (OUTPATIENT)
Dept: INTERNAL MEDICINE | Facility: CLINIC | Age: 76
End: 2021-06-22

## 2021-06-22 ENCOUNTER — ALLIED HEALTH/NURSE VISIT (OUTPATIENT)
Dept: NURSING | Facility: CLINIC | Age: 76
End: 2021-06-22
Payer: COMMERCIAL

## 2021-06-22 DIAGNOSIS — E53.8 VITAMIN B12 DEFICIENCY (NON ANEMIC): Primary | ICD-10-CM

## 2021-06-22 PROCEDURE — 96372 THER/PROPH/DIAG INJ SC/IM: CPT

## 2021-06-22 RX ORDER — CYANOCOBALAMIN 1000 UG/ML
1000 INJECTION, SOLUTION INTRAMUSCULAR; SUBCUTANEOUS
Status: COMPLETED | OUTPATIENT
Start: 2021-06-22 | End: 2021-07-07

## 2021-06-22 RX ADMIN — CYANOCOBALAMIN 1000 MCG: 1000 INJECTION, SOLUTION INTRAMUSCULAR; SUBCUTANEOUS at 08:46

## 2021-06-22 NOTE — TELEPHONE ENCOUNTER
Sonia is still working on this. Sonia saw patient yesterday and her legs are looking great. No swelling or blisters noted.   FYI: Sonia also states Sundowning worse.  having difficulty with her cares. Sonia advised family to seek additional resources.

## 2021-06-22 NOTE — PROGRESS NOTES
Here foshyanne STEPHENS 12 - see MAR     Due to injection administration, patient instructed to remain in clinic for 15 minutes  afterwards, and to report any adverse reaction to me immediately.    REMY Balderas LPN

## 2021-06-22 NOTE — TELEPHONE ENCOUNTER
Pt here for Vitamin B12 shot. Per LPN she gets her shots every 14 days. This has been consistent with her past appointments.   Please advise if should continue with every 14 days or if can go every 30 days.     Last Vitamin B12 lab level on 2/24/21 = 572

## 2021-06-24 ENCOUNTER — TELEPHONE (OUTPATIENT)
Dept: INTERNAL MEDICINE | Facility: CLINIC | Age: 76
End: 2021-06-24

## 2021-06-24 NOTE — TELEPHONE ENCOUNTER
Left message for alek. She will call back with information from message below.  She will ask  when next appointment with neurology

## 2021-06-24 NOTE — TELEPHONE ENCOUNTER
We should check with Sonia and/or  as to when patient is next scheduled to see Dr Regino Ledesma of [facility] again.

## 2021-06-25 NOTE — TELEPHONE ENCOUNTER
RUDY from message below.  Sonia from home health called back letting us know patient has follow up with neurologist on 7-15-21 She also was told by patients  he had started her on Seroquel 25 mg 1/2 tablet at hs.  Sleeping much better since she has started.

## 2021-06-27 NOTE — PROGRESS NOTES
"SUBJECTIVE: Danielle Bryan is a 76 year old female presenting with a chief complaint of left lower leg swelling and redness.  Onset of symptoms was day(s) ago.  Course of illness is worsening.    Severity moderate  Current and Associated symptoms: none  Treatment measures tried include see med list.  Predisposing factors include cellulitis recently.    Past Medical History:   Diagnosis Date     Alopecia areata     Requires a wig now     Atrial fibrillation (H) 1/20/2019     Cerebral infarction (H) 2014    TIA     Dysphagia     Botox/EGD dilation at [place], most recent 05/2013     Hyperlipemia      PONV (postoperative nausea and vomiting)      Recurrent UTI     Believed related in part to atrophic vaginitis     Vitamin D deficiency 11/29/2018     Allergies   Allergen Reactions     Ciprofloxacin      Sores in mouth and throat felt funny     Reclast [Zoledronic Acid]      dizziness     Social History     Tobacco Use     Smoking status: Never Smoker     Smokeless tobacco: Never Used   Substance Use Topics     Alcohol use: No     Frequency: Never       ROS:  SKIN: no rash  GI: no vomiting    OBJECTIVE:  /77 (BP Location: Left arm, Cuff Size: Adult Small)   Pulse 57   Temp 99.3  F (37.4  C)   Resp 18   Ht 1.575 m (5' 2\")   Wt 54.4 kg (120 lb)   LMP  (LMP Unknown)   SpO2 98%   BMI 21.95 kg/m  GENERAL APPEARANCE: healthy, alert and no distress  SKIN: lower leg warm red      ICD-10-CM    1. Pain of left lower leg  M79.662 D dimer quantitative     WBC count   2. Left leg swelling  M79.89 D dimer quantitative     WBC count   3. Cellulitis of left lower extremity  L03.116 cephALEXin (KEFLEX) 500 MG capsule     No DVT by negative D Dimer  Fluids/Rest, f/u if worse/not any better    "

## 2021-07-01 ENCOUNTER — TELEPHONE (OUTPATIENT)
Dept: INTERNAL MEDICINE | Facility: CLINIC | Age: 76
End: 2021-07-01

## 2021-07-02 ENCOUNTER — TELEPHONE (OUTPATIENT)
Dept: INTERNAL MEDICINE | Facility: CLINIC | Age: 76
End: 2021-07-02

## 2021-07-05 NOTE — TELEPHONE ENCOUNTER
Robbi patients son calling  [facility] does offer hospice but needs a order from primary. Please advise. Ok to call and wilda [phone]

## 2021-07-06 NOTE — TELEPHONE ENCOUNTER
Please call [facility] and provide them with Dr Riley's okay for order for Hospice evaluation.

## 2021-07-06 NOTE — TELEPHONE ENCOUNTER
Called [facility], they do not provide Hospice services.     Called Robbi back, he misspoke when he left his message yesterday and does not know if [facility] provides Hospice or not. He states they had a discussion with patient's home care nurse, Sonia about options for more care. However, they do not believe patient is at the end of life yet. Advised that if patient is not at the end of life, then Hospice wouldn't be appropriate for her. Robbi said he cannot recall what type of services were discussed, but they were told to contact PCP for help with this. This RN will call patient's home care nurse Sonia ([phone]) for more information on what was discussed with family.     Contacted Sonia, she states that family is wanting to have more help in the home with patient once her home care episode through Medicare ends and there is no longer a skilled need for home care-she thinks this would be at least 60 days out. She states that the family is looking for more help with the home and wants the maximum amount of assistance they can have to help manage patient's care at home. Robbi had said that [facility] told them she was eligible for more services, but Sonia is not sure what kind of services [facility] was referring to or how many hours this would be. She did discuss PCA services with the family, but this could not be provided through their home care agency as they do not have PCA staff available through this area. She had recommended family reach out to PCP for help with finding services. This RN discussed Care Coordination referral to assist family with care in the home once home care episode ends. Sonia agrees with this plan and is available if the Care Coordinators have any questions.     Contacted Robbi, informed him this RN is recommending referral to Care Coordination for help with in-home services after her home care episode ends. He agrees with this. Requesting to have home care call him to  discuss. Care Coordination referral placed for patient.

## 2021-07-07 ENCOUNTER — PATIENT OUTREACH (OUTPATIENT)
Dept: CARE COORDINATION | Facility: CLINIC | Age: 76
End: 2021-07-07

## 2021-07-07 ENCOUNTER — ALLIED HEALTH/NURSE VISIT (OUTPATIENT)
Dept: NURSING | Facility: CLINIC | Age: 76
End: 2021-07-07
Payer: COMMERCIAL

## 2021-07-07 DIAGNOSIS — E53.8 VITAMIN B12 DEFICIENCY (NON ANEMIC): Primary | ICD-10-CM

## 2021-07-07 PROCEDURE — 96372 THER/PROPH/DIAG INJ SC/IM: CPT | Performed by: INTERNAL MEDICINE

## 2021-07-07 RX ADMIN — CYANOCOBALAMIN 1000 MCG: 1000 INJECTION, SOLUTION INTRAMUSCULAR; SUBCUTANEOUS at 10:11

## 2021-07-07 NOTE — PROGRESS NOTES
Clinic Care Coordination Contact  [facility]/Voicemail       Clinical Data: Care Coordinator Outreach  Outreach attempted x 1.  Left message on Robbi (son) voicemail with call back information and requested return call.    Chart Review: Referral from PCP. Patient/Caregiver Support: Resources for Support; family wanting help with care in the home  Additional pertinent details: [facility] recommended family reach out to the clinic for assistance with home services once her current Medicare home care episode ends (likely at least 60 days out as therapy evaluations are happening later this week)  Please contact patient's son Robbi ([phone]) to discuss - he is on consent to communicate    Plan:  Care Coordinator will try to reach patient again in 1-2 business days.    SUE Ballard  Clinic Care Coordination  [facility] : [place], [place], and [place]  Phone: [phone]    ______________________  Next Outreach:  07/08/21

## 2021-07-08 ENCOUNTER — MEDICAL CORRESPONDENCE (OUTPATIENT)
Dept: HEALTH INFORMATION MANAGEMENT | Facility: CLINIC | Age: 76
End: 2021-07-08

## 2021-07-08 ENCOUNTER — PATIENT OUTREACH (OUTPATIENT)
Dept: NURSING | Facility: CLINIC | Age: 76
End: 2021-07-08
Payer: COMMERCIAL

## 2021-07-08 NOTE — PROGRESS NOTES
Clinic Care Coordination Contact  Community Health Worker Initial Outreach    CHW Initial Information Gathering:  Referral Source: PCP  Preferred Hospital: [facility], [place]  [phone]  Current living arrangement:: Not Assessed  Informal Support system:: Children, Family, Friends  No PCP office visit in Past Year: No  CHW Additional Questions  If ED/Hospital discharge, follow-up appointment scheduled as recommended?: N/A  Medication changes made following ED/Hospital discharge?: N/A  MyChart active?: Yes    Patient accepts CC: Yes. Patient scheduled for assessment with NETTIE Kingsley , on 07/09/21 at 2PM. Patient noted desire to discuss CC.     Spoke to Robbi(son)  CHW introduced self and intent of call regarding referral received from PCP.  Patient/Caregiver Support: Resources for Support; family wanting help with care in the home   Additional pertinent details:  [facility] recommended family reach out to the clinic for assistance with home services once her current Medicare home care episode ends (likely at least 60 days out as therapy evaluations are happening later this week)  Robbi states that patients home care episode will be ending soon and is hoping to get ongoing RN care in the home.    SUE Ballard  Clinic Care Coordination  [facility] : [place], [place], and [place]  Phone: [phone]

## 2021-07-09 ENCOUNTER — PATIENT OUTREACH (OUTPATIENT)
Dept: NURSING | Facility: CLINIC | Age: 76
End: 2021-07-09
Attending: INTERNAL MEDICINE
Payer: COMMERCIAL

## 2021-07-09 ENCOUNTER — TELEPHONE (OUTPATIENT)
Dept: INTERNAL MEDICINE | Facility: CLINIC | Age: 76
End: 2021-07-09

## 2021-07-09 ASSESSMENT — ACTIVITIES OF DAILY LIVING (ADL)
DEPENDENT_IADLS:: TRANSPORTATION;MONEY MANAGEMENT;MEDICATION MANAGEMENT;MEAL PREPARATION;SHOPPING;LAUNDRY;COOKING;CLEANING

## 2021-07-09 NOTE — PROGRESS NOTES
Clinic Care Coordination Contact    Clinic Care Coordination Contact  OUTREACH    Referral Information:  Referral Source: PCP  Primary Diagnosis: Psychosocial    Chief Complaint   Patient presents with     Clinic Care Coordination - Initial     Psychosocial        Universal Utilization:   Difficulty keeping appointments: No  Compliance Concerns: No  No-Show Concerns: No  No PCP office visit in Past Year: No  Utilization    Last refreshed: 7/9/2021  3:40 PM: Hospital Admissions 0           Last refreshed: 7/9/2021  3:40 PM: ED Visits 0           Last refreshed: 7/9/2021  3:40 PM: No Show Count (past year) 2              Current as of: 7/9/2021  3:40 PM            Clinical Concerns:  Current Medical Concerns:    Patient Active Problem List   Diagnosis     Sprain of jaw     Alopecia areata     Advanced directives, counseling/discussion     Bartholin's cyst     Allergic rhinitis     Transient cerebral ischemia     Hyperlipidemia with target LDL less than 100     Vitamin B12 deficiency (non anemic)     Osteoporosis     Vitamin D deficiency     Syncope, near     Atrial fibrillation (H)     Dysphagia, unspecified type     Adverse effect of bisphosphonate, sequela     Dupuytren's contracture     Esophageal motility disorder     Reduced mobility       Current Behavioral Concerns: No acute concerns noted at this time.     Education Provided to patient: Role of [facility] CC and reason for outreach.    Health Maintenance Reviewed: Up to date  Clinical Pathway: None    Medication Management:  Managed by spouse.      Functional Status:  Dependent IADLs: Transportation, Money Management, Medication Management, Meal Preparation, Shopping, Laundry, Cooking, Cleaning  Bed or wheelchair confined: No  Mobility Status: Dependent/Assisted by Another    Living Situation:  Current living arrangement: I live in a private home with spouse  Type of residence: Private home - stairs    Lifestyle & Psychosocial Needs:     Social Needs     Financial  "resource strain: Not hard at all     Food insecurity     Worry: Never true     Inability: Never true     Transportation needs     Medical: No     Non-medical: No     Diet: Regular  Tube Feeding: No  Transportation means: Family     Catholic or spiritual beliefs that impact treatment: No  Mental health DX: No  Mental health management concern : No  Chemical Dependency Status: No Current Concerns  Informal Support system: Children, Family, Friends   Socioeconomic History     Marital status:      Spouse name: Not on file     Number of children: 3     Years of education: Not on file     Highest education level: Not on file   Occupational History     Occupation: Copier repair at [employer]     Comment: Retired   Relationships     Social connections     Talks on phone: Not on file     Gets together: Not on file     Attends Gnosticism service: Not on file     Active member of club or organization: Not on file     Attends meetings of clubs or organizations: Not on file     Relationship status:      Intimate partner violence     Fear of current or ex partner: No     Emotionally abused: No     Physically abused: No     Forced sexual activity: No     Tobacco Use     Smoking status: Never Smoker     Smokeless tobacco: Never Used   Substance and Sexual Activity     Alcohol use: No     Frequency: Never     Drug use: No     Sexual activity: Yes     Partners: Male     Birth control/protection: None       Call placed to Pt's son, Robbi. Patient lives in [place] with her , who is her primary caregiver. Robbi states that Pt has Alzheimer's dementia, and relies on spouse for food, transportation, and some personal cares. Pt does experience intermittent confusion and has \"anger issues.\" Robbi reports that family helps out to support her spouse.    Robbi is interesting in finding in-home supports for Patient, including assistance bathing, personal cares, nursing, and getting ready for bed. We reviewed the availability of " private duty home care, PCA services, and waivers. Resources sent to Robbi via email ([email or web address]).    Robbi also notes that the current home care agency mentioned that Pt may be appropriate for hospice. Robbi is agreeable to [facility] requesting an informational visit/call through [facility]. Referral sent.      Resources and Interventions:  Current Resources:   Skilled Home Care Services: Skilled Nursing  Community Resources: Home Care     Employment Status: retired)    Advance Care Plan/Directive  Advanced Care Plans/Directives on file: No  Advanced Care Plan/Directive Status: Considering Options    Referrals Placed: Community Resources, PCA, Hospice, Home Care, County Resources     Goals:   Goals        General    1. Psychosocial (pt-stated)     Notes - Note created  7/9/2021  3:25 PM by Jovanny Salmeron LGSW    Goal Statement: I want my family to review community care/support options within 3 months.  Date Goal set: 7.9.2021  Barriers: Cost, availability of services.  Strengths: Recognition of need, family support system.   Date to Achieve By: 10.9.2021  Patient expressed understanding of goal: Pt reports understanding and denies any additional questions or concerns at this times. [facility] CC engaged in AIDET communication during encounter.    Action steps to achieve this goal:  1. I will have my family review options sent by [facility].  2. My family will coordinate with agencies of interest.  3. My family will access services/coordinate assistance with agencies.              Patient/Caregiver understanding: Pt reports understanding and denies any additional questions or concerns at this times. [facility] CC engaged in AIDET communication during encounter.      Outreach Frequency: monthly  Future Appointments              In 1 week [facility] NURSE [place]    In 3 weeks [facility] N[place] [place]    In 1 month [facility]  [place]          Plan: Robbi will review resources and follow-up with agencies of interest. [facility] CC will remain available for CC needs and will schedule an outreach in one month.     CADY Kingsley  Clinic Care Coordinator  Ph. [phone]  [email or web address]

## 2021-07-09 NOTE — TELEPHONE ENCOUNTER
Sonia from [facility] ([phone]) calls to advise that they were out to look at her legs and that they look much better and will not need further services.

## 2021-07-09 NOTE — LETTER
[facility] CARE COORDINATION  [address]  [facility] [address]  July 9, 2021    Danielle Bryan  [address] [place] [address]      Dear Robbi,    I am a clinic care coordinator who works with Colt Riley MD at the [facility]. I wanted to thank you for spending the time to talk with me.  Below is a description of clinic care coordination and how I can further assist you.      The clinic care coordination team is made up of a registered nurse,  and community health worker who understand the health care system. The goal of clinic care coordination is to help you manage your health and improve access to the health care system in the most efficient manner. The team can assist you in meeting your health care goals by providing education, coordinating services, strengthening the communication among your providers and supporting you with any resource needs.    I've included various resources below that I hope will be helpful to you:    Private Pay Agencies:    -[employer]: P. [phone]  Https://www.[employer]iCo.com/  -Home Instead: Ph. [phone]   https://www.[employer]/location/505?redirFrom=/505  -[place]: Ph. [phone]  https://www.SKINNYprice.[employer]/locations/home-health/nugzu-wp-5132    There are many agencies that offer private-duty home care and PCA services, you can explore additional options via the websites below.    Resources that help review community care providers/agencies:    -Care Options Network: Care Options Network, now in a first-time offering, provides the strength of our professional association to you in this fully searchable website. Use this website to search for many senior care professionals ready to serve you.  https://www.careoptionsnetwork.org/    -Direct Support Connect from [facility]: https://directsupportconnect.com/    -Senior LinkAge Line: The Senior LinkAge Line is a free statewide service of  the [place] Board on Aging in partnership with [place]'s area agencies on aging. The Senior LinkAge Line assists older [place] and caregivers, by connecting them to local services, finding answers and getting the help they need.   P. 1-226.202.9353  https://[place].gov/senior-linkage-line/      [facility] Choices Assessment:    The [place] assessment has been developed to help individuals make decisions about the supports and services that will help them stay in the community while focusing on their needs and preferences. To schedule an assessment, call our Home and Community Care/Disabilities Intake line at [phone].    During an assessment visit, our assessors will discuss insurance or other payment sources which could help with the cost of services. Some people are eligible for assistance with part or all of the cost of services through state and federal-funded programs administered through [facility], such as Alternative Care (AC), Elderly Waiver (EW), Community Access for Disability Inclusion Waiver (CADI), Community Alternative Care (CAC) and Brain Injury Waiver (BI). Private pay options can also be arranged for eligible services.  https://www.[facility]/923/Home-Community-Care      Caregiver Support    -Institute Caregiver Assurance: Caring for a loved one can be overwhelming, even for the strongest people. But you don t have to do this alone.  Caregiver Assurance  is a membership program that provides resources over the phone, in-person, and online to aid you in your caregiving journey. We help you maintain your own well-being as you support others in living a fulfilling, independent life.   https://fairview.org/services/caregiver-assurance  -Tal Caregiver Support Groups:  https://www.ebenezercares.org/care-partner-support-groups.html  -Alzheimer's Association of [place] and [place]: Support, education, and resources for caregivers.   https://www.alz.org/mnnd      I also did send a request  to [facility] to reach out to you to review information and eligibility for hospice care.     Please feel free to contact me at [phone] with any questions or concerns. We are focused on providing you with the highest-quality healthcare experience possible and that all starts with you.     Sincerely,       Jovanny Hurt [facility] Care Coordinator  Ph. [phone]  [email or web address]      Enclosed: I have enclosed a copy of the Complex Care Plan. This has helpful information and goals that we have talked about. Please keep this in an easy to access place to use as needed.

## 2021-07-09 NOTE — TELEPHONE ENCOUNTER
[facility] calling for the below orders:    PT 1 x week for 5 weeks  PT 1 time prn    They can be reached at [phone]. Please advise. Thanks.

## 2021-07-09 NOTE — LETTER
[facility]  Complex Care Plan  About Me:    Patient Name:  Danielle Stevens    YOB: 1945  Age:         76 year old   [facility] MRN:    [id number] Telephone Information:  Home Phone [phone]   Mobile [phone]       Address:  [address]  [place] [address] Email address:  [email or web address]      Emergency Contact(s)    Name Relationship Lgl Grd Work Phone Home Phone Mobile Phone   1. KIRSTEN STEVENS Spouse   [phone]   2. NO PATIENT CON* Other   [phone]            Primary language:  English     needed? No   [employer]:  [phone] op. 1  Other communication barriers: Cognitive impairment  Preferred Method of Communication:  Mail  Current living arrangement: I live in a private home with spouse  Mobility Status/ Medical Equipment: Dependent/Assisted by Another    Health Maintenance  Health Maintenance Reviewed: Up to date  Health Maintenance Due   Topic Date Due     ZOSTER IMMUNIZATION (2 of 3) 02/26/2008       My Access Plan  Medical Emergency 911   Primary Clinic Line [facility] - [phone]   24 Hour Appointment Line [phone] or  [phone]) (toll-free)   24 Hour Nurse Line [phone] (toll-free)   Preferred Urgent Care [facility] [phone]   [facility]  [phone]   Preferred Pharmacy [id number] [place] - [address] [address]     Behavioral Health Crisis Line The National Suicide Prevention Lifeline at 1-813.115.8839 or 911       My Care Team Members  Patient Care Team       Relationship Specialty Notifications Start End    Colt Riley MD PCP - General Internal Medicine  3/21/13     Phone: [phone] Pager: [phone] Fax: [phone]        [address] [facility] [address] [facility] [address]    Colt Riley MD Assigned PCP   12/15/19     Phone: [phone] Pager:  [phone] Fax: [phone]        [address] [facility] [address]    Aaron Batista MD Assigned Surgical Provider   10/23/20     Phone: [phone] Fax: [phone]         [address] [facility] [address] [facility] [address]    Jovanny Salmeron LGSW Lead Care Coordinator   7/9/21             My Care Plans  Self Management and Treatment Plan  Goals and (Comments)  Goals        General    1. Psychosocial (pt-stated)     Notes - Note created  7/9/2021  3:25 PM by Jovanny Salmeron LGSW    Goal Statement: I want my family to review community care/support options within 3 months.  Date Goal set: 7.9.2021  Barriers: Cost, availability of services.  Strengths: Recognition of need, family support system.   Date to Achieve By: 10.9.2021  Patient expressed understanding of goal: Pt reports understanding and denies any additional questions or concerns at this times. [facility] CC engaged in AIDET communication during encounter.    Action steps to achieve this goal:  1. I will have my family review options sent by [facility].  2. My family will coordinate with agencies of interest.  3. My family will access services/coordinate assistance with agencies.                 My Medical and Care Information  Problem List   Patient Active Problem List   Diagnosis     Sprain of jaw     Alopecia areata     Advanced directives, counseling/discussion     Bartholin's cyst     Allergic rhinitis     Transient cerebral ischemia     Hyperlipidemia with target LDL less than 100     Vitamin B12 deficiency (non anemic)     Osteoporosis     Vitamin D deficiency     Syncope, near     Atrial fibrillation (H)     Dysphagia, unspecified type     Adverse effect of bisphosphonate, sequela     Dupuytren's contracture     Esophageal motility disorder     Reduced mobility      Current Medications and Allergies:  See printed Medication Report.  Current Outpatient Medications   Medication Instructions     cetirizine (ZYRTEC) 10 mg, Oral, PRN      donepezil (ARICEPT) 5 MG tablet 2 tabs in the am     escitalopram (LEXAPRO) 30 mg, Oral, EVERY MORNING     metoprolol tartrate (LOPRESSOR) 12.5 mg, Oral, DAILY     order for DME Equipment being ordered: Tamela     rivaroxaban ANTICOAGULANT (XARELTO ANTICOAGULANT) 15 mg, Oral, DAILY WITH SUPPER     sodium chloride (OCEAN) 0.65 % nasal spray 1 spray, Both Nostrils, DAILY PRN        Allergies   Allergen Reactions     Ciprofloxacin      Sores in mouth and throat felt funny     Reclast [Zoledronic Acid]      dizziness       Care Coordination Start Date: 7/9/2021   Frequency of Care Coordination: monthly   Form Last Updated: 07/09/2021     CADY Kingsley  Clinic Care Coordinator  Ph. [phone]  [email or web address]

## 2021-07-13 ENCOUNTER — TELEPHONE (OUTPATIENT)
Dept: INTERNAL MEDICINE | Facility: CLINIC | Age: 76
End: 2021-07-13

## 2021-07-14 DIAGNOSIS — Z53.9 DIAGNOSIS NOT YET DEFINED: Primary | ICD-10-CM

## 2021-07-14 PROCEDURE — 99207 PR MD CERTIFICATION HHA PATIENT: CPT | Performed by: INTERNAL MEDICINE

## 2021-07-15 ENCOUNTER — TRANSFERRED RECORDS (OUTPATIENT)
Dept: HEALTH INFORMATION MANAGEMENT | Facility: CLINIC | Age: 76
End: 2021-07-15

## 2021-07-19 ENCOUNTER — TELEPHONE (OUTPATIENT)
Dept: INTERNAL MEDICINE | Facility: CLINIC | Age: 76
End: 2021-07-19

## 2021-07-19 NOTE — TELEPHONE ENCOUNTER
Sonia from [facility] calling  Patients blood pressure last Friday  Sitting 110/52  Standing 100/52    Today's reading  Sitting 90/40  Standing 82/50    Patient is non symptomatic   She wants to know if her medications need to be altered  Ok to call and  [phone]

## 2021-07-20 ENCOUNTER — ALLIED HEALTH/NURSE VISIT (OUTPATIENT)
Dept: NURSING | Facility: CLINIC | Age: 76
End: 2021-07-20
Payer: COMMERCIAL

## 2021-07-20 DIAGNOSIS — E53.8 VITAMIN B12 DEFICIENCY (NON ANEMIC): Primary | ICD-10-CM

## 2021-07-20 PROCEDURE — 96372 THER/PROPH/DIAG INJ SC/IM: CPT | Performed by: INTERNAL MEDICINE

## 2021-07-20 RX ORDER — CYANOCOBALAMIN 1000 UG/ML
1000 INJECTION, SOLUTION INTRAMUSCULAR; SUBCUTANEOUS
Status: DISCONTINUED | OUTPATIENT
Start: 2021-07-20 | End: 2021-07-20

## 2021-07-20 RX ORDER — CYANOCOBALAMIN 1000 UG/ML
1000 INJECTION, SOLUTION INTRAMUSCULAR; SUBCUTANEOUS
Status: ACTIVE | OUTPATIENT
Start: 2021-07-20 | End: 2022-01-04

## 2021-07-20 RX ADMIN — CYANOCOBALAMIN 1000 MCG: 1000 INJECTION, SOLUTION INTRAMUSCULAR; SUBCUTANEOUS at 10:48

## 2021-07-20 NOTE — NURSING NOTE
Here for B 12     Due to injection administration, patient instructed to remain in clinic for 15 minutes  afterwards, and to report any adverse reaction to me immediately.    REMY Balderas LPN

## 2021-07-23 ENCOUNTER — PATIENT OUTREACH (OUTPATIENT)
Dept: CARE COORDINATION | Facility: CLINIC | Age: 76
End: 2021-07-23

## 2021-07-23 NOTE — PROGRESS NOTES
Clinic Care Coordination Contact  Care Team Conversations    Per RN CC, Patient's spouse is requesting a referral to [facility]. Hospice referral was previously submitted by TRISTA SHEFFIELD on 7/9/2021. Per RN CC, they were meant to complete a care conference yesterday, but Karl did not hear from them.    Email sent to the [facility] intake team requesting that they follow-up with Karl this morning to further discuss Patient enrolling in Hospice.     TRISTA SHEFFIELD will continue to follow.    CADY Kingsley  Clinic Care Coordinator  Ph. [phone]  [email or web address]

## 2021-07-27 ENCOUNTER — TELEPHONE (OUTPATIENT)
Dept: INTERNAL MEDICINE | Facility: CLINIC | Age: 76
End: 2021-07-27

## 2021-07-30 ENCOUNTER — MEDICAL CORRESPONDENCE (OUTPATIENT)
Dept: HEALTH INFORMATION MANAGEMENT | Facility: CLINIC | Age: 76
End: 2021-07-30

## 2021-07-30 ENCOUNTER — TELEPHONE (OUTPATIENT)
Dept: INTERNAL MEDICINE | Facility: CLINIC | Age: 76
End: 2021-07-30

## 2021-07-30 NOTE — TELEPHONE ENCOUNTER
Everytime  sent a fax with BP readings    7/16: asymptomatic   Sitting 110/52   Standing 1100/52    7/19: asymp   Sit: 90/40   Stand: 82/50    7/21: asymp   Sit: 90/68'   Stand: 85/62    7/23   Sit 110/58   Stand 110/60    7/26   Sit: 98/58   Stand 82/52    7/28    Sit 88/52   Stand 88/52    Please advise for further orders if needed.    Sonia (SANTA) [phone]

## 2021-08-02 ENCOUNTER — ALLIED HEALTH/NURSE VISIT (OUTPATIENT)
Dept: NURSING | Facility: CLINIC | Age: 76
End: 2021-08-02
Payer: COMMERCIAL

## 2021-08-02 DIAGNOSIS — E53.8 VITAMIN B12 DEFICIENCY (NON ANEMIC): Primary | ICD-10-CM

## 2021-08-02 PROCEDURE — 96372 THER/PROPH/DIAG INJ SC/IM: CPT | Performed by: INTERNAL MEDICINE

## 2021-08-02 RX ADMIN — CYANOCOBALAMIN 1000 MCG: 1000 INJECTION, SOLUTION INTRAMUSCULAR; SUBCUTANEOUS at 15:22

## 2021-08-04 ENCOUNTER — MEDICAL CORRESPONDENCE (OUTPATIENT)
Dept: HEALTH INFORMATION MANAGEMENT | Facility: CLINIC | Age: 76
End: 2021-08-04

## 2021-08-05 ENCOUNTER — TELEPHONE (OUTPATIENT)
Dept: INTERNAL MEDICINE | Facility: CLINIC | Age: 76
End: 2021-08-05

## 2021-08-12 ENCOUNTER — MEDICAL CORRESPONDENCE (OUTPATIENT)
Dept: HEALTH INFORMATION MANAGEMENT | Facility: CLINIC | Age: 76
End: 2021-08-12

## 2021-08-17 ENCOUNTER — ALLIED HEALTH/NURSE VISIT (OUTPATIENT)
Dept: NURSING | Facility: CLINIC | Age: 76
End: 2021-08-17
Payer: COMMERCIAL

## 2021-08-17 DIAGNOSIS — E53.8 VITAMIN B12 DEFICIENCY (NON ANEMIC): Primary | ICD-10-CM

## 2021-08-17 PROCEDURE — 99207 PR NO CHARGE NURSE ONLY: CPT

## 2021-08-17 PROCEDURE — 96372 THER/PROPH/DIAG INJ SC/IM: CPT | Performed by: INTERNAL MEDICINE

## 2021-08-17 RX ADMIN — CYANOCOBALAMIN 1000 MCG: 1000 INJECTION, SOLUTION INTRAMUSCULAR; SUBCUTANEOUS at 08:36

## 2021-08-17 NOTE — NURSING NOTE
Here for B 12 - see MAR     Due to injection administration, patient instructed to remain in clinic for 15 minutes  afterwards, and to report any adverse reaction to me immediately.      REMY Balderas LPN

## 2021-08-20 ENCOUNTER — PATIENT OUTREACH (OUTPATIENT)
Dept: CARE COORDINATION | Facility: CLINIC | Age: 76
End: 2021-08-20

## 2021-08-20 ENCOUNTER — TELEPHONE (OUTPATIENT)
Dept: INTERNAL MEDICINE | Facility: CLINIC | Age: 76
End: 2021-08-20

## 2021-08-20 NOTE — PROGRESS NOTES
Clinic Care Coordination Contact  [facility]/Voicemail       Clinical Data: Care Coordinator Outreach  Outreach attempted x 1.  Left message on Robbi's voicemail with call back information and requested return call.  Plan: Care Coordinator will try to reach patient again in 10 business days.      SANTY Kingsley  Clinic Care Coordinator  Ph. [phone]  [email or web address]

## 2021-08-20 NOTE — TELEPHONE ENCOUNTER
Fax received from [facility] - PT Discharge Orders and Summary for review and signature.  Put in Dr. Riley's yellow folder.

## 2021-08-24 ENCOUNTER — TELEPHONE (OUTPATIENT)
Dept: INTERNAL MEDICINE | Facility: CLINIC | Age: 76
End: 2021-08-24

## 2021-08-24 NOTE — TELEPHONE ENCOUNTER
Fax received from [facility] - PT Discharge Order and Summary 08/04/21 for review and signature.  Put in Dr. Riley's yellow folder.

## 2021-08-30 ENCOUNTER — MEDICAL CORRESPONDENCE (OUTPATIENT)
Dept: HEALTH INFORMATION MANAGEMENT | Facility: CLINIC | Age: 76
End: 2021-08-30

## 2021-08-31 ENCOUNTER — ALLIED HEALTH/NURSE VISIT (OUTPATIENT)
Dept: INTERNAL MEDICINE | Facility: CLINIC | Age: 76
End: 2021-08-31
Payer: COMMERCIAL

## 2021-08-31 DIAGNOSIS — E53.8 VITAMIN B12 DEFICIENCY (NON ANEMIC): Primary | ICD-10-CM

## 2021-08-31 PROCEDURE — 99207 PR NO CHARGE NURSE ONLY: CPT

## 2021-08-31 PROCEDURE — 96372 THER/PROPH/DIAG INJ SC/IM: CPT | Performed by: INTERNAL MEDICINE

## 2021-08-31 RX ADMIN — CYANOCOBALAMIN 1000 MCG: 1000 INJECTION, SOLUTION INTRAMUSCULAR; SUBCUTANEOUS at 09:36

## 2021-09-04 ENCOUNTER — HEALTH MAINTENANCE LETTER (OUTPATIENT)
Age: 76
End: 2021-09-04

## 2021-09-07 ENCOUNTER — PATIENT OUTREACH (OUTPATIENT)
Dept: NURSING | Facility: CLINIC | Age: 76
End: 2021-09-07
Payer: COMMERCIAL

## 2021-09-07 NOTE — PROGRESS NOTES
Clinic Care Coordination Contact    Follow Up Progress Note      Assessment: Call placed to Pt's son to review goal progression. Per Robbi, Patient now has in-home support during the evenings, which has been helpful in providing respite for Pt's spouse.     Of note, a hospice informational visit was completed, however Pt is not eligible at this time.     Care Gaps:    Health Maintenance Due   Topic Date Due     ZOSTER IMMUNIZATION (2 of 3) 02/26/2008     INFLUENZA VACCINE (1) 09/01/2021       Goals addressed this encounter:   Goals Addressed                    This Visit's Progress       Patient Stated       1. Psychosocial (pt-stated)   70%      Goal Statement: I want my family to review community care/support options within 3 months.  Date Goal set: 7.9.2021  Barriers: Cost, availability of services.  Strengths: Recognition of need, family support system.   Date to Achieve By: 10.9.2021  Patient expressed understanding of goal: Pt reports understanding and denies any additional questions or concerns at this times. TRISTA SHEFFIELD engaged in AIDET communication during encounter.    Action steps to achieve this goal:  1. I will have my family review options sent by TRISTA SHEFFIELD.  2. My family will coordinate with agencies of interest.  3. My family will access services/coordinate assistance with agencies.              Intervention/Education provided during outreach: Robbi declines additional resources/services at this time, however, is agreeable to monthly TRISTA CC outreach to review Patient status and goal progression.      Outreach Frequency: monthly    Plan:   TRISTA CC will outreach to Patient's son, Robbi, in one month. Patient will continue to access community supports and resources.     CADY Kingsley  Clinic Care Coordinator  Ph. [phone]  [email or web address]

## 2021-09-14 ENCOUNTER — ALLIED HEALTH/NURSE VISIT (OUTPATIENT)
Dept: INTERNAL MEDICINE | Facility: CLINIC | Age: 76
End: 2021-09-14
Payer: COMMERCIAL

## 2021-09-14 DIAGNOSIS — Z23 NEED FOR PROPHYLACTIC VACCINATION AND INOCULATION AGAINST INFLUENZA: Primary | ICD-10-CM

## 2021-09-14 PROCEDURE — 90662 IIV NO PRSV INCREASED AG IM: CPT

## 2021-09-14 PROCEDURE — 99207 PR NO CHARGE NURSE ONLY: CPT

## 2021-09-14 PROCEDURE — G0008 ADMIN INFLUENZA VIRUS VAC: HCPCS

## 2021-09-15 ENCOUNTER — TELEPHONE (OUTPATIENT)
Dept: INTERNAL MEDICINE | Facility: CLINIC | Age: 76
End: 2021-09-15

## 2021-09-20 ENCOUNTER — MEDICAL CORRESPONDENCE (OUTPATIENT)
Dept: HEALTH INFORMATION MANAGEMENT | Facility: CLINIC | Age: 76
End: 2021-09-20
Payer: COMMERCIAL

## 2021-09-28 ENCOUNTER — ALLIED HEALTH/NURSE VISIT (OUTPATIENT)
Dept: INTERNAL MEDICINE | Facility: CLINIC | Age: 76
End: 2021-09-28
Payer: COMMERCIAL

## 2021-09-28 DIAGNOSIS — Z23 NEED FOR PROPHYLACTIC VACCINATION AND INOCULATION AGAINST INFLUENZA: Primary | ICD-10-CM

## 2021-09-28 PROCEDURE — 99207 PR NO CHARGE NURSE ONLY: CPT

## 2021-09-28 PROCEDURE — 96372 THER/PROPH/DIAG INJ SC/IM: CPT | Performed by: INTERNAL MEDICINE

## 2021-09-28 RX ADMIN — CYANOCOBALAMIN 1000 MCG: 1000 INJECTION, SOLUTION INTRAMUSCULAR; SUBCUTANEOUS at 09:34

## 2021-10-12 ENCOUNTER — ALLIED HEALTH/NURSE VISIT (OUTPATIENT)
Dept: INTERNAL MEDICINE | Facility: CLINIC | Age: 76
End: 2021-10-12
Payer: COMMERCIAL

## 2021-10-12 DIAGNOSIS — E53.8 VITAMIN B12 DEFICIENCY (NON ANEMIC): Primary | ICD-10-CM

## 2021-10-12 PROCEDURE — 96372 THER/PROPH/DIAG INJ SC/IM: CPT | Performed by: INTERNAL MEDICINE

## 2021-10-12 PROCEDURE — 99207 PR NO CHARGE NURSE ONLY: CPT

## 2021-10-12 RX ADMIN — CYANOCOBALAMIN 1000 MCG: 1000 INJECTION, SOLUTION INTRAMUSCULAR; SUBCUTANEOUS at 09:30

## 2021-10-15 ENCOUNTER — PATIENT OUTREACH (OUTPATIENT)
Dept: NURSING | Facility: CLINIC | Age: 76
End: 2021-10-15
Payer: COMMERCIAL

## 2021-10-15 NOTE — PROGRESS NOTES
Clinic Care Coordination Contact    Follow Up Progress Note      Assessment: Follow-up call placed to Pt's son, Robbi. He reports that Pt continues to access in-home support through an evening support person. They are not hired through a company.    Care Gaps:    Health Maintenance Due   Topic Date Due     ZOSTER IMMUNIZATION (2 of 3) 02/26/2008       Not addressed at this outreach.     Goals addressed this encounter:   Goals Addressed                    This Visit's Progress       Patient Stated       1. Psychosocial (pt-stated)   80%      Goal Statement: I want my family to review community care/support options within 3 months.  Date Goal set: 7.9.2021  Barriers: Cost, availability of services.  Strengths: Recognition of need, family support system.   Date to Achieve By: 10.9.2021  Patient expressed understanding of goal: Pt reports understanding and denies any additional questions or concerns at this times. TRISTA SHEFFIELD engaged in AIDET communication during encounter.    Action steps to achieve this goal:  1. I will have my family review options sent by TRISTA SHEFFIELD.  2. My family will coordinate with agencies of interest.  3. My family will access services/coordinate assistance with agencies.              Intervention/Education provided during outreach: Robbi declines further interventions or resources at this time, stating that the current supports in place are adequate. Robbi requests continued monthly check-ins from care coordination.      Outreach Frequency: monthly    Plan:   TRISTA SHEFFIELD will outreach to Robbi in one month. Pt will continue to access in-home support.       CADY Kingsley  Clinic Care Coordinator  Ph. [phone]  [email or web address]

## 2021-10-15 NOTE — LETTER
[facility] CARE COORDINATION  [address]  [facility] [address]  October 15, 2021        Danielle Bryan  [address] [place] [address]          Dear Kalyn Santos is an updated Patient Centered Plan of Care for your continued enrollment in Care Coordination. Please let us know if you have additional questions, concerns, or goals that we can assist with.    Sincerely,        CADY Kingsley  Clinic Care Coordinator  Ph. [phone]  [email or web address]  Patient Centered Plan of Care  About Me:        Patient Name:  Danielle Bryan    YOB: 1945  Age:         76 year old   Worth MRN:    [id number] Telephone Information:  Home Phone [phone]   Mobile [phone]       Address:  [address] [place] [address] Email address:  [email or web address]      Emergency Contact(s)    Name Relationship Lgl Grd Work Phone Home Phone Mobile Phone   1. KIRSTEN BRYAN Spouse   [phone]           Primary language:  English     needed? No   [employer]:  [phone] op. 1  Other communication barriers:Cognitive impairment  Preferred Method of Communication:  Mail  Current living arrangement: I live in a private home with spouse  Mobility Status/ Medical Equipment: Dependent/Assisted by Another    Health Maintenance  Health Maintenance Reviewed: Due/Overdue   Health Maintenance Due   Topic Date Due     ZOSTER IMMUNIZATION (2 of 3) 02/26/2008         My Access Plan  Medical Emergency 911   Primary Clinic Line [facility] - [phone]   24 Hour Appointment Line [phone] or  [phone]) (toll-free)   24 Hour Nurse Line [phone] (toll-free)   Preferred Urgent Care [facility] [phone]     Preferred Hospital [facility]  [phone]     Preferred Pharmacy [id number] -  [place], [place] - [address] [address]     Behavioral Health Crisis Line The National Suicide Prevention Lifeline at 1-882.743.4927 or 911     My Care Team Members  Patient Care Team       Relationship Specialty Notifications Start End    Colt Riley MD PCP - General Internal Medicine  3/21/13     Phone: [phone] Pager: [phone] Fax: [phone]        [address] [facility] [address]    Colt Riley MD Assigned PCP   12/15/19     Phone: [phone] Pager: [phone] Fax: [phone]        [address] [facility] [address]    Aaron Batista MD Assigned Surgical Provider   10/23/20     Phone: [phone] Fax: [phone]         [address] [facility] [address] [facility] [address]    Jovanny Salmeron LGSW Lead Care Coordinator   7/9/21             My Care Plans  Self Management and Treatment Plan  Goals and (Comments)  Goals        General     1. Psychosocial (pt-stated)      Notes - Note created  7/9/2021  3:25 PM by Jovanny Salmeron LGSW     Goal Statement: I want my family to review community care/support options within 3 months.  Date Goal set: 7.9.2021  Barriers: Cost, availability of services.  Strengths: Recognition of need, family support system.   Date to Achieve By: 10.9.2021  Patient expressed understanding of goal: Pt reports understanding and denies any additional questions or concerns at this times. TRISTA SHEFFIELD engaged in AIDET communication during encounter.    Action steps to achieve this goal:  1. I will have my family review options sent by TRISTA SHEFFIELD.  2. My family will coordinate with agencies of interest.  3. My family will access services/coordinate assistance with agencies.                 My Medical and Care Information  Problem List   Patient Active Problem List   Diagnosis     Sprain of jaw     Alopecia areata     Advanced directives, counseling/discussion     Bartholin's cyst     Allergic rhinitis     Transient cerebral ischemia     Hyperlipidemia  with target LDL less than 100     Vitamin B12 deficiency (non anemic)     Osteoporosis     Vitamin D deficiency     Syncope, near     Atrial fibrillation (H)     Dysphagia, unspecified type     Adverse effect of bisphosphonate, sequela     Dupuytren's contracture     Esophageal motility disorder     Reduced mobility      Current Medications and Allergies:  See printed Medication Report.  Current Outpatient Medications   Medication Instructions     cetirizine (ZYRTEC) 10 mg, Oral, PRN     donepezil (ARICEPT) 5 MG tablet 2 tabs in the am     escitalopram (LEXAPRO) 30 mg, Oral, EVERY MORNING     order for DME Equipment being ordered: Tamela     rivaroxaban ANTICOAGULANT (XARELTO ANTICOAGULANT) 15 mg, Oral, DAILY WITH SUPPER     sodium chloride (OCEAN) 0.65 % nasal spray 1 spray, Both Nostrils, DAILY PRN     Allergies   Allergen Reactions     Ciprofloxacin      Sores in mouth and throat felt funny     Reclast [Zoledronic Acid]      dizziness     Care Coordination Start Date: 7/9/2021   Frequency of Care Coordination: monthly     Form Last Updated: 10/15/2021

## 2021-10-19 ENCOUNTER — TELEPHONE (OUTPATIENT)
Dept: INTERNAL MEDICINE | Facility: CLINIC | Age: 76
End: 2021-10-19

## 2021-10-19 NOTE — TELEPHONE ENCOUNTER
Reason for Call:  Other     Detailed comments: Karl called and wanted to update staff that juan lost her phone and to call his number to get a hold of juan.    Phone Number Patient can be reached at: Other phone number:  [phone]    Best Time:     Can we leave a detailed message on this number? Not Applicable    Call taken on 10/19/2021 at 9:37 AM by Rommel Box

## 2021-10-20 NOTE — TELEPHONE ENCOUNTER
Contacted Karl, he does not know if they will be replacing patient's phone as it is getting harder for her to use a phone due to memory issues. Removed patient's phone number from chart, will have Karl's phone number as primary contact for patient, he is on consent to communicate. Nothing further needed for patient.    Judy Castillo RN  [facility]

## 2021-10-26 ENCOUNTER — ALLIED HEALTH/NURSE VISIT (OUTPATIENT)
Dept: INTERNAL MEDICINE | Facility: CLINIC | Age: 76
End: 2021-10-26
Payer: COMMERCIAL

## 2021-10-26 DIAGNOSIS — E53.8 VITAMIN B12 DEFICIENCY (NON ANEMIC): Primary | ICD-10-CM

## 2021-10-26 PROCEDURE — 96372 THER/PROPH/DIAG INJ SC/IM: CPT | Performed by: INTERNAL MEDICINE

## 2021-10-26 PROCEDURE — 99207 PR NO CHARGE NURSE ONLY: CPT

## 2021-10-26 RX ADMIN — CYANOCOBALAMIN 1000 MCG: 1000 INJECTION, SOLUTION INTRAMUSCULAR; SUBCUTANEOUS at 09:37

## 2021-11-09 ENCOUNTER — TRANSFERRED RECORDS (OUTPATIENT)
Dept: HEALTH INFORMATION MANAGEMENT | Facility: CLINIC | Age: 76
End: 2021-11-09
Payer: COMMERCIAL

## 2021-11-11 ENCOUNTER — OFFICE VISIT (OUTPATIENT)
Dept: URGENT CARE | Facility: URGENT CARE | Age: 76
End: 2021-11-11
Payer: COMMERCIAL

## 2021-11-11 ENCOUNTER — NURSE TRIAGE (OUTPATIENT)
Dept: NURSING | Facility: CLINIC | Age: 76
End: 2021-11-11

## 2021-11-11 ENCOUNTER — ALLIED HEALTH/NURSE VISIT (OUTPATIENT)
Dept: INTERNAL MEDICINE | Facility: CLINIC | Age: 76
End: 2021-11-11
Payer: COMMERCIAL

## 2021-11-11 VITALS
OXYGEN SATURATION: 98 % | RESPIRATION RATE: 16 BRPM | SYSTOLIC BLOOD PRESSURE: 160 MMHG | TEMPERATURE: 98.6 F | HEART RATE: 58 BPM | WEIGHT: 120 LBS | DIASTOLIC BLOOD PRESSURE: 80 MMHG | BODY MASS INDEX: 21.95 KG/M2

## 2021-11-11 DIAGNOSIS — Z79.01 LONG TERM CURRENT USE OF ANTICOAGULANT THERAPY: ICD-10-CM

## 2021-11-11 DIAGNOSIS — E53.8 VITAMIN B12 DEFICIENCY (NON ANEMIC): Primary | ICD-10-CM

## 2021-11-11 DIAGNOSIS — Z86.73 HX OF CEREBRAL INFARCTION: ICD-10-CM

## 2021-11-11 DIAGNOSIS — J06.9 VIRAL URI: Primary | ICD-10-CM

## 2021-11-11 LAB
DEPRECATED S PYO AG THROAT QL EIA: NEGATIVE
GROUP A STREP BY PCR: NOT DETECTED

## 2021-11-11 PROCEDURE — U0003 INFECTIOUS AGENT DETECTION BY NUCLEIC ACID (DNA OR RNA); SEVERE ACUTE RESPIRATORY SYNDROME CORONAVIRUS 2 (SARS-COV-2) (CORONAVIRUS DISEASE [COVID-19]), AMPLIFIED PROBE TECHNIQUE, MAKING USE OF HIGH THROUGHPUT TECHNOLOGIES AS DESCRIBED BY CMS-2020-01-R: HCPCS | Performed by: FAMILY MEDICINE

## 2021-11-11 PROCEDURE — 99214 OFFICE O/P EST MOD 30 MIN: CPT | Performed by: FAMILY MEDICINE

## 2021-11-11 PROCEDURE — 96372 THER/PROPH/DIAG INJ SC/IM: CPT | Performed by: INTERNAL MEDICINE

## 2021-11-11 PROCEDURE — 99207 PR NO CHARGE NURSE ONLY: CPT

## 2021-11-11 PROCEDURE — 87651 STREP A DNA AMP PROBE: CPT | Performed by: FAMILY MEDICINE

## 2021-11-11 PROCEDURE — U0005 INFEC AGEN DETEC AMPLI PROBE: HCPCS | Performed by: FAMILY MEDICINE

## 2021-11-11 RX ADMIN — CYANOCOBALAMIN 1000 MCG: 1000 INJECTION, SOLUTION INTRAMUSCULAR; SUBCUTANEOUS at 14:26

## 2021-11-11 NOTE — TELEPHONE ENCOUNTER
Fever for today, 99.8, cough and runny nose (for years). I connected with scheduling for an appointment. I told him they may want to test her for coronavirus and influenza. I advised urgent care if they can't get her into the clinic.  Deya Velez RN  [facility]      Reason for Disposition    Patient wants to be seen    Additional Information    Negative: Difficult to awaken or acting confused (e.g., disoriented, slurred speech)    Negative: Pale cold skin and very weak (can't stand)    Negative: Difficulty breathing and bluish (or gray) lips or face    Negative: New onset rash with purple (or blood-colored) spots or dots    Negative: Sounds like a life-threatening emergency to the triager    Negative: Fever onset within 24 hours of receiving vaccine    Negative: Fever within 14 days of COVID-19 Exposure    Negative: Pregnant    Negative: Postpartum (from 0 to 6 weeks after delivery)    Negative: Headache and stiff neck (can't touch chin to chest)    Negative: Difficulty breathing    Negative: IV drug abuse    Negative: Fever > 103 F (39.4 C)    Negative: Fever > 101 F (38.3 C) and over 60 years of age    Negative: Fever > 100.0 F (37.8 C) and diabetes mellitus or a weak immune system (e.g., HIV positive, chemotherapy, splenectomy)    Negative: Fever > 100.0 F (37.8 C) and bedridden (e.g., nursing home patient, stroke, chronic illness, recovering from surgery)    Negative: Fever > 100.0 F (37.8 C) and indwelling urinary catheter (e.g., [place], coude)    Negative: Fever > 100.4 F (38.0 C) and has port (portacath), central line, or PICC line    Negative: Drinking very little and has signs of dehydration (e.g., no urine > 12 hours, very dry mouth, very lightheaded)    Negative: Patient sounds very sick or weak to the triager    Negative: Fever > 100.4 F (38.0 C) and surgery in the past month    Negative: Transplant patient (e.g., liver, heart, lung, kidney)    Negative: Widespread rash and cause  unknown    Protocols used: FEVER-A-OH

## 2021-11-11 NOTE — PROGRESS NOTES
ASSESSMENT/ PLAN:    Viral URI     - Symptomatic COVID-19 Virus (Coronavirus) by PCR Nose  - Streptococcus A Rapid Screen w/Reflex to PCR  - Group A Streptococcus PCR Throat Swab     We discussed that based on the patient's description and physical exam, that a respiratory virus is the most likely cause of the the current illness.  Treatment is symptomatic with OTC cold remedies, acetaminophen, ibuprofen for pain and fever      Symptomatic measures encouraged, humidified air, plenty of fluids.  Patient may consider OTC expectorant and/or cough suppressant to treat symptoms.  Return if worsening         Hx of cerebral infarction  Patient has difficulty describing symptoms.  Her  reports that she started coughing today and felt feverish.  Patient denies dysuria, diarrhea, nausea, labored breathing     Long term current use of anticoagulant therapy  Low risk of PE or acute MI with chronic anticoagulation    ------------------------------------------------------------------------------------------------------------------------------------------------    SUBJECTIVE:  Chief Complaint   Patient presents with     URI     sx started this morning, fever 99.8 oral, coughing.     Danielle Bryan is a 76 year old female who presents to the clinic today with a chief complaint of cough  for 1 day(s).  Patient denies cough   Her cough is described as persistent, nonproductive and spasmodic.    The patient's symptoms are moderate and stable.  Associated symptoms include sore throat and low grade fever. The patient's symptoms are exacerbated by no particular triggers  Patient has been using nothing  to improve symptoms.    Denies flank pain, suprapubic pain, dysuria, diarrhea, nausea, vomiting , new weakness, chest pain, SOB    Past Medical History:   Diagnosis Date     Alopecia areata     Requires a wig now     Atrial fibrillation (H) 1/20/2019     Cerebral infarction (H) 2014    TIA     Dysphagia     Botox/EGD dilation  at [place], most recent 2013     Hyperlipemia      PONV (postoperative nausea and vomiting)      Recurrent UTI     Believed related in part to atrophic vaginitis     Vitamin D deficiency 2018     Patient Active Problem List   Diagnosis     Sprain of jaw     Alopecia areata     Advanced directives, counseling/discussion     Bartholin's cyst     Allergic rhinitis     Transient cerebral ischemia     Hyperlipidemia with target LDL less than 100     Vitamin B12 deficiency (non anemic)     Osteoporosis     Vitamin D deficiency     Syncope, near     Atrial fibrillation (H)     Dysphagia, unspecified type     Adverse effect of bisphosphonate, sequela     Dupuytren's contracture     Esophageal motility disorder     Reduced mobility       ALLERGIES:  Ciprofloxacin and Reclast [zoledronic acid]    MEDs  cetirizine (ZYRTEC) 10 MG tablet, Take 10 mg by mouth as needed for allergies  donepezil (ARICEPT) 5 MG tablet, 2 tabs in the am  escitalopram (LEXAPRO) 10 MG tablet, Take 30 mg by mouth every morning  order for DME, Equipment being ordered: Tamela  rivaroxaban ANTICOAGULANT (XARELTO ANTICOAGULANT) 15 MG TABS tablet, Take 1 tablet (15 mg) by mouth daily (with dinner)  sodium chloride (OCEAN) 0.65 % nasal spray, Spray 1 spray into both nostrils daily as needed for congestion (Patient not taking: Reported on 2021)    cyanocobalamin injection 1,000 mcg        Social History     Tobacco Use     Smoking status: Never Smoker     Smokeless tobacco: Never Used   Substance Use Topics     Alcohol use: No       Family History   Problem Relation Age of Onset     Family History Negative Mother          age 85     C.A.D. Father          in his mid-80's of heart attack     Myocardial Infarction Brother      Coronary Artery Disease Brother         Has had stents/CABG     Other Cancer Brother      Other Cancer Sister          ROS  CONSTITUTIONAL:low grade fever, chills,    INTEGUMENTARY/SKIN: NEGATIVE for worrisome rashes,  or  lesions  EYES: NEGATIVE for vision changes or irritation  ENT/MOUTH: NEGATIVE for ear, mouth and throat problems  GI: NEGATIVE for nausea, abdominal pain  or change in bowel habits    OBJECTIVE:  BP (!) 160/80 (BP Location: Right arm, Patient Position: Sitting, Cuff Size: Adult Regular)   Pulse 58   Temp 98.6  F (37  C) (Oral)   Resp 16   Wt 54.4 kg (120 lb)   LMP  (LMP Unknown)   SpO2 98%   BMI 21.95 kg/m    GENERAL APPEARANCE: alert, mild distress and cooperative.  Slow to respond to questions,  Gives simple few word responses.  Flat affect.   Facial droop right side  EYES: EOMI,  PERRL, conjunctiva clear  HENT: ear canals and TM's normal.  Nose and mouth without ulcers, erythema or lesions  NECK: supple, nontender, no lymphadenopathy  RESP: lungs clear to auscultation - no rales, rhonchi or wheezes  CV: regular rates and rhythm, normal S1 S2, no murmur noted  ABDOMEN:  soft, nontender, no HSM or masses and bowel sounds normal  NEURO: Normal strength and tone, sensory exam grossly normal,  normal speech and mentation  SKIN: no suspicious lesions or rashes    Results for orders placed or performed in visit on 11/11/21   Streptococcus A Rapid Screen w/Reflex to PCR     Status: Normal    Specimen: Throat; Swab   Result Value Ref Range    Group A Strep antigen Negative Negative

## 2021-11-11 NOTE — PATIENT INSTRUCTIONS

## 2021-11-12 LAB — SARS-COV-2 RNA RESP QL NAA+PROBE: POSITIVE

## 2021-11-15 ENCOUNTER — TELEPHONE (OUTPATIENT)
Dept: INTERNAL MEDICINE | Facility: CLINIC | Age: 76
End: 2021-11-15
Payer: COMMERCIAL

## 2021-11-15 NOTE — TELEPHONE ENCOUNTER
calls.    Pt diagnosed with covid on Thursday.  She received monoclonal antibodies.  That part is better.    She woke this morning at 10:00.  She was unstready,  She was kind of dozing and a little bit out of it - just very tired.  She wanted to lay down again.  She does not drink a lot.  He gave her some gatorade.  She is a little more alert now.  She is feeling better now.  Advised to have her drink lots of fluids as  says pt has a hx of alzheimers and does not drink a lot.  She is peeing ok, wears depends, having wet diapers.  She does not complain of dizziness, but she does not complain a lot.     He is the most worried about her fatigue today.  Advised it could be from covid.  Advised to have her drink lots of fluids, rest.  Advised to call us back if any concerns or have her seen.

## 2021-11-23 ENCOUNTER — PATIENT OUTREACH (OUTPATIENT)
Dept: NURSING | Facility: CLINIC | Age: 76
End: 2021-11-23
Payer: COMMERCIAL

## 2021-11-23 NOTE — LETTER
"[facility] CARE COORDINATION  [address] [facility] [address]  [facility] [address]  November 23, 2021      Robbi,    Thank you for taking the time to speak with me over the phone today. As we discussed, I've included the information for Adult Day Programs below:    Adult Day Programs    -[facility]: \"[facility] adult day program offers personalized care for older adults who live at home or with family, but who enjoy staying active and being in the company of others. The program also offers relief to caregivers who can rest assured that their family member is getting personalized assistance at [facility] Adult Day [facility] from our staff, known for their warmth and caring.\"  [address] [place] [address]  Ph. [phone]    [email or web address]    -[facility]: \"[facility] provides respite for caregivers while helping people with changing physical, cognitive and/or social abilities to enjoy fulfilling lives. Members can attend one of the adult day centers two to five days a week and find an environment that helps them thrive.\"  To contact [facility] in [place], contact [email or web address] or call [phone].  [email or web address]      Please feel free to contact me at [phone] with any questions or concerns. We are focused on providing you with the highest-quality healthcare experience possible and that all starts with you.     Sincerely,       CADY Kingsley  Clinic Care Coordinator  Ph. [phone]  [email or web address]      "

## 2021-11-23 NOTE — PROGRESS NOTES
Clinic Care Coordination Contact    Follow Up Progress Note      Assessment: Follow-up call placed to Pt's son, Robbi. Robbi reports that Pt recently had Covid-19, so they were unable to access in-home services. As Pt has recently recovered, private duty home care services will be restarting.     Robbi is interested in resources for Adult Day Programs.    Care Gaps:    Health Maintenance Due   Topic Date Due     ZOSTER IMMUNIZATION (2 of 3) 02/26/2008     COVID-19 Vaccine (3 - Booster for Moderna series) 08/25/2021       Not addressed at this time.    Goals addressed this encounter:   Goals Addressed                    This Visit's Progress       Patient Stated       1. Psychosocial (pt-stated)   80%      Goal Statement: I want my family to review community care/support options within 3 months.  Date Goal set: 7.9.2021, extended by 3 months on 11.23.2021.  Barriers: Cost, availability of services.  Strengths: Recognition of need, family support system.   Date to Achieve By: 2.23.2022  Patient expressed understanding of goal: Pt reports understanding and denies any additional questions or concerns at this times. TRISTA SHEFFIELD engaged in AIDET communication during encounter.    Action steps to achieve this goal:  1. I will have my family review options sent by TRISTA SHEFFIELD.  2. My family will coordinate with agencies of interest.  3. My family will access services/coordinate assistance with agencies.              Intervention/Education provided during outreach: TRISTA SHEFFIELD will email resources for Adult Day Programs to Pt's son at: [email or web address]       Outreach Frequency: monthly    Plan:   Robbi will review resources for Adult Day Programs. TRISTA SHEFFIELD will remain available for CC needs and will schedule a follow-up outreach in one month.     CADY Kingsley  Clinic Care Coordinator  Ph. [phone]  [email or web address]

## 2021-12-02 ENCOUNTER — ALLIED HEALTH/NURSE VISIT (OUTPATIENT)
Dept: INTERNAL MEDICINE | Facility: CLINIC | Age: 76
End: 2021-12-02
Payer: COMMERCIAL

## 2021-12-02 VITALS — SYSTOLIC BLOOD PRESSURE: 118 MMHG | DIASTOLIC BLOOD PRESSURE: 60 MMHG | WEIGHT: 115 LBS | BODY MASS INDEX: 21.03 KG/M2

## 2021-12-02 DIAGNOSIS — I10 HYPERTENSION: Primary | ICD-10-CM

## 2021-12-02 PROCEDURE — 99207 PR NO CHARGE NURSE ONLY: CPT

## 2021-12-02 PROCEDURE — 96372 THER/PROPH/DIAG INJ SC/IM: CPT | Performed by: INTERNAL MEDICINE

## 2021-12-02 RX ADMIN — CYANOCOBALAMIN 1000 MCG: 1000 INJECTION, SOLUTION INTRAMUSCULAR; SUBCUTANEOUS at 10:38

## 2021-12-02 NOTE — NURSING NOTE
Here fr B 12 injection  also requested a B/P check and weight check .      B/P= 118/60  Weight = 115     Spoke with  and informed him IF provider needed to adjust any care he would contact patient /  .other wise stats with in NL .      REMY Balderas LPN

## 2021-12-14 ENCOUNTER — ALLIED HEALTH/NURSE VISIT (OUTPATIENT)
Dept: INTERNAL MEDICINE | Facility: CLINIC | Age: 76
End: 2021-12-14
Payer: COMMERCIAL

## 2021-12-14 DIAGNOSIS — E53.8 VITAMIN B12 DEFICIENCY (NON ANEMIC): Primary | ICD-10-CM

## 2021-12-14 PROCEDURE — 96372 THER/PROPH/DIAG INJ SC/IM: CPT | Performed by: INTERNAL MEDICINE

## 2021-12-14 PROCEDURE — 99207 PR NO CHARGE NURSE ONLY: CPT

## 2021-12-14 RX ADMIN — CYANOCOBALAMIN 1000 MCG: 1000 INJECTION, SOLUTION INTRAMUSCULAR; SUBCUTANEOUS at 10:23

## 2021-12-28 ENCOUNTER — NURSE TRIAGE (OUTPATIENT)
Dept: INTERNAL MEDICINE | Facility: CLINIC | Age: 76
End: 2021-12-28

## 2021-12-28 ENCOUNTER — TRANSFERRED RECORDS (OUTPATIENT)
Dept: HEALTH INFORMATION MANAGEMENT | Facility: CLINIC | Age: 76
End: 2021-12-28

## 2021-12-28 ENCOUNTER — ALLIED HEALTH/NURSE VISIT (OUTPATIENT)
Dept: INTERNAL MEDICINE | Facility: CLINIC | Age: 76
End: 2021-12-28
Payer: COMMERCIAL

## 2021-12-28 DIAGNOSIS — E55.9 VITAMIN D DEFICIENCY: Primary | ICD-10-CM

## 2021-12-28 PROCEDURE — 96372 THER/PROPH/DIAG INJ SC/IM: CPT | Performed by: INTERNAL MEDICINE

## 2021-12-28 PROCEDURE — 99207 PR NO CHARGE NURSE ONLY: CPT

## 2021-12-28 RX ADMIN — CYANOCOBALAMIN 1000 MCG: 1000 INJECTION, SOLUTION INTRAMUSCULAR; SUBCUTANEOUS at 09:49

## 2021-12-28 NOTE — TELEPHONE ENCOUNTER
Patient here for nurse only appointment, requesting to speak to a nurse regarding shoulder pain.     Nurse Triage SBAR    Is this a 2nd Level Triage? NO    Situation: right shoulder pain    Background: Patient's  provided most of history. Pain started about 2 weeks ago and not getting better, she does get temporary relief with heat pack.      Assessment: no numbness or tingling. She has not noticed any difficulty using arm for daily tasks, however, cannot raise arm above shoulder level due to pain. Patient's  does think pain     (See information below for more triage details.)    Recommendation: schedule appointment for evaluation. Could contact Ortho directly since you have been seen at [facility] before for your knees, schedule appointment in our office or go to [facility]. Patient's  states they will go to [facility] today.     Protocol Recommended Disposition: Referred to other agency      Judy Castillo RN  [facility]     Reason for Disposition    Patient wants to be seen    Additional Information    Negative: Shock suspected (e.g., cold/pale/clammy skin, too weak to stand, low BP, rapid pulse)    Negative: Similar pain previously and it was from 'heart attack'    Negative: Similar pain previously and it was from 'angina' and not relieved by nitroglycerin    Negative: Sounds like a life-threatening emergency to the triager    Negative: Chest pain    Negative: Followed an injury to shoulder    Negative: Difficulty breathing or unusual sweating (e.g., sweating without exertion)    Negative: Pain lasting > 5 minutes and pain also present in chest (Exception: pain is clearly made worse by movement)    Negative: Age > 40 and no obvious cause and pain even when not moving the arm (Exception: pain is clearly made worse by moving arm or bending neck)    Negative: Red area or streak and fever    Negative:  "Swollen joint and fever    Negative: Entire arm is swollen    Negative: Patient sounds very sick or weak to the triager    Negative: SEVERE pain (e.g., excruciating, unable to do any normal activities)    Negative: Shoulder pains with exertion (e.g., walking) and pain goes away on resting and not present now    Negative: Painful rash with multiple small blisters grouped together (i.e., dermatomal distribution or 'band' or 'stripe')    Negative: Looks like a boil, infected sore, deep ulcer or other infected rash (spreading redness, pus)    Negative: Localized rash is very painful (no fever)    Negative: Weakness (i.e., loss of strength) in hand or fingers (Exception: not truly weak; hand feels weak because of pain)    Negative: Numbness (i.e., loss of sensation) in hand or fingers    Negative: Unable to use arm at all and because of shoulder pain or stiffness    Answer Assessment - Initial Assessment Questions  1. ONSET: \"When did the pain start?\"      2 weeks ago  2. LOCATION: \"Where is the pain located?\"      Right shoulder  3. PAIN: \"How bad is the pain?\" (Scale 1-10; or mild, moderate, severe)    - MILD (1-3): doesn't interfere with normal activities    - MODERATE (4-7): interferes with normal activities (e.g., work or school) or awakens from sleep    - SEVERE (8-10): excruciating pain, unable to do any normal activities, unable to move arm at all due to pain      moderate  4. WORK OR EXERCISE: \"Has there been any recent work or exercise that involved this part of the body?\"      No known injury  5. CAUSE: \"What do you think is causing the shoulder pain?\"      unsure  6. OTHER SYMPTOMS: \"Do you have any other symptoms?\" (e.g., neck pain, swelling, rash, fever, numbness, weakness)      no  7. PREGNANCY: \"Is there any chance you are pregnant?\" \"When was your last menstrual period?\"      n/a    Protocols used: SHOULDER PAIN-A-OH      "

## 2021-12-28 NOTE — PROGRESS NOTES
Clinic Administered Medication Documentation    Administrations This Visit     cyanocobalamin injection 1,000 mcg     Admin Date  12/28/2021 Action  Given Dose  1,000 mcg Route  Intramuscular Site  Left Deltoid Administered By  Ella Shields MA    Ordering Provider: Colt Riley MD    NDC: 35074-762-52    Lot#: [id number]    : Faculte    Patient Supplied?: No                  Injectable Medication Documentation    Patient was given Cyanocobalamin (B-12). Prior to medication administration, verified patients identity using patient s name and date of birth. Please see MAR and medication order for additional information. Patient instructed to remain in clinic for 15 minutes and report any adverse reaction to staff immediately .      Was entire vial of medication used? Yes  Vial/Syringe: Single dose vial  Expiration Date:  12/1/22  Was this medication supplied by the patient? No

## 2022-01-03 ENCOUNTER — TRANSFERRED RECORDS (OUTPATIENT)
Dept: HEALTH INFORMATION MANAGEMENT | Facility: CLINIC | Age: 77
End: 2022-01-03

## 2022-01-11 ENCOUNTER — APPOINTMENT (OUTPATIENT)
Dept: INTERNAL MEDICINE | Facility: CLINIC | Age: 77
End: 2022-01-11
Payer: COMMERCIAL

## 2022-01-12 ENCOUNTER — PATIENT OUTREACH (OUTPATIENT)
Dept: NURSING | Facility: CLINIC | Age: 77
End: 2022-01-12
Payer: COMMERCIAL

## 2022-01-12 NOTE — PROGRESS NOTES
Clinic Care Coordination Contact    Follow Up Progress Note      Assessment: Follow-up call placed to Pt's son, Robbi. He reports that Patient continues to do well and access in-home support. He plans to review adult day programs with his father and determine if any may be a good option for Patient.    Care Gaps:    Health Maintenance Due   Topic Date Due     ZOSTER IMMUNIZATION (2 of 3) 02/26/2008     COVID-19 Vaccine (3 - Booster for Moderna series) 08/25/2021     PHQ-2  01/01/2022       Goals addressed this encounter:   Goals Addressed                    This Visit's Progress       Patient Stated       1. Psychosocial (pt-stated)   90%      Goal Statement: I want my family to review community care/support options within 3 months.  Date Goal set: 7.9.2021, extended by 3 months on 11.23.2021.  Barriers: Cost, availability of services.  Strengths: Recognition of need, family support system.   Date to Achieve By: 2.23.2022  Patient expressed understanding of goal: Pt reports understanding and denies any additional questions or concerns at this times. TRISTA SHEFFIELD engaged in AIDET communication during encounter.    Action steps to achieve this goal:  1. I will have my family review options sent by TRISTA SHEFFIELD.  2. My family will coordinate with agencies of interest.  3. My family will access services/coordinate assistance with agencies.              Intervention/Education provided during outreach: Robbi reports no new needs/concerns at this time, and declines further resources/programs for now. Robbi is agreeable to continued monthly outreach by TRISTA SHEFFIELD.      Outreach Frequency: monthly    Plan:   TRISTA SHEFFIELD will outreach to Robbi in one month for follow-up. Robbi will review Adult Day Program resources with family.     CADY Kingsley  Clinic Care Coordinator  Ph. [phone]  [email or web address]

## 2022-01-25 ENCOUNTER — ALLIED HEALTH/NURSE VISIT (OUTPATIENT)
Dept: INTERNAL MEDICINE | Facility: CLINIC | Age: 77
End: 2022-01-25
Payer: COMMERCIAL

## 2022-01-25 DIAGNOSIS — E53.8 VITAMIN B12 DEFICIENCY (NON ANEMIC): Primary | ICD-10-CM

## 2022-01-25 PROCEDURE — 99207 PR NO CHARGE NURSE ONLY: CPT

## 2022-01-25 PROCEDURE — 96372 THER/PROPH/DIAG INJ SC/IM: CPT | Performed by: INTERNAL MEDICINE

## 2022-01-25 RX ORDER — CYANOCOBALAMIN 1000 UG/ML
1000 INJECTION, SOLUTION INTRAMUSCULAR; SUBCUTANEOUS
Status: DISCONTINUED | OUTPATIENT
Start: 2022-01-25 | End: 2022-03-08

## 2022-01-25 RX ADMIN — CYANOCOBALAMIN 1000 MCG: 1000 INJECTION, SOLUTION INTRAMUSCULAR; SUBCUTANEOUS at 09:58

## 2022-02-08 ENCOUNTER — ALLIED HEALTH/NURSE VISIT (OUTPATIENT)
Dept: INTERNAL MEDICINE | Facility: CLINIC | Age: 77
End: 2022-02-08
Payer: COMMERCIAL

## 2022-02-08 DIAGNOSIS — E53.8 VITAMIN B12 DEFICIENCY (NON ANEMIC): Primary | ICD-10-CM

## 2022-02-08 PROCEDURE — 99207 PR NO CHARGE NURSE ONLY: CPT

## 2022-02-22 ENCOUNTER — ALLIED HEALTH/NURSE VISIT (OUTPATIENT)
Dept: INTERNAL MEDICINE | Facility: CLINIC | Age: 77
End: 2022-02-22
Payer: COMMERCIAL

## 2022-02-22 VITALS — WEIGHT: 126 LBS | BODY MASS INDEX: 23.05 KG/M2 | DIASTOLIC BLOOD PRESSURE: 46 MMHG | SYSTOLIC BLOOD PRESSURE: 98 MMHG

## 2022-02-22 DIAGNOSIS — E53.8 VITAMIN B12 DEFICIENCY (NON ANEMIC): Primary | ICD-10-CM

## 2022-02-22 PROCEDURE — 99207 PR NO CHARGE NURSE ONLY: CPT

## 2022-02-22 PROCEDURE — 96372 THER/PROPH/DIAG INJ SC/IM: CPT | Performed by: INTERNAL MEDICINE

## 2022-02-22 RX ORDER — MEMANTINE HYDROCHLORIDE 10 MG/1
20 TABLET ORAL EVERY EVENING
COMMUNITY
Start: 2022-02-07

## 2022-02-22 RX ORDER — QUETIAPINE FUMARATE 25 MG/1
TABLET, FILM COATED ORAL
Status: ON HOLD | COMMUNITY
Start: 2022-02-11 | End: 2023-11-29

## 2022-02-22 RX ORDER — METOPROLOL TARTRATE 25 MG/1
TABLET, FILM COATED ORAL
COMMUNITY
Start: 2021-11-24 | End: 2022-04-08

## 2022-02-22 RX ADMIN — CYANOCOBALAMIN 1000 MCG: 1000 INJECTION, SOLUTION INTRAMUSCULAR; SUBCUTANEOUS at 10:12

## 2022-02-22 NOTE — NURSING NOTE
Patient here with  he requested B/P check and weight check     B/P = 98/46   Recheck =108/50  Weight = 126     Provider Patients asked that I run the B/P # by you , please advise .      REMY Balderas LPN

## 2022-03-02 ENCOUNTER — PATIENT OUTREACH (OUTPATIENT)
Dept: CARE COORDINATION | Facility: CLINIC | Age: 77
End: 2022-03-02
Payer: COMMERCIAL

## 2022-03-02 NOTE — PROGRESS NOTES
Clinic Care Coordination Contact  [facility]/Voicemail       Clinical Data: Care Coordinator Outreach  Outreach attempted x 1.  Left message on Son, Robbi' voicemail with call back information and requested return call.  Plan:  Care Coordinator will try to reach patient again in 10 business days.      Jovanny Hurt [facility] Care Coordinator  Ph. [phone]  [email or web address]

## 2022-03-02 NOTE — LETTER
[facility]  [address]  [facility] [address]  April 7, 2022    Danielle Bryan  [address]  [place] [address]      Dear Robbi Santos,    I have been unsuccessful in reaching you since our last contact. At this time the Care Coordination team will make no further attempts to reach you, however this does not change your ability to continue receiving care from your providers at your primary care clinic. If you need additional support from a care coordinator in the future please contact me at [phone].    All of us at [facility] are invested in your health and are here to assist you in meeting your goals.     Sincerely,    Jovanny Hurt [facility]  [facility] Care Coordinator  Ph. [phone]  [email or web address]

## 2022-03-08 ENCOUNTER — TELEPHONE (OUTPATIENT)
Dept: INTERNAL MEDICINE | Facility: CLINIC | Age: 77
End: 2022-03-08

## 2022-03-08 ENCOUNTER — ALLIED HEALTH/NURSE VISIT (OUTPATIENT)
Dept: INTERNAL MEDICINE | Facility: CLINIC | Age: 77
End: 2022-03-08
Payer: COMMERCIAL

## 2022-03-08 DIAGNOSIS — E53.8 VITAMIN B12 DEFICIENCY (NON ANEMIC): ICD-10-CM

## 2022-03-08 DIAGNOSIS — E53.8 VITAMIN B12 DEFICIENCY (NON ANEMIC): Primary | ICD-10-CM

## 2022-03-08 PROCEDURE — 96372 THER/PROPH/DIAG INJ SC/IM: CPT | Performed by: INTERNAL MEDICINE

## 2022-03-08 PROCEDURE — 99207 PR NO CHARGE NURSE ONLY: CPT

## 2022-03-08 RX ORDER — CYANOCOBALAMIN 1000 UG/ML
1000 INJECTION, SOLUTION INTRAMUSCULAR; SUBCUTANEOUS
Status: COMPLETED | OUTPATIENT
Start: 2022-03-08 | End: 2022-05-03

## 2022-03-08 RX ADMIN — CYANOCOBALAMIN 1000 MCG: 1000 INJECTION, SOLUTION INTRAMUSCULAR; SUBCUTANEOUS at 09:40

## 2022-03-08 NOTE — PROGRESS NOTES
Okay if systolic BP's 90 or higher unless she has simultaneous symptoms of dizziness/lightheadedness.

## 2022-03-08 NOTE — TELEPHONE ENCOUNTER
Patient here for nurse only B12 injection. Order is for patient to receive injections every 30 days but patient has been receiving them every 14 days. Per 6/22/21 telephone encounter:    Colt Riley MD  2:03 PM  Note  Okay to continue with every 14 day injections.         Order was not updated. Patient has upcoming appointment.     Next 5 appointments (look out 90 days)       Apr 08, 2022 11:00 AM  (Arrive by 10:40 AM)  Adult Preventative Visit with oClt Riley MD  [facility] [address] [place] [id number] [phone]     Corrected order entered to cover injections until patient is seen by primary care provider.

## 2022-03-08 NOTE — TELEPHONE ENCOUNTER
Patient requesting Disability Parking Certificate. Partially filled form is in Rosemary's yellow folder.

## 2022-03-08 NOTE — NURSING NOTE
Patient's  concerned that patient's blood pressure readings at home are as low as 97/47. He would like to know if they should be concerned?

## 2022-03-08 NOTE — PROGRESS NOTES
Left voice message for patient's  Karl to call back.     Judy Castillo RN  [facility]

## 2022-03-09 NOTE — PROGRESS NOTES
Karl calls back to clinic. Advised of Dr. Riley's message. Advised to keep appointment. Advised to call back with any further concerns.

## 2022-03-17 NOTE — PROGRESS NOTES
Clinic Care Coordination Contact  [facility]/Voicemail       Clinical Data: Care Coordinator Outreach  Outreach attempted x 2.  Left message on Robib's voicemail with call back information and requested return call.  Plan: Care Coordinator will try to reach patient again in 10 business days.      SANTY Kingsley  Clinic Care Coordinator  Ph. [phone]  [email or web address]

## 2022-03-22 ENCOUNTER — ALLIED HEALTH/NURSE VISIT (OUTPATIENT)
Dept: INTERNAL MEDICINE | Facility: CLINIC | Age: 77
End: 2022-03-22
Payer: COMMERCIAL

## 2022-03-22 VITALS — BODY MASS INDEX: 23.41 KG/M2 | SYSTOLIC BLOOD PRESSURE: 110 MMHG | WEIGHT: 128 LBS | DIASTOLIC BLOOD PRESSURE: 52 MMHG

## 2022-03-22 DIAGNOSIS — E53.8 VITAMIN B12 DEFICIENCY: Primary | ICD-10-CM

## 2022-03-22 PROCEDURE — 99207 PR NO CHARGE NURSE ONLY: CPT

## 2022-03-22 PROCEDURE — 96372 THER/PROPH/DIAG INJ SC/IM: CPT | Performed by: INTERNAL MEDICINE

## 2022-03-22 RX ADMIN — CYANOCOBALAMIN 1000 MCG: 1000 INJECTION, SOLUTION INTRAMUSCULAR; SUBCUTANEOUS at 10:16

## 2022-03-31 ENCOUNTER — TRANSFERRED RECORDS (OUTPATIENT)
Dept: HEALTH INFORMATION MANAGEMENT | Facility: CLINIC | Age: 77
End: 2022-03-31
Payer: COMMERCIAL

## 2022-04-06 ENCOUNTER — ALLIED HEALTH/NURSE VISIT (OUTPATIENT)
Dept: INTERNAL MEDICINE | Facility: CLINIC | Age: 77
End: 2022-04-06
Payer: COMMERCIAL

## 2022-04-06 DIAGNOSIS — E53.8 VITAMIN B12 DEFICIENCY (NON ANEMIC): Primary | ICD-10-CM

## 2022-04-06 PROCEDURE — 99207 PR NO CHARGE NURSE ONLY: CPT

## 2022-04-06 PROCEDURE — 96372 THER/PROPH/DIAG INJ SC/IM: CPT | Performed by: INTERNAL MEDICINE

## 2022-04-06 RX ADMIN — CYANOCOBALAMIN 1000 MCG: 1000 INJECTION, SOLUTION INTRAMUSCULAR; SUBCUTANEOUS at 10:25

## 2022-04-07 NOTE — PROGRESS NOTES
Clinic Care Coordination Contact  [facility] [[email or web address]]/Voicemail       Clinical Data: Care Coordinator Outreach  Outreach attempted x 3.  Left message on son, Robbi's voicemail with call back information and requested return call.  Plan: Care Coordinator will send disenrollment letter with care coordinator contact information via [employer]. Care Coordinator will do no further outreaches at this time.      CADY Kingsley  Clinic Care Coordinator  Ph. [phone]  [email or web address]

## 2022-04-08 ENCOUNTER — OFFICE VISIT (OUTPATIENT)
Dept: INTERNAL MEDICINE | Facility: CLINIC | Age: 77
End: 2022-04-08
Payer: COMMERCIAL

## 2022-04-08 VITALS
HEART RATE: 74 BPM | WEIGHT: 124 LBS | TEMPERATURE: 98.3 F | RESPIRATION RATE: 12 BRPM | OXYGEN SATURATION: 97 % | SYSTOLIC BLOOD PRESSURE: 118 MMHG | HEIGHT: 62 IN | BODY MASS INDEX: 22.82 KG/M2 | DIASTOLIC BLOOD PRESSURE: 62 MMHG

## 2022-04-08 DIAGNOSIS — E78.5 HYPERLIPIDEMIA LDL GOAL <100: ICD-10-CM

## 2022-04-08 DIAGNOSIS — I48.0 PAROXYSMAL ATRIAL FIBRILLATION (H): ICD-10-CM

## 2022-04-08 DIAGNOSIS — E55.9 VITAMIN D DEFICIENCY: ICD-10-CM

## 2022-04-08 DIAGNOSIS — Z79.899 MEDICATION MANAGEMENT: ICD-10-CM

## 2022-04-08 DIAGNOSIS — I10 HYPERTENSION, UNSPECIFIED TYPE: ICD-10-CM

## 2022-04-08 DIAGNOSIS — M81.0 AGE-RELATED OSTEOPOROSIS WITHOUT CURRENT PATHOLOGICAL FRACTURE: ICD-10-CM

## 2022-04-08 DIAGNOSIS — E53.8 VITAMIN B12 DEFICIENCY (NON ANEMIC): ICD-10-CM

## 2022-04-08 DIAGNOSIS — R41.3 MEMORY DIFFICULTIES: ICD-10-CM

## 2022-04-08 DIAGNOSIS — Z00.00 ENCOUNTER FOR MEDICARE ANNUAL WELLNESS EXAM: Primary | ICD-10-CM

## 2022-04-08 LAB
ERYTHROCYTE [DISTWIDTH] IN BLOOD BY AUTOMATED COUNT: 13.2 % (ref 10–15)
HCT VFR BLD AUTO: 42.2 % (ref 35–47)
HGB BLD-MCNC: 13.6 G/DL (ref 11.7–15.7)
MCH RBC QN AUTO: 30.6 PG (ref 26.5–33)
MCHC RBC AUTO-ENTMCNC: 32.2 G/DL (ref 31.5–36.5)
MCV RBC AUTO: 95 FL (ref 78–100)
PLATELET # BLD AUTO: 270 10E3/UL (ref 150–450)
RBC # BLD AUTO: 4.44 10E6/UL (ref 3.8–5.2)
VIT B12 SERPL-MCNC: 1978 PG/ML (ref 193–986)
WBC # BLD AUTO: 7.7 10E3/UL (ref 4–11)

## 2022-04-08 PROCEDURE — 82607 VITAMIN B-12: CPT | Performed by: INTERNAL MEDICINE

## 2022-04-08 PROCEDURE — 99214 OFFICE O/P EST MOD 30 MIN: CPT | Mod: 25 | Performed by: INTERNAL MEDICINE

## 2022-04-08 PROCEDURE — 84443 ASSAY THYROID STIM HORMONE: CPT | Performed by: INTERNAL MEDICINE

## 2022-04-08 PROCEDURE — 80053 COMPREHEN METABOLIC PANEL: CPT | Performed by: INTERNAL MEDICINE

## 2022-04-08 PROCEDURE — 80061 LIPID PANEL: CPT | Performed by: INTERNAL MEDICINE

## 2022-04-08 PROCEDURE — 99397 PER PM REEVAL EST PAT 65+ YR: CPT | Performed by: INTERNAL MEDICINE

## 2022-04-08 PROCEDURE — 85027 COMPLETE CBC AUTOMATED: CPT | Performed by: INTERNAL MEDICINE

## 2022-04-08 PROCEDURE — 36415 COLL VENOUS BLD VENIPUNCTURE: CPT | Performed by: INTERNAL MEDICINE

## 2022-04-08 PROCEDURE — 82306 VITAMIN D 25 HYDROXY: CPT | Performed by: INTERNAL MEDICINE

## 2022-04-08 RX ORDER — METOPROLOL TARTRATE 25 MG/1
12.5 TABLET, FILM COATED ORAL DAILY
Qty: 45 TABLET | Refills: 3 | Status: ON HOLD | OUTPATIENT
Start: 2022-04-08 | End: 2023-11-23

## 2022-04-08 SDOH — ECONOMIC STABILITY: FOOD INSECURITY: WITHIN THE PAST 12 MONTHS, YOU WORRIED THAT YOUR FOOD WOULD RUN OUT BEFORE YOU GOT MONEY TO BUY MORE.: NEVER TRUE

## 2022-04-08 SDOH — ECONOMIC STABILITY: FOOD INSECURITY: WITHIN THE PAST 12 MONTHS, THE FOOD YOU BOUGHT JUST DIDN'T LAST AND YOU DIDN'T HAVE MONEY TO GET MORE.: NEVER TRUE

## 2022-04-08 SDOH — ECONOMIC STABILITY: INCOME INSECURITY: IN THE LAST 12 MONTHS, WAS THERE A TIME WHEN YOU WERE NOT ABLE TO PAY THE MORTGAGE OR RENT ON TIME?: NO

## 2022-04-08 ASSESSMENT — SOCIAL DETERMINANTS OF HEALTH (SDOH)
HOW HARD IS IT FOR YOU TO PAY FOR THE VERY BASICS LIKE FOOD, HOUSING, MEDICAL CARE, AND HEATING?: NOT HARD AT ALL
WITHIN THE LAST YEAR, HAVE YOU BEEN KICKED, HIT, SLAPPED, OR OTHERWISE PHYSICALLY HURT BY YOUR PARTNER OR EX-PARTNER?: NO
WITHIN THE LAST YEAR, HAVE YOU BEEN AFRAID OF YOUR PARTNER OR EX-PARTNER?: NO
WITHIN THE LAST YEAR, HAVE YOU BEEN HUMILIATED OR EMOTIONALLY ABUSED IN OTHER WAYS BY YOUR PARTNER OR EX-PARTNER?: NO
WITHIN THE LAST YEAR, HAVE TO BEEN RAPED OR FORCED TO HAVE ANY KIND OF SEXUAL ACTIVITY BY YOUR PARTNER OR EX-PARTNER?: NO

## 2022-04-08 ASSESSMENT — LIFESTYLE VARIABLES: HOW OFTEN DO YOU HAVE A DRINK CONTAINING ALCOHOL: NEVER

## 2022-04-08 ASSESSMENT — ACTIVITIES OF DAILY LIVING (ADL)
CURRENT_FUNCTION: BATHING REQUIRES ASSISTANCE
CURRENT_FUNCTION: MONEY MANAGEMENT REQUIRES ASSISTANCE
CURRENT_FUNCTION: LAUNDRY REQUIRES ASSISTANCE
CURRENT_FUNCTION: PREPARING MEALS REQUIRES ASSISTANCE
CURRENT_FUNCTION: HOUSEWORK REQUIRES ASSISTANCE
CURRENT_FUNCTION: TRANSPORTATION REQUIRES ASSISTANCE
CURRENT_FUNCTION: MEDICATION ADMINISTRATION REQUIRES ASSISTANCE
CURRENT_FUNCTION: SHOPPING REQUIRES ASSISTANCE
CURRENT_FUNCTION: TELEPHONE REQUIRES ASSISTANCE

## 2022-04-08 NOTE — PATIENT INSTRUCTIONS
Patient Education   Personalized Prevention Plan  You are due for the preventive services outlined below.  Your care team is available to assist you in scheduling these services.  If you have already completed any of these items, please share that information with your care team to update in your medical record.  Health Maintenance Due   Topic Date Due     Zoster (Shingles) Vaccine (2 of 3) 02/26/2008     COVID-19 Vaccine (3 - Booster for Moderna series) 07/25/2021           Dr Riley will investigate timing of next Reclast injection.     Everything looks fine.    Refills of needed medications have been faxed to your pharmacy.     Lab results will be available soon on [employer].    See me in a year, sooner if problems.

## 2022-04-08 NOTE — PROGRESS NOTES
"SUBJECTIVE:   Danielle Bryan is a 77 year old female who presents for Preventive Visit.      Patient has been advised of split billing requirements and indicates understanding: Yes  Are you in the first 12 months of your Medicare coverage?  No    Healthy Habits:    In general, how would you rate your overall health?  Good    Frequency of exercise:  None    Duration of exercise:  Less than 15 minutes    Do you usually eat at least 4 servings of fruit and vegetables a day, include whole grains    & fiber and avoid regularly eating high fat or \"junk\" foods?  Yes    Taking medications regularly:  No    Barriers to taking medications:  None    Medication side effects:  None    Ability to successfully perform activities of daily living:  Transportation requires assistance, telephone requires assistance, shopping requires assistance, preparing meals requires assistance, housework requires assistance, bathing requires assistance, laundry requires assistance, medication administration requires assistance and money management requires assistance    Home Safety:  No safety concerns identified    Hearing Impairment:  No hearing concerns    In the past 6 months, have you been bothered by leaking of urine? Yes    In general, how would you rate your overall mental or emotional health?  Fair      PHQ-2 Total Score:    Additional concerns today:  Yes (spouse states the patient's dementia is rapidly progressing, inability to make coherent sentences, anger concerns)           Do you feel safe in your environment? Yes    Have you ever done Advance Care Planning? (For example, a Health Directive, POLST, or a discussion with a medical provider or your loved ones about your wishes): Yes, advance care planning is on file.       Fall risk  Fallen 2 or more times in the past year?: No  Any fall with injury in the past year?: No    Cognitive Screening Not appropriate due to known dementia    Do you have sleep apnea, excessive snoring or " daytime drowsiness?: no    Reviewed and updated as needed this visit by clinical staff   Tobacco  Allergies  Meds   Med Hx  Surg Hx  Fam Hx  Soc Hx        Reviewed and updated as needed this visit by Provider                 Social History     Tobacco Use     Smoking status: Never Smoker     Smokeless tobacco: Never Used   Substance Use Topics     Alcohol use: No     If you drink alcohol do you typically have >3 drinks per day or >7 drinks per week? No    Alcohol Use 12/14/2016   Prescreen: >3 drinks/day or >7 drinks/week? The patient does not drink >3 drinks per day nor >7 drinks per week.       PROBLEMS TO ADD ON...In addition to an Annual Wellness Exam, we addressed paroxysmal atrial fibrillation, hypertension, dementia, Vitamin B12 deficiency, osteoporosis.     History is obtained almost entirely from the patient's  due to her progressive cognitive impairment.    He reports that her chronic conditions are relatively stable on current medications.  Her blood pressure appears satisfactorily controlled.  She appears to tolerate chronic oral anticoagulation and they report no significant fall or injuries.    She is following with neurology for cognitive impairment.  She continues also on vitamin B12 injections which she has not resulted in improvement in her cognition.    She had been intolerant of Reclast and more recently has been taking Prolia.  This is reordered.    Current providers sharing in care for this patient include:   Patient Care Team:  Colt Riley MD as PCP - General (Internal Medicine)  Colt Riley MD as Assigned PCP    The following health maintenance items are reviewed in Epic and correct as of today:  Health Maintenance Due   Topic Date Due     ZOSTER IMMUNIZATION (2 of 3) 02/26/2008     COVID-19 Vaccine (3 - Booster for Moderna series) 07/25/2021     PHQ-2 (once per calendar year)  01/01/2022     MEDICARE ANNUAL WELLNESS VISIT  02/24/2022     FALL RISK ASSESSMENT   "02/24/2022     Pneumonia Vaccine: Patient has received both pneumonia vaccinations.    Mammogram Screening: Mammogram Screening - Patient over age 75, has elected to discontinue screenings. Mammogram Screening - Mammography discussed and declined    Pertinent mammograms are reviewed under the imaging tab.    Review of Systems  Review of systems: Unobtainable, patient unable to provide due to cognitive disabilities. Attendant provides no concerns on her behalf except as noted above.     OBJECTIVE:   /62   Pulse 74   Temp 98.3  F (36.8  C) (Tympanic)   Resp 12   Ht 1.562 m (5' 1.5\")   Wt 56.2 kg (124 lb)   LMP  (LMP Unknown)   SpO2 97%   Breastfeeding No   BMI 23.05 kg/m   Estimated body mass index is 23.05 kg/m  as calculated from the following:    Height as of this encounter: 1.562 m (5' 1.5\").    Weight as of this encounter: 56.2 kg (124 lb).  Physical Exam  GENERAL: healthy, alert and no distress  EYES: Eyes grossly normal to inspection, PERRL and conjunctivae and sclerae normal  HENT: ear canals and TM's normal, nose and mouth without ulcers or lesions  NECK: no adenopathy, no asymmetry, masses, or scars and thyroid normal to palpation  RESP: lungs clear to auscultation - no rales, rhonchi or wheezes  BREAST/PELVIC exams not performed.  CV: regular rate and rhythm, normal S1 S2, no S3 or S4, no murmur, click or rub, no peripheral edema and peripheral pulses strong  ABDOMEN: soft, nontender, no hepatosplenomegaly, no masses and bowel sounds normal  MS: no gross musculoskeletal defects noted, no edema  SKIN: no suspicious lesions or rashes  NEURO: Normal strength and tone, sensory exam grossly normal and abnormal mental status - paucity of speech, bland affect.   PSYCH: affect flat    Diagnostic Test Results:  Labs reviewed in Epic    ASSESSMENT / PLAN:   (Z00.00) Encounter for Medicare annual wellness exam  (primary encounter diagnosis)  Comment: Stable health. See epic orders.     (I48.0) " "Paroxysmal atrial fibrillation (H)  Comment: Appears to be in sinus rhythm. Continue chronic oral anticoagulation.   Plan: rivaroxaban ANTICOAGULANT (XARELTO         ANTICOAGULANT) 15 MG TABS tablet, metoprolol         tartrate (LOPRESSOR) 25 MG tablet, OFFICE/OUTPT        VISIT,EST,LEVL III          (I10) Hypertension, unspecified type  Comment: BP at target. Continue current meds.   Plan: OFFICE/OUTPT VISIT,EST,LEVL III          (R41.3) Memory difficulties  Comment: See epic orders. F/u with Neurology as they advise.   Plan: **Vitamin B12 FUTURE 2mo, **CBC with platelets         FUTURE 2mo, **Comprehensive metabolic panel         FUTURE 2mo, **TSH with free T4 reflex FUTURE         2mo, OFFICE/OUTPT VISIT,EST,LEVL III          (E53.8) Vitamin B12 deficiency (non anemic)  Comment: Continue injections. Check level.   Plan: **Vitamin B12 FUTURE 2mo, OFFICE/OUTPT         VISIT,EST,LEVL III          (M81.0) Age-related osteoporosis without current pathological fracture  Comment: Prolia ordered.   Plan: OFFICE/OUTPT VISIT,EST,LEVL III, denosumab         (PROLIA) injection 60 mg          (E55.9) Vitamin D deficiency  Plan: Vitamin D Deficiency          (E78.5) Hyperlipidemia LDL goal <100  Comment: Update lipids.     (Z79.899) Medication management      Patient has been advised of split billing requirements and indicates understanding: Yes    COUNSELING:  Reviewed preventive health counseling, as reflected in patient instructions    Estimated body mass index is 23.05 kg/m  as calculated from the following:    Height as of this encounter: 1.562 m (5' 1.5\").    Weight as of this encounter: 56.2 kg (124 lb).        She reports that she has never smoked. She has never used smokeless tobacco.      Appropriate preventive services were discussed with this patient, including applicable screening as appropriate for cardiovascular disease, diabetes, osteopenia/osteoporosis, and glaucoma.  As appropriate for age/gender, discussed " screening for colorectal cancer, prostate cancer, breast cancer, and cervical cancer. Checklist reviewing preventive services available has been given to the patient.    Reviewed patients plan of care and provided an AVS. The Basic Care Plan (routine screening as documented in Health Maintenance) for Danielle meets the Care Plan requirement. This Care Plan has been established and reviewed with the Patient and .    Counseling Resources:  ATP IV Guidelines  Pooled Cohorts Equation Calculator  Breast Cancer Risk Calculator  Breast Cancer: Medication to Reduce Risk  FRAX Risk Assessment  ICSI Preventive Guidelines  Dietary Guidelines for Americans, 2010  USDA's MyPlate  ASA Prophylaxis  Lung CA Screening    Colt Riley MD,   [facility]    Patient Instructions     Dr Riley will investigate timing of next Reclast injection.  (note: should be Prolia which is actually ordered)     Everything looks fine.    Refills of needed medications have been faxed to your pharmacy.     Lab results will be available soon on [employer].    See me in a year, sooner if problems.

## 2022-04-09 LAB
ALBUMIN SERPL-MCNC: 3.6 G/DL (ref 3.4–5)
ALP SERPL-CCNC: 49 U/L (ref 40–150)
ALT SERPL W P-5'-P-CCNC: 47 U/L (ref 0–50)
ANION GAP SERPL CALCULATED.3IONS-SCNC: 4 MMOL/L (ref 3–14)
AST SERPL W P-5'-P-CCNC: 27 U/L (ref 0–45)
BILIRUB SERPL-MCNC: 0.6 MG/DL (ref 0.2–1.3)
BUN SERPL-MCNC: 17 MG/DL (ref 7–30)
CALCIUM SERPL-MCNC: 9.3 MG/DL (ref 8.5–10.1)
CHLORIDE BLD-SCNC: 109 MMOL/L (ref 94–109)
CHOLEST SERPL-MCNC: 307 MG/DL
CO2 SERPL-SCNC: 26 MMOL/L (ref 20–32)
CREAT SERPL-MCNC: 0.96 MG/DL (ref 0.52–1.04)
FASTING STATUS PATIENT QL REPORTED: YES
GFR SERPL CREATININE-BSD FRML MDRD: 60 ML/MIN/1.73M2
GLUCOSE BLD-MCNC: 98 MG/DL (ref 70–99)
HDLC SERPL-MCNC: 83 MG/DL
LDLC SERPL CALC-MCNC: 192 MG/DL
NONHDLC SERPL-MCNC: 224 MG/DL
POTASSIUM BLD-SCNC: 4.1 MMOL/L (ref 3.4–5.3)
PROT SERPL-MCNC: 7.1 G/DL (ref 6.8–8.8)
SODIUM SERPL-SCNC: 139 MMOL/L (ref 133–144)
TRIGL SERPL-MCNC: 158 MG/DL
TSH SERPL DL<=0.005 MIU/L-ACNC: 1.96 MU/L (ref 0.4–4)

## 2022-04-10 LAB — DEPRECATED CALCIDIOL+CALCIFEROL SERPL-MC: 21 UG/L (ref 20–75)

## 2022-04-18 ENCOUNTER — TRANSFERRED RECORDS (OUTPATIENT)
Dept: HEALTH INFORMATION MANAGEMENT | Facility: CLINIC | Age: 77
End: 2022-04-18
Payer: COMMERCIAL

## 2022-04-19 ENCOUNTER — ALLIED HEALTH/NURSE VISIT (OUTPATIENT)
Dept: INTERNAL MEDICINE | Facility: CLINIC | Age: 77
End: 2022-04-19
Payer: COMMERCIAL

## 2022-04-19 DIAGNOSIS — E53.8 VITAMIN B12 DEFICIENCY: Primary | ICD-10-CM

## 2022-04-19 PROCEDURE — 99207 PR NO CHARGE NURSE ONLY: CPT

## 2022-04-19 PROCEDURE — 96372 THER/PROPH/DIAG INJ SC/IM: CPT | Performed by: INTERNAL MEDICINE

## 2022-04-19 RX ADMIN — CYANOCOBALAMIN 1000 MCG: 1000 INJECTION, SOLUTION INTRAMUSCULAR; SUBCUTANEOUS at 09:20

## 2022-05-03 ENCOUNTER — TELEPHONE (OUTPATIENT)
Dept: INTERNAL MEDICINE | Facility: CLINIC | Age: 77
End: 2022-05-03

## 2022-05-03 ENCOUNTER — ALLIED HEALTH/NURSE VISIT (OUTPATIENT)
Dept: INTERNAL MEDICINE | Facility: CLINIC | Age: 77
End: 2022-05-03
Payer: COMMERCIAL

## 2022-05-03 DIAGNOSIS — E53.8 VITAMIN B12 DEFICIENCY (NON ANEMIC): ICD-10-CM

## 2022-05-03 DIAGNOSIS — M81.0 AGE-RELATED OSTEOPOROSIS WITHOUT CURRENT PATHOLOGICAL FRACTURE: ICD-10-CM

## 2022-05-03 DIAGNOSIS — E53.8 VITAMIN B12 DEFICIENCY: Primary | ICD-10-CM

## 2022-05-03 DIAGNOSIS — Z92.29 HISTORY OF BISPHOSPHONATE THERAPY: ICD-10-CM

## 2022-05-03 PROCEDURE — 96372 THER/PROPH/DIAG INJ SC/IM: CPT | Performed by: INTERNAL MEDICINE

## 2022-05-03 PROCEDURE — 99207 PR NO CHARGE NURSE ONLY: CPT

## 2022-05-03 RX ORDER — CYANOCOBALAMIN 1000 UG/ML
1000 INJECTION, SOLUTION INTRAMUSCULAR; SUBCUTANEOUS
Status: DISCONTINUED | OUTPATIENT
Start: 2022-05-17 | End: 2023-11-29

## 2022-05-03 RX ADMIN — CYANOCOBALAMIN 1000 MCG: 1000 INJECTION, SOLUTION INTRAMUSCULAR; SUBCUTANEOUS at 10:08

## 2022-05-03 NOTE — TELEPHONE ENCOUNTER
Patient's  Karl (on consent to communicate) advised of recommendation to decrease B12 to once a month. Patient's appointments updated to reflect dosing change. Karl has a few additional questions as well:   1. Her recent labs show her cholesterol number have increased. She had been on cholesterol medications for a long time and taken off this last year because her numbers had improved. He asks if she needs to go back on cholesterol medication.  2. Asks if there were concerns with any other lab results.   3. Patient due for Prolia, per CAM finance team notes, prescription is requiring a second diagnosis. In-basket messages have been sent to Dr. Riley for this. Please review message from CAM team and update diagnosis so insurance review can be completed.     Judy Castillo RN  [facility]

## 2022-05-03 NOTE — NURSING NOTE
Here for B 12 see MAR .      Due to injection administration, patient instructed to remain in clinic for 15 minutes  afterwards, and to report any adverse reaction to me immediately.REMY Balderas LPN

## 2022-05-03 NOTE — LETTER
"  5/4/2022    Danielle Bryan  [address]  [place] [address]           Dear Ms. Bryan,    The results of your lab tests are enclosed. Everything looks fine. Unless noted otherwise below, any results that are outside the \"normal\" range are within acceptable limits and are of no concern.    LDL= Bad Cholesterol-- the target is below 100 to 130.  Your LDL-Cholesterol was very high. There are \"pros and cons\" to resuming a statin medication to lower this number:    Pros: a strong statin medication like atorvastatin (Lipitor) or rosuvastatin (Crestor) may reduce this number into the desired range, if given in high enough doses. This could theoretically reduce your risk of future stroke or heart attack years down the road.    Cons: Your previous statin medication (pravastatin) is not as potent and would be unlikely to reduce your LDL-Cholesterol to target.   Also, there is a chance that other conditions (like dementia) are more likely than stroke or heart attack to affect your longevity of life.     If you are interested in starting a medication like atorvastatin or rosuvastatin, I will send a prescription to your pharmacy. We should then recheck your cholesterol after taking it for 2-3 months.     HDL= Good Cholesterol-- although this is determined mostly by heredity, exercise and/or medications may sometimes raise this number.    Triglycerides are another type of fat in the blood, and can sometimes be lowered by reducing intake of sweets or excess carbohydrates, alcohol, and by weight reduction if needed.  Sometimes medications are also used.    AST and ALT are liver tests, as are the bilirubin (total and direct), albumin, total protein, and alkaline phosphatase. Yours are all normal.       Urea Nitrogen and Creatinine are kidney tests--yours are normal. GFR stands for Glomerular Filtration Rate, a more precise estimate of kidney function.    Sodium, Potassium, Chloride, Carbon Dioxide, and Calcium are " all normal salts in the bloodstream. Yours all look normal. Your glucose (blood sugar) also looks fine. (You can ignore the anion gap result).    Your hemoglobin, white blood cell count, and platelet count looked fine.  Hemoglobin measures the amount of red blood cells carrying oxygen to the body's tissues. A low hemoglobin can cause symptoms of fatigue.  WBC Count measures White Blood Cells, the cells that fight infection. The percentages of various types of white blood cells are listed and look fine.  Platelets assist in normal blood clotting. Your platelet count looks normal.    TSH measures thyroid function. Yours is normal.    Your Vitamin B12 level is very high, and you should now get by on monthly Vitamin B12 injections.     Your Vitamin D level is on the lower end of normal. I recommend that you take Vitamin D at 2000 IU each day. This is available at your pharmacy without a prescription.       If you have any further questions or problems, please contact our office.    Sincerely,      MILDRED CANELA M.D.  Attachment: Lab results

## 2022-05-03 NOTE — TELEPHONE ENCOUNTER
Patient seen for nurse only appointment today, B12 injection order needs to be renewed prior to next nurse visit. Patient had labs drawn for B12 at 4/8/22 office visit (had received B12 injections 2 days before on 4/6/22), routed to provider to review if ok to continue B12 injections 1000 mcg every 2 weeks.   Component      Latest Ref Rng & Units 4/8/2022   Vitamin B12      193 - 986 pg/mL 1,978 (H)     Judy Castillo RN  [facility]

## 2022-05-04 NOTE — TELEPHONE ENCOUNTER
Letter completed, ready to be mailed.     Could you please read to her  the paragraph about statin medications?

## 2022-05-04 NOTE — TELEPHONE ENCOUNTER
Called and spoke to Karl and reviewed lab results. Karl will consider this, he has an appointment for himself coming up with Dr. Riley and will let Dr. Riley know his answer. Letter mailed with lab results.     Informed Karl that Dr. Riley has updated the Prolia order for Danielle and insurance coverage has been verified.     Prolia Checklist:  Last Injection: 5/11/21  Coverage: covered, no PA needed  Medication Order: in place  Dental work involving bone in past month or will have work in the next 6 months: no  Future Appt Scheduled for Injection: Karl will call back to schedule appointment as he does not have their calendar with him. Advised Karl appointment can be scheduled at any time.    Judy Castillo RN  [facility]

## 2022-05-04 NOTE — TELEPHONE ENCOUNTER
Please advise regarding lab results, patient's  was concerned about increased cholesterol levels since her statin was stopped.     Judy Castillo RN  [facility]

## 2022-05-05 ENCOUNTER — ALLIED HEALTH/NURSE VISIT (OUTPATIENT)
Dept: NURSING | Facility: CLINIC | Age: 77
End: 2022-05-05
Payer: COMMERCIAL

## 2022-05-05 DIAGNOSIS — M81.0 AGE-RELATED OSTEOPOROSIS WITHOUT CURRENT PATHOLOGICAL FRACTURE: Primary | ICD-10-CM

## 2022-05-05 PROCEDURE — 96372 THER/PROPH/DIAG INJ SC/IM: CPT | Performed by: INTERNAL MEDICINE

## 2022-05-05 PROCEDURE — 99207 PR NO CHARGE NURSE ONLY: CPT

## 2022-05-05 NOTE — PROGRESS NOTES
Clinic Administered Medication Documentation          Prolia Documentation    Prior to injection, verified patient identity using patient's name and date of birth. Medication was administered. Please see MAR and medication order for additional information. Patient instructed to remain in clinic for 15 minutes and report any adverse reaction to staff immediately .    Indication: Prolia  (denosumab) is a prescription medicine used to treat osteoporosis in patients who:     Are at high risk for fracture, meaning patients who have had a fracture related to osteoporosis, or who have multiple risk factors for fracture.    Cannot use another osteoporosis medicine or other osteoporosis medicines did not work well.    The timeline for early/late injections would be 4 weeks early and any time after the 6 month cecilia. If a patient receives their injection late, then the subsequent injection would be 6 months from the date that they actually received the injection.    When was the last injection?  5/11/21  Was the last injection at least 6 months ago? Yes  Has the prior authorization been completed?  Yes  Is there an active order (within the past 365 days) in the chart?  Yes  Patient denied having dental work involving the bone in the past 6 months?  Yes  Patient denies a plan to dental work involving the bone in the next 6 months? Yes    The following steps were completed to comply with the REMS program for Prolia:    Reviewed information in the Medication Guide and Patient Counseling Chart, including the serious risks of Prolia  and the symptoms of each risk.    Advised patient to seek prompt medical attention if they have signs or symptoms of any of the serious risks.    Provided each patient a copy of the Medication Guide and Patient Brochure.      Was entire vial of medication used? Yes  Vial/Syringe: Syringe  Expiration Date:  7/24  Was the medication not being billed by clinic? No

## 2022-05-17 ENCOUNTER — OFFICE VISIT (OUTPATIENT)
Dept: INTERNAL MEDICINE | Facility: CLINIC | Age: 77
End: 2022-05-17
Payer: COMMERCIAL

## 2022-05-17 VITALS
HEIGHT: 62 IN | RESPIRATION RATE: 12 BRPM | OXYGEN SATURATION: 99 % | SYSTOLIC BLOOD PRESSURE: 122 MMHG | BODY MASS INDEX: 24.84 KG/M2 | DIASTOLIC BLOOD PRESSURE: 60 MMHG | TEMPERATURE: 98.1 F | HEART RATE: 70 BPM | WEIGHT: 135 LBS

## 2022-05-17 DIAGNOSIS — Z23 HIGH PRIORITY FOR 2019-NCOV VACCINE: ICD-10-CM

## 2022-05-17 DIAGNOSIS — R30.0 DYSURIA: Primary | ICD-10-CM

## 2022-05-17 DIAGNOSIS — Z23 NEED FOR VACCINATION: ICD-10-CM

## 2022-05-17 DIAGNOSIS — L03.116 CELLULITIS OF LEFT LOWER EXTREMITY: ICD-10-CM

## 2022-05-17 DIAGNOSIS — E78.5 HYPERLIPIDEMIA WITH TARGET LDL LESS THAN 100: ICD-10-CM

## 2022-05-17 LAB
ALBUMIN UR-MCNC: NEGATIVE MG/DL
APPEARANCE UR: CLEAR
BACTERIA #/AREA URNS HPF: ABNORMAL /HPF
BILIRUB UR QL STRIP: NEGATIVE
COLOR UR AUTO: YELLOW
GLUCOSE UR STRIP-MCNC: NEGATIVE MG/DL
HGB UR QL STRIP: NEGATIVE
KETONES UR STRIP-MCNC: ABNORMAL MG/DL
LEUKOCYTE ESTERASE UR QL STRIP: NEGATIVE
MUCOUS THREADS #/AREA URNS LPF: PRESENT /LPF
NITRATE UR QL: NEGATIVE
PH UR STRIP: 5 [PH] (ref 5–7)
RBC #/AREA URNS AUTO: ABNORMAL /HPF
SP GR UR STRIP: >=1.03 (ref 1–1.03)
SQUAMOUS #/AREA URNS AUTO: ABNORMAL /LPF
UROBILINOGEN UR STRIP-ACNC: 0.2 E.U./DL
WBC #/AREA URNS AUTO: ABNORMAL /HPF

## 2022-05-17 PROCEDURE — 99214 OFFICE O/P EST MOD 30 MIN: CPT | Mod: 25 | Performed by: INTERNAL MEDICINE

## 2022-05-17 PROCEDURE — 0064A COVID-19,PF,MODERNA (18+ YRS BOOSTER .25ML): CPT | Performed by: INTERNAL MEDICINE

## 2022-05-17 PROCEDURE — 91306 COVID-19,PF,MODERNA (18+ YRS BOOSTER .25ML): CPT | Performed by: INTERNAL MEDICINE

## 2022-05-17 PROCEDURE — 81001 URINALYSIS AUTO W/SCOPE: CPT | Performed by: INTERNAL MEDICINE

## 2022-05-17 RX ORDER — CEPHALEXIN 500 MG/1
500 CAPSULE ORAL 2 TIMES DAILY
Qty: 14 CAPSULE | Refills: 0 | Status: SHIPPED | OUTPATIENT
Start: 2022-05-17 | End: 2022-06-02

## 2022-05-17 RX ORDER — PRAVASTATIN SODIUM 20 MG
20 TABLET ORAL AT BEDTIME
Qty: 90 TABLET | Refills: 3 | Status: SHIPPED | OUTPATIENT
Start: 2022-05-17 | End: 2023-04-19

## 2022-05-17 NOTE — PROGRESS NOTES
"  Assessment & Plan   (R30.0) Dysuria  (primary encounter diagnosis)  Comment: UA returned after patient's clinic departure, no clear evidence of UTI. Empiric Rx with cephalexin.   Plan: UA with Microscopic reflex to Culture - lab         collect, cephALEXin (KEFLEX) 500 MG capsule,         Urine Microscopic, CANCELED: UA Macro with         Reflex to Micro and Culture - lab collect,         CANCELED: UA Macro with Reflex to Micro and         Culture - lab collect          (L03.116) Cellulitis of left lower extremity  Comment: minimal erythema surrounding small scabbed lesion--cephalexin should cover cellulitis and potential UTI (less likely present).  Plan: cephALEXin (KEFLEX) 500 MG capsule          (E78.5) Hyperlipidemia with target LDL less than 100  Comment: Resume pravastatin.   Plan: pravastatin (PRAVACHOL) 20 MG tablet          (Z23) Need for vaccination       (Z23) High priority for 2019-nCoV vaccine  Comment: Offered Moderna booster.   Plan: COVID-19,PF,MODERNA (18+ Yrs BOOSTER .25mL)       Patient Instructions   Let's treat with an antibiotic that would cover your leg and your bladder (cephalexin twice a day, for 7 days).     Let's restart the cholesterol-medication called \"pravastatin\". This has worked well in the past for you without causing muscle aches.     If you would like to have your cholesterol rechecked after two months, I've placed an order and you can schedule a \"lab only\" appointment.     See me in April 2023 for next Annual Wellness Visit, or sooner if problems.         Return in about 11 months (around 4/17/2023) for Annual Wellness Visit.    Colt Riley MD,   [facility] NUZHAT Santos is a 77 year old who presents for the following health issues  accompanied by her spouse.    HPI     Possible UTI, patient unable to provide urine specimen.    Sore with redness on lower left leg.    History obtained primarily from the patient's , due to her " "cognitive limitations.     She does complain of some discomfort in her left posteromedial lower leg above the ankle, at the site of a small visible/palpable hematoma. She is not reporting discomfort at the area of a scabbed lesion over her left anterolateral lower leg, also just above the ankle.     She had also complained recently to her  of some burning with urination.     She offers no concerns on her own behalf today.     Her  wondered about whether her cholesterol level is too high and whether she should resume statin therapy. We were not able to recall upon whose recommendation her pravastatin was discontinued, in around February 2021.   She had come off of atorvastatin in around 2015 (presumedly due to myalgias), but had tolerated pravastatin since around that time.     Past medical, family and social histories as well as medications reviewed and updated as needed.    Review of Systems   Review of systems: Unobtainable, patient unable to provide due to cognitive disabilities. Attendant provides no concerns on her behalf except as noted above.       Objective    /60   Pulse 70   Temp 98.1  F (36.7  C) (Tympanic)   Resp 12   Ht 1.562 m (5' 1.5\")   Wt 61.2 kg (135 lb)   LMP  (LMP Unknown)   SpO2 99%   Breastfeeding No   BMI 25.09 kg/m    Body mass index is 25.09 kg/m .     Physical Exam   GENERAL: healthy, alert and no distress  EYES: Eyes grossly normal to inspection, PERRL and conjunctivae and sclerae normal  RESP: lungs clear to auscultation - no rales, rhonchi or wheezes  CV: regular rate and rhythm, normal S1 S2, no S3 or S4, no murmur, click or rub, no peripheral edema and peripheral pulses strong  MS: slight palpable discomfort in the left posteromedial lower leg above the ankle, at the site of a small visible/palpable hematoma.    ~5 mm diameter scabbed lesion with minimal surrounding erythema over her left anterolateral lower leg, also just above the ankle. Nontender.      "

## 2022-05-17 NOTE — PATIENT INSTRUCTIONS
"Let's treat with an antibiotic that would cover your leg and your bladder (cephalexin twice a day, for 7 days).     Let's restart the cholesterol-medication called \"pravastatin\". This has worked well in the past for you without causing muscle aches.     If you would like to have your cholesterol rechecked after two months, I've placed an order and you can schedule a \"lab only\" appointment.     See me in April 2023 for next Annual Wellness Visit, or sooner if problems.     "

## 2022-06-01 ENCOUNTER — ALLIED HEALTH/NURSE VISIT (OUTPATIENT)
Dept: INTERNAL MEDICINE | Facility: CLINIC | Age: 77
End: 2022-06-01
Payer: COMMERCIAL

## 2022-06-01 DIAGNOSIS — E53.8 VITAMIN B12 DEFICIENCY (NON ANEMIC): Primary | ICD-10-CM

## 2022-06-01 PROCEDURE — 96372 THER/PROPH/DIAG INJ SC/IM: CPT | Performed by: INTERNAL MEDICINE

## 2022-06-01 PROCEDURE — 99207 PR NO CHARGE NURSE ONLY: CPT

## 2022-06-01 RX ADMIN — CYANOCOBALAMIN 1000 MCG: 1000 INJECTION, SOLUTION INTRAMUSCULAR; SUBCUTANEOUS at 09:53

## 2022-06-02 ENCOUNTER — OFFICE VISIT (OUTPATIENT)
Dept: FAMILY MEDICINE | Facility: CLINIC | Age: 77
End: 2022-06-02
Payer: COMMERCIAL

## 2022-06-02 VITALS
OXYGEN SATURATION: 95 % | SYSTOLIC BLOOD PRESSURE: 108 MMHG | DIASTOLIC BLOOD PRESSURE: 64 MMHG | WEIGHT: 133 LBS | HEART RATE: 70 BPM | TEMPERATURE: 98.7 F | BODY MASS INDEX: 24.48 KG/M2 | HEIGHT: 62 IN

## 2022-06-02 DIAGNOSIS — M79.662 PAIN OF LEFT CALF: Primary | ICD-10-CM

## 2022-06-02 DIAGNOSIS — R60.0 BILATERAL LEG EDEMA: ICD-10-CM

## 2022-06-02 DIAGNOSIS — T14.8XXA BRUISE: ICD-10-CM

## 2022-06-02 PROCEDURE — 99213 OFFICE O/P EST LOW 20 MIN: CPT | Performed by: FAMILY MEDICINE

## 2022-06-02 NOTE — PROGRESS NOTES
Assessment & Plan   Pain of left calf  Bruise   Most likely related to repetitive trauma with kicking when she gets frustrated her  reports.  No signs of blood clot and recent platelets were normal.  No other bruising noted.  Able to ambulate without difficulty.  Recommended protecting her legs as best he can.  Follow-up if increased bruising or other concerns.    Bilateral leg edema  She sits a lot and recommended she get up and move around more.  He has been reports she also has compression socks which would be helpful for this.  Avoiding salt also recommended as best she can.    Return in about 2 weeks (around 6/16/2022) for symptoms failing to improve or sooner if worsening.      Chris Headley MD      [address] [address]  [place]   Office: [phone]       Subjective   Danielle is a 77 year old who presents for the following health issues  accompanied by her spouse.    HPI     Left leg pain  Onset/Duration: several weeks  Description  Location: left leg pain distal medial calf has a bruise.  Joint Swelling: YES  Redness: no  Pain: YES if touched  Warmth: no  Intensity:  Tender to touch - when you touch it or bump it its 8/10  Progression of Symptoms:  same  Accompanying signs and symptoms:   Fevers: no  Numbness/tingling/weakness: no  History  Trauma to the area: she has episodes of kicking when she gets frustrated or upset (dementia symptoms) and her  thinks that is the cause of this.  Recent illness:  no  Previous similar problem: no  Previous evaluation:  Treated for mild cellulitis on the anterior shin concurrently with UTI treatment (cephalexin) and that area of redness has resolved  Precipitating or alleviating factors:  Aggravating factors include: touching it or bumping it  Therapies tried and outcome: recent cephalexin course ~ 2 weeks ago    No history of DVTs or blood clots in self nor family.  No shortness of breath or  "chest pain.    Review of Systems   Constitutional, HEENT, cardiovascular, pulmonary, gi and gu systems are negative, except as otherwise noted.      Objective    /64 (BP Location: Right arm, Cuff Size: Adult Regular)   Pulse 70   Temp 98.7  F (37.1  C) (Tympanic)   Ht 1.562 m (5' 1.5\")   Wt 60.3 kg (133 lb)   LMP  (LMP Unknown)   SpO2 95%   BMI 24.72 kg/m    Body mass index is 24.72 kg/m .  Physical Exam   GENERAL: healthy, alert and no distress  NECK: no adenopathy, no asymmetry, masses, or scars and thyroid normal to palpation  RESP: lungs clear to auscultation - no rales, rhonchi or wheezes  CV: regular rate and rhythm, normal S1 S2, no S3 or S4, no murmur, click or rub, no peripheral edema and peripheral pulses strong  ABDOMEN: soft, nontender, no hepatosplenomegaly, no masses and bowel sounds normal  MS: Left distal medial calf has a 4 x 5 cm slight bruised area with no signs of hematoma, full range of motion of the ankle without pain.  Bruises tender to the touch otherwise negative Homans' sign and remainder exam showed no gross musculoskeletal defects noted, bilateral trace ankle edema  SKIN: no suspicious lesions or rashes  NEURO: Normal strength and tone, mentation intact and speech normal  BACK: no CVA tenderness, no paralumbar tenderness  PSYCH: mentation appears normal, affect normal/bright  LYMPH: no cervical, supraclavicular, axillary, or inguinal adenopathy            "

## 2022-06-23 ENCOUNTER — LAB (OUTPATIENT)
Dept: LAB | Facility: CLINIC | Age: 77
End: 2022-06-23
Payer: COMMERCIAL

## 2022-06-23 ENCOUNTER — VIRTUAL VISIT (OUTPATIENT)
Dept: FAMILY MEDICINE | Facility: CLINIC | Age: 77
End: 2022-06-23
Payer: COMMERCIAL

## 2022-06-23 DIAGNOSIS — R30.0 DYSURIA: ICD-10-CM

## 2022-06-23 DIAGNOSIS — R30.0 DYSURIA: Primary | ICD-10-CM

## 2022-06-23 LAB
ALBUMIN UR-MCNC: NEGATIVE MG/DL
APPEARANCE UR: CLEAR
BILIRUB UR QL STRIP: NEGATIVE
COLOR UR AUTO: YELLOW
GLUCOSE UR STRIP-MCNC: NEGATIVE MG/DL
HGB UR QL STRIP: NEGATIVE
KETONES UR STRIP-MCNC: NEGATIVE MG/DL
LEUKOCYTE ESTERASE UR QL STRIP: NEGATIVE
NITRATE UR QL: NEGATIVE
PH UR STRIP: 5 [PH] (ref 5–7)
SP GR UR STRIP: >=1.03 (ref 1–1.03)
UROBILINOGEN UR STRIP-ACNC: 0.2 E.U./DL

## 2022-06-23 PROCEDURE — 81003 URINALYSIS AUTO W/O SCOPE: CPT

## 2022-06-23 PROCEDURE — 99213 OFFICE O/P EST LOW 20 MIN: CPT | Mod: 95 | Performed by: NURSE PRACTITIONER

## 2022-06-23 NOTE — PATIENT INSTRUCTIONS
At [facility], we strive to deliver an exceptional experience to you, every time we see you. If you receive a survey, please complete it as we do value your feedback.  If you have MyChart, you can expect to receive results automatically within 24 hours of their completion.  Your provider will send a note interpreting your results as well.   If you do not have MyChart, you should receive your results in about a week by mail.    Your care team:                            Family Medicine Internal Medicine   MD Kishan Yuan MD Shantel Branch-Fleming, MD Srinivasa Vaka, MD Katya Belousova, MARTHA Leger CNP, MD ([facility]   Robbi Stanton, MD Cristal Whitten MD Amelia Massimini APRN CNP Kim Thein, APRN CNP Bethany Templen, MD             Clinic hours: Monday - Thursday 7 am-6 pm; Fridays 7 am-5 pm.   Urgent care: Monday - Friday 10 am- 8 pm; Saturday and Sunday 9 am-5 pm.    Clinic: [phone]       [facility]: Monday - Thursday 8 am - 7 pm; Friday 8 am - 6 pm  [facility]: [phone]

## 2022-06-23 NOTE — PROGRESS NOTES
Danielle is a 77 year old who is being evaluated via a billable telephone visit.      What phone number would you like to be contacted at? [phone]  How would you like to obtain your AVS? Mail a copy    Assessment & Plan     Dysuria  Push po fluids, reviewed genital hygiene, treat as indicated.  - UA Macro with Reflex to Micro and Culture - lab collect      Ordering of each unique test  7 minutes spent on the date of the encounter doing chart review, history and exam, documentation and further activities per the note       See Patient Instructions    No follow-ups on file.    MARTHA Simmons [facility]    Jacob Santos is a 77 year old{ , presenting for the following health issues:  UTI      History of Present Illness       Reason for visit:  Possible UTI    She eats 0-1 servings of fruits and vegetables daily.She consumes 0 sweetened beverage(s) daily.She exercises with enough effort to increase her heart rate 9 or less minutes per day.  She exercises with enough effort to increase her heart rate 3 or less days per week.   She is taking medications regularly.     Patient has Dementia per  so I spoke with her , Karl, who reports that the home health RN who saw the patient last night hd concern for possible UTI.  Patient is incontinent, complains of some burning with urination, voiding in smaller amounts,  and low back pain but is afebrile. No nausea, vomiting, diarrhea, or constipation. He requests order for UA and wants to go to [facility] to leave specimen.      Review of Systems   Constitutional, HEENT, cardiovascular, pulmonary, gi and gu systems are negative, except as otherwise noted.      Objective           Vitals:  No vitals were obtained today due to virtual visit.    Physical Exam   Patient not available for phone visit today per     No results found for this or any previous visit (from the past 24 hour(s)).          Phone call  duration: 5 minutes    .  ..

## 2022-06-27 ENCOUNTER — NURSE TRIAGE (OUTPATIENT)
Dept: INTERNAL MEDICINE | Facility: CLINIC | Age: 77
End: 2022-06-27

## 2022-06-27 NOTE — TELEPHONE ENCOUNTER
Forwarded to covering provider-Dr. Rodrigues to review.     Judy Castillo RN  [facility]

## 2022-06-27 NOTE — TELEPHONE ENCOUNTER
If it is not painful and she does not feel a hard rope along the blue line it is likely just a branden that she has not noticed before. If it hurts or she feels a firm lump along the line then she should be seen in UC

## 2022-06-27 NOTE — TELEPHONE ENCOUNTER
Provider Recommendation Follow Up:   Reached patient's  Karl. Informed of provider's recommendations. Patient verbalized understanding and agrees with the plan. He asks what they can do for swelling, suggested elevating her hand above her heart to help manage swelling. For the bruise, since it's been over 2 days since the bruise occurred, she could apply warm compress to the site.     Judy Castillo RN  [facility]

## 2022-06-27 NOTE — TELEPHONE ENCOUNTER
"Nurse Triage SBAR    Is this a 2nd Level Triage? YES, LICENSED PRACTITIONER REVIEW IS REQUIRED    Situation: blue streak going up arm.     Background: LOV: 6/1/2022     Assessment: Spoke with .   She has a wound on her left wrist. Had a bruise, that broke open. Bandaged for a couple days. Has ROM, no pain. Fingers and hand swollen this morning. Bluish/purple streak going up her arm -up to elbow. Top of her left arm. Just noticed this today. No fever. But has not checked.  not able to send picture.     Protocol Recommended Disposition:   Go To Office Now    Recommendation: Routing to provider to review and advise.     Kenna Hurt RN  [place] [facility]       Routed to provider    Does the patient meet one of the following criteria for ADS visit consideration? 16+ years old, with an [facility] PCP     TIP  Providers, please consider if this condition is appropriate for management at one of our Acute and Diagnostic Services sites.     If patient is a good candidate, please use dotphrase <dot>triageresponse and select Refer to ADS to document.       Reason for Disposition    Looks infected (e.g., spreading redness, pus, red streak)    Answer Assessment - Initial Assessment Questions  1. MECHANISM: \"How did the injury happen?\"      Bruise on left wrist that broke open.     2. ONSET: \"When did the injury happen?\" (Minutes or hours ago)       Couple days ago    3. APPEARANCE of INJURY: \"What does the injury look like?\"       Now there is a blue streak from wound to elbow     4. SEVERITY: \"Can you use the hand normally?\" \"Can you bend your fingers into a ball and then fully open them?\"      Hand is swollen, but has ROM, no pain with movement    5. SIZE: For cuts, bruises, or swelling, ask: \"How large is it?\" (e.g., inches or centimeters;  entire hand or wrist)       *No Answer*  6. PAIN: \"Is there pain?\" If so, ask: \"How bad is the pain?\"  (Scale 1-10; or mild, moderate, severe)      No    7. TETANUS: For any " "breaks in the skin, ask: \"When was the last tetanus booster?\"      N/a    8. OTHER SYMPTOMS: \"Do you have any other symptoms?\"       No fever, chills.     9. PREGNANCY: \"Is there any chance you are pregnant?\" \"When was your last menstrual period?\"      n/a    Protocols used: HAND AND WRIST INJURY-A-[place]      "

## 2022-06-30 ENCOUNTER — ALLIED HEALTH/NURSE VISIT (OUTPATIENT)
Dept: FAMILY MEDICINE | Facility: CLINIC | Age: 77
End: 2022-06-30
Payer: COMMERCIAL

## 2022-06-30 DIAGNOSIS — E55.9 VITAMIN D DEFICIENCY: Primary | ICD-10-CM

## 2022-06-30 PROCEDURE — 96372 THER/PROPH/DIAG INJ SC/IM: CPT | Performed by: INTERNAL MEDICINE

## 2022-06-30 PROCEDURE — 99207 PR NO CHARGE NURSE ONLY: CPT

## 2022-06-30 RX ADMIN — CYANOCOBALAMIN 1000 MCG: 1000 INJECTION, SOLUTION INTRAMUSCULAR; SUBCUTANEOUS at 09:26

## 2022-07-08 ENCOUNTER — OFFICE VISIT (OUTPATIENT)
Dept: INTERNAL MEDICINE | Facility: CLINIC | Age: 77
End: 2022-07-08
Payer: COMMERCIAL

## 2022-07-08 VITALS
WEIGHT: 133 LBS | TEMPERATURE: 98.4 F | RESPIRATION RATE: 14 BRPM | OXYGEN SATURATION: 99 % | BODY MASS INDEX: 24.48 KG/M2 | HEART RATE: 73 BPM | HEIGHT: 62 IN | SYSTOLIC BLOOD PRESSURE: 114 MMHG | DIASTOLIC BLOOD PRESSURE: 62 MMHG

## 2022-07-08 DIAGNOSIS — R10.32 ABDOMINAL PAIN, LEFT LOWER QUADRANT: Primary | ICD-10-CM

## 2022-07-08 DIAGNOSIS — N30.00 ACUTE CYSTITIS WITHOUT HEMATURIA: ICD-10-CM

## 2022-07-08 LAB
ALBUMIN UR-MCNC: NEGATIVE MG/DL
APPEARANCE UR: ABNORMAL
BACTERIA #/AREA URNS HPF: ABNORMAL /HPF
BILIRUB UR QL STRIP: NEGATIVE
COLOR UR AUTO: YELLOW
GLUCOSE UR STRIP-MCNC: NEGATIVE MG/DL
HGB UR QL STRIP: ABNORMAL
KETONES UR STRIP-MCNC: NEGATIVE MG/DL
LEUKOCYTE ESTERASE UR QL STRIP: ABNORMAL
NITRATE UR QL: NEGATIVE
PH UR STRIP: 5 [PH] (ref 5–7)
RBC #/AREA URNS AUTO: ABNORMAL /HPF
SP GR UR STRIP: >=1.03 (ref 1–1.03)
SQUAMOUS #/AREA URNS AUTO: ABNORMAL /LPF
UROBILINOGEN UR STRIP-ACNC: 0.2 E.U./DL
WBC #/AREA URNS AUTO: ABNORMAL /HPF

## 2022-07-08 PROCEDURE — 99213 OFFICE O/P EST LOW 20 MIN: CPT | Performed by: INTERNAL MEDICINE

## 2022-07-08 PROCEDURE — 81001 URINALYSIS AUTO W/SCOPE: CPT | Performed by: INTERNAL MEDICINE

## 2022-07-08 PROCEDURE — 87086 URINE CULTURE/COLONY COUNT: CPT | Performed by: INTERNAL MEDICINE

## 2022-07-08 PROCEDURE — 87186 SC STD MICRODIL/AGAR DIL: CPT | Performed by: INTERNAL MEDICINE

## 2022-07-08 RX ORDER — GABAPENTIN 300 MG/1
900 CAPSULE ORAL DAILY
COMMUNITY
Start: 2022-06-24 | End: 2023-04-19

## 2022-07-08 RX ORDER — ASPIRIN 81 MG
100 TABLET, DELAYED RELEASE (ENTERIC COATED) ORAL AT BEDTIME
COMMUNITY

## 2022-07-08 NOTE — PROGRESS NOTES
Assessment & Plan   (R10.32) Abdominal pain, left lower quadrant  (primary encounter diagnosis)  Comment: Initially suspected musculoskeletal cause, subsequent UA/UC confirms UTI. E-prescribed Rx.    Plan: UA with Microscopic reflex to Culture - lab         collect, Urine Microscopic, Urine Culture          (N30.00) Acute cystitis without hematuria  Comment: E-prescribed Rx for cephalexin.   Plan: cephALEXin (KEFLEX) 500 MG capsule                 Patient Instructions   Let's check for a bladder infection.   No obvious intestinal, rib or spinal problem.   May be an abdominal muscle strain, agree with Tylenol and giving it some more time.     Lab results will be on MyChart.     See me in April of 2023 for your next Annual Wellness Visit.       Return in about 9 months (around 4/8/2023) for Annual Wellness Visit.    Colt Riley MD,   [facility]    Jacob Santos is a 77 year old accompanied by her spouse, presenting for the following health issues:  Abdominal Pain (Left sided abdominal pain for 1 week)      HPI   History provided almost entirely by patient's .    She has had pain for about a week. She has some difficulty localizing the discomfort, her  thinks that she has mostly localized it to the lower abdomen. It seems worse with changing posture, such as lying down in bed or getting up out of bed.   She recently completed a virtual visit on 6/23 for symptoms of dysuria, at which time a UA did not show evidence of a UTI.   She currently does not report dysuria.   No fever or chills. No flank pain.     Past medical, family and social histories as well as medications reviewed and updated as needed.    Review of Systems   REVIEW OF SYSTEMS: The following systems have been completely reviewed and are negative except as noted above:   Constitutional, gastrointestinal, genitourinary, musculoskeletal systems.        Objective    /62   Pulse 73   Temp 98.4  F (36.9  " C) (Tympanic)   Resp 14   Ht 1.562 m (5' 1.5\")   Wt 60.3 kg (133 lb)   LMP  (LMP Unknown)   SpO2 99%   Breastfeeding No   BMI 24.72 kg/m    Body mass index is 24.72 kg/m .     Physical Exam   GENERAL: healthy, alert and no distress  RESP: lungs clear to auscultation - no rales, rhonchi or wheezes  CV: regular rate and rhythm, normal S1 S2, no S3 or S4, no murmur, click or rub, no peripheral edema and peripheral pulses strong  ABDOMEN: soft, nontender, no hepatosplenomegaly, no masses and bowel sounds normal  MS: no gross musculoskeletal defects noted, no edema  BACK: no CVA tenderness, no paralumbar tenderness    UA RESULTS:  Recent Labs   Lab Test 07/08/22  1637   COLOR Yellow   APPEARANCE Cloudy*   URINEGLC Negative   URINEBILI Negative   URINEKETONE Negative   SG >=1.030   UBLD Small*   URINEPH 5.0   PROTEIN Negative   UROBILINOGEN 0.2   NITRITE Negative   LEUKEST Small*   RBCU 2-5*   WBCU *     Blood culture:  No results found for this or any previous visit.   Urine culture:  Results for orders placed or performed in visit on 07/08/22   Urine Culture    Specimen: Urine, Midstream   Result Value Ref Range    Culture 10,000-50,000 CFU/mL Escherichia coli (A)        Susceptibility    Escherichia coli - DONAL     Ampicillin >=32 Resistant ug/mL     Ampicillin/ Sulbactam 16 Intermediate ug/mL     Piperacillin/Tazobactam <=4 Susceptible ug/mL     Cefazolin* <=4 Susceptible ug/mL      * Cefazolin DONAL breakpoints are for the treatment of uncomplicated urinary tract infections. For the treatment of systemic infections, please contact the laboratory for additional testing.     Cefoxitin <=4 Susceptible ug/mL     Ceftazidime <=1 Susceptible ug/mL     Ceftriaxone <=1 Susceptible ug/mL     Cefepime <=1 Susceptible ug/mL     Gentamicin <=1 Susceptible ug/mL     Tobramycin <=1 Susceptible ug/mL     Ciprofloxacin <=0.25 Susceptible ug/mL     Levofloxacin <=0.12 Susceptible ug/mL     Nitrofurantoin 32 Susceptible " ug/mL     Trimethoprim/Sulfamethoxazole <=1/19 Susceptible ug/mL

## 2022-07-08 NOTE — PATIENT INSTRUCTIONS
Let's check for a bladder infection.   No obvious intestinal, rib or spinal problem.   May be an abdominal muscle strain, agree with Tylenol and giving it some more time.     Lab results will be on MyChart.     See me in April of 2023 for your next Annual Wellness Visit.

## 2022-07-10 LAB — BACTERIA UR CULT: ABNORMAL

## 2022-07-11 ENCOUNTER — TELEPHONE (OUTPATIENT)
Dept: INTERNAL MEDICINE | Facility: CLINIC | Age: 77
End: 2022-07-11

## 2022-07-11 RX ORDER — CEPHALEXIN 500 MG/1
500 CAPSULE ORAL 2 TIMES DAILY
Qty: 20 CAPSULE | Refills: 0 | Status: SHIPPED | OUTPATIENT
Start: 2022-07-11 | End: 2022-07-21

## 2022-07-11 NOTE — TELEPHONE ENCOUNTER
Patient's  is calling and he saw the urine results and thinks she may need a rx. Can we send one in today please? Call him and let him know too please.

## 2022-07-11 NOTE — TELEPHONE ENCOUNTER
Dr. Riley ordered prescription for Cephalexin today for patient. Called patient's , he states Dr. Riley call him this morning and informed him prescription as sent to pharmacy.     uJdy Castillo RN  [facility]

## 2022-07-26 ENCOUNTER — TELEPHONE (OUTPATIENT)
Dept: INTERNAL MEDICINE | Facility: CLINIC | Age: 77
End: 2022-07-26

## 2022-07-26 NOTE — TELEPHONE ENCOUNTER
[facility] * Physical Therapy discharge received via fax     Forms in DrAnju mail box for review and signature.

## 2022-07-28 ENCOUNTER — ALLIED HEALTH/NURSE VISIT (OUTPATIENT)
Dept: FAMILY MEDICINE | Facility: CLINIC | Age: 77
End: 2022-07-28
Payer: COMMERCIAL

## 2022-07-28 DIAGNOSIS — E53.8 VITAMIN B12 DEFICIENCY (NON ANEMIC): Primary | ICD-10-CM

## 2022-07-28 PROCEDURE — 99207 PR NO CHARGE NURSE ONLY: CPT

## 2022-07-28 PROCEDURE — 96372 THER/PROPH/DIAG INJ SC/IM: CPT | Performed by: INTERNAL MEDICINE

## 2022-07-28 RX ADMIN — CYANOCOBALAMIN 1000 MCG: 1000 INJECTION, SOLUTION INTRAMUSCULAR; SUBCUTANEOUS at 10:20

## 2022-08-04 ENCOUNTER — TELEPHONE (OUTPATIENT)
Dept: INTERNAL MEDICINE | Facility: CLINIC | Age: 77
End: 2022-08-04

## 2022-08-04 NOTE — TELEPHONE ENCOUNTER
[facility] * Occupational Therapy discharge order & summary received via fax     Forms in Dr. mail box for review and signature.

## 2022-08-09 ENCOUNTER — MEDICAL CORRESPONDENCE (OUTPATIENT)
Dept: HEALTH INFORMATION MANAGEMENT | Facility: CLINIC | Age: 77
End: 2022-08-09

## 2022-08-25 ENCOUNTER — TRANSFERRED RECORDS (OUTPATIENT)
Dept: HEALTH INFORMATION MANAGEMENT | Facility: CLINIC | Age: 77
End: 2022-08-25

## 2022-08-25 ENCOUNTER — TELEPHONE (OUTPATIENT)
Dept: INTERNAL MEDICINE | Facility: CLINIC | Age: 77
End: 2022-08-25

## 2022-08-25 DIAGNOSIS — R39.9 URINARY SYMPTOM OR SIGN: Primary | ICD-10-CM

## 2022-08-25 NOTE — TELEPHONE ENCOUNTER
Karl patients  calling  Patient seen by Dr Ledesma her neurologist today and he would like her urine tested again for UTI. Patient is not having any symptoms    Ok to call and  [phone]

## 2022-08-26 NOTE — TELEPHONE ENCOUNTER
Call to spouse. States patient is having back aches. States in the past patient has also had a back ache when she has had a urinary tract infection. Patient has also been fatigued. Spouse states Dr. Ledesma had a couple of other reasons why patient should be tested but spouse can not remember why.    Patient has appointment for a B12 injection on 8/31/22 and would like to leave a urine sample at that time.

## 2022-08-29 ENCOUNTER — TELEPHONE (OUTPATIENT)
Dept: INTERNAL MEDICINE | Facility: CLINIC | Age: 77
End: 2022-08-29

## 2022-08-29 DIAGNOSIS — R26.81 GAIT INSTABILITY: Primary | ICD-10-CM

## 2022-08-29 DIAGNOSIS — G30.9 ALZHEIMER'S DEMENTIA WITHOUT BEHAVIORAL DISTURBANCE, UNSPECIFIED TIMING OF DEMENTIA ONSET: ICD-10-CM

## 2022-08-29 DIAGNOSIS — F02.80 ALZHEIMER'S DEMENTIA WITHOUT BEHAVIORAL DISTURBANCE, UNSPECIFIED TIMING OF DEMENTIA ONSET: ICD-10-CM

## 2022-08-29 NOTE — TELEPHONE ENCOUNTER
" Karl states Danielle saw neurologist Dr. Ledesma recently and Karl asked him if it was normal with dementia to have a short stride (4\") and a sort of shuffling gait.  Dr. Ledesma said no that is not a part of dementia and said they should \"look into physical therapy\" but did not offer to write the order.  Karl asking if primary care provider will order PT for patient to work on gait. TOÑO Lindsay R.N.    " Stable

## 2022-08-31 ENCOUNTER — LAB (OUTPATIENT)
Dept: LAB | Facility: CLINIC | Age: 77
End: 2022-08-31

## 2022-08-31 ENCOUNTER — ALLIED HEALTH/NURSE VISIT (OUTPATIENT)
Dept: FAMILY MEDICINE | Facility: CLINIC | Age: 77
End: 2022-08-31
Payer: COMMERCIAL

## 2022-08-31 VITALS
BODY MASS INDEX: 24.89 KG/M2 | DIASTOLIC BLOOD PRESSURE: 62 MMHG | WEIGHT: 133.9 LBS | SYSTOLIC BLOOD PRESSURE: 118 MMHG | RESPIRATION RATE: 16 BRPM

## 2022-08-31 DIAGNOSIS — E53.8 VITAMIN B12 DEFICIENCY (NON ANEMIC): Primary | ICD-10-CM

## 2022-08-31 DIAGNOSIS — R39.9 URINARY SYMPTOM OR SIGN: ICD-10-CM

## 2022-08-31 LAB
ALBUMIN UR-MCNC: NEGATIVE MG/DL
APPEARANCE UR: CLEAR
BACTERIA #/AREA URNS HPF: ABNORMAL /HPF
BILIRUB UR QL STRIP: NEGATIVE
COLOR UR AUTO: YELLOW
GLUCOSE UR STRIP-MCNC: NEGATIVE MG/DL
HGB UR QL STRIP: NEGATIVE
KETONES UR STRIP-MCNC: NEGATIVE MG/DL
LEUKOCYTE ESTERASE UR QL STRIP: NEGATIVE
MUCOUS THREADS #/AREA URNS LPF: PRESENT /LPF
NITRATE UR QL: NEGATIVE
PH UR STRIP: 5 [PH] (ref 5–7)
RBC #/AREA URNS AUTO: ABNORMAL /HPF
SP GR UR STRIP: >=1.03 (ref 1–1.03)
SQUAMOUS #/AREA URNS AUTO: ABNORMAL /LPF
UROBILINOGEN UR STRIP-ACNC: 0.2 E.U./DL
WBC #/AREA URNS AUTO: ABNORMAL /HPF

## 2022-08-31 PROCEDURE — 81001 URINALYSIS AUTO W/SCOPE: CPT

## 2022-08-31 PROCEDURE — 99207 PR NO CHARGE NURSE ONLY: CPT

## 2022-08-31 PROCEDURE — 96372 THER/PROPH/DIAG INJ SC/IM: CPT | Performed by: INTERNAL MEDICINE

## 2022-08-31 RX ORDER — CYANOCOBALAMIN 1000 UG/ML
1000 INJECTION, SOLUTION INTRAMUSCULAR; SUBCUTANEOUS
Status: DISCONTINUED | OUTPATIENT
Start: 2022-08-31 | End: 2022-08-31

## 2022-08-31 RX ADMIN — CYANOCOBALAMIN 1000 MCG: 1000 INJECTION, SOLUTION INTRAMUSCULAR; SUBCUTANEOUS at 09:05

## 2022-09-01 ENCOUNTER — TELEPHONE (OUTPATIENT)
Dept: INTERNAL MEDICINE | Facility: CLINIC | Age: 77
End: 2022-09-01

## 2022-09-01 NOTE — TELEPHONE ENCOUNTER
Karl saw UA results through My Chart and notes there are a number of abnormal values.  He wonders if MD is going to treat patient or order culture.  TOÑO Lindsay R.N.

## 2022-09-02 NOTE — TELEPHONE ENCOUNTER
Please advise Mr Bryan:     No signs of infection on micro exam--bacteria common as specimens can get contaminated, but no white blood cells. (Okay to see squamous epithelial cells and mucus).     No need for antibiotics or culture.

## 2022-09-29 ENCOUNTER — ALLIED HEALTH/NURSE VISIT (OUTPATIENT)
Dept: FAMILY MEDICINE | Facility: CLINIC | Age: 77
End: 2022-09-29
Payer: COMMERCIAL

## 2022-09-29 DIAGNOSIS — E53.8 VITAMIN B12 DEFICIENCY (NON ANEMIC): Primary | ICD-10-CM

## 2022-09-29 PROCEDURE — 99207 PR NO CHARGE NURSE ONLY: CPT

## 2022-09-29 PROCEDURE — 96372 THER/PROPH/DIAG INJ SC/IM: CPT | Performed by: INTERNAL MEDICINE

## 2022-09-29 RX ADMIN — CYANOCOBALAMIN 1000 MCG: 1000 INJECTION, SOLUTION INTRAMUSCULAR; SUBCUTANEOUS at 09:15

## 2022-09-29 NOTE — PROGRESS NOTES
Clinic Administered Medication Documentation      Injectable Medication Documentation    Patient was given Cyanocobalamin (B-12). Prior to medication administration, verified patients identity using patient s name and date of birth. Please see MAR and medication order for additional information. Patient instructed to remain in clinic for 15 minutes.      Was entire vial of medication used? Yes  Vial/Syringe: Syringe  Expiration Date:  01/30/2024  Was this medication supplied by the patient? No

## 2022-10-07 ENCOUNTER — HOSPITAL ENCOUNTER (OUTPATIENT)
Dept: PHYSICAL THERAPY | Facility: CLINIC | Age: 77
Setting detail: THERAPIES SERIES
Discharge: HOME | End: 2022-10-07
Attending: INTERNAL MEDICINE
Payer: COMMERCIAL

## 2022-10-07 DIAGNOSIS — F02.80 ALZHEIMER'S DEMENTIA WITHOUT BEHAVIORAL DISTURBANCE (H): ICD-10-CM

## 2022-10-07 DIAGNOSIS — R26.81 GAIT INSTABILITY: ICD-10-CM

## 2022-10-07 DIAGNOSIS — G30.9 ALZHEIMER'S DEMENTIA WITHOUT BEHAVIORAL DISTURBANCE (H): ICD-10-CM

## 2022-10-07 PROCEDURE — 97162 PT EVAL MOD COMPLEX 30 MIN: CPT | Mod: GP

## 2022-10-07 PROCEDURE — 97110 THERAPEUTIC EXERCISES: CPT | Mod: GP

## 2022-10-07 NOTE — PROGRESS NOTES
10/07/22 0900   Quick Adds   Quick Adds Certification   Type of Visit Initial OP PT Evaluation   General Information   Start of Care Date 10/07/22   Referring Physician Colt Riley MD   Orders Evaluate and Treat as Indicated   Order Date 09/01/22   Medical Diagnosis Gait instability, Alzheimer's dementia without behavioral disturbance, unspecified timing of dementia onset   Onset of illness/injury or Date of Surgery 09/01/22  (order date)   Precautions/Limitations fall precautions   Surgical/Medical history reviewed Yes   Pertinent history of current problem (include personal factors and/or comorbidities that impact the POC) Danielle Cordova) arrived to evaluation with , Karl. Soren has a pmhx significant for Alzheimer's dementia which was diagnosed 6-7 years ago.  states it was mild for several years but symptoms have picked up significantly within the past year and a half. States she walks with tiny little steps and it began happening about 3-4 months ago. She states she does feel nervous that she is going to fall. Has a walker and WC at home, a couple of canes, but does not use them. She tried using the walker a few days ago and felt like it helped,  states she was able to walk a little faster.  states someone is with Soren at all times due to her dementia.   Living environment House[facility]  (split entry)   Home/Community Accessibility Comments 6 steps going up, 6 steps going down, navigates stairs well, very careful, double-sided railings   ADL Devices Shower/Tub Grab Bar;Shower/Tub Chair   Assistive Devices Comments PCA comes every evening to help with showers   Patient/Family Goals Statement Walk more comfortably and effectively   Fall Risk Screen   Fall screen completed by PT   Have you fallen 2 or more times in the past year? No   Have you fallen and had an injury in the past year? No   Timed Up and Go score (seconds) 38.3 seconds, no AD   Is patient a fall risk? Yes   Fall  screen comments Per balance testing   Abuse Screen (yes response referral indicated)   Physical Signs of Abuse Present no   Pain   Patient currently in pain No   Cognitive Status Examination   Cognitive Comment Hx of dementia. Is quiet and takes a bit longer to process directions.  often answered questions for her.   Observation   Observation Prefers to keep hands in pockets of jacket which decreases her balance reactions.   Range of Motion (ROM)   ROM Comment B LE ROM screen WFL   Strength   Strength Comments General weakness in B LEs, more notably in the hips. Generally 4 to 4-/5 throughout.   Transfer Skills   Transfer Comments Uses UEs to push off on chair with STS. When fatigued at end of session, required help of  to stand up from chair.   Gait   Gait Comments Ambulates with short and shuffling steps at slow pace. Doesn't seem to be significantly unstable. When trialing walker, does show mild increase in step length and speed.   Gait Special Tests   Gait Special Tests 25 FOOT TIMED WALK   Gait Special Tests 25 Foot Timed Walk   Seconds 29.54 seconds   Comments 0.25 m/s gait speed   Balance   Balance Comments Balance not fully assesesed due to time constraints- will benefit from further testing. Does seems to be off balance if ambulating at faster speed or taking bigger steps.   Balance Special Tests   Balance Special Tests Sit to stand reps;Timed up and go   Balance Special Tests Timed Up and Go   Seconds 38.3 Seconds   Comments No AD, no instabilities or LOB. Hands in pockets.   Balance Special Tests Sit to Stand Reps in 30 Seconds   Reps in 30 seconds 3   Height standard chair   Comments pushing up from arm rests- not reaching full hip/knee extension   Planned Therapy Interventions   Planned Therapy Interventions balance training;gait training;neuromuscular re-education;ROM;strengthening   Clinical Impression   Criteria for Skilled Therapeutic Interventions Met yes, treatment indicated   PT  Diagnosis Impaired gait and mobility, impaired balance, muscle weakness, impaired transitions   Influenced by the following impairments Dementia, impaired gait and mobility, muscle weakness, fear of falling   Functional limitations due to impairments Decreased safety and independence, increased fall risk   Clinical Presentation Evolving/Changing   Clinical Presentation Rationale Symptom report, clinical judgment   Clinical Decision Making (Complexity) Moderate complexity   Therapy Frequency 1 time/week  (decreasing frequency prn)   Predicted Duration of Therapy Intervention (days/wks) 90 days   Risk & Benefits of therapy have been explained Yes   Patient, Family & other staff in agreement with plan of care Yes   Clinical Impression Comments Soren is a 77 year old female who presents to PT for evaluation. She demonstrates impaired gait and overall mobility with short shuffling steps, secondary to balance impairments and muscle weakness. Soren will benefit from skilled PT intervention targeting her impairments in order to decrease risk for falls and increase safety and independence with functional mobility.   Education Assessment   Barriers to Learning Cognitive   GOALS   PT Eval Goals 1;2;3   Goal 1   Goal Identifier Gait speed   Goal Description Soren will increase gait speed to at least 0.5 m/s or faster in order to improve efficiency and safety with gait.   Target Date 01/04/23   Goal 2   Goal Identifier 30 second STS   Goal Description Soren will perform at least 6 or more STS in 30 seconds in order to improve functional leg strength and endurance.   Target Date 01/04/23   Goal 3   Goal Identifier TUG   Goal Description Soren will perform TUG in 30 seconds or less in order to decrease risk for falls and improve gait efficiency.   Target Date 01/04/23   Total Evaluation Time   PT Eval, Moderate Complexity Minutes (29479) 35   Therapy Certification   Certification date from 10/07/22   Certification date to 01/04/23   Medical  Diagnosis Gait instability, Alzheimer's dementia without behavioral disturbance, unspecified timing of dementia onset   Certification I certify the need for these services furnished under this plan of treatment and while under my care.  (Physician co-signature of this document indicates review and certification of the therapy plan).       Thank you for referring Danielle to outpatient physical therapy. Please do not hesitate to contact me with any questions via email at [email or web address].     Efren Hurt PT, DPT  [employer]   [email or web address]

## 2022-10-07 NOTE — PROGRESS NOTES
[facility]                                                                                   OUTPATIENT PHYSICAL THERAPY FUNCTIONAL EVALUATION  PLAN OF TREATMENT FOR OUTPATIENT REHABILITATION  (COMPLETE FOR INITIAL CLAIMS ONLY)  Patient's Last Name, First Name, M.I.  YOB: 1945  Danielle Bryan     Provider's Name   [facility]   Medical Record No.  [id number]     Start of Care Date:  10/07/22   Onset Date:  09/01/22 (order date)   Type:     _X__PT   ____OT  ____SLP Medical Diagnosis:  Gait instability, Alzheimer's dementia without behavioral disturbance, unspecified timing of dementia onset     PT Diagnosis:  Impaired gait and mobility, impaired balance, muscle weakness, impaired transitions Visits from SOC:  1                              __________________________________________________________________________________  Plan of Treatment/Functional Goals:  balance training, gait training, neuromuscular re-education, ROM, strengthening           GOALS  Gait speed  Soren will increase gait speed to at least 0.5 m/s or faster in order to improve efficiency and safety with gait.  01/04/23    30 second STS  Soren will perform at least 6 or more STS in 30 seconds in order to improve functional leg strength and endurance.  01/04/23    TUG  Soren will perform TUG in 30 seconds or less in order to decrease risk for falls and improve gait efficiency.  01/04/23    Therapy Frequency:  1 time/week (decreasing frequency prn)   Predicted Duration of Therapy Intervention:  90 days    FELICIANO CODY, PT                                    I CERTIFY THE NEED FOR THESE SERVICES FURNISHED UNDER        THIS PLAN OF TREATMENT AND WHILE UNDER MY CARE     (Physician co-signature of this document indicates review and certification of the therapy plan).                Certification Date From:   10/07/22   Certification Date To:  01/04/23    Referring Provider:  Colt Riley MD    Initial Assessment  See Epic Evaluation- Start of Care Date: 10/07/22

## 2022-10-10 ENCOUNTER — TELEPHONE (OUTPATIENT)
Dept: INTERNAL MEDICINE | Facility: CLINIC | Age: 77
End: 2022-10-10

## 2022-10-10 DIAGNOSIS — M79.603 PAIN OF UPPER EXTREMITY, UNSPECIFIED LATERALITY: Primary | ICD-10-CM

## 2022-10-10 NOTE — TELEPHONE ENCOUNTER
Patient's , Karl, called to leave message: Patient is having trouble with the upper left arm, and noticed she just started taking cholesterol medication again 6 months ago. [place] some cholesterol medications can cause pain in joints and arms.  is wondering if its something she should come in to be seen for? Is it going to lead to complications down the road if pain in arms are increasing.     Phone#: [phone]

## 2022-10-10 NOTE — TELEPHONE ENCOUNTER
Left upper arm is quite sore off and on for 1 month, right upper arm also sore but to a lesser extent.  Not a constant pain but intermittent and worsening.  Has been on statin medication for about 6 months now and  remembers similar symptoms in the past on a statin. No known weakness, numbness or tingling in hands or arms.  TOÑO Lindsay R.N.

## 2022-10-12 NOTE — TELEPHONE ENCOUNTER
calls back. He is going to have her stop the Pravastatin for a week or two. He will call back with how her arm muscle aches are responding.

## 2022-10-13 ENCOUNTER — HOSPITAL ENCOUNTER (OUTPATIENT)
Dept: PHYSICAL THERAPY | Facility: CLINIC | Age: 77
Setting detail: THERAPIES SERIES
Discharge: HOME | End: 2022-10-13
Attending: INTERNAL MEDICINE
Payer: COMMERCIAL

## 2022-10-13 PROCEDURE — 97116 GAIT TRAINING THERAPY: CPT | Mod: GP | Performed by: PHYSICAL THERAPIST

## 2022-10-13 PROCEDURE — 97112 NEUROMUSCULAR REEDUCATION: CPT | Mod: GP | Performed by: PHYSICAL THERAPIST

## 2022-10-13 PROCEDURE — 97750 PHYSICAL PERFORMANCE TEST: CPT | Mod: GP | Performed by: PHYSICAL THERAPIST

## 2022-10-13 NOTE — PROGRESS NOTES
10/13/22 1000   Signing Clinician's Name / Credentials   Signing clinician's name / credentials Kezia Mcrae PT   Hein Balance Scale (HEIN FROYLAN, VANCE S, CHRISTOPHER PEGUERO, NATIVIDAD B: MEASURING BALANCE IN THE ELDERLY: VALIDATION OF AN INSTRUMENT. CAN. J. PUB. HEALTH, JULY/AUGUST SUPPLEMENT 2:S7-11, 1992.)   Sit To Stand 1   Standing Unsupported 2   Sitting Unsupported 4   Stand to Sit 2   Transfers 2   Standing with Eyes Closed 3   Standing Unsupported, Feet Together 1   Reach Forward With Outstretched Arm 0   Retrieve Object From Floor 2  (can do if holds support)   Turning to Look Behind 1   Turn 360 Degrees 0  (needs cueing and physical assist d/t dementia/cognitive issues)   Placing Alternate Foot on Stool (4-6 inches) 0   Unsupported Tandem Stand (Demonstrate to Subject) 0  (assist to step holds 10 sec)   One Leg Stand 1   Total Score (A score of 45 or less has been correlated with an increased risk of falls)   Total Score (out of 56) 19     Hein Balance Scale (BBS) Cutoff Scores: A score of < 45 /56 indicates an increased risk for falls.     The BBS is a measure of static and dynamic standing balance that has been validated in community dwelling elderly individuals and individuals who have Parkinson's Disease, MS, and those who are s/p CVA and TBI. The test is administered without an assistive device. Scores from the Hein are used to determine the probability of falling based on the patient's previous history of falls and their test performance.     Minimal Detectable Change = 6.5   & Minimal Detectable Change (Parkinson's Disease) = 5 according to Adria & Seney 2008    Assessment (rationale for performing, application to patient s function & care plan): falls risk/balance  (Minutes billed as physical performance test) 25

## 2022-10-16 ENCOUNTER — HEALTH MAINTENANCE LETTER (OUTPATIENT)
Age: 77
End: 2022-10-16

## 2022-10-18 ENCOUNTER — HOSPITAL ENCOUNTER (OUTPATIENT)
Dept: PHYSICAL THERAPY | Facility: CLINIC | Age: 77
Setting detail: THERAPIES SERIES
Discharge: HOME | End: 2022-10-18
Attending: INTERNAL MEDICINE
Payer: COMMERCIAL

## 2022-10-18 PROCEDURE — 97110 THERAPEUTIC EXERCISES: CPT | Mod: GP | Performed by: PHYSICAL THERAPIST

## 2022-10-18 PROCEDURE — 97750 PHYSICAL PERFORMANCE TEST: CPT | Mod: GP | Performed by: PHYSICAL THERAPIST

## 2022-10-20 NOTE — PROGRESS NOTES
10/18/22 1400   Signing Clinician's Name / Credentials   Signing clinician's name / credentials Kezia Mcrae PT   Dynamic Gait Index (Haris and Rick Ross, 1995)   Gait Level Surface 1   Change in Gait Speed 1   Gait and Horizontal Head Turns 2  (very slow pace, shuffling steps but no LOB)   Gait with Vertical Head Turns 2  (shuffling steps, slow pace)   Gait and Pivot Turns 1   Step Over Obstacle 0   Step Around Obstacles 1   Steps 1   Total Dynamic Gait Index Score  (A score of 19 or less has been correlated to an increased risk of falls in community dwelling older adults, patients with vestibular disorders, and patients with MS.)   Total Score (out of 24) 9     Dynamic Gait Index (DGI):The DGI is a measure of balance during gait that is reliable and valid for the elderly and individuals with Parkinson's disease, MS, vestibular disorders, or s/p stroke. Gait assistive device used: None    Scores ?19 /24 indicate an increased risk for falls according to Catie et al 2000  Minimal Detectable Change = 2.9 in community dwelling elderly according to Logan et al 2011    Assessment (rationale for performing, application to patient s function & care plan): gait safety, falls risk assessment    (Minutes billed as physical performance test)  15

## 2022-10-26 ENCOUNTER — HOSPITAL ENCOUNTER (OUTPATIENT)
Dept: PHYSICAL THERAPY | Facility: CLINIC | Age: 77
Setting detail: THERAPIES SERIES
Discharge: HOME | End: 2022-10-26
Attending: INTERNAL MEDICINE
Payer: COMMERCIAL

## 2022-10-26 PROCEDURE — 97110 THERAPEUTIC EXERCISES: CPT | Mod: GP | Performed by: PHYSICAL THERAPIST

## 2022-10-26 PROCEDURE — 97116 GAIT TRAINING THERAPY: CPT | Mod: GP | Performed by: PHYSICAL THERAPIST

## 2022-10-28 ENCOUNTER — ALLIED HEALTH/NURSE VISIT (OUTPATIENT)
Dept: FAMILY MEDICINE | Facility: CLINIC | Age: 77
End: 2022-10-28
Payer: COMMERCIAL

## 2022-10-28 DIAGNOSIS — Z23 NEED FOR INFLUENZA VACCINATION: ICD-10-CM

## 2022-10-28 DIAGNOSIS — E53.8 VITAMIN B12 DEFICIENCY (NON ANEMIC): ICD-10-CM

## 2022-10-28 DIAGNOSIS — Z23 HIGH PRIORITY FOR 2019-NCOV VACCINE: Primary | ICD-10-CM

## 2022-10-28 PROCEDURE — G0008 ADMIN INFLUENZA VIRUS VAC: HCPCS

## 2022-10-28 PROCEDURE — 96372 THER/PROPH/DIAG INJ SC/IM: CPT | Performed by: INTERNAL MEDICINE

## 2022-10-28 PROCEDURE — 91313 COVID-19,PF,MODERNA BIVALENT: CPT

## 2022-10-28 PROCEDURE — 90662 IIV NO PRSV INCREASED AG IM: CPT

## 2022-10-28 PROCEDURE — 0134A COVID-19,PF,MODERNA BIVALENT: CPT

## 2022-10-28 PROCEDURE — 99207 PR NO CHARGE NURSE ONLY: CPT

## 2022-10-28 RX ADMIN — CYANOCOBALAMIN 1000 MCG: 1000 INJECTION, SOLUTION INTRAMUSCULAR; SUBCUTANEOUS at 13:37

## 2022-11-08 NOTE — TELEPHONE ENCOUNTER
Called  and relayed message.      Stated arm pain is still present and is just a shade better and is still bothering her.  Any recommendations?

## 2022-11-08 NOTE — TELEPHONE ENCOUNTER
calling back stating patient has stopped the Pravastatin for one month and there is very minimal improvement in the arm pain.  Please advise on next steps, thanks.    Should patient restart the Pravastatin?

## 2022-11-10 NOTE — TELEPHONE ENCOUNTER
She might consider a sports medicine/ortho  consult. Placed an order in case they are interested.

## 2022-11-11 ENCOUNTER — HOSPITAL ENCOUNTER (OUTPATIENT)
Dept: PHYSICAL THERAPY | Facility: CLINIC | Age: 77
Setting detail: THERAPIES SERIES
Discharge: HOME | End: 2022-11-11
Attending: INTERNAL MEDICINE
Payer: COMMERCIAL

## 2022-11-11 PROCEDURE — 97110 THERAPEUTIC EXERCISES: CPT | Mod: GP

## 2022-11-21 ENCOUNTER — OFFICE VISIT (OUTPATIENT)
Dept: ORTHOPEDICS | Facility: CLINIC | Age: 77
End: 2022-11-21
Payer: COMMERCIAL

## 2022-11-21 ENCOUNTER — ANCILLARY PROCEDURE (OUTPATIENT)
Dept: GENERAL RADIOLOGY | Facility: CLINIC | Age: 77
End: 2022-11-21
Attending: FAMILY MEDICINE
Payer: COMMERCIAL

## 2022-11-21 VITALS
DIASTOLIC BLOOD PRESSURE: 62 MMHG | HEIGHT: 62 IN | WEIGHT: 133 LBS | SYSTOLIC BLOOD PRESSURE: 100 MMHG | BODY MASS INDEX: 24.48 KG/M2

## 2022-11-21 DIAGNOSIS — G89.29 CHRONIC PAIN OF BOTH SHOULDERS: Primary | ICD-10-CM

## 2022-11-21 DIAGNOSIS — M25.512 BILATERAL SHOULDER PAIN: ICD-10-CM

## 2022-11-21 DIAGNOSIS — M25.511 BILATERAL SHOULDER PAIN: ICD-10-CM

## 2022-11-21 DIAGNOSIS — M25.511 CHRONIC PAIN OF BOTH SHOULDERS: Primary | ICD-10-CM

## 2022-11-21 DIAGNOSIS — M79.603 PAIN OF UPPER EXTREMITY, UNSPECIFIED LATERALITY: ICD-10-CM

## 2022-11-21 DIAGNOSIS — M25.512 CHRONIC PAIN OF BOTH SHOULDERS: Primary | ICD-10-CM

## 2022-11-21 PROCEDURE — 99204 OFFICE O/P NEW MOD 45 MIN: CPT | Performed by: FAMILY MEDICINE

## 2022-11-21 PROCEDURE — 73030 X-RAY EXAM OF SHOULDER: CPT | Mod: TC | Performed by: RADIOLOGY

## 2022-11-21 NOTE — LETTER
11/21/2022         RE: Danielle Bryan  [address]  [place] [address]        Dear Colleague,    Thank you for referring your patient, Danielle Bryan, to the [facility] [place]. Please see a copy of my visit note below.    ASSESSMENT & PLAN  Patient Instructions     1. Chronic pain of both shoulders    2. Pain of upper extremity, unspecified laterality      -Patient has chronic bilateral shoulder pain due to aggravation of arthritis versus possible development of frozen shoulder  -X-rays of bilateral shoulder shows mild degenerative changes  -Patient will follow up in 1 to 2 weeks for bilateral shoulder intra-articular glenohumeral cortisone injections.  -Patient also reports chronic worsening bilateral knee pain due to arthritis.  Patient will see us after her cortisone injections for an evaluation and potential bilateral knee cortisone injections  -Call direct clinic number [[phone]] at any time with questions or concerns.    Albert Yeo MD [facility] Orthopedics and Sports Medicine  [facility] [place]          -----    SUBJECTIVE  Danielle Bryan is a/an 77 year old Right handed female who is seen in consultation at the request of  Colt Riley M.D. for evaluation of bilateral shoulder pain, left worse than right. The patient is seen with their .    Onset: 2 month(s) ago. Reports insidious onset without acute precipitating event.  Location of Pain: bilateral lateral distal shoulder, left worse than right  Rating of Pain at worst: 8/10  Rating of Pain Currently: 7/10  Worsened by: weight bearing/pushing through arms, reaching behind or in front of her    Better with: Tylenol just a little   Treatments tried: Tylenol, previous right glenohumeral joint corticosteriod injection ~1 year ago (?) which provided ~8 months of relief (?)  Associated symptoms: occasional numbness/tinglging per patient (?)   Orthopedic history: YES - Date: h/o  "1 year ago bilateral shoulder joint pain, treated with corticosteriod injection  Relevant surgical history: NO  Social history: social history: retired    Past Medical History:   Diagnosis Date     Alopecia areata     Requires a wig now     Atrial fibrillation (H) 1/20/2019     Cerebral infarction (H) 2014    TIA     Dysphagia     Botox/EGD dilation at [facility], most recent 05/2013     Hyperlipemia      PONV (postoperative nausea and vomiting)      Recurrent UTI     Believed related in part to atrophic vaginitis     Vitamin D deficiency 11/29/2018     Social History     Socioeconomic History     Marital status:      Number of children: 3   Occupational History     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 No   Social History Narrative    Raised in [place]. 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     Walking regularly in summer months.    Prior to pandemic was [place] the [employer] in winter months.         , Three children, two in [place] [facility] [place], one in [place], [place].    Five grandchildren.     Social Determinants of Health     Financial Resource Strain: Low Risk      Difficulty of Paying Living Expenses: Not hard at all   Food Insecurity: No Food Insecurity     Worried About Running Out of Food in the Last Year: Never true     Ran Out of Food in the Last Year: Never true   Transportation Needs: No Transportation Needs     Lack of Transportation (Medical): No     Lack of Transportation (Non-Medical): No   Intimate Partner Violence: Not At Risk     Fear of Current or Ex-Partner: No     Emotionally Abused: No     Physically Abused: No     Sexually " "Abused: No   Housing Stability: Low Risk      Unable to Pay for Housing in the Last Year: No     Number of Places Lived in the Last Year: 1     Unstable Housing in the Last Year: No         Patient's past medical, surgical, social, and family histories were reviewed today and no changes are noted.    REVIEW OF SYSTEMS:  10 point ROS is negative other than symptoms noted above in HPI, Past Medical History or as stated below  Constitutional: NEGATIVE for fever, chills, change in weight  Skin: NEGATIVE for worrisome rashes, moles or lesions  GI/: NEGATIVE for bowel or bladder changes  Neuro: NEGATIVE for weakness, dizziness or paresthesias    OBJECTIVE:  /62   Ht 1.562 m (5' 1.5\")   Wt 60.3 kg (133 lb)   LMP  (LMP Unknown)   BMI 24.72 kg/m     General: healthy, alert and in no distress  HEENT: no scleral icterus or conjunctival erythema  Skin: no suspicious lesions or rash. No jaundice.  CV: regular rhythm by palpation  Resp: normal respiratory effort without conversational dyspnea   Psych: normal mood and affect  Gait: normal steady gait with appropriate coordination and balance  Neuro: normal light touch sensory exam of the bilateral upper extremities.    MSK:  BILATERAL SHOULDER  Inspection:    no swelling, bruising, discoloration, or obvious deformity or asymmetry  Palpation:   bony and tendinous landmarks are nontender.  Active Range of Motion:     Abduction 900, , , IR SI joint.      Scapular dyskinesis absent  Strength:    Scapular plane abduction painful,  ER grossly intact, IR grossly intact, biceps grossly intact, triceps grossly intact  Special Tests:    Positive: Neer's, Aleman', supraspinatus (empty can) and crossed arm adduction    Negative: drop arm/painful arc, Speed's and Yergason's      Independent visualization of the below image:    Personal review of bilateral x-rays taken office today show calcified granuloma of the right lung.  No acute fracture, dislocation or " significant osseous degenerative abnormalities bilaterally.        Albert Yeo MD [facility]        Again, thank you for allowing me to participate in the care of your patient.        Sincerely,        Albert Yeo, MD

## 2022-11-21 NOTE — PROGRESS NOTES
ASSESSMENT & PLAN  Patient Instructions     1. Chronic pain of both shoulders    2. Pain of upper extremity, unspecified laterality      -Patient has chronic bilateral shoulder pain due to aggravation of arthritis versus possible development of frozen shoulder  -X-rays of bilateral shoulder shows mild degenerative changes  -Patient will follow up in 1 to 2 weeks for bilateral shoulder intra-articular glenohumeral cortisone injections.  -Patient also reports chronic worsening bilateral knee pain due to arthritis.  Patient will see us after her cortisone injections for an evaluation and potential bilateral knee cortisone injections  -Call direct clinic number [[phone]] at any time with questions or concerns.    Albert Yeo MD [facility] Orthopedics and Sports Medicine  [facility]          -----    SUBJECTIVE  Danielle Bryan is a/an 77 year old Right handed female who is seen in consultation at the request of  Colt Riley M.D. for evaluation of bilateral shoulder pain, left worse than right. The patient is seen with their .    Onset: 2 month(s) ago. Reports insidious onset without acute precipitating event.  Location of Pain: bilateral lateral distal shoulder, left worse than right  Rating of Pain at worst: 8/10  Rating of Pain Currently: 7/10  Worsened by: weight bearing/pushing through arms, reaching behind or in front of her    Better with: Tylenol just a little   Treatments tried: Tylenol, previous right glenohumeral joint corticosteriod injection ~1 year ago (?) which provided ~8 months of relief (?)  Associated symptoms: occasional numbness/tinglging per patient (?)   Orthopedic history: YES - Date: h/o 1 year ago bilateral shoulder joint pain, treated with corticosteriod injection  Relevant surgical history: NO  Social history: social history: retired    Past Medical History:   Diagnosis Date     Alopecia areata     Requires a wig now     Atrial fibrillation (H) 1/20/2019  "    Cerebral infarction (H) 2014    TIA     Dysphagia     Botox/EGD dilation at [place], most recent 05/2013     Hyperlipemia      PONV (postoperative nausea and vomiting)      Recurrent UTI     Believed related in part to atrophic vaginitis     Vitamin D deficiency 11/29/2018     Social History     Socioeconomic History     Marital status:      Number of children: 3   Occupational History     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 No   Social History Narrative    Raised in [place]. 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     Walking regularly in summer months.    Prior to pandemic was exercising at the [employer] in winter months.         , Three children, two in [place] [facility], one in [place], [place].    Five grandchildren.     Social Determinants of Health     Financial Resource Strain: Low Risk      Difficulty of Paying Living Expenses: Not hard at all   Food Insecurity: No Food Insecurity     Worried About Running Out of Food in the Last Year: Never true     Ran Out of Food in the Last Year: Never true   Transportation Needs: No Transportation Needs     Lack of Transportation (Medical): No     Lack of Transportation (Non-Medical): No   Intimate Partner Violence: Not At Risk     Fear of Current or Ex-Partner: No     Emotionally Abused: No     Physically Abused: No     Sexually Abused: No   Housing Stability: Low Risk      Unable to Pay for Housing in the Last Year: No     Number of Places Lived in the Last Year: 1     Unstable Housing in the Last Year: No         Patient's past medical, surgical, social, and family histories were reviewed today and no " "changes are noted.    REVIEW OF SYSTEMS:  10 point ROS is negative other than symptoms noted above in HPI, Past Medical History or as stated below  Constitutional: NEGATIVE for fever, chills, change in weight  Skin: NEGATIVE for worrisome rashes, moles or lesions  GI/: NEGATIVE for bowel or bladder changes  Neuro: NEGATIVE for weakness, dizziness or paresthesias    OBJECTIVE:  /62   Ht 1.562 m (5' 1.5\")   Wt 60.3 kg (133 lb)   LMP  (LMP Unknown)   BMI 24.72 kg/m     General: healthy, alert and in no distress  HEENT: no scleral icterus or conjunctival erythema  Skin: no suspicious lesions or rash. No jaundice.  CV: regular rhythm by palpation  Resp: normal respiratory effort without conversational dyspnea   Psych: normal mood and affect  Gait: normal steady gait with appropriate coordination and balance  Neuro: normal light touch sensory exam of the bilateral upper extremities.    MSK:  BILATERAL SHOULDER  Inspection:    no swelling, bruising, discoloration, or obvious deformity or asymmetry  Palpation:   bony and tendinous landmarks are nontender.  Active Range of Motion:     Abduction 900, , , IR SI joint.      Scapular dyskinesis absent  Strength:    Scapular plane abduction painful,  ER grossly intact, IR grossly intact, biceps grossly intact, triceps grossly intact  Special Tests:    Positive: Neer's, Aleman', supraspinatus (empty can) and crossed arm adduction    Negative: drop arm/painful arc, Speed's and Yergason's      Independent visualization of the below image:    Personal review of bilateral x-rays taken office today show calcified granuloma of the right lung.  No acute fracture, dislocation or significant osseous degenerative abnormalities bilaterally.        Albert Yeo MD [facility]    "

## 2022-11-21 NOTE — PATIENT INSTRUCTIONS
1. Chronic pain of both shoulders    2. Pain of upper extremity, unspecified laterality      -Patient has chronic bilateral shoulder pain due to aggravation of arthritis versus possible development of frozen shoulder  -X-rays of bilateral shoulder shows mild degenerative changes  -Patient will follow up in 1 to 2 weeks for bilateral shoulder intra-articular glenohumeral cortisone injections.  -Patient also reports chronic worsening bilateral knee pain due to arthritis.  Patient will see us after her cortisone injections for an evaluation and potential bilateral knee cortisone injections  -Call direct clinic number [[phone]] at any time with questions or concerns.    Albert Yeo MD [facility]  [facility] [place]         No

## 2022-11-23 ENCOUNTER — HOSPITAL ENCOUNTER (OUTPATIENT)
Dept: PHYSICAL THERAPY | Facility: CLINIC | Age: 77
Setting detail: THERAPIES SERIES
Discharge: HOME | End: 2022-11-23
Attending: INTERNAL MEDICINE
Payer: COMMERCIAL

## 2022-11-23 PROCEDURE — 97110 THERAPEUTIC EXERCISES: CPT | Mod: GP

## 2022-11-23 NOTE — PROGRESS NOTES
ASSESSMENT & PLAN  Patient Instructions     1. Chronic pain of both knees    2. Chronic pain of both shoulders    3. Primary osteoarthritis of right knee    4. Primary osteoarthritis of left knee      -Patient is following for chronic bilateral shoulder pains due to mild arthritis.  Patient also has chronic bilateral knee pain due to arthritis  -Patient tolerated bilateral humeral intra-articular cortisone injection today without complications.  Patient was given postprocedure instructions  -Patient also had x-rays of bilateral knees which shows mild to moderate arthritis of the medial compartments  -Patient was interested in cortisone injections.  Patient will follow up in a couple of weeks to administer the cortisone injections  -Call direct clinic number [[phone]] at any time with questions or concerns.    Albert Yeo MD [facility] Orthopedics and Sports Medicine  [facility]          -----    SUBJECTIVE:  Danielle Bryan is a 77 year old female who is seen in follow-up for bilateral glenohumeral intra-articular corticosteriod injections and to discuss bilateral knee pain.They were last seen 11/21/2022.     Regarding her bilateral shoulder pain, since their last visit reports 0% - (About the same as last time). They indicate that their current pain level is 7/10. They have tried Tylenol.      Patient also wants to discuss bilateral knee pain  Onset: 2 years(s) ago. Reports insidious onset without acute precipitating event.  Location of Pain: bilateral knee  Rating of Pain at worst: patient unable to answer  Rating of Pain Currently: patient unable to answer  Worsened by: stairs, sitting to standing   Better with: rest/activity avoidance   Treatments tried: corticosteroid injection (most recent date: 2 years ago at [facility]) that provided significant amount of relief  Associated symptoms: feeling of instability and swelling  Orthopedic history: NO  Relevant surgical history: NO  Social  "history: social history: retired     The patient is seen with their .    Patient's past medical, surgical, social, and family histories were reviewed today and no changes are noted.    REVIEW OF SYSTEMS:  Constitutional: NEGATIVE for fever, chills, change in weight  Skin: NEGATIVE for worrisome rashes, moles or lesions  GI/: NEGATIVE for bowel or bladder changes  Neuro: NEGATIVE for weakness, dizziness or paresthesias    OBJECTIVE:  /62   Ht 1.562 m (5' 1.5\")   Wt 60.3 kg (133 lb)   LMP  (LMP Unknown)   BMI 24.72 kg/m     General: healthy, alert and in no distress  HEENT: no scleral icterus or conjunctival erythema  Skin: no suspicious lesions or rash. No jaundice.  CV: regular rhythm by palpation, no pedal edema  Resp: normal respiratory effort without conversational dyspnea   Psych: normal mood and affect  Gait: normal steady gait with appropriate coordination and balance  Neuro: normal light touch sensory exam of the extremities.    MSK:  BILATERAL KNEE  Inspection:    Normal alignment; no edema, erythema, or ecchymosis present  Palpation:    Tender about the medial joint line. Remainder of bony and ligamentous landmarks are nontender.    Trace effusion is present    Patellofemoral crepitus is Present  Range of Motion:     00 extension to 1100 flexion  Strength:    Quadriceps grossly intact    Extensor mechanism intact  Special Tests:    Positive: none    Negative: MCL/valgus stress (0 & 30 deg), LCL/varus stress (0 & 30 deg), Lachman's, anterior drawer, posterior drawer, Cristobal's         Independent visualization of the below image:  Recent Results (from the past 24 hour(s))   XR Knee Bilateral 3 Views    Narrative    Mild medial compartment joint space narrowing bilaterally.  No acute   fracture or dislocation.       Large Joint Injection/Arthocentesis: bilateral glenohumeral    Date/Time: 11/29/2022 4:05 PM  Performed by: Yeo, Albert, MD  Authorized by: Yeo, Albert, MD     Indications:  " Pain and osteoarthritis  Needle Size:  22 G  Guidance: ultrasound    Approach:  Posterior  Location:  Shoulder  Laterality:  Bilateral      Site:  Bilateral glenohumeral  Medications (Right):  40 mg methylPREDNISolone 40 MG/ML  Medications (Left):  40 mg methylPREDNISolone 40 MG/ML  Outcome:  Tolerated well, no immediate complications  Procedure discussed: discussed risks, benefits, and alternatives    Consent Given by:  Patient  Prep: patient was prepped and draped in usual sterile fashion        Patient's conditions were thoroughly discussed during today's visit with total time spent face-to-face with the patient and documentation being 50 minutes.    Albert Yeo MD, CAM  [place] [facility]

## 2022-11-29 ENCOUNTER — OFFICE VISIT (OUTPATIENT)
Dept: ORTHOPEDICS | Facility: CLINIC | Age: 77
End: 2022-11-29
Payer: COMMERCIAL

## 2022-11-29 ENCOUNTER — ALLIED HEALTH/NURSE VISIT (OUTPATIENT)
Dept: FAMILY MEDICINE | Facility: CLINIC | Age: 77
End: 2022-11-29
Payer: COMMERCIAL

## 2022-11-29 ENCOUNTER — ANCILLARY PROCEDURE (OUTPATIENT)
Dept: GENERAL RADIOLOGY | Facility: CLINIC | Age: 77
End: 2022-11-29
Attending: FAMILY MEDICINE
Payer: COMMERCIAL

## 2022-11-29 VITALS
HEIGHT: 62 IN | BODY MASS INDEX: 24.48 KG/M2 | SYSTOLIC BLOOD PRESSURE: 100 MMHG | WEIGHT: 133 LBS | DIASTOLIC BLOOD PRESSURE: 62 MMHG

## 2022-11-29 DIAGNOSIS — M25.561 BILATERAL KNEE PAIN: ICD-10-CM

## 2022-11-29 DIAGNOSIS — M25.562 CHRONIC PAIN OF BOTH KNEES: ICD-10-CM

## 2022-11-29 DIAGNOSIS — E53.8 VITAMIN B12 DEFICIENCY (NON ANEMIC): Primary | ICD-10-CM

## 2022-11-29 DIAGNOSIS — M17.11 PRIMARY OSTEOARTHRITIS OF RIGHT KNEE: ICD-10-CM

## 2022-11-29 DIAGNOSIS — M25.512 CHRONIC PAIN OF BOTH SHOULDERS: Primary | ICD-10-CM

## 2022-11-29 DIAGNOSIS — G89.29 CHRONIC PAIN OF BOTH KNEES: ICD-10-CM

## 2022-11-29 DIAGNOSIS — M25.561 CHRONIC PAIN OF BOTH KNEES: ICD-10-CM

## 2022-11-29 DIAGNOSIS — M25.562 BILATERAL KNEE PAIN: ICD-10-CM

## 2022-11-29 DIAGNOSIS — G89.29 CHRONIC PAIN OF BOTH SHOULDERS: Primary | ICD-10-CM

## 2022-11-29 DIAGNOSIS — M17.12 PRIMARY OSTEOARTHRITIS OF LEFT KNEE: ICD-10-CM

## 2022-11-29 DIAGNOSIS — M25.511 CHRONIC PAIN OF BOTH SHOULDERS: Primary | ICD-10-CM

## 2022-11-29 PROCEDURE — 96372 THER/PROPH/DIAG INJ SC/IM: CPT | Performed by: INTERNAL MEDICINE

## 2022-11-29 PROCEDURE — 73562 X-RAY EXAM OF KNEE 3: CPT | Mod: 50 | Performed by: FAMILY MEDICINE

## 2022-11-29 PROCEDURE — 20611 DRAIN/INJ JOINT/BURSA W/US: CPT | Mod: 50 | Performed by: FAMILY MEDICINE

## 2022-11-29 PROCEDURE — 99215 OFFICE O/P EST HI 40 MIN: CPT | Mod: 25 | Performed by: FAMILY MEDICINE

## 2022-11-29 PROCEDURE — 99207 PR NO CHARGE NURSE ONLY: CPT

## 2022-11-29 RX ORDER — METHYLPREDNISOLONE ACETATE 40 MG/ML
40 INJECTION, SUSPENSION INTRA-ARTICULAR; INTRALESIONAL; INTRAMUSCULAR; SOFT TISSUE
Status: DISCONTINUED | OUTPATIENT
Start: 2022-11-29 | End: 2023-05-17

## 2022-11-29 RX ADMIN — CYANOCOBALAMIN 1000 MCG: 1000 INJECTION, SOLUTION INTRAMUSCULAR; SUBCUTANEOUS at 09:40

## 2022-11-29 RX ADMIN — METHYLPREDNISOLONE ACETATE 40 MG: 40 INJECTION, SUSPENSION INTRA-ARTICULAR; INTRALESIONAL; INTRAMUSCULAR; SOFT TISSUE at 16:05

## 2022-11-29 NOTE — PROGRESS NOTES
ASSESSMENT & PLAN  Patient Instructions     1. Primary osteoarthritis of left knee    2. Primary osteoarthritis of right knee      -Patient is following up for bilateral knee pain due to arthritis  -Patient tolerated bilateral knee intra-articular cortisone injection today without complications.  Patient was given postprocedure instructions  -Patient will follow up when pain returns  -Call direct clinic number [[phone]] at any time with questions or concerns.    Albert Yeo MD [facility] Orthopedics and Sports Medicine  [facility]          -----    SUBJECTIVE:  Danielle Bryan is a 77 year old female who is seen for bilateral knee corticosteriod injections.      Patient rates pain as 4/10 pre-procedure. Patient rates pain as 4/10 post-procedure.    Large Joint Injection/Arthocentesis: bilateral knee    Date/Time: 12/6/2022 10:54 AM  Performed by: Yeo, Albert, MD  Authorized by: Yeo, Albert, MD     Indications:  Pain and osteoarthritis  Needle Size:  25 G  Guidance: ultrasound    Approach:  Lateral  Location:  Knee  Laterality:  Bilateral      Medications (Right):  40 mg methylPREDNISolone 40 MG/ML  Medications (Left):  40 mg methylPREDNISolone 40 MG/ML  Outcome:  Tolerated well, no immediate complications  Procedure discussed: discussed risks, benefits, and alternatives    Consent Given by:  Patient  Prep: patient was prepped and draped in usual sterile fashion            Albert Yeo MD, [facility]

## 2022-11-29 NOTE — LETTER
11/29/2022         RE: Danielle Bryan  [address]  [place] [address]        Dear Colleague,    Thank you for referring your patient, Danielle Bryan, to the [facility] [place]. Please see a copy of my visit note below.    ASSESSMENT & PLAN  Patient Instructions     1. Chronic pain of both knees    2. Chronic pain of both shoulders    3. Primary osteoarthritis of right knee    4. Primary osteoarthritis of left knee      -Patient is following for chronic bilateral shoulder pains due to mild arthritis.  Patient also has chronic bilateral knee pain due to arthritis  -Patient tolerated bilateral humeral intra-articular cortisone injection today without complications.  Patient was given postprocedure instructions  -Patient also had x-rays of bilateral knees which shows mild to moderate arthritis of the medial compartments  -Patient was interested in cortisone injections.  Patient will follow up in a couple of weeks to administer the cortisone injections  -Call direct clinic number [[phone]] at any time with questions or concerns.    Albert Yeo MD [facility] Orthopedics and Sports Medicine  [facility] [place]          -----    SUBJECTIVE:  Danielle Bryan is a 77 year old female who is seen in follow-up for bilateral glenohumeral intra-articular corticosteriod injections and to discuss bilateral knee pain.They were last seen 11/21/2022.     Regarding her bilateral shoulder pain, since their last visit reports 0% - (About the same as last time). They indicate that their current pain level is 7/10. They have tried Tylenol.      Patient also wants to discuss bilateral knee pain  Onset: 2 years(s) ago. Reports insidious onset without acute precipitating event.  Location of Pain: bilateral knee  Rating of Pain at worst: patient unable to answer  Rating of Pain Currently: patient unable to answer  Worsened by: stairs, sitting to standing   Better with:  "rest/activity avoidance   Treatments tried: corticosteroid injection (most recent date: 2 years ago at [facility]) that provided significant amount of relief  Associated symptoms: feeling of instability and swelling  Orthopedic history: NO  Relevant surgical history: NO  Social history: social history: retired     The patient is seen with their .    Patient's past medical, surgical, social, and family histories were reviewed today and no changes are noted.    REVIEW OF SYSTEMS:  Constitutional: NEGATIVE for fever, chills, change in weight  Skin: NEGATIVE for worrisome rashes, moles or lesions  GI/: NEGATIVE for bowel or bladder changes  Neuro: NEGATIVE for weakness, dizziness or paresthesias    OBJECTIVE:  /62   Ht 1.562 m (5' 1.5\")   Wt 60.3 kg (133 lb)   LMP  (LMP Unknown)   BMI 24.72 kg/m     General: healthy, alert and in no distress  HEENT: no scleral icterus or conjunctival erythema  Skin: no suspicious lesions or rash. No jaundice.  CV: regular rhythm by palpation, no pedal edema  Resp: normal respiratory effort without conversational dyspnea   Psych: normal mood and affect  Gait: normal steady gait with appropriate coordination and balance  Neuro: normal light touch sensory exam of the extremities.    MSK:  BILATERAL KNEE  Inspection:    Normal alignment; no edema, erythema, or ecchymosis present  Palpation:    Tender about the medial joint line. Remainder of bony and ligamentous landmarks are nontender.    Trace effusion is present    Patellofemoral crepitus is Present  Range of Motion:     00 extension to 1100 flexion  Strength:    Quadriceps grossly intact    Extensor mechanism intact  Special Tests:    Positive: none    Negative: MCL/valgus stress (0 & 30 deg), LCL/varus stress (0 & 30 deg), Lachman's, anterior drawer, posterior drawer, Cristobal's         Independent visualization of the below image:  Recent Results (from the past 24 hour(s))   XR Knee Bilateral 3 Views    Narrative    Mild " medial compartment joint space narrowing bilaterally.  No acute   fracture or dislocation.       Large Joint Injection/Arthocentesis: bilateral glenohumeral    Date/Time: 11/29/2022 4:05 PM  Performed by: Yeo, Albert, MD  Authorized by: Yeo, Albert, MD     Indications:  Pain and osteoarthritis  Needle Size:  22 G  Guidance: ultrasound    Approach:  Posterior  Location:  Shoulder  Laterality:  Bilateral      Site:  Bilateral glenohumeral  Medications (Right):  40 mg methylPREDNISolone 40 MG/ML  Medications (Left):  40 mg methylPREDNISolone 40 MG/ML  Outcome:  Tolerated well, no immediate complications  Procedure discussed: discussed risks, benefits, and alternatives    Consent Given by:  Patient  Prep: patient was prepped and draped in usual sterile fashion        Patient's conditions were thoroughly discussed during today's visit with total time spent face-to-face with the patient and documentation being 50 minutes.    Albert Yeo MD, [facility] Orthopedic [facility]          Again, thank you for allowing me to participate in the care of your patient.        Sincerely,        Albert Yeo, MD

## 2022-11-29 NOTE — PATIENT INSTRUCTIONS
1. Chronic pain of both knees    2. Chronic pain of both shoulders    3. Primary osteoarthritis of right knee    4. Primary osteoarthritis of left knee      -Patient is following for chronic bilateral shoulder pains due to mild arthritis.  Patient also has chronic bilateral knee pain due to arthritis  -Patient tolerated bilateral humeral intra-articular cortisone injection today without complications.  Patient was given postprocedure instructions  -Patient also had x-rays of bilateral knees which shows mild to moderate arthritis of the medial compartments  -Patient was interested in cortisone injections.  Patient will follow up in a couple of weeks to administer the cortisone injections  -Call direct clinic number [[phone]] at any time with questions or concerns.    Albert Yeo MD [facility] Orthopedics and Sports Medicine  [facility] [place]

## 2022-11-30 ENCOUNTER — HOSPITAL ENCOUNTER (OUTPATIENT)
Dept: PHYSICAL THERAPY | Facility: CLINIC | Age: 77
Setting detail: THERAPIES SERIES
Discharge: HOME | End: 2022-11-30
Attending: INTERNAL MEDICINE
Payer: COMMERCIAL

## 2022-11-30 PROCEDURE — 97116 GAIT TRAINING THERAPY: CPT | Mod: GP | Performed by: PHYSICAL THERAPIST

## 2022-11-30 PROCEDURE — 97110 THERAPEUTIC EXERCISES: CPT | Mod: GP | Performed by: PHYSICAL THERAPIST

## 2022-12-05 ENCOUNTER — TRANSFERRED RECORDS (OUTPATIENT)
Dept: HEALTH INFORMATION MANAGEMENT | Facility: CLINIC | Age: 77
End: 2022-12-05

## 2022-12-06 ENCOUNTER — TELEPHONE (OUTPATIENT)
Dept: INTERNAL MEDICINE | Facility: CLINIC | Age: 77
End: 2022-12-06

## 2022-12-06 ENCOUNTER — OFFICE VISIT (OUTPATIENT)
Dept: ORTHOPEDICS | Facility: CLINIC | Age: 77
End: 2022-12-06
Payer: COMMERCIAL

## 2022-12-06 VITALS — WEIGHT: 133 LBS | BODY MASS INDEX: 24.48 KG/M2 | HEIGHT: 62 IN

## 2022-12-06 DIAGNOSIS — M17.12 PRIMARY OSTEOARTHRITIS OF LEFT KNEE: Primary | ICD-10-CM

## 2022-12-06 DIAGNOSIS — M17.11 PRIMARY OSTEOARTHRITIS OF RIGHT KNEE: ICD-10-CM

## 2022-12-06 PROCEDURE — 20611 DRAIN/INJ JOINT/BURSA W/US: CPT | Mod: 50 | Performed by: FAMILY MEDICINE

## 2022-12-06 RX ORDER — METHYLPREDNISOLONE ACETATE 40 MG/ML
40 INJECTION, SUSPENSION INTRA-ARTICULAR; INTRALESIONAL; INTRAMUSCULAR; SOFT TISSUE
Status: DISCONTINUED | OUTPATIENT
Start: 2022-12-06 | End: 2023-05-17

## 2022-12-06 RX ADMIN — METHYLPREDNISOLONE ACETATE 40 MG: 40 INJECTION, SUSPENSION INTRA-ARTICULAR; INTRALESIONAL; INTRAMUSCULAR; SOFT TISSUE at 10:54

## 2022-12-06 NOTE — TELEPHONE ENCOUNTER
Would remain off of pravastatin for a while. Could reconsider resuming it after discussing at a future appt. Please advise patient's .

## 2022-12-06 NOTE — TELEPHONE ENCOUNTER
Patient and her  calling  Patient stopped taking pravastatin (PRAVACHOL) 20 MG tablet about 6 weeks ago.  cortisone injection in both shoulders have helped a lot  Patient wants to know if she should start taking Pravastatin again.  Ok to call and  [phone]    Patient wants to know if its time for another Prolia injection

## 2022-12-06 NOTE — PATIENT INSTRUCTIONS
1. Primary osteoarthritis of left knee    2. Primary osteoarthritis of right knee      -Patient is following up for bilateral knee pain due to arthritis  -Patient tolerated bilateral knee intra-articular cortisone injection today without complications.  Patient was given postprocedure instructions  -Patient will follow up when pain returns  -Call direct clinic number [[phone]] at any time with questions or concerns.    Albert Yeo MD [facility]  [facility] [place]

## 2022-12-06 NOTE — LETTER
12/6/2022         RE: Danielle Bryan  [address]  [place] [address]        Dear Colleague,    Thank you for referring your patient, Danielle Bryan, to the [facility] [place]. Please see a copy of my visit note below.    ASSESSMENT & PLAN  Patient Instructions     1. Primary osteoarthritis of left knee    2. Primary osteoarthritis of right knee      -Patient is following up for bilateral knee pain due to arthritis  -Patient tolerated bilateral knee intra-articular cortisone injection today without complications.  Patient was given postprocedure instructions  -Patient will follow up when pain returns  -Call direct clinic number [[phone]] at any time with questions or concerns.    Albert Yeo MD [facility] Orthopedics and Sports Medicine  [facility]          -----    SUBJECTIVE:  Danielle Bryan is a 77 year old female who is seen for bilateral knee corticosteriod injections.      Patient rates pain as 4/10 pre-procedure. Patient rates pain as 4/10 post-procedure.    Large Joint Injection/Arthocentesis: bilateral knee    Date/Time: 12/6/2022 10:54 AM  Performed by: Yeo, Albert, MD  Authorized by: Yeo, Albert, MD     Indications:  Pain and osteoarthritis  Needle Size:  25 G  Guidance: ultrasound    Approach:  Lateral  Location:  Knee  Laterality:  Bilateral      Medications (Right):  40 mg methylPREDNISolone 40 MG/ML  Medications (Left):  40 mg methylPREDNISolone 40 MG/ML  Outcome:  Tolerated well, no immediate complications  Procedure discussed: discussed risks, benefits, and alternatives    Consent Given by:  Patient  Prep: patient was prepped and draped in usual sterile fashion            Albert Yeo MD, [facility]opedics        Again, thank you for allowing me to participate in the care of your patient.        Sincerely,        Albert Yeo, MD

## 2022-12-07 ENCOUNTER — HOSPITAL ENCOUNTER (OUTPATIENT)
Dept: PHYSICAL THERAPY | Facility: CLINIC | Age: 77
Setting detail: THERAPIES SERIES
Discharge: HOME | End: 2022-12-07
Attending: INTERNAL MEDICINE
Payer: COMMERCIAL

## 2022-12-07 PROCEDURE — 97116 GAIT TRAINING THERAPY: CPT | Mod: GP | Performed by: PHYSICAL THERAPIST

## 2022-12-23 ENCOUNTER — TELEPHONE (OUTPATIENT)
Dept: INTERNAL MEDICINE | Facility: CLINIC | Age: 77
End: 2022-12-23

## 2022-12-23 NOTE — TELEPHONE ENCOUNTER
[facility] rehab * order received via fax    Forms in [employer] mail box for review and signature.

## 2022-12-30 ENCOUNTER — ALLIED HEALTH/NURSE VISIT (OUTPATIENT)
Dept: FAMILY MEDICINE | Facility: CLINIC | Age: 77
End: 2022-12-30
Payer: COMMERCIAL

## 2022-12-30 DIAGNOSIS — E53.8 VITAMIN B12 DEFICIENCY (NON ANEMIC): Primary | ICD-10-CM

## 2022-12-30 PROCEDURE — 96372 THER/PROPH/DIAG INJ SC/IM: CPT | Performed by: INTERNAL MEDICINE

## 2022-12-30 PROCEDURE — 99207 PR NO CHARGE NURSE ONLY: CPT

## 2022-12-30 RX ADMIN — CYANOCOBALAMIN 1000 MCG: 1000 INJECTION, SOLUTION INTRAMUSCULAR; SUBCUTANEOUS at 15:23

## 2023-01-06 ENCOUNTER — TELEPHONE (OUTPATIENT)
Dept: ORTHOPEDICS | Facility: CLINIC | Age: 78
End: 2023-01-06

## 2023-01-06 NOTE — TELEPHONE ENCOUNTER
Phone call to spouse, Karl.   There is a consent to communicate on file to speak with him.   He states patient has not noticed any relief from the cortisone injection from 12/6/22 of her left knee. Her right knee is better.   He denies redness of the left knee and states there may be mild swelling but not that noticeable. Patient is able to ambulate but complains of pain off and on. Pain is worse with going down stairs.     Inquired as to what types of injections patient had previously at [facility]. He is a poor historian and states it was not cortisone but something similar. He is unsure if gel injections were ever done.  He is not sure which knee was injected there about 1 year ago. Patient has also had shoulder injections at [facility].   Recommended he contact [facility] and have records sent so we know what patient has had in the past that was or was not successful. Our fax number was provided.   Discussed that they may need a release from patient. Discussed that having the records will help Dr. Yeo decide what next steps are. He will try to obtain records.   Will also discuss with Dr. Yeo and get back with him.     CRISTIAN Bermudez RN

## 2023-01-06 NOTE — TELEPHONE ENCOUNTER
[facility] Call Center    Phone Message    May a detailed message be left on voicemail: no     Reason for Call: Other: Wanting to let Dr. Yeo know that her treatment from [place] should be ariving in a couple of days.     Action Taken: Other: [facility]    Travel Screening: Not Applicable

## 2023-01-06 NOTE — TELEPHONE ENCOUNTER
Per Dr. Yeo, having the records would be helpful, but he can make recommendations without them if needed. Patient to follow up in the office for re-evaluation.     Phone call to , Karl and informed of the above. He is currently on his way to [facility] to  records. He states he called and they told him he could pick them up. He will call back to schedule an appointment with Dr. Yeo. At the time he was informed there were no openings until 1/20/23.     After call ended, there is an opening on 1/11/23 at 2pm that was held. Will call him back later to see if that works for them.     CRISTIAN Bermudez RN

## 2023-01-06 NOTE — TELEPHONE ENCOUNTER
Reason for call:  Patient had bilateral knee injections done about 1 month ago. Patient got relief in one knee but still experiencing pain in the other. Would like return call to call options.    Home number on file [phone] (home)

## 2023-01-06 NOTE — TELEPHONE ENCOUNTER
Please also see duplicate encounter dated 1/6/23.     Phone call to Karl,  to clarify if records are coming from [facility] or from [place]. He states [facility]. Offered appointment for 1/11/23 at 2 pm with 12:45pm arrival. Appointment scheduled. Hopefully records will be received by then. He verbalized understanding.     Also had Radiology tech contact [facility] for imaging to be pushed.     CRISTIAN Bermudez RN

## 2023-01-09 NOTE — PROGRESS NOTES
ASSESSMENT & PLAN  Patient Instructions     1. Primary osteoarthritis of left knee    2. Primary osteoarthritis of right knee      -Patient is following up for chronic bilateral knee pain due to arthritis  -Patient reports resolution of right knee pain with the recent cortisone injection.  However, left knee pain has not improved with a cortisone injection  -Patient reports mainly continued pain in the morning and when she goes up and down stairs.  -Patient may try 1 to 2 tablets of X strength Tylenol at bedtime and topical Voltaren gel at bedtime and in the morning before she gets ready for the day  -Patient may purchase an over-the-counter compression sleeve at [facility] or at any pharmacy to mainly be worn when she has to go up or down the stairs  -Patient will get an MRI of the left knee for further evaluation of intra-articular injuries to determine any additional interventions  - Your MRI has been ordered. You may call [phone] to schedule over the phone. Once you get notified on [employer] of your results, send me a [employer] message to discuss results and next of treatment options.    -Call direct clinic number [[phone]] at any time with questions or concerns.    Albert Yeo MD [facility] Orthopedics and Sports Medicine  [facility]          -----    SUBJECTIVE:  Danielle Bryan is a 77 year old female who is seen in follow-up for bilateral knee pain.They were last seen 12/6/2022    Since their last visit reports right knee pain is still improved since corticosteriod injection at last office visit, notes left knee pain has continued to have pain and feels the same since last office visit.  Pain worse with walking, sitting to standing, and stairs.  thinks left knee feels swollen.  They have tried corticosteroid injection (most recent date: 12/6/2022) that provided no amount of relief in her left knee, provided good amount of relief in the right knee.  Uses  "walker.     Notes bilateral shoulder pain still bothersome since last office visit 11/29/22 when she had bilateral glenohumeral joint corticosteriod injections, but overall better compared to this last office visit. Thinks corticosteriod injections are helping.    The patient is seen with their .    Patient's past medical, surgical, social, and family histories were reviewed today and no changes are noted.    REVIEW OF SYSTEMS:  Constitutional: NEGATIVE for fever, chills, change in weight  Skin: NEGATIVE for worrisome rashes, moles or lesions  GI/: NEGATIVE for bowel or bladder changes  Neuro: NEGATIVE for weakness, dizziness or paresthesias    OBJECTIVE:  /62   Ht 1.562 m (5' 1.5\")   Wt 60.3 kg (133 lb)   LMP  (LMP Unknown)   BMI 24.72 kg/m     General: healthy, alert and in no distress  HEENT: no scleral icterus or conjunctival erythema  Skin: no suspicious lesions or rash. No jaundice.  CV: regular rhythm by palpation, no pedal edema  Resp: normal respiratory effort without conversational dyspnea   Psych: normal mood and affect  Gait: normal steady gait with appropriate coordination and balance  Neuro: normal light touch sensory exam of the extremities.    MSK:  LEFT KNEE  Inspection:    Normal alignment; no edema, erythema, or ecchymosis present  Palpation:    Tender about the medial joint line. Remainder of bony and ligamentous landmarks are nontender.    Trace effusion is present    Patellofemoral crepitus is Present  Range of Motion:     00 extension to 1100 flexion  Strength:    Quadriceps grossly intact    Extensor mechanism intact  Special Tests:    Positive: none    Negative: MCL/valgus stress (0 & 30 deg), LCL/varus stress (0 & 30 deg), Lachman's, anterior drawer, posterior drawer, Cristobal's    Independent visualization of the below image:        Albert Yeo MD, [facility] Sports and Orthopedic Care        "

## 2023-01-10 NOTE — TELEPHONE ENCOUNTER
Records received via fax from [facility] and placed in provider basket for patient's upcoming appointment with Dr. Yeo on 1/11/23.     Myra Russell MSA, ATC  Certified Athletic Trainer

## 2023-01-11 ENCOUNTER — OFFICE VISIT (OUTPATIENT)
Dept: ORTHOPEDICS | Facility: CLINIC | Age: 78
End: 2023-01-11
Payer: COMMERCIAL

## 2023-01-11 ENCOUNTER — TELEPHONE (OUTPATIENT)
Dept: ORTHOPEDICS | Facility: CLINIC | Age: 78
End: 2023-01-11

## 2023-01-11 VITALS
HEIGHT: 62 IN | DIASTOLIC BLOOD PRESSURE: 62 MMHG | SYSTOLIC BLOOD PRESSURE: 106 MMHG | WEIGHT: 133 LBS | BODY MASS INDEX: 24.48 KG/M2

## 2023-01-11 DIAGNOSIS — M17.12 PRIMARY OSTEOARTHRITIS OF LEFT KNEE: Primary | ICD-10-CM

## 2023-01-11 DIAGNOSIS — M17.11 PRIMARY OSTEOARTHRITIS OF RIGHT KNEE: ICD-10-CM

## 2023-01-11 PROCEDURE — 99214 OFFICE O/P EST MOD 30 MIN: CPT | Performed by: FAMILY MEDICINE

## 2023-01-11 NOTE — LETTER
1/11/2023         RE: Danielle Bryan  [address]  [place] [address]        Dear Colleague,    Thank you for referring your patient, Danielle Bryan, to the [facility] [place]. Please see a copy of my visit note below.    ASSESSMENT & PLAN  Patient Instructions     1. Primary osteoarthritis of left knee    2. Primary osteoarthritis of right knee      -Patient is following up for chronic bilateral knee pain due to arthritis  -Patient reports resolution of right knee pain with the recent cortisone injection.  However, left knee pain has not improved with a cortisone injection  -Patient reports mainly continued pain in the morning and when she goes up and down stairs.  -Patient may try 1 to 2 tablets of X strength Tylenol at bedtime and topical Voltaren gel at bedtime and in the morning before she gets ready for the day  -Patient may purchase an over-the-counter compression sleeve at [facility] or at any pharmacy to mainly be worn when she has to go up or down the stairs  -Patient will get an MRI of the left knee for further evaluation of intra-articular injuries to determine any additional interventions  - Your MRI has been ordered. You may call [phone] to schedule over the phone. Once you get notified on [employer] of your results, send me a [employer] message to discuss results and next of treatment options.    -Call direct clinic number [[phone]] at any time with questions or concerns.    Albert Yeo MD [facility] Orthopedics and Sports Medicine  [facility] [place]          -----    SUBJECTIVE:  Danielle Bryan is a 77 year old female who is seen in follow-up for bilateral knee pain.They were last seen 12/6/2022    Since their last visit reports right knee pain is still improved since corticosteriod injection at last office visit, notes left knee pain has continued to have pain and feels the same since last office visit.  Pain  "worse with walking, sitting to standing, and stairs.  thinks left knee feels swollen.  They have tried corticosteroid injection (most recent date: 12/6/2022) that provided no amount of relief in her left knee, provided good amount of relief in the right knee.  Uses walker.     Notes bilateral shoulder pain still bothersome since last office visit 11/29/22 when she had bilateral glenohumeral joint corticosteriod injections, but overall better compared to this last office visit. Thinks corticosteriod injections are helping.    The patient is seen with their .    Patient's past medical, surgical, social, and family histories were reviewed today and no changes are noted.    REVIEW OF SYSTEMS:  Constitutional: NEGATIVE for fever, chills, change in weight  Skin: NEGATIVE for worrisome rashes, moles or lesions  GI/: NEGATIVE for bowel or bladder changes  Neuro: NEGATIVE for weakness, dizziness or paresthesias    OBJECTIVE:  /62   Ht 1.562 m (5' 1.5\")   Wt 60.3 kg (133 lb)   LMP  (LMP Unknown)   BMI 24.72 kg/m     General: healthy, alert and in no distress  HEENT: no scleral icterus or conjunctival erythema  Skin: no suspicious lesions or rash. No jaundice.  CV: regular rhythm by palpation, no pedal edema  Resp: normal respiratory effort without conversational dyspnea   Psych: normal mood and affect  Gait: normal steady gait with appropriate coordination and balance  Neuro: normal light touch sensory exam of the extremities.    MSK:  LEFT KNEE  Inspection:    Normal alignment; no edema, erythema, or ecchymosis present  Palpation:    Tender about the medial joint line. Remainder of bony and ligamentous landmarks are nontender.    Trace effusion is present    Patellofemoral crepitus is Present  Range of Motion:     00 extension to 1100 flexion  Strength:    Quadriceps grossly intact    Extensor mechanism intact  Special Tests:    Positive: none    Negative: MCL/valgus stress (0 & 30 deg), LCL/varus " stress (0 & 30 deg), Lachman's, anterior drawer, posterior drawer, Cristobal's    Independent visualization of the below image:        Albert Yeo MD, [facility] Sports and Orthopedic Care            Again, thank you for allowing me to participate in the care of your patient.        Sincerely,        Albert Yeo, MD

## 2023-01-11 NOTE — TELEPHONE ENCOUNTER
Phone call to Karl,      called to schedule MRI, MRI tech wanted to know if patient would have issues with claustrophobia     Informed  that patient would only be in the MRI machine from the waist down     states he does not think patient would have any issues with this.     Dr.Yeo offered to  to sent a one time anxiety medication to patients pharmacy just in case they need it,  declined.    Patient states he will call with any further issues or questions    Alyssa Cuevas ATC

## 2023-01-11 NOTE — TELEPHONE ENCOUNTER
[facility] Call Center    Phone Message    May a detailed message be left on voicemail: yes     Reason for Call: Other: Karl spouse called would like Dr. Yeo to call him back to discuss more about the diagnosis before scheduling for an MRI. Please call and advise.     Action Taken: Other: [facility]    Travel Screening: Not Applicable

## 2023-01-11 NOTE — PATIENT INSTRUCTIONS
1. Primary osteoarthritis of left knee    2. Primary osteoarthritis of right knee      -Patient is following up for chronic bilateral knee pain due to arthritis  -Patient reports resolution of right knee pain with the recent cortisone injection.  However, left knee pain has not improved with a cortisone injection  -Patient reports mainly continued pain in the morning and when she goes up and down stairs.  -Patient may try 1 to 2 tablets of X strength Tylenol at bedtime and topical Voltaren gel at bedtime and in the morning before she gets ready for the day  -Patient may purchase an over-the-counter compression sleeve at [employer] or at any pharmacy to mainly be worn when she has to go up or down the stairs  -Patient will get an MRI of the left knee for further evaluation of intra-articular injuries to determine any additional interventions  - Your MRI has been ordered. You may call [phone] to schedule over the phone. Once you get notified on [employer] of your results, send me a [employer] message to discuss results and next of treatment options.    -Call direct clinic number [[phone]] at any time with questions or concerns.    Albert Yeo MD CAQSM  [facility]  [facility] [place]

## 2023-01-16 ENCOUNTER — LAB (OUTPATIENT)
Dept: LAB | Facility: CLINIC | Age: 78
End: 2023-01-16
Payer: COMMERCIAL

## 2023-01-16 DIAGNOSIS — R30.0 DYSURIA: Primary | ICD-10-CM

## 2023-01-16 LAB
ALBUMIN UR-MCNC: NEGATIVE MG/DL
APPEARANCE UR: CLEAR
BILIRUB UR QL STRIP: NEGATIVE
COLOR UR AUTO: YELLOW
GLUCOSE UR STRIP-MCNC: NEGATIVE MG/DL
HGB UR QL STRIP: NEGATIVE
KETONES UR STRIP-MCNC: NEGATIVE MG/DL
LEUKOCYTE ESTERASE UR QL STRIP: NEGATIVE
NITRATE UR QL: NEGATIVE
PH UR STRIP: 5.5 [PH] (ref 5–7)
SP GR UR STRIP: >=1.03 (ref 1–1.03)
UROBILINOGEN UR STRIP-ACNC: 0.2 E.U./DL

## 2023-01-16 PROCEDURE — 81003 URINALYSIS AUTO W/O SCOPE: CPT

## 2023-01-25 ENCOUNTER — OFFICE VISIT (OUTPATIENT)
Dept: INTERNAL MEDICINE | Facility: CLINIC | Age: 78
End: 2023-01-25
Payer: COMMERCIAL

## 2023-01-25 ENCOUNTER — HOSPITAL ENCOUNTER (OUTPATIENT)
Dept: MRI IMAGING | Facility: CLINIC | Age: 78
Discharge: HOME | End: 2023-01-25
Attending: FAMILY MEDICINE | Admitting: FAMILY MEDICINE
Payer: COMMERCIAL

## 2023-01-25 ENCOUNTER — TELEPHONE (OUTPATIENT)
Dept: ORTHOPEDICS | Facility: CLINIC | Age: 78
End: 2023-01-25

## 2023-01-25 ENCOUNTER — TELEPHONE (OUTPATIENT)
Dept: INTERNAL MEDICINE | Facility: CLINIC | Age: 78
End: 2023-01-25

## 2023-01-25 VITALS
HEART RATE: 78 BPM | HEIGHT: 62 IN | RESPIRATION RATE: 14 BRPM | TEMPERATURE: 97.8 F | OXYGEN SATURATION: 95 % | BODY MASS INDEX: 25.4 KG/M2 | SYSTOLIC BLOOD PRESSURE: 102 MMHG | WEIGHT: 138 LBS | DIASTOLIC BLOOD PRESSURE: 60 MMHG

## 2023-01-25 DIAGNOSIS — M17.11 PRIMARY OSTEOARTHRITIS OF RIGHT KNEE: ICD-10-CM

## 2023-01-25 DIAGNOSIS — M17.12 PRIMARY OSTEOARTHRITIS OF LEFT KNEE: Primary | ICD-10-CM

## 2023-01-25 DIAGNOSIS — E78.5 HYPERLIPIDEMIA WITH TARGET LDL LESS THAN 100: Primary | ICD-10-CM

## 2023-01-25 DIAGNOSIS — I48.0 PAROXYSMAL ATRIAL FIBRILLATION (H): ICD-10-CM

## 2023-01-25 DIAGNOSIS — M81.0 AGE-RELATED OSTEOPOROSIS WITHOUT CURRENT PATHOLOGICAL FRACTURE: ICD-10-CM

## 2023-01-25 DIAGNOSIS — M17.12 PRIMARY OSTEOARTHRITIS OF LEFT KNEE: ICD-10-CM

## 2023-01-25 PROCEDURE — 73721 MRI JNT OF LWR EXTRE W/O DYE: CPT | Mod: 26 | Performed by: RADIOLOGY

## 2023-01-25 PROCEDURE — 73721 MRI JNT OF LWR EXTRE W/O DYE: CPT | Mod: LT

## 2023-01-25 PROCEDURE — 99213 OFFICE O/P EST LOW 20 MIN: CPT | Performed by: INTERNAL MEDICINE

## 2023-01-25 NOTE — TELEPHONE ENCOUNTER
Dr. Riley saw patient today for appointment, patient due for Prolia injection. He placed order for injection, patient needs to be scheduled for appointment.     Prolia Checklist:  Last Injection: 5/5/22  Coverage: pending  Medication Order: placed today  Dental work involving bone in past month or will have work in the next 6 months: no  Future Appt Scheduled for Injection: Called and spoke to patient's -on consent to communicate, appointment scheduled for 2/9/23.      Judy Castillo RN  [facility]

## 2023-01-25 NOTE — TELEPHONE ENCOUNTER
Patient would like a call back once the MRI results have been reviewed.     Phone#: [phone]

## 2023-01-25 NOTE — PROGRESS NOTES
"Face to face time with patient and : 20 minutes (1202---1222)       Assessment & Plan   (E78.5) Hyperlipidemia with target LDL less than 100  (primary encounter diagnosis)  Comment: Continue to hold pravastatin.     (I48.0) Paroxysmal atrial fibrillation (H)  Comment: Continue Xarelto.     (M81.0) Age-related osteoporosis without current pathological fracture  Comment: Placed order for Prolia.   Plan: denosumab (PROLIA) injection 60 mg             BMI:   Estimated body mass index is 25.65 kg/m  as calculated from the following:    Height as of this encounter: 1.562 m (5' 1.5\").    Weight as of this encounter: 62.6 kg (138 lb).     Patient Instructions   After our discussion, I recommend at this time:    Remaining off of pravastatin.   Continuing on Xarelto and other meds.     Schedule an Annual Wellness Visit with me sometime after mid-April 2023.       No follow-ups on file.    Colt Riley MD,   [facility] 77 year [facility] accompanied by her spouse, presenting for the following health issues:  Follow Up (Medications and weight gain (per spouse).)      History of Present Illness       She eats 2-3 servings of fruits and vegetables daily.She consumes 1 sweetened beverage(s) daily.She exercises with enough effort to increase her heart rate 9 or less minutes per day.  She exercises with enough effort to increase her heart rate 3 or less days per week.   She is taking medications regularly.     History provided by patient's .   After holding pravastatin for several weeks, he is not convinced that her reports of muscle and joint pains had improved. She did note improvement after receiving injections in both shoulders and both knees.     We discussed that her medication regimen might be streamlined without introducing significant health risks by continuing to withhold pravastatin.     She continues on Xarelto for paroxysmal atrial fibrillation. Although she " "has had occasional falls, she has not experienced any significant injuries. I've advised for now that she remain on Xarelto.     She is due for Prolia injection which is ordered.     Past medical, family and social histories as well as medications reviewed and updated as needed.    Review of Systems   REVIEW OF SYSTEMS: The following systems have been completely reviewed and are negative except as noted above:   Constitutional, respiratory, cardiovascular, musculoskeletal, dermatologic, hematologic, endocrine, psychiatric, and neurologic systems.        Objective    /60 (BP Location: Right arm, Patient Position: Sitting, Cuff Size: Adult Regular)   Pulse 78   Temp 97.8  F (36.6  C) (Tympanic)   Resp 14   Ht 1.562 m (5' 1.5\")   Wt 62.6 kg (138 lb)   LMP  (LMP Unknown)   SpO2 95%   Breastfeeding No   BMI 25.65 kg/m    Body mass index is 25.65 kg/m .     Physical Exam   GENERAL: no distress and fatigued  RESP: lungs clear to auscultation - no rales, rhonchi or wheezes  CV: regular rate and rhythm, normal S1 S2, no S3 or S4, no murmur, click or rub, no peripheral edema and peripheral pulses strong  ABDOMEN: soft, nontender, no hepatosplenomegaly, no masses and bowel sounds normal  MS: no gross musculoskeletal defects noted, no edema            "

## 2023-01-25 NOTE — TELEPHONE ENCOUNTER
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  Patient's  reports patient fell approximately week ago which could explain the acute ligament sprains.  Patient had recently started Voltaren gel daily which is completely resolved her pain.  Patient will continue to use Voltaren gel daily as needed.  Patient will follow up if pain worsens.

## 2023-01-25 NOTE — PATIENT INSTRUCTIONS
After our discussion, I recommend at this time:    Remaining off of pravastatin.   Continuing on Xarelto and other meds.     Schedule an Annual Wellness Visit with me sometime after mid-April 2023.

## 2023-01-27 ENCOUNTER — ALLIED HEALTH/NURSE VISIT (OUTPATIENT)
Dept: FAMILY MEDICINE | Facility: CLINIC | Age: 78
End: 2023-01-27
Payer: COMMERCIAL

## 2023-01-27 DIAGNOSIS — E53.8 VITAMIN B12 DEFICIENCY (NON ANEMIC): Primary | ICD-10-CM

## 2023-01-27 PROCEDURE — 99207 PR NO CHARGE LOS: CPT

## 2023-01-27 PROCEDURE — 96372 THER/PROPH/DIAG INJ SC/IM: CPT | Performed by: INTERNAL MEDICINE

## 2023-01-27 RX ADMIN — CYANOCOBALAMIN 1000 MCG: 1000 INJECTION, SOLUTION INTRAMUSCULAR; SUBCUTANEOUS at 10:02

## 2023-02-09 ENCOUNTER — ALLIED HEALTH/NURSE VISIT (OUTPATIENT)
Dept: NURSING | Facility: CLINIC | Age: 78
End: 2023-02-09
Payer: COMMERCIAL

## 2023-02-09 VITALS — BODY MASS INDEX: 26.77 KG/M2 | WEIGHT: 144 LBS

## 2023-02-09 DIAGNOSIS — M81.0 AGE-RELATED OSTEOPOROSIS WITHOUT CURRENT PATHOLOGICAL FRACTURE: Primary | ICD-10-CM

## 2023-02-09 PROCEDURE — 99207 PR NO CHARGE NURSE ONLY: CPT | Performed by: INTERNAL MEDICINE

## 2023-02-09 PROCEDURE — 96372 THER/PROPH/DIAG INJ SC/IM: CPT | Performed by: INTERNAL MEDICINE

## 2023-02-09 NOTE — PROGRESS NOTES
Clinic Administered Medication Documentation          Prolia Documentation    Prior to injection, verified patient identity using patient's name and date of birth. Medication was administered. Please see MAR and medication order for additional information. Patient instructed to remain in clinic for 15 minutes and report any adverse reaction to staff immediately .    Indication: Prolia  (denosumab) is a prescription medicine used to treat osteoporosis in patients who:     Are at high risk for fracture, meaning patients who have had a fracture related to osteoporosis, or who have multiple risk factors for fracture.    Cannot use another osteoporosis medicine or other osteoporosis medicines did not work well.    The timeline for early/late injections would be 4 weeks early and any time after the 6 month cecilia. If a patient receives their injection late, then the subsequent injection would be 6 months from the date that they actually received the injection.  8/8/22  When was the last injection?  8/8/22  Was the last injection at least 6 months ago? Yes  Has the prior authorization been completed?  Yes  Is there an active order (within the past 365 days) in the chart?  Yes  Patient denies any dental work involving the bone (e.g. tooth extraction or dental implants) in the past 4 weeks?  Yes  Patient denies plans for any dental work involving the bone (e.g. tooth extraction or dental implants) in the next 4 weeks? Yes    The following steps were completed to comply with the REMS program for Prolia:    Reviewed information in the Medication Guide and Patient Counseling Chart, including the serious risks of Prolia  and the symptoms of each risk.    Advised patient to seek prompt medical attention if they have signs or symptoms of any of the serious risks.    Provided each patient a copy of the Medication Guide and Patient Brochure.      Was entire vial of medication used? Yes  Vial/Syringe: Syringe  Expiration Date:   3/31/2025  Was this medication supplied by the patient? No

## 2023-02-13 DIAGNOSIS — M17.12 PRIMARY OSTEOARTHRITIS OF LEFT KNEE: ICD-10-CM

## 2023-02-13 DIAGNOSIS — M17.11 PRIMARY OSTEOARTHRITIS OF RIGHT KNEE: ICD-10-CM

## 2023-02-28 ENCOUNTER — ALLIED HEALTH/NURSE VISIT (OUTPATIENT)
Dept: FAMILY MEDICINE | Facility: CLINIC | Age: 78
End: 2023-02-28
Payer: COMMERCIAL

## 2023-02-28 DIAGNOSIS — E53.8 VITAMIN B12 DEFICIENCY (NON ANEMIC): Primary | ICD-10-CM

## 2023-02-28 PROCEDURE — 99207 PR NO CHARGE NURSE ONLY: CPT

## 2023-02-28 PROCEDURE — 96372 THER/PROPH/DIAG INJ SC/IM: CPT | Performed by: INTERNAL MEDICINE

## 2023-02-28 RX ADMIN — CYANOCOBALAMIN 1000 MCG: 1000 INJECTION, SOLUTION INTRAMUSCULAR; SUBCUTANEOUS at 10:06

## 2023-03-28 ENCOUNTER — ALLIED HEALTH/NURSE VISIT (OUTPATIENT)
Dept: FAMILY MEDICINE | Facility: CLINIC | Age: 78
End: 2023-03-28
Payer: COMMERCIAL

## 2023-03-28 DIAGNOSIS — E55.9 VITAMIN D DEFICIENCY: Primary | ICD-10-CM

## 2023-03-28 PROCEDURE — 99207 PR NO CHARGE NURSE ONLY: CPT

## 2023-03-28 PROCEDURE — 96372 THER/PROPH/DIAG INJ SC/IM: CPT | Performed by: INTERNAL MEDICINE

## 2023-03-28 RX ADMIN — CYANOCOBALAMIN 1000 MCG: 1000 INJECTION, SOLUTION INTRAMUSCULAR; SUBCUTANEOUS at 10:18

## 2023-04-19 ENCOUNTER — OFFICE VISIT (OUTPATIENT)
Dept: INTERNAL MEDICINE | Facility: CLINIC | Age: 78
End: 2023-04-19
Payer: COMMERCIAL

## 2023-04-19 VITALS
RESPIRATION RATE: 14 BRPM | HEART RATE: 70 BPM | DIASTOLIC BLOOD PRESSURE: 62 MMHG | BODY MASS INDEX: 26.87 KG/M2 | WEIGHT: 146 LBS | OXYGEN SATURATION: 95 % | TEMPERATURE: 97.7 F | HEIGHT: 62 IN | SYSTOLIC BLOOD PRESSURE: 100 MMHG

## 2023-04-19 DIAGNOSIS — R41.3 MEMORY DIFFICULTIES: ICD-10-CM

## 2023-04-19 DIAGNOSIS — G47.01 INSOMNIA DUE TO MEDICAL CONDITION: ICD-10-CM

## 2023-04-19 DIAGNOSIS — M81.0 AGE-RELATED OSTEOPOROSIS WITHOUT CURRENT PATHOLOGICAL FRACTURE: ICD-10-CM

## 2023-04-19 DIAGNOSIS — I48.0 PAROXYSMAL ATRIAL FIBRILLATION (H): ICD-10-CM

## 2023-04-19 DIAGNOSIS — E55.9 VITAMIN D DEFICIENCY: ICD-10-CM

## 2023-04-19 DIAGNOSIS — E78.5 HYPERLIPIDEMIA WITH TARGET LDL LESS THAN 100: ICD-10-CM

## 2023-04-19 DIAGNOSIS — E53.8 VITAMIN B12 DEFICIENCY (NON ANEMIC): ICD-10-CM

## 2023-04-19 DIAGNOSIS — I10 HYPERTENSION, UNSPECIFIED TYPE: ICD-10-CM

## 2023-04-19 DIAGNOSIS — Z00.00 ENCOUNTER FOR MEDICARE ANNUAL WELLNESS EXAM: Primary | ICD-10-CM

## 2023-04-19 PROCEDURE — 99214 OFFICE O/P EST MOD 30 MIN: CPT | Mod: 25 | Performed by: INTERNAL MEDICINE

## 2023-04-19 PROCEDURE — G0439 PPPS, SUBSEQ VISIT: HCPCS | Performed by: INTERNAL MEDICINE

## 2023-04-19 RX ORDER — GABAPENTIN 300 MG/1
900 CAPSULE ORAL AT BEDTIME
Qty: 90 CAPSULE | Refills: 0 | Status: ON HOLD
Start: 2023-04-19 | End: 2023-11-23

## 2023-04-19 ASSESSMENT — ENCOUNTER SYMPTOMS
WEAKNESS: 0
DIARRHEA: 1
CHILLS: 1
FEVER: 0
HEMATURIA: 0
ARTHRALGIAS: 1
PARESTHESIAS: 0
HEADACHES: 0
CONSTIPATION: 1
HEARTBURN: 0
COUGH: 0
DYSURIA: 0
MYALGIAS: 0
JOINT SWELLING: 0
SORE THROAT: 0
PALPITATIONS: 0
BREAST MASS: 0
DIZZINESS: 1
HEMATOCHEZIA: 0
ABDOMINAL PAIN: 0
NERVOUS/ANXIOUS: 1
FREQUENCY: 0
NAUSEA: 0
SHORTNESS OF BREATH: 0
EYE PAIN: 0

## 2023-04-19 ASSESSMENT — ACTIVITIES OF DAILY LIVING (ADL)
CURRENT_FUNCTION: TRANSPORTATION REQUIRES ASSISTANCE
CURRENT_FUNCTION: LAUNDRY REQUIRES ASSISTANCE
CURRENT_FUNCTION: SHOPPING REQUIRES ASSISTANCE
CURRENT_FUNCTION: MEDICATION ADMINISTRATION REQUIRES ASSISTANCE
CURRENT_FUNCTION: TELEPHONE REQUIRES ASSISTANCE
CURRENT_FUNCTION: MONEY MANAGEMENT REQUIRES ASSISTANCE
CURRENT_FUNCTION: PREPARING MEALS REQUIRES ASSISTANCE
CURRENT_FUNCTION: BATHING REQUIRES ASSISTANCE
CURRENT_FUNCTION: HOUSEWORK REQUIRES ASSISTANCE

## 2023-04-19 NOTE — PROGRESS NOTES
"SUBJECTIVE:   Danielle is a 78 year old who presents for Preventive Visit.         4/19/2023     9:58 AM   Additional Questions   Roomed by Azalia ALBERTO CMA   Accompanied by Karl, spouse   Patient has been advised of split billing requirements and indicates understanding: Yes  Are you in the first 12 months of your Medicare coverage?  No    Healthy Habits:     In general, how would you rate your overall health?  Fair    Frequency of exercise:  None    Do you usually eat at least 4 servings of fruit and vegetables a day, include whole grains    & fiber and avoid regularly eating high fat or \"junk\" foods?  Yes    Taking medications regularly:  Yes    Medication side effects:  None    Ability to successfully perform activities of daily living:  Telephone requires assistance, transportation requires assistance, shopping requires assistance, preparing meals requires assistance, housework requires assistance, bathing requires assistance, laundry requires assistance, medication administration requires assistance and money management requires assistance    Home Safety:  No safety concerns identified    Hearing Impairment:  No hearing concerns    In the past 6 months, have you been bothered by leaking of urine? Yes    In general, how would you rate your overall mental or emotional health?  Fair      PHQ-2 Total Score: 1    Additional concerns today:  No           Have you ever done Advance Care Planning? (For example, a Health Directive, POLST, or a discussion with a medical provider or your loved ones about your wishes): No, advance care planning information given to patient to review.  Patient plans to discuss their wishes with loved ones or provider.         Fall risk  Fallen 2 or more times in the past year?: Yes  Any fall with injury in the past year?: No  Timed Up and Go Test (>13.5 is fall risk; contact physician) : 7    Cognitive Screening Not appropriate due to known dementia    Do you have sleep apnea, excessive snoring " or daytime drowsiness?: no    Reviewed and updated as needed this visit by clinical staff   Tobacco  Allergies  Meds              Reviewed and updated as needed this visit by Provider                 Social History     Tobacco Use     Smoking status: Never     Smokeless tobacco: Never   Vaping Use     Vaping status: Never Used     Passive vaping exposure: Yes   Substance Use Topics     Alcohol use: No           4/19/2023     9:57 AM   Alcohol Use   Prescreen: >3 drinks/day or >7 drinks/week? No     Do you have a current opioid prescription? No  Do you use any other controlled substances or medications that are not prescribed by a provider? None    PROBLEMS TO ADD ON......In addition to an Annual Wellness Exam, we addressed paroxysmal atrial fibrillation, hypertension, dementia, Vitamin B12 deficiency, osteoporosis.      History is obtained almost entirely from the patient's  due to her progressive cognitive impairment.     He reports that her chronic conditions are relatively stable on current medications.  Her blood pressure appears satisfactorily controlled.  She appears to tolerate chronic oral anticoagulation and they report no significant fall or injuries.    He asks about benefits and limitations of warfarin as an alternative to DOAC agents. Discussed the benefits of lower costs but limitations of possibly frequent lab draws.     She is following with neurology for cognitive impairment. She continues also on vitamin B12 injections which has not resulted in improvement in her cognition.     She had been intolerant of Reclast and more recently has been taking Prolia.        Current providers sharing in care for this patient include:   Patient Care Team:  Colt Riley MD as PCP - General (Internal Medicine)  Colt Riley MD as Assigned PCP  Yeo, Albert, MD as Assigned Musculoskeletal Provider    The following health maintenance items are reviewed in Epic and correct as of today:    Health  Maintenance   Topic Date Due     ZOSTER IMMUNIZATION (2 of 3) 02/26/2008     MEDICARE ANNUAL WELLNESS VISIT  04/08/2023     ANNUAL REVIEW OF HM ORDERS  07/08/2023     FALL RISK ASSESSMENT  04/19/2024     LIPID  04/08/2027     ADVANCE CARE PLANNING  04/10/2027     DTAP/TDAP/TD IMMUNIZATION (4 - Td or Tdap) 12/10/2028     DEXA  11/09/2035     HEPATITIS C SCREENING  Completed     PHQ-2 (once per calendar year)  Completed     INFLUENZA VACCINE  Completed     Pneumococcal Vaccine: 65+ Years  Completed     COVID-19 Vaccine  Completed     IPV IMMUNIZATION  Aged Out     MENINGITIS IMMUNIZATION  Aged Out     MAMMO SCREENING  Discontinued     COLORECTAL CANCER SCREENING  Discontinued      Patient has received both pneumonia vaccinations (Prevnar-13 and Pneumovax-23)     Mammogram Screening: Mammogram Screening - Patient over age 75, has elected to discontinue screenings.  Pertinent mammograms are reviewed under the imaging tab.    Review of systems: Unobtainable from the patient herself due to cognitive disabilities. Attendant provides no concerns on her behalf except as noted above.   Review of Systems   Constitutional: Positive for chills. Negative for fever.   HENT: Negative for congestion, ear pain, hearing loss and sore throat.    Eyes: Negative for pain and visual disturbance.   Respiratory: Negative for cough and shortness of breath.    Cardiovascular: Negative for chest pain, palpitations and peripheral edema.   Gastrointestinal: Positive for constipation and diarrhea. Negative for abdominal pain, heartburn, hematochezia and nausea.   Breasts:  Negative for tenderness, breast mass and discharge.   Genitourinary: Negative for dysuria, frequency, genital sores, hematuria, pelvic pain, urgency, vaginal bleeding and vaginal discharge.   Musculoskeletal: Positive for arthralgias. Negative for joint swelling and myalgias.   Skin: Negative for rash.   Neurological: Positive for dizziness. Negative for weakness, headaches and  "paresthesias.   Psychiatric/Behavioral: Negative for mood changes. The patient is nervous/anxious.        OBJECTIVE:   /62 (BP Location: Left arm, Patient Position: Sitting, Cuff Size: Adult Regular)   Pulse 70   Temp 97.7  F (36.5  C) (Tympanic)   Resp 14   Ht 1.562 m (5' 1.5\")   Wt 66.2 kg (146 lb)   LMP  (LMP Unknown)   SpO2 95%   Breastfeeding No   BMI 27.14 kg/m   Estimated body mass index is 27.14 kg/m  as calculated from the following:    Height as of this encounter: 1.562 m (5' 1.5\").    Weight as of this encounter: 66.2 kg (146 lb).     Physical Exam  GENERAL: healthy, alert and no distress  EYES: Eyes grossly normal to inspection, PERRL and conjunctivae and sclerae normal  HENT: ear canals and TM's normal, nose and mouth without ulcers or lesions  NECK: no adenopathy, no asymmetry, masses, or scars and thyroid normal to palpation  RESP: lungs clear to auscultation - no rales, rhonchi or wheezes  BREAST: normal without masses, tenderness or nipple discharge and no palpable axillary masses or adenopathy  CV: regular rate and rhythm, normal S1 S2, no S3 or S4, no murmur, click or rub, no peripheral edema and peripheral pulses strong  ABDOMEN: soft, nontender, no hepatosplenomegaly, no masses and bowel sounds normal  MS: no gross musculoskeletal defects noted, no edema  SKIN: no suspicious lesions or rashes  NEURO: Normal strength and tone, mentation intact and speech normal  PSYCH: mentation appears normal, affect normal/bright    Diagnostic Test Results:  Labs reviewed in Epic    ASSESSMENT / PLAN:   (Z00.00) Encounter for Medicare annual wellness exam  (primary encounter diagnosis)  Comment: Stable health. See epic orders.   Plan: PRIMARY CARE FOLLOW-UP SCHEDULING          (I48.0) Paroxysmal atrial fibrillation (H)  Comment: No apparent symptoms of tachycardia.   Plan: OFFICE/OUTPT VISIT,KAYE JAEGER III          (I10) Hypertension, unspecified type  Comment: BP at target. Continue current meds. " "  Plan: OFFICE/OUTPT VISIT,MARY ELLEN JAEGERL III          (E78.5) Hyperlipidemia with target LDL less than 100  Comment: We agreed to defer lab testing.      (M81.0) Age-related osteoporosis without current pathological fracture  Comment: Continue Prolia.   Plan: OFFICE/OUTPT VISIT,MARY ELLEN JAEGERL III          (E53.8) Vitamin B12 deficiency (non anemic)  Comment: Continue B12 injections as recommended by Neurology.     (R41.3) Memory difficulties  Comment: F/u with neurology as they advise.    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   Plan: gabapentin (NEURONTIN) 300 MG capsule            Patient has been advised of split billing requirements and indicates understanding: Yes      COUNSELING:  Reviewed preventive health counseling, as reflected in patient instructions      BMI:   Estimated body mass index is 27.14 kg/m  as calculated from the following:    Height as of this encounter: 1.562 m (5' 1.5\").    Weight as of this encounter: 66.2 kg (146 lb).         She reports that she has never smoked. She has never used smokeless tobacco.      Appropriate preventive services were discussed with this patient, including applicable screening as appropriate for cardiovascular disease, diabetes, osteopenia/osteoporosis, and glaucoma.  As appropriate for age/gender, discussed screening for colorectal cancer, prostate cancer, breast cancer, and cervical cancer. Checklist reviewing preventive services available has been given to the patient.    Reviewed patients plan of care and provided an AVS. The Basic Care Plan (routine screening as documented in Health Maintenance) for Danielle meets the Care Plan requirement. This Care Plan has been established and reviewed with the Patient.      Colt Riley MD,   [facility]    Patient Instructions     If Xarelto becomes cost-prohibitive, we could consider changing to warfarin.   Warfarin (also known as coumadin) " "is much less expensive, but requires periodic lab testing.   The name of the test is called an \"INR\".   Usually patients come in to the clinic to get an INR, however, occasionally others can draw the lab in your home. Alternatively, some patients have acquired a machine that tests INR in the home, then phones in the result to the INR or anticoagulation nurse.     Otherwise, everything looks fine!    Refills of medications will be faxed to your pharmacy.     Labs not required today.     See me in a year, sooner if problems.                She is at risk for falling and has been provided with information to reduce the risk of falling at home.  "

## 2023-04-19 NOTE — PATIENT INSTRUCTIONS
Patient Education   Personalized Prevention Plan  You are due for the preventive services outlined below.  Your care team is available to assist you in scheduling these services.  If you have already completed any of these items, please share that information with your care team to update in your medical record.  Health Maintenance Due   Topic Date Due    Zoster (Shingles) Vaccine (2 of 3) 02/26/2008       Preventing Falls at Home  A person can fall for many reasons. Older adults may fall because reaction time slows down as we age. Your muscles and joints may get stiff, weak, or less flexible because of illness, medicines, or a physical condition.   Other health problems that make falls more likely include:   Arthritis  Dizziness or lightheadedness when you stand up (orthostatic hypotension)  History of a stroke  Dizziness  Anemia  Certain medicines taken for mental illness or to control blood pressure.  Problems with balance or gait  Bladder or urinary problems  History of falling  Changes in vision (vision impairment)  Changes in thinking skills and memory (cognitive impairment)  Injuries from a fall can include serious injuries such as broken bones, dislocated joints, internal bleeding and cuts. Injuries like these can limit your independence.   Prevention tips  To help prevent falls and fall-related injuries, follow the tips below.    Floors  To make floors safer:   Put nonskid pads under area rugs.  Remove small rugs.  Replace worn floor coverings.  Tack carpets firmly to each step on carpeted stairs. Put nonskid strips on the edges of uncarpeted stairs.  Keep floors and stairs free of clutter and cords.  Arrange furniture so there are clear pathways.  Clean up any spills right away.  Bathrooms    To make bathrooms safer:   Install grab bars in the tub or shower.  Apply nonskid strips or put a nonskid rubber mat in the tub or shower.  Sit on a bath chair to bathe.  Use bathmats with nonskid  "backing.  Lighting  To improve visibility in your home:    Keep a flashlight in each room. Or put a lamp next to the bed within easy reach.  Put nightlights in the bedrooms, hallways, kitchen, and bathrooms.  Make sure all stairways have good lighting.  Take your time when going up and down stairs.  Put handrails on both sides of stairs and in walkways for more support. To prevent injury to your wrist or arm, don t use handrails to pull yourself up.  Install grab bars to pull yourself up.  Move or rearrange items that you use often. This will make them easier to find or reach.  Look at your home to find any safety hazards. Especially look at doorways, walkways, and the driveway. Remove or repair any safety problems that you find.  Other changes to make  Look around to find any safety hazards. Look closely at doorways, walkways, and the driveway. Remove or repair any safety problems that you find.  Wear shoes that fit well.  Take your time when going up and down stairs.  Put handrails on both sides of stairs and in walkways for more support. To prevent injury to your wrist or arm, don t use handrails to pull yourself up.  Install grab bars wherever needed to pull yourself up.  Arrange items that you use often. This will make them easier to find or reach.    [employer] last reviewed this educational content on 3/1/2020    9949-3097 [employer]. All rights reserved. This information is not intended as a substitute for professional medical care. Always follow your healthcare professional's instructions.      If Xarelto becomes cost-prohibitive, we could consider changing to warfarin.   Warfarin (also known as coumadin) is much less expensive, but requires periodic lab testing.   The name of the test is called an \"INR\".   Usually patients come in to the clinic to get an INR, however, occasionally others can draw the lab in your home. Alternatively, some patients have acquired a machine that tests INR in the " home, then phones in the result to the INR or anticoagulation nurse.     Otherwise, everything looks fine!    Refills of medications will be faxed to your pharmacy.     Labs not required today.     See me in a year, sooner if problems.

## 2023-04-25 ENCOUNTER — ALLIED HEALTH/NURSE VISIT (OUTPATIENT)
Dept: FAMILY MEDICINE | Facility: CLINIC | Age: 78
End: 2023-04-25
Payer: COMMERCIAL

## 2023-04-25 DIAGNOSIS — E55.9 VITAMIN D DEFICIENCY: Primary | ICD-10-CM

## 2023-04-25 PROCEDURE — 96372 THER/PROPH/DIAG INJ SC/IM: CPT | Performed by: INTERNAL MEDICINE

## 2023-04-25 PROCEDURE — 99207 PR NO CHARGE NURSE ONLY: CPT

## 2023-04-25 RX ADMIN — CYANOCOBALAMIN 1000 MCG: 1000 INJECTION, SOLUTION INTRAMUSCULAR; SUBCUTANEOUS at 10:14

## 2023-05-01 ENCOUNTER — TELEPHONE (OUTPATIENT)
Dept: INTERNAL MEDICINE | Facility: CLINIC | Age: 78
End: 2023-05-01
Payer: COMMERCIAL

## 2023-05-01 NOTE — TELEPHONE ENCOUNTER
Patient spouse calls to report. Patient was seen by Neurologist Dr Regino Ledesma for her dizziness upon standing.  Dr Ledesma asked for the patient BP to be taken while sitting and then standing.     Spouse reports   Sitting first [id number]. Standing 99/64    Sitting second reading 115/unreported. Standing 107/ unreported     Dr Ledesma asked if metoprolol could be the cause and if so should patient discontinuing this or is there an alternative?     pls call patient spouse to advise [phone]

## 2023-05-01 NOTE — TELEPHONE ENCOUNTER
RN: please contact patient's spouse.     1) Okay to discontinue metoprolol--her dose is low enough that a taper is not required.    2) In reviewing her list of medications, quetiapine (Seroquel) is far more likely to be causing a drop in blood pressure with standing.   I believe that this is being prescribed by Neurology.     Recommend discontinuing metoprolol, but also contacting Dr Ledesma's office to see whether it might be possible to reduce her dose of quetiapine.

## 2023-05-17 ENCOUNTER — OFFICE VISIT (OUTPATIENT)
Dept: ORTHOPEDICS | Facility: CLINIC | Age: 78
End: 2023-05-17
Payer: COMMERCIAL

## 2023-05-17 VITALS — HEIGHT: 62 IN | BODY MASS INDEX: 26.87 KG/M2 | WEIGHT: 146 LBS

## 2023-05-17 DIAGNOSIS — M75.02 ADHESIVE CAPSULITIS OF LEFT SHOULDER: Primary | ICD-10-CM

## 2023-05-17 PROCEDURE — 99213 OFFICE O/P EST LOW 20 MIN: CPT | Performed by: FAMILY MEDICINE

## 2023-05-17 NOTE — PROGRESS NOTES
ASSESSMENT & PLAN  Patient Instructions     1. Adhesive capsulitis of left shoulder      -Patient has persistent left shoulder pain, stiffness and dysfunction due to a frozen shoulder  -Patient has reported pain when she is reaching away from her and she when she is getting pulled by her left arm.  -Patient and her  who is her caregiver was advised on how to modify her day-to-day activities so as not to aggravate her shoulder.  This condition will resolve on its own without intervention in most cases  -We will hold off on a cortisone injection at this time.  If patient continues to have pain with the modifications that we discussed today, she may return for a repeat left glenohumeral intra-articular cortisone injection  -Call direct clinic number [[phone]] at any time with questions or concerns.    Albert Yeo MD [facility] and Sports Medicine  [facility]          -----    SUBJECTIVE:  Danielle Bryan is a 78 year old female who is seen in follow-up for left shoulder pain. They were last seen for bilateral shoulder pain on 11/29/23 and had US guided bilateral glenohumeral joint cortisone injections. Patient reports partial relief of pain in left shoulder.     Since their last visit reports she still notes pain with reaching away from body when holding onto stair railing, when  pulls arms away from body and with dressing. They indicate that their current pain level is 7/10. They have tried rest/activity avoidance, Tylenol, previous imaging (xray 11/21/22) and Voltaren gel.      The patient is seen with their .    Patient's past medical, surgical, social, and family histories were reviewed today and no changes are noted.    REVIEW OF SYSTEMS:  Constitutional: NEGATIVE for fever, chills, change in weight  Skin: NEGATIVE for worrisome rashes, moles or lesions  GI/: NEGATIVE for bowel or bladder changes  Neuro: NEGATIVE for weakness, dizziness or  "paresthesias    OBJECTIVE:  Ht 1.562 m (5' 1.5\")   Wt 66.2 kg (146 lb)   LMP  (LMP Unknown)   BMI 27.14 kg/m     General: healthy, alert and in no distress  HEENT: no scleral icterus or conjunctival erythema  Skin: no suspicious lesions or rash. No jaundice.  CV: regular rhythm by palpation, no pedal edema  Resp: normal respiratory effort without conversational dyspnea   Psych: normal mood and affect  Gait: normal steady gait with appropriate coordination and balance  Neuro: normal light touch sensory exam of the extremities.    MSK:  LEFT SHOULDER  Inspection:    no swelling, bruising, discoloration, or obvious deformity or asymmetry  Palpation:    bony and tendinous landmarks are nontender.    Crepitus is Absent  Active Range of Motion:     Abduction 600 /  /  / IR hip pocket.    Strength:    Scapular plane abduction painful / ER painful / IR grossly intact / biceps grossly intact / triceps grossly intact  Special Tests:    Positive: Neer's, Aleman', supraspinatus (empty can) and crossed arm adduction    Negative: Speed's and Yergason's    Independent visualization of the below image:  XR BILATERAL SHOULDER TWO OR MORE VIEWS   11/21/2022 3:21 PM      HISTORY:  Bilateral shoulder pain       COMPARISON: None.                                                                   IMPRESSION:     Right: Calcified granuloma in the right lung apex. Calcified  mediastinal lymph nodes. Normal bones, joint spaces and alignment  except for degenerative changes in the visualized spine.     Left: Normal bones, joint spaces and alignment.        Albert Yeo MD, [facility]        "

## 2023-05-17 NOTE — LETTER
5/17/2023         RE: Danielle Bryan  [address]  [place] [address]        Dear Colleague,    Thank you for referring your patient, Danielle Bryan, to the [facility] [place]. Please see a copy of my visit note below.    ASSESSMENT & PLAN  Patient Instructions     1. Adhesive capsulitis of left shoulder      -Patient has persistent left shoulder pain, stiffness and dysfunction due to a frozen shoulder  -Patient has reported pain when she is reaching away from her and she when she is getting pulled by her left arm.  -Patient and her  who is her caregiver was advised on how to modify her day-to-day activities so as not to aggravate her shoulder.  This condition will resolve on its own without intervention in most cases  -We will hold off on a cortisone injection at this time.  If patient continues to have pain with the modifications that we discussed today, she may return for a repeat left glenohumeral intra-articular cortisone injection  -Call direct clinic number [[phone]] at any time with questions or concerns.    Albert Yeo MD [facility] Orthopedics and Sports Medicine  [facility] [place]          -----    SUBJECTIVE:  Danielle Bryna is a 78 year old female who is seen in follow-up for left shoulder pain. They were last seen for bilateral shoulder pain on 11/29/23 and had US guided bilateral glenohumeral joint cortisone injections. Patient reports partial relief of pain in left shoulder.     Since their last visit reports she still notes pain with reaching away from body when holding onto stair railing, when  pulls arms away from body and with dressing. They indicate that their current pain level is 7/10. They have tried rest/activity avoidance, Tylenol, previous imaging (xray 11/21/22) and Voltaren gel.      The patient is seen with their .    Patient's past medical, surgical, social, and family histories were  "reviewed today and no changes are noted.    REVIEW OF SYSTEMS:  Constitutional: NEGATIVE for fever, chills, change in weight  Skin: NEGATIVE for worrisome rashes, moles or lesions  GI/: NEGATIVE for bowel or bladder changes  Neuro: NEGATIVE for weakness, dizziness or paresthesias    OBJECTIVE:  Ht 1.562 m (5' 1.5\")   Wt 66.2 kg (146 lb)   LMP  (LMP Unknown)   BMI 27.14 kg/m     General: healthy, alert and in no distress  HEENT: no scleral icterus or conjunctival erythema  Skin: no suspicious lesions or rash. No jaundice.  CV: regular rhythm by palpation, no pedal edema  Resp: normal respiratory effort without conversational dyspnea   Psych: normal mood and affect  Gait: normal steady gait with appropriate coordination and balance  Neuro: normal light touch sensory exam of the extremities.    MSK:  LEFT SHOULDER  Inspection:    no swelling, bruising, discoloration, or obvious deformity or asymmetry  Palpation:    bony and tendinous landmarks are nontender.    Crepitus is Absent  Active Range of Motion:     Abduction 600 /  /  / IR hip pocket.    Strength:    Scapular plane abduction painful / ER painful / IR grossly intact / biceps grossly intact / triceps grossly intact  Special Tests:    Positive: Neer's, Aleman', supraspinatus (empty can) and crossed arm adduction    Negative: Speed's and Yergason's    Independent visualization of the below image:  XR BILATERAL SHOULDER TWO OR MORE VIEWS   11/21/2022 3:21 PM      HISTORY:  Bilateral shoulder pain       COMPARISON: None.                                                                   IMPRESSION:     Right: Calcified granuloma in the right lung apex. Calcified  mediastinal lymph nodes. Normal bones, joint spaces and alignment  except for degenerative changes in the visualized spine.     Left: Normal bones, joint spaces and alignment.        Albert Yeo MD, [facility]            Again, thank you for allowing me to " participate in the care of your patient.        Sincerely,        Albert Yeo, MD

## 2023-05-17 NOTE — PATIENT INSTRUCTIONS
1. Adhesive capsulitis of left shoulder      -Patient has persistent left shoulder pain, stiffness and dysfunction due to a frozen shoulder  -Patient has reported pain when she is reaching away from her and she when she is getting pulled by her left arm.  -Patient and her  who is her caregiver was advised on how to modify her day-to-day activities so as not to aggravate her shoulder.  This condition will resolve on its own without intervention in most cases  -We will hold off on a cortisone injection at this time.  If patient continues to have pain with the modifications that we discussed today, she may return for a repeat left glenohumeral intra-articular cortisone injection  -Call direct clinic number [[phone]] at any time with questions or concerns.    Albert Yeo MD [facility] Orthopedics and Sports Medicine  [facility] [place]

## 2023-05-31 ENCOUNTER — ALLIED HEALTH/NURSE VISIT (OUTPATIENT)
Dept: FAMILY MEDICINE | Facility: CLINIC | Age: 78
End: 2023-05-31
Payer: COMMERCIAL

## 2023-05-31 DIAGNOSIS — E53.8 VITAMIN B12 DEFICIENCY (NON ANEMIC): Primary | ICD-10-CM

## 2023-05-31 PROCEDURE — 96372 THER/PROPH/DIAG INJ SC/IM: CPT | Performed by: INTERNAL MEDICINE

## 2023-05-31 PROCEDURE — 99207 PR NO CHARGE NURSE ONLY: CPT

## 2023-05-31 RX ADMIN — CYANOCOBALAMIN 1000 MCG: 1000 INJECTION, SOLUTION INTRAMUSCULAR; SUBCUTANEOUS at 10:08

## 2023-05-31 NOTE — PROGRESS NOTES
Clinic Administered Medication Documentation      Injectable Medication Documentation    Is there an active order (written within the past 365 days, with administrations remaining, not ) in the chart? Yes.     Patient was given Cyanocobalamin (B-12). Prior to medication administration, verified patient's identity using patient s name and date of birth. Please see MAR and medication order for additional information. Patient instructed to remain in clinic for 15 minutes and report any adverse reaction to staff immediately.    Vial/Syringe: Single dose vial. Was entire vial of medication used? Yes  Was this medication supplied by the patient? No  Is this a medication the patient will need to receive again? Yes. Verified that the patient has refills remaining in their prescription.    Harinder GOLDEN CMA ([facility])

## 2023-06-06 ENCOUNTER — TELEPHONE (OUTPATIENT)
Dept: INTERNAL MEDICINE | Facility: CLINIC | Age: 78
End: 2023-06-06

## 2023-06-06 ENCOUNTER — DOCUMENTATION ONLY (OUTPATIENT)
Dept: ANTICOAGULATION | Facility: CLINIC | Age: 78
End: 2023-06-06
Payer: COMMERCIAL

## 2023-06-06 DIAGNOSIS — I48.0 PAROXYSMAL ATRIAL FIBRILLATION (H): Primary | ICD-10-CM

## 2023-06-06 RX ORDER — WARFARIN SODIUM 2.5 MG/1
2.5-5 TABLET ORAL DAILY
Qty: 40 TABLET | Refills: 0 | Status: CANCELLED | OUTPATIENT
Start: 2023-06-06

## 2023-06-06 NOTE — PROGRESS NOTES
ANTICOAGULATION CLINIC REFERRAL RENEWAL REQUEST       An annual renewal order is required for all patients referred to [facility].?  Please review and sign the pended referral order for Danielle Bryan.       ANTICOAGULATION SUMMARY      Warfarin indication(s)   Atrial Fibrillation    Mechanical heart valve present?  NO       Current goal range   INR: 2.0-3.0     Goal appropriate for indication? Goal INR 2-3, standard for indication(s) above     Time in Therapeutic Range (TTR)  (Goal > 60%) N/A%  -new enroll     Office visit with referring provider's group within last year yes on 4/19/2023       Coni Russell, RN  [facility]

## 2023-06-06 NOTE — TELEPHONE ENCOUNTER
Patient's  called and said they would like to have Danielle go on a different medicine other than xarelto as it is too costly.  Even if it means she has to have blood test monitoring they are ok with this.

## 2023-06-06 NOTE — TELEPHONE ENCOUNTER
"ANTICOAGULATION  MANAGEMENT: NEW REFERRAL      SUBJECTIVE/OBJECTIVE     Danielle Bryan, a 78 year old female  is newly referred to [facility].    Anticoagulation:    Previously on warfarin: No, has been on Rivaroxaban (Xarelto); transitioning to warfarin.  Warfarin initiation date (approximate): 5-7 years ago   Indication(s): Atrial Fibrillation   Goal Range: 2.0-3.0 (PCP has not sent ACC referral, yet, I will send under separate encounter).   Anticoagulation Bridge/Overlap: Yes, overlapping with Rivaroxaban (Xarelto) until overlap x 2 days   Referring provider: from PCP    General Dietary/Social Hx:    Typical vitamin K intake: low; variable --occasional eats green beans, does not eat any lettuce.    Other dietary considerations: Drank Boost in the past, uncertain if patient will resume; occasionally drinks V8 juice.     Social History:   Social History     Tobacco Use     Smoking status: Never     Smokeless tobacco: Never   Vaping Use     Vaping status: Never Used     Passive vaping exposure: Yes   Substance Use Topics     Alcohol use: No     Drug use: No       In the past 2 weeks, patient estimates taking medications as instructed % of time: 100%--patient's  handles her medications due to patient's dementia.    Results:        No results for input(s): INR, [id number], F2, ALMWH in the last 168 hours.    Wt Readings from Last 2 Encounters:   05/17/23 66.2 kg (146 lb)   04/19/23 66.2 kg (146 lb)      Estimated body mass index is 27.14 kg/m  as calculated from the following:    Height as of 5/17/23: 1.562 m (5' 1.5\").    Weight as of 5/17/23: 66.2 kg (146 lb).  Lab Results   Component Value Date    AST 27 04/08/2022     Lab Results   Component Value Date    CR 0.96 04/08/2022     CrCl cannot be calculated (Patient's most recent lab result is older than the maximum 30 days allowed.).    ASSESSMENT       Goal INR 2-3, standard for indication(s) above  Establishing initial " warfarin maintenance dose (on warfarin < 30 days)     PLAN     Dosing Instructions: Per [facility] Winter: Starting dose - 5 mg x 2 days, then 5 mg MWF; 2.5 mg ROW (25 mg/week).with INR in 2-3 weeks.    Karl thinks they have about 4 weeks of Xarelto left.        Education provided:     Taking warfarin: purpose of warfarin and how it works  Dietary considerations: importance of consistent vitamin K intake, impact of vitamin K foods on INR, vitamin K content of foods and importance of notifying ACC to changes in diet    Education still needed:     Taking warfarin: take warfarin at same time each day; preferably in the evening, prescribed tablet strength and color, importance of following ACC instructions vs instructions on the prescription bottle and Importance of taking warfarin as instructed    Goal range and lab monitoring: goal range and significance of current result, Importance of therapeutic range and Importance of following up at instructed interval    Symptom monitoring: monitoring for bleeding signs and symptoms, monitoring for clotting signs and symptoms, monitoring for stroke signs and symptoms, when to seek medical attention/emergency care, if you hit your head or have a bad fall seek emergency care and travel related clotting risk and prevention    Importance of notifying anticoagulation clinic for: changes in medications; a sooner lab recheck maybe needed, diarrhea, nausea/vomiting, reduced intake, cold/flu, and/or infections; a sooner lab recheck maybe needed, upcoming surgeries and procedures 2 weeks in advance and if you did not receive dosing instructions on the same day as your labs were checked      Telephone call with   Karl who verbalizes understanding and agrees to plan    Contact [phone]  to schedule and with any changes, questions or concerns.     Standing orders placed in [facility]: Point of Care INR (Lab 5000)    Plan made with [facility] Pharmacist Winter Gallardo / prescription pended, will  await call back from Karl.    New patient packet placed in out-going mail      Coni Russell RN  Anticoagulation Clinic  6/6/2023

## 2023-06-06 NOTE — TELEPHONE ENCOUNTER
Will forward to anticoagulation team. She may need to set up an appointment with them to start warfarin.

## 2023-06-06 NOTE — TELEPHONE ENCOUNTER
Patient transitioning from Xarelto to warfarin. Starting dose - 5 mg x 2 days, then 5 mg MWF; 2.5 mg ROW (25 mg/week). May overlap for 2 days with Xarelto.     Last refill of Xarelto may have been on 4/21 for 90 days.     Warfarin Rx pended when able to transition based on Xarelto supply remaining.     Winter Gallardo, NashD, BCPS

## 2023-06-15 PROBLEM — I48.0 PAROXYSMAL ATRIAL FIBRILLATION (H): Status: ACTIVE | Noted: 2023-06-15

## 2023-06-20 NOTE — TELEPHONE ENCOUNTER
Spoke with Karl to [place] back on how much Poli Santos has remaining. He reports she has at least 4 weeks left from today, so will postpone encounter for 3 weeks until 7/11/23. Asked that he call the [facility] if he notices less than 1 week of Xarelto left.

## 2023-06-29 ENCOUNTER — ALLIED HEALTH/NURSE VISIT (OUTPATIENT)
Dept: FAMILY MEDICINE | Facility: CLINIC | Age: 78
End: 2023-06-29
Payer: COMMERCIAL

## 2023-06-29 DIAGNOSIS — E53.8 VITAMIN B12 DEFICIENCY (NON ANEMIC): Primary | ICD-10-CM

## 2023-06-29 PROCEDURE — 99207 PR NO CHARGE LOS: CPT

## 2023-06-29 PROCEDURE — 96372 THER/PROPH/DIAG INJ SC/IM: CPT | Performed by: INTERNAL MEDICINE

## 2023-06-29 RX ADMIN — CYANOCOBALAMIN 1000 MCG: 1000 INJECTION, SOLUTION INTRAMUSCULAR; SUBCUTANEOUS at 10:10

## 2023-07-11 NOTE — TELEPHONE ENCOUNTER
Spoke with Karl. He set Danielle up for a visit with his PCP, Amarilis Mtz. He would like to wait for further instruction from her before the transition to warfarin. He will plan to call [facility] on 7/17/23 after the visit. Will postpone TE for the day after to ensure proper follow up.

## 2023-07-17 ENCOUNTER — ANTICOAGULATION THERAPY VISIT (OUTPATIENT)
Dept: ANTICOAGULATION | Facility: CLINIC | Age: 78
End: 2023-07-17

## 2023-07-17 ENCOUNTER — OFFICE VISIT (OUTPATIENT)
Dept: FAMILY MEDICINE | Facility: CLINIC | Age: 78
End: 2023-07-17
Payer: COMMERCIAL

## 2023-07-17 VITALS
HEART RATE: 66 BPM | WEIGHT: 153 LBS | DIASTOLIC BLOOD PRESSURE: 61 MMHG | HEIGHT: 62 IN | TEMPERATURE: 96.6 F | SYSTOLIC BLOOD PRESSURE: 106 MMHG | OXYGEN SATURATION: 99 % | RESPIRATION RATE: 13 BRPM | BODY MASS INDEX: 28.16 KG/M2

## 2023-07-17 DIAGNOSIS — I48.0 PAROXYSMAL ATRIAL FIBRILLATION (H): Primary | ICD-10-CM

## 2023-07-17 LAB — INR BLD: 1.1 (ref 0.9–1.1)

## 2023-07-17 PROCEDURE — 99213 OFFICE O/P EST LOW 20 MIN: CPT | Performed by: NURSE PRACTITIONER

## 2023-07-17 PROCEDURE — 85610 PROTHROMBIN TIME: CPT | Performed by: NURSE PRACTITIONER

## 2023-07-17 PROCEDURE — 36416 COLLJ CAPILLARY BLOOD SPEC: CPT | Performed by: NURSE PRACTITIONER

## 2023-07-17 RX ORDER — WARFARIN SODIUM 2.5 MG/1
TABLET ORAL
Qty: 90 TABLET | Refills: 0 | Status: SHIPPED | OUTPATIENT
Start: 2023-07-17 | End: 2023-08-15

## 2023-07-17 NOTE — PROGRESS NOTES
Spoke with  Karl who plans to  warfarin tomorrow and is not sure at the moment how many days of xarelto they have left.   Will call him tomorrow to review start plan and warfarin education. Morning is good he said.  Start plan has been entered by Winter Gtz to include 2 day overlap with Xarelto.

## 2023-07-17 NOTE — PROGRESS NOTES
"  Assessment & Plan     Paroxysmal atrial fibrillation (H)  INR today and referral to [place] INR team to manage her warfarin dosing and INR.   Care with new PCP if med check needed before April 2024 medicare wellness exam.   Danielle verbalizes understanding of plan of care and is in agreement.   - INR point of care  - Anticoagulation Clinic Referral  - warfarin ANTICOAGULANT (COUMADIN) 2.5 MG tablet  Dispense: 90 tablet; Refill: 0  - INR point of care      Return in about 1 week (around 7/24/2023) for Lab only appointment.       MARTHA Cisneros [facility]    Subjective   Danielle is a 78 year old, presenting for the following health issues:  Recheck Medication (Coumadin)        7/17/2023    11:01 AM   Additional Questions   Roomed by Bryan TRAYLOR   Accompanied by  - Karl     HPI     Wants to change from Xarelto to Warfarin -- cost is much lower, so wants to change.   Xarelto at least has a week left  Plans to transfer care to here so all care is in one place. Wants an MD would like Dr. Headley.   Physical up to date.        Review of Systems   Constitutional, HEENT, cardiovascular, pulmonary, GI, , musculoskeletal, neuro, skin, endocrine and psych systems are negative, except as otherwise noted in the HPI.      Objective    /61 (BP Location: Left arm, Patient Position: Chair, Cuff Size: Adult Regular)   Pulse 66   Temp (!) 96.6  F (35.9  C) (Tympanic)   Resp 13   Ht 1.562 m (5' 1.5\")   Wt 69.4 kg (153 lb)   LMP  (LMP Unknown)   SpO2 99%   BMI 28.44 kg/m    Body mass index is 28.44 kg/m .  Physical Exam   GENERAL: healthy, alert and no distress  RESP: lungs clear to auscultation - no rales, rhonchi or wheezes  CV: regular rate and rhythm, normal S1 S2, no S3 or S4, no murmur, click or rub, no peripheral edema and peripheral pulses strong        "

## 2023-07-18 ENCOUNTER — DOCUMENTATION ONLY (OUTPATIENT)
Dept: ANTICOAGULATION | Facility: CLINIC | Age: 78
End: 2023-07-18
Payer: COMMERCIAL

## 2023-07-18 ENCOUNTER — TELEPHONE (OUTPATIENT)
Dept: FAMILY MEDICINE | Facility: CLINIC | Age: 78
End: 2023-07-18
Payer: COMMERCIAL

## 2023-07-18 NOTE — PROGRESS NOTES
"ANTICOAGULATION  MANAGEMENT: NEW REFERRAL    SUBJECTIVE/OBJECTIVE     Danielle Bryan, a 78 year old female  is newly referred to [facility].    Anticoagulation:    Previously on warfarin: No, has been on Rivaroxaban (Xarelto); transitioning to warfarin.  Warfarin initiation date (approximate): 7/20/23   Indication(s): Atrial Fibrillation   Goal Range: 2.0-3.0   Anticoagulation Bridge/Overlap: Yes, overlapping with Rivaroxaban (Xarelto) until Saturday. (Overlap thurs and fri)    Referring provider: from PCP    General Dietary/Social Hx:    Typical vitamin K intake: low; consistent does not like veg in general    Other dietary considerations: None     Social History: Typical alcohol intake: never for 30 yrs    In the past 2 weeks, patient estimates taking medications as instructed % of time: 100    Results:        Recent labs: (last 7 days)     07/17/23  1210   INR 1.1       Wt Readings from Last 2 Encounters:   07/17/23 69.4 kg (153 lb)   05/17/23 66.2 kg (146 lb)      Estimated body mass index is 28.44 kg/m  as calculated from the following:    Height as of an earlier encounter on 7/17/23: 1.562 m (5' 1.5\").    Weight as of an earlier encounter on 7/17/23: 69.4 kg (153 lb).  Lab Results   Component Value Date    AST 27 04/08/2022     Lab Results   Component Value Date    CR 0.96 04/08/2022     CrCl cannot be calculated (Patient's most recent lab result is older than the maximum 365 days allowed.).    ASSESSMENT     Goal INR 2-3, standard for indication(s) above  Starting warfarin dose is appropriate for patient's anticipated sensitivity to warfarin set by PCP    PLAN     Dosing Instructions: Start warfarin with INR in 5 days   continue Xarelto thurs and Friday, then stop Xarelto    Summary  As of 7/17/2023      Full warfarin instructions:  7/20: 5 mg; 7/21: 5 mg; 7/22: 2.5 mg; 7/23: 2.5 mg; Otherwise 5 mg every Mon, Wed, Fri; 2.5 mg all other days; Starting 7/24/2023   Next INR " check:  7/24/2023               Education provided:   Taking warfarin: purpose of warfarin and how it works, take warfarin at same time each day; preferably in the evening, prescribed tablet strength and color, importance of following ACC instructions vs instructions on the prescription bottle, and Importance of taking warfarin as instructed  Goal range and lab monitoring: goal range and significance of current result, Importance of therapeutic range, Importance of following up at instructed interval, and frequency of lab work when starting warfarin and importance of following up when instructed (extends after stability established)  Dietary considerations: importance of consistent vitamin K intake and importance of notifying ACC to changes in diet  Healthy lifestyle considerations: potential interaction between warfarin and alcohol  Symptom monitoring: monitoring for bleeding signs and symptoms and when to seek medical attention/emergency care  Importance of notifying anticoagulation clinic for: changes in medications; a sooner lab recheck maybe needed, diarrhea, nausea/vomiting, reduced intake, cold/flu, and/or infections; a sooner lab recheck maybe needed, and if you did not receive dosing instructions on the same day as your labs were checked  Written instructions provided  Contact [phone]  with any changes, questions or concerns.     Education still needed:   Dietary considerations: Impact of protein intake on INR   Healthy lifestyle considerations: impact of smoking or tobacco on INR, impact of exercise/activity level on INR, impact of CBD, marijuana, or medical marijuana on INR, and avoid activities that have a high risk for falling or injury such as contact sports  Symptom monitoring: monitoring for clotting signs and symptoms, monitoring for stroke signs and symptoms, if you hit your head or have a bad fall seek emergency care, and travel related clotting risk and prevention      Telephone call with   Karl who agrees to plan and repeated back plan correctly    Lab visit scheduled 7/24    Standing orders placed in Epic: Point of Care INR (Lab 5000)    Plan made with [facility] Pharmacist Winter Hardwick, RN  Anticoagulation Clinic  7/18/2023

## 2023-07-18 NOTE — PROGRESS NOTES
"  ANTICOAGULATION  MANAGEMENT: NEW REFERRAL    SUBJECTIVE/OBJECTIVE     Danielle Bryan, a 78 year old female  is newly referred to [facility].    Anticoagulation:    Previously on warfarin: No, has been on Rivaroxaban (Xarelto); transitioning to warfarin.  Warfarin initiation date (approximate): 7/20/23   Indication(s): Atrial Fibrillation   Goal Range: 2.0-3.0   Anticoagulation Bridge/Overlap: Yes, overlapping with Rivaroxaban (Xarelto) until Saturday. (Overlap thurs and fri)    Referring provider: from PCP    General Dietary/Social Hx:    Typical vitamin K intake: low; consistent does not like veg in general    Other dietary considerations: None     Social History: Typical alcohol intake: never for 30 yrs    In the past 2 weeks, patient estimates taking medications as instructed % of time: 100    Results:        Recent labs: (last 7 days)     07/17/23  1210   INR 1.1       Wt Readings from Last 2 Encounters:   07/17/23 69.4 kg (153 lb)   05/17/23 66.2 kg (146 lb)        Estimated body mass index is 28.44 kg/m  as calculated from the following:    Height as of an earlier encounter on 7/17/23: 1.562 m (5' 1.5\").    Weight as of an earlier encounter on 7/17/23: 69.4 kg (153 lb).  Lab Results   Component Value Date    AST 27 04/08/2022     Lab Results   Component Value Date    CR 0.96 04/08/2022     CrCl cannot be calculated (Patient's most recent lab result is older than the maximum 365 days allowed.).    ASSESSMENT     Goal INR 2-3, standard for indication(s) above  Starting warfarin dose is appropriate for patient's anticipated sensitivity to warfarin set by PCP    PLAN     Dosing Instructions: Start warfarin with INR in 5 days   continue Xarelto thurs and Friday, then stop Xarelto    Summary  As of 7/17/2023      Full warfarin instructions:  7/20: 5 mg; 7/21: 5 mg; 7/22: 2.5 mg; 7/23: 2.5 mg; Otherwise 5 mg every Mon, Wed, Fri; 2.5 mg all other days; Starting 7/24/2023   Next INR " check:  7/24/2023               Education provided:   Taking warfarin: purpose of warfarin and how it works, take warfarin at same time each day; preferably in the evening, prescribed tablet strength and color, importance of following [facility] instructions vs instructions on the prescription bottle, and Importance of taking warfarin as instructed  Goal range and lab monitoring: goal range and significance of current result, Importance of therapeutic range, Importance of following up at instructed interval, and frequency of lab work when starting warfarin and importance of following up when instructed (extends after stability established)  Dietary considerations: importance of consistent vitamin K intake and importance of notifying [facility] to changes in diet  Healthy lifestyle considerations: potential interaction between warfarin and alcohol  Symptom monitoring: monitoring for bleeding signs and symptoms and when to seek medical attention/emergency care  Importance of notifying anticoagulation clinic for: changes in medications; a sooner lab recheck maybe needed, diarrhea, nausea/vomiting, reduced intake, cold/flu, and/or infections; a sooner lab recheck maybe needed, and if you did not receive dosing instructions on the same day as your labs were checked  Written instructions provided  Contact [phone]  with any changes, questions or concerns.     Education still needed:   Dietary considerations: Impact of protein intake on INR   Healthy lifestyle considerations: impact of smoking or tobacco on INR, impact of exercise/activity level on INR, impact of CBD, marijuana, or medical marijuana on INR, and avoid activities that have a high risk for falling or injury such as contact sports  Symptom monitoring: monitoring for clotting signs and symptoms, monitoring for stroke signs and symptoms, if you hit your head or have a bad fall seek emergency care, and travel related clotting risk and prevention      Telephone call with   Karl who agrees to plan and repeated back plan correctly    Lab visit scheduled 7/24    Standing orders placed in Epic: Point of Care INR (Lab 5000)    Plan made with [facility] Pharmacist Winter Hardwick, RN  Anticoagulation Clinic  7/18/2023

## 2023-07-24 ENCOUNTER — ANTICOAGULATION THERAPY VISIT (OUTPATIENT)
Dept: ANTICOAGULATION | Facility: CLINIC | Age: 78
End: 2023-07-24

## 2023-07-24 ENCOUNTER — LAB (OUTPATIENT)
Dept: LAB | Facility: CLINIC | Age: 78
End: 2023-07-24
Payer: COMMERCIAL

## 2023-07-24 DIAGNOSIS — I48.0 PAROXYSMAL ATRIAL FIBRILLATION (H): Primary | ICD-10-CM

## 2023-07-24 DIAGNOSIS — I48.0 PAROXYSMAL ATRIAL FIBRILLATION (H): ICD-10-CM

## 2023-07-24 LAB — INR BLD: 1.2 (ref 0.9–1.1)

## 2023-07-24 PROCEDURE — 85610 PROTHROMBIN TIME: CPT

## 2023-07-24 PROCEDURE — 36416 COLLJ CAPILLARY BLOOD SPEC: CPT

## 2023-07-24 NOTE — PROGRESS NOTES
ANTICOAGULATION MANAGEMENT     Danielle Bryan 78 year old female is on warfarin with subtherapeutic INR result. (Goal INR 2.0-3.0)    Recent labs: (last 7 days)     07/24/23  1042   INR 1.2*       ASSESSMENT     Warfarin Lab Questionnaire    Warfarin Doses Last 7 Days      7/24/2023    10:41 AM   Dose in Tablet or Mg   TAB or MG? tablet (tab)     Warfarin dose confirmed with patient and taken as directed.  Also took Xarelto as recommended until 7/21/23.        7/24/2023   Warfarin Lab Questionnaire   Missed doses within past 14 days? No   Changes in diet or alcohol within past 14 days? No   Medication changes since last result? No   Injuries or illness since last result? No   New shortness of breath, severe headaches or sudden changes in vision since last result? No   Abnormal bleeding since last result? No   Upcoming surgery, procedure? No     Previous result: Subtherapeutic  Additional findings: New start day 5.       PLAN     Recommended plan for temporary change(s) affecting INR     Dosing Instructions: Increase your warfarin dose (40% change) with next INR in 4 days       Summary  As of 7/24/2023      Full warfarin instructions:  5 mg every day   Next INR check:  7/27/2023               Telephone call with Danielle's , Karl, who agrees to plan and repeated back plan correctly    Lab visit scheduled    Education provided:   Please call back if any changes to your diet, medications or how you've been taking warfarin  Goal range and lab monitoring: goal range and significance of current result and frequency of lab work when starting warfarin and importance of following up when instructed (extends after stability established)    Plan made with [facility] Pharmacist Winter Horan RN  Anticoagulation Clinic  7/24/2023    _______________________________________________________________________     Anticoagulation Episode Summary       Current INR goal:  2.0-3.0   TTR:  0.0 % (6 d)   Target end date:   Indefinite   Send INR reminders to:  [facility]    Indications    Paroxysmal atrial fibrillation (H) [I48.0]             Comments:               Anticoagulation Care Providers       Provider Role Specialty Phone number    Colt Riley MD Referring Internal Medicine [phone]    Amarilis Mtz APRN CNP Referring Nurse Practitioner - Family [phone]

## 2023-07-27 ENCOUNTER — LAB (OUTPATIENT)
Dept: LAB | Facility: CLINIC | Age: 78
End: 2023-07-27
Payer: COMMERCIAL

## 2023-07-27 ENCOUNTER — ANTICOAGULATION THERAPY VISIT (OUTPATIENT)
Dept: ANTICOAGULATION | Facility: CLINIC | Age: 78
End: 2023-07-27

## 2023-07-27 ENCOUNTER — ALLIED HEALTH/NURSE VISIT (OUTPATIENT)
Dept: FAMILY MEDICINE | Facility: CLINIC | Age: 78
End: 2023-07-27
Payer: COMMERCIAL

## 2023-07-27 DIAGNOSIS — I48.0 PAROXYSMAL ATRIAL FIBRILLATION (H): Primary | ICD-10-CM

## 2023-07-27 DIAGNOSIS — E53.8 VITAMIN B12 DEFICIENCY (NON ANEMIC): Primary | ICD-10-CM

## 2023-07-27 DIAGNOSIS — I48.0 PAROXYSMAL ATRIAL FIBRILLATION (H): ICD-10-CM

## 2023-07-27 LAB — INR BLD: 1.4 (ref 0.9–1.1)

## 2023-07-27 PROCEDURE — 85610 PROTHROMBIN TIME: CPT

## 2023-07-27 PROCEDURE — 96372 THER/PROPH/DIAG INJ SC/IM: CPT | Mod: RT | Performed by: INTERNAL MEDICINE

## 2023-07-27 PROCEDURE — 99207 PR NO CHARGE NURSE ONLY: CPT

## 2023-07-27 PROCEDURE — 36415 COLL VENOUS BLD VENIPUNCTURE: CPT

## 2023-07-27 RX ADMIN — CYANOCOBALAMIN 1000 MCG: 1000 INJECTION, SOLUTION INTRAMUSCULAR; SUBCUTANEOUS at 10:09

## 2023-07-27 NOTE — PROGRESS NOTES
ANTICOAGULATION MANAGEMENT     Danielle Bryan 78 year old female is on warfarin with subtherapeutic INR result. (Goal INR 2.0-3.0)    Recent labs: (last 7 days)     07/27/23  1022   INR 1.4*       ASSESSMENT     Source(s): Chart Review and Patient/Caregiver Call     Warfarin doses taken: Warfarin taken as instructed  Diet: No new diet changes identified  Medication/supplement changes: None noted  New illness, injury, or hospitalization: No  Signs or symptoms of bleeding or clotting: No  Previous result: Subtherapeutic  Additional findings: New start day 8.  Last dose of Xarelto was 7/21/23       PLAN     Recommended plan for no diet, medication or health factor changes affecting INR     Dosing Instructions: booster dose then Increase your warfarin dose (14% change) with next INR in 4 days       Summary  As of 7/27/2023      Full warfarin instructions:  7/27: 7.5 mg; Otherwise 7.5 mg every Tue, Fri; 5 mg all other days   Next INR check:  7/31/2023               Telephone call with Danielle's , Karl, who agrees to plan and repeated back plan correctly    Lab visit scheduled    Education provided:   Please call back if any changes to your diet, medications or how you've been taking warfarin  Goal range and lab monitoring: goal range and significance of current result and frequency of lab work when starting warfarin and importance of following up when instructed (extends after stability established)    Plan made with [facility] Pharmacist Berna Horan, RN  Anticoagulation Clinic  7/27/2023    _______________________________________________________________________     Anticoagulation Episode Summary       Current INR goal:  2.0-3.0   TTR:  0.0 % (1.3 wk)   Target end date:  Indefinite   Send INR reminders to:  [facility]    Indications    Paroxysmal atrial fibrillation (H) [I48.0]             Comments:               Anticoagulation Care Providers       Provider Role Specialty Phone number     Colt Riley MD Referring Internal Medicine [phone]    Amarilis Mtz APRN CNP Referring Nurse Practitioner - Family [phone]

## 2023-07-27 NOTE — PROGRESS NOTES
Clinic Administered Medication Documentation      Injectable Medication Documentation    Is there an active order (written within the past 365 days, with administrations remaining, not ) in the chart? Yes.     Patient was given Cyanocobalamin (B-12). Prior to medication administration, verified patient's identity using patient s name and date of birth. Please see MAR and medication order for additional information. Patient instructed to remain in clinic for 15 minutes and report any adverse reaction to staff immediately.    Vial/Syringe: Single dose vial. Was entire vial of medication used? Yes  Was this medication supplied by the patient? Yes, Medication was received    Is this a medication the patient will need to receive again? No - not necessary to check for refills remaining.

## 2023-07-31 ENCOUNTER — LAB (OUTPATIENT)
Dept: LAB | Facility: CLINIC | Age: 78
End: 2023-07-31
Payer: COMMERCIAL

## 2023-07-31 ENCOUNTER — ANTICOAGULATION THERAPY VISIT (OUTPATIENT)
Dept: ANTICOAGULATION | Facility: CLINIC | Age: 78
End: 2023-07-31

## 2023-07-31 DIAGNOSIS — I48.0 PAROXYSMAL ATRIAL FIBRILLATION (H): ICD-10-CM

## 2023-07-31 DIAGNOSIS — I48.0 PAROXYSMAL ATRIAL FIBRILLATION (H): Primary | ICD-10-CM

## 2023-07-31 LAB — INR BLD: 2.3 (ref 0.9–1.1)

## 2023-07-31 PROCEDURE — 36416 COLLJ CAPILLARY BLOOD SPEC: CPT

## 2023-07-31 PROCEDURE — 85610 PROTHROMBIN TIME: CPT

## 2023-07-31 NOTE — PROGRESS NOTES
ANTICOAGULATION MANAGEMENT     Danielle Bryan 78 year old female is on warfarin with therapeutic INR result. (Goal INR 2.0-3.0)    Recent labs: (last 7 days)     07/31/23  1412   INR 2.3*       ASSESSMENT     Warfarin Lab Questionnaire    Warfarin Doses Last 7 Days      7/31/2023     2:07 PM   Dose in Tablet or Mg   TAB or MG? tablet (tab)     Confirmed dosing with Karl.       7/31/2023   Warfarin Lab Questionnaire   Missed doses within past 14 days? No   Changes in diet or alcohol within past 14 days? No   Medication changes since last result? No   Injuries or illness since last result? No   New shortness of breath, severe headaches or sudden changes in vision since last result? Yes   If yes, please explain:  Patient will explain over phone - Karl reports that Danielle has has some blurred vision lately. Timeline correlates with warfarin, so wondering if they are linked. Advised that this is not necessarily a typical side effect and they should inform PCP as soon as possible.   Abnormal bleeding since last result? No   Upcoming surgery, procedure? No     Previous result: Subtherapeutic  Additional findings: New start day 12. Gave dosing only until next INR, will need to review current maintenance dose next visit.        PLAN     Recommended plan for no diet, medication or health factor changes affecting INR     Dosing Instructions: Continue your current warfarin dose with next INR in 3 days       Summary  As of 7/31/2023      Full warfarin instructions:  7.5 mg every Tue, Fri; 5 mg all other days   Next INR check:  8/3/2023               Telephone call with Karl () who verbalizes understanding and agrees to plan    Lab visit scheduled    Education provided:   Please call back if any changes to your diet, medications or how you've been taking warfarin    Plan made per [facility] anticoagulation protocol    Margoth Menezes RN  Anticoagulation  Clinic  7/31/2023    _______________________________________________________________________     Anticoagulation Episode Summary       Current INR goal:  2.0-3.0   TTR:  9.8 % (2 wk)   Target end date:  Indefinite   Send INR reminders to:  [facility]    Indications    Paroxysmal atrial fibrillation (H) [I48.0]             Comments:               Anticoagulation Care Providers       Provider Role Specialty Phone number    Colt Riley MD Referring Internal Medicine [phone]    Amarilis Mtz APRN CNP Referring Nurse Practitioner - Family [phone]

## 2023-08-03 ENCOUNTER — ANTICOAGULATION THERAPY VISIT (OUTPATIENT)
Dept: ANTICOAGULATION | Facility: CLINIC | Age: 78
End: 2023-08-03

## 2023-08-03 ENCOUNTER — LAB (OUTPATIENT)
Dept: LAB | Facility: CLINIC | Age: 78
End: 2023-08-03
Payer: COMMERCIAL

## 2023-08-03 DIAGNOSIS — I48.0 PAROXYSMAL ATRIAL FIBRILLATION (H): Primary | ICD-10-CM

## 2023-08-03 DIAGNOSIS — I48.0 PAROXYSMAL ATRIAL FIBRILLATION (H): ICD-10-CM

## 2023-08-03 LAB — INR BLD: 2.4 (ref 0.9–1.1)

## 2023-08-03 PROCEDURE — 85610 PROTHROMBIN TIME: CPT

## 2023-08-03 PROCEDURE — 36416 COLLJ CAPILLARY BLOOD SPEC: CPT

## 2023-08-03 NOTE — PROGRESS NOTES
ANTICOAGULATION MANAGEMENT     Danielle Bryan 78 year old female is on warfarin with therapeutic INR result. (Goal INR 2.0-3.0)    Recent labs: (last 7 days)     08/03/23  1330   INR 2.4*       ASSESSMENT     Warfarin Lab Questionnaire    Warfarin Doses Last 7 Days      7/31/2023     2:07 PM   Dose in Tablet or Mg   TAB or MG? tablet (tab)           8/3/2023   Warfarin Lab Questionnaire   Missed doses within past 14 days? No   Changes in diet or alcohol within past 14 days? No   Medication changes since last result? No   Injuries or illness since last result? No   New shortness of breath, severe headaches or sudden changes in vision since last result? No   Abnormal bleeding since last result? No   Upcoming surgery, procedure? No     Previous result: Therapeutic last visit; previously outside of goal range  Additional findings: None       PLAN     Recommended plan for no diet, medication or health factor changes affecting INR     Dosing Instructions: Continue your current warfarin dose with next INR in 4 days       Summary  As of 8/3/2023      Full warfarin instructions:  7.5 mg every Tue, Fri; 5 mg all other days   Next INR check:  8/7/2023               Telephone call with , Karl, who agrees to plan and repeated back plan correctly    Lab visit scheduled    Education provided:   Please call back if any changes to your diet, medications or how you've been taking warfarin  Contact [phone]  with any changes, questions or concerns.     Plan made per [facility] anticoagulation protocol    Mya Encarnacion RN  Anticoagulation Clinic  8/3/2023    _______________________________________________________________________     Anticoagulation Episode Summary       Current INR goal:  2.0-3.0   TTR:  25.5 % (2.4 wk)   Target end date:  Indefinite   Send INR reminders to:  [facility]    Indications    Paroxysmal atrial fibrillation (H) [I48.0]             Comments:               Anticoagulation Care Providers        Provider Role Specialty Phone number    Colt Riley MD Referring Internal Medicine [phone]    Amarilis Mtz APRN CNP Referring Nurse Practitioner - Family [phone]

## 2023-08-07 ENCOUNTER — LAB (OUTPATIENT)
Dept: LAB | Facility: CLINIC | Age: 78
End: 2023-08-07
Payer: COMMERCIAL

## 2023-08-07 DIAGNOSIS — I48.0 PAROXYSMAL ATRIAL FIBRILLATION (H): ICD-10-CM

## 2023-08-07 LAB — INR BLD: 2.7 (ref 0.9–1.1)

## 2023-08-07 PROCEDURE — 85610 PROTHROMBIN TIME: CPT

## 2023-08-07 PROCEDURE — 36416 COLLJ CAPILLARY BLOOD SPEC: CPT

## 2023-08-11 ENCOUNTER — LAB (OUTPATIENT)
Dept: LAB | Facility: CLINIC | Age: 78
End: 2023-08-11
Payer: COMMERCIAL

## 2023-08-11 ENCOUNTER — ANTICOAGULATION THERAPY VISIT (OUTPATIENT)
Dept: ANTICOAGULATION | Facility: CLINIC | Age: 78
End: 2023-08-11

## 2023-08-11 DIAGNOSIS — I48.0 PAROXYSMAL ATRIAL FIBRILLATION (H): ICD-10-CM

## 2023-08-11 DIAGNOSIS — I48.0 PAROXYSMAL ATRIAL FIBRILLATION (H): Primary | ICD-10-CM

## 2023-08-11 LAB — INR BLD: 2.8 (ref 0.9–1.1)

## 2023-08-11 PROCEDURE — 36415 COLL VENOUS BLD VENIPUNCTURE: CPT

## 2023-08-11 PROCEDURE — 85610 PROTHROMBIN TIME: CPT

## 2023-08-11 NOTE — PROGRESS NOTES
ANTICOAGULATION MANAGEMENT     Danielle Bryan 78 year old female is on warfarin with therapeutic INR result. (Goal INR 2.0-3.0)    Recent labs: (last 7 days)     08/11/23  1145   INR 2.8*       ASSESSMENT     Warfarin Lab Questionnaire    Warfarin Doses Last 7 Day--Patient confirmed taking warfarin maintenance dose as listed           8/11/2023   Warfarin Lab Questionnaire   Missed doses within past 14 days? No   Changes in diet or alcohol within past 14 days? No   Medication changes since last result? No   Injuries or illness since last result? No   New shortness of breath, severe headaches or sudden changes in vision since last result? No   Abnormal bleeding since last result? No   Upcoming surgery, procedure? No     Previous result: Therapeutic last 2(+) visits  Additional findings: None       PLAN     Recommended plan for no diet, medication or health factor changes affecting INR     Dosing Instructions: Continue your current warfarin dose with next INR in 1 week       Summary  As of 8/11/2023      Full warfarin instructions:  7.5 mg every Tue, Fri; 5 mg all other days   Next INR check:  8/18/2023               Telephone call with  Karl who agrees to plan and repeated back plan correctly    Patient offered & declined to schedule next visit, he will call back to schedule next INR when he is back at home.    Education provided:   Contact [phone]  with any changes, questions or concerns.     Plan made per [facility] anticoagulation protocol    Coni Russell RN  Anticoagulation Clinic  8/11/2023    _______________________________________________________________________     Anticoagulation Episode Summary       Current INR goal:  2.0-3.0   TTR:  49.2 % (3.4 wk)   Target end date:  Indefinite   Send INR reminders to:  [facility]    Indications    Paroxysmal atrial fibrillation (H) [I48.0]             Comments:               Anticoagulation Care Providers       Provider Role Specialty Phone number     Colt Riley MD Referring Internal Medicine [phone]    Amarilis Mtz APRN CNP Referring Nurse Practitioner - Family [phone]

## 2023-08-12 DIAGNOSIS — I48.0 PAROXYSMAL ATRIAL FIBRILLATION (H): ICD-10-CM

## 2023-08-15 RX ORDER — WARFARIN SODIUM 2.5 MG/1
TABLET ORAL
Qty: 160 TABLET | Refills: 1 | Status: ON HOLD | OUTPATIENT
Start: 2023-08-15 | End: 2023-11-29

## 2023-08-15 NOTE — TELEPHONE ENCOUNTER
ANTICOAGULATION MANAGEMENT:  Medication Refill    Anticoagulation Summary  As of 8/11/2023      Warfarin maintenance plan:  7.5 mg (2.5 mg x 3) every Tue, Fri; 5 mg (2.5 mg x 2) all other days   Next INR check:  8/18/2023   Target end date:  Indefinite    Indications    Paroxysmal atrial fibrillation (H) [I48.0]                 Anticoagulation Care Providers       Provider Role Specialty Phone number    Colt Riley MD Referring Internal Medicine [phone]    Amarilis Mtz APRN CNP Referring Nurse Practitioner - Family [phone]            Refill Criteria    Visit with referring provider/group: Meets criteria: office visit within referring provider group in the last 1 year on 07/17/2023    [facility] referral signed last signed: 07/17/2023; within last year: Yes    Lab monitoring not exceeding 2 weeks overdue: Yes    Danielle meets all criteria for refill. Rx instructions and quantity supplied updated to match patient's current dosing plan. Warfarin 90 day supply with 1 refill granted per [facility] protocol     Coni Russell RN  Anticoagulation Clinic

## 2023-08-18 ENCOUNTER — LAB (OUTPATIENT)
Dept: LAB | Facility: CLINIC | Age: 78
End: 2023-08-18
Payer: COMMERCIAL

## 2023-08-18 ENCOUNTER — ANTICOAGULATION THERAPY VISIT (OUTPATIENT)
Dept: ANTICOAGULATION | Facility: CLINIC | Age: 78
End: 2023-08-18

## 2023-08-18 DIAGNOSIS — I48.0 PAROXYSMAL ATRIAL FIBRILLATION (H): ICD-10-CM

## 2023-08-18 DIAGNOSIS — I48.0 PAROXYSMAL ATRIAL FIBRILLATION (H): Primary | ICD-10-CM

## 2023-08-18 LAB — INR BLD: 2.7 (ref 0.9–1.1)

## 2023-08-18 PROCEDURE — 36416 COLLJ CAPILLARY BLOOD SPEC: CPT

## 2023-08-18 PROCEDURE — 85610 PROTHROMBIN TIME: CPT

## 2023-08-18 NOTE — PROGRESS NOTES
ANTICOAGULATION MANAGEMENT     Danielle Bryan 78 year old female is on warfarin with therapeutic INR result. (Goal INR 2.0-3.0)    Recent labs: (last 7 days)     08/18/23  0950   INR 2.7*       ASSESSMENT     Warfarin Lab Questionnaire    Warfarin Doses Last 7 Days    Confirmed proper dosing      8/18/2023   Warfarin Lab Questionnaire   Missed doses within past 14 days? No   Changes in diet or alcohol within past 14 days? No   Medication changes since last result? No   Injuries or illness since last result? No   New shortness of breath, severe headaches or sudden changes in vision since last result? No   Abnormal bleeding since last result? No   Upcoming surgery, procedure? No     Previous result: Therapeutic last 2(+) visits  Additional findings: None       PLAN     Recommended plan for no diet, medication or health factor changes affecting INR     Dosing Instructions: Continue your current warfarin dose with next INR in 2 weeks       Summary  As of 8/18/2023      Full warfarin instructions:  7.5 mg every Tue, Fri; 5 mg all other days   Next INR check:  9/1/2023               Telephone call with Karl (spouse) who verbalizes understanding and agrees to plan    Lab visit scheduled    Education provided:   Please call back if any changes to your diet, medications or how you've been taking warfarin    Plan made per [facility] anticoagulation protocol    Margoth Menezes RN  Anticoagulation Clinic  8/18/2023    _______________________________________________________________________     Anticoagulation Episode Summary       Current INR goal:  2.0-3.0   TTR:  60.2 % (1 mo)   Target end date:  Indefinite   Send INR reminders to:  [facility]    Indications    Paroxysmal atrial fibrillation (H) [I48.0]             Comments:               Anticoagulation Care Providers       Provider Role Specialty Phone number    Colt Riley MD Referring Internal Medicine [phone]    Amarilis Mtz APRN CNP  Referring Nurse Practitioner - Family [phone]

## 2023-08-24 ENCOUNTER — ALLIED HEALTH/NURSE VISIT (OUTPATIENT)
Dept: FAMILY MEDICINE | Facility: CLINIC | Age: 78
End: 2023-08-24
Payer: COMMERCIAL

## 2023-08-24 DIAGNOSIS — E53.8 VITAMIN B12 DEFICIENCY (NON ANEMIC): Primary | ICD-10-CM

## 2023-08-24 PROCEDURE — 96372 THER/PROPH/DIAG INJ SC/IM: CPT | Mod: RT | Performed by: INTERNAL MEDICINE

## 2023-08-24 PROCEDURE — 99207 PR NO CHARGE NURSE ONLY: CPT

## 2023-08-24 RX ADMIN — CYANOCOBALAMIN 1000 MCG: 1000 INJECTION, SOLUTION INTRAMUSCULAR; SUBCUTANEOUS at 10:23

## 2023-08-25 NOTE — TELEPHONE ENCOUNTER
Form faxed.  Roxanna CMA     pt with acute onset flank pain, abdominal pain with CVA tenderness, will evaluate for renal colic, pain control. reassess

## 2023-09-01 ENCOUNTER — LAB (OUTPATIENT)
Dept: LAB | Facility: CLINIC | Age: 78
End: 2023-09-01
Payer: COMMERCIAL

## 2023-09-01 ENCOUNTER — ANTICOAGULATION THERAPY VISIT (OUTPATIENT)
Dept: ANTICOAGULATION | Facility: CLINIC | Age: 78
End: 2023-09-01

## 2023-09-01 DIAGNOSIS — I48.0 PAROXYSMAL ATRIAL FIBRILLATION (H): ICD-10-CM

## 2023-09-01 DIAGNOSIS — I48.0 PAROXYSMAL ATRIAL FIBRILLATION (H): Primary | ICD-10-CM

## 2023-09-01 LAB — INR BLD: 2.4 (ref 0.9–1.1)

## 2023-09-01 PROCEDURE — 36416 COLLJ CAPILLARY BLOOD SPEC: CPT

## 2023-09-01 PROCEDURE — 85610 PROTHROMBIN TIME: CPT

## 2023-09-01 NOTE — PROGRESS NOTES
ANTICOAGULATION MANAGEMENT     Danielle Bryan 78 year old female is on warfarin with therapeutic INR result. (Goal INR 2.0-3.0)    Recent labs: (last 7 days)     09/01/23  1010   INR 2.4*       ASSESSMENT     Warfarin Lab Questionnaire    Warfarin Doses Last 7 Days          9/1/2023   Warfarin Lab Questionnaire   Missed doses within past 14 days? No   Changes in diet or alcohol within past 14 days? No   Medication changes since last result? No   Injuries or illness since last result? No   New shortness of breath, severe headaches or sudden changes in vision since last result? No   Abnormal bleeding since last result? No   Upcoming surgery, procedure? No     Previous result: Therapeutic last 2(+) visits  Additional findings:  verbally confirmed warfarin was taken as instructed       PLAN     Recommended plan for no diet, medication or health factor changes affecting INR     Dosing Instructions: Continue your current warfarin dose with next INR in 3 weeks       Summary  As of 9/1/2023      Full warfarin instructions:  7.5 mg every Tue, Fri; 5 mg all other days   Next INR check:  9/22/2023               Telephone call with  Karl who verbalizes understanding and agrees to plan    Lab visit scheduled    Education provided:   Contact [phone]  with any changes, questions or concerns.     Plan made per [facility] anticoagulation protocol    Cristal Ace RN  Anticoagulation Clinic  9/1/2023    _______________________________________________________________________     Anticoagulation Episode Summary       Current INR goal:  2.0-3.0   TTR:  72.3 % (1.5 mo)   Target end date:  Indefinite   Send INR reminders to:  [facility]    Indications    Paroxysmal atrial fibrillation (H) [I48.0]             Comments:               Anticoagulation Care Providers       Provider Role Specialty Phone number    Colt Riley MD Referring Internal Medicine [phone]    Amarilis Mtz APRN CNP Referring Nurse  Practitioner - Family [phone]

## 2023-09-25 ENCOUNTER — HOSPITAL ENCOUNTER (EMERGENCY)
Facility: CLINIC | Age: 78
Discharge: HOME | End: 2023-09-25
Attending: EMERGENCY MEDICINE | Admitting: EMERGENCY MEDICINE
Payer: COMMERCIAL

## 2023-09-25 ENCOUNTER — ANTICOAGULATION THERAPY VISIT (OUTPATIENT)
Dept: ANTICOAGULATION | Facility: CLINIC | Age: 78
End: 2023-09-25

## 2023-09-25 ENCOUNTER — DOCUMENTATION ONLY (OUTPATIENT)
Dept: ANTICOAGULATION | Facility: CLINIC | Age: 78
End: 2023-09-25

## 2023-09-25 ENCOUNTER — ALLIED HEALTH/NURSE VISIT (OUTPATIENT)
Dept: NURSING | Facility: CLINIC | Age: 78
End: 2023-09-25
Payer: COMMERCIAL

## 2023-09-25 ENCOUNTER — ALLIED HEALTH/NURSE VISIT (OUTPATIENT)
Dept: FAMILY MEDICINE | Facility: CLINIC | Age: 78
End: 2023-09-25
Payer: COMMERCIAL

## 2023-09-25 ENCOUNTER — APPOINTMENT (OUTPATIENT)
Dept: CT IMAGING | Facility: CLINIC | Age: 78
End: 2023-09-25
Attending: EMERGENCY MEDICINE
Payer: COMMERCIAL

## 2023-09-25 ENCOUNTER — LAB (OUTPATIENT)
Dept: LAB | Facility: CLINIC | Age: 78
End: 2023-09-25
Payer: COMMERCIAL

## 2023-09-25 VITALS
TEMPERATURE: 97.4 F | SYSTOLIC BLOOD PRESSURE: 141 MMHG | DIASTOLIC BLOOD PRESSURE: 82 MMHG | RESPIRATION RATE: 18 BRPM | HEART RATE: 65 BPM | OXYGEN SATURATION: 98 %

## 2023-09-25 VITALS — DIASTOLIC BLOOD PRESSURE: 68 MMHG | SYSTOLIC BLOOD PRESSURE: 110 MMHG

## 2023-09-25 DIAGNOSIS — E53.8 VITAMIN B12 DEFICIENCY (NON ANEMIC): Primary | ICD-10-CM

## 2023-09-25 DIAGNOSIS — I48.0 PAROXYSMAL ATRIAL FIBRILLATION (H): ICD-10-CM
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
## 2023-09-25 DIAGNOSIS — S09.90XA CLOSED HEAD INJURY, INITIAL ENCOUNTER: ICD-10-CM

## 2023-09-25 DIAGNOSIS — I48.0 PAROXYSMAL ATRIAL FIBRILLATION (H): Primary | ICD-10-CM

## 2023-09-25 LAB — INR BLD: 2.6 (ref 0.9–1.1)

## 2023-09-25 PROCEDURE — 99207 PR NO CHARGE NURSE ONLY: CPT

## 2023-09-25 PROCEDURE — 85610 PROTHROMBIN TIME: CPT

## 2023-09-25 PROCEDURE — 99284 EMERGENCY DEPT VISIT MOD MDM: CPT | Mod: 25

## 2023-09-25 PROCEDURE — 96372 THER/PROPH/DIAG INJ SC/IM: CPT | Performed by: INTERNAL MEDICINE

## 2023-09-25 PROCEDURE — 36416 COLLJ CAPILLARY BLOOD SPEC: CPT

## 2023-09-25 PROCEDURE — 70450 CT HEAD/BRAIN W/O DYE: CPT

## 2023-09-25 RX ADMIN — CYANOCOBALAMIN 1000 MCG: 1000 INJECTION, SOLUTION INTRAMUSCULAR; SUBCUTANEOUS at 10:43

## 2023-09-25 NOTE — PROGRESS NOTES
Pt and  came into clinic for a b12 shot and INR - they report pt had fallen before coming to clinic.    Unsure if she his her head     S-(situation): fell this morning around 10 am possibly hitting her head ,  found pt on the floor holding her head.   Pt does not think she hit her head, nothing on her body hurts     B-(background): has an unsteady gait - uses a walker  and is on blood thinners     A-(assessment): smile uneven - left side has some drooping,   Able to raise both eye brows.   Grasps are weak in both hands  Ox3   No breaks on skin when RN assessed head, back of head, neck and hands    Denies: headaches, nausea, vomiting, CP, SOB     R-(recommendations): should be seen for a further assessment. Huddled with MD MINDY -Provider Dr. Headley assessed pt - she was able to smile and show her teeth evenly.   pt should be going to  get a scan - please call [facility]     RN called [facility] - they are full until 3 pm and provider Haydee Butler PA-c advised to send pt to ER - [facility]     RN advised pt and  on the above     Patient and  stated an understanding and agreed with plan.    Rosy Bales RN, BSN  [facility] - Mobile Triage

## 2023-09-25 NOTE — ED TRIAGE NOTES
Patient sent to the clinic following a fall this morning. Unwitnessed fall, patient denies injury. Is on coumadin. While at the clinic, patient was observed to have a facial droop although  reports it had resolved by the time the doctor came in. No facial droop noted on arrival.

## 2023-09-25 NOTE — PROGRESS NOTES
ANTICOAGULATION  MANAGEMENT: Discharge Review    Danielle Bryan chart reviewed for anticoagulation continuity of care    Emergency room visit on 9/25/23 for fall.    Discharge disposition: Home    Results:    Recent labs: (last 7 days)     09/25/23  1036   INR 2.6*     Anticoagulation inpatient management:     not applicable     Anticoagulation discharge instructions:     Warfarin dosing: home regimen continued   Bridging: No   INR goal change: No      Medication changes affecting anticoagulation: No    Additional factors affecting anticoagulation: No     PLAN     No adjustment to anticoagulation plan needed    Patient not contacted, spoke with spouse earlier regarding INR result and set up next INR lab appointment.     No adjustment to Anticoagulation Calendar was required    Margot Menezes RN

## 2023-09-25 NOTE — ED PROVIDER NOTES
History     Chief Complaint:  Fall       The history is provided by the patient and the spouse.      Danielle Bryan is a 78 year old female, anticoagulated on Warfarin, with a history of atrial fibrillation who presents after a fall. The patient's  reports that they were getting ready for appointment when he heard his wife fall. He states that it was <30 seconds before he was able to get to his wife. He states that the patient appeared to be laying on the ground, conscious and was able to stand up and ambulate with assistance. The were able to make it to their appointments and while they were there,  mentioned the fall the nurse. The nurse did a physical examination and noted a facial asymmetry when the patient smiled. The physician also evaluated the patient and at that time her symptoms had resolved. The provider recommended that she present to the ED for further evaluation in addition to the fact that they patient is anticoagulated. The patient states that she fell after she lost her balance. She reports lower back pain but  states that this is chronic. The patient denies hitting her head, other pain, loss of consciousness, or other symptoms.    Independent Historian:    Spouse/partner - They report primary history.    Review of External Notes:  None    Medications:    Aricept   Lexapro    Gabapentin   Namenda   Lopressor   Seroquel   Coumadin   Zocor    Past Medical History:    Alopecia areata   Atrial fibrillation   Cerebral infarction   Dysphagia   Hyperlipidemia   Vitamin D deficiency   Allergic rhinitis   Osteoporosis   Dupuytren's contracture    Chronic ischemic heart disease   TIA    Past Surgical History:    Caesarean  section   Colonoscopy x2  Bilateral cataract surgery   Tubal ligation, D&C   Thyroidectomy partial   Release trigger finger   EGD x3     Physical Exam   Patient Vitals for the past 24 hrs:   BP Temp Temp src Pulse Resp SpO2   09/25/23 1210 (!) 141/82 97.4  F  (36.3  C) Temporal 65 18 98 %        Physical Exam      HEENT:   No scalp hematoma or defect to the bony calvarium.      Alvarado's and Racoon's sign negative.      Midface is stable.     Oropharynx is moist, without lesions or trismus.  EYES:   Conjunctiva normal, PERRL    EOMs intact  NECK:   C-spine non-tender with full ROM.      No bony step-off to cervical spine.   CV:    Regular rate and rhythm.     No murmurs, rubs or gallops.    PULM:  Clear to auscultation bilateral.      No respiratory distress.    ABD:   Soft, non-tender, non-distended.      No rebound or guarding.  MSK:    No focal bony tenderness to the extremities.      Upper and lower extremities taken through full ROM without significant pain or limited ROM.  LYMPH:  No cervical lymphadenopathy.  NEURO:  Alert. GCS 15.      CN II-XII intact, speech is clear with no aphasia.      Strength is 5/5 in all 4 extremities.  Sensation is intact.      Normal muscular tone, no tremor.  SKIN:   Warm, dry and intact.    PSYCH:   Mood is good and affect is appropriate.    Emergency Department Course   Imaging:  CT Head w/o Contrast   Final Result   IMPRESSION:       1. No acute intracranial abnormality.   2. Chronic/incidental findings as detailed.      ZAHEER MCCANN MD            SYSTEM ID:  [id number]        Report per radiology  Emergency Department Course & Assessments:         Assessments:  1340 I obtained history and examined the patient as noted above. I explained findings to the patient and we discussed plan for discharge. The patient is comfortable with this plan.     Independent Interpretation (X-rays, CTs, rhythm strip):  None    Consultations/Discussion of Management or Tests:  None       Social Determinants of Health affecting care:  None     Disposition:  The patient was discharged to home.     Impression & Plan    CMS Diagnoses: None    Medical Decision Makin-year-old female on warfarin presents with fall resulting in closed head injury.   Patient reports mechanical fall.  There are no external signs of trauma.  CT scan of the head is unremarkable.  Patient and  made aware of the risk of delayed intracerebral hemorrhage.  The remainder of her trauma evaluation was unremarkable.  Cervical spine cleared by Nexus criteria.  Patient safe for discharge home and will return to ED for worsening symptoms.    Diagnosis:    ICD-10-CM    1. Closed head injury, initial encounter  S09.90XA          Scribe Disclosure:  I, Gypsy Hill, am serving as a scribe at 2:29 PM on 9/25/2023 to document services personally performed by Ranjeet Wahl MD based on my observations and the provider's statements to me.    9/25/2023   Ranjeet Wahl MD Matthews, Jeremiah R, MD  09/25/23 1516

## 2023-09-25 NOTE — PROGRESS NOTES
ANTICOAGULATION MANAGEMENT     Danielle Bryan 78 year old female is on warfarin with therapeutic INR result. (Goal INR 2.0-3.0)    Recent labs: (last 7 days)     09/25/23  1036   INR 2.6*       ASSESSMENT     Warfarin Lab Questionnaire    Warfarin Doses Last 7 Days      9/25/2023    10:33 AM   Dose in Tablet or Mg   TAB or MG? tablet (tab)           9/25/2023   Warfarin Lab Questionnaire   Missed doses within past 14 days? No   Changes in diet or alcohol within past 14 days? No   Medication changes since last result? No   Injuries or illness since last result? No - Patient fell this morning, evaluated by nurse triage at clinic, sent to ED for evaluation   New shortness of breath, severe headaches or sudden changes in vision since last result? No   Abnormal bleeding since last result? No   Upcoming surgery, procedure? No     Previous result: Therapeutic last 2(+) visits  Additional findings:  Patient is currently at ED to be checked after falling this morning.       PLAN     Recommended plan for no diet, medication or health factor changes affecting INR     Dosing Instructions: Continue your current warfarin dose with next INR in 3 weeks       Summary  As of 9/25/2023      Full warfarin instructions:  7.5 mg every Tue, Fri; 5 mg all other days   Next INR check:  10/16/2023               Telephone call with Karl, patient's spouse, who verbalizes understanding and agrees to plan    Lab visit scheduled    Education provided:   Please call back if any changes to your diet, medications or how you've been taking warfarin  Contact [phone]  with any changes, questions or concerns.     Plan made per [facility] anticoagulation protocol    Margot Menezes RN  Anticoagulation Clinic  9/25/2023    _______________________________________________________________________     Anticoagulation Episode Summary       Current INR goal:  2.0-3.0   TTR:  81.8 % (2.3 mo)   Target end date:  Indefinite   Send INR reminders to:  [facility]    Indications    Paroxysmal atrial fibrillation (H) [I48.0]             Comments:               Anticoagulation Care Providers       Provider Role Specialty Phone number    Colt Riley MD Referring Internal Medicine [phone]    Amarilis Mtz APRN CNP Referring Nurse Practitioner - Family [phone]

## 2023-10-16 ENCOUNTER — ANTICOAGULATION THERAPY VISIT (OUTPATIENT)
Dept: ANTICOAGULATION | Facility: CLINIC | Age: 78
End: 2023-10-16

## 2023-10-16 ENCOUNTER — LAB (OUTPATIENT)
Dept: LAB | Facility: CLINIC | Age: 78
End: 2023-10-16
Payer: COMMERCIAL

## 2023-10-16 DIAGNOSIS — I48.0 PAROXYSMAL ATRIAL FIBRILLATION (H): Primary | ICD-10-CM

## 2023-10-16 DIAGNOSIS — I48.0 PAROXYSMAL ATRIAL FIBRILLATION (H): ICD-10-CM

## 2023-10-16 LAB — INR BLD: 2.4 (ref 0.9–1.1)

## 2023-10-16 PROCEDURE — 36416 COLLJ CAPILLARY BLOOD SPEC: CPT

## 2023-10-16 PROCEDURE — 85610 PROTHROMBIN TIME: CPT

## 2023-10-16 NOTE — PROGRESS NOTES
ANTICOAGULATION MANAGEMENT     Danielle Bryan 78 year old female is on warfarin with therapeutic INR result. (Goal INR 2.0-3.0)    Recent labs: (last 7 days)     10/16/23  1119   INR 2.4*       ASSESSMENT     Warfarin Lab Questionnaire    Warfarin Doses Last 7 Days          10/16/2023   Warfarin Lab Questionnaire   Missed doses within past 14 days? No   Changes in diet or alcohol within past 14 days? No   Medication changes since last result? No   Injuries or illness since last result? No   New shortness of breath, severe headaches or sudden changes in vision since last result? No   Abnormal bleeding since last result? No   Upcoming surgery, procedure? No     Previous result: Therapeutic last 2(+) visits  Additional findings: None       PLAN     Recommended plan for no diet, medication or health factor changes affecting INR     Dosing Instructions: Continue your current warfarin dose with next INR in 4 weeks       Summary  As of 10/16/2023      Full warfarin instructions:  7.5 mg every Tue, Fri; 5 mg all other days   Next INR check:  11/13/2023               Telephone call with Karl, patient's spouse, who agrees to plan and repeated back plan correctly    Lab visit scheduled    Education provided:   Please call back if any changes to your diet, medications or how you've been taking warfarin  Contact [phone]  with any changes, questions or concerns.     Plan made per [facility] anticoagulation protocol    Margot Menezes RN  Anticoagulation Clinic  10/16/2023    _______________________________________________________________________     Anticoagulation Episode Summary       Current INR goal:  2.0-3.0   TTR:  86.0% (3 mo)   Target end date:  Indefinite   Send INR reminders to:  [facility]    Indications    Paroxysmal atrial fibrillation (H) [I48.0]             Comments:               Anticoagulation Care Providers       Provider Role Specialty Phone number    Colt Riley MD Referring Internal Medicine  [phone]    Amarilis Mtz APRN CNP Referring Nurse Practitioner - Family [phone]

## 2023-10-24 ENCOUNTER — ALLIED HEALTH/NURSE VISIT (OUTPATIENT)
Dept: FAMILY MEDICINE | Facility: CLINIC | Age: 78
End: 2023-10-24
Payer: COMMERCIAL

## 2023-10-24 DIAGNOSIS — E53.8 VITAMIN B12 DEFICIENCY (NON ANEMIC): Primary | ICD-10-CM

## 2023-10-24 PROCEDURE — 96372 THER/PROPH/DIAG INJ SC/IM: CPT | Performed by: INTERNAL MEDICINE

## 2023-10-24 PROCEDURE — 99207 PR NO CHARGE LOS: CPT

## 2023-10-24 RX ADMIN — CYANOCOBALAMIN 1000 MCG: 1000 INJECTION, SOLUTION INTRAMUSCULAR; SUBCUTANEOUS at 10:30

## 2023-11-13 ENCOUNTER — ALLIED HEALTH/NURSE VISIT (OUTPATIENT)
Dept: FAMILY MEDICINE | Facility: CLINIC | Age: 78
End: 2023-11-13
Payer: COMMERCIAL

## 2023-11-13 ENCOUNTER — ANTICOAGULATION THERAPY VISIT (OUTPATIENT)
Dept: ANTICOAGULATION | Facility: CLINIC | Age: 78
End: 2023-11-13

## 2023-11-13 ENCOUNTER — LAB (OUTPATIENT)
Dept: LAB | Facility: CLINIC | Age: 78
End: 2023-11-13
Payer: COMMERCIAL

## 2023-11-13 DIAGNOSIS — Z23 ENCOUNTER FOR IMMUNIZATION: Primary | ICD-10-CM

## 2023-11-13 DIAGNOSIS — Z29.11 NEED FOR VACCINATION AGAINST RESPIRATORY SYNCYTIAL VIRUS: ICD-10-CM

## 2023-11-13 DIAGNOSIS — I48.0 PAROXYSMAL ATRIAL FIBRILLATION (H): ICD-10-CM

## 2023-11-13 DIAGNOSIS — I48.0 PAROXYSMAL ATRIAL FIBRILLATION (H): Primary | ICD-10-CM

## 2023-11-13 DIAGNOSIS — Z23 NEED FOR SHINGLES VACCINE: ICD-10-CM

## 2023-11-13 LAB — INR BLD: 2.1 (ref 0.9–1.1)

## 2023-11-13 PROCEDURE — 85610 PROTHROMBIN TIME: CPT

## 2023-11-13 PROCEDURE — 36416 COLLJ CAPILLARY BLOOD SPEC: CPT

## 2023-11-13 PROCEDURE — 90662 IIV NO PRSV INCREASED AG IM: CPT

## 2023-11-13 PROCEDURE — G0008 ADMIN INFLUENZA VIRUS VAC: HCPCS

## 2023-11-13 NOTE — PROGRESS NOTES
Prior to immunization administration, verified patients identity using patient s name and date of birth. Please see Immunization Activity for additional information.     Screening Questionnaire for Adult Immunization    Are you sick today?   No   Do you have allergies to medications, food, a vaccine component or latex?   No   Have you ever had a serious reaction after receiving a vaccination?   No   Do you have a long-term health problem with heart, lung, kidney, or metabolic disease (e.g., diabetes), asthma, a blood disorder, no spleen, complement component deficiency, a cochlear implant, or a spinal fluid leak?  Are you on long-term aspirin therapy?   No   Do you have cancer, leukemia, HIV/AIDS, or any other immune system problem?   No   Do you have a parent, brother, or sister with an immune system problem?   No   In the past 3 months, have you taken medications that affect  your immune system, such as prednisone, other steroids, or anticancer drugs; drugs for the treatment of rheumatoid arthritis, Crohn s disease, or psoriasis; or have you had radiation treatments?   No   Have you had a seizure, or a brain or other nervous system problem?   No   During the past year, have you received a transfusion of blood or blood    products, or been given immune (gamma) globulin or antiviral drug?   No   For women: Are you pregnant or is there a chance you could become       pregnant during the next month?   No   Have you received any vaccinations in the past 4 weeks?   No     Immunization questionnaire answers were all negative.    I have reviewed the following standing orders:   This patient is due and qualifies for the Influenza vaccine.    Click here for Influenza Vaccine Standing Order    I have reviewed the vaccines inclusion and exclusion criteria; No concerns regarding eligibility.     Patient instructed to remain in clinic for 15 minutes afterwards, and to report any adverse reactions.     Screening performed by Deana  GAUTAM Bedoya on 11/13/2023 at 11:01 AM.

## 2023-11-13 NOTE — PROGRESS NOTES
ANTICOAGULATION MANAGEMENT     Danielle Bryan 78 year old female is on warfarin with therapeutic INR result. (Goal INR 2.0-3.0)    Recent labs: (last 7 days)     11/13/23  1055   INR 2.1*       ASSESSMENT     Warfarin Lab Questionnaire    Warfarin Doses Last 7 Days--Patient confirmed taking warfarin maintenance dose as listed        11/13/2023    10:49 AM   Dose in Tablet or Mg   TAB or MG? tablet (tab)           11/13/2023   Warfarin Lab Questionnaire   Missed doses within past 14 days? No   Changes in diet or alcohol within past 14 days? No   Medication changes since last result? No   Injuries or illness since last result? No   New shortness of breath, severe headaches or sudden changes in vision since last result? No   Abnormal bleeding since last result? No   Upcoming surgery, procedure? No     Previous result: Therapeutic last 2(+) visits  Additional findings: None       PLAN     Recommended plan for no diet, medication or health factor changes affecting INR     Dosing Instructions: Continue your current warfarin dose with next INR in 5 weeks       Summary  As of 11/13/2023      Full warfarin instructions:  7.5 mg every Tue, Fri; 5 mg all other days   Next INR check:  12/18/2023               Telephone call with  Karl who verbalizes understanding and agrees to plan    Lab visit scheduled    Education provided:   None required    Plan made per [facility] anticoagulation protocol    Coni Russell, RN  Anticoagulation Clinic  11/13/2023    _______________________________________________________________________     Anticoagulation Episode Summary       Current INR goal:  2.0-3.0   TTR:  89.3% (3.9 mo)   Target end date:  Indefinite   Send INR reminders to:  [facility]    Indications    Paroxysmal atrial fibrillation (H) [I48.0]             Comments:               Anticoagulation Care Providers       Provider Role Specialty Phone number    Colt Riley MD Referring Internal Medicine [phone]     Amarilis Mtz APRN CNP Referring Nurse Practitioner - Family [phone]

## 2023-11-22 ENCOUNTER — APPOINTMENT (OUTPATIENT)
Dept: CT IMAGING | Facility: CLINIC | Age: 78
End: 2023-11-22
Attending: EMERGENCY MEDICINE
Payer: COMMERCIAL

## 2023-11-22 ENCOUNTER — HOSPITAL ENCOUNTER (OUTPATIENT)
Facility: CLINIC | Age: 78
Setting detail: OBSERVATION
Discharge: NURSING, REHAB OR LONG-TERM CARE | End: 2023-11-29
Attending: EMERGENCY MEDICINE | Admitting: INTERNAL MEDICINE
Payer: COMMERCIAL

## 2023-11-22 ENCOUNTER — APPOINTMENT (OUTPATIENT)
Dept: GENERAL RADIOLOGY | Facility: CLINIC | Age: 78
End: 2023-11-22
Attending: EMERGENCY MEDICINE
Payer: COMMERCIAL

## 2023-11-22 DIAGNOSIS — F03.C18 SEVERE DEMENTIA WITH OTHER BEHAVIORAL DISTURBANCE, UNSPECIFIED DEMENTIA TYPE (H): Primary | ICD-10-CM

## 2023-11-22 DIAGNOSIS — I48.0 PAROXYSMAL ATRIAL FIBRILLATION (H): ICD-10-CM

## 2023-11-22 DIAGNOSIS — R62.7 FAILURE TO THRIVE IN ADULT: ICD-10-CM

## 2023-11-22 DIAGNOSIS — R53.1 GENERAL WEAKNESS: ICD-10-CM

## 2023-11-22 DIAGNOSIS — E53.8 VITAMIN B12 DEFICIENCY (NON ANEMIC): ICD-10-CM

## 2023-11-22 LAB
ALBUMIN UR-MCNC: 20 MG/DL
AMORPH CRY #/AREA URNS HPF: ABNORMAL /HPF
ANION GAP SERPL CALCULATED.3IONS-SCNC: 8 MMOL/L (ref 7–15)
APPEARANCE UR: ABNORMAL
BASOPHILS # BLD AUTO: 0 10E3/UL (ref 0–0.2)
BASOPHILS NFR BLD AUTO: 0 %
BILIRUB UR QL STRIP: NEGATIVE
BUN SERPL-MCNC: 13.3 MG/DL (ref 8–23)
CALCIUM SERPL-MCNC: 8.5 MG/DL (ref 8.8–10.2)
CHLORIDE SERPL-SCNC: 105 MMOL/L (ref 98–107)
COLOR UR AUTO: YELLOW
CREAT SERPL-MCNC: 1.08 MG/DL (ref 0.51–0.95)
DEPRECATED HCO3 PLAS-SCNC: 26 MMOL/L (ref 22–29)
EGFRCR SERPLBLD CKD-EPI 2021: 52 ML/MIN/1.73M2
EOSINOPHIL # BLD AUTO: 0 10E3/UL (ref 0–0.7)
EOSINOPHIL NFR BLD AUTO: 1 %
ERYTHROCYTE [DISTWIDTH] IN BLOOD BY AUTOMATED COUNT: 13.3 % (ref 10–15)
FLUAV RNA SPEC QL NAA+PROBE: NEGATIVE
FLUBV RNA RESP QL NAA+PROBE: NEGATIVE
GLUCOSE SERPL-MCNC: 95 MG/DL (ref 70–99)
GLUCOSE UR STRIP-MCNC: NEGATIVE MG/DL
HCT VFR BLD AUTO: 37.3 % (ref 35–47)
HGB BLD-MCNC: 11.8 G/DL (ref 11.7–15.7)
HGB UR QL STRIP: NEGATIVE
HOLD SPECIMEN: NORMAL
HOLD SPECIMEN: NORMAL
IMM GRANULOCYTES # BLD: 0 10E3/UL
IMM GRANULOCYTES NFR BLD: 0 %
INR PPP: 1.77 (ref 0.85–1.15)
KETONES UR STRIP-MCNC: ABNORMAL MG/DL
LEUKOCYTE ESTERASE UR QL STRIP: NEGATIVE
LYMPHOCYTES # BLD AUTO: 0.4 10E3/UL (ref 0.8–5.3)
LYMPHOCYTES NFR BLD AUTO: 5 %
MAGNESIUM SERPL-MCNC: 2.1 MG/DL (ref 1.7–2.3)
MCH RBC QN AUTO: 29.1 PG (ref 26.5–33)
MCHC RBC AUTO-ENTMCNC: 31.6 G/DL (ref 31.5–36.5)
MCV RBC AUTO: 92 FL (ref 78–100)
MONOCYTES # BLD AUTO: 0.7 10E3/UL (ref 0–1.3)
MONOCYTES NFR BLD AUTO: 8 %
MUCOUS THREADS #/AREA URNS LPF: PRESENT /LPF
NEUTROPHILS # BLD AUTO: 6.7 10E3/UL (ref 1.6–8.3)
NEUTROPHILS NFR BLD AUTO: 86 %
NITRATE UR QL: NEGATIVE
NRBC # BLD AUTO: 0 10E3/UL
NRBC BLD AUTO-RTO: 0 /100
PH UR STRIP: 8 [PH] (ref 5–7)
PLATELET # BLD AUTO: 211 10E3/UL (ref 150–450)
POTASSIUM SERPL-SCNC: 4.7 MMOL/L (ref 3.4–5.3)
RBC # BLD AUTO: 4.05 10E6/UL (ref 3.8–5.2)
RBC URINE: 3 /HPF
RSV RNA SPEC NAA+PROBE: NEGATIVE
SARS-COV-2 RNA RESP QL NAA+PROBE: NEGATIVE
SODIUM SERPL-SCNC: 139 MMOL/L (ref 135–145)
SP GR UR STRIP: 1.02 (ref 1–1.03)
SQUAMOUS EPITHELIAL: 1 /HPF
TSH SERPL DL<=0.005 MIU/L-ACNC: 1.83 UIU/ML (ref 0.3–4.2)
UROBILINOGEN UR STRIP-MCNC: NORMAL MG/DL
WBC # BLD AUTO: 7.8 10E3/UL (ref 4–11)
WBC URINE: 2 /HPF

## 2023-11-22 PROCEDURE — 36415 COLL VENOUS BLD VENIPUNCTURE: CPT | Performed by: INTERNAL MEDICINE

## 2023-11-22 PROCEDURE — 36415 COLL VENOUS BLD VENIPUNCTURE: CPT | Performed by: EMERGENCY MEDICINE

## 2023-11-22 PROCEDURE — 71046 X-RAY EXAM CHEST 2 VIEWS: CPT

## 2023-11-22 PROCEDURE — 258N000003 HC RX IP 258 OP 636: Performed by: EMERGENCY MEDICINE

## 2023-11-22 PROCEDURE — 87637 SARSCOV2&INF A&B&RSV AMP PRB: CPT | Performed by: EMERGENCY MEDICINE

## 2023-11-22 PROCEDURE — 99222 1ST HOSP IP/OBS MODERATE 55: CPT | Performed by: INTERNAL MEDICINE

## 2023-11-22 PROCEDURE — 87040 BLOOD CULTURE FOR BACTERIA: CPT | Performed by: INTERNAL MEDICINE

## 2023-11-22 PROCEDURE — 99285 EMERGENCY DEPT VISIT HI MDM: CPT | Mod: 25

## 2023-11-22 PROCEDURE — G0378 HOSPITAL OBSERVATION PER HR: HCPCS

## 2023-11-22 PROCEDURE — 83735 ASSAY OF MAGNESIUM: CPT | Performed by: EMERGENCY MEDICINE

## 2023-11-22 PROCEDURE — 70450 CT HEAD/BRAIN W/O DYE: CPT

## 2023-11-22 PROCEDURE — 85025 COMPLETE CBC W/AUTO DIFF WBC: CPT | Performed by: EMERGENCY MEDICINE

## 2023-11-22 PROCEDURE — 80053 COMPREHEN METABOLIC PANEL: CPT | Performed by: EMERGENCY MEDICINE

## 2023-11-22 PROCEDURE — 84443 ASSAY THYROID STIM HORMONE: CPT | Performed by: EMERGENCY MEDICINE

## 2023-11-22 PROCEDURE — 81001 URINALYSIS AUTO W/SCOPE: CPT | Performed by: INTERNAL MEDICINE

## 2023-11-22 PROCEDURE — 85610 PROTHROMBIN TIME: CPT | Performed by: INTERNAL MEDICINE

## 2023-11-22 PROCEDURE — 250N000013 HC RX MED GY IP 250 OP 250 PS 637: Performed by: INTERNAL MEDICINE

## 2023-11-22 PROCEDURE — 82248 BILIRUBIN DIRECT: CPT | Performed by: PHYSICIAN ASSISTANT

## 2023-11-22 RX ORDER — ONDANSETRON 2 MG/ML
4 INJECTION INTRAMUSCULAR; INTRAVENOUS EVERY 6 HOURS PRN
Status: DISCONTINUED | OUTPATIENT
Start: 2023-11-22 | End: 2023-11-29 | Stop reason: HOSPADM

## 2023-11-22 RX ORDER — AMOXICILLIN 250 MG
1 CAPSULE ORAL 2 TIMES DAILY PRN
Status: DISCONTINUED | OUTPATIENT
Start: 2023-11-22 | End: 2023-11-28

## 2023-11-22 RX ORDER — AMOXICILLIN 250 MG
2 CAPSULE ORAL 2 TIMES DAILY PRN
Status: DISCONTINUED | OUTPATIENT
Start: 2023-11-22 | End: 2023-11-28

## 2023-11-22 RX ORDER — ONDANSETRON 4 MG/1
4 TABLET, ORALLY DISINTEGRATING ORAL EVERY 6 HOURS PRN
Status: DISCONTINUED | OUTPATIENT
Start: 2023-11-22 | End: 2023-11-29 | Stop reason: HOSPADM

## 2023-11-22 RX ORDER — WARFARIN SODIUM 5 MG/1
5 TABLET ORAL ONCE
Status: COMPLETED | OUTPATIENT
Start: 2023-11-22 | End: 2023-11-22

## 2023-11-22 RX ADMIN — SODIUM CHLORIDE, POTASSIUM CHLORIDE, SODIUM LACTATE AND CALCIUM CHLORIDE 500 ML: 600; 310; 30; 20 INJECTION, SOLUTION INTRAVENOUS at 11:17

## 2023-11-22 RX ADMIN — METOPROLOL TARTRATE 12.5 MG: 25 TABLET, FILM COATED ORAL at 20:10

## 2023-11-22 RX ADMIN — WARFARIN SODIUM 5 MG: 5 TABLET ORAL at 20:11

## 2023-11-22 ASSESSMENT — ACTIVITIES OF DAILY LIVING (ADL)
ADLS_ACUITY_SCORE: 39
ADLS_ACUITY_SCORE: 35
ADLS_ACUITY_SCORE: 39
ADLS_ACUITY_SCORE: 35
ADLS_ACUITY_SCORE: 35

## 2023-11-22 ASSESSMENT — COLUMBIA-SUICIDE SEVERITY RATING SCALE - C-SSRS
2. HAVE YOU ACTUALLY HAD ANY THOUGHTS OF KILLING YOURSELF IN THE PAST MONTH?: NO
1. IN THE PAST MONTH, HAVE YOU WISHED YOU WERE DEAD OR WISHED YOU COULD GO TO SLEEP AND NOT WAKE UP?: NO
6. HAVE YOU EVER DONE ANYTHING, STARTED TO DO ANYTHING, OR PREPARED TO DO ANYTHING TO END YOUR LIFE?: NO

## 2023-11-22 NOTE — ED NOTES
Rounded on pt. Pt denies pain. Attempted to see if pt could walk but pt was unable to sit pt at side of bed with assist of three. Unable to re-obtain urine specimen at this time. Provider aware.     Tiesha Tovar RN

## 2023-11-22 NOTE — ED NOTES
[facility]  ED Nurse Handoff Report    ED Chief complaint: Generalized Weakness  . ED Diagnosis:   Final diagnoses:   None       Allergies:   Allergies   Allergen Reactions    Ciprofloxacin      Sores in mouth and throat felt funny    Reclast [Zoledronic Acid]      dizziness       Code Status: Full Code    Activity level - Baseline/Home:  in bed.  Activity Level - Current:   in bed.   Lift room needed: No.   Bariatric: No   Needed: No   Isolation: No.   Infection: Not Applicable.     Respiratory status: Room air    Vital Signs (within 30 minutes):   Vitals:    11/22/23 1415 11/22/23 1430 11/22/23 1445 11/22/23 1500   BP:  (!) 173/84 (!) 173/78 (!) 160/85   Pulse:  72  74   Resp:       Temp:       TempSrc:       SpO2: 94% 95%  93%       Cardiac Rhythm:  ,      Pain level:    Patient confused: Yes.   Patient Falls Risk: bed/chair alarm on.   Elimination Status:  Straight cath for urine spec and drained bladder. Brief on. Incontinent of urine.      Patient Report - Initial Complaint: Admitted with weakness.   Focused Assessment: Pt has dementia at baseline. Pt is unable to turn herself in bed and with assist of three is unable to sit at side of bed.      Abnormal Results:   Labs Ordered and Resulted from Time of ED Arrival to Time of ED Departure   BASIC METABOLIC PANEL - Abnormal       Result Value    Sodium 139      Potassium 4.7      Chloride 105      Carbon Dioxide (CO2) 26      Anion Gap 8      Urea Nitrogen 13.3      Creatinine 1.08 (*)     GFR Estimate 52 (*)     Calcium 8.5 (*)     Glucose 95     CBC WITH PLATELETS AND DIFFERENTIAL - Abnormal    WBC Count 7.8      RBC Count 4.05      Hemoglobin 11.8      Hematocrit 37.3      MCV 92      MCH 29.1      MCHC 31.6      RDW 13.3      Platelet Count 211      % Neutrophils 86      % Lymphocytes 5      % Monocytes 8      % Eosinophils 1      % Basophils 0      % Immature Granulocytes 0      NRBCs per 100 WBC 0      Absolute Neutrophils  6.7      Absolute Lymphocytes 0.4 (*)     Absolute Monocytes 0.7      Absolute Eosinophils 0.0      Absolute Basophils 0.0      Absolute Immature Granulocytes 0.0      Absolute NRBCs 0.0     INFLUENZA A/B, RSV, & SARS-COV2 PCR - Normal    Influenza A PCR Negative      Influenza B PCR Negative      RSV PCR Negative      SARS CoV2 PCR Negative     ROUTINE UA WITH MICROSCOPIC REFLEX TO CULTURE   MAGNESIUM   TSH WITH FREE T4 REFLEX        XR Chest 2 Views   Final Result   IMPRESSION: Right apical calcified granuloma and right paratracheal   calcified lymph nodes appear unchanged. Enlarged cardiomediastinal   silhouette is present. Multilevel degenerative changes are seen in the   spine.      MELINA PHILLIPS MD            SYSTEM ID:  [id number]      Head CT w/o contrast   Preliminary Result   IMPRESSION:   1. No CT findings of acute intracranial process.   2. Brain atrophy and presumed chronic small vessel ischemic changes,   as described.   3. Low-lying cerebellar tonsils, as described, with findings   concerning for borderline/mild Chiari I malformation. This is   unchanged.          Treatments provided: Labs, IV fluids, Head CT and chest xray  Family Comments: Spouse and son were here in ER but went home after they found out she would be admitted.  OBS brochure/video discussed/provided to patient:  Yes  ED Medications:   Medications   lactated ringers BOLUS 500 mL (500 mLs Intravenous $New Bag 11/22/23 1117)       Drips infusing:  No  For the majority of the shift this patient was Green.   Interventions performed were Labs, CT head, Chest xray, IV fluids.    Sepsis treatment initiated: No    Cares/treatment/interventions/medications to be completed following ED care: As ordered    ED Nurse Name: Tiesha Tovar RN  3:17 PM     RECEIVING UNIT ED HANDOFF REVIEW    Above ED Nurse Handoff Report was reviewed: Yes  Reviewed by: Shlomo Naranjo RN on November 22, 2023 at 6:13 PM

## 2023-11-22 NOTE — ED NOTES
Bed: [facility]  Expected date:   Expected time:   Means of arrival:   Comments:  Adilene 522- 88y, F, AMS

## 2023-11-22 NOTE — ED TRIAGE NOTES
Pt BIBA for generalized weakness this morning spouse noted. Pt lives @ home with spouse who helps her at baseline and today having more difficulty d/t generalized weakness with movement and transfers. History of dementia, and aphasia.      Triage Assessment (Adult)       Row Name 11/22/23 1038          Triage Assessment    Airway WDL WDL        Respiratory WDL    Respiratory WDL WDL        Cardiac WDL    Cardiac WDL WDL

## 2023-11-22 NOTE — H&P
[facility]    History and Physical - Hospitalist Service       Date of Admission:  11/22/2023  Primary Care Physician   Heriberto Headley  CONSULTANTS: Therapy    Assessment & Plan      Dainelle Bryan is a 78 year old female who presents from home with general weakness.  Patient's story is obtained by the chart as the patient has aphasia and the family is not readily available to me.  I tried calling the patient's  was unable to touch base with him.  Patient has a significant history of falls, dementia, atrial fibrillation on Coumadin, previous stroke with dysphagia and aphasia, hyperlipidemia, frozen shoulder who presents from home with just generalized weakness.  Workup in the emergency room was negative including a head CT electrolytes, and CBC.  Her TSH was also noted to be normal.  She is unable to tell us if she has any Canna symptoms due to her expressive aphasia.  This is a chronic problem for her.  Unfortunately they tried walking her in the emergency room she was unable to do it.  Normally at baseline her  is standby assist when she gets up and about.  She did have a fall back in 9/2023 for which she was seen in the emergency room.  She otherwise has no other symptoms that we can tell that could be contributing to her symptoms.  This could be all due to progression of her chronic disease state as well as possible side effects from medications.       Failure to thrive, general weakness  Patient presents with failure to thrive and inability to be taken care of at home.  They tried walking her to the ER and she could not do it.  Workup has been negative in the ER with a urinalysis still pending.  She is unable to really express herself due to her aphasia.  Patient be seen by PT and OT and likely needs rehab placement.  I suspect her condition is actually progression of her dementia, just general deconditioning, and possible side effects due to polypharmacy   Addendum:   fever to 101.3 on the admission to the floor.  Awaiting urine analysis. Has no leukocytosis, and was afebrile in the emergency room.  Check blood cultures. Cxr did not show pneumonia    History of falls  Patient had a fall in 9/2023 and was seen in the emergency room.  She is at high risk for morbidity and mortality if she falls as she is on Coumadin.  Really need to be worried about polypharmacy as a contributing factor to this.  Patient is on a beta-blocker for rate control which could lead to hypotension.  She is also on multiple medications for her dementia and behaviors.    Presumed Alzheimer's dementia  Patient has been on Namenda, Aricept, and Seroquel.  In the emergency room she has tongue smacking consistent with tardive dyskinesia which is most likely side effect of the Seroquel which I will hold.  Patient may have side effects happening from her Namenda and Aricept but given her dementia and the fact that she is not in memory care yet I will continue these 2 for now once pharmacy has reconciled them    Atrial fibrillation, Coumadin induced coagulopathy  Patient is on Lopressor for rate control.  She is also on Coumadin with an INR goal of 2-3 and currently has an INR 2.1.  Pharm.D. to dose her Coumadin.  Will resume her home dosing at this point    History of stroke  Complicated by dysphagia and aphasia.  Patient is on Coumadin to prevent future strokes    Depression, anxiety  Patient has been on Lexapro, will hold for now.    Hyperlipidemia  In the past patient has been on Zocor not sure if she is still on this.  But given the patient's debility the risk outweighs the benefit of this medication this point and I would stop it going forward.    Frozen shoulder  History noted    Advanced care planning  Per the chart the patient has been full code but this is from 2019.  There is no active healthcare directive that I can find nor a POLST form.  Given the patient's current failure to thrive, overall debility,  ongoing advance care planning needs to be discussed with the family.  Not sure what her goals are but at some point she is likely to end up in memory care if not sooner than later versus nursing home.  Will need to figure out what is really important to her.      Tardive dyskinesia  Patient has lipsmacking on admission.  This is consistent with tardive dyskinesia and she is on Seroquel which could be the culprit.  I have stopped this medication for now.          4Ms:  Polypharmacy: Medications reviewed.  Not sure exactly what she is taken because her medications have not been reconciled officially by pharmacy.  My concern is her falling going forward especially given the fact that she is on Coumadin.  She is on multiple high risk medications that side effects could outweigh the benefits at this point including Aricept, Namenda, Zocor, Neurontin, and Seroquel.  Really need to be because the intent of what were going to keep her on going forward.  Unfortunate was not able to talk to the  about this at all.  Pharm.D. still to reconcile medications  Mentation:  SLUMS , BIMS,  CPT, capacity unable to test with her aphasia.  Patient is following commands however but really will have difficulty trying to aid in her cares due to her expressive aphasia  Social support: Patient currently lives at home with her spouse, Josy has 1 son who was here earlier but the family is no longer bedside  Mobility: Unclear what she uses for aids but given her recurrent falls most certainly would benefit from a walker if she is not already using it  What is importmant: N/A    Discussed plan of care with Dr Dowling in the emergency room   Diet:  regular, may need to watch closely given her history of dysphagia  DVT Prophylaxis: Warfarin  Morelos Catheter: Not present  Lines: None     Cardiac Monitoring: None    RESTRAINTS: not indicated  Code Status:  FULL presumed, need to talk to family about this          This document was created  using voice recognition technology.  Please excuse any typographical errors that may have occurred.  Please call with any questions.        Clinically Significant Risk Factors Present on Admission               # Drug Induced Coagulation Defect: home medication list includes an anticoagulant medication                    Disposition Plan      Expected Discharge Date: 11/23/2023           likely to need Tcu vs other placement       Paul Laura MD  Hospitalist Service  [facility]  Securely message with OpenText (more info)  Text page via [employer] Paging/Directory     Length of stay: Anticipate less than 2 midnight hospitalization for evaluation and treatment of dementia, failure to trive      ______________________________________________________________________    Chief Complaint   Failure to thrive, weakness    EMR, patient with aphasia    History of Present Illness   Danielle Bryan is a 78 year old female who presents from home with general weakness.  Patient's story is obtained by the chart as the patient has aphasia and the family is not readily available to me.  I tried calling the patient's  was unable to touch base with him.  Patient has a significant history of falls, dementia, atrial fibrillation on Coumadin, previous stroke with dysphagia and aphasia, hyperlipidemia, frozen shoulder who presents from home with just generalized weakness.  Workup in the emergency room was negative including a head CT electrolytes, and CBC.  Her TSH was also noted to be normal.  She is unable to tell us if she has any Canna symptoms due to her expressive aphasia.  This is a chronic problem for her.  Unfortunately they tried walking her in the emergency room she was unable to do it.  Normally at baseline her  is standby assist when she gets up and about.  She did have a fall back in 9/2023 for which she was seen in the emergency room.  She otherwise has no other symptoms that we can tell  that could be contributing to her symptoms.  This could be all due to progression of her chronic disease state as well as possible side effects from medications.      Past Medical History    Past Medical History:   Diagnosis Date    Alopecia areata     Requires a wig now    Atrial fibrillation (H) 2019    Cerebral infarction (H)     TIA    Dysphagia     Botox/EGD dilation at [place], most recent 2013    Hyperlipemia     PONV (postoperative nausea and vomiting)     Recurrent UTI     Believed related in part to atrophic vaginitis    Vitamin D deficiency 2018       Past Surgical History   Past Surgical History:   Procedure Laterality Date    ABDOMEN SURGERY  1975    Ce[facility]re[facility]    COLONOSCOPY  1/15/2014    Dr. Henry [facility]    COLONOSCOPY  1/15/2014    Normal, no further colonoscopy felt required. Procedure: COLONOSCOPY;  Colonoscopy ;  Surgeon: Esteban Henry MD;  Location:  GI    EYE SURGERY      bilateral cataract surgery    GYN SURGERY      TUBAL PREGNANCY, D&C,  X 2    HEAD & NECK SURGERY      THYROIDECTOMY - partial-right side    RELEASE TRIGGER FINGER  2012    Procedure: RELEASE TRIGGER FINGER;  RIGHT THUMB, MIDDLE AND RING TRIGGER RELEASES;  Surgeon: Deniz Velazquez MD;  [facility]: [facility]       Prior to Admission Medications   Prior to Admission Medications   Prescriptions Last Dose Informant Patient Reported? Taking?   QUEtiapine (SEROQUEL) 25 MG tablet   Yes No   Sig: Patient takes 5 tablets at bedtime.   cetirizine (ZYRTEC) 10 MG tablet   Yes No   Sig: Take 10 mg by mouth as needed for allergies   diclofenac (VOLTAREN) 1 % topical gel   No No   Sig: Apply 4 g topically 3 times daily as needed for moderate pain (4-6)   docusate sodium (COLACE) 100 MG tablet   Yes No   Sig: Take 100 mg by mouth At Bedtime   donepezil (ARICEPT) 5 MG tablet   Yes No   Si tabs in the am   escitalopram (LEXAPRO) 10 MG tablet   Yes No   Sig: Take 20 mg by mouth every morning    gabapentin (NEURONTIN) 300 MG capsule   No No   Sig: Take 3 capsules (900 mg) by mouth At Bedtime Patient is taking 1 capsule in the morning and 1 capsule in the evening.   memantine (NAMENDA) 10 MG tablet   Yes No   Si tablets in the morning   metoprolol tartrate (LOPRESSOR) 25 MG tablet   No No   Sig: Take 0.5 tablets (12.5 mg) by mouth daily   Patient not taking: Reported on 2023   order for DME   No No   Sig: Equipment being ordered: Wigs   sodium chloride (OCEAN) 0.65 % nasal spray   Yes No   Sig: Spray 1 spray into both nostrils daily as needed for congestion   Patient not taking: Reported on 2023   warfarin ANTICOAGULANT (COUMADIN) 2.5 MG tablet   No No   Sig: Take 7.5mg (3 tablets) every  & Fri / Take 5mg (2 tablets) all other days OR per INR clinic      Facility-Administered Medications Last Administration Doses Remaining   cyanocobalamin injection 1,000 mcg 10/24/2023 10:30 AM 6   denosumab (PROLIA) injection 60 mg 2022 11:11 AM            Allergies   Allergies   Allergen Reactions    Ciprofloxacin      Sores in mouth and throat felt funny    Reclast [Zoledronic Acid]      dizziness        REVIEW OF SYSTEMS:  A comprehensive review of systems was unobtainable due to the patient's aphasia    Physical Exam   Vital Signs: Temp: 97.4  F (36.3  C) Temp src: Oral BP: (!) 169/78 Pulse: 76   Resp: 20 SpO2: 93 % O2 Device: None (Room air)    Weight: 0 lbs 0 oz    General appearance: Patient is alert unable to test orientation due to expressive aphasia, no apparent distress, pleasant and conversing normally, speaking in full sentences, appears older than stated age, sitting up in a cot in the ER  HEENT:  Atraumatic/normal cephalic, EOMI, Pupils equally round and reactive to light, sclera non-icteric, Mucous membranes are moist, alopecia  NECK:  supple without bruit or lymphadenopathy  RESPIRATORY: Clear to auscultation bilateral, good air movement  CARDIOVASCULAR: Sounds currently regular rate  and rhythm, normal S1/S2, no murmurs, rubs, or gallops present, peripheral pulses intact  GASTROINTESTINAL: Non-distended, non-tender, soft, bowel sounds present throughout, no mass or hepatosplenomegaly  MUSCULOSKELETAL: without deformity, normal range of motion  SKIN:  Warm, dry, no rashes, no mottling  NEUROLOGIC: Expressive aphasia, has tongue smacking on exam  EXTREMITIES:  Moves all extremities but difficulty moving her right arm due to her frozen shoulder, no clubbing, cyanosis, nor edema  :  Morelos not present         Data     I have personally reviewed the following data over the past 24 hrs:    7.8  \   11.8   / 211     139 105 13.3 /  95   4.7 26 1.08 (H) \     TSH: 1.83 T4: N/A A1C: N/A       Imaging:   Results for orders placed or performed during the hospital encounter of 11/22/23   Head CT w/o contrast    Narrative    CT SCAN OF THE HEAD WITHOUT CONTRAST   11/22/2023 11:47 AM     HISTORY: AMS.    TECHNIQUE:  Axial images of the head and coronal reformations without  IV contrast material. Radiation dose for this scan was reduced using  automated exposure control, adjustment of the mA and/or kV according  to patient size, or iterative reconstruction technique.    COMPARISON: CT of the head 9/25/2023.    FINDINGS: There is no evidence of intracranial hemorrhage, mass, acute  infarct or anomaly. The ventricles are normal in size, shape and  configuration. Mild to moderate generalized brain parenchymal volume  loss. Mild patchy periventricular white matter hypodensities which are  nonspecific, but likely related to chronic microvascular ischemic  disease. As before, there is a relatively low-lying position of the  cerebellar tonsils extending approximately 5 mm below the level of the  foramen magnum. This raises concern for borderline/mild Chiari I  malformation. There is partial effacement of the subarachnoid space at  the level of the cisterna magna, as before. Atherosclerotic  calcification involving  the carotid siphons.    The visualized portions of the sinuses and mastoids appear normal. The  bony calvarium and bones of the skull base appear intact.       Impression    IMPRESSION:  1. No CT findings of acute intracranial process.  2. Brain atrophy and presumed chronic small vessel ischemic changes,  as described.  3. Low-lying cerebellar tonsils, as described, with findings  concerning for borderline/mild Chiari I malformation. This is  unchanged.    EWA LANDIN MD         SYSTEM ID:  [id number]   XR Chest 2 Views    Narrative    XR CHEST 2 VIEWS   11/22/2023 12:03 PM     HISTORY: cough, gen weakness    COMPARISON: 1/20/2019      Impression    IMPRESSION: Right apical calcified granuloma and right paratracheal  calcified lymph nodes appear unchanged. Enlarged cardiomediastinal  silhouette is present. Multilevel degenerative changes are seen in the  spine.    MELINA PHILLIPS MD         SYSTEM ID:  [id number]     Procedures: none     I have personally have reviewed the patient's most up to date radiologic exams,  labs, orders, and medications myself

## 2023-11-22 NOTE — ED NOTES
Rounded on pt. Pt resting comfortably in bed. IVF started and swab done. Patient, spouse and son updated on plan of care at bedside. Awaiting imaging. Continuing to monitor.     Tiesha Tovar RN

## 2023-11-23 ENCOUNTER — APPOINTMENT (OUTPATIENT)
Dept: OCCUPATIONAL THERAPY | Facility: CLINIC | Age: 78
End: 2023-11-23
Attending: INTERNAL MEDICINE
Payer: COMMERCIAL

## 2023-11-23 ENCOUNTER — APPOINTMENT (OUTPATIENT)
Dept: PHYSICAL THERAPY | Facility: CLINIC | Age: 78
End: 2023-11-23
Attending: INTERNAL MEDICINE
Payer: COMMERCIAL

## 2023-11-23 LAB
ALBUMIN SERPL BCG-MCNC: 3.7 G/DL (ref 3.5–5.2)
ALP SERPL-CCNC: 64 U/L (ref 40–150)
ALT SERPL W P-5'-P-CCNC: 18 U/L (ref 0–50)
ANION GAP SERPL CALCULATED.3IONS-SCNC: 12 MMOL/L (ref 7–15)
AST SERPL W P-5'-P-CCNC: 27 U/L (ref 0–45)
BILIRUB DIRECT SERPL-MCNC: <0.2 MG/DL (ref 0–0.3)
BILIRUB SERPL-MCNC: 0.3 MG/DL
BUN SERPL-MCNC: 16.2 MG/DL (ref 8–23)
CALCIUM SERPL-MCNC: 8.1 MG/DL (ref 8.8–10.2)
CHLORIDE SERPL-SCNC: 102 MMOL/L (ref 98–107)
CREAT SERPL-MCNC: 0.94 MG/DL (ref 0.51–0.95)
DEPRECATED HCO3 PLAS-SCNC: 21 MMOL/L (ref 22–29)
EGFRCR SERPLBLD CKD-EPI 2021: 61 ML/MIN/1.73M2
ERYTHROCYTE [DISTWIDTH] IN BLOOD BY AUTOMATED COUNT: 13.3 % (ref 10–15)
GLUCOSE SERPL-MCNC: 112 MG/DL (ref 70–99)
HCT VFR BLD AUTO: 37.2 % (ref 35–47)
HGB BLD-MCNC: 12.1 G/DL (ref 11.7–15.7)
INR PPP: 1.82 (ref 0.85–1.15)
MCH RBC QN AUTO: 29.4 PG (ref 26.5–33)
MCHC RBC AUTO-ENTMCNC: 32.5 G/DL (ref 31.5–36.5)
MCV RBC AUTO: 90 FL (ref 78–100)
PLATELET # BLD AUTO: 201 10E3/UL (ref 150–450)
POTASSIUM SERPL-SCNC: 4.1 MMOL/L (ref 3.4–5.3)
PROT SERPL-MCNC: 6.4 G/DL (ref 6.4–8.3)
RBC # BLD AUTO: 4.12 10E6/UL (ref 3.8–5.2)
SODIUM SERPL-SCNC: 135 MMOL/L (ref 135–145)
WBC # BLD AUTO: 6.2 10E3/UL (ref 4–11)

## 2023-11-23 PROCEDURE — 85610 PROTHROMBIN TIME: CPT | Performed by: INTERNAL MEDICINE

## 2023-11-23 PROCEDURE — 99233 SBSQ HOSP IP/OBS HIGH 50: CPT | Performed by: PHYSICIAN ASSISTANT

## 2023-11-23 PROCEDURE — 97535 SELF CARE MNGMENT TRAINING: CPT | Mod: GO

## 2023-11-23 PROCEDURE — G0378 HOSPITAL OBSERVATION PER HR: HCPCS

## 2023-11-23 PROCEDURE — 250N000013 HC RX MED GY IP 250 OP 250 PS 637: Performed by: PHYSICIAN ASSISTANT

## 2023-11-23 PROCEDURE — 97165 OT EVAL LOW COMPLEX 30 MIN: CPT | Mod: GO

## 2023-11-23 PROCEDURE — 36415 COLL VENOUS BLD VENIPUNCTURE: CPT | Performed by: PHYSICIAN ASSISTANT

## 2023-11-23 PROCEDURE — 36415 COLL VENOUS BLD VENIPUNCTURE: CPT | Performed by: INTERNAL MEDICINE

## 2023-11-23 PROCEDURE — 97161 PT EVAL LOW COMPLEX 20 MIN: CPT | Mod: GP

## 2023-11-23 PROCEDURE — 97530 THERAPEUTIC ACTIVITIES: CPT | Mod: GP

## 2023-11-23 PROCEDURE — 85027 COMPLETE CBC AUTOMATED: CPT | Performed by: PHYSICIAN ASSISTANT

## 2023-11-23 PROCEDURE — 80048 BASIC METABOLIC PNL TOTAL CA: CPT | Performed by: PHYSICIAN ASSISTANT

## 2023-11-23 PROCEDURE — 250N000013 HC RX MED GY IP 250 OP 250 PS 637: Performed by: INTERNAL MEDICINE

## 2023-11-23 PROCEDURE — 97116 GAIT TRAINING THERAPY: CPT | Mod: GP

## 2023-11-23 RX ORDER — GABAPENTIN 300 MG/1
300 CAPSULE ORAL DAILY
Status: DISCONTINUED | OUTPATIENT
Start: 2023-11-23 | End: 2023-11-23

## 2023-11-23 RX ORDER — DONEPEZIL HYDROCHLORIDE 10 MG/1
10 TABLET, FILM COATED ORAL AT BEDTIME
Status: DISCONTINUED | OUTPATIENT
Start: 2023-11-23 | End: 2023-11-29 | Stop reason: HOSPADM

## 2023-11-23 RX ORDER — MEMANTINE HYDROCHLORIDE 5 MG/1
20 TABLET ORAL EVERY EVENING
Status: DISCONTINUED | OUTPATIENT
Start: 2023-11-23 | End: 2023-11-29 | Stop reason: HOSPADM

## 2023-11-23 RX ORDER — GABAPENTIN 300 MG/1
300 CAPSULE ORAL EVERY EVENING
COMMUNITY

## 2023-11-23 RX ORDER — METOPROLOL TARTRATE 25 MG/1
12.5 TABLET, FILM COATED ORAL EVERY EVENING
Status: ON HOLD | COMMUNITY
End: 2023-11-29

## 2023-11-23 RX ORDER — DOCUSATE SODIUM 100 MG/1
100 CAPSULE, LIQUID FILLED ORAL AT BEDTIME
Status: DISCONTINUED | OUTPATIENT
Start: 2023-11-23 | End: 2023-11-28

## 2023-11-23 RX ADMIN — METOPROLOL TARTRATE 12.5 MG: 25 TABLET, FILM COATED ORAL at 08:23

## 2023-11-23 RX ADMIN — WARFARIN SODIUM 7.5 MG: 5 TABLET ORAL at 18:34

## 2023-11-23 RX ADMIN — MEMANTINE 20 MG: 5 TABLET ORAL at 21:54

## 2023-11-23 RX ADMIN — DOCUSATE SODIUM 100 MG: 100 CAPSULE, LIQUID FILLED ORAL at 21:54

## 2023-11-23 RX ADMIN — DONEPEZIL HYDROCHLORIDE 10 MG: 10 TABLET ORAL at 21:54

## 2023-11-23 ASSESSMENT — ACTIVITIES OF DAILY LIVING (ADL)
ADLS_ACUITY_SCORE: 39
ADLS_ACUITY_SCORE: 42
ADLS_ACUITY_SCORE: 39
ADLS_ACUITY_SCORE: 42
ADLS_ACUITY_SCORE: 39
ADLS_ACUITY_SCORE: 41
ADLS_ACUITY_SCORE: 42
ADLS_ACUITY_SCORE: 41
ADLS_ACUITY_SCORE: 42
ADLS_ACUITY_SCORE: 42
ADLS_ACUITY_SCORE: 41
ADLS_ACUITY_SCORE: 41

## 2023-11-23 NOTE — ED PROVIDER NOTES
[facility] Provider Note  [facility] Emergency Department  6:18 PM  11/22/2023    Danielle Bryan  78 year oldfemale    Chief Complaint   Patient presents with    Generalized Weakness       HPI:    78 y F here with  and son for eval gen weakness since this AM.      No recent fever, vomiting, diarrhea or note of malodorous urine.  No recent falls or trauma.      stated baseline he will gently help her sit up on the side of the bed change closed but when she is up needs only a standby assist.  Since this morning she had a really hard time and he had to help her significantly more than usual to get to the bathroom and significant difficulty getting back up off the toilet and back to bed.  Also noted her to be more fatigued/tired and confused than normal.  No one else at home is sick.  Has been compliant with her medications.  She is continued on her warfarin which has been therapeutic on recent checks.  This morning when he turned his back and she is outside the bed she slid down to the floor.  He does not report any significant trauma or head injury.    History of dementia and aphasia states that is at baseline.      Independent Historian:      as noted above    Review of External Notes:     None       ROS: 10 point ROS completed and negative other than mentioned above    Past Medical History:   Diagnosis Date    Alopecia areata     Requires a wig now    Atrial fibrillation (H) 1/20/2019    Cerebral infarction (H) 2014    TIA    Dysphagia     Botox/EGD dilation at [place], most recent 05/2013    Hyperlipemia     PONV (postoperative nausea and vomiting)     Recurrent UTI     Believed related in part to atrophic vaginitis    Vitamin D deficiency 11/29/2018     Past Surgical History:   Procedure Laterality Date    ABDOMEN SURGERY  1975 1982    Ceasereamaria a    COLONOSCOPY  1/15/2014    Dr. Henry [facility]    COLONOSCOPY  1/15/2014    Normal, no further colonoscopy felt required. Procedure: COLONOSCOPY;   Colonoscopy ;  Surgeon: Esteban Henry MD;  Location:  GI    EYE SURGERY      bilateral cataract surgery    GYN SURGERY      TUBAL PREGNANCY, D&C,  X 2    HEAD & NECK SURGERY  1983    THYROIDECTOMY - partial-right side    RELEASE TRIGGER FINGER  2012    Procedure: RELEASE TRIGGER FINGER;  RIGHT THUMB, MIDDLE AND RING TRIGGER RELEASES;  Surgeon: Deniz Velazquez MD;  Location: [facility]         Current Outpatient Medications   Medication Instructions    cetirizine (ZYRTEC) 10 mg, Oral, PRN    diclofenac (VOLTAREN) 4 g, Topical, 3 TIMES DAILY PRN    docusate sodium (COLACE) 100 mg, Oral, AT BEDTIME    donepezil (ARICEPT) 5 MG tablet 2 tabs in the am    escitalopram (LEXAPRO) 20 mg, Oral, EVERY MORNING    gabapentin (NEURONTIN) 900 mg, Oral, AT BEDTIME, Patient is taking 1 capsule in the morning and 1 capsule in the evening.    memantine (NAMENDA) 10 MG tablet 2 tablets in the morning    metoprolol tartrate (LOPRESSOR) 12.5 mg, Oral, DAILY    order for DME Equipment being ordered: Tamela    QUEtiapine (SEROQUEL) 25 MG tablet Patient takes 5 tablets at bedtime.    sodium chloride (OCEAN) 0.65 % nasal spray 1 spray, DAILY PRN    warfarin ANTICOAGULANT (COUMADIN) 2.5 MG tablet Take 7.5mg (3 tablets) every  & Fri / Take 5mg (2 tablets) all other days OR per INR clinic            Allergies   Allergen Reactions    Ciprofloxacin      Sores in mouth and throat felt funny    Reclast [Zoledronic Acid]      dizziness         Physical Exam  Vitals: BP (!) 170/88   Pulse 77   Temp 97.4  F (36.3  C) (Oral)   Resp 20   LMP  (LMP Unknown)   SpO2 94%     HEENT: Atraumatic, external ear unremarkable, mucous membranes dry, oropharynx without tonsilar hypertrophy/erythema/exudate  Neck: supple  Lungs:  CTAB, no resp distress  CV: rrr, no m/r/g, ppi  Abd: soft, nontender, nondistended, no rebound/masses/guarding/hsm  Ext: Skeletal survey without significant bony tenderness, major joint effusion, significant soft  tissue swelling.  Skin: warm, dry, well perused, no rashes/bruising/lesions on exposed skin  Neuro: Awake, nods yes or no to simple questions, symmetric  in bilateral extremities, able to flex and extend the toes bilateral lower extremities.  Sensation intact to light touch all extremities.  Psych: Normal mood, normal affect    Labs and Imaging:    Labs Ordered and Resulted from Time of ED Arrival to Time of ED Departure   BASIC METABOLIC PANEL - Abnormal       Result Value    Sodium 139      Potassium 4.7      Chloride 105      Carbon Dioxide (CO2) 26      Anion Gap 8      Urea Nitrogen 13.3      Creatinine 1.08 (*)     GFR Estimate 52 (*)     Calcium 8.5 (*)     Glucose 95     CBC WITH PLATELETS AND DIFFERENTIAL - Abnormal    WBC Count 7.8      RBC Count 4.05      Hemoglobin 11.8      Hematocrit 37.3      MCV 92      MCH 29.1      MCHC 31.6      RDW 13.3      Platelet Count 211      % Neutrophils 86      % Lymphocytes 5      % Monocytes 8      % Eosinophils 1      % Basophils 0      % Immature Granulocytes 0      NRBCs per 100 WBC 0      Absolute Neutrophils 6.7      Absolute Lymphocytes 0.4 (*)     Absolute Monocytes 0.7      Absolute Eosinophils 0.0      Absolute Basophils 0.0      Absolute Immature Granulocytes 0.0      Absolute NRBCs 0.0     INFLUENZA A/B, RSV, & SARS-COV2 PCR - Normal    Influenza A PCR Negative      Influenza B PCR Negative      RSV PCR Negative      SARS CoV2 PCR Negative     MAGNESIUM - Normal    Magnesium 2.1     TSH WITH FREE T4 REFLEX - Normal    TSH 1.83     ROUTINE UA WITH MICROSCOPIC REFLEX TO CULTURE          XR Chest 2 Views   Final Result   IMPRESSION: Right apical calcified granuloma and right paratracheal   calcified lymph nodes appear unchanged. Enlarged cardiomediastinal   silhouette is present. Multilevel degenerative changes are seen in the   spine.      MELINA PHILLIPS MD            SYSTEM ID:  [id number]      Head CT w/o contrast   Final Result   IMPRESSION:   1. No CT  findings of acute intracranial process.   2. Brain atrophy and presumed chronic small vessel ischemic changes,   as described.   3. Low-lying cerebellar tonsils, as described, with findings   concerning for borderline/mild Chiari I malformation. This is   unchanged.      EWA LANDIN MD            SYSTEM ID:  [id number]               Independent Interpretation (X-rays, CTs, rhythm strip):    Chest Radiograph without Pneumothorax, Lobar opacity, nor concerning cardiomegaly or pulm edema/pleural effusion          Consultations/Discussion of Management or Tests:    Discussed presenting symptoms, work-up, plan of care with the admitting hospitalist      Social Determinants of Health affecting care:    None           ED Medications:   Medications   lactated ringers BOLUS 500 mL (500 mLs Intravenous $New Bag [phone])             ED Course:             Medical Decision Makin-year-old female with dementia and aphasia at baseline with limited mobility cared for by  and home care at home.  Generalized weakness since this morning.  On examination here no significant traumatic findings.  She is chronically anticoagulated with warfarin.  Review of systems unremarkable for recent signs or symptoms of infectious process.  Blood counts unremarkable.  Urinalysis pending as the first specimen had a labeling issue and will need to recollect.  Appeared slightly volume down and so was given crystalloid.  On a road test she was unable to function at her baseline level and in her current state her  cannot care for her.  Given this we will admit for further evaluation and management.      Diagnosis:      ICD-10-CM    1. General weakness  R53.1       2. Failure to thrive in adult  R62.7             Disposition:    Observation       Wilder Dowling MD  [facility]  [facility]                       Wilder Dowling MD  [phone]

## 2023-11-23 NOTE — PROVIDER NOTIFICATION
Patient temperature elevated - UA obtained via straight cath and sent to lab.     Admitting provider notified.

## 2023-11-23 NOTE — PROGRESS NOTES
11/23/23 1120   Appointment Info   Signing Clinician's Name / Credentials (PT) Odalis Mora DPT   Quick Adds   Quick Adds Certification   Living Environment   People in Home spouse   Current Living Arrangements house   Home Accessibility stairs to enter home;stairs within home   Number of Stairs, Main Entrance 1   Number of Stairs, Within Home, Primary six   Stair Railings, Within Home, Primary railings safe and in good condition   Transportation Anticipated family or friend will provide   Self-Care   Usual Activity Tolerance moderate   Current Activity Tolerance fair   Equipment Currently Used at Home walker, standard;grab bar, tub/shower;shower chair   Fall history within last six months yes   Number of times patient has fallen within last six months 1   General Information   Onset of Illness/Injury or Date of Surgery 11/22/23   Referring Physician Paul Laura MD   Patient/Family Therapy Goals Statement (PT) Return home   Pertinent History of Current Problem (include personal factors and/or comorbidities that impact the POC) Danielle Bryan is a 78 year old female who presents from home with general weakness. Patient has a significant history of falls, dementia, atrial fibrillation on Coumadin, previous stroke with dysphagia and aphasia, hyperlipidemia, frozen shoulder who presents from home with just generalized weakness.  Workup in the emergency room was negative including a head CT electrolytes, and CBC.   Existing Precautions/Restrictions fall   Cognition   Affect/Mental Status (Cognition) confused   Orientation Status (Cognition) oriented to;person;verbal cues/prompts needed for orientation   Follows Commands (Cognition) follows one-step commands;0-24% accuracy;delayed response/completion;increased processing time needed;physical/tactile prompts required;repetition of directions required   Pain Assessment   Patient Currently in Pain No   Integumentary/Edema   Integumentary/Edema no deficits  were identifed   Posture    Posture Forward head position;Protracted shoulders   Range of Motion (ROM)   Range of Motion ROM is WFL   Strength (Manual Muscle Testing)   Strength (Manual Muscle Testing) Deficits observed during functional mobility   Bed Mobility   Comment, (Bed Mobility) Supine to sit Mod A   Transfers   Comment, (Transfers) Sit to stand Max A   Gait/Stairs (Locomotion)   Comment, (Gait/Stairs) Pt amb w/ FWW and Min A   Balance   Balance Comments Fair seated and poor standing w/ FWW   Sensory Examination   Sensory Perception patient reports no sensory changes   Clinical Impression   Criteria for Skilled Therapeutic Intervention Yes, treatment indicated   PT Diagnosis (PT) Impaired functional mobility and gait   Influenced by the following impairments Weakness, decreased activity tolerance, impaired balance, confusion   Functional limitations due to impairments Limited functional mobility requiring AD and assist   Clinical Presentation (PT Evaluation Complexity) stable   Clinical Presentation Rationale Based on PMH, current status, and social support   Clinical Decision Making (Complexity) low complexity   Planned Therapy Interventions (PT) balance training;bed mobility training;gait training;stair training;strengthening;transfer training;progressive activity/exercise   Risk & Benefits of therapy have been explained evaluation/treatment results reviewed;care plan/treatment goals reviewed;risks/benefits reviewed;current/potential barriers reviewed;participants voiced agreement with care plan;participants included;patient;spouse/significant other   PT Total Evaluation Time   PT Eval, Low Complexity Minutes (78174) 10   Therapy Certification   Start of care date 11/23/23   Certification date from 11/23/23   Certification date to 11/30/23   Medical Diagnosis Failure to thrive   Physical Therapy Goals   PT Frequency 5x/week   PT Predicted Duration/Target Date for Goal Attainment 11/30/23   PT Goals Bed  Mobility;Transfers;Gait;Stairs   PT: Bed Mobility Supervision/stand-by assist;Supine to/from sit   PT: Transfers Supervision/stand-by assist;Sit to/from stand;Assistive device   PT: Gait Supervision/stand-by assist;Assistive device;50 feet   PT: Stairs Minimal assist;6 stairs   Interventions   Interventions Quick Adds Therapeutic Activity;Gait Training   Therapeutic Activity   Therapeutic Activities: dynamic activities to improve functional performance Minutes (16105) 15   Symptoms Noted During/After Treatment Fatigue   Treatment Detail/Skilled Intervention Pt supine in bed upon therapist arrival, agreeable to PT. Pt's  present for session, very encouraging. Pt sitting EOB, required Max A to scoot hips forward. Pt performed sit <> stand x2 reps w/ FWW and Mod A. Pt requiring VCs for hand placement and anterior weight shift. Pt demos heavy posterior lean in standing, requiring Min A for balance. Pt stood over toilet, required assist w/ brief, pt sat on toilet. Pt performed sit to stand w/ Mod A x2. Pt total assist for pericares and brief. Pt sat EOB, transferred to supine w/ Max A. Pt required assist to reposition in bed. Bed alarm on, all needs w/in reach,  at side.   Gait Training   Gait Training Minutes (67534) 10   Symptoms Noted During/After Treatment (Gait Training) fatigue   Treatment Detail/Skilled Intervention Pt amb 20' w/ FWW and Min A, pt demos slow speed, mild instability. Pt requiring heavy VCs and TCs for movement initiation and follow through. Pt w/ dificulty on turns, requiring Min A x2. Pt amb 5' w/ FWW and Min A x2.   PT Discharge Planning   PT Plan Progress bed mob, transfers, amb   PT Discharge Recommendation (DC Rec) Transitional Care Facility   PT Rationale for DC Rec Pt below baseline, currently Ax2 for mobility. Pt has assist from her  at home and stairs to navigate. Recommend TCU to progress strength and functional mobility   PT Brief overview of current status Ax2    Total Session Time   Timed Code Treatment Minutes 25   Total Session Time (sum of timed and untimed services) 35   [facility]  OUTPATIENT PHYSICAL THERAPY EVALUATION  PLAN OF TREATMENT FOR OUTPATIENT REHABILITATION  (COMPLETE FOR INITIAL CLAIMS ONLY)  Patient's Last Name, First Name, M.I.  YOB: 1945  IrvinDanielle CUEVAS                        Provider's Name  [facility] Medical Record No.  [id number]                             Onset Date:  11/22/23   Start of Care Date:  11/23/23   Type:     _X_PT   ___OT   ___SLP Medical Diagnosis:  Failure to thrive              PT Diagnosis:  Impaired functional mobility and gait Visits from SOC:  1     See note for plan of treatment, functional goals and certification details    I CERTIFY THE NEED FOR THESE SERVICES FURNISHED UNDER        THIS PLAN OF TREATMENT AND WHILE UNDER MY CARE     (Physician co-signature of this document indicates review and certification of the therapy plan).

## 2023-11-23 NOTE — PROGRESS NOTES
[facility]    Medicine Progress Note - Hospitalist Service    Date of Admission:  11/22/2023    Assessment & Plan Danielle Bryan is a 78 year old female with past medical history of dementia, CVA c/b dysphagia and aphasia, hyperlipidemia, atrial fibrillation on chronic anticoagulation, HLD, who was admitted on 11/22/2023 after presenting for evaluation of global weakness.    Workup in the emergency room was negative including a head CT electrolytes, and CBC.  Her TSH was also noted to be normal.  Following admission to the floor developed fever to 101.3, cultures were drawn.  Remains admitted for further cares and monitoring.  Infectious workup thus far is negative for source of fever.  Clinically improving but not back to her baseline per spouse who is at bedside.  Social work, PT, OT are consulted.    Fever  Suspect viral process contributing to fever, global weakness.  No evidence of sepsis or hemodynamic instability. NCHCT, CXR, UA, Viral PCR all negative. Nonew medication changes  -Only symptom is global weakness.  Monitor for any development of new symptoms.  -If any abdominal pain, nausea or vomiting lack of p.o. intake or recurrent fever obtain CT abdomen pelvis patient is a poor historian and symptom  due to previous stroke deficits and dementia  -Monitor vital signs  -Follow blood cultures, no growth to date  -anticipate self resolution if viral process     Atrial fibrillation, Coumadin induced coagulopathy  On lopressor, rate controlled.  She is also on Coumadin with an INR goal of 2-3 and currently has a sub therapeutic INR. -Pharm.D. to dose her Coumadin.      Presumed Alzheimer's dementia  At baseline Patient has been on Namenda, Aricept, and Seroquel.    Normally at baseline her  is standby assist when she gets up and about.  She did have a fall back in 9/2023 for which she was seen in the emergency room.  She otherwise has no other symptoms that we can tell  "that could be contributing to her symptoms.  -Seroquel held as noted below  -resume PTA namenda, aricept     Concern for possible Tardive Dyskinesia   Admitting provider had concern for Seroquel contribution to tardive dyskinesia example being lipsmacking movements.  Seroquel since held.    History of falls  Patient had a fall in 9/2023 and was seen in the emergency room.  She is at high risk for morbidity and mortality if she falls as she is on Coumadin.    -pt/ot/sw consulted for dispo recommendations     CVA  HLD  Complicated by dysphagia and aphasia.  Patient is on Coumadin to prevent future strokes.  -resume PTA statin at discharge (obs status)     Depression, anxiety  Patient has been on Lexapro, Resume.      Frozen shoulder  History noted.         Diet: Regular Diet Adult    DVT Prophylaxis: Warfarin  [place] Catheter: Not present  Lines: None     Cardiac Monitoring: None  Code Status: Full Code      Clinically Significant Risk Factors Present on Admission               # Drug Induced Coagulation Defect: home medication list includes an anticoagulant medication         # Overweight: Estimated body mass index is 29.15 kg/m  as calculated from the following:    Height as of this encounter: 1.575 m (5' 2\").    Weight as of this encounter: 72.3 kg (159 lb 6.3 oz).              Disposition Plan      Expected Discharge Date: 11/23/2023                    Susan Donaldson PA-C  Hospitalist Service  [facility]  Securely message with [employer] (more info)  Text page via [facility] Paging/Directory   ______________________________________________________________________    Interval History   T max 101.8 F 2000 11/22.   No fever PTA.  Strength improving per patient's spouse but not yet back to baseline.  Has been noted to be eating and drinking.  No nonverbal demonstrations of pain.  No nausea or vomiting noted.  No diarrhea.     is at bedside, he feels that she is improving from a strength " perspective although not back to her baseline.  He describes facial flushing which is new.    Physical Exam   Vital Signs: Temp: 98.8  F (37.1  C) Temp src: Oral BP: 133/70 Pulse: 63   Resp: 18 SpO2: 92 % O2 Device: None (Room air)    Weight: 159 lbs 6.28 oz    Constitutional: Awake, alert, no apparent distress.  Facial flushing.  Respiratory:  Normal work of breathing. Lungs clear to auscultation bilaterally, no crackles or wheezing.  Cardiovascular: Regular rate and rhythm, normal S1 and S2, and no murmur appreciated.   GI: Bowel sounds present. soft, non-distended, non-tender.   Skin/Integument: Warm, dry. no peripheral edema.  Neuro: awake. unable to test orientation due to expressive aphasia. Follows commands. Expressive aphasia, has tongue smacking on exam   MK: Moves all extremities but difficulty moving her right arm due to her frozen shoulder, no clubbing, cyanosis, nor edema   Psych: Appropriate affect.      Medical Decision Making       55 MINUTES SPENT BY ME on the date of service doing chart review, history, exam, documentation & further activities per the note.      Data   ------------------------- PAST 24 HR DATA REVIEWED -----------------------------------------------[id number]}

## 2023-11-23 NOTE — PHARMACY-ANTICOAGULATION SERVICE
Clinical Pharmacy - Warfarin Dosing Consult     Pharmacy has been consulted to manage this patient s warfarin therapy.  Indication: Atrial Fibrillation  Therapy Goal: INR 2-3  Warfarin Prior to Admission: Yes  Warfarin PTA Regimen: 7.5 mg Tu, Fr and 5 mg ROW  Recent documented change in oral intake/nutrition: Unknown  Dose Comments: INR 1.77. Warfarin 5 mg today    INR   Date Value Ref Range Status   11/22/2023 1.77 (H) 0.85 - 1.15 Final   11/13/2023 2.1 (H) 0.9 - 1.1 Final       Recommend warfarin 5 mg today.  Pharmacy will monitor Danielle Bryan daily and order warfarin doses to achieve specified goal.      Please contact pharmacy as soon as possible if the warfarin needs to be held for a procedure or if the warfarin goals change.

## 2023-11-23 NOTE — CONSULTS
Care Management Initial Consult    General Information  Assessment completed with: Spouse or significant other, , Karl  Type of CM/SW Visit: Initial Assessment    Primary Care Provider verified and updated as needed:     Readmission within the last 30 days: no previous admission in last 30 days      Reason for Consult: discharge planning  Advance Care Planning:            Communication Assessment  Patient's communication style: spoken language (English or Bilingual)    Hearing Difficulty or Deaf: no   Wear Glasses or Blind: yes    Cognitive  Cognitive/Neuro/Behavioral: .WDL except (patient unable to express herself)  Level of Consciousness: alert           Best Language: 3 - Mute       Living Environment:   People in home: spouse     Current living Arrangements: house      Able to return to prior arrangements:         Family/Social Support:  Care provided by: self, spouse/significant other  Provides care for: no one, unable/limited ability to care for self  Marital Status:   , Children  Karl       Description of Support System: Supportive, Involved    Support Assessment: Adequate family and caregiver support    Current Resources:   Patient receiving home care services: No     Community Resources:    Equipment currently used at home: walker, standard, grab bar, tub/shower, shower chair  Supplies currently used at home:      Employment/Financial:  Employment Status:          Financial Concerns:             Does the patient's insurance plan have a 3 day qualifying hospital stay waiver?  No    Lifestyle & Psychosocial Needs:  Social Determinants of Health     Food Insecurity: No Food Insecurity (4/8/2022)    Hunger Vital Sign     Worried About Running Out of Food in the Last Year: Never true     Ran Out of Food in the Last Year: Never true   Depression: Not at risk (4/19/2023)    PHQ-2     PHQ-2 Score: 1   Housing Stability: Low Risk  (4/8/2022)    Housing Stability Vital Sign     Unable to Pay  for Housing in the Last Year: No     Number of Places Lived in the Last Year: 1     Unstable Housing in the Last Year: No   Tobacco Use: Low Risk  (7/17/2023)    Patient History     Smoking Tobacco Use: Never     Smokeless Tobacco Use: Never     Passive Exposure: Not on file   Financial Resource Strain: Low Risk  (4/8/2022)    Overall Financial Resource Strain (CARDIA)     Difficulty of Paying Living Expenses: Not hard at all   Alcohol Use: Not At Risk (4/8/2022)    AUDIT-C     Frequency of Alcohol Consumption: Never     Average Number of Drinks: Not on file     Frequency of Binge Drinking: Not on file   Transportation Needs: No Transportation Needs (4/8/2022)    PRAPARE - Transportation     Lack of Transportation (Medical): No     Lack of Transportation (Non-Medical): No   Physical Activity: Not on file   Interpersonal Safety: Not At Risk (4/8/2022)    Humiliation, Afraid, Rape, and Kick questionnaire     Fear of Current or Ex-Partner: No     Emotionally Abused: No     Physically Abused: No     Sexually Abused: No   Stress: Not on file   Social Connections: Unknown (2/24/2021)    Social Connection and Isolation Panel [NHANES]     Frequency of Communication with Friends and Family: Not asked     Frequency of Social Gatherings with Friends and Family: Not asked     Attends [employer]ism Services: Not asked     Active Member of Clubs or Organizations: Not asked     Attends Club or Organization Meetings: Not asked     Marital Status:        Functional Status:  Prior to admission patient needed assistance:    Not at functional baseline    Mental Health Status:  Mental Health Status: No Current Concerns       Chemical Dependency Status:  Chemical Dependency Status: No Current Concerns             Values/Beliefs:  Spiritual, Cultural Beliefs, Mormonism Practices, Values that affect care:                 Additional Information:  Met with patient and , patient fell asleep but  completed assessment.   Discussed therapy recommendations for TCU, and  is open to this option.   would like to try [facility] and [facility], he would like to take patient back home after TCU.  SW to send TCU referrals and follow for discharge planning.    Tommy Sheridan, QASIM  [phone]

## 2023-11-23 NOTE — PROGRESS NOTES
11/23/23 0900   Appointment Info   Signing Clinician's Name / Credentials (OT) Nilda Howell, OTR/L   Quick Adds   Quick Adds Certification   Living Environment   People in Home spouse   Current Living Arrangements house   Home Accessibility stairs to enter home;stairs within home   Number of Stairs, Main Entrance 1   Number of Stairs, Within Home, Primary six   Transportation Anticipated family or friend will provide   Living Environment Comments walk in shower and grab bars with shower chair; comfort height toilet   Self-Care   Equipment Currently Used at Home walker, standard;grab bar, tub/shower;shower chair  (uses walker outside, not typically in house)   Fall history within last six months yes   Number of times patient has fallen within last six months 1   Activity/Exercise/Self-Care Comment pt recieves assist with dressing, toileting, showering, and all IADLs. Indep with mobility in the home   General Information   Onset of Illness/Injury or Date of Surgery 11/22/23   Referring Physician Paul Laura MD   Patient/Family Therapy Goal Statement (OT) return home   Additional Occupational Profile Info/Pertinent History of Current Problem per chart: 78 y F here with  and son for eval gen weakness since this AM.       No recent fever, vomiting, diarrhea or note of malodorous urine.  No recent falls or trauma.       stated baseline he will gently help her sit up on the side of the bed change closed but when she is up needs only a standby assist.  Since this morning she had a really hard time and he had to help her significantly more than usual to get to the bathroom and significant difficulty getting back up off the toilet and back to bed.  Also noted her to be more fatigued/tired and confused than normal.  No one else at home is sick.  Has been compliant with her medications.  She is continued on her warfarin which has been therapeutic on recent checks.  This morning when he turned his back  and she is outside the bed she slid down to the floor.  He does not report any significant trauma or head injury.     History of dementia and aphasia states that is at baseline.   Cognitive Status Examination   Cognitive Status Comments dementia at baseline   Bed Mobility   Bed Mobility supine-sit   Supine-Sit Corn (Bed Mobility) maximum assist (25% patient effort)   Transfers   Transfers sit-stand transfer;toilet transfer   Sit-Stand Transfer   Sit-Stand Corn (Transfers) moderate assist (50% patient effort)   Toilet Transfer   [place] (Toilet Transfer) moderate assist (50% patient effort)   Balance   Balance Comments yes signficant posterior lean in standing   Activities of Daily Living   BADL Assessment/Intervention lower body dressing;toileting   Lower Body Dressing Assessment/Training   [place] (Lower Body Dressing) maximum assist (25% patient effort)   Clinical Impression   Criteria for Skilled Therapeutic Interventions Met (OT) Yes, treatment indicated   OT Diagnosis weakness   OT Problem List-Impairments impacting ADL problems related to;activity tolerance impaired;balance;cognition;mobility   Assessment of Occupational Performance 5 or more Performance Deficits   Identified Performance Deficits functional mobility; functional transfers   Planned Therapy Interventions (OT) ADL retraining;progressive activity/exercise;transfer training   Clinical Decision Making Complexity (OT) problem focused assessment/low complexity   Risk & Benefits of therapy have been explained evaluation/treatment results reviewed;care plan/treatment goals reviewed;risks/benefits reviewed;current/potential barriers reviewed;participants voiced agreement with care plan;participants included;patient;spouse/significant other   Clinical Impression Comments Pt is significantly below baseline and would benefit from ongoing therapy, however concerns with participation due to cognition.   OT Total  Evaluation Time   OT Eval, Low Complexity Minutes (41874) 5   Therapy Certification   Medical Diagnosis fall   Start of Care Date 11/23/23   Certification date from 11/23/23   Certification date to 11/30/23   OT Goals   Therapy Frequency (OT) Daily   OT Predicted Duration/Target Date for Goal Attainment 11/30/23   OT Goals Toilet Transfer/Toileting;OT Goal 1;Lower Body Dressing   OT: Lower Body Dressing Maximum assist   OT: Toilet Transfer/Toileting toilet transfer;Minimal assist   OT: Goal 1 Pt will be Ax1 with shower transfer.   Interventions   Interventions Quick Adds Self-Care/Home Management   Self-Care/Home Management   Self-Care/Home Mgmt/ADL, Compensatory, Meal Prep Minutes (50818) 21   Treatment Detail/Skilled Intervention Pt is max A with bed mobility from supine to sit with raised HOB and use of bed rail. OT helps significantly with transfer as pt has difficulty with following directions and appears fearful with transfer. Once sitting EOB, pt requires min A to sit EOB due to posterior lean. Pt is total A with donning brief while sitting EOB. Pt is mod A with STS from raised bed. Pt is min A x2 with mobiltity in room; OT and  help direct pt into bathroom and transfer onto toilet while guiding pt and walker. Pt appears anxious. Pt is max A with managing brief.  Pt is mod A with stand to sit onto toilet. Pt left on toilet with pt's  present and nursing notified. Pt's  educated on TCU rec.   OT Discharge Planning   OT Plan toilet transfer; shower transfer   OT Discharge Recommendation (DC Rec) Transitional Care Facility   OT Rationale for DC Rec Pt is signifcantly below baseline due to weakness and cog impairment. OT recommending OT during IP and TCU to maximimze indep, however pt's cognition may be barrier to participation and progression at TCU. Pt's  strong advocate and able to help with pt particiapting in session.   OT Brief overview of current status max A bed mobility; mod A  toilet transfer; max A LB dressing   Total Session Time   Timed Code Treatment Minutes 21   Total Session Time (sum of timed and untimed services) 26      [facility]  OUTPATIENT OCCUPATIONAL THERAPY  EVALUATION  PLAN OF TREATMENT FOR OUTPATIENT REHABILITATION  (COMPLETE FOR INITIAL CLAIMS ONLY)  Patient's Last Name, First Name, M.I.  YOB: 1945  Danielle Bryan                          Provider's Name  [facility] Medical Record No.  [id number]                             Onset Date:  11/22/23   Start of Care Date:  11/23/23   Type:     ___PT   _X_OT   ___SLP Medical Diagnosis:  fall                    OT Diagnosis:  weakness Visits from SOC:  1     See note for plan of treatment, functional goals and certification details    I CERTIFY THE NEED FOR THESE SERVICES FURNISHED UNDER        THIS PLAN OF TREATMENT AND WHILE UNDER MY CARE     (Physician co-signature of this document indicates review and certification of the therapy plan).

## 2023-11-23 NOTE — PHARMACY-ADMISSION MEDICATION HISTORY
Pharmacist Admission Medication History    Admission medication history is complete. The information provided in this note is only as accurate as the sources available at the time of the update.    Information Source(s): Family member and CareEverywhere/SureScripts via in-person    Pertinent Information: Takes all medications at once in the evening.    Changes made to PTA medication list:  Added: None  Deleted: Ocean Nasal Spray  Changed: None    Medication Affordability:  Not including over the counter (OTC) medications, was there a time in the past 3 months when you did not take your medications as prescribed because of cost?: No    Allergies reviewed with patient and updates made in EHR: no    Medication History Completed By: Baylee Robertson [facility] 11/23/2023 9:55 AM    Current Facility-Administered Medications for the 11/22/23 encounter (Hospital Encounter)   Medication    cyanocobalamin injection 1,000 mcg    denosumab (PROLIA) injection 60 mg     PTA Med List   Medication Sig Last Dose    cetirizine (ZYRTEC) 10 MG tablet Take 10 mg by mouth daily as needed for allergies Unknown at PRN    diclofenac (VOLTAREN) 1 % topical gel Apply 4 g topically 3 times daily as needed for moderate pain (4-6) Unknown at PRN    docusate sodium (COLACE) 100 MG tablet Take 100 mg by mouth At Bedtime 11/21/2023    donepezil (ARICEPT) 5 MG tablet Take 10 mg by mouth at bedtime 11/21/2023    escitalopram (LEXAPRO) 10 MG tablet Take 20 mg by mouth every evening 11/21/2023    gabapentin (NEURONTIN) 300 MG capsule Take 300 mg by mouth every evening 11/21/2023    memantine (NAMENDA) 10 MG tablet Take 20 mg by mouth every evening 11/21/2023    metoprolol tartrate (LOPRESSOR) 25 MG tablet Take 12.5 mg by mouth every evening 11/21/2023    QUEtiapine (SEROQUEL) 25 MG tablet Patient takes 5 tablets at bedtime. 11/21/2023    warfarin ANTICOAGULANT (COUMADIN) 2.5 MG tablet Take 7.5mg (3 tablets) every Tu & Fri / Take 5mg (2 tablets) all other  days OR per INR clinic 11/21/2023

## 2023-11-23 NOTE — PROGRESS NOTES
PRIMARY DIAGNOSIS: GENERALIZED WEAKNESS/ Fall    OUTPATIENT/OBSERVATION GOALS TO BE MET BEFORE DISCHARGE  1. Orthostatic performed: N/A    2. Tolerating PO medications: Yes    3. Return to near baseline physical activity: Yes    4. Cleared for discharge by consultants (if involved): No    Discharge Planner Nurse   Safe discharge environment identified: Yes  Barriers to discharge: Yes       Entered by: Lyric Howell RN 11/23/2023 1:51 PM     Please review provider order for any additional goals.   Nurse to notify provider when observation goals have been met and patient is ready for discharge.   Pt up to bathroom with one and walker/gait belt tolerated well. Pt with   at bedside resting comfortably. Pt tolerating diet. Pt vitals are stable. Pt and   aware of recommended discharge  to tcu.

## 2023-11-24 ENCOUNTER — APPOINTMENT (OUTPATIENT)
Dept: PHYSICAL THERAPY | Facility: CLINIC | Age: 78
End: 2023-11-24
Payer: COMMERCIAL

## 2023-11-24 ENCOUNTER — APPOINTMENT (OUTPATIENT)
Dept: OCCUPATIONAL THERAPY | Facility: CLINIC | Age: 78
End: 2023-11-24
Payer: COMMERCIAL

## 2023-11-24 LAB
ANION GAP SERPL CALCULATED.3IONS-SCNC: 8 MMOL/L (ref 7–15)
BUN SERPL-MCNC: 14.4 MG/DL (ref 8–23)
CALCIUM SERPL-MCNC: 8.2 MG/DL (ref 8.8–10.2)
CHLORIDE SERPL-SCNC: 107 MMOL/L (ref 98–107)
CREAT SERPL-MCNC: 0.86 MG/DL (ref 0.51–0.95)
DEPRECATED HCO3 PLAS-SCNC: 24 MMOL/L (ref 22–29)
EGFRCR SERPLBLD CKD-EPI 2021: 68 ML/MIN/1.73M2
ERYTHROCYTE [DISTWIDTH] IN BLOOD BY AUTOMATED COUNT: 13.2 % (ref 10–15)
GLUCOSE SERPL-MCNC: 99 MG/DL (ref 70–99)
HCT VFR BLD AUTO: 37.1 % (ref 35–47)
HGB BLD-MCNC: 12.2 G/DL (ref 11.7–15.7)
INR PPP: 1.67 (ref 0.85–1.15)
MCH RBC QN AUTO: 29.2 PG (ref 26.5–33)
MCHC RBC AUTO-ENTMCNC: 32.9 G/DL (ref 31.5–36.5)
MCV RBC AUTO: 89 FL (ref 78–100)
PLATELET # BLD AUTO: 191 10E3/UL (ref 150–450)
POTASSIUM SERPL-SCNC: 3.9 MMOL/L (ref 3.4–5.3)
RBC # BLD AUTO: 4.18 10E6/UL (ref 3.8–5.2)
SODIUM SERPL-SCNC: 139 MMOL/L (ref 135–145)
WBC # BLD AUTO: 5.6 10E3/UL (ref 4–11)

## 2023-11-24 PROCEDURE — G0378 HOSPITAL OBSERVATION PER HR: HCPCS

## 2023-11-24 PROCEDURE — 97535 SELF CARE MNGMENT TRAINING: CPT | Mod: GO

## 2023-11-24 PROCEDURE — 97530 THERAPEUTIC ACTIVITIES: CPT | Mod: GP

## 2023-11-24 PROCEDURE — 250N000013 HC RX MED GY IP 250 OP 250 PS 637: Performed by: PHYSICIAN ASSISTANT

## 2023-11-24 PROCEDURE — 80048 BASIC METABOLIC PNL TOTAL CA: CPT | Performed by: PHYSICIAN ASSISTANT

## 2023-11-24 PROCEDURE — 99231 SBSQ HOSP IP/OBS SF/LOW 25: CPT | Performed by: PHYSICIAN ASSISTANT

## 2023-11-24 PROCEDURE — 85610 PROTHROMBIN TIME: CPT | Performed by: INTERNAL MEDICINE

## 2023-11-24 PROCEDURE — 250N000013 HC RX MED GY IP 250 OP 250 PS 637: Performed by: INTERNAL MEDICINE

## 2023-11-24 PROCEDURE — 36415 COLL VENOUS BLD VENIPUNCTURE: CPT | Performed by: PHYSICIAN ASSISTANT

## 2023-11-24 PROCEDURE — 85014 HEMATOCRIT: CPT | Performed by: PHYSICIAN ASSISTANT

## 2023-11-24 PROCEDURE — 97116 GAIT TRAINING THERAPY: CPT | Mod: GP

## 2023-11-24 RX ORDER — ESCITALOPRAM OXALATE 20 MG/1
20 TABLET ORAL EVERY EVENING
Status: DISCONTINUED | OUTPATIENT
Start: 2023-11-24 | End: 2023-11-29 | Stop reason: HOSPADM

## 2023-11-24 RX ORDER — GABAPENTIN 300 MG/1
300 CAPSULE ORAL AT BEDTIME
Status: DISCONTINUED | OUTPATIENT
Start: 2023-11-24 | End: 2023-11-29 | Stop reason: HOSPADM

## 2023-11-24 RX ORDER — ACETAMINOPHEN 325 MG/1
975 TABLET ORAL EVERY 8 HOURS PRN
Status: DISCONTINUED | OUTPATIENT
Start: 2023-11-24 | End: 2023-11-29 | Stop reason: HOSPADM

## 2023-11-24 RX ADMIN — DONEPEZIL HYDROCHLORIDE 10 MG: 10 TABLET ORAL at 22:25

## 2023-11-24 RX ADMIN — GABAPENTIN 300 MG: 300 CAPSULE ORAL at 22:24

## 2023-11-24 RX ADMIN — ESCITALOPRAM OXALATE 20 MG: 20 TABLET, FILM COATED ORAL at 22:25

## 2023-11-24 RX ADMIN — DOCUSATE SODIUM 100 MG: 100 CAPSULE, LIQUID FILLED ORAL at 22:25

## 2023-11-24 RX ADMIN — METOPROLOL TARTRATE 12.5 MG: 25 TABLET, FILM COATED ORAL at 08:41

## 2023-11-24 RX ADMIN — MEMANTINE 20 MG: 5 TABLET ORAL at 22:24

## 2023-11-24 RX ADMIN — MAGNESIUM HYDROXIDE 30 ML: 400 SUSPENSION ORAL at 12:58

## 2023-11-24 RX ADMIN — ACETAMINOPHEN 975 MG: 325 TABLET, FILM COATED ORAL at 14:11

## 2023-11-24 RX ADMIN — WARFARIN SODIUM 7.5 MG: 5 TABLET ORAL at 17:50

## 2023-11-24 RX ADMIN — MAGNESIUM HYDROXIDE 30 ML: 400 SUSPENSION ORAL at 13:06

## 2023-11-24 ASSESSMENT — ACTIVITIES OF DAILY LIVING (ADL)
ADLS_ACUITY_SCORE: 49
ADLS_ACUITY_SCORE: 46
ADLS_ACUITY_SCORE: 46
ADLS_ACUITY_SCORE: 42
ADLS_ACUITY_SCORE: 49
ADLS_ACUITY_SCORE: 49
ADLS_ACUITY_SCORE: 42
ADLS_ACUITY_SCORE: 49

## 2023-11-24 NOTE — PROGRESS NOTES
Pt alert but disorientated x4. Pt has Dementia. Was up to bathroom with therapy, heavy assist of 2 with gb/walker. Incontinent at times.  PIV S/L right hand. VSS on RA. BP was high until metoprolol given, then came down. Pt needs assistance to eat.  Karl is bedside, very involved in her cares, helps feeding her. Pt c/o neck pain, tylenol given

## 2023-11-24 NOTE — PROGRESS NOTES
SPIRITUAL HEALTH SERVICES - Progress Note  RH  222    Visit with Soren and her , Karl per ACP consult order.    I introduced myself and [facility] services.      Soren is not decisional, (doctor 11/22 notes that she has Alzheimers)  therefore, she cannot complete a triston care directive.  Karl also said she is not decisional.      Karl and I spoke out or the room about this.  I explained that he could not do the triston care directive for her.  We then talked about next of kin being able to make decisions about health care for a loved one when there are no ACP docs on file.    After our discussion, Karl decided he wanted to do a health care directive for himself.  I brought him the documents, explained then and answered questions for him..    Karl asked for a prayer before I left.  I did let him know how to contact [facility] should he need more support      Maria Eugenia Roberts MA  Associate   [phone] - pager  [facility] remains available 24/7 for emergent requests/referrals, either by having the on-call  paged or by entering an ASAP/STAT consult in Epic (this will also page the on-call ). Routine Epic consults receive an initial response within 24 hours.

## 2023-11-24 NOTE — PROGRESS NOTES
PRIMARY DIAGNOSIS: GENERALIZED WEAKNESS/ Fall    OUTPATIENT/OBSERVATION GOALS TO BE MET BEFORE DISCHARGE  1. Orthostatic performed: N/A    2. Tolerating PO medications: Yes    3. Return to near baseline physical activity: Yes    4. Cleared for discharge by consultants (if involved): No    Discharge Planner Nurse   Safe discharge environment identified: Yes  Barriers to discharge: Yes       Entered by: Laurent Burr RN 11/23/2023    Please review provider order for any additional goals.   Nurse to notify provider when observation goals have been met and patient is ready for discharge.

## 2023-11-24 NOTE — UTILIZATION REVIEW
Concurrent stay review; Secondary Review Determination     [facility]          Under the authority of the Utilization Management Committee, the utilization review process indicated a secondary review on the above patient.  The review outcome is based on review of the medical records, discussions with staff, and applying clinical experience noted on the date of the review.          (x) Observation Status Appropriate - Concurrent stay review    RATIONALE FOR DETERMINATION     78-year-old female with a history of dementia, previous CVA with dysphagia and aphasia, hyperlipidemia, and atrial fibrillation on chronic anticoagulation presented with global weakness. Initial workup in the emergency room was unremarkable, but she developed a fever following admission, with an infectious workup yielding no source of the fever. She clinically improved with supportive care, including physical and occupational therapy consultations. The fever was suspected to be due to a viral process, and monitoring for any new symptoms was recommended. Her atrial fibrillation was managed with lopressor, and her Coumadin therapy was adjusted by a pharmacist. She also had a history of neurocognitive disorder, presumed Alzheimer's dementia, and B12 deficiency, for which she continued her prior medications and was due for a B12 injection in the outpatient setting.   Patient is clinically improving and there is no clear indication to change patient's status to inpatient. The severity of illness, intensity of service provided, expected LOS and risk for adverse outcome make the care appropriate for observation.      This document was produced using voice recognition software       The information on this document is developed by the utilization review team in order for the business office to ensure compliance.  This only denotes the appropriateness of proper admission status and does not reflect the quality of care rendered.          The definitions of Inpatient Status and Observation Status used in making the determination above are those provided in the CMS Coverage Manual, Chapter 1 and Chapter 6, section 70.4.      Sincerely,     FELICIA DAHL MD    System Medical Director  Utilization Management  [facility].

## 2023-11-24 NOTE — PROGRESS NOTES
Care Management Follow Up    Expected Discharge Date: 11/25/2023     Concerns to be Addressed: Discharge planning     Patient plan of care discussed at interdisciplinary rounds: Yes    Anticipated Discharge Disposition: Transitional Care     Anticipated Discharge Services: PT/OT      Patient/family educated on Medicare website which has current facility and service quality ratings:  Yes  Education Provided on the Discharge Plan:  Yes  Patient/Family in Agreement with the Plan:  Yes    Referrals Placed by CM/SW:  Skilled Nursing Facility  Private pay costs discussed: Not applicable    Additional Information[facility]. SW met with patient and spouse Karl at bedside. He is agreeable to additional referrals being sent to [facility], and [facility]. Referrals sent. [facility] will continue to follow.    AAMIR Kumar, [facility]   Inpatient Care Coordination  [facility]   [phone]

## 2023-11-24 NOTE — PROGRESS NOTES
Pt alert but disorientated x4. Pt has Dementia. Has NOOB  Incontinent  PIV S/L right hand. VSS on RA. BP was high until metoprolol given, then came down. Pt needs assistance to eat.

## 2023-11-24 NOTE — PROGRESS NOTES
[facility]    Medicine Progress Note - Hospitalist Service    Date of Admission:  11/22/2023    Assessment & Plan Danielle Bryan is a 78 year old female with past medical history of dementia, CVA c/b dysphagia and aphasia, hyperlipidemia, atrial fibrillation on chronic anticoagulation, HLD, who was admitted on 11/22/2023 after presenting for evaluation of global weakness.    Workup in the emergency room was negative including a head CT electrolytes, and CBC.  Her TSH was also noted to be normal.  Following admission to the floor developed fever to 101.3, cultures were drawn.  Remains admitted for further cares and monitoring.  Infectious workup thus far is negative for source of fever.  No fever recurrence.  Clinically improving with supportive care for not completely back to her previous baseline.  PT, OT are consulted and following.  Initially recommendations were for TCU.    Fever  Suspect viral process contributing to fever, global weakness.  No evidence of sepsis or hemodynamic instability. NCHCT, CXR, UA, Viral PCR all negative. No new medication changes.  -Only symptom is global weakness which .  Monitor for any development of new symptoms.  -If any abdominal pain, nausea or vomiting lack of p.o. intake or recurrent fever obtain CT abdomen pelvis patient is a poor historian and symptom  due to previous stroke deficits and dementia  -Monitor vital signs  -Follow blood cultures, no growth to date  -anticipate self resolution if viral process. Appears improving.     Atrial fibrillation, Coumadin induced coagulopathy  On lopressor, rate controlled.  She is also on Coumadin with an INR goal of 2-3 and currently has a sub therapeutic INR. -Pharm.D. to dose her Coumadin.      Neurocognitive Disorder   Presumed Alzheimer's dementia  B12 Deficiency   Follows with Dr. Ledesma of neurology for progressive cognitive dysfunction B12 deficiency and progressive dementia consistent with  "Alzheimer's type.  Previous brain imaging also showed mild amyloid angiopathy changes that is thought to be contributing as well.  She has known abnormal gait with rigidity and jaw tremor considered for parkinsonism although per last neurology note deferred Sinemet trial at this time.  -Continue prior to admission Aricept, Namenda and monthly B12 injections she is due for this tomorrow but we will defer till the outpatient setting at this time.  Continue Neurontin 300 mg at lunch and dinner as per last neurology note.  Seroquel was held in case contributing to weakness as per admission as well as Lexapro which we may consider reintroducing.  Due for follow-up with neurologist.  -Admitting provider had concern for Seroquel contribution to tardive dyskinesia example being lipsmacking movements.  Seroquel since held.    History of falls  Patient had a fall in 9/2023 and was seen in the emergency room.  She is at high risk for morbidity and mortality if she falls as she is on Coumadin.    -pt/ot/sw consulted for dispo recommendations     CVA  HLD  Complicated by dysphagia and aphasia.  Patient is on Coumadin to prevent future strokes.  -resume PTA statin at discharge (obs status)     Depression, anxiety  Patient has been on Lexapro, Resume if stable at discharge.      Frozen shoulder  History noted.         Diet: Regular Diet Adult    DVT Prophylaxis: Warfarin  Morelos Catheter: Not present  Lines: None     Cardiac Monitoring: None  Code Status: Full Code      Clinically Significant Risk Factors Present on Admission               # Drug Induced Coagulation Defect: home medication list includes an anticoagulant medication         # Overweight: Estimated body mass index is 29.15 kg/m  as calculated from the following:    Height as of this encounter: 1.575 m (5' 2\").    Weight as of this encounter: 72.3 kg (159 lb 6.3 oz).              Disposition Plan      Expected Discharge Date: 11/25/2023                  Susan" KARLEE Donaldson  Hospitalist Service  [facility]  Securely message with [employer] (more info)  Text page via [employer] Paging/Directory   ______________________________________________________________________    Interval History   Tmax 99.1 F  No acute events overnight  Voiding, due for BM spouse requesting bowel meds.  No nonverbal signs of pain.  Was up with PT to the bathroom has some urinary incontinence.  Needs assistance with feeding but is tolerating a diet no nausea or vomiting.  Facial flushing today.  Family and PCA all at bedside this morning for an update.    Later called back in the room to address prior to admission medications again.   Patient's spouse states that she was taking gabapentin and would like resumed.  Discussed Seroquel as it was being held for possible concern for side effects and Lexapro previous provider.    Physical Exam   Vital Signs: Temp: 99.1  F (37.3  C) Temp src: Oral BP: 137/71 Pulse: 66   Resp: 17 SpO2: 95 % O2 Device: None (Room air)    Weight: 159 lbs 6.28 oz    Constitutional: Awake, alert, no apparent distress.   Respiratory:  Normal work of breathing.   Skin/Integument: Warm, dry. no peripheral edema.  Neuro: awake. unable to test orientation due to expressive aphasia. Noted lip smacking. Follows commands. At times answers yes or no to spouse's questions.   MK: Moves all extremities but difficulty moving her right arm due to her frozen shoulder, no clubbing, cyanosis. No edema.   Psych: Appropriate affect.      Medical Decision Making       55 MINUTES SPENT BY ME on the date of service doing chart review, history, exam, documentation & further activities per the note.      Data   ------------------------- PAST 24 HR DATA REVIEWED -----------------------------------------------[id number]}

## 2023-11-25 ENCOUNTER — APPOINTMENT (OUTPATIENT)
Dept: OCCUPATIONAL THERAPY | Facility: CLINIC | Age: 78
End: 2023-11-25
Payer: COMMERCIAL

## 2023-11-25 LAB — INR PPP: 2.25 (ref 0.85–1.15)

## 2023-11-25 PROCEDURE — G0378 HOSPITAL OBSERVATION PER HR: HCPCS

## 2023-11-25 PROCEDURE — 250N000013 HC RX MED GY IP 250 OP 250 PS 637: Performed by: INTERNAL MEDICINE

## 2023-11-25 PROCEDURE — 97535 SELF CARE MNGMENT TRAINING: CPT | Mod: GO

## 2023-11-25 PROCEDURE — 85610 PROTHROMBIN TIME: CPT | Performed by: INTERNAL MEDICINE

## 2023-11-25 PROCEDURE — 36415 COLL VENOUS BLD VENIPUNCTURE: CPT | Performed by: INTERNAL MEDICINE

## 2023-11-25 PROCEDURE — 99231 SBSQ HOSP IP/OBS SF/LOW 25: CPT | Performed by: PHYSICIAN ASSISTANT

## 2023-11-25 PROCEDURE — 250N000013 HC RX MED GY IP 250 OP 250 PS 637: Performed by: PHYSICIAN ASSISTANT

## 2023-11-25 RX ORDER — WARFARIN SODIUM 5 MG/1
5 TABLET ORAL
Status: COMPLETED | OUTPATIENT
Start: 2023-11-25 | End: 2023-11-25

## 2023-11-25 RX ADMIN — WARFARIN SODIUM 5 MG: 5 TABLET ORAL at 17:10

## 2023-11-25 RX ADMIN — MAGNESIUM HYDROXIDE 30 ML: 400 SUSPENSION ORAL at 08:27

## 2023-11-25 RX ADMIN — METOPROLOL TARTRATE 12.5 MG: 25 TABLET, FILM COATED ORAL at 08:28

## 2023-11-25 RX ADMIN — ACETAMINOPHEN 975 MG: 325 TABLET, FILM COATED ORAL at 17:10

## 2023-11-25 ASSESSMENT — ACTIVITIES OF DAILY LIVING (ADL)
ADLS_ACUITY_SCORE: 49

## 2023-11-25 NOTE — PROGRESS NOTES
[facility]    Medicine Progress Note - Hospitalist Service    Date of Admission:  11/22/2023    Assessment & Plan Danielle Bryan is a 78 year old female with past medical history of dementia, CVA c/b dysphagia and aphasia, hyperlipidemia, atrial fibrillation on chronic anticoagulation, HLD, who was admitted on 11/22/2023 after presenting for evaluation of global weakness.    Workup in the emergency room was negative including a head CT electrolytes, and CBC.  Her TSH was also noted to be normal.  Following admission to the floor developed fever to 101.3, cultures were drawn.  Remains admitted for further cares and monitoring.  Infectious workup thus far is negative for source of fever.  No fever recurrence.  Clinically improving with supportive care for not completely back to her previous baseline.  PT, OT are consulted and following.  Initially recommendations were for TCU. Overall symptoms resolved, largely back to baseline awaiting TCU vs home with assistance if able to do stairs.     Fever  Suspect viral process contributing to fever, global weakness.  No evidence of sepsis or hemodynamic instability. NCHCT, CXR, UA, Viral PCR all negative. No new medication changes.  -Only symptom was global weakness. Monitor for any development of new symptoms. If any abdominal pain, nausea or vomiting lack of p.o. intake or recurrent fever obtain CT abdomen pelvis patient is a poor historian and symptom  due to previous stroke deficits and dementia  -Monitor vital signs  -Follow blood cultures, no growth to date  -Overall symptoms resolved, largely back to baseline awaiting TCU vs home with assistance if able to do stairs.     Atrial fibrillation, Coumadin induced coagulopathy  On lopressor, rate controlled.  She is also on Coumadin with an INR goal of 2-3 and currently has a sub therapeutic INR. -Pharm.D. to dose her Coumadin.      Neurocognitive Disorder   Presumed Alzheimer's dementia  B12  Deficiency   Follows with Dr. Ledesma of neurology for progressive cognitive dysfunction B12 deficiency and progressive dementia consistent with Alzheimer's type.  Previous brain imaging also showed mild amyloid angiopathy changes that is thought to be contributing as well.  She has known abnormal gait with rigidity and jaw tremor considered for parkinsonism although per last neurology note deferred Sinemet trial at this time.  -Continue prior to admission Aricept, Namenda and monthly B12 injections she is due for this tomorrow but we will defer till the outpatient setting at this time.  Continue Neurontin 300 mg at lunch and dinner as per last neurology note.  Seroquel was held in case contributing to weakness as per admission as well as Lexapro which we may consider reintroducing.  Due for follow-up with neurologist.Resumed lexapro.   -Admitting provider had concern for Seroquel contribution to tardive dyskinesia example being lipsmacking movements.  Seroquel since held. Resume if behaviors develop.     History of falls  Patient had a fall in 9/2023 and was seen in the emergency room.  She is at high risk for morbidity and mortality if she falls as she is on Coumadin.    -pt/ot/sw consulted for dispo recommendations     CVA  HLD  Complicated by dysphagia and aphasia.  Patient is on Coumadin to prevent future strokes.  -resume PTA statin at discharge (obs status)     Depression, anxiety  Patient has been on Lexapro, Resume if stable at discharge.      Frozen shoulder  History noted.       Diet: Regular Diet Adult    DVT Prophylaxis: Warfarin  Morelos Catheter: Not present  Lines: None     Cardiac Monitoring: None  Code Status: Full Code      Clinically Significant Risk Factors Present on Admission               # Drug Induced Coagulation Defect: home medication list includes an anticoagulant medication         # Overweight: Estimated body mass index is 29.15 kg/m  as calculated from the following:    Height as of this  "encounter: 1.575 m (5' 2\").    Weight as of this encounter: 72.3 kg (159 lb 6.3 oz).              Disposition Plan     Expected Discharge Date: 11/25/2023                    Susan Donaldson PA-C  Hospitalist Service  [facility]  Securely message with [employer] (more info)  Text page via [employer] Paging/Directory   ______________________________________________________________________    Interval History   No pain.. more alert today and interactive answering some yes- no questions. Up with assist of 2 with gb/walker. Pt is incontinent of B/B. Pt is total feed.  Karl is bedside and involved in cares.    at bedside agrees closer to baseline. Had tylenol yesterday c/o mild headache resolved after APAP without recurrence.     Physical Exam   Vital Signs: Temp: 98.6  F (37  C) Temp src: Axillary BP: (!) 155/80 Pulse: 66   Resp: 16 SpO2: 94 % O2 Device: None (Room air)    Weight: 159 lbs 6.28 oz    Constitutional: Awake, alert, no apparent distress.   Respiratory:  Normal work of breathing.   Skin/Integument: Warm, dry. no peripheral edema.  Neuro: awake. unable to test orientation due to expressive aphasia. Noted lip smacking. Follows commands. At times answers yes or no to spouse's questions.   MK: Moves all extremities but difficulty moving her right arm due to her frozen shoulder, no clubbing, cyanosis. No edema.   Psych: Appropriate affect.       Medical Decision Making       55 MINUTES SPENT BY ME on the date of service doing chart review, history, exam, documentation & further activities per the note.      Data   ------------------------- PAST 24 HR DATA REVIEWED -----------------------------------------------[id number]}    "

## 2023-11-25 NOTE — CARE PLAN
"Time of Care: 4869-6931    Orientation: disorientation x 4   VS: BP (!) 149/87 (BP Location: Right arm)   Pulse 67   Temp 98.9  F (37.2  C) (Oral)   Resp 16   Ht 1.575 m (5' 2\")   Wt 72.3 kg (159 lb 6.3 oz)   LMP  (LMP Unknown)   SpO2 95%   BMI 29.15 kg/m    Pain: denies pain during time of care  Activity: Heavy ax 2   Resp: Wnl   GI: No BM during time of care   : Incontinent   Lines: PIV-SL   Diet: Reg   Plan: TCU   Discharge: TBD     "

## 2023-11-26 ENCOUNTER — APPOINTMENT (OUTPATIENT)
Dept: PHYSICAL THERAPY | Facility: CLINIC | Age: 78
End: 2023-11-26
Payer: COMMERCIAL

## 2023-11-26 LAB — INR PPP: 2.53 (ref 0.85–1.15)

## 2023-11-26 PROCEDURE — 250N000013 HC RX MED GY IP 250 OP 250 PS 637: Performed by: PHYSICIAN ASSISTANT

## 2023-11-26 PROCEDURE — 250N000011 HC RX IP 250 OP 636: Performed by: PHYSICIAN ASSISTANT

## 2023-11-26 PROCEDURE — G0378 HOSPITAL OBSERVATION PER HR: HCPCS

## 2023-11-26 PROCEDURE — 96374 THER/PROPH/DIAG INJ IV PUSH: CPT

## 2023-11-26 PROCEDURE — 97530 THERAPEUTIC ACTIVITIES: CPT | Mod: GP | Performed by: PHYSICAL THERAPIST

## 2023-11-26 PROCEDURE — 36415 COLL VENOUS BLD VENIPUNCTURE: CPT | Performed by: INTERNAL MEDICINE

## 2023-11-26 PROCEDURE — 99231 SBSQ HOSP IP/OBS SF/LOW 25: CPT | Performed by: PHYSICIAN ASSISTANT

## 2023-11-26 PROCEDURE — 250N000013 HC RX MED GY IP 250 OP 250 PS 637: Performed by: INTERNAL MEDICINE

## 2023-11-26 PROCEDURE — 97116 GAIT TRAINING THERAPY: CPT | Mod: GP | Performed by: PHYSICAL THERAPIST

## 2023-11-26 PROCEDURE — 85610 PROTHROMBIN TIME: CPT | Performed by: INTERNAL MEDICINE

## 2023-11-26 RX ORDER — GADOBUTROL 604.72 MG/ML
7.5 INJECTION INTRAVENOUS ONCE
Status: COMPLETED | OUTPATIENT
Start: 2023-11-26 | End: 2023-11-27

## 2023-11-26 RX ORDER — SENNOSIDES 8.6 MG
2 TABLET ORAL
Status: DISCONTINUED | OUTPATIENT
Start: 2023-11-26 | End: 2023-11-28

## 2023-11-26 RX ORDER — WARFARIN SODIUM 5 MG/1
5 TABLET ORAL
Status: COMPLETED | OUTPATIENT
Start: 2023-11-26 | End: 2023-11-26

## 2023-11-26 RX ORDER — LORAZEPAM 2 MG/ML
0.5 INJECTION INTRAMUSCULAR ONCE
Status: COMPLETED | OUTPATIENT
Start: 2023-11-26 | End: 2023-11-26

## 2023-11-26 RX ADMIN — ACETAMINOPHEN 975 MG: 325 TABLET, FILM COATED ORAL at 15:48

## 2023-11-26 RX ADMIN — LORAZEPAM 0.5 MG: 2 INJECTION INTRAMUSCULAR; INTRAVENOUS at 15:39

## 2023-11-26 RX ADMIN — DONEPEZIL HYDROCHLORIDE 10 MG: 10 TABLET ORAL at 21:58

## 2023-11-26 RX ADMIN — QUETIAPINE FUMARATE 75 MG: 50 TABLET ORAL at 21:57

## 2023-11-26 RX ADMIN — GABAPENTIN 300 MG: 300 CAPSULE ORAL at 21:58

## 2023-11-26 RX ADMIN — MEMANTINE 20 MG: 5 TABLET ORAL at 21:58

## 2023-11-26 RX ADMIN — WARFARIN SODIUM 5 MG: 5 TABLET ORAL at 21:58

## 2023-11-26 RX ADMIN — SENNOSIDES AND DOCUSATE SODIUM 1 TABLET: 8.6; 5 TABLET ORAL at 08:22

## 2023-11-26 RX ADMIN — ESCITALOPRAM OXALATE 20 MG: 20 TABLET, FILM COATED ORAL at 21:58

## 2023-11-26 RX ADMIN — MAGNESIUM HYDROXIDE 30 ML: 400 SUSPENSION ORAL at 08:22

## 2023-11-26 RX ADMIN — METOPROLOL TARTRATE 12.5 MG: 25 TABLET, FILM COATED ORAL at 08:22

## 2023-11-26 ASSESSMENT — ACTIVITIES OF DAILY LIVING (ADL)
ADLS_ACUITY_SCORE: 49

## 2023-11-26 NOTE — PROGRESS NOTES
[facility]    Medicine Progress Note - Hospitalist Service    Date of Admission:  11/22/2023    Assessment & Plan Danielle Bryan is a 78 year old female with past medical history of dementia, chart review reporting CVA c/b dysphagia and aphasia, hyperlipidemia, atrial fibrillation on chronic anticoagulation, HLD, who was admitted on 11/22/2023 after presenting for evaluation of global weakness.     Workup in the emergency room including NCHCT without new acute pathology, electrolytes, and CBC.  Her TSH was also noted to be normal.  Following admission to the floor developed fever to 101.3, cultures were drawn.  Remains admitted for further cares and monitoring.  Infectious workup thus far is negative for source of fever.  No fever recurrence.  Clinically improving with supportive care for not completely back to her previous baseline.  PT, OT are consulted and following.  Initially recommendations were for TCU.     Overall symptoms resolved, largely back to baseline awaiting TCU vs home with assistance if able to do stairs. Neurology consulted to see if candidate for sinemet trial to improve gait and rigidity given previous concern for possible  concern for features of movement disorder. Goal is home with [facility] if moving better and able to complete stairs otherwise ongoing referrals to TCU.        Neurocognitive Disorder   Presumed Alzheimer's dementia  B12 Deficiency   Follows with Dr. Ledesma of neurology for progressive cognitive dysfunction B12 deficiency and progressive dementia consistent with Alzheimer's type.    She has known abnormal gait with rigidity and jaw tremor considered for parkinsonism although per last neurology note deferred Sinemet trial at the time. These symptoms due appear to have progressed and are limiting her ability to return home with prior level of assistance.   On admission NCHCT showed previous changes in cerebellar tonsils c/w Chiari malformation, no acute  pathology.   -Continue prior to admission Aricept, Namenda, lexapro   -due for monthly B12 injections she is due for this 11/24 but we will defer till the outpatient setting at this time.    -Continue Neurontin 300 mg at lunch and dinner as per last neurology note.    -Was prescribed Seroquel 125 mg nightly prior to admission this was held initially for drowsiness but restarting today at reduced dose (75 mg) as her sundowning behaviors have started to return.  Spouse states this used to be given in a divided dose but due to difficulty getting Danielle to tolerate p.o. meds it has now been given in a daily dose which we will continue here. If drowsy AM Of 11/27 with Seroquel recommend reducing PTA dose further  -Neurology consulted to see if candidate for sinemet trial to improve gait and rigidity given previous concern for possible  concern for features of movement disorder. Goal is home with [facility] if moving better and able to complete stairs otherwise ongoing referrals to TCU.      History of falls  Patient had a fall in 9/2023 and was seen in the emergency room.  She is at high risk for morbidity and mortality if she falls as she is on Coumadin.    -pt/ot/sw consulted for dispo recommendations      Fever  Main reason she was brought in. Suspect this was a viral process contributing to fever, global weakness.  No evidence of sepsis or hemodynamic instability. NCHCT, CXR, UA/UCx, Viral PCR all negative. No new medication changes. Since resolved. Near baseline and improving slowly with PT from a deconditioning stand point although limited by gait freezing.   -Follow blood cultures, no growth to date     Atrial fibrillation, Coumadin induced coagulopathy  On lopressor, rate controlled.  She is also on Coumadin for CVA ppx with an INR goal of 2-3 and currently has a sub therapeutic INR. -Pharm.D. to dose her Coumadin.       CVA  HLD  Complicated by dysphagia and aphasia.  Patient is on Coumadin to prevent future strokes  "but also has some mention on previous Neurology note regarding amyloid but has been anticoagulated since at least before 2019.   -resume PTA statin at discharge (obs status)     Depression, anxiety  Patient has been on Lexapro, Resume if stable at discharge.      Frozen shoulder  History noted.    Chronic Constipation concerns  -resume home regimen of maaylox, prune juice and colace          Diet: Regular Diet Adult    DVT Prophylaxis: Warfarin  Morelos Catheter: Not present  Lines: None     Cardiac Monitoring: None  Code Status: Full Code      Clinically Significant Risk Factors Present on Admission               # Drug Induced Coagulation Defect: home medication list includes an anticoagulant medication         # Overweight: Estimated body mass index is 29.15 kg/m  as calculated from the following:    Height as of this encounter: 1.575 m (5' 2\").    Weight as of this encounter: 72.3 kg (159 lb 6.3 oz).              Disposition Plan      Expected Discharge Date: 11/27/2023                    Susan Donaldson PA-C  Hospitalist Service  [facility]  Securely message with [employer] (more info)  Text page via [employer] Paging/Directory   ______________________________________________________________________    Interval History   Karl at bedside.  Danielle has been continuing to improve.  No fever since day of admission (11/22).  Tolerating diet, voiding.  No nausea or vomiting.  Still with slow gait, freezing. Otherwise has been progressing very slowly with PT. Unable to try stairs with PT.   [facility]     Physical Exam   Vital Signs: Temp: 97.4  F (36.3  C) Temp src: Axillary BP: 133/88 Pulse: 64   Resp: 16 SpO2: 96 % O2 Device: None (Room air)    Weight: 159 lbs 6.28 oz    Constitutional: Awake, alert, no apparent distress.   Respiratory:  Normal work of breathing.   Skin/Integument: Warm, dry. no peripheral edema.  Neuro: awake. unable to test full orientation due " to expressive aphasia. Noted lip smacking. Follows commands. At times answers yes or no to spouse's questions.   MK: Moves all extremities but difficulty moving her right arm due to her frozen shoulder, no clubbing, cyanosis. No edema. Some UE rigidity bilaterally.   Psych: no current hallucinations.     Medical Decision Making       55 MINUTES SPENT BY ME on the date of service doing chart review, history, exam, documentation & further activities per the note.      Data   ------------------------- PAST 24 HR DATA REVIEWED -----------------------------------------------[id number]}

## 2023-11-26 NOTE — CONSULTS
[facility]  Neurology Consultation         Latoya Zambrano MD    Danielle Bryan MRN# [id number]   YOB: 1945 Age: 78 year old      Date of Admission:  11/22/2023  Date of Consult: 11/26/2023                    Chief Complaint:     Stiffness         History of Present Illness:   Danielle Bryan is a 78 year old female with past medical history of dementia, atrial fib on chronic anticoagulation, HTN/HLP who was admited on 11/22/2023 after presenting with worsening global weakness.  In ER eval with NCHCT and labs reassuring with no acute pathology, Had fever to 101.3 but no infectious etiology found.  Has been improving but not back to prior baseline and now TCU recommended.   Primary team consulted neurology after review of outpatient neurology note to see if trial of Sinemet indicated.      Reviewed outpatient neurology note from 8/24/2023 by Dr. Regino Ledesma. Patient with diagnosis of dementia (Alzheimer's type) based on NP testing, amyloid angiopathy (reportedly based on finding on MRI from 11/10/2020 - I however was not able to personally find this MRI report in [facility] or  systems ([facility] now has [facility] imaging scanned in).  She has been continued on blood thinners despite this diagnosis and B12 def.  At her last visit, concern for more jaw tremor and decreased arm swing on exam with plan to monitor for more Parkinson's symptoms.  Per  very intermittent chin tremor for 4+ years and unchanged and shuffling gait gradually progressive over several years with worsening memory concerns.   Was able to do stairs until acute/overnight/ decline leading to admission.       Prior notes indicate stroke but  just reports hx of atrial fib.  Dr. Ledesma's notes with no specific mention of stroke.            Past Medical History:     Patient Active Problem List   Diagnosis    Sprain of jaw    Alopecia areata    Advanced directives, counseling/discussion    Bartholin's cyst    Allergic  rhinitis    Transient cerebral ischemia    Hyperlipidemia with target LDL less than 100    Vitamin B12 deficiency (non anemic)    Osteoporosis    Vitamin D deficiency    Syncope, near    Atrial fibrillation (H)    Dysphagia, unspecified type    Adverse effect of bisphosphonate, sequela    Dupuytren's contracture    Esophageal motility disorder    Reduced mobility    Paroxysmal atrial fibrillation (H)    General weakness    Failure to thrive in adult      Past Medical History:   Diagnosis Date    Alopecia areata     Requires a wig now    Atrial fibrillation (H) 2019    Cerebral infarction (H)     TIA    Dysphagia     Botox/EGD dilation at [place], most recent 2013    Hyperlipemia     PONV (postoperative nausea and vomiting)     Recurrent UTI     Believed related in part to atrophic vaginitis    Vitamin D deficiency 2018               Past Surgical History:     Past Surgical History:   Procedure Laterality Date    ABDOMEN SURGERY  1975    [facility]    COLONOSCOPY  1/15/2014    Dr. Henry [facility]    COLONOSCOPY  1/15/2014    Normal, no further colonoscopy felt required. Procedure: COLONOSCOPY;  Colonoscopy ;  Surgeon: Esteban Henry MD;  Location:  GI    EYE SURGERY      bilateral cataract surgery    GYN SURGERY      TUBAL PREGNANCY, D&C,  X 2    HEAD & NECK SURGERY      THYROIDECTOMY - partial-right side    RELEASE TRIGGER FINGER  2012    Procedure: RELEASE TRIGGER FINGER;  RIGHT THUMB, MIDDLE AND RING TRIGGER RELEASES;  Surgeon: Deniz Velazquez MD;  Location: [facility]            Home Medications:     Prior to Admission medications    Medication Sig Last Dose Taking? Auth Provider Long Term End Date   cetirizine (ZYRTEC) 10 MG tablet Take 10 mg by mouth daily as needed for allergies Unknown at PRN Yes Reported, Patient     diclofenac (VOLTAREN) 1 % topical gel Apply 4 g topically 3 times daily as needed for moderate pain (4-6) Unknown at PRN Yes Yeo, Albert, MD     docusate  sodium (COLACE) 100 MG tablet Take 100 mg by mouth At Bedtime 2023 Yes Reported, Patient     donepezil (ARICEPT) 5 MG tablet Take 10 mg by mouth at bedtime 2023 Yes Reported, Patient     escitalopram (LEXAPRO) 10 MG tablet Take 20 mg by mouth every evening 2023 Yes Reported, Patient Yes    gabapentin (NEURONTIN) 300 MG capsule Take 300 mg by mouth every evening 2023 Yes Unknown, Entered By History Yes    memantine (NAMENDA) 10 MG tablet Take 20 mg by mouth every evening 2023 Yes Reported, Patient     metoprolol tartrate (LOPRESSOR) 25 MG tablet Take 12.5 mg by mouth every evening 2023 Yes Unknown, Entered By History Yes    QUEtiapine (SEROQUEL) 25 MG tablet Patient takes 5 tablets at bedtime. 2023 Yes Reported, Patient Yes    warfarin ANTICOAGULANT (COUMADIN) 2.5 MG tablet Take 7.5mg (3 tablets) every  & Fri / Take 5mg (2 tablets) all other days OR per INR clinic 2023 Yes Amarilis Mtz, MARTHA CNP              Current Medications:          docusate sodium  100 mg Oral At Bedtime    donepezil  10 mg Oral At Bedtime    escitalopram  20 mg Oral QPM    gabapentin  300 mg Oral At Bedtime    magnesium hydroxide  30 mL Oral Daily with breakfast    memantine  20 mg Oral QPM    metoprolol tartrate  12.5 mg Oral Daily    QUEtiapine  75 mg Oral At Bedtime    warfarin ANTICOAGULANT  5 mg Oral ONCE at 18:00    Warfarin Therapy Reminder  1 each Oral See Admin Instructions     acetaminophen, ondansetron **OR** ondansetron, - MEDICATION INSTRUCTIONS -, senna-docusate **OR** senna-docusate, sennosides         Allergies:     Allergies   Allergen Reactions    Ciprofloxacin      Sores in mouth and throat felt funny    Reclast [Zoledronic Acid]      dizziness            Social History:     Lives at home with family             Family History:     Family History   Problem Relation Age of Onset    Family History Negative Mother          age 85    C.A.D. Father          in his  "mid-80's of heart attack    Myocardial Infarction Brother     Coronary Artery Disease Brother         Has had stents/CABG    Other Cancer Brother     Other Cancer Sister              Review of Systems:   N/a           Physical Exam:    , Blood pressure 133/88, pulse 64, temperature 97.4  F (36.3  C), temperature source Axillary, resp. rate 16, height 1.575 m (5' 2\"), weight 72.3 kg (159 lb 6.3 oz), SpO2 96%, not currently breastfeeding.  159 lbs 6.28 oz     Cardio - Heart Rate Reg, Distal pulses palpable  Resp - Breathing non labored    Neurological Exam:  General: Mental status -Alert, did not say name or location, or date when I attempted to ask, but at times says yes/no when her spouse asked her some questions.  Followed limited 1 step commands to do exam.  Limited verbal output of 1-3 words when I asked questions.    Cranial Nerves-  Pupils equal round and reactive to light. Extraocular movements intact. No nystagmus.  Face symmetric and intact to light touch, tongue midline, palate activates symmetrically. Shoulder shrug 5/5.  No specific masked expression.  Motor: Normal muscle bulk and tone some paratonia worse on right than left but no cogwheeling.  Moves all extremities at least 4+ - some pain when attempting to do leg testing and this limits exam.   Sensation: responds to touch in all 4 extremities    Reflexes:  Biceps 2/2, patella 2/2, ankles 1/1, toes neutral   Cerebellar: intact FNF, no specific tremor seen   Gait: in bed         Data:   All new lab and imaging data was reviewed.  Recent Labs   Lab 11/26/23  0607 11/25/23  0704 11/24/23  0636 11/23/23  1111 11/23/23  0515 11/22/23  1057   WBC  --   --  5.6 6.2  --  7.8   HGB  --   --  12.2 12.1  --  11.8   MCV  --   --  89 90  --  92   PLT  --   --  191 201  --  211   INR 2.53* 2.25* 1.67*  --    < > 1.77*   NA  --   --  139 135  --  139   POTASSIUM  --   --  3.9 4.1  --  4.7   CHLORIDE  --   --  107 102  --  105   CO2  --   --  24 21*  --  26   BUN  -- "   --  14.4 16.2  --  13.3   CR  --   --  0.86 0.94  --  1.08*   ANIONGAP  --   --  8 12  --  8   TYSON  --   --  8.2* 8.1*  --  8.5*   GLC  --   --  99 112*  --  95   ALBUMIN  --   --   --   --   --  3.7   PROTTOTAL  --   --   --   --   --  6.4   BILITOTAL  --   --   --   --   --  0.3   ALKPHOS  --   --   --   --   --  64   ALT  --   --   --   --   --  18   AST  --   --   --   --   --  27    < > = values in this interval not displayed.              Assessment and Plan:       Danielle Bryan is a 78 year old female with past medical history of dementia, atrial fib on chronic anticoagulation, HTN/HLP who was admited on 11/22/2023 after presenting with worsening acute global weakness.  In ER eval with NCHCT and labs reassuring with no acute pathology, Had fever to 101.3 but no infectious etiology found.  Has been improving but not back to prior baseline and now TCU recommended.   Primary team consulted neurology after review of outpatient neurology note to see if trial of Sinemet indicated.  Reviewed prior outpatient neurology note from Dr. Ledesma on 8/2023 and concern for Alzheimer's dementia, amyloid angiopathy, and low B12 on IM supplement.   Exam today without specific parkinsonian features for me and more consistent with advanced dementia but not able to observe walking.     ? Of amyloid angiopathy  - per prior documentation this is based on  MI brain 11/10/2020 - cannot find where this is completed and actual priors- Last Mri 2014 that I can see results of - no mention of bleeding/amyloid - CT reassuring at admit - but given acute decline would obtain MRI brain.   If pattern consistent with amyloid will need to have discussion on holding chornic anticoagulatoin risks vs benefits given high brain bleed risks   Concern for PD and if sinemet would be helpful - would monitor as outpatient - feel acute decline not explained by this - exam not overly worrisome today and would limit med changes     73 min spent on date  of service

## 2023-11-26 NOTE — PROGRESS NOTES
SPIRITUAL HEALTH SERVICES  SPIRITUAL ASSESSMENT Progress Note  Former[facility] Obs     REFERRAL SOURCE: Pt/Family    I briefly visited pt Soren and her spouse aKrl today as they had requested communion. I let them know there is not communion available today but is tomorrow and will have them added to the list to be visited. Pt and Karl declined any additional needs today as Soren was trying to rest.     PLAN: No plans for follow up.     Regina Jay MA  Associate    Phone: [phone]  [facility] On Call Pager: [phone]

## 2023-11-27 ENCOUNTER — APPOINTMENT (OUTPATIENT)
Dept: MRI IMAGING | Facility: CLINIC | Age: 78
End: 2023-11-27
Attending: PHYSICIAN ASSISTANT
Payer: COMMERCIAL

## 2023-11-27 LAB
BACTERIA BLD CULT: NO GROWTH
BACTERIA BLD CULT: NO GROWTH
INR PPP: 2.29 (ref 0.85–1.15)
VIT B12 SERPL-MCNC: 593 PG/ML (ref 232–1245)

## 2023-11-27 PROCEDURE — 85610 PROTHROMBIN TIME: CPT | Performed by: INTERNAL MEDICINE

## 2023-11-27 PROCEDURE — 250N000013 HC RX MED GY IP 250 OP 250 PS 637

## 2023-11-27 PROCEDURE — G0378 HOSPITAL OBSERVATION PER HR: HCPCS

## 2023-11-27 PROCEDURE — 99232 SBSQ HOSP IP/OBS MODERATE 35: CPT

## 2023-11-27 PROCEDURE — 36415 COLL VENOUS BLD VENIPUNCTURE: CPT | Performed by: PSYCHIATRY & NEUROLOGY

## 2023-11-27 PROCEDURE — A9585 GADOBUTROL INJECTION: HCPCS | Performed by: INTERNAL MEDICINE

## 2023-11-27 PROCEDURE — 250N000013 HC RX MED GY IP 250 OP 250 PS 637: Performed by: PHYSICIAN ASSISTANT

## 2023-11-27 PROCEDURE — 255N000002 HC RX 255 OP 636: Performed by: INTERNAL MEDICINE

## 2023-11-27 PROCEDURE — 82607 VITAMIN B-12: CPT | Performed by: PSYCHIATRY & NEUROLOGY

## 2023-11-27 PROCEDURE — 70553 MRI BRAIN STEM W/O & W/DYE: CPT

## 2023-11-27 PROCEDURE — 250N000013 HC RX MED GY IP 250 OP 250 PS 637: Performed by: INTERNAL MEDICINE

## 2023-11-27 RX ORDER — QUETIAPINE FUMARATE 50 MG/1
50 TABLET, FILM COATED ORAL AT BEDTIME
Status: DISCONTINUED | OUTPATIENT
Start: 2023-11-27 | End: 2023-11-29 | Stop reason: HOSPADM

## 2023-11-27 RX ORDER — GADOBUTROL 604.72 MG/ML
7.5 INJECTION INTRAVENOUS ONCE
Status: DISCONTINUED | OUTPATIENT
Start: 2023-11-27 | End: 2023-11-29 | Stop reason: HOSPADM

## 2023-11-27 RX ORDER — WARFARIN SODIUM 5 MG/1
5 TABLET ORAL
Status: COMPLETED | OUTPATIENT
Start: 2023-11-27 | End: 2023-11-27

## 2023-11-27 RX ADMIN — SENNOSIDES AND DOCUSATE SODIUM 2 TABLET: 8.6; 5 TABLET ORAL at 17:10

## 2023-11-27 RX ADMIN — MAGNESIUM HYDROXIDE 30 ML: 400 SUSPENSION ORAL at 08:54

## 2023-11-27 RX ADMIN — METOPROLOL TARTRATE 12.5 MG: 25 TABLET, FILM COATED ORAL at 08:54

## 2023-11-27 RX ADMIN — ACETAMINOPHEN 975 MG: 325 TABLET, FILM COATED ORAL at 12:07

## 2023-11-27 RX ADMIN — ESCITALOPRAM OXALATE 20 MG: 20 TABLET, FILM COATED ORAL at 21:54

## 2023-11-27 RX ADMIN — MEMANTINE 20 MG: 5 TABLET ORAL at 21:53

## 2023-11-27 RX ADMIN — QUETIAPINE FUMARATE 50 MG: 50 TABLET ORAL at 21:54

## 2023-11-27 RX ADMIN — WARFARIN SODIUM 5 MG: 5 TABLET ORAL at 17:11

## 2023-11-27 RX ADMIN — DONEPEZIL HYDROCHLORIDE 10 MG: 10 TABLET ORAL at 21:54

## 2023-11-27 RX ADMIN — GABAPENTIN 300 MG: 300 CAPSULE ORAL at 21:53

## 2023-11-27 RX ADMIN — GADOBUTROL 7.5 ML: 604.72 INJECTION INTRAVENOUS at 09:36

## 2023-11-27 ASSESSMENT — ACTIVITIES OF DAILY LIVING (ADL)
ADLS_ACUITY_SCORE: 53
ADLS_ACUITY_SCORE: 49
ADLS_ACUITY_SCORE: 53
ADLS_ACUITY_SCORE: 49
ADLS_ACUITY_SCORE: 49
ADLS_ACUITY_SCORE: 53
ADLS_ACUITY_SCORE: 49

## 2023-11-27 NOTE — UTILIZATION REVIEW
Concurrent stay review; Secondary Review Determination - [facility]        Under the authority of the Utilization Management Committee, the utilization review process indicated a secondary review on the above patient.  The review outcome is based on review of the medical records, discussions with staff, and applying clinical experience noted on the date of the review.        (x) Observation/outpatient Status Appropriate - Concurrent stay review          78-year-old female with a history of dementia, previous CVA with dysphagia and aphasia, hyperlipidemia, and atrial fibrillation on chronic anticoagulation presented with global weakness. Initial workup in the emergency room was unremarkable, but she developed a fever following admission, with an infectious workup yielding no source of the fever. She clinically improved with supportive care, including physical and occupational therapy consultations. The fever was suspected to be due to a viral process, and monitoring for any new symptoms was recommended. Her atrial fibrillation was managed with lopressor, and her Coumadin therapy was adjusted by a pharmacist. She also had a history of neurocognitive disorder, presumed Alzheimer's dementia, and B12 deficiency, for which she continued her prior medications and was due for a B12 injection in the outpatient setting.   Neurology consult recommended brain MRI which showed no acute findings but confirmed the suspicion for cerebral amyloid angiopathy.  The patient needs assist x 2 with walker and a belt for ambulation.  TCU was recommended.  Patient is waiting for TCU placement.     RATIONALE FOR DETERMINATION:     Patient delayed discharge is related to disposition, there is no medical necessity for inpatient admission at the time of this review. If there is a change in patient status, please resend for review.    The information on this document is developed by the utilization review team in  order for the business office to ensure compliance.  This only denotes the appropriateness of proper admission status and does not reflect the quality of care rendered.       The definitions of Inpatient Status and Observation Status used in making the determination above are those provided in the CMS Coverage Manual, Chapter 1 and Chapter 6, section 70.4.       Sincerely,     PAIGE BOWERS MD   Utilization Review  Physician Advisor  [facility]

## 2023-11-27 NOTE — PROGRESS NOTES
[facility]    Medicine Progress Note - Hospitalist Service    Date of Admission:  11/22/2023    Assessment & Plan   Danielle Bryan is a 78 year old female with past medical history of dementia, chart review reporting CVA c/b dysphagia and aphasia, hyperlipidemia, atrial fibrillation on chronic anticoagulation, HLD, who was admitted on 11/22/2023 after presenting for evaluation of global weakness.     Workup in the emergency room including NCHCT without new acute pathology, electrolytes, and CBC.  Her TSH was also noted to be normal.  Following admission to the floor developed fever to 101.3, cultures were drawn.  Remains admitted for further cares and monitoring.  Infectious workup thus far is negative for source of fever.  No fever recurrence.  Clinically improving with supportive care for not completely back to her previous baseline.  PT, OT are consulted and following.  Initially recommendations were for TCU.     Daily update: Patient appears sedated today suspect due to Seroquel and also received Ativan prior to an MRI yesterday.  Per previous note symptoms has had resolved and not sedated and she was close to her baseline.  Due to sedation patient would not do well going home with home care today, continue ongoing referrals to TCU. Reassess in AM if improvement of sedation with decreasing Seroquel      Neurocognitive Disorder   Presumed Alzheimer's dementia  B12 Deficiency   Follows with Dr. Ledesma of neurology for progressive cognitive dysfunction B12 deficiency and progressive dementia consistent with Alzheimer's type.She has known abnormal gait with rigidity and jaw tremor considered for parkinsonism although per last neurology note deferred Sinemet trial at the time. These symptoms due appear to have progressed and are limiting her ability to return home with prior level of assistance.  On admission NCHCT showed previous changes in cerebellar tonsils c/w Chiari malformation, no  acute pathology.   Plan:  -Continue pta Aricept, Namenda, lexapro   - due for monthly B12 injections she is due for this 11/24 but we will defer till the outpatient setting at this time.    -Continue Neurontin 300 mg at lunch and dinner as per last neurology note.    -Was prescribed Seroquel 125 mg nightly prior to admission this was held initially for drowsiness but restarting today at reduced dose (75 mg) as her sundowning behaviors have started to return.  Spouse states this used to be given in a divided dose but due to difficulty getting Danielle to tolerate p.o. meds it has now been given in a daily dose which we will continue here.   - decreased Seroquel to 50 mg at night due to sedation  - Neurology consulted    - rec MRI: findings suggest component of cerebral amyloid angiopathy, no signs of CVA   - need to discuss risk benefit of anticoagulation with family     History of falls  Patient had a fall in 9/2023 and was seen in the emergency room.  She is at high risk for morbidity and mortality if she falls as she is on Coumadin.    -pt/ot/sw consulted for dispo recommendations      Fever - resolved  Main reason she was brought in. Suspect this was a viral process contributing to fever, global weakness.  No evidence of sepsis or hemodynamic instability. NCHCT, CXR, UA/UCx, Viral PCR all negative. No new medication changes. Since resolved. Near baseline and improving slowly with PT from a deconditioning stand point although limited by gait freezing.   - Blood cultures NGTD after 4 days      Atrial fibrillation, Coumadin induced coagulopathy  On lopressor, rate controlled.  She is also on Coumadin for CVA ppx with an INR goal of 2-3 and currently has a sub therapeutic INR. -Pharm.D. to dose her Coumadin.       CVA  HLD  Complicated by dysphagia and aphasia.  Patient is on Coumadin to prevent future strokes but also has some mention on previous Neurology note regarding amyloid but has been anticoagulated since at  "least before 2019.   -resume PTA statin at discharge (obs status)     Depression, anxiety  - continue pta lexapro      Frozen shoulder  History noted.     Chronic Constipation concerns  -resume home regimen of maaylox, prune juice and colace       Observation Goals: -diagnostic tests and consults completed and resulted, -vital signs normal or at patient baseline, -returns to baseline functional status, May need TCU, needs therapy to see, Nurse to notify provider when observation goals have been met and patient is ready for discharge.  Diet: Regular Diet Adult    DVT Prophylaxis: Warfarin  Morelos Catheter: Not present  Lines: None     Cardiac Monitoring: None  Code Status: Full Code      Clinically Significant Risk Factors Present on Admission               # Drug Induced Coagulation Defect: home medication list includes an anticoagulant medication         # Overweight: Estimated body mass index is 29.15 kg/m  as calculated from the following:    Height as of this encounter: 1.575 m (5' 2\").    Weight as of this encounter: 72.3 kg (159 lb 6.3 oz).              Disposition Plan pending improvement of sedation and finding a safe disposition    Expected Discharge Date: 11/27/2023                  The patient's care was discussed with the Bedside Nurse, Care Coordinator/, Patient, and Patient's Family.    LOLY Byrd PA-C  Hospitalist Service  [facility]  Securely message with [employer] (more info)  Text page via [facility] Paging/Directory   ______________________________________________________________________    Interval History   Increased sedation this morning. Had some neck discomfort yesterday that has resolved. No fever, chills, chest pain, SOB, abdominal pain, N/V.     Physical Exam   Vital Signs: Temp: 97.7  F (36.5  C) Temp src: Oral BP: 136/64 Pulse: 61   Resp: 18 SpO2: 94 % O2 Device: None (Room air)    Weight: 159 lbs 6.28 oz    GENERAL:  Sleepy, Comfortable, No acute distress. " Laying in bed.   PSYCH: pleasant  HEART:  Normal S1, S2 with no murmur, RRR  LUNGS:  Normal Respiratory effort. Clear to auscultation anteriorly.  EXTREMITIES:  No pedal edema, No cyanosis.    SKIN:  Warm, dry to touch. No rash.  NEUROLOGIC:  Speech mumbled, alert to verbal    Medical Decision Making       MANAGEMENT DISCUSSED with the following over the past 24 hours: patient, family, nursing, SW   NOTE(S)/MEDICAL RECORDS REVIEWED over the past 24 hours: labs, progress notes       Data     I have personally reviewed the following data over the past 24 hrs:    INR:  2.29 (H) PTT:  N/A   D-dimer:  N/A Fibrinogen:  N/A       Imaging results reviewed over the past 24 hrs:   No results found for this or any previous visit (from the past 24 hour(s)).

## 2023-11-27 NOTE — PROGRESS NOTES
Neurology Note    I had a thorough discussion with patient and  at bedside.  She came to the hospital with rather global impairment with dementia at baseline.  There had been some soft signs in the past (jaw tremor, shuffling gait) to raise the possibility of parkinsonism.  This has never been entirely clear and now is not the time to trial Sinemet.      But there was also a discussion of cerebral amyloid angiopathy.  The MRI shows a number of peripherally-located punctate areas of susceptibility artifact likely representing microhemorrhages.  This probably indicates cerebral amyloid angiopathy, though this is not definitive.    Regardless, I discussed with  my concern that warfarin can be dangerous in the setting of CAA.  We discussed the risks on both sides, she was put on anticoagulation for afib and aspirin would not be as effective in preventing embolic stroke resulting from afib.  That being said, the type of hemorrhagic strokes that warfarin would significantly increase the risk of (in the setting of CAA) would be more likely to be devastating events with a higher morbidity and mortality.  I explained that this is the rationale for aspirin being preferred over anticoagulation in this setting.    This all being said, she has been on anticoagulation for many years at this point without issue, so it is difficult to frame this as an urgent issue.  I think it would be ideal to make any changes in her anti-thrombotic regimen with cardiology, I wonder if she would be considered an appropriate candidate for the WATCHMAN device.        - Would not consider a Sinemet trial at this time, this may be something to consider further as an outpatient  - Discussed at length the concern for Cerebral Amyloid Angiopathy and why anticoagulants are not recommended in these patients (aspirin is usually considered to have an acceptable [though not absent] risk]  - It is preferable that any changes in her medication be  made with cardiology and can evaluate if the WATCHMAN device is an appropriate option       I spent 40 minutes on the date of encounter with the patient and before and after the visit on activities detailed in the above note which may include reviewing the EMR, documenting clinical information, and communicating with other health care professionals.      Robin Addison MD (general neurology)  [facility] [phone]

## 2023-11-27 NOTE — PROGRESS NOTES
Care Management Follow Up    Expected Discharge Date: 11/28/2023     Concerns to be Addressed: Discharge planning     Patient plan of care discussed at interdisciplinary rounds: Yes    Anticipated Discharge Disposition: Transitional Care     Anticipated Discharge Services:  PT, OT    Patient/Family in Agreement with the Plan:  Yes    Referrals Placed by CM/SW: Skilled Nursing Facility  Private pay costs discussed: Not applicable    Additional Information:  Updated by PT that pt still requiring Ax2. Recs were TCU, may change to home with HC vs LTC. TRISTA met with patient's spouse Karl at bedside. He reports that pt is participatory especially when spouse present. He wants to continue to try for TCU placement and reports that he can be at TCU daily for a majority of the day to assist patient. He reports that she has made mobility progress while here, but is not safe to return home with stairs yet. TRISTA followed up with [facility], VM left. Additional TCU referrals sent. TRISTA will continue to follow.     AAMIR Kumar, [facility]   Inpatient Care Coordination  [facility]   [phone]

## 2023-11-27 NOTE — PROGRESS NOTES
"PRIMARY DIAGNOSIS: GENERALIZED WEAKNESS    OUTPATIENT/OBSERVATION GOALS TO BE MET BEFORE DISCHARGE  1. Orthostatic performed: N/A    2. Tolerating PO medications: Yes    3. Return to near baseline physical activity: No    4. Cleared for discharge by consultants (if involved): No    Discharge Planner Nurse   Safe discharge environment identified: No  Barriers to discharge: Yes       Entered by: Miriam Parsons RN 11/27/2023 4:38 PM   /60 (BP Location: Right arm)   Pulse 59   Temp 96.9  F (36.1  C) (Oral)   Resp 18   Ht 1.575 m (5' 2\")   Wt 72.3 kg (159 lb 6.3 oz)   LMP  (LMP Unknown)   SpO2 97%   BMI 29.15 kg/m    Patient alert, confused but in no acute distress. Appears drowsy d/t Seroquel from last evening. Dose decreased. Arouses to voice. Poor oral intake. Needs assist with all cares. MRI completed today, Family at bedside.  Please review provider order for any additional goals.   Nurse to notify provider when observation goals have been met and patient is ready for discharge.  "

## 2023-11-27 NOTE — PROGRESS NOTES
"PRIMARY DIAGNOSIS: GENERALIZED WEAKNESS    OUTPATIENT/OBSERVATION GOALS TO BE MET BEFORE DISCHARGE  1. Orthostatic performed: No    2. Tolerating PO medications: Yes    3. Return to near baseline physical activity: No    4. Cleared for discharge by consultants (if involved): No    Discharge Planner Nurse   Safe discharge environment identified: No  Barriers to discharge: Yes       Entered by: Miriam Parsons RN 11/27/2023 10:56 AM   /64 (BP Location: Left arm)   Pulse 61   Temp 97.7  F (36.5  C) (Oral)   Resp 18   Ht 1.575 m (5' 2\")   Wt 72.3 kg (159 lb 6.3 oz)   LMP  (LMP Unknown)   SpO2 94%   BMI 29.15 kg/m    Patient alert, confused but in no acute distress. Appears drowsy d/t Seroquel from last evening. Arouses to voice. Poor oral intake. Needs assist with all cares. MRI completed today,  Please review provider order for any additional goals.   Nurse to notify provider when observation goals have been met and patient is ready for discharge.  "

## 2023-11-28 ENCOUNTER — APPOINTMENT (OUTPATIENT)
Dept: PHYSICAL THERAPY | Facility: CLINIC | Age: 78
End: 2023-11-28
Payer: COMMERCIAL

## 2023-11-28 LAB
ANION GAP SERPL CALCULATED.3IONS-SCNC: 7 MMOL/L (ref 7–15)
ANION GAP SERPL CALCULATED.3IONS-SCNC: 8 MMOL/L (ref 7–15)
ANION GAP SERPL CALCULATED.3IONS-SCNC: 9 MMOL/L (ref 7–15)
BUN SERPL-MCNC: 23.3 MG/DL (ref 8–23)
BUN SERPL-MCNC: 23.4 MG/DL (ref 8–23)
BUN SERPL-MCNC: 24.3 MG/DL (ref 8–23)
CALCIUM SERPL-MCNC: 8.3 MG/DL (ref 8.8–10.2)
CALCIUM SERPL-MCNC: 8.4 MG/DL (ref 8.8–10.2)
CALCIUM SERPL-MCNC: 8.6 MG/DL (ref 8.8–10.2)
CHLORIDE SERPL-SCNC: 108 MMOL/L (ref 98–107)
CHLORIDE SERPL-SCNC: 111 MMOL/L (ref 98–107)
CHLORIDE SERPL-SCNC: 112 MMOL/L (ref 98–107)
CREAT SERPL-MCNC: 0.96 MG/DL (ref 0.51–0.95)
CREAT SERPL-MCNC: 0.98 MG/DL (ref 0.51–0.95)
CREAT SERPL-MCNC: 1.08 MG/DL (ref 0.51–0.95)
DEPRECATED HCO3 PLAS-SCNC: 25 MMOL/L (ref 22–29)
DEPRECATED HCO3 PLAS-SCNC: 27 MMOL/L (ref 22–29)
DEPRECATED HCO3 PLAS-SCNC: 28 MMOL/L (ref 22–29)
EGFRCR SERPLBLD CKD-EPI 2021: 52 ML/MIN/1.73M2
EGFRCR SERPLBLD CKD-EPI 2021: 58 ML/MIN/1.73M2
EGFRCR SERPLBLD CKD-EPI 2021: 60 ML/MIN/1.73M2
ERYTHROCYTE [DISTWIDTH] IN BLOOD BY AUTOMATED COUNT: 13.5 % (ref 10–15)
GLUCOSE BLDC GLUCOMTR-MCNC: 96 MG/DL (ref 70–99)
GLUCOSE SERPL-MCNC: 112 MG/DL (ref 70–99)
GLUCOSE SERPL-MCNC: 141 MG/DL (ref 70–99)
GLUCOSE SERPL-MCNC: 84 MG/DL (ref 70–99)
HCT VFR BLD AUTO: 37.2 % (ref 35–47)
HGB BLD-MCNC: 11.6 G/DL (ref 11.7–15.7)
INR PPP: 2.53 (ref 0.85–1.15)
MCH RBC QN AUTO: 29 PG (ref 26.5–33)
MCHC RBC AUTO-ENTMCNC: 31.2 G/DL (ref 31.5–36.5)
MCV RBC AUTO: 93 FL (ref 78–100)
PLATELET # BLD AUTO: 216 10E3/UL (ref 150–450)
POTASSIUM SERPL-SCNC: 4.1 MMOL/L (ref 3.4–5.3)
POTASSIUM SERPL-SCNC: 4.2 MMOL/L (ref 3.4–5.3)
POTASSIUM SERPL-SCNC: 4.2 MMOL/L (ref 3.4–5.3)
RBC # BLD AUTO: 4 10E6/UL (ref 3.8–5.2)
SODIUM SERPL-SCNC: 142 MMOL/L (ref 135–145)
SODIUM SERPL-SCNC: 146 MMOL/L (ref 135–145)
SODIUM SERPL-SCNC: 147 MMOL/L (ref 135–145)
WBC # BLD AUTO: 4.3 10E3/UL (ref 4–11)

## 2023-11-28 PROCEDURE — 85027 COMPLETE CBC AUTOMATED: CPT

## 2023-11-28 PROCEDURE — 36415 COLL VENOUS BLD VENIPUNCTURE: CPT

## 2023-11-28 PROCEDURE — 97116 GAIT TRAINING THERAPY: CPT | Mod: GP

## 2023-11-28 PROCEDURE — 97530 THERAPEUTIC ACTIVITIES: CPT | Mod: GP

## 2023-11-28 PROCEDURE — G0378 HOSPITAL OBSERVATION PER HR: HCPCS

## 2023-11-28 PROCEDURE — 258N000003 HC RX IP 258 OP 636

## 2023-11-28 PROCEDURE — 85610 PROTHROMBIN TIME: CPT | Performed by: INTERNAL MEDICINE

## 2023-11-28 PROCEDURE — 250N000013 HC RX MED GY IP 250 OP 250 PS 637: Performed by: INTERNAL MEDICINE

## 2023-11-28 PROCEDURE — 250N000013 HC RX MED GY IP 250 OP 250 PS 637

## 2023-11-28 PROCEDURE — 82310 ASSAY OF CALCIUM: CPT

## 2023-11-28 PROCEDURE — 99232 SBSQ HOSP IP/OBS MODERATE 35: CPT

## 2023-11-28 PROCEDURE — 250N000013 HC RX MED GY IP 250 OP 250 PS 637: Performed by: PHYSICIAN ASSISTANT

## 2023-11-28 PROCEDURE — 82962 GLUCOSE BLOOD TEST: CPT

## 2023-11-28 RX ORDER — WARFARIN SODIUM 5 MG/1
5 TABLET ORAL
Status: COMPLETED | OUTPATIENT
Start: 2023-11-28 | End: 2023-11-28

## 2023-11-28 RX ORDER — AMOXICILLIN 250 MG
2 CAPSULE ORAL 2 TIMES DAILY
Status: DISCONTINUED | OUTPATIENT
Start: 2023-11-28 | End: 2023-11-28

## 2023-11-28 RX ORDER — DEXTROSE MONOHYDRATE 50 MG/ML
INJECTION, SOLUTION INTRAVENOUS CONTINUOUS
Status: DISCONTINUED | OUTPATIENT
Start: 2023-11-28 | End: 2023-11-28

## 2023-11-28 RX ORDER — DEXTROSE MONOHYDRATE 50 MG/ML
INJECTION, SOLUTION INTRAVENOUS CONTINUOUS
Status: ACTIVE | OUTPATIENT
Start: 2023-11-28 | End: 2023-11-28

## 2023-11-28 RX ORDER — AMOXICILLIN 250 MG
1 CAPSULE ORAL 2 TIMES DAILY
Status: DISCONTINUED | OUTPATIENT
Start: 2023-11-28 | End: 2023-11-29 | Stop reason: HOSPADM

## 2023-11-28 RX ORDER — POLYETHYLENE GLYCOL 3350 17 G/17G
17 POWDER, FOR SOLUTION ORAL DAILY PRN
Status: DISCONTINUED | OUTPATIENT
Start: 2023-11-28 | End: 2023-11-29 | Stop reason: HOSPADM

## 2023-11-28 RX ADMIN — DONEPEZIL HYDROCHLORIDE 10 MG: 10 TABLET ORAL at 21:16

## 2023-11-28 RX ADMIN — GABAPENTIN 300 MG: 300 CAPSULE ORAL at 21:16

## 2023-11-28 RX ADMIN — MEMANTINE 20 MG: 5 TABLET ORAL at 21:15

## 2023-11-28 RX ADMIN — MAGNESIUM HYDROXIDE 30 ML: 400 SUSPENSION ORAL at 10:38

## 2023-11-28 RX ADMIN — METOPROLOL TARTRATE 12.5 MG: 25 TABLET, FILM COATED ORAL at 10:37

## 2023-11-28 RX ADMIN — ESCITALOPRAM OXALATE 20 MG: 20 TABLET, FILM COATED ORAL at 21:15

## 2023-11-28 RX ADMIN — WARFARIN SODIUM 5 MG: 5 TABLET ORAL at 17:07

## 2023-11-28 RX ADMIN — SENNOSIDES AND DOCUSATE SODIUM 1 TABLET: 8.6; 5 TABLET ORAL at 21:15

## 2023-11-28 RX ADMIN — DEXTROSE MONOHYDRATE: 50 INJECTION, SOLUTION INTRAVENOUS at 11:11

## 2023-11-28 RX ADMIN — QUETIAPINE FUMARATE 50 MG: 50 TABLET ORAL at 21:16

## 2023-11-28 ASSESSMENT — ACTIVITIES OF DAILY LIVING (ADL)
ADLS_ACUITY_SCORE: 56
ADLS_ACUITY_SCORE: 58
ADLS_ACUITY_SCORE: 56
ADLS_ACUITY_SCORE: 58
ADLS_ACUITY_SCORE: 58
ADLS_ACUITY_SCORE: 56
ADLS_ACUITY_SCORE: 56
ADLS_ACUITY_SCORE: 58
ADLS_ACUITY_SCORE: 58

## 2023-11-28 NOTE — PROGRESS NOTES
[facility]    Medicine Progress Note - Hospitalist Service    Date of Admission:  11/22/2023    Assessment & Plan   Danielle Bryan is a 78 year old female with past medical history of dementia, chart review reporting CVA c/b dysphagia and aphasia, hyperlipidemia, atrial fibrillation on chronic anticoagulation, HLD, who was admitted on 11/22/2023 after presenting for evaluation of global weakness.     Workup in the emergency room including NCHCT without new acute pathology, electrolytes, and CBC.  Her TSH was also noted to be normal.  Following admission to the floor developed fever to 101.3, cultures were drawn.  Remains admitted for further cares and monitoring.  Infectious workup thus far is negative for source of fever.  No fever recurrence.  Clinically improving with supportive care for not completely back to her previous baseline.  PT, OT are consulted and following.  Initially recommendations were for TCU.     Daily update: Much more alert today after decreasing Seroquel to 50 mg.  Repeat labs show the patient is dehydrated with hypernatremia, hyperchloremia and elevated creatinine.  Started IV fluids, after electrolytes corrects need to encourage oral fluid intake.  Social work states they found a memory care TCU for tomorrow afternoon     Neurocognitive Disorder   Presumed Alzheimer's dementia  B12 Deficiency   Follows with Dr. Ledesma of neurology for progressive cognitive dysfunction B12 deficiency and progressive dementia consistent with Alzheimer's type.She has known abnormal gait with rigidity and jaw tremor considered for parkinsonism although per last neurology note deferred Sinemet trial at the time. These symptoms due appear to have progressed and are limiting her ability to return home with prior level of assistance.  On admission NCHCT showed previous changes in cerebellar tonsils c/w Chiari malformation, no acute pathology.   Plan:  -Continue pta Aricept, Namenda, lexapro    - due for monthly B12 injections she is due for this 11/24 but we will defer till the outpatient setting at this time.    -Continue Neurontin 300 mg at lunch and dinner as per last neurology note.    -Was prescribed Seroquel 125 mg nightly prior to admission this was held initially for drowsiness but restarting today at reduced dose (75 mg) as her sundowning behaviors have started to return.  Spouse states this used to be given in a divided dose but due to difficulty getting Daneille to tolerate p.o. meds it has now been given in a daily dose which we will continue here.   - decreased Seroquel to 50 mg at night due to sedation 11/28  - Neurology consulted    - rec MRI: findings suggest component of cerebral amyloid angiopathy, no signs of acute CVA   - neurology discussed with patient, recommending outpatient cardiology referral to discuss anticoagulation and if a watchman may be an option. Likely not an urgent issue as she has been on anticoagulation for years     History of falls  Patient had a fall in 9/2023 and was seen in the emergency room.  She is at high risk for morbidity and mortality if she falls as she is on Coumadin.    -pt/ot/sw consulted for dispo recommendations     Hypernatremia   Hypochloridemia  Elevated creatinine   Very likely due to dehydration. Patient needs frequent cues to drink water.   - started D5W infusion this morning, repeat BMP 1 pm  - slight improvement in sodium and creatinine at 1 pm, continue IVF with repeat BMP at 9 pm to ensure it is continuing to improve, if resolved discontinue IVF  - added blood glucose checks with D5 infusion     Fever - resolved  Main reason she was brought in. Suspect this was a viral process contributing to fever, global weakness.  No evidence of sepsis or hemodynamic instability. NCHCT, CXR, UA/UCx, Viral PCR all negative. No new medication changes. Since resolved. Near baseline and improving slowly with PT from a deconditioning stand point although  "limited by gait freezing.   - Blood cultures NGTD after 5 days      Atrial fibrillation, Coumadin induced coagulopathy  On lopressor, rate controlled.  She is also on Coumadin for CVA ppx with an INR goal of 2-3.    - Pharm.D. to dose her Coumadin.       CVA  HLD  Complicated by dysphagia and aphasia.  Patient is on Coumadin to prevent future strokes but also has some mention on previous Neurology note regarding amyloid but has been anticoagulated since at least before 2019.   -resume PTA statin at discharge (obs status)  - outpatient cardiology referral to discuss anticoagulation     Depression, anxiety  - continue pta lexapro      Frozen shoulder  History noted.     Chronic Constipation concerns  - adjusted on 11/28 due to concerns for no BM, scheduled milk magnesia and sennakot, miralax PRN       Observation Goals: -diagnostic tests and consults completed and resulted, -vital signs normal or at patient baseline, -returns to baseline functional status, May need TCU, needs therapy to see, Nurse to notify provider when observation goals have been met and patient is ready for discharge.  Diet: Regular Diet Adult    DVT Prophylaxis: Warfarin  Morelos Catheter: Not present  Lines: None     Cardiac Monitoring: None  Code Status: Full Code      Clinically Significant Risk Factors Present on Admission         # Hypernatremia: Highest Na = 147 mmol/L in last 2 days, will monitor as appropriate       # Drug Induced Coagulation Defect: home medication list includes an anticoagulant medication         # Overweight: Estimated body mass index is 29.15 kg/m  as calculated from the following:    Height as of this encounter: 1.575 m (5' 2\").    Weight as of this encounter: 72.3 kg (159 lb 6.3 oz).              Disposition Plan discharge tomorrow afternoon to  TCU     Expected Discharge Date: 11/28/2023                  The patient's care was discussed with the Bedside Nurse, Care Coordinator/, Patient, and Patient's " Family.    LOLY Byrd PA-C  Hospitalist Service  [facility]  Securely message with [employer] (more info)  Text page via [employer] Paging/Directory   ______________________________________________________________________    Interval History   Doing better today after decreasing Seroquel yesterday. More alert. No fever, chills, chest pain, SOB, abdominal pain, N/V.     Physical Exam   Vital Signs: Temp: 98  F (36.7  C) Temp src: Oral BP: 130/63 Pulse: 63   Resp: 18 SpO2: 95 % O2 Device: None (Room air)    Weight: 159 lbs 6.28 oz    GENERAL:  Alert, Comfortable, No acute distress. Laying in bed.   PSYCH: pleasant  HEART:  Normal S1, S2 with no murmur, RRR  LUNGS:  Normal Respiratory effort. Clear to auscultation anteriorly.  ABDOMEN: soft, non-tender, non-distended  EXTREMITIES:  No pedal edema, No cyanosis.    SKIN:  Warm, dry to touch. No rash.  NEUROLOGIC:  Speech fairly clear, alert, residual facial droop from previous CVA    Medical Decision Making       MANAGEMENT DISCUSSED with the following over the past 24 hours: patient, family, nursing, SW   NOTE(S)/MEDICAL RECORDS REVIEWED over the past 24 hours: labs, progress notes       Data     I have personally reviewed the following data over the past 24 hrs:    4.3  \   11.6 (L)   / 216     147 (H) 112 (H) 24.3 (H) /  84   4.1 28 1.08 (H) \     INR:  2.53 (H) PTT:  N/A   D-dimer:  N/A Fibrinogen:  N/A       Imaging results reviewed over the past 24 hrs:   No results found for this or any previous visit (from the past 24 hour(s)).

## 2023-11-28 NOTE — PROGRESS NOTES
Care Management Follow Up    Expected Discharge Date: 11/29/2023     Concerns to be Addressed: Discharge planning      Patient plan of care discussed at interdisciplinary rounds: Yes    Anticipated Discharge Disposition: [facility] Transitional Care, [id number]     Anticipated Discharge Services: PT/OT    Patient/family educated on Medicare website which has current facility and service quality ratings:  Yes  Education Provided on the Discharge Plan:  Yes  Patient/Family in Agreement with the Plan:  Yes    Referrals Placed by CM/SW: Skilled Nursing Facility  Private pay costs discussed: transportation costs    Additional Information:  Patient has been accepted at [facility] ([phone]),  TCU bed available tomorrow. Met with patient at bedside, agreeable to plan. He is requesting [facility] transport and anticipates pt can tolerate transport without additional support or assistance. [facility] transport arranged for tomorrow, 11/29, between 1809-0060.  and facility updated. PAS completed. Orders to be faxed. SW will continue to follow.     AAMIR Kumar, [facility]   Inpatient Care Coordination  [facility]   [phone]

## 2023-11-29 ENCOUNTER — TELEPHONE (OUTPATIENT)
Dept: ANTICOAGULATION | Facility: CLINIC | Age: 78
End: 2023-11-29
Payer: COMMERCIAL

## 2023-11-29 ENCOUNTER — TELEPHONE (OUTPATIENT)
Dept: FAMILY MEDICINE | Facility: CLINIC | Age: 78
End: 2023-11-29
Payer: COMMERCIAL

## 2023-11-29 VITALS
HEIGHT: 62 IN | BODY MASS INDEX: 29.33 KG/M2 | TEMPERATURE: 97.9 F | DIASTOLIC BLOOD PRESSURE: 76 MMHG | SYSTOLIC BLOOD PRESSURE: 165 MMHG | OXYGEN SATURATION: 96 % | WEIGHT: 159.39 LBS | HEART RATE: 57 BPM | RESPIRATION RATE: 16 BRPM

## 2023-11-29 DIAGNOSIS — I48.0 PAROXYSMAL ATRIAL FIBRILLATION (H): Primary | ICD-10-CM

## 2023-11-29 LAB
ANION GAP SERPL CALCULATED.3IONS-SCNC: 5 MMOL/L (ref 7–15)
BUN SERPL-MCNC: 20.7 MG/DL (ref 8–23)
CALCIUM SERPL-MCNC: 8.4 MG/DL (ref 8.8–10.2)
CHLORIDE SERPL-SCNC: 106 MMOL/L (ref 98–107)
CREAT SERPL-MCNC: 1.09 MG/DL (ref 0.51–0.95)
DEPRECATED HCO3 PLAS-SCNC: 30 MMOL/L (ref 22–29)
EGFRCR SERPLBLD CKD-EPI 2021: 51 ML/MIN/1.73M2
GLUCOSE BLDC GLUCOMTR-MCNC: 103 MG/DL (ref 70–99)
GLUCOSE SERPL-MCNC: 83 MG/DL (ref 70–99)
INR PPP: 2.31 (ref 0.85–1.15)
POTASSIUM SERPL-SCNC: 4.2 MMOL/L (ref 3.4–5.3)
SODIUM SERPL-SCNC: 141 MMOL/L (ref 135–145)

## 2023-11-29 PROCEDURE — 85610 PROTHROMBIN TIME: CPT | Performed by: INTERNAL MEDICINE

## 2023-11-29 PROCEDURE — 82962 GLUCOSE BLOOD TEST: CPT

## 2023-11-29 PROCEDURE — 250N000013 HC RX MED GY IP 250 OP 250 PS 637: Performed by: PHYSICIAN ASSISTANT

## 2023-11-29 PROCEDURE — 80048 BASIC METABOLIC PNL TOTAL CA: CPT

## 2023-11-29 PROCEDURE — 250N000013 HC RX MED GY IP 250 OP 250 PS 637

## 2023-11-29 PROCEDURE — 250N000013 HC RX MED GY IP 250 OP 250 PS 637: Performed by: INTERNAL MEDICINE

## 2023-11-29 PROCEDURE — 36415 COLL VENOUS BLD VENIPUNCTURE: CPT | Performed by: INTERNAL MEDICINE

## 2023-11-29 PROCEDURE — 99239 HOSP IP/OBS DSCHRG MGMT >30: CPT | Performed by: PHYSICIAN ASSISTANT

## 2023-11-29 PROCEDURE — G0378 HOSPITAL OBSERVATION PER HR: HCPCS

## 2023-11-29 RX ORDER — CYANOCOBALAMIN 1000 UG/ML
1000 INJECTION, SOLUTION INTRAMUSCULAR; SUBCUTANEOUS
Status: DISCONTINUED | OUTPATIENT
Start: 2023-11-30 | End: 2024-02-29

## 2023-11-29 RX ORDER — QUETIAPINE FUMARATE 50 MG/1
50 TABLET, FILM COATED ORAL AT BEDTIME
DISCHARGE
Start: 2023-11-29 | End: 2024-01-04

## 2023-11-29 RX ORDER — METOPROLOL TARTRATE 25 MG/1
12.5 TABLET, FILM COATED ORAL EVERY EVENING
DISCHARGE
Start: 2023-11-29 | End: 2023-12-10

## 2023-11-29 RX ORDER — AMOXICILLIN 250 MG
1 CAPSULE ORAL 2 TIMES DAILY
DISCHARGE
Start: 2023-11-29 | End: 2024-06-03

## 2023-11-29 RX ORDER — ASPIRIN 81 MG/1
81 TABLET ORAL DAILY
DISCHARGE
Start: 2023-12-03

## 2023-11-29 RX ADMIN — SENNOSIDES AND DOCUSATE SODIUM 1 TABLET: 8.6; 5 TABLET ORAL at 08:24

## 2023-11-29 RX ADMIN — METOPROLOL TARTRATE 12.5 MG: 25 TABLET, FILM COATED ORAL at 08:24

## 2023-11-29 RX ADMIN — MAGNESIUM HYDROXIDE 30 ML: 400 SUSPENSION ORAL at 08:24

## 2023-11-29 ASSESSMENT — ACTIVITIES OF DAILY LIVING (ADL)
ADLS_ACUITY_SCORE: 58
ADLS_ACUITY_SCORE: 60
ADLS_ACUITY_SCORE: 58
ADLS_ACUITY_SCORE: 58
ADLS_ACUITY_SCORE: 60
ADLS_ACUITY_SCORE: 60

## 2023-11-29 NOTE — PROGRESS NOTES
Care Management Discharge Note    Discharge Date: 11/29/2023     Discharge Disposition: [facility] Transitional Care    Discharge Services:  PT/OT    Discharge Transportation: [facility] transport  Private pay costs discussed: transportation costs    Does the patient's insurance plan have a 3 day qualifying hospital stay waiver?  No    PAS Confirmation Code: [id number]   Patient/family educated on Medicare website which has current facility and service quality ratings: Yes     Education Provided on the Discharge Plan:  Yes  Persons Notified of Discharge Plans: Patient, spouse, [facility] TCU  Patient/Family in Agreement with the Plan:  Yes    Handoff Referral Completed: No    Additional Information:  Plan for [facility] today. PAS completed. Orders to be faxed. Met with spouse at bedside, updated on plan and answered questions. [facility] transport arranged for today between 8414-2726. SW will continue to follow.     AAMIR Kumar, [facility]   Inpatient Care Coordination  [facility]   [phone]

## 2023-11-29 NOTE — PROGRESS NOTES
"PRIMARY DIAGNOSIS: GENERALIZED WEAKNESS    OUTPATIENT/OBSERVATION GOALS TO BE MET BEFORE DISCHARGE  1. Orthostatic performed: N/A    2. Tolerating PO medications: Yes    3. Return to near baseline physical activity: No    4. Cleared for discharge by consultants (if involved): No    Discharge Planner Nurse   Safe discharge environment identified: Yes  Barriers to discharge: Yes       Entered by: Tessa Cote RN 11/29/2023    BP (!) 149/67 (BP Location: Left arm)   Pulse 56   Temp 96.9  F (36.1  C) (Axillary)   Resp 18   Ht 1.575 m (5' 2\")   Wt 72.3 kg (159 lb 6.3 oz)   LMP  (LMP Unknown)   SpO2 97%   BMI 29.15 kg/m    Patient alert & oriented only to self. VSS. Assist x 2 with Walker/GB. Needs assistance with feeding. Meds crushed in Applesauce. May refuse cares & be combative but cooperative once re approached.Incontinent of B/B. PIV saline locked. BGs checks: 96 & 103. Anticipated discharge today 11/29 between 12:30- 1:30 to a TCU/MC.  Please review provider order for any additional goals.   Nurse to notify provider when observation goals have been met and patient is ready for discharge.  "

## 2023-11-29 NOTE — PROGRESS NOTES
"PRIMARY DIAGNOSIS: GENERALIZED WEAKNESS    OUTPATIENT/OBSERVATION GOALS TO BE MET BEFORE DISCHARGE  1. Orthostatic performed: N/A    2. Tolerating PO medications: Yes    3. Return to near baseline physical activity: No    4. Cleared for discharge by consultants (if involved): No    Discharge Planner Nurse   Safe discharge environment identified: Yes  Barriers to discharge: Yes       Entered by: Tessa Cote RN 11/28/2023    /71 (BP Location: Left arm)   Pulse 57   Temp 98  F (36.7  C) (Oral)   Resp 18   Ht 1.575 m (5' 2\")   Wt 72.3 kg (159 lb 6.3 oz)   LMP  (LMP Unknown)   SpO2 96%   BMI 29.15 kg/m    Please review provider order for any additional goals.   Nurse to notify provider when observation goals have been met and patient is ready for discharge.  "

## 2023-11-29 NOTE — PLAN OF CARE
PRIMARY DIAGNOSIS: Wkns  OUTPATIENT/OBSERVATION GOALS TO BE MET BEFORE DISCHARGE:  ADLs back to baseline: No     Activity and level of assistance: Ax2 with walker and gait belt     Pain status: Pain free.     Return to near baseline physical activity: No          Discharge Planner Nurse   Safe discharge environment identified: No  Barriers to discharge: Yes       Entered by: Kassi López RN 11/24/2023 11:13 PM  Please review provider order for any additional goals.   Nurse to notify provider when observation goals have been met and patient is ready for discharge.     Temp: 97.7  F (36.5  C) Temp src: Axillary BP: (!) 153/85 Pulse: 66   Resp: 16 SpO2: 94 % O2 Device: None (Room air)         Oriented to self only. Ax2 with cares/ambulate with walker and gait belt. Incontinent of urine and bowel. Total feed. Takes meds crushed in applesauce. Have been offering fluids when in room. Plan- SW following for TCU placement, blood cultures pending-NGTD.                               
   PRIMARY DIAGNOSIS: Wkns  OUTPATIENT/OBSERVATION GOALS TO BE MET BEFORE DISCHARGE:  ADLs back to baseline: No     Activity and level of assistance: Ax2 with walker and gait belt     Pain status: Pain free.     Return to near baseline physical activity: No          Discharge Planner Nurse   Safe discharge environment identified: No  Barriers to discharge: Yes       Entered by: Kassi López RN 11/24/2023 11:13 PM  Please review provider order for any additional goals.   Nurse to notify provider when observation goals have been met and patient is ready for discharge.     Temp: 98.2  F (36.8  C) Temp src: Axillary BP: (!) 179/82 Pulse: 61   Resp: 16 SpO2: 97 % O2 Device: None (Room air)            Oriented to self only. Ax2 with cares/ambulate with walker and gait belt. Incontinent of urine and bowel. Total feed. Takes meds crushed in applesauce. Slept most of the night. Agitated with cares overnight. Plan- SW following for TCU placement vs home with assistance if able to do stairs, blood cultures pending-NGTD.                               
   PRIMARY DIAGNOSIS: Wkns  OUTPATIENT/OBSERVATION GOALS TO BE MET BEFORE DISCHARGE:  ADLs back to baseline: No     Activity and level of assistance: Ax2 with walker and gait belt     Pain status: Pain free.     Return to near baseline physical activity: No          Discharge Planner Nurse   Safe discharge environment identified: No  Barriers to discharge: Yes       Entered by: Kassi López RN 11/24/2023 11:13 PM  Please review provider order for any additional goals.   Nurse to notify provider when observation goals have been met and patient is ready for discharge.     Temp: 98.3  F (36.8  C) Temp src: Oral BP: (!) 163/82 Pulse: 61   Resp: 18 SpO2: 96 % O2 Device: None (Room air)              Oriented to self only. Ax2 with cares/ambulate with walker and gait belt. Incontinent of urine and bowel. Total feed. Takes meds crushed in applesauce or pudding. Mildly combative with repositioning in bed. Plan- SW following for TCU placement vs home with assistance if able to do stairs, blood cultures pending-NGTD, neurology following- MRI ordered and to be completed tomorrow 11/27 between 4956-9020, restarted seroquel this evening at reduced dose- if drowsy in am plan is to reduce dose further.                              
  Physical Therapy Discharge Summary     Reason for therapy discharge:    Discharged to transitional care facility.     Progress towards therapy goal(s). See goals on Care Plan in [facility] electronic health record for goal details.  Goals not met.  Barriers to achieving goals:   discharge from facility.     Therapy recommendation(s):    Continued therapy is recommended.  Rationale/Recommendations:  Patient would benefit from PT eval at TCU in order to increase strength, activity tolerance, balance and independence with mobility.    **Pt not seen by discharging therapist on this date, note written based on previous treating therapist's notes and recommendations    
"PRIMARY DIAGNOSIS: Failure to thrive  OUTPATIENT/OBSERVATION GOALS TO BE MET BEFORE DISCHARGE:  ADLs back to baseline: No    Activity and level of assistance:  A2x    Pain status: Pain free.    Return to near baseline physical activity: No     Discharge Planner Nurse   Safe discharge environment identified: No  Barriers to discharge: Yes       Entered by: Angela Finch RN 11/24/2023 5:41 AM     Please review provider order for any additional goals.   Nurse to notify provider when observation goals have been met and patient is ready for discharge.    /70 (BP Location: Right arm)   Pulse 80   Temp 97.5  F (36.4  C) (Oral)   Resp 18   Ht 1.575 m (5' 2\")   Wt 72.3 kg (159 lb 6.3 oz)   LMP  (LMP Unknown)   SpO2 97%   BMI 29.15 kg/m      Slept on and off. Incontinent of urine. Plan TCU placement , PT and SW following.  "
"PRIMARY DIAGNOSIS: GENERALIZED WEAKNESS    OUTPATIENT/OBSERVATION GOALS TO BE MET BEFORE DISCHARGE  1. Orthostatic performed: N/A    2. Tolerating PO medications: Yes    3. Return to near baseline physical activity: No    4. Cleared for discharge by consultants (if involved): No    Discharge Planner Nurse   Safe discharge environment identified: No  Barriers to discharge: Yes - Not yet cleared by healthcare team.        Entered by: Jay Le RN      BP (!) 166/76 (BP Location: Right arm)   Pulse 64   Temp 98.4  F (36.9  C) (Oral)   Resp 20   Ht 1.575 m (5' 2\")   Wt 72.3 kg (159 lb 6.3 oz)   LMP  (LMP Unknown)   SpO2 96%   BMI 29.15 kg/m     Please review provider order for any additional goals.   Nurse to notify provider when observation goals have been met and patient is ready for discharge.  "
"PRIMARY DIAGNOSIS: GENERALIZED WEAKNESS    OUTPATIENT/OBSERVATION GOALS TO BE MET BEFORE DISCHARGE  1. Orthostatic performed: N/A    2. Tolerating PO medications: Yes    3. Return to near baseline physical activity: No    4. Cleared for discharge by consultants (if involved): No    Discharge Planner Nurse   Safe discharge environment identified: No  Barriers to discharge: Yes - Not yet cleared by healthcare team.        Entered by: Shlomo Naranjo RN 11/22/2023 10:33 PM     Patient alert - Has history of aphasia and unable to assess orientation status. Her VS remarkable for fever and HTN. She is on RA and admitting provider notified of elevated temp. PAINAD assessment scored at zero as she seems to be resting comfortably. Blood culture pending.   UA obtained via straight cath. Patient on regular diet and spouse reports she is able to feed self without difficulty. Possibly incontinent due to communication issues. Spouse reports last BM 11/21/23 and she needs prune juice in PM.   Patient takes pills crushed with pudding.   Per spouse report, she is independent at baseline. Not OOB upon admission and this shift due to weakness. PT and OT to follow.   BP (!) 169/77 (BP Location: Right arm, Patient Position: Semi-Hurtado's, Cuff Size: Adult Regular)   Pulse 76   Temp (!) 101.8  F (38.8  C) (Oral)   Resp 20   Ht 1.575 m (5' 2\")   Wt 72.3 kg (159 lb 6.3 oz)   LMP  (LMP Unknown)   SpO2 94%   BMI 29.15 kg/m     Please review provider order for any additional goals.   Nurse to notify provider when observation goals have been met and patient is ready for discharge.  "
Goal Outcome Evaluation:       Pt alert but confused, pt has Dementia. Up with assist of 2 with gb/walker. Pt is incontinent of B/B. Pt is total feed.  Karl is bedside and involved in cares. Takes pills crushed in apple sauce. LS clear.   requesting prune juice every morning.                  
Goal Outcome Evaluation:       Pt alert to self/, disorientated to place situation time. Pt has Dementia.  Pt on regular diet, good appetite,  usually feeds her, otherwise total feed. Heavy assist of 2 with gb and walker.  Up to commode and chair today. Incontinent otherwise.  Meds taken crushed in apple sauce.  VSS on RA  waiting on placement.                 
Goal Outcome Evaluation: Pt up with walker gait belt and 2 assist. BM on toilet after breakfast today. Incontinent of urine.  at bedside and feeds her and is comforting for her. IV dcd and pt dressed. Ready for discharge and awaiting [employer] transport. Confused dementia at baseline. Out in wheelchair with [employer] 1250.                        
Occupational Therapy Discharge Summary    Reason for therapy discharge:    Discharged to transitional care facility.    Progress towards therapy goal(s). See goals on Care Plan in [facility] electronic health record for goal details.  Goals not met.  Barriers to achieving goals:   discharge from facility.    Therapy recommendation(s):    Continued therapy is recommended.  Rationale/Recommendations:  OT eval and treat at TCU.      **Pt not seen by discharging therapist on this date, note written based on previous treating therapist's notes and recommendations  
PRIMARY DIAGNOSIS: Failure to Thrive, Weakness  OUTPATIENT/OBSERVATION GOALS TO BE MET BEFORE DISCHARGE:  ADLs back to baseline: No    Activity and level of assistance: Up with maximum assistance. A of 2 with GB and WC following    Pain status: Pain free.    Return to near baseline physical activity: No     Discharge Planner Nurse   Safe discharge environment identified: No  Barriers to discharge: Yes       Entered by: Itzel Henson RN 11/26/2023     Please review provider order for any additional goals.   Nurse to notify provider when observation goals have been met and patient is ready for discharge.  
PRIMARY DIAGNOSIS: Failure to thrive  OUTPATIENT/OBSERVATION GOALS TO BE MET BEFORE DISCHARGE:  ADLs back to baseline: No    Activity and level of assistance:  A2x    Pain status: Pain free.    Return to near baseline physical activity: No     Discharge Planner Nurse   Safe discharge environment identified: No  Barriers to discharge: Yes       Entered by: Angela Finch RN 11/24/2023 2:11 AM     Please review provider order for any additional goals.   Nurse to notify provider when observation goals have been met and patient is ready for discharge.    Alert and opens eyes spontaneously and aphasic at baseline.   
PRIMARY DIAGNOSIS: GENERALIZED WEAKNESS    OUTPATIENT/OBSERVATION GOALS TO BE MET BEFORE DISCHARGE  1. Orthostatic performed: N/A    2. Tolerating PO medications: Yes    3. Return to near baseline physical activity: No    4. Cleared for discharge by consultants (if involved): No    Discharge Planner Nurse   Safe discharge environment identified: No  Barriers to discharge: Yes - Not yet cleared by healthcare team.        Entered by: Jay Le RN    Vitals are Temp: 98.7  F (37.1  C) Temp src: Oral BP: (!) 152/75 Pulse: 62   Resp: 18 SpO2: 95 %.  Patient is Alert and Oriented x4. Patient is 1 Assist in bed.  Pt is a Regular diet. Patient does not show sign of pain neither  does she answered when asked . Patient is Saline locked. Her  diaper soiled and personal hygiene maintained.  Plan, OT, PT are following.pat slept well though.    Please review provider order for any additional goals.   Nurse to notify provider when observation goals have been met and patient is ready for discharge.  
PRIMARY DIAGNOSIS: GENERALIZED WEAKNESS    OUTPATIENT/OBSERVATION GOALS TO BE MET BEFORE DISCHARGE  1. Orthostatic performed: N/A    2. Tolerating PO medications: Yes    3. Return to near baseline physical activity: No    4. Cleared for discharge by consultants (if involved): Yes    Discharge Planner Nurse   Safe discharge environment identified: No  Barriers to discharge: Yes       Entered by: Israel Coyle RN 11/25/2023 9:19 PM     Please review provider order for any additional goals.   Nurse to notify provider when observation goals have been met and patient is ready for discharge.      Plan of Care Reviewed With: patient    RN - VSS. Pt complaining of stiff neck otherwise denying pain. Remains alert to self only. Spouse at bedside assisting with cares. Pt cooperative with cares at this time. Incontinent of urine - no bowel movement observed. Pt did have 1x episode of agitation with transferring. Spouse stating this behavior is not unusual.                  
PRIMARY DIAGNOSIS: GENERALIZED WEAKNESS    OUTPATIENT/OBSERVATION GOALS TO BE MET BEFORE DISCHARGE  1. Orthostatic performed: N/A    2. Tolerating PO medications: Yes    3. Return to near baseline physical activity: No    4. Cleared for discharge by consultants (if involved): Yes    Discharge Planner Nurse   Safe discharge environment identified: No  Barriers to discharge: Yes       Entered by: Israel Coyle RN 11/25/2023 9:22 PM     Please review provider order for any additional goals.   Nurse to notify provider when observation goals have been met and patient is ready for discharge.      Plan of Care Reviewed With: patient      RN - VSS - Neuro status and orientation unchanged with exception of pt more uncooperative with cares - refusing assessment and night time medications from writer. TAY LENNON. Plan continues to be for rehab placement for pt.           
PRIMARY DIAGNOSIS: GENERALIZED WEAKNESS    OUTPATIENT/OBSERVATION GOALS TO BE MET BEFORE DISCHARGE  1. Orthostatic performed: No    2. Tolerating PO medications: Yes crushed in applesauce    3. Return to near baseline physical activity: No    4. Cleared for discharge by consultants (if involved): No    Discharge Planner Nurse   Safe discharge environment identified: No  Barriers to discharge: Yes       Entered by: Janelle Ho RN 11/29/2023 7:23 AM     Please review provider order for any additional goals.   Nurse to notify provider when observation goals have been met and patient is ready for discharge.Goal Outcome Evaluation:                        
PRIMARY DIAGNOSIS: GLOBAL WEAKNESS    OUTPATIENT/OBSERVATION GOALS TO BE MET BEFORE DISCHARGE  1. Orthostatic performed: No  2. Tolerating PO medications: Yes  3. Return to near baseline physical activity: No  4. Cleared for discharge by consultants (if involved): No    Discharge Planner Nurse   Safe discharge environment identified: No  Barriers to discharge: Yes       Entered by: Tara Angulo RN 11/28/2023 11:42 AM     Please review provider order for any additional goals. Nurse to notify provider when observation goals have been met and patient is ready for discharge.    Alert and oriented to self, otherwise confused and drowsy. Have yet to see patient ambulate. Patient denies being in pain. Consults: PT, OT, SW, and neurology. Right IV site infusing with dextrose and NS. Incontinent. Medications taken when crushed in applesauce or pudding.     Vital signs:  Temp: 98  F (36.7  C) Temp src: Oral BP: 130/63 Pulse: 63   Resp: 18 SpO2: 95 % O2 Device: None (Room air)  
PRIMARY DIAGNOSIS: GLOBAL WEAKNESS    OUTPATIENT/OBSERVATION GOALS TO BE MET BEFORE DISCHARGE  1. Orthostatic performed: No  2. Tolerating PO medications: Yes  3. Return to near baseline physical activity: No  4. Cleared for discharge by consultants (if involved): No    Discharge Planner Nurse   Safe discharge environment identified: No  Barriers to discharge: Yes       Entered by: Tara Angulo RN 11/28/2023 12:57 PM     Please review provider order for any additional goals. Nurse to notify provider when observation goals have been met and patient is ready for discharge.    Alert and oriented to self, otherwise confused and drowsy. Have yet to see patient ambulate. Patient denies being in pain. Consults: PT, OT, SW, and neurology. Right IV site infusing with dextrose and NS. Incontinent. Medications taken when crushed in applesauce or pudding.       
PRIMARY DIAGNOSIS: GLOBAL WEAKNESS    OUTPATIENT/OBSERVATION GOALS TO BE MET BEFORE DISCHARGE  1. Orthostatic performed: No  2. Tolerating PO medications: Yes  3. Return to near baseline physical activity: No  4. Cleared for discharge by consultants (if involved): No    Discharge Planner Nurse   Safe discharge environment identified: Yes  Barriers to discharge: Yes       Entered by: Tara Angulo RN 11/28/2023 4:29 PM     Please review provider order for any additional goals. Nurse to notify provider when observation goals have been met and patient is ready for discharge.    Alert and oriented to self, otherwise confused and drowsy. Ambulates with 2 assist, walker, and gait belt. Patient agitated during ambulation. Consults: PT, OT, SW, and neurology. Right IV site infusing with dextrose and NS. Incontinent. Had a large bowel movement today. Medications taken crushed in applesauce or pudding. TCU discharge 11/29 via [facility] transport.    Vital signs:  Temp: 98.3  F (36.8  C) Temp src: Oral BP: 127/67 Pulse: 55   Resp: 18 SpO2: 95 % O2 Device: None (Room air)            
PRIMARY DIAGNOSIS: Generalized Weakness  OUTPATIENT/OBSERVATION GOALS TO BE MET BEFORE DISCHARGE:  ADLs back to baseline: No    Activity and level of assistance: Assist of 2 with walker and GB    Pain status: Pain free.    Return to near baseline physical activity: No     Discharge Planner Nurse   Safe discharge environment identified: Yes  Barriers to discharge: Yes       Entered by: Itzel Henson RN 11/26/2023      Please review provider order for any additional goals.   Nurse to notify provider when observation goals have been met and patient is ready for discharge.  
PRIMARY DIAGNOSIS: Weakness  OUTPATIENT/OBSERVATION GOALS TO BE MET BEFORE DISCHARGE:  ADLs back to baseline: No    Activity and level of assistance: Assist of 2 with gait belt and walker    Pain status: Pain free (except chronic intermittent neck pain,  says usually starts in afternoon, gave tylenol)    Return to near baseline physical activity: No     Discharge Planner Nurse   Safe discharge environment identified: No  Barriers to discharge: Yes       Entered by: Itzel Henson RN 11/26/2023      A&O to self only, able to answer simple questions. VSS on RA x elevated BP. Incontinent of bowel and bladder. Requires total assistance with feeding. Takes meds crushed in applesauce. PT and SW following. Orders for neuro consult and MRI entered today. Spouse at bedside.    Please review provider order for any additional goals.   Nurse to notify provider when observation goals have been met and patient is ready for discharge.  
PRIMARY DIAGNOSIS: Wkns  OUTPATIENT/OBSERVATION GOALS TO BE MET BEFORE DISCHARGE:  ADLs back to baseline: No     Activity and level of assistance: Ax1-2 with walker and gait belt     Pain status: Pain free.     Return to near baseline physical activity: No          Discharge Planner Nurse   Safe discharge environment identified: No  Barriers to discharge: Yes       Entered by: Kassi López RN 11/24/2023 11:13 PM  Please review provider order for any additional goals.   Nurse to notify provider when observation goals have been met and patient is ready for discharge.     Temp: 97.7  F (36.5  C) Temp src: Axillary BP: 123/56 Pulse: 54   Resp: 20 SpO2: 96 % O2 Device: None (Room air)                     Oriented to self only. Ax1-2 with cares/ambulate with walker and gait belt. Incontinent of urine and bowel. Total feed. Takes meds crushed in applesauce or pudding. Pleasant and not combative with cares, talking more. Plan- SW following for TCU placement, blood cultures negative, neurology following, reduced seroquel to 50mg at bedtime from 75mg- assess dose in am.                         
PRIMARY DIAGNOSIS: Wkns  OUTPATIENT/OBSERVATION GOALS TO BE MET BEFORE DISCHARGE:  ADLs back to baseline: No     Activity and level of assistance: Ax1-2 with walker and gait belt     Pain status: Pain free.     Return to near baseline physical activity: No          Discharge Planner Nurse   Safe discharge environment identified: No  Barriers to discharge: Yes       Entered by: Kassi López RN 11/24/2023 11:13 PM  Please review provider order for any additional goals.   Nurse to notify provider when observation goals have been met and patient is ready for discharge.     Temp: 98  F (36.7  C) Temp src: Oral BP: 138/76 Pulse: 59   Resp: 20 SpO2: 93 % O2 Device: None (Room air)                   Oriented to self only. Ax1-2 with cares/ambulate with walker and gait belt. Incontinent of urine and bowel. Total feed. Takes meds crushed in applesauce or pudding. Pleasant and not combative with cares this evening, talking more. Plan- SW following for TCU placement, blood cultures negative, neurology following, reduced seroquel to 50mg at bedtime from 75mg- assess dose in am.                            
PRIMARY DIAGNOSIS: Wkns  OUTPATIENT/OBSERVATION GOALS TO BE MET BEFORE DISCHARGE:  ADLs back to baseline: No     Activity and level of assistance: Ax1-2 with walker and gait belt     Pain status: Pain free.     Return to near baseline physical activity: No          Discharge Planner Nurse   Safe discharge environment identified: No  Barriers to discharge: Yes       Entered by: Kassi López RN 11/24/2023 11:13 PM  Please review provider order for any additional goals.   Nurse to notify provider when observation goals have been met and patient is ready for discharge.     Temp: 98  F (36.7  C) Temp src: Oral BP: 138/76 Pulse: 59   Resp: 20 SpO2: 93 % O2 Device: None (Room air)                   Oriented to self only. Ax1-2 with cares/ambulate with walker and gait belt. Incontinent of urine and bowel. Total feed. Takes meds crushed in applesauce or pudding. Pleasant and not combative with cares this evening. Plan- SW following for TCU placement, blood cultures negative, neurology following, reduced seroquel to 50mg at bedtime from 75mg- assess dose in am.                         
PRIMARY DIAGNOSIS: Wkns  OUTPATIENT/OBSERVATION GOALS TO BE MET BEFORE DISCHARGE:  ADLs back to baseline: No     Activity and level of assistance: Ax2 with walker and gait belt     Pain status: Pain free.     Return to near baseline physical activity: No          Discharge Planner Nurse   Safe discharge environment identified: No  Barriers to discharge: Yes       Entered by: Kassi López RN 11/24/2023 11:13 PM  Please review provider order for any additional goals.   Nurse to notify provider when observation goals have been met and patient is ready for discharge.     Temp: 97.7  F (36.5  C) Temp src: Axillary BP: (!) 153/85 Pulse: 66   Resp: 16 SpO2: 94 % O2 Device: None (Room air)         Oriented to self only. Ax2 with cares/ambulate with walker and gait belt. Incontinent of urine and bowel. Total feed. Takes meds crushed in applesauce. Slept most of the night. Plan- SW following for TCU placement, blood cultures pending-NGTD.                         
PRIMARY DIAGNOSIS: Wkns  OUTPATIENT/OBSERVATION GOALS TO BE MET BEFORE DISCHARGE:  ADLs back to baseline: No     Activity and level of assistance: Ax2 with walker and gait belt     Pain status: Pain free.     Return to near baseline physical activity: No          Discharge Planner Nurse   Safe discharge environment identified: No  Barriers to discharge: Yes       Entered by: Kassi López RN 11/24/2023 11:13 PM  Please review provider order for any additional goals.   Nurse to notify provider when observation goals have been met and patient is ready for discharge.     Temp: 97.8  F (36.6  C) Temp src: Oral BP: 127/67 Pulse: 65   Resp: 16 SpO2: 93 % O2 Device: None (Room air)                 Oriented to self only. Ax2 with cares/ambulate with walker and gait belt. Incontinent of urine and bowel. Total feed. Takes meds crushed in applesauce or pudding. Mildly combative with repositioning in bed. Plan- SW following for TCU placement vs home with assistance if able to do stairs, blood cultures pending-NGTD, neurology following- MRI ordered and to be completed today 11/27 between [id number], restarted seroquel this evening at reduced dose- if drowsy in am plan is to reduce dose further.                        
PRIMARY DIAGNOSIS: Wkns  OUTPATIENT/OBSERVATION GOALS TO BE MET BEFORE DISCHARGE:  ADLs back to baseline: No     Activity and level of assistance: Ax2 with walker and gait belt     Pain status: Pain free.     Return to near baseline physical activity: No          Discharge Planner Nurse   Safe discharge environment identified: No  Barriers to discharge: Yes       Entered by: Kassi López RN 11/24/2023 11:13 PM  Please review provider order for any additional goals.   Nurse to notify provider when observation goals have been met and patient is ready for discharge.     Temp: 98  F (36.7  C) Temp src: Oral BP: (!) 146/74 Pulse: 70   Resp: 12 SpO2: 96 % O2 Device: None (Room air)             Oriented to self only. Ax2 with cares/ambulate with walker and gait belt. Incontinent of urine and bowel. Total feed. Takes meds crushed in applesauce or pudding. Mildly combative with repositioning in bed. Plan- SW following for TCU placement vs home with assistance if able to do stairs, blood cultures pending-NGTD, neurology following- MRI ordered and to be completed today 11/27 between [id number], restarted seroquel this evening at reduced dose- if drowsy in am plan is to reduce dose further.                        
PRIMARY DIAGNOSIS: Wkns  OUTPATIENT/OBSERVATION GOALS TO BE MET BEFORE DISCHARGE:  ADLs back to baseline: No     Activity and level of assistance: Ax2 with walker and gait belt     Pain status: Pain free.     Return to near baseline physical activity: No          Discharge Planner Nurse   Safe discharge environment identified: No  Barriers to discharge: Yes       Entered by: Kassi López RN 11/24/2023 11:13 PM  Please review provider order for any additional goals.   Nurse to notify provider when observation goals have been met and patient is ready for discharge.     Temp: 98.2  F (36.8  C) Temp src: Axillary BP: (!) 179/82 Pulse: 61   Resp: 16 SpO2: 97 % O2 Device: None (Room air)            Oriented to self only. Ax2 with cares/ambulate with walker and gait belt. Incontinent of urine and bowel. Total feed. Takes meds crushed in applesauce. Slept most of the night. Agitated with cares overnight. Plan- SW following for TCU placement vs home with assistance if able to do stairs, blood cultures pending-NGTD.                         
PRIMARY DIAGNOSIS: Wkns  OUTPATIENT/OBSERVATION GOALS TO BE MET BEFORE DISCHARGE:  ADLs back to baseline: No    Activity and level of assistance: Ax2 with walker and gait belt    Pain status: Pain free.    Return to near baseline physical activity: No     Discharge Planner Nurse   Safe discharge environment identified: No  Barriers to discharge: Yes       Entered by: Kassi López RN 11/24/2023 11:13 PM     Please review provider order for any additional goals.   Nurse to notify provider when observation goals have been met and patient is ready for discharge.    Temp: 98.1  F (36.7  C) Temp src: Oral BP: (!) 156/79 Pulse: 71   Resp: 18 SpO2: 96 % O2 Device: None (Room air)       Oriented to self only. Ax2 with cares/ambulate with walker and gait belt. Incontinent of urine and bowel. Total feed. Takes meds crushed in applesauce. Have been offering fluids when in room. Plan- SW following for TCU placement, blood cultures pending-NGTD.                         
ROOM # 222    Living Situation (if not independent, order SW consult): Home with spouse - Split level entry   Facility name:  : [facility] -[phone]    Activity level at baseline: Ind at baseline - walker at long distances  Activity level on admit: Not OOB    Who will be transporting you at discharge: Karl (Spouse)    Patient registered to observation; given Patient Bill of Rights; given the opportunity to ask questions about observation status and their plan of care.  Patient has been oriented to the observation room, bathroom and call light is in place.    Discussed discharge goals and expectations with patient/family.           
no

## 2023-11-29 NOTE — TELEPHONE ENCOUNTER
ANTICOAGULATION  MANAGEMENT: Discharge Review    Danielle Bryan chart reviewed for anticoagulation continuity of care    Hospital Admission on 11/22-11/29/23 for dementia.    Discharge disposition: TCU    Results:    Recent labs: (last 7 days)     11/23/23  0515 11/24/23  0636 11/25/23  0704 11/26/23  0607 11/27/23  0613 11/28/23  0700 11/29/23  0640   INR 1.82* 1.67* 2.25* 2.53* 2.29* 2.53* 2.31*     Anticoagulation inpatient management:     warfarin discontinued      ANTICOAGULATION  MANAGEMENT    Danielle Bryan is being discharged from the [facility] ([facility]).    Reason for discharge:  Stopped by  physician -  I spoke with the patient's spouse Karl regarding risk of hemorrhagic stroke on warfarin in the setting of CAA, at this time he would prefer that the patient be taken off of warfarin, he plans to revisit discussion with Cardiologist to see if she would be a candidate for alternative procedure for anticoagulation.  In the mean time when NR subtherapeutic would like to try baby aspirin daily 8 given lower risk of bleeding in the setting of CAA -expresses understanding with our discussion regarding that aspirin does not replace full anticoagulation and  that aspirin also comes with bleeding risk albeit less than warfarin.     Anticoagulation episode resolved, [facility] referral closed, and Standing order discontinued    If patient needs warfarin management in the future, please send a new referral    Jaden Julian RN    
show

## 2023-11-29 NOTE — DISCHARGE SUMMARY
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  The following labs/tests are   recommended: BMP to recheck sodium level.  Follow up with Palliative care to continue discussions of goals of care   given advanced dementia, dehydration.  Follow-up with neurologist to discuss dementia symptoms and gait   abnormalities, Sinemet trial was not recommended in the hospital by   neurologist.  Follow-up with cardiologist to discuss further treatment options, warfarin   was discontinued during hospitalization due to risk of brain bleed in the   setting of CAA.            Unresulted Labs Ordered in the Past 30 Days of this Admission       No orders found from 10/23/2023 to 11/23/2023.        These results will be followed up by n/a    Discharge Disposition   Discharged to short-term care facility  Condition at discharge: Stable    Hospital Course   Danielle Bryan is a 78 year old female with past medical history of dementia, chart review reporting CVA c/b dysphagia and aphasia, hyperlipidemia, atrial fibrillation on chronic anticoagulation, HLD, who was admitted on 11/22/2023 after presenting for evaluation of global weakness.     Workup in the emergency room including NCHCT without new acute pathology, electrolytes, and CBC.  Her TSH was also noted to be normal.  Following admission to the floor developed fever to 101.3, cultures were drawn.  Remains admitted for further cares  and monitoring.  Infectious workup thus far is negative for source of fever.  No fever recurrence during admission and recovered from this.    Given ongoing issues with global weakness consulted neurology after review of previous outpatient neurology care notes to see if Sinemet trial was indicated.  Patient had MRI performed that showed number peripheral located punctate areas likely representative of microhemorrhages indicative of possible previous diagnosis of cerebral amyloid angiopathy with no acute bleed or stroke.    Neurology did not recommend Sinemet trial at this time. They did review danger of warfarin use in patient with setting of CAA, had risk benefit discussion with hx of PAF.       Conversation was revisited on 11/29 as the patient is intended to discharge to TCU.  I spoke with the patient's spouse Karl regarding risk of hemorrhagic stroke on warfarin in the setting of CAA, at this time he would prefer that the patient be taken off of warfarin, he plans to revisit discussion with Cardiologist to see if she would be a candidate for alternative procedure for anticoagulation.  In the mean time when NR subtherapeutic would like to try baby aspirin daily 8 given lower risk of bleeding in the setting of CAA -expresses understanding with our discussion regarding that aspirin does not replace full anticoagulation and  that aspirin also comes with bleeding risk albeit less than warfarin.     In regard to mental status patient did prove with somnolence perspective with decreased dose of Seroquel although this will likely need to be adjusted again in the future.  The biggest concern is with progressive decline and dementia that may be at risk of further dehydration and lack of return to previous baseline with hospitalizations.  I discussed palliative care consultation with Karl for ongoing goals of care discussions at discharge who is agreeable.      Neurocognitive Disorder   Presumed Alzheimer's  dementia  B12 Deficiency   Follows with Dr. Ledesma of neurology for progressive cognitive dysfunction B12 deficiency and progressive dementia consistent with Alzheimer's type.She has known abnormal gait with rigidity and jaw tremor considered for parkinsonism although per last neurology note deferred Sinemet trial at the time. These symptoms due appear to have progressed and are limiting her ability to return home with prior level of assistance.  On admission NCHCT showed previous changes in cerebellar tonsils c/w Chiari malformation, no acute pathology.   Plan:  -Continue pta Aricept, Namenda, lexapro   - due for monthly B12 injections she is due for this 11/24 but we will defer till the outpatient setting at this time.    -Continue Neurontin 300 mg at lunch and dinner as per last neurology note.    -Was prescribed Seroquel 125 mg nightly prior to admission this was held initially for drowsiness but restarting today at reduced dose (75 mg) as her sundowning behaviors have started to return.  Spouse states this used to be given in a divided dose but due to difficulty getting Danielle to tolerate p.o. meds it has now been given in a daily dose which we will continue here.              - decreased Seroquel to 50 mg at night due to sedation 11/28 with improvement in daytime sedation   - Neurology consulted               - rec MRI: findings suggest component of cerebral amyloid angiopathy, no signs of acute CVA              - neurology discussed with patient, recommending outpatient cardiology referral to discuss anticoagulation and if a watchman may be an option. Likely not an urgent issue as she has been on anticoagulation for years although spouse is concerned with bleeding risk given history of falls and CAA suspected diagnosis thus warfarin discontinued this hospitalization. F/up with primary Neurologist.      History of falls  Patient had a fall in 9/2023 and was seen in the emergency room.  She is at high risk for  morbidity and mortality if she falls as she is on Coumadin.    -pt/ot/sw consulted for dispo recommendations - TCU recommended to bridge back to home environment with spouse and PCA services.      Hypernatremia   Hypochloridemia  Elevated creatinine   Very likely due to dehydration. Patient needs frequent cues to drink water.   - treated with d5 infusion with improvement  - somnolence improved with reduced dose Seroquel   - request ongoing goals of care discussion with Palliative at discharge regarding artifical nutrition       Fever - resolved  Main reason she was brought in. Suspect this was a viral process contributing to fever, global weakness.  No evidence of sepsis or hemodynamic instability. NCHCT, CXR, UA/UCx, Viral PCR all negative. No new medication changes. Since resolved. Near baseline and improving slowly with PT from a deconditioning stand point although limited by gait freezing.   - Blood cultures NGTD after 5 days      PAF, Coumadin induced coagulopathy  On lopressor, rate controlled.  She is also on Coumadin for CVA ppx now discontinued. - outpatient cardiology referral to discuss Watchman candidacy if indicated.      CVA  HLD  Complicated by dysphagia and aphasia.  Patient is on Coumadin to prevent future strokes but also has some mention on previous Neurology note regarding amyloid but has been anticoagulated since at least before 2019.   -resume PTA statin at discharge (obs status)  - outpatient cardiology referral to discuss anticoagulation     Depression, anxiety  - continue pta lexapro      Frozen shoulder  History noted.     Chronic Constipation concerns  - adjusted on 11/28 due to concerns for no BM, scheduled milk magnesia and sennakot, miralax PRN. Improved.     Consultations This Hospital Stay   PHARMACY TO DOSE WARFARIN  PHYSICAL THERAPY ADULT IP CONSULT  OCCUPATIONAL THERAPY ADULT IP CONSULT  ADVANCE DIRECTIVE IP CONSULT  CARE MANAGEMENT / SOCIAL WORK IP CONSULT  SPIRITUAL HEALTH  SERVICES IP CONSULT  NEUROLOGY IP CONSULT  PHYSICAL THERAPY ADULT IP CONSULT  OCCUPATIONAL THERAPY ADULT IP CONSULT  SPEECH LANGUAGE PATH ADULT IP CONSULT    Code Status   Prior    Time Spent on this Encounter   I, Susan Donaldson PA-C, personally saw the patient today and spent greater than 30 minutes discharging this patient.       Susan Donaldson PA-C  ______________________________________________________________________    Interval History  More alert this morning after Seroquel reduced. had breakfast this morning with supervision and assistance from spouse. No fever, nausea, vomiting. BM AM of 11/29    Physical Exam   Vital Signs: Temp: 97.9  F (36.6  C) Temp src: Oral BP: (!) 165/76 Pulse: 57   Resp: 16 SpO2: 96 % O2 Device: None (Room air)    Weight: 159 lbs 6.28 oz    Constitutional: Awakens to voice. No distress. Sitting up in chair.   Respiratory:  Normal work of breathing. Lungs clear to auscultation bilaterally, no crackles or wheezing.  Skin/Integument: Warm, dry. no peripheral edema.  Neuro: Alert. Will not answer orientation questions. Moving all extremities. No tremor. Answers yes no questions if spouse answers.   Psych: Appropriate affect.         Primary Care Physician   Heriberto Headley    Discharge Orders      Primary Care - Care Coordination Referral      Adult Palliative Care Referral      General info for SNF    Length of Stay Estimate: Short Term Care: Estimated # of Days <30  Condition at Discharge: Stable  Level of care:skilled   Rehabilitation Potential: Poor   Admission H&P remains valid and up-to-date: Yes  Recent Chemotherapy: N/A  Use Nursing Home Standing Orders: Yes     Mantoux instructions    Give two-step Mantoux (PPD) Per Facility Policy Yes     Follow Up and recommended labs and tests    Follow up with detention physician.  The following labs/tests are recommended: BMP to recheck sodium level.  Follow up with Palliative care to continue discussions of goals of care given  advanced dementia, dehydration.  Follow-up with neurologist to discuss dementia symptoms and gait abnormalities, Sinemet trial was not recommended in the hospital by neurologist.  Follow-up with cardiologist to discuss further treatment options, warfarin was discontinued during hospitalization due to risk of brain bleed in the setting of CAA.     Reason for your hospital stay    Weakness.   Workup in the emergency room was negative. She improved with dose reduction of Seroquel.  Infectious workup was all negative.  Neurology was consulted had an MRI that suggested a component of cerebral amyloid angiopathy with no acute stroke.  Had risk-benefit discussion with providers while admitted due to concern for hemorrhagic stroke on warfarin warfarin was discontinued she was started on baby aspirin once INR has down trended due to lower bleeding risk than warfarin.   Can discuss a Sinemet trial as an outpatient with primary neurologist and can discuss with cardiology if she is a candidate for a Watchman device in place of anticoagulation.     Intake and output    Every shift     Additional Discharge Instructions     Activity - Up with assistive device     Encourage PO fluids     Physical Therapy Adult Consult    Evaluate and treat as clinically indicated.    Reason:  global weakness, deconditioning after viral illness     Occupational Therapy Adult Consult    Evaluate and treat as clinically indicated.    Reason:  fall precautions     Speech Language Path Adult Consult    Evaluate and treat as clinically indicated.    Reason: cognitive impairment     Fall precautions     Diet    Follow this diet upon discharge: Orders Placed This Encounter      Regular Diet Adult       Significant Results and Procedures   Results for orders placed or performed during the hospital encounter of 11/22/23   Head CT w/o contrast    Narrative    CT SCAN OF THE HEAD WITHOUT CONTRAST   11/22/2023 11:47 AM     HISTORY: AMS.    TECHNIQUE:  Axial  images of the head and coronal reformations without  IV contrast material. Radiation dose for this scan was reduced using  automated exposure control, adjustment of the mA and/or kV according  to patient size, or iterative reconstruction technique.    COMPARISON: CT of the head 9/25/2023.    FINDINGS: There is no evidence of intracranial hemorrhage, mass, acute  infarct or anomaly. The ventricles are normal in size, shape and  configuration. Mild to moderate generalized brain parenchymal volume  loss. Mild patchy periventricular white matter hypodensities which are  nonspecific, but likely related to chronic microvascular ischemic  disease. As before, there is a relatively low-lying position of the  cerebellar tonsils extending approximately 5 mm below the level of the  foramen magnum. This raises concern for borderline/mild Chiari I  malformation. There is partial effacement of the subarachnoid space at  the level of the cisterna magna, as before. Atherosclerotic  calcification involving the carotid siphons.    The visualized portions of the sinuses and mastoids appear normal. The  bony calvarium and bones of the skull base appear intact.       Impression    IMPRESSION:  1. No CT findings of acute intracranial process.  2. Brain atrophy and presumed chronic small vessel ischemic changes,  as described.  3. Low-lying cerebellar tonsils, as described, with findings  concerning for borderline/mild Chiari I malformation. This is  unchanged.    EWA LANDIN MD         SYSTEM ID:  [id number]   XR Chest 2 Views    Narrative    XR CHEST 2 VIEWS   11/22/2023 12:03 PM     HISTORY: cough, gen weakness    COMPARISON: 1/20/2019      Impression    IMPRESSION: Right apical calcified granuloma and right paratracheal  calcified lymph nodes appear unchanged. Enlarged cardiomediastinal  silhouette is present. Multilevel degenerative changes are seen in the  spine.    MELINA PHILLIPS MD         SYSTEM ID:  [id number]   MR Brain w/o & w  Contrast    Narrative    MRI BRAIN WITHOUT AND WITH CONTRAST  11/27/2023 10:09 AM     HISTORY:  subacute mobility decline, anticoagulated, previous chart  mention evaluate for amyloid?     TECHNIQUE:  Multiplanar, multisequence MRI of the brain without and  with 7.5mL Gadavist     COMPARISON: 11/22/2023 CT brain. 12/18/2014 MRI brain.     FINDINGS: Compared to the prior brain MRI there has been interval  development of multiple foci of hemosiderin deposition scattered  throughout the brain parenchyma. There is no abnormal enhancement.  Moderate chronic small vessel ischemic changes in periventricular  distribution. Mild global cortical volume loss with expected  dilatation. Midline structures are grossly unremarkable. Major  vascular flow voids are grossly unremarkable.    No restricted diffusion to suggest acute ischemia.    Orbits, paranasal sinuses and mastoid air cells are unremarkable  without acute findings. Mucosal sinus disease in the inferior  maxillary sinuses incidentally noted.        Impression    IMPRESSION: Findings in the brain parenchyma suggesting a component of  cerebral amyloid angiopathy given the multifocal hemosiderin  deposition findings within the cerebral hemispheres.    No acute findings. No abnormal enhancement. No restricted diffusion to  suggest acute ischemia.        WARREN MCCABE MD         SYSTEM ID:  [id number]       Discharge Medications   Discharge Medication List as of 11/29/2023 12:46 PM        START taking these medications    Details   aspirin 81 MG EC tablet Take 1 tablet (81 mg) by mouth daily, Transitional      magnesium hydroxide (MILK OF MAGNESIA) 400 MG/5ML suspension Take 30 mLs by mouth daily (with breakfast), Transitional      senna-docusate (SENOKOT-S/PERICOLACE) 8.6-50 MG tablet Take 1 tablet by mouth 2 times daily Hold for loose stools, Transitional           CONTINUE these medications which have CHANGED    Details   metoprolol tartrate (LOPRESSOR) 25 MG tablet Take  0.5 tablets (12.5 mg) by mouth every evening Hold for HR <50 or SBP <110, Transitional      QUEtiapine (SEROQUEL) 50 MG tablet Take 1 tablet (50 mg) by mouth at bedtime Hold for sedation, somnolence., Transitional           CONTINUE these medications which have NOT CHANGED    Details   cetirizine (ZYRTEC) 10 MG tablet Take 10 mg by mouth daily as needed for allergies, Historical      diclofenac (VOLTAREN) 1 % topical gel Apply 4 g topically 3 times daily as needed for moderate pain (4-6), Disp-100 g, R-0, E-Prescribe      docusate sodium (COLACE) 100 MG tablet Take 100 mg by mouth At Bedtime, Historical      donepezil (ARICEPT) 5 MG tablet Take 10 mg by mouth at bedtime, Historical      escitalopram (LEXAPRO) 10 MG tablet Take 20 mg by mouth every evening, Historical      gabapentin (NEURONTIN) 300 MG capsule Take 300 mg by mouth every evening, Historical      memantine (NAMENDA) 10 MG tablet Take 20 mg by mouth every evening, Historical           STOP taking these medications       warfarin ANTICOAGULANT (COUMADIN) 2.5 MG tablet Comments:   Reason for Stopping:             Allergies   Allergies   Allergen Reactions    Ciprofloxacin      Sores in mouth and throat felt funny    Reclast [Zoledronic Acid]      dizziness

## 2023-11-30 ENCOUNTER — TRANSITIONAL CARE UNIT VISIT (OUTPATIENT)
Dept: GERIATRICS | Facility: CLINIC | Age: 78
End: 2023-11-30
Payer: COMMERCIAL

## 2023-11-30 ENCOUNTER — PATIENT OUTREACH (OUTPATIENT)
Dept: CARE COORDINATION | Facility: CLINIC | Age: 78
End: 2023-11-30

## 2023-11-30 ENCOUNTER — LAB REQUISITION (OUTPATIENT)
Dept: LAB | Facility: CLINIC | Age: 78
End: 2023-11-30
Payer: COMMERCIAL

## 2023-11-30 VITALS
TEMPERATURE: 97 F | SYSTOLIC BLOOD PRESSURE: 113 MMHG | RESPIRATION RATE: 16 BRPM | WEIGHT: 159 LBS | HEART RATE: 59 BPM | DIASTOLIC BLOOD PRESSURE: 64 MMHG | BODY MASS INDEX: 29.26 KG/M2 | HEIGHT: 62 IN | OXYGEN SATURATION: 100 %

## 2023-11-30 DIAGNOSIS — Z86.73 HISTORY OF CVA (CEREBROVASCULAR ACCIDENT): ICD-10-CM

## 2023-11-30 DIAGNOSIS — R53.81 PHYSICAL DECONDITIONING: ICD-10-CM

## 2023-11-30 DIAGNOSIS — Z51.81 ENCOUNTER FOR THERAPEUTIC DRUG LEVEL MONITORING: ICD-10-CM

## 2023-11-30 DIAGNOSIS — I48.20 CHRONIC ATRIAL FIBRILLATION (H): ICD-10-CM

## 2023-11-30 DIAGNOSIS — I48.20 CHRONIC ATRIAL FIBRILLATION, UNSPECIFIED (H): ICD-10-CM

## 2023-11-30 DIAGNOSIS — E78.5 HYPERLIPIDEMIA LDL GOAL <100: ICD-10-CM

## 2023-11-30 DIAGNOSIS — Z87.898 HISTORY OF DYSPHAGIA: ICD-10-CM

## 2023-11-30 DIAGNOSIS — M62.81 GENERALIZED MUSCLE WEAKNESS: ICD-10-CM

## 2023-11-30 DIAGNOSIS — K59.09 CHRONIC CONSTIPATION: ICD-10-CM

## 2023-11-30 DIAGNOSIS — I68.0 CEREBRAL AMYLOID ANGIOPATHY (CODE): Primary | ICD-10-CM

## 2023-11-30 PROCEDURE — 99309 SBSQ NF CARE MODERATE MDM 30: CPT

## 2023-11-30 NOTE — PROGRESS NOTES
"[facility] GERIATRICS    PRIMARY CARE PROVIDER AND CLINIC:  Heriberto Headley MD, [address] [facility] [facility], [place] / [facility] [address]  Chief Complaint   Patient presents with    [facility] Medical Record Number:  [id number]  Place of Service where encounter took place:  [facility] (TCU) [[id number]]    HPI:  Danielle Bryan  is a 78 year old  (1945), admitted to the above facility from  [facility]. Hospital stay 11/22/23 through 11/29/23.  Patient with past medical history significant for advanced dementia, CVA c/b dysphagia and aphasia, hyperlipidemia, atrial fibrillation on chronic anticoagulation PTA, and HLD admitted to the hospital on 11/22 with weakness.  Head MRI showed number peripheral located punctate areas likely representative of microhemorrhages indicative of possible previous diagnosis of cerebral amyloid angiopathy with no acute bleed or stroke.  PTA Coumadin was discontinued and the patient was started on aspirin 81 mg daily. Hospitalization course as encrypted below from discharge summary.    [facility]  Hospitalist Discharge Summary    Date of Admission:  11/22/2023  Date of Discharge:  11/29/2023    Hospital course:  \"Danielle Bryan is a 78 year old female with past medical history of dementia, chart review reporting CVA c/b dysphagia and aphasia, hyperlipidemia, atrial fibrillation on chronic anticoagulation, HLD, who was admitted on 11/22/2023 after presenting for evaluation of global weakness.     Workup in the emergency room including NCHCT without new acute pathology, electrolytes, and CBC.  Her TSH was also noted to be normal.  Following admission to the floor developed fever to 101.3, cultures were drawn.  Remains admitted for further cares and monitoring.  Infectious workup thus far is negative for source of fever.  No fever recurrence during admission and recovered from this.     Given ongoing issues " with global weakness consulted neurology after review of previous outpatient neurology care notes to see if Sinemet trial was indicated.  Patient had MRI performed that showed number peripheral located punctate areas likely representative of microhemorrhages indicative of possible previous diagnosis of cerebral amyloid angiopathy with no acute bleed or stroke.     Neurology did not recommend Sinemet trial at this time. They did review danger of warfarin use in patient with setting of CAA, had risk benefit discussion with hx of PAF.     Conversation was revisited on 11/29 as the patient is intended to discharge to TCU.  I spoke with the patient's spouse Karl regarding risk of hemorrhagic stroke on warfarin in the setting of CAA, at this time he would prefer that the patient be taken off of warfarin, he plans to revisit discussion with Cardiologist to see if she would be a candidate for alternative procedure for anticoagulation.  In the mean time when NR subtherapeutic would like to try baby aspirin daily 8 given lower risk of bleeding in the setting of CAA -expresses understanding with our discussion regarding that aspirin does not replace full anticoagulation and  that aspirin also comes with bleeding risk albeit less than warfarin.      In regard to mental status patient did prove with somnolence perspective with decreased dose of Seroquel although this will likely need to be adjusted again in the future.  The biggest concern is with progressive decline and dementia that may be at risk of further dehydration and lack of return to previous baseline with hospitalizations.  I discussed palliative care consultation with Karl for ongoing goals of care discussions at discharge who is agreeable.      Neurocognitive Disorder   Presumed Alzheimer's dementia  B12 Deficiency   Follows with Dr. Ledesma of neurology for progressive cognitive dysfunction B12 deficiency and progressive dementia consistent with Alzheimer's  type.She has known abnormal gait with rigidity and jaw tremor considered for parkinsonism although per last neurology note deferred Sinemet trial at the time. These symptoms due appear to have progressed and are limiting her ability to return home with prior level of assistance.  On admission NCHCT showed previous changes in cerebellar tonsils c/w Chiari malformation, no acute pathology.   Plan:  -Continue pta Aricept, Namenda, lexapro   - due for monthly B12 injections she is due for this 11/24 but we will defer till the outpatient setting at this time.    -Continue Neurontin 300 mg at lunch and dinner as per last neurology note.    -Was prescribed Seroquel 125 mg nightly prior to admission this was held initially for drowsiness but restarting today at reduced dose (75 mg) as her sundowning behaviors have started to return.  Spouse states this used to be given in a divided dose but due to difficulty getting Danielle to tolerate p.o. meds it has now been given in a daily dose which we will continue here.              - decreased Seroquel to 50 mg at night due to sedation 11/28 with improvement in daytime sedation   - Neurology consulted               - rec MRI: findings suggest component of cerebral amyloid angiopathy, no signs of acute CVA              - neurology discussed with patient, recommending outpatient cardiology referral to discuss anticoagulation and if a watchman may be an option. Likely not an urgent issue as she has been on anticoagulation for years although spouse is concerned with bleeding risk given history of falls and CAA suspected diagnosis thus warfarin discontinued this hospitalization. F/up with primary Neurologist.      History of falls  Patient had a fall in 9/2023 and was seen in the emergency room.  She is at high risk for morbidity and mortality if she falls as she is on Coumadin.    -pt/ot/sw consulted for dispo recommendations - TCU recommended to bridge back to home environment with  "spouse and PCA services.      Hypernatremia   Hypochloridemia  Elevated creatinine   Very likely due to dehydration. Patient needs frequent cues to drink water.   - treated with d5 infusion with improvement  - somnolence improved with reduced dose Seroquel   - request ongoing goals of care discussion with Palliative at discharge regarding artifical nutrition         Fever - resolved  Main reason she was brought in. Suspect this was a viral process contributing to fever, global weakness.  No evidence of sepsis or hemodynamic instability. NCHCT, CXR, UA/UCx, Viral PCR all negative. No new medication changes. Since resolved. Near baseline and improving slowly with PT from a deconditioning stand point although limited by gait freezing.   - Blood cultures NGTD after 5 days      PAF, Coumadin induced coagulopathy  On lopressor, rate controlled.  She is also on Coumadin for CVA ppx now discontinued. - outpatient cardiology referral to discuss Watchman candidacy if indicated.      CVA  HLD  Complicated by dysphagia and aphasia.  Patient is on Coumadin to prevent future strokes but also has some mention on previous Neurology note regarding amyloid but has been anticoagulated since at least before 2019.   -resume PTA statin at discharge (obs status)  - outpatient cardiology referral to discuss anticoagulation     Depression, anxiety  - continue pta lexapro      Frozen shoulder  History noted.     Chronic Constipation concerns  - adjusted on 11/28 due to concerns for no BM, scheduled milk magnesia and sennakot, miralax PRN. Improved\".     Overall stabilized and patient discharged to the above TCU in stable condition for rehab.    Today: Patient being seen for initial TCU visit.  Admitted to the above TCU yesterday.  Found laying in bed today.  Offer no history. Appears comfortable.  No signs of pain.  Course reviewed with staff.  Initial therapy screening underway.  No acute concerns at this time.    CODE STATUS/ADVANCE " DIRECTIVES DISCUSSION:  CPR/Full code   ALLERGIES:   Allergies   Allergen Reactions    Ciprofloxacin      Sores in mouth and throat felt funny    Reclast [Zoledronic Acid]      dizziness      PAST MEDICAL HISTORY:   Past Medical History:   Diagnosis Date    Alopecia areata     Requires a wig now    Atrial fibrillation (H) 1/20/2019    Cerebral infarction (H) 2014    TIA    Dysphagia     Botox/EGD dilation at [facility], most recent 05/2013    Hyperlipemia     PONV (postoperative nausea and vomiting)     Recurrent UTI     Believed related in part to atrophic vaginitis    Vitamin D deficiency 11/29/2018      PAST SURGICAL HISTORY:   has a past surgical history that includes GYN surgery; Release trigger finger (7/12/2012); colonoscopy (1/15/2014); Head and neck surgery (1983); Eye surgery (2013); Colonoscopy (1/15/2014); and Abdomen surgery (1975 1982).  FAMILY HISTORY: family history includes C.A.D. in her father; Coronary Artery Disease in her brother; Family History Negative in her mother; Myocardial Infarction in her brother; Other Cancer in her brother and sister.  SOCIAL HISTORY:   reports that she has never smoked. She has never used smokeless tobacco. She reports that she does not drink alcohol and does not use drugs.  Patient's living condition: lives with spouse    Post Discharge Medication Reconciliation Status:   MED REC REQUIRED  Post Medication Reconciliation Status: Continue without any changes.     Current Outpatient Medications   Medication Sig    [START ON 12/3/2023] aspirin 81 MG EC tablet Take 1 tablet (81 mg) by mouth daily    cetirizine (ZYRTEC) 10 MG tablet Take 10 mg by mouth daily as needed for allergies    diclofenac (VOLTAREN) 1 % topical gel Apply 4 g topically 3 times daily as needed for moderate pain (4-6)    docusate sodium (COLACE) 100 MG tablet Take 100 mg by mouth At Bedtime    donepezil (ARICEPT) 5 MG tablet Take 10 mg by mouth at bedtime    escitalopram (LEXAPRO) 10 MG tablet Take 20 mg by  "mouth every evening    gabapentin (NEURONTIN) 300 MG capsule Take 300 mg by mouth every evening    magnesium hydroxide (MILK OF MAGNESIA) 400 MG/5ML suspension Take 30 mLs by mouth daily (with breakfast)    memantine (NAMENDA) 10 MG tablet Take 20 mg by mouth every evening    metoprolol tartrate (LOPRESSOR) 25 MG tablet Take 0.5 tablets (12.5 mg) by mouth every evening Hold for HR <50 or SBP <110    QUEtiapine (SEROQUEL) 50 MG tablet Take 1 tablet (50 mg) by mouth at bedtime Hold for sedation, somnolence.    senna-docusate (SENOKOT-S/PERICOLACE) 8.6-50 MG tablet Take 1 tablet by mouth 2 times daily Hold for loose stools     Current Facility-Administered Medications   Medication    cyanocobalamin injection 1,000 mcg    denosumab (PROLIA) injection 60 mg     ROS:  Unobtainable secondary to cognitive impairment.     Vitals:  /64   Pulse 59   Temp 97  F (36.1  C)   Resp 16   Ht 1.575 m (5' 2\")   Wt 72.1 kg (159 lb)   LMP  (LMP Unknown)   SpO2 100%   BMI 29.08 kg/m      Exam:  GENERAL APPEARANCE: NAD  HEENT-EARS: No discharge/drainage  HEENT-NECK: No lymphadenopathy  HEENT-EYES: PERRLA positive, no drainage/discharge  HEENT-NOSE/MOUTH/THROAT: No nasal drainage or erythema, mucous membranes moist  RESPIRATORY: Clear to auscultation, even and unlabored, symmetrical chest wall expansion  CARDIOVASCULAR: RRR, no peripheral edema, S1/S2 normal  GASTROINTESTINAL: Soft, nontender, nondistended, bowel sounds present x4 quadrants  MUSCULOSKELETAL: In bed  NEUROLOGICAL: Memory loss, confusion  PSYCHOLOGICAL: Minimal eye contact    Lab/Diagnostic data:  Recent labs in [facility] reviewed by me today.     ASSESSMENT/PLAN:  Possible cerebral amyloid angiopathy with no acute bleed or stroke   Somnolence  Generalized weakness   History of repeated falling  Deconditioning  Frail elderly  -Off Coumadin as above and started on aspirin, sleepy, minimal speech  -Continue current care, continue therapies, outpatient neurology, " cardiology , and PCP follow-up    Advanced dementia  Neurocognitive disorder  Depression with anxiety  B12 deficiency  Abnormal gait  - NCHCT showed previous changes in cerebellar tonsils c/w Chiari malformation without acute pathology, sleepy this morning, on monthly B12 injection per chart review, Seroquel dose adjusted as above  -Continue PTA Aricept, Namenda, Lexapro, gabapentin and Seroquel, continue B12 injection, neurology follow-up after to discharge, monitor mood and behavior    History of A-fib  -Continue metoprolol and aspirin, outpatient cardiology referral for possible Watchman device consult    CVA  HLD  Dysphagia  Aphasia  -Tolerating regular diet, off Coumadin as above  -Continue statin and aspirin, cardiology and neurology follow-up    Constipation, chronic  -Meds adjusted in the hospital  -Continue current care, monitor    Orders:  BMP           Electronically signed by:  Tory Horn,DNP,APRN,AGNP-BC.

## 2023-11-30 NOTE — LETTER
"    11/30/2023        RE: Danielle Bryan  [address]  [place] [address]        [facility] GERIATRICS    PRIMARY CARE PROVIDER AND CLINIC:  Heriberto Headley MD, [address] [facility] / [facility] [address]  Chief Complaint   Patient presents with     [facility] Medical Record Number:  [id number]  Place of Service where encounter took place:  [facility] (TCU) [[id number]]    HPI:  Danielle Bryan  is a 78 year old  (1945), admitted to the above facility from  [facility]. Hospital stay 11/22/23 through 11/29/23.  Patient with past medical history significant for advanced dementia, CVA c/b dysphagia and aphasia, hyperlipidemia, atrial fibrillation on chronic anticoagulation PTA, and HLD admitted to the hospital on 11/22 with weakness.  Head MRI showed number peripheral located punctate areas likely representative of microhemorrhages indicative of possible previous diagnosis of cerebral amyloid angiopathy with no acute bleed or stroke.  PTA Coumadin was discontinued and the patient was started on aspirin 81 mg daily. Hospitalization course as encrypted below from discharge summary.    [facility]  Hospitalist Discharge Summary    Date of Admission:  11/22/2023  Date of Discharge:  11/29/2023    Hospital course:  \"Danielle Bryan is a 78 year old female with past medical history of dementia, chart review reporting CVA c/b dysphagia and aphasia, hyperlipidemia, atrial fibrillation on chronic anticoagulation, HLD, who was admitted on 11/22/2023 after presenting for evaluation of global weakness.     Workup in the emergency room including NCHCT without new acute pathology, electrolytes, and CBC.  Her TSH was also noted to be normal.  Following admission to the floor developed fever to 101.3, cultures were drawn.  Remains admitted for further cares and monitoring.  Infectious workup thus far is negative for source of " fever.  No fever recurrence during admission and recovered from this.     Given ongoing issues with global weakness consulted neurology after review of previous outpatient neurology care notes to see if Sinemet trial was indicated.  Patient had MRI performed that showed number peripheral located punctate areas likely representative of microhemorrhages indicative of possible previous diagnosis of cerebral amyloid angiopathy with no acute bleed or stroke.     Neurology did not recommend Sinemet trial at this time. They did review danger of warfarin use in patient with setting of CAA, had risk benefit discussion with hx of PAF.     Conversation was revisited on 11/29 as the patient is intended to discharge to TCU.  I spoke with the patient's spouse Karl regarding risk of hemorrhagic stroke on warfarin in the setting of CAA, at this time he would prefer that the patient be taken off of warfarin, he plans to revisit discussion with Cardiologist to see if she would be a candidate for alternative procedure for anticoagulation.  In the mean time when NR subtherapeutic would like to try baby aspirin daily 8 given lower risk of bleeding in the setting of CAA -expresses understanding with our discussion regarding that aspirin does not replace full anticoagulation and  that aspirin also comes with bleeding risk albeit less than warfarin.      In regard to mental status patient did prove with somnolence perspective with decreased dose of Seroquel although this will likely need to be adjusted again in the future.  The biggest concern is with progressive decline and dementia that may be at risk of further dehydration and lack of return to previous baseline with hospitalizations.  I discussed palliative care consultation with Karl for ongoing goals of care discussions at discharge who is agreeable.      Neurocognitive Disorder   Presumed Alzheimer's dementia  B12 Deficiency   Follows with Dr. Ledesma of neurology for progressive  cognitive dysfunction B12 deficiency and progressive dementia consistent with Alzheimer's type.She has known abnormal gait with rigidity and jaw tremor considered for parkinsonism although per last neurology note deferred Sinemet trial at the time. These symptoms due appear to have progressed and are limiting her ability to return home with prior level of assistance.  On admission NCHCT showed previous changes in cerebellar tonsils c/w Chiari malformation, no acute pathology.   Plan:  -Continue pta Aricept, Namenda, lexapro   - due for monthly B12 injections she is due for this 11/24 but we will defer till the outpatient setting at this time.    -Continue Neurontin 300 mg at lunch and dinner as per last neurology note.    -Was prescribed Seroquel 125 mg nightly prior to admission this was held initially for drowsiness but restarting today at reduced dose (75 mg) as her sundowning behaviors have started to return.  Spouse states this used to be given in a divided dose but due to difficulty getting Danielle to tolerate p.o. meds it has now been given in a daily dose which we will continue here.              - decreased Seroquel to 50 mg at night due to sedation 11/28 with improvement in daytime sedation   - Neurology consulted               - rec MRI: findings suggest component of cerebral amyloid angiopathy, no signs of acute CVA              - neurology discussed with patient, recommending outpatient cardiology referral to discuss anticoagulation and if a watchman may be an option. Likely not an urgent issue as she has been on anticoagulation for years although spouse is concerned with bleeding risk given history of falls and CAA suspected diagnosis thus warfarin discontinued this hospitalization. F/up with primary Neurologist.      History of falls  Patient had a fall in 9/2023 and was seen in the emergency room.  She is at high risk for morbidity and mortality if she falls as she is on Coumadin.    -pt/ot/sw  "consulted for dispo recommendations - TCU recommended to bridge back to home environment with spouse and PCA services.      Hypernatremia   Hypochloridemia  Elevated creatinine   Very likely due to dehydration. Patient needs frequent cues to drink water.   - treated with d5 infusion with improvement  - somnolence improved with reduced dose Seroquel   - request ongoing goals of care discussion with Palliative at discharge regarding artifical nutrition         Fever - resolved  Main reason she was brought in. Suspect this was a viral process contributing to fever, global weakness.  No evidence of sepsis or hemodynamic instability. NCHCT, CXR, UA/UCx, Viral PCR all negative. No new medication changes. Since resolved. Near baseline and improving slowly with PT from a deconditioning stand point although limited by gait freezing.   - Blood cultures NGTD after 5 days      PAF, Coumadin induced coagulopathy  On lopressor, rate controlled.  She is also on Coumadin for CVA ppx now discontinued. - outpatient cardiology referral to discuss Watchman candidacy if indicated.      CVA  HLD  Complicated by dysphagia and aphasia.  Patient is on Coumadin to prevent future strokes but also has some mention on previous Neurology note regarding amyloid but has been anticoagulated since at least before 2019.   -resume PTA statin at discharge (obs status)  - outpatient cardiology referral to discuss anticoagulation     Depression, anxiety  - continue pta lexapro      Frozen shoulder  History noted.     Chronic Constipation concerns  - adjusted on 11/28 due to concerns for no BM, scheduled milk magnesia and sennakot, miralax PRN. Improved\".     Overall stabilized and patient discharged to the above TCU in stable condition for rehab.    Today: Patient being seen for initial TCU visit.  Admitted to the above TCU yesterday.  Found laying in bed today.  Offer no history. Appears comfortable.  No signs of pain.  Course reviewed with staff.  " Initial therapy screening underway.  No acute concerns at this time.    CODE STATUS/ADVANCE DIRECTIVES DISCUSSION:  CPR/Full code   ALLERGIES:   Allergies   Allergen Reactions     Ciprofloxacin      Sores in mouth and throat felt funny     Reclast [Zoledronic Acid]      dizziness      PAST MEDICAL HISTORY:   Past Medical History:   Diagnosis Date     Alopecia areata     Requires a wig now     Atrial fibrillation (H) 1/20/2019     Cerebral infarction (H) 2014    TIA     Dysphagia     Botox/EGD dilation at [facility], most recent 05/2013     Hyperlipemia      PONV (postoperative nausea and vomiting)      Recurrent UTI     Believed related in part to atrophic vaginitis     Vitamin D deficiency 11/29/2018      PAST SURGICAL HISTORY:   has a past surgical history that includes GYN surgery; Release trigger finger (7/12/2012); colonoscopy (1/15/2014); Head and neck surgery (1983); Eye surgery (2013); Colonoscopy (1/15/2014); and Abdomen surgery (1975 1982).  FAMILY HISTORY: family history includes C.A.D. in her father; Coronary Artery Disease in her brother; Family History Negative in her mother; Myocardial Infarction in her brother; Other Cancer in her brother and sister.  SOCIAL HISTORY:   reports that she has never smoked. She has never used smokeless tobacco. She reports that she does not drink alcohol and does not use drugs.  Patient's living condition: lives with spouse    Post Discharge Medication Reconciliation Status:   MED REC REQUIRED  Post Medication Reconciliation Status: Continue without any changes.     Current Outpatient Medications   Medication Sig     [START ON 12/3/2023] aspirin 81 MG EC tablet Take 1 tablet (81 mg) by mouth daily     cetirizine (ZYRTEC) 10 MG tablet Take 10 mg by mouth daily as needed for allergies     diclofenac (VOLTAREN) 1 % topical gel Apply 4 g topically 3 times daily as needed for moderate pain (4-6)     docusate sodium (COLACE) 100 MG tablet Take 100 mg by mouth At Bedtime      "donepezil (ARICEPT) 5 MG tablet Take 10 mg by mouth at bedtime     escitalopram (LEXAPRO) 10 MG tablet Take 20 mg by mouth every evening     gabapentin (NEURONTIN) 300 MG capsule Take 300 mg by mouth every evening     magnesium hydroxide (MILK OF MAGNESIA) 400 MG/5ML suspension Take 30 mLs by mouth daily (with breakfast)     memantine (NAMENDA) 10 MG tablet Take 20 mg by mouth every evening     metoprolol tartrate (LOPRESSOR) 25 MG tablet Take 0.5 tablets (12.5 mg) by mouth every evening Hold for HR <50 or SBP <110     QUEtiapine (SEROQUEL) 50 MG tablet Take 1 tablet (50 mg) by mouth at bedtime Hold for sedation, somnolence.     senna-docusate (SENOKOT-S/PERICOLACE) 8.6-50 MG tablet Take 1 tablet by mouth 2 times daily Hold for loose stools     Current Facility-Administered Medications   Medication     cyanocobalamin injection 1,000 mcg     denosumab (PROLIA) injection 60 mg     ROS:  Unobtainable secondary to cognitive impairment.     Vitals:  /64   Pulse 59   Temp 97  F (36.1  C)   Resp 16   Ht 1.575 m (5' 2\")   Wt 72.1 kg (159 lb)   LMP  (LMP Unknown)   SpO2 100%   BMI 29.08 kg/m      Exam:  GENERAL APPEARANCE: NAD  HEENT-EARS: No discharge/drainage  HEENT-NECK: No lymphadenopathy  HEENT-EYES: PERRLA positive, no drainage/discharge  HEENT-NOSE/MOUTH/THROAT: No nasal drainage or erythema, mucous membranes moist  RESPIRATORY: Clear to auscultation, even and unlabored, symmetrical chest wall expansion  CARDIOVASCULAR: RRR, no peripheral edema, S1/S2 normal  GASTROINTESTINAL: Soft, nontender, nondistended, bowel sounds present x4 quadrants  MUSCULOSKELETAL: In bed  NEUROLOGICAL: Memory loss, confusion  PSYCHOLOGICAL: Minimal eye contact    Lab/Diagnostic data:  Recent labs in [facility] reviewed by me today.     ASSESSMENT/PLAN:  Possible cerebral amyloid angiopathy with no acute bleed or stroke   Somnolence  Generalized weakness   History of repeated falling  Deconditioning  Frail elderly  -Off Coumadin as " above and started on aspirin, sleepy, minimal speech  -Continue current care, continue therapies, outpatient neurology, cardiology , and PCP follow-up    Advanced dementia  Neurocognitive disorder  Depression with anxiety  B12 deficiency  Abnormal gait  - NCHCT showed previous changes in cerebellar tonsils c/w Chiari malformation without acute pathology, sleepy this morning, on monthly B12 injection per chart review, Seroquel dose adjusted as above  -Continue PTA Aricept, Namenda, Lexapro, gabapentin and Seroquel, continue B12 injection, neurology follow-up after to discharge, monitor mood and behavior    History of A-fib  -Continue metoprolol and aspirin, outpatient cardiology referral for possible Watchman device consult    CVA  HLD  Dysphagia  Aphasia  -Tolerating regular diet, off Coumadin as above  -Continue statin and aspirin, cardiology and neurology follow-up    Constipation, chronic  -Meds adjusted in the hospital  -Continue current care, monitor    Orders:  BMP           Electronically signed by:  Tory Horn,ARTEMIO,APRN,AGNP-BC.                     Sincerely,        MARTHA Chow CNP

## 2023-11-30 NOTE — PROGRESS NOTES
Clinic Care Coordination Contact  Care Coordination Transition Communication    Clinical Data: Patient was hospitalized at [facility] from 11/22/2023 to 11/29/2023 with diagnosis of Fever, Neurocognitive disorder.     Assessment: Patient has transitioned to TCU/ARU for short term rehabilitation:    Facility Name: [facility]   Transition Communication:  Notified facility of Primary Care- Care Coordination support via Epic fax.    Plan: Care Coordinator will await notification from facility staff informing of patient's discharge plans/needs. Care Coordinator will review chart and outreach to facility staff every 4 weeks and as needed.     AAMIR Wiggins/[facility] Hospital[facility]  Social Work Care Coordinator  [facility] - [place], [place], and [place]  Phone: [phone]

## 2023-11-30 NOTE — LETTER
[facility]   To:       Please give to facility    From:   Christine Khalil    Care Coordinator   [facility]   P: [phone] [email or web address]   Patient Name:  Danielle Bryan YOB: 1945   Admit date: 11/29/2023      *Information Needed:  Please contact me when the patient will discharge (or if they will move to long term care)- include the discharge date, disposition, and main diagnosis   If the patient is discharged with home care services, please provide the name of the agency    Also- Please inform me if a care conference is being held.   Phone, Fax or Email with information                Thank you

## 2023-12-01 LAB
ANION GAP SERPL CALCULATED.3IONS-SCNC: 11 MMOL/L (ref 7–15)
BUN SERPL-MCNC: 18.9 MG/DL (ref 8–23)
CALCIUM SERPL-MCNC: 9.4 MG/DL (ref 8.8–10.2)
CHLORIDE SERPL-SCNC: 108 MMOL/L (ref 98–107)
CREAT SERPL-MCNC: 0.93 MG/DL (ref 0.51–0.95)
DEPRECATED HCO3 PLAS-SCNC: 24 MMOL/L (ref 22–29)
EGFRCR SERPLBLD CKD-EPI 2021: 62 ML/MIN/1.73M2
GLUCOSE SERPL-MCNC: 85 MG/DL (ref 70–99)
INR PPP: 1.37 (ref 0.85–1.15)
POTASSIUM SERPL-SCNC: 4.8 MMOL/L (ref 3.4–5.3)
SODIUM SERPL-SCNC: 143 MMOL/L (ref 135–145)

## 2023-12-01 PROCEDURE — 80048 BASIC METABOLIC PNL TOTAL CA: CPT | Mod: ORL | Performed by: INTERNAL MEDICINE

## 2023-12-01 PROCEDURE — 36415 COLL VENOUS BLD VENIPUNCTURE: CPT | Mod: ORL | Performed by: INTERNAL MEDICINE

## 2023-12-01 PROCEDURE — P9604 ONE-WAY ALLOW PRORATED TRIP: HCPCS | Mod: ORL | Performed by: INTERNAL MEDICINE

## 2023-12-01 PROCEDURE — 85610 PROTHROMBIN TIME: CPT | Mod: ORL | Performed by: INTERNAL MEDICINE

## 2023-12-05 ENCOUNTER — TELEPHONE (OUTPATIENT)
Dept: FAMILY MEDICINE | Facility: CLINIC | Age: 78
End: 2023-12-05

## 2023-12-05 ENCOUNTER — TRANSITIONAL CARE UNIT VISIT (OUTPATIENT)
Dept: GERIATRICS | Facility: CLINIC | Age: 78
End: 2023-12-05
Payer: COMMERCIAL

## 2023-12-05 VITALS
HEIGHT: 62 IN | BODY MASS INDEX: 29.26 KG/M2 | HEART RATE: 54 BPM | OXYGEN SATURATION: 96 % | RESPIRATION RATE: 17 BRPM | DIASTOLIC BLOOD PRESSURE: 60 MMHG | WEIGHT: 159 LBS | TEMPERATURE: 97.3 F | SYSTOLIC BLOOD PRESSURE: 108 MMHG

## 2023-12-05 DIAGNOSIS — L30.4 INTERTRIGO: Primary | ICD-10-CM

## 2023-12-05 DIAGNOSIS — M62.81 GENERALIZED MUSCLE WEAKNESS: ICD-10-CM

## 2023-12-05 DIAGNOSIS — I68.0 CEREBRAL AMYLOID ANGIOPATHY (CODE): ICD-10-CM

## 2023-12-05 DIAGNOSIS — R53.81 PHYSICAL DECONDITIONING: ICD-10-CM

## 2023-12-05 PROCEDURE — 99309 SBSQ NF CARE MODERATE MDM 30: CPT

## 2023-12-05 NOTE — PROGRESS NOTES
"[facility]    Chief Complaint   Patient presents with    RECHECK     HPI:  Danielle Bryan is a 78 year old  (1945), who is being seen today for an episodic care visit at: [facility] (TCU) [[id number]]. Today's concern is: TCU Follow Up     Patient admitted to the above facility from  [facility]. Hospital stay 11/22/23 through 11/29/23.  Patient with past medical history significant for advanced dementia, CVA c/b dysphagia and aphasia, hyperlipidemia, atrial fibrillation on chronic anticoagulation PTA, and HLD admitted to the hospital on 11/22 with weakness.  Head MRI showed number peripheral located punctate areas likely representative of microhemorrhages indicative of possible previous diagnosis of cerebral amyloid angiopathy with no acute bleed or stroke.  PTA Coumadin was discontinued and the patient was started on aspirin 81 mg daily. Hospitalization course as encrypted below from discharge summary.     [facility]  Hospitalist Discharge Summary    Date of Admission:  11/22/2023  Date of Discharge:  11/29/2023     Hospital course:  \"Danielle Bryan is a 78 year old female with past medical history of dementia, chart review reporting CVA c/b dysphagia and aphasia, hyperlipidemia, atrial fibrillation on chronic anticoagulation, HLD, who was admitted on 11/22/2023 after presenting for evaluation of global weakness.     Workup in the emergency room including NCHCT without new acute pathology, electrolytes, and CBC.  Her TSH was also noted to be normal.  Following admission to the floor developed fever to 101.3, cultures were drawn.  Remains admitted for further cares and monitoring.  Infectious workup thus far is negative for source of fever.  No fever recurrence during admission and recovered from this.     Given ongoing issues with global weakness consulted neurology after review of previous outpatient neurology care notes to " see if Sinemet trial was indicated.  Patient had MRI performed that showed number peripheral located punctate areas likely representative of microhemorrhages indicative of possible previous diagnosis of cerebral amyloid angiopathy with no acute bleed or stroke.     Neurology did not recommend Sinemet trial at this time. They did review danger of warfarin use in patient with setting of CAA, had risk benefit discussion with hx of PAF.      Conversation was revisited on 11/29 as the patient is intended to discharge to TCU.  I spoke with the patient's spouse Karl regarding risk of hemorrhagic stroke on warfarin in the setting of CAA, at this time he would prefer that the patient be taken off of warfarin, he plans to revisit discussion with Cardiologist to see if she would be a candidate for alternative procedure for anticoagulation.  In the mean time when NR subtherapeutic would like to try baby aspirin daily 8 given lower risk of bleeding in the setting of CAA -expresses understanding with our discussion regarding that aspirin does not replace full anticoagulation and  that aspirin also comes with bleeding risk albeit less than warfarin.      In regard to mental status patient did prove with somnolence perspective with decreased dose of Seroquel although this will likely need to be adjusted again in the future.  The biggest concern is with progressive decline and dementia that may be at risk of further dehydration and lack of return to previous baseline with hospitalizations.  I discussed palliative care consultation with Karl for ongoing goals of care discussions at discharge who is agreeable.      Neurocognitive Disorder   Presumed Alzheimer's dementia  B12 Deficiency   Follows with Dr. Ledesma of neurology for progressive cognitive dysfunction B12 deficiency and progressive dementia consistent with Alzheimer's type.She has known abnormal gait with rigidity and jaw tremor considered for parkinsonism although per last  neurology note deferred Sinemet trial at the time. These symptoms due appear to have progressed and are limiting her ability to return home with prior level of assistance.  On admission NCHCT showed previous changes in cerebellar tonsils c/w Chiari malformation, no acute pathology.   Plan:  -Continue pta Aricept, Namenda, lexapro   - due for monthly B12 injections she is due for this 11/24 but we will defer till the outpatient setting at this time.    -Continue Neurontin 300 mg at lunch and dinner as per last neurology note.    -Was prescribed Seroquel 125 mg nightly prior to admission this was held initially for drowsiness but restarting today at reduced dose (75 mg) as her sundowning behaviors have started to return.  Spouse states this used to be given in a divided dose but due to difficulty getting Danielle to tolerate p.o. meds it has now been given in a daily dose which we will continue here.              - decreased Seroquel to 50 mg at night due to sedation 11/28 with improvement in daytime sedation   - Neurology consulted               - rec MRI: findings suggest component of cerebral amyloid angiopathy, no signs of acute CVA              - neurology discussed with patient, recommending outpatient cardiology referral to discuss anticoagulation and if a watchman may be an option. Likely not an urgent issue as she has been on anticoagulation for years although spouse is concerned with bleeding risk given history of falls and CAA suspected diagnosis thus warfarin discontinued this hospitalization. F/up with primary Neurologist.      History of falls  Patient had a fall in 9/2023 and was seen in the emergency room.  She is at high risk for morbidity and mortality if she falls as she is on Coumadin.    -pt/ot/sw consulted for dispo recommendations - TCU recommended to bridge back to home environment with spouse and PCA services.      Hypernatremia   Hypochloridemia  Elevated creatinine   Very likely due to  "dehydration. Patient needs frequent cues to drink water.   - treated with d5 infusion with improvement  - somnolence improved with reduced dose Seroquel   - request ongoing goals of care discussion with Palliative at discharge regarding artifical nutrition         Fever - resolved  Main reason she was brought in. Suspect this was a viral process contributing to fever, global weakness.  No evidence of sepsis or hemodynamic instability. NCHCT, CXR, UA/UCx, Viral PCR all negative. No new medication changes. Since resolved. Near baseline and improving slowly with PT from a deconditioning stand point although limited by gait freezing.   - Blood cultures NGTD after 5 days      PAF, Coumadin induced coagulopathy  On lopressor, rate controlled.  She is also on Coumadin for CVA ppx now discontinued. - outpatient cardiology referral to discuss Watchman candidacy if indicated.      CVA  HLD  Complicated by dysphagia and aphasia.  Patient is on Coumadin to prevent future strokes but also has some mention on previous Neurology note regarding amyloid but has been anticoagulated since at least before 2019.   -resume PTA statin at discharge (obs status)  - outpatient cardiology referral to discuss anticoagulation     Depression, anxiety  - continue pta lexapro      Frozen shoulder  History noted.     Chronic Constipation concerns  - adjusted on 11/28 due to concerns for no BM, scheduled milk magnesia and sennakot, miralax PRN. Improved\".      Overall stabilized and patient discharged to the above TCU in stable condition for rehab.     Today: Patient being seen for TCU follow up visit.  She offers no history. Staff reports some skin redness under the right breast.  No reports of pain. Hemodynamically stable.  Eating and sleeping fine. Working with therapy.  Breathing fine.  No reports of chest pain or shortness of breath.    Allergies, and PMH/PSH reviewed in EPIC today.  REVIEW OF SYSTEMS:  Unobtainable secondary to cognitive " "impairment.     Objective:   /60   Pulse 54   Temp 97.3  F (36.3  C)   Resp 17   Ht 1.575 m (5' 2\")   Wt 72.1 kg (159 lb)   LMP  (LMP Unknown)   SpO2 96%   BMI 29.08 kg/m      Exam:  GENERAL APPEARANCE: NAD   RESPIRATORY: Clear to auscultation, even and unlabored, symmetrical chest wall expansion  CARDIOVASCULAR: RRR, no peripheral edema, S1/S2 normal  GASTROINTESTINAL: Soft, nontender, nondistended, bowel sounds present x4 quadrants  MUSCULOSKELETAL: In w/c   SKIN: Small rash under the right breast  NEUROLOGICAL: Memory loss, confusion  PSYCHOLOGICAL: Minimal eye contact    Recent labs in EPIC reviewed by me today.     Assessment/Plan:  Intertrigo  -Rash under the right breast  -Nystatin powder, monitor    Possible cerebral amyloid angiopathy with no acute bleed or stroke   Somnolence  Generalized weakness           History of repeated falling  Deconditioning  Frail elderly  -Off Coumadin as above and started on aspirin, sleepy, minimal speech  -Continue current care, continue therapies, outpatient neurology, cardiology, and PCP follow-up     Advanced dementia  Neurocognitive disorder  Depression with anxiety  B12 deficiency  Abnormal gait  - NCHCT showed previous changes in cerebellar tonsils c/w chiari malformation without acute pathology, sleepy this morning, on monthly B12 injection per chart review, Seroquel dose adjusted as above  -Continue PTA Aricept, Namenda, Lexapro, gabapentin and Seroquel, continue B12 injection, neurology follow-up after to discharge, monitor mood and behavior    MED REC REQUIRED  Post Medication Reconciliation Status:  Discharge medications reconciled and changed, see notes/orders      Orders:  Nystatin powder      Electronically signed by:    Tory Horn,ARTEMIO,APRN,AGNP-BC.           The above note was completed in part using Dragon voice recognition software. Although reviewed after completion, some word and grammatical errors may occur. Please contact the author of this " note with any questions.

## 2023-12-05 NOTE — LETTER
"    12/5/2023        RE: Danielle Bryan  [id number]  [place] [id number]        [facility]    Chief Complaint   Patient presents with     RECHECK     HPI:  Danielle Bryan is a 78 year old  (1945), who is being seen today for an episodic care visit at: [facility] (U) [[id number]]. Today's concern is: TCU Follow Up     Patient admitted to the above facility from  [facility]. Hospital stay 11/22/23 through 11/29/23.  Patient with past medical history significant for advanced dementia, CVA c/b dysphagia and aphasia, hyperlipidemia, atrial fibrillation on chronic anticoagulation PTA, and HLD admitted to the hospital on 11/22 with weakness.  Head MRI showed number peripheral located punctate areas likely representative of microhemorrhages indicative of possible previous diagnosis of cerebral amyloid angiopathy with no acute bleed or stroke.  PTA Coumadin was discontinued and the patient was started on aspirin 81 mg daily. Hospitalization course as encrypted below from discharge summary.     [facility]  Hospitalist Discharge Summary    Date of Admission:  11/22/2023  Date of Discharge:  11/29/2023     Hospital course:  \"Danielle Bryan is a 78 year old female with past medical history of dementia, chart review reporting CVA c/b dysphagia and aphasia, hyperlipidemia, atrial fibrillation on chronic anticoagulation, HLD, who was admitted on 11/22/2023 after presenting for evaluation of global weakness.     Workup in the emergency room including NCHCT without new acute pathology, electrolytes, and CBC.  Her TSH was also noted to be normal.  Following admission to the floor developed fever to 101.3, cultures were drawn.  Remains admitted for further cares and monitoring.  Infectious workup thus far is negative for source of fever.  No fever recurrence during admission and recovered from this.     Given ongoing issues with " global weakness consulted neurology after review of previous outpatient neurology care notes to see if Sinemet trial was indicated.  Patient had MRI performed that showed number peripheral located punctate areas likely representative of microhemorrhages indicative of possible previous diagnosis of cerebral amyloid angiopathy with no acute bleed or stroke.     Neurology did not recommend Sinemet trial at this time. They did review danger of warfarin use in patient with setting of CAA, had risk benefit discussion with hx of PAF.      Conversation was revisited on 11/29 as the patient is intended to discharge to TCU.  I spoke with the patient's spouse Karl regarding risk of hemorrhagic stroke on warfarin in the setting of CAA, at this time he would prefer that the patient be taken off of warfarin, he plans to revisit discussion with Cardiologist to see if she would be a candidate for alternative procedure for anticoagulation.  In the mean time when NR subtherapeutic would like to try baby aspirin daily 8 given lower risk of bleeding in the setting of CAA -expresses understanding with our discussion regarding that aspirin does not replace full anticoagulation and  that aspirin also comes with bleeding risk albeit less than warfarin.      In regard to mental status patient did prove with somnolence perspective with decreased dose of Seroquel although this will likely need to be adjusted again in the future.  The biggest concern is with progressive decline and dementia that may be at risk of further dehydration and lack of return to previous baseline with hospitalizations.  I discussed palliative care consultation with Karl for ongoing goals of care discussions at discharge who is agreeable.      Neurocognitive Disorder   Presumed Alzheimer's dementia  B12 Deficiency   Follows with Dr. Ledesma of neurology for progressive cognitive dysfunction B12 deficiency and progressive dementia consistent with Alzheimer's type.She  has known abnormal gait with rigidity and jaw tremor considered for parkinsonism although per last neurology note deferred Sinemet trial at the time. These symptoms due appear to have progressed and are limiting her ability to return home with prior level of assistance.  On admission NCHCT showed previous changes in cerebellar tonsils c/w Chiari malformation, no acute pathology.   Plan:  -Continue pta Aricept, Namenda, lexapro   - due for monthly B12 injections she is due for this 11/24 but we will defer till the outpatient setting at this time.    -Continue Neurontin 300 mg at lunch and dinner as per last neurology note.    -Was prescribed Seroquel 125 mg nightly prior to admission this was held initially for drowsiness but restarting today at reduced dose (75 mg) as her sundowning behaviors have started to return.  Spouse states this used to be given in a divided dose but due to difficulty getting Danielle to tolerate p.o. meds it has now been given in a daily dose which we will continue here.              - decreased Seroquel to 50 mg at night due to sedation 11/28 with improvement in daytime sedation   - Neurology consulted               - rec MRI: findings suggest component of cerebral amyloid angiopathy, no signs of acute CVA              - neurology discussed with patient, recommending outpatient cardiology referral to discuss anticoagulation and if a watchman may be an option. Likely not an urgent issue as she has been on anticoagulation for years although spouse is concerned with bleeding risk given history of falls and CAA suspected diagnosis thus warfarin discontinued this hospitalization. F/up with primary Neurologist.      History of falls  Patient had a fall in 9/2023 and was seen in the emergency room.  She is at high risk for morbidity and mortality if she falls as she is on Coumadin.    -pt/ot/sw consulted for dispo recommendations - TCU recommended to bridge back to home environment with spouse and  "PCA services.      Hypernatremia   Hypochloridemia  Elevated creatinine   Very likely due to dehydration. Patient needs frequent cues to drink water.   - treated with d5 infusion with improvement  - somnolence improved with reduced dose Seroquel   - request ongoing goals of care discussion with Palliative at discharge regarding artifical nutrition         Fever - resolved  Main reason she was brought in. Suspect this was a viral process contributing to fever, global weakness.  No evidence of sepsis or hemodynamic instability. NCHCT, CXR, UA/UCx, Viral PCR all negative. No new medication changes. Since resolved. Near baseline and improving slowly with PT from a deconditioning stand point although limited by gait freezing.   - Blood cultures NGTD after 5 days      PAF, Coumadin induced coagulopathy  On lopressor, rate controlled.  She is also on Coumadin for CVA ppx now discontinued. - outpatient cardiology referral to discuss Watchman candidacy if indicated.      CVA  HLD  Complicated by dysphagia and aphasia.  Patient is on Coumadin to prevent future strokes but also has some mention on previous Neurology note regarding amyloid but has been anticoagulated since at least before 2019.   -resume PTA statin at discharge (obs status)  - outpatient cardiology referral to discuss anticoagulation     Depression, anxiety  - continue pta lexapro      Frozen shoulder  History noted.     Chronic Constipation concerns  - adjusted on 11/28 due to concerns for no BM, scheduled milk magnesia and sennakot, miralax PRN. Improved\".      Overall stabilized and patient discharged to the above TCU in stable condition for rehab.     Today: Patient being seen for TCU follow up visit.  She offers no history. Staff reports some skin redness under the right breast.  No reports of pain. Hemodynamically stable.  Eating and sleeping fine. Working with therapy.  Breathing fine.  No reports of chest pain or shortness of breath.    Allergies, and " "PMH/PSH reviewed in EPIC today.  REVIEW OF SYSTEMS:  Unobtainable secondary to cognitive impairment.     Objective:   /60   Pulse 54   Temp 97.3  F (36.3  C)   Resp 17   Ht 1.575 m (5' 2\")   Wt 72.1 kg (159 lb)   LMP  (LMP Unknown)   SpO2 96%   BMI 29.08 kg/m      Exam:  GENERAL APPEARANCE: NAD   RESPIRATORY: Clear to auscultation, even and unlabored, symmetrical chest wall expansion  CARDIOVASCULAR: RRR, no peripheral edema, S1/S2 normal  GASTROINTESTINAL: Soft, nontender, nondistended, bowel sounds present x4 quadrants  MUSCULOSKELETAL: In w/c   SKIN: Small rash under the right breast  NEUROLOGICAL: Memory loss, confusion  PSYCHOLOGICAL: Minimal eye contact    Recent labs in [facility] reviewed by me today.     Assessment/Plan:  Intertrigo  -Rash under the right breast  -Nystatin powder, monitor    Possible cerebral amyloid angiopathy with no acute bleed or stroke   Somnolence  Generalized weakness           History of repeated falling  Deconditioning  Frail elderly  -Off Coumadin as above and started on aspirin, sleepy, minimal speech  -Continue current care, continue therapies, outpatient neurology, cardiology, and PCP follow-up     Advanced dementia  Neurocognitive disorder  Depression with anxiety  B12 deficiency  Abnormal gait  - NCHCT showed previous changes in cerebellar tonsils c/w chiari malformation without acute pathology, sleepy this morning, on monthly B12 injection per chart review, Seroquel dose adjusted as above  -Continue PTA Aricept, Namenda, Lexapro, gabapentin and Seroquel, continue B12 injection, neurology follow-up after to discharge, monitor mood and behavior    MED REC REQUIRED  Post Medication Reconciliation Status:  Discharge medications reconciled and changed, see notes/orders      Orders:  Nystatin powder      Electronically signed by:    Tory Horn,DNP,APRN,AGNP-BC.           The above note was completed in part using Dragon voice recognition software. Although reviewed after " completion, some word and grammatical errors may occur. Please contact the author of this note with any questions.           Sincerely,        MARTHA Chow CNP

## 2023-12-05 NOTE — TELEPHONE ENCOUNTER
Pt was on 125 Seroquel every evening and during recent hospitalization the dose was decreased to 50 mg every evening as patient had been too sedated, sleeping all the time.  That change occurred 7-10 days ago, patient is now in TCU and is still very sedated.  She sleeps 8 p.m. to 8 a.m. and dozes much during the day.   states he wants to be cautious as there had been a time when patient was agitated and difficult to get along with until she was put on Gabapentin and Seroquel.  He is a little nervous about backing off med further but she is not even able to interact with people and her surroundings she is so sleepy.   spoke with TCU house provider about this and was told to contact neurologist.   called neurology office and he was told neurologist is out sick and may not be coming back.  He is reaching out now to Dr. Riley as he is familiar with patient.  What do you recommend?     This RN did advise  that the Seroquel order said to hold for sedation and that is an option to try also.  TOÑO Lindsay R.N.

## 2023-12-08 ENCOUNTER — TRANSITIONAL CARE UNIT VISIT (OUTPATIENT)
Dept: GERIATRICS | Facility: CLINIC | Age: 78
End: 2023-12-08
Payer: COMMERCIAL

## 2023-12-08 VITALS
DIASTOLIC BLOOD PRESSURE: 60 MMHG | OXYGEN SATURATION: 95 % | WEIGHT: 158.3 LBS | HEART RATE: 64 BPM | TEMPERATURE: 97.5 F | HEIGHT: 62 IN | BODY MASS INDEX: 29.13 KG/M2 | RESPIRATION RATE: 18 BRPM | SYSTOLIC BLOOD PRESSURE: 115 MMHG

## 2023-12-08 DIAGNOSIS — E78.5 HYPERLIPIDEMIA LDL GOAL <100: ICD-10-CM

## 2023-12-08 DIAGNOSIS — M62.81 GENERALIZED MUSCLE WEAKNESS: ICD-10-CM

## 2023-12-08 DIAGNOSIS — Z87.898 HISTORY OF DYSPHAGIA: ICD-10-CM

## 2023-12-08 DIAGNOSIS — Z86.73 HISTORY OF CVA (CEREBROVASCULAR ACCIDENT): ICD-10-CM

## 2023-12-08 DIAGNOSIS — I48.0 PAROXYSMAL ATRIAL FIBRILLATION (H): ICD-10-CM

## 2023-12-08 DIAGNOSIS — K59.09 CHRONIC CONSTIPATION: ICD-10-CM

## 2023-12-08 DIAGNOSIS — I48.20 CHRONIC ATRIAL FIBRILLATION (H): ICD-10-CM

## 2023-12-08 DIAGNOSIS — L30.4 INTERTRIGO: Primary | ICD-10-CM

## 2023-12-08 DIAGNOSIS — I68.0 CEREBRAL AMYLOID ANGIOPATHY (CODE): ICD-10-CM

## 2023-12-08 PROCEDURE — 99316 NF DSCHRG MGMT 30 MIN+: CPT

## 2023-12-08 NOTE — LETTER
12/8/2023        RE: Danielle Bryan  [address]  [place] [address]        [facility] GERIATRICS DISCHARGE SUMMARY  PATIENT'S NAME: Danielle Bryan  YOB: 1945  MEDICAL RECORD NUMBER:  [id number]  Place of Service where encounter took place:  [facility] (TCU) [[id number]]    PRIMARY CARE PROVIDER AND CLINIC RESPONSIBLE AFTER TRANSFER:   Heriberto Headley MD, [address] [facility] / PRIOR [facility] [id number]    [facility] Provider     Transferring providers: Tory Horn DNP APRN CNP; Kory De Los Santos MD  Recent Hospitalization/ED:  Hospital  [facility] Hospital stay 11/22/23 to 11/29/23.  Date of SNF Admission:  11/29/23  Date of SNF (anticipated) Discharge:  12/8/23  Discharged to: previous independent home  Cognitive Scores:  Memory loss, confusion   Physical Function:  Ambulates with assist  DME: No new DME needed    CODE STATUS/ADVANCE DIRECTIVES DISCUSSION:  Full Code   ALLERGIES: Ciprofloxacin and Reclast [zoledronic acid]    NURSING FACILITY COURSE   Medication Changes/Rationale:   Refer to notes    Summary of nursing facility stay:   Patient admitted to the above facility from  [facility]. Hospital stay 11/22/23 through 11/29/23.  Patient with past medical history significant for advanced dementia, CVA c/b dysphagia and aphasia, hyperlipidemia, atrial fibrillation on chronic anticoagulation PTA, and HLD admitted to the hospital on 11/22 with weakness.  Head MRI showed number peripheral located punctate areas likely representative of microhemorrhages indicative of possible previous diagnosis of cerebral amyloid angiopathy with no acute bleed or stroke.  PTA Coumadin was discontinued and the patient was started on aspirin 81 mg daily. Hospitalization course as encrypted below from discharge summary.     [facility]  Hospitalist Discharge Summary    Date of Admission:  11/22/2023  Date of Discharge:  11/29/2023    "  Hospital course:  \"Danielle Bryan is a 78 year old female with past medical history of dementia, chart review reporting CVA c/b dysphagia and aphasia, hyperlipidemia, atrial fibrillation on chronic anticoagulation, HLD, who was admitted on 11/22/2023 after presenting for evaluation of global weakness.     Workup in the emergency room including NCHCT without new acute pathology, electrolytes, and CBC.  Her TSH was also noted to be normal.  Following admission to the floor developed fever to 101.3, cultures were drawn.  Remains admitted for further cares and monitoring.  Infectious workup thus far is negative for source of fever.  No fever recurrence during admission and recovered from this.     Given ongoing issues with global weakness consulted neurology after review of previous outpatient neurology care notes to see if Sinemet trial was indicated.  Patient had MRI performed that showed number peripheral located punctate areas likely representative of microhemorrhages indicative of possible previous diagnosis of cerebral amyloid angiopathy with no acute bleed or stroke.     Neurology did not recommend Sinemet trial at this time. They did review danger of warfarin use in patient with setting of CAA, had risk benefit discussion with hx of PAF.      Conversation was revisited on 11/29 as the patient is intended to discharge to TCU.  I spoke with the patient's spouse Karl regarding risk of hemorrhagic stroke on warfarin in the setting of CAA, at this time he would prefer that the patient be taken off of warfarin, he plans to revisit discussion with Cardiologist to see if she would be a candidate for alternative procedure for anticoagulation.  In the mean time when NR subtherapeutic would like to try baby aspirin daily 8 given lower risk of bleeding in the setting of CAA -expresses understanding with our discussion regarding that aspirin does not replace full anticoagulation and  that aspirin also comes with " bleeding risk albeit less than warfarin.      In regard to mental status patient did prove with somnolence perspective with decreased dose of Seroquel although this will likely need to be adjusted again in the future.  The biggest concern is with progressive decline and dementia that may be at risk of further dehydration and lack of return to previous baseline with hospitalizations.  I discussed palliative care consultation with Karl for ongoing goals of care discussions at discharge who is agreeable.      Neurocognitive Disorder   Presumed Alzheimer's dementia  B12 Deficiency   Follows with Dr. Ledesma of neurology for progressive cognitive dysfunction B12 deficiency and progressive dementia consistent with Alzheimer's type.She has known abnormal gait with rigidity and jaw tremor considered for parkinsonism although per last neurology note deferred Sinemet trial at the time. These symptoms due appear to have progressed and are limiting her ability to return home with prior level of assistance.  On admission NCHCT showed previous changes in cerebellar tonsils c/w Chiari malformation, no acute pathology.   Plan:  -Continue pta Aricept, Namenda, lexapro   - due for monthly B12 injections she is due for this 11/24 but we will defer till the outpatient setting at this time.    -Continue Neurontin 300 mg at lunch and dinner as per last neurology note.    -Was prescribed Seroquel 125 mg nightly prior to admission this was held initially for drowsiness but restarting today at reduced dose (75 mg) as her sundowning behaviors have started to return.  Spouse states this used to be given in a divided dose but due to difficulty getting Danielle to tolerate p.o. meds it has now been given in a daily dose which we will continue here.              - decreased Seroquel to 50 mg at night due to sedation 11/28 with improvement in daytime sedation   - Neurology consulted               - rec MRI: findings suggest component of cerebral  amyloid angiopathy, no signs of acute CVA              - neurology discussed with patient, recommending outpatient cardiology referral to discuss anticoagulation and if a watchman may be an option. Likely not an urgent issue as she has been on anticoagulation for years although spouse is concerned with bleeding risk given history of falls and CAA suspected diagnosis thus warfarin discontinued this hospitalization. F/up with primary Neurologist.      History of falls  Patient had a fall in 9/2023 and was seen in the emergency room.  She is at high risk for morbidity and mortality if she falls as she is on Coumadin.    -pt/ot/sw consulted for dispo recommendations - TCU recommended to bridge back to home environment with spouse and PCA services.      Hypernatremia   Hypochloridemia  Elevated creatinine   Very likely due to dehydration. Patient needs frequent cues to drink water.   - treated with d5 infusion with improvement  - somnolence improved with reduced dose Seroquel   - request ongoing goals of care discussion with Palliative at discharge regarding artifical nutrition         Fever - resolved  Main reason she was brought in. Suspect this was a viral process contributing to fever, global weakness.  No evidence of sepsis or hemodynamic instability. NCHCT, CXR, UA/UCx, Viral PCR all negative. No new medication changes. Since resolved. Near baseline and improving slowly with PT from a deconditioning stand point although limited by gait freezing.   - Blood cultures NGTD after 5 days      PAF, Coumadin induced coagulopathy  On lopressor, rate controlled.  She is also on Coumadin for CVA ppx now discontinued. - outpatient cardiology referral to discuss Watchman candidacy if indicated.      CVA  HLD  Complicated by dysphagia and aphasia.  Patient is on Coumadin to prevent future strokes but also has some mention on previous Neurology note regarding amyloid but has been anticoagulated since at least before 2019.  "  -resume PTA statin at discharge (obs status)  - outpatient cardiology referral to discuss anticoagulation     Depression, anxiety  - continue pta lexapro      Frozen shoulder  History noted.     Chronic Constipation concerns  - adjusted on 11/28 due to concerns for no BM, scheduled milk magnesia and sennakot, miralax PRN. Improved\".      Overall stabilized and patient discharged to the above TCU in stable condition for rehab.     Today: Patient being seen for discharge orders.  She offers no history due to advanced dementia.  Able to ambulate with assist in TCU.  No report of falls.  Hemodynamically remained stable.  Tolerating regular diet.  No reports of dysphagia.  She is discharging home with family and in no acute concerns.    Assessment and Plan:  Intertrigo  -Rash under the right breast improved  -Continue nystatin powder, monitor     Possible cerebral amyloid angiopathy with no acute bleed or stroke   Somnolence  Generalized weakness           History of repeated falling  Deconditioning  Frail elderly  -Off Coumadin as above and started on aspirin, sleepy at times  -Fall precautions, outpatient neurology, cardiology, and PCP follow-up     Advanced dementia  Neurocognitive disorder  Depression with anxiety  B12 deficiency  Abnormal gait  - NCHCT showed previous changes in cerebellar tonsils c/w chiari malformation without acute pathology, on monthly B12 injection per chart review, Seroquel dose adjusted as above  -Continue PTA Aricept, Namenda, Lexapro, gabapentin and Seroquel, continue B12 injection, neurology follow-up after TCU discharge, monitor mood and behavior    History of A-fib  -Continue metoprolol and aspirin, outpatient cardiology referral for possible Watchman device consult, PCP follow up      CVA  HLD  Dysphagia  Aphasia  -Tolerating regular diet, off Coumadin as above  -Continue statin and aspirin, cardiology and neurology follow-up     Constipation, chronic  -Stable, meds adjusted in the " hospital  -Continue current care, monitor       Discharge Medications:  MED REC REQUIRED  Post Medication Reconciliation Status:  Medication reconciliation previously completed during another office visit     Current Outpatient Medications   Medication Sig Dispense Refill     aspirin 81 MG EC tablet Take 1 tablet (81 mg) by mouth daily       cetirizine (ZYRTEC) 10 MG tablet Take 10 mg by mouth daily as needed for allergies       diclofenac (VOLTAREN) 1 % topical gel Apply 4 g topically 3 times daily as needed for moderate pain (4-6) 100 g 0     docusate sodium (COLACE) 100 MG tablet Take 100 mg by mouth At Bedtime       donepezil (ARICEPT) 5 MG tablet Take 10 mg by mouth at bedtime       escitalopram (LEXAPRO) 10 MG tablet Take 20 mg by mouth every evening       gabapentin (NEURONTIN) 300 MG capsule Take 300 mg by mouth every evening       magnesium hydroxide (MILK OF MAGNESIA) 400 MG/5ML suspension Take 30 mLs by mouth daily (with breakfast)       memantine (NAMENDA) 10 MG tablet Take 20 mg by mouth every evening       metoprolol tartrate (LOPRESSOR) 25 MG tablet Take 0.5 tablets (12.5 mg) by mouth every evening Hold for HR <50 or SBP <110       QUEtiapine (SEROQUEL) 50 MG tablet Take 1 tablet (50 mg) by mouth at bedtime Hold for sedation, somnolence.       senna-docusate (SENOKOT-S/PERICOLACE) 8.6-50 MG tablet Take 1 tablet by mouth 2 times daily Hold for loose stools        Controlled medications:   not applicable/none     Past Medical History:   Past Medical History:   Diagnosis Date     Alopecia areata     Requires a wig now     Atrial fibrillation (H) 1/20/2019     Cerebral infarction (H) 2014    TIA     Dysphagia     Botox/EGD dilation at [place], most recent 05/2013     Hyperlipemia      PONV (postoperative nausea and vomiting)      Recurrent UTI     Believed related in part to atrophic vaginitis     Vitamin D deficiency 11/29/2018     Physical Exam:   Vitals: /60   Pulse 64   Temp 97.5  F (36.4  C)    "Resp 18   Ht 1.575 m (5' 2\")   Wt 71.8 kg (158 lb 4.8 oz)   LMP  (LMP Unknown)   SpO2 95%   BMI 28.95 kg/m    BMI: Body mass index is 28.95 kg/m .    Exam:  GENERAL APPEARANCE: NAD   RESPIRATORY: Clear to auscultation, even and unlabored, symmetrical chest wall expansion  CARDIOVASCULAR: RRR, no peripheral edema, S1/S2 normal  GASTROINTESTINAL: Soft, nontender, nondistended, bowel sounds present x4 quadrants   SKIN: Small rash under the right breast improved   NEUROLOGICAL: Memory loss, confusion  PSYCHOLOGICAL: Minimal eye contact    SNF labs: Labs done in SNF are in [place] EPIC. Please refer to them using EPIC/Care Everywhere.    DISCHARGE PLAN:  Follow up labs: Defer to PCP  Medical Follow Up:     Follow up with primary care provider in 1 week  Current [place] scheduled appointments:     Discharge Services: No therapy or home care recommended.   Discharge Instructions Verbalized to Patient at Discharge:   Continue to follow your diet:  regular.     TOTAL DISCHARGE TIME:   Greater than 30 minutes  Electronically signed by:  Tory Horn DNP,APRN,AGNP-BC.                     Sincerely,        MARTHA Chow CNP      "

## 2023-12-08 NOTE — PROGRESS NOTES
"[facility] GERIATRICS DISCHARGE SUMMARY  PATIENT'S NAME: Danielle Bryan  YOB: 1945  MEDICAL RECORD NUMBER:  [id number]  Place of Service where encounter took place:  [facility] (TCU) [[id number]]    PRIMARY CARE PROVIDER AND CLINIC RESPONSIBLE AFTER TRANSFER:   Heriberto Headley MD, [address] [facility] [address] / PRIOR [facility] [address]    [facility] Provider     Transferring providers: Tory Horn DNP APRN CNP; Kory De Los Santos MD  Recent Hospitalization/ED:  Hospital  [facility] stay 11/22/23 to 11/29/23.  Date of SNF Admission:  11/29/23  Date of SNF (anticipated) Discharge:  12/8/23  Discharged to: previous independent home  Cognitive Scores:  Memory loss, confusion   Physical Function:  Ambulates with assist  DME: No new DME needed    CODE STATUS/ADVANCE DIRECTIVES DISCUSSION:  Full Code   ALLERGIES: Ciprofloxacin and Reclast [zoledronic acid]    NURSING FACILITY COURSE   Medication Changes/Rationale:   Refer to notes    Summary of nursing facility stay:   Patient admitted to the above facility from  [facility]. Hospital stay 11/22/23 through 11/29/23.  Patient with past medical history significant for advanced dementia, CVA c/b dysphagia and aphasia, hyperlipidemia, atrial fibrillation on chronic anticoagulation PTA, and HLD admitted to the hospital on 11/22 with weakness.  Head MRI showed number peripheral located punctate areas likely representative of microhemorrhages indicative of possible previous diagnosis of cerebral amyloid angiopathy with no acute bleed or stroke.  PTA Coumadin was discontinued and the patient was started on aspirin 81 mg daily. Hospitalization course as encrypted below from discharge summary.     [facility]  Hospitalist Discharge Summary    Date of Admission:  11/22/2023  Date of Discharge:  11/29/2023     Hospital course:  \"Danielle Bryan is a 78 year old female with past medical history of " dementia, chart review reporting CVA c/b dysphagia and aphasia, hyperlipidemia, atrial fibrillation on chronic anticoagulation, HLD, who was admitted on 11/22/2023 after presenting for evaluation of global weakness.     Workup in the emergency room including NCHCT without new acute pathology, electrolytes, and CBC.  Her TSH was also noted to be normal.  Following admission to the floor developed fever to 101.3, cultures were drawn.  Remains admitted for further cares and monitoring.  Infectious workup thus far is negative for source of fever.  No fever recurrence during admission and recovered from this.     Given ongoing issues with global weakness consulted neurology after review of previous outpatient neurology care notes to see if Sinemet trial was indicated.  Patient had MRI performed that showed number peripheral located punctate areas likely representative of microhemorrhages indicative of possible previous diagnosis of cerebral amyloid angiopathy with no acute bleed or stroke.     Neurology did not recommend Sinemet trial at this time. They did review danger of warfarin use in patient with setting of CAA, had risk benefit discussion with hx of PAF.      Conversation was revisited on 11/29 as the patient is intended to discharge to TCU.  I spoke with the patient's spouse Karl regarding risk of hemorrhagic stroke on warfarin in the setting of CAA, at this time he would prefer that the patient be taken off of warfarin, he plans to revisit discussion with Cardiologist to see if she would be a candidate for alternative procedure for anticoagulation.  In the mean time when NR subtherapeutic would like to try baby aspirin daily 8 given lower risk of bleeding in the setting of CAA -expresses understanding with our discussion regarding that aspirin does not replace full anticoagulation and  that aspirin also comes with bleeding risk albeit less than warfarin.      In regard to mental status patient did prove with  somnolence perspective with decreased dose of Seroquel although this will likely need to be adjusted again in the future.  The biggest concern is with progressive decline and dementia that may be at risk of further dehydration and lack of return to previous baseline with hospitalizations.  I discussed palliative care consultation with Karl for ongoing goals of care discussions at discharge who is agreeable.      Neurocognitive Disorder   Presumed Alzheimer's dementia  B12 Deficiency   Follows with Dr. Ledesma of neurology for progressive cognitive dysfunction B12 deficiency and progressive dementia consistent with Alzheimer's type.She has known abnormal gait with rigidity and jaw tremor considered for parkinsonism although per last neurology note deferred Sinemet trial at the time. These symptoms due appear to have progressed and are limiting her ability to return home with prior level of assistance.  On admission NCHCT showed previous changes in cerebellar tonsils c/w Chiari malformation, no acute pathology.   Plan:  -Continue pta Aricept, Namenda, lexapro   - due for monthly B12 injections she is due for this 11/24 but we will defer till the outpatient setting at this time.    -Continue Neurontin 300 mg at lunch and dinner as per last neurology note.    -Was prescribed Seroquel 125 mg nightly prior to admission this was held initially for drowsiness but restarting today at reduced dose (75 mg) as her sundowning behaviors have started to return.  Spouse states this used to be given in a divided dose but due to difficulty getting Danielle to tolerate p.o. meds it has now been given in a daily dose which we will continue here.              - decreased Seroquel to 50 mg at night due to sedation 11/28 with improvement in daytime sedation   - Neurology consulted               - rec MRI: findings suggest component of cerebral amyloid angiopathy, no signs of acute CVA              - neurology discussed with patient,  recommending outpatient cardiology referral to discuss anticoagulation and if a watchman may be an option. Likely not an urgent issue as she has been on anticoagulation for years although spouse is concerned with bleeding risk given history of falls and CAA suspected diagnosis thus warfarin discontinued this hospitalization. F/up with primary Neurologist.      History of falls  Patient had a fall in 9/2023 and was seen in the emergency room.  She is at high risk for morbidity and mortality if she falls as she is on Coumadin.    -pt/ot/sw consulted for dispo recommendations - TCU recommended to bridge back to home environment with spouse and PCA services.      Hypernatremia   Hypochloridemia  Elevated creatinine   Very likely due to dehydration. Patient needs frequent cues to drink water.   - treated with d5 infusion with improvement  - somnolence improved with reduced dose Seroquel   - request ongoing goals of care discussion with Palliative at discharge regarding artifical nutrition         Fever - resolved  Main reason she was brought in. Suspect this was a viral process contributing to fever, global weakness.  No evidence of sepsis or hemodynamic instability. NCHCT, CXR, UA/UCx, Viral PCR all negative. No new medication changes. Since resolved. Near baseline and improving slowly with PT from a deconditioning stand point although limited by gait freezing.   - Blood cultures NGTD after 5 days      PAF, Coumadin induced coagulopathy  On lopressor, rate controlled.  She is also on Coumadin for CVA ppx now discontinued. - outpatient cardiology referral to discuss Watchman candidacy if indicated.      CVA  HLD  Complicated by dysphagia and aphasia.  Patient is on Coumadin to prevent future strokes but also has some mention on previous Neurology note regarding amyloid but has been anticoagulated since at least before 2019.   -resume PTA statin at discharge (obs status)  - outpatient cardiology referral to discuss  "anticoagulation     Depression, anxiety  - continue pta lexapro      Frozen shoulder  History noted.     Chronic Constipation concerns  - adjusted on 11/28 due to concerns for no BM, scheduled milk magnesia and sennakot, miralax PRN. Improved\".      Overall stabilized and patient discharged to the above TCU in stable condition for rehab.     Today: Patient being seen for discharge orders.  She offers no history due to advanced dementia.  Able to ambulate with assist in TCU.  No report of falls.  Hemodynamically remained stable.  Tolerating regular diet.  No reports of dysphagia.  She is discharging home with family and in no acute concerns.    Assessment and Plan:  Intertrigo  -Rash under the right breast improved  -Continue nystatin powder, monitor     Possible cerebral amyloid angiopathy with no acute bleed or stroke   Somnolence  Generalized weakness           History of repeated falling  Deconditioning  Frail elderly  -Off Coumadin as above and started on aspirin, sleepy at times  -Fall precautions, outpatient neurology, cardiology, and PCP follow-up     Advanced dementia  Neurocognitive disorder  Depression with anxiety  B12 deficiency  Abnormal gait  - NCHCT showed previous changes in cerebellar tonsils c/w chiari malformation without acute pathology, on monthly B12 injection per chart review, Seroquel dose adjusted as above  -Continue PTA Aricept, Namenda, Lexapro, gabapentin and Seroquel, continue B12 injection, neurology follow-up after TCU discharge, monitor mood and behavior    History of A-fib  -Continue metoprolol and aspirin, outpatient cardiology referral for possible Watchman device consult, PCP follow up      CVA  HLD  Dysphagia  Aphasia  -Tolerating regular diet, off Coumadin as above  -Continue statin and aspirin, cardiology and neurology follow-up     Constipation, chronic  -Stable, meds adjusted in the hospital  -Continue current care, monitor       Discharge Medications:  MED REC REQUIRED  Post " "Medication Reconciliation Status:  Medication reconciliation previously completed during another office visit     Current Outpatient Medications   Medication Sig Dispense Refill    aspirin 81 MG EC tablet Take 1 tablet (81 mg) by mouth daily      cetirizine (ZYRTEC) 10 MG tablet Take 10 mg by mouth daily as needed for allergies      diclofenac (VOLTAREN) 1 % topical gel Apply 4 g topically 3 times daily as needed for moderate pain (4-6) 100 g 0    docusate sodium (COLACE) 100 MG tablet Take 100 mg by mouth At Bedtime      donepezil (ARICEPT) 5 MG tablet Take 10 mg by mouth at bedtime      escitalopram (LEXAPRO) 10 MG tablet Take 20 mg by mouth every evening      gabapentin (NEURONTIN) 300 MG capsule Take 300 mg by mouth every evening      magnesium hydroxide (MILK OF MAGNESIA) 400 MG/5ML suspension Take 30 mLs by mouth daily (with breakfast)      memantine (NAMENDA) 10 MG tablet Take 20 mg by mouth every evening      metoprolol tartrate (LOPRESSOR) 25 MG tablet Take 0.5 tablets (12.5 mg) by mouth every evening Hold for HR <50 or SBP <110      QUEtiapine (SEROQUEL) 50 MG tablet Take 1 tablet (50 mg) by mouth at bedtime Hold for sedation, somnolence.      senna-docusate (SENOKOT-S/PERICOLACE) 8.6-50 MG tablet Take 1 tablet by mouth 2 times daily Hold for loose stools        Controlled medications:   not applicable/none     Past Medical History:   Past Medical History:   Diagnosis Date    Alopecia areata     Requires a wig now    Atrial fibrillation (H) 1/20/2019    Cerebral infarction (H) 2014    TIA    Dysphagia     Botox/EGD dilation at [place], most recent 05/2013    Hyperlipemia     PONV (postoperative nausea and vomiting)     Recurrent UTI     Believed related in part to atrophic vaginitis    Vitamin D deficiency 11/29/2018     Physical Exam:   Vitals: /60   Pulse 64   Temp 97.5  F (36.4  C)   Resp 18   Ht 1.575 m (5' 2\")   Wt 71.8 kg (158 lb 4.8 oz)   LMP  (LMP Unknown)   SpO2 95%   BMI 28.95 kg/m  "   BMI: Body mass index is 28.95 kg/m .    Exam:  GENERAL APPEARANCE: NAD   RESPIRATORY: Clear to auscultation, even and unlabored, symmetrical chest wall expansion  CARDIOVASCULAR: RRR, no peripheral edema, S1/S2 normal  GASTROINTESTINAL: Soft, nontender, nondistended, bowel sounds present x4 quadrants   SKIN: Small rash under the right breast improved   NEUROLOGICAL: Memory loss, confusion  PSYCHOLOGICAL: Minimal eye contact    SNF labs: Labs done in SNF are in [place] EPIC. Please refer to them using [facility]/Care Everywhere.    DISCHARGE PLAN:  Follow up labs: Defer to PCP  Medical Follow Up:     Follow up with primary care provider in 1 week  Current [place] scheduled appointments:     Discharge Services: No therapy or home care recommended.   Discharge Instructions Verbalized to Patient at Discharge:   Continue to follow your diet:  regular.     TOTAL DISCHARGE TIME:   Greater than 30 minutes  Electronically signed by:  Tory Horn,ARTEMIO,APRN,AGNP-BC.

## 2023-12-09 NOTE — TELEPHONE ENCOUNTER
Call to spouse. Advised. States she is currently taking 50 mg at this time and that seems to be working well at this time.

## 2023-12-10 RX ORDER — METOPROLOL TARTRATE 25 MG/1
12.5 TABLET, FILM COATED ORAL DAILY
Qty: 45 TABLET | Refills: 0 | Status: SHIPPED | OUTPATIENT
Start: 2023-12-10 | End: 2024-01-04

## 2023-12-27 ENCOUNTER — ALLIED HEALTH/NURSE VISIT (OUTPATIENT)
Dept: FAMILY MEDICINE | Facility: CLINIC | Age: 78
End: 2023-12-27
Payer: COMMERCIAL

## 2023-12-27 DIAGNOSIS — E53.8 VITAMIN B12 DEFICIENCY (NON ANEMIC): Primary | ICD-10-CM

## 2023-12-27 PROCEDURE — 96372 THER/PROPH/DIAG INJ SC/IM: CPT | Performed by: PHYSICIAN ASSISTANT

## 2023-12-27 PROCEDURE — 99207 PR NO CHARGE NURSE ONLY: CPT

## 2023-12-27 RX ADMIN — CYANOCOBALAMIN 1000 MCG: 1000 INJECTION, SOLUTION INTRAMUSCULAR; SUBCUTANEOUS at 09:52

## 2023-12-27 NOTE — PROGRESS NOTES
Clinic Administered Medication Documentation      Injectable Medication Documentation    Is there an active order (written within the past 365 days, with administrations remaining, not ) in the chart? Yes.     Patient was given Cyanocobalamin (B-12). Prior to medication administration, verified patient's identity using patient s name and date of birth. Please see MAR and medication order for additional information. Patient instructed to remain in clinic for 15 minutes and report any adverse reaction to staff immediately.    Vial/Syringe: Single dose vial. Was entire vial of medication used? Yes  Was this medication supplied by the patient? Yes, Medication was received    Is this a medication the patient will need to receive again? Yes. Verified that the patient has refills remaining in their prescription.

## 2023-12-29 ENCOUNTER — PATIENT OUTREACH (OUTPATIENT)
Dept: CARE COORDINATION | Facility: CLINIC | Age: 78
End: 2023-12-29
Payer: COMMERCIAL

## 2023-12-29 NOTE — PROGRESS NOTES
Clinic Care Coordination Contact  Clinic Care Coordination Contact  OUTREACH    Referral Information:  Referral Source: SNF/TCU    Chief Complaint   Patient presents with    Clinic Care Coordination - Initial     Post TCU discharge assessment       Universal Utilization: Reviewed 12/29/2023  Clinic Utilization  Difficulty keeping appointments:: No  Compliance Concerns: No  No-Show Concerns: No  No PCP office visit in Past Year: No  Utilization      No Show Count (past year)  0             ED Visits  2             Hospital Admissions  1                    Current as of: 12/27/2023  3:26 PM              Clinical Concerns:  Current Medical Concerns:    Patient Active Problem List   Diagnosis    Sprain of jaw    Alopecia areata    Advanced directives, counseling/discussion    Bartholin's cyst    Allergic rhinitis    Transient cerebral ischemia    Hyperlipidemia with target LDL less than 100    Vitamin B12 deficiency (non anemic)    Osteoporosis    Vitamin D deficiency    Syncope, near    Atrial fibrillation (H)    Dysphagia, unspecified type    Adverse effect of bisphosphonate, sequela    Dupuytren's contracture    Esophageal motility disorder    Reduced mobility    Paroxysmal atrial fibrillation (H)    General weakness    Failure to thrive in adult   Current Behavioral Concerns: none noted     Education Provided to patients spouse, Karl: [facility] role and reason for call.    Pain  Pain (GOAL):: No  Health Maintenance Reviewed: Due/Overdue   Health Maintenance Due   Topic Date Due    RSV VACCINE (Pregnancy & 60+) (1 - 1-dose 60+ series) Never done    ZOSTER IMMUNIZATION (2 of 3) 02/26/2008    COVID-19 Vaccine (5 - 2023-24 season) 09/01/2023   Clinical Pathway: None    Medication Management:  Medication review status: Medications not reviewed during this encounter due to nature of call.   Current Outpatient Medications   Medication    aspirin 81 MG EC tablet    cetirizine (ZYRTEC) 10 MG tablet    diclofenac (VOLTAREN) 1 %  topical gel    docusate sodium (COLACE) 100 MG tablet    donepezil (ARICEPT) 5 MG tablet    escitalopram (LEXAPRO) 10 MG tablet    gabapentin (NEURONTIN) 300 MG capsule    magnesium hydroxide (MILK OF MAGNESIA) 400 MG/5ML suspension    memantine (NAMENDA) 10 MG tablet    metoprolol tartrate (LOPRESSOR) 25 MG tablet    QUEtiapine (SEROQUEL) 50 MG tablet    senna-docusate (SENOKOT-S/PERICOLACE) 8.6-50 MG tablet     Current Facility-Administered Medications   Medication    cyanocobalamin injection 1,000 mcg    denosumab (PROLIA) injection 60 mg     Functional Status:  Dependent ADLs:: Ambulation-walker  Dependent IADLs:: Transportation, Money Management, Medication Management, Meal Preparation, Shopping, Laundry, Cooking, Cleaning  Bed or wheelchair confined:: No    Living Situation:  Current living arrangement:: I live in a private home with spouse  Type of residence:: Private home - stairs    Lifestyle & Psychosocial Needs:  Social Determinants of Health     Food Insecurity: No Food Insecurity (4/8/2022)    Hunger Vital Sign     Worried About Running Out of Food in the Last Year: Never true     Ran Out of Food in the Last Year: Never true   Depression: Not at risk (4/19/2023)    PHQ-2     PHQ-2 Score: 1   Housing Stability: Low Risk  (4/8/2022)    Housing Stability Vital Sign     Unable to Pay for Housing in the Last Year: No     Number of Places Lived in the Last Year: 1     Unstable Housing in the Last Year: No   Tobacco Use: Low Risk  (7/17/2023)    Patient History     Smoking Tobacco Use: Never     Smokeless Tobacco Use: Never     Passive Exposure: Not on file   Financial Resource Strain: Low Risk  (4/8/2022)    Overall Financial Resource Strain (CARDIA)     Difficulty of Paying Living Expenses: Not hard at all   Alcohol Use: Not At Risk (4/8/2022)    AUDIT-C     Frequency of Alcohol Consumption: Never     Average Number of Drinks: Not on file     Frequency of Binge Drinking: Not on file   Transportation Needs:  No Transportation Needs (4/8/2022)    PRAPARE - Transportation     Lack of Transportation (Medical): No     Lack of Transportation (Non-Medical): No   Physical Activity: Not on file   Interpersonal Safety: Not At Risk (4/8/2022)    Humiliation, Afraid, Rape, and Kick questionnaire     Fear of Current or Ex-Partner: No     Emotionally Abused: No     Physically Abused: No     Sexually Abused: No   Stress: Not on file   Social Connections: Unknown (2/24/2021)    Social Connection and Isolation Panel [NHANES]     Frequency of Communication with Friends and Family: Not asked     Frequency of Social Gatherings with Friends and Family: Not asked     Attends Zoroastrianism Services: Not asked     Active Member of Clubs or Organizations: Not asked     Attends Club or Organization Meetings: Not asked     Marital Status:      Diet:: Regular  Inadequate nutrition (GOAL):: No  Tube Feeding: No  Inadequate activity/exercise (GOAL):: No  Significant changes in sleep pattern (GOAL): No  Transportation means:: Family, Regular car     Zoroastrianism or spiritual beliefs that impact treatment:: No  Mental health DX:: No  Mental health management concern (GOAL):: No  Chemical Dependency Status: No Current Concerns  Informal Support system:: Children, Family, Friends      SWCC spoke with patients spouse, Karl (consent to communicate on file), to introduce self and offer Care Coordination services due to recent TCU stay. Per report, patient is doing well back at home with support from spouse and denied any new concerns or complaints. Karl declined any concerns related to housing, transportation, financial, and/or food insecurities. Karl reports they have all the supports and resources needed at this time and denied any ongoing CC needs. CC reviewed recommendation to follow up with PCP post TCU discharge. Kral expressed understanding and shared he plans to follow-up with PCP/care team next week. No further CC needs identified at this  time. Karl thankful for call and politely declined CC services. Preference is to contact CC if/when needs arise.     Resources and Interventions:  Current Resources:   Community Resources: None  Equipment Currently Used at Home: walker, standard, grab bar, tub/shower, shower chair  Employment Status: retired  Advance Care Plan/Directive  Advanced Care Plans/Directives on file:: No  Referrals Placed: None     Care Plan: None     Future Appointments                In 6 days Michel Dawn MD [facility] PSA CLIN    In 1 month RV MA/LPN [facility], RV    In 2 months RV MA/LPN [facility], RV    In 3 months RV MA/LPN [facility], RV          Plan: Patients spouse, Karl, was encouraged to call with questions, concerns, and/or support needs.  CC will remain available as needed. No further care coordination outreaches will be made at this time.     AAMIR Wiggins/[facility]A  Social Work Care Coordinator  [facility] [place], [place], and [place]  Phone: [phone]

## 2024-01-04 ENCOUNTER — OFFICE VISIT (OUTPATIENT)
Dept: CARDIOLOGY | Facility: CLINIC | Age: 79
End: 2024-01-04
Payer: COMMERCIAL

## 2024-01-04 VITALS — HEIGHT: 62 IN | BODY MASS INDEX: 29.44 KG/M2 | WEIGHT: 160 LBS

## 2024-01-04 DIAGNOSIS — I48.0 PAROXYSMAL ATRIAL FIBRILLATION (H): ICD-10-CM

## 2024-01-04 PROCEDURE — 99207 PR NO CHARGE LOS: CPT | Performed by: INTERNAL MEDICINE

## 2024-01-04 RX ORDER — QUETIAPINE FUMARATE 25 MG/1
TABLET, FILM COATED ORAL
COMMUNITY
Start: 2023-12-13

## 2024-01-04 NOTE — LETTER
1/4/2024    Heriberto Headley MD  [address] [facility] [address]    RE: Danielle Bryan       Dear Colleague,     I had the pleasure of seeing Danielle Bryan in the [facility].  CARDIOLOGY CLINIC CONSULTATION      REASON FOR CONSULT:   Paroxysmal AF, cerebral amyloid angiopathy    PRIMARY CARE PHYSICIAN:  Heriberto Headley        History of Present Illness  Danielle Bryan is an extremely pleasant 78 year old female who was referred to cardiology clinic after a recent hospitalization for failure to thrive to discuss anticoagulation/Watchman in relation to her paroxysmal AF.  Patient was not present at today's visit but instead her  came to the visit today as he tells me that her dementia makes it difficult for her to attend clinic appointments.    Briefly, she was diagnosed with paroxysmal atrial fibrillation roughly 4 years ago.  She has not seen a cardiologist at this point but instead has followed with neurology.  She was on Coumadin for several years and did well with this.  She was admitted to [facility] on 11/22/2023 with generalized weakness and failure to thrive.  A brain MRI at that time showed micro punctate hemorrhages and other findings concerning for cerebral amyloid angiopathy.  Accordingly, she was seen by neurology who recommended that her Coumadin was stopped and she transition to a baby aspirin instead.  She was referred to cardiology to further discuss anticoagulation as well as the possibility of watchman device.  According to her , she does struggle with significant dementia.  She is currently able to live at home with him, but this does require significant help and he is not sure how much longer he will be able to do this.  She does not complain of any cardiac symptoms to him.  She has never had any bleeding issues per his report on the Coumadin.      Assessment & Plan    Paroxysmal atrial fibrillation  Cerebral amyloid angiopathy  with micro punctate hemorrhages on brain MRI  Progressive dementia  Adult failure to thrive  Dyslipidemia      It was a pleasure to speak with Karl (Danielle's ) in clinic today.  Briefly, we discussed the potential options for stroke prevention with atrial fibrillation, including indefinite anticoagulation, indefinite baby aspirin, and watchman therapy.  I would agree with neurology that I believe the risks of intracranial hemorrhage are too high with cerebral amyloid angiopathy for her to be on indefinite anticoagulation.  In regards to watchman, if neurology were to feel that it would be okay for her to be on brief anticoagulation or DAPT for a period of time after implant, this is something that theoretically could be considered.  However, this would need to be weighed against the upfront risk of the procedure, including simply the discomfort and inconvenience to the patient.  I think that the most appropriate way to make that decision is based on her level of dementia and her goals of care.  We discussed this in detail and it sounds as though her dementia is fairly severely progressed, and accordingly I would tend to lean away from watchman or any other procedural intervention at this point.  Karl will think about this and talk it over with his family (his brother in fact had a Watchman device in the past), but he is also leaning away from pursuing this at present.  I asked him to please let us know if he has any additional questions or if he would like to have Danielle seen in clinic in the future.        Michel Dawn MD  Interventional Cardiology  January 4, 2024        Medications  Current Outpatient Medications   Medication    aspirin 81 MG EC tablet    cetirizine (ZYRTEC) 10 MG tablet    diclofenac (VOLTAREN) 1 % topical gel    docusate sodium (COLACE) 100 MG tablet    donepezil (ARICEPT) 5 MG tablet    escitalopram (LEXAPRO) 10 MG tablet    gabapentin (NEURONTIN) 300 MG capsule    magnesium  hydroxide (MILK OF MAGNESIA) 400 MG/5ML suspension    memantine (NAMENDA) 10 MG tablet    QUEtiapine (SEROQUEL) 25 MG tablet    senna-docusate (SENOKOT-S/PERICOLACE) 8.6-50 MG tablet     Current Facility-Administered Medications   Medication    cyanocobalamin injection 1,000 mcg    denosumab (PROLIA) injection 60 mg     Allergies  Allergies   Allergen Reactions    Ciprofloxacin      Sores in mouth and throat felt funny    Reclast [Zoledronic Acid]      dizziness         Physical Exam                     Vital Signs with Ranges     160 lbs 0 oz    No exam performed as patient was not present      Thank you for allowing me to participate in the care of your patient.      Sincerely,     Michel Dawn MD     [facility]  cc:   Susan Donaldson PA-C  [address] [place] [address]

## 2024-01-04 NOTE — PROGRESS NOTES
CARDIOLOGY CLINIC CONSULTATION      REASON FOR CONSULT:   Paroxysmal AF, cerebral amyloid angiopathy    PRIMARY CARE PHYSICIAN:  Heriberto Headley        History of Present Illness   Danielle Bryan is an extremely pleasant 78 year old female who was referred to cardiology clinic after a recent hospitalization for failure to thrive to discuss anticoagulation/Watchman in relation to her paroxysmal AF.  Patient was not present at today's visit but instead her  came to the visit today as he tells me that her dementia makes it difficult for her to attend clinic appointments.    Briefly, she was diagnosed with paroxysmal atrial fibrillation roughly 4 years ago.  She has not seen a cardiologist at this point but instead has followed with neurology.  She was on Coumadin for several years and did well with this.  She was admitted to [facility] on 11/22/2023 with generalized weakness and failure to thrive.  A brain MRI at that time showed micro punctate hemorrhages and other findings concerning for cerebral amyloid angiopathy.  Accordingly, she was seen by neurology who recommended that her Coumadin was stopped and she transition to a baby aspirin instead.  She was referred to cardiology to further discuss anticoagulation as well as the possibility of watchman device.  According to her , she does struggle with significant dementia.  She is currently able to live at home with him, but this does require significant help and he is not sure how much longer he will be able to do this.  She does not complain of any cardiac symptoms to him.  She has never had any bleeding issues per his report on the Coumadin.      Assessment & Plan     Paroxysmal atrial fibrillation  Cerebral amyloid angiopathy with micro punctate hemorrhages on brain MRI  Progressive dementia  Adult failure to thrive  Dyslipidemia      It was a pleasure to speak with Karl (Danielle's ) in clinic today.  Briefly, we discussed the  potential options for stroke prevention with atrial fibrillation, including indefinite anticoagulation, indefinite baby aspirin, and watchman therapy.  I would agree with neurology that I believe the risks of intracranial hemorrhage are too high with cerebral amyloid angiopathy for her to be on indefinite anticoagulation.  In regards to watchman, if neurology were to feel that it would be okay for her to be on brief anticoagulation or DAPT for a period of time after implant, this is something that theoretically could be considered.  However, this would need to be weighed against the upfront risk of the procedure, including simply the discomfort and inconvenience to the patient.  I think that the most appropriate way to make that decision is based on her level of dementia and her goals of care.  We discussed this in detail and it sounds as though her dementia is fairly severely progressed, and accordingly I would tend to lean away from watchman or any other procedural intervention at this point.  Karl will think about this and talk it over with his family (his brother in fact had a Watchman device in the past), but he is also leaning away from pursuing this at present.  I asked him to please let us know if he has any additional questions or if he would like to have Danielle seen in clinic in the future.        Michel Dawn MD  Interventional Cardiology  January 4, 2024        Medications   Current Outpatient Medications   Medication    aspirin 81 MG EC tablet    cetirizine (ZYRTEC) 10 MG tablet    diclofenac (VOLTAREN) 1 % topical gel    docusate sodium (COLACE) 100 MG tablet    donepezil (ARICEPT) 5 MG tablet    escitalopram (LEXAPRO) 10 MG tablet    gabapentin (NEURONTIN) 300 MG capsule    magnesium hydroxide (MILK OF MAGNESIA) 400 MG/5ML suspension    memantine (NAMENDA) 10 MG tablet    QUEtiapine (SEROQUEL) 25 MG tablet    senna-docusate (SENOKOT-S/PERICOLACE) 8.6-50 MG tablet     Current  Facility-Administered Medications   Medication    cyanocobalamin injection 1,000 mcg    denosumab (PROLIA) injection 60 mg     Allergies   Allergies   Allergen Reactions    Ciprofloxacin      Sores in mouth and throat felt funny    Reclast [Zoledronic Acid]      dizziness         Physical Exam                      Vital Signs with Ranges     160 lbs 0 oz    No exam performed as patient was not present

## 2024-01-15 ENCOUNTER — TELEPHONE (OUTPATIENT)
Dept: INTERNAL MEDICINE | Facility: CLINIC | Age: 79
End: 2024-01-15
Payer: COMMERCIAL

## 2024-01-15 DIAGNOSIS — R41.3 MEMORY DIFFICULTIES: Primary | ICD-10-CM

## 2024-01-15 DIAGNOSIS — R26.81 GAIT INSTABILITY: ICD-10-CM

## 2024-01-15 NOTE — TELEPHONE ENCOUNTER
Order/Referral Request    Who is requesting: Patient Spouse     Orders being requested: Physical Therapy     Reason service is needed/diagnosis:  Pt has dementia, has a hard time walking now    When are orders needed by: asap    Has this been discussed with Provider: No    Does patient have a preference on a Group/Provider/Facility? Pt spouse would like to use who they used before, but he does not remember     Does patient have an appointment scheduled?: No    Where to send orders: Place orders within Epic    Could we send this information to you in [facility] or would you prefer to receive a phone call?:   Patient would prefer a phone call   Okay to leave a detailed message?: Yes at Home number on file [phone] (home)

## 2024-01-22 NOTE — TELEPHONE ENCOUNTER
[phone]    Eyad informed of referral info.  He is requesting a home physical therapy eval.  [facility] states patient is getting home care.    Please advise, thanks.

## 2024-01-23 NOTE — TELEPHONE ENCOUNTER
Will request order for home PT.   They may request that a video or office visit be updated before home PT is started.

## 2024-01-24 NOTE — TELEPHONE ENCOUNTER
Ghazala with [facility] calls - phone: [phone]. She states patient will need appointment for face-to-face and to have new home care referral placed at appointment - appointment can be in-person or video visit. She also asks if patient is being followed by Dr. Riley. Advised that while Dr. Headley is listed as PCP, patient has never seen him. Previous PCP was Dr. Riley at [place], but patient was last seen by provider at [place].     Called Karl, he states they are transitioning care to [facility]. He is ok with scheduling in-person appointment for patient, but is in the process of being discharged from the hospital following hip replacement. He requests a call back tomorrow to make appointment with [facility] for home care face-to-face. Message forwarded to [facility] to call Karl on Thursday to schedule appointment.    Judy Castillo RN  [facility]

## 2024-01-26 NOTE — TELEPHONE ENCOUNTER
Placed call to patient and spoke with patient's . Patient is scheduled to see Dr. Headley 2/29/24. Closing encounter.

## 2024-02-20 ENCOUNTER — ALLIED HEALTH/NURSE VISIT (OUTPATIENT)
Dept: FAMILY MEDICINE | Facility: CLINIC | Age: 79
End: 2024-02-20
Payer: COMMERCIAL

## 2024-02-20 DIAGNOSIS — E53.8 VITAMIN B12 DEFICIENCY (NON ANEMIC): Primary | ICD-10-CM

## 2024-02-20 PROCEDURE — 99207 PR NO CHARGE NURSE ONLY: CPT

## 2024-02-20 PROCEDURE — 96372 THER/PROPH/DIAG INJ SC/IM: CPT | Performed by: PHYSICIAN ASSISTANT

## 2024-02-20 RX ADMIN — CYANOCOBALAMIN 1000 MCG: 1000 INJECTION, SOLUTION INTRAMUSCULAR; SUBCUTANEOUS at 09:27

## 2024-02-29 ENCOUNTER — OFFICE VISIT (OUTPATIENT)
Dept: FAMILY MEDICINE | Facility: CLINIC | Age: 79
End: 2024-02-29
Payer: COMMERCIAL

## 2024-02-29 VITALS
BODY MASS INDEX: 30.53 KG/M2 | HEART RATE: 104 BPM | RESPIRATION RATE: 18 BRPM | DIASTOLIC BLOOD PRESSURE: 60 MMHG | SYSTOLIC BLOOD PRESSURE: 98 MMHG | HEIGHT: 62 IN | OXYGEN SATURATION: 96 % | WEIGHT: 165.9 LBS | TEMPERATURE: 97.8 F

## 2024-02-29 DIAGNOSIS — I48.0 PAROXYSMAL ATRIAL FIBRILLATION (H): ICD-10-CM

## 2024-02-29 DIAGNOSIS — R63.5 WEIGHT GAIN: ICD-10-CM

## 2024-02-29 DIAGNOSIS — R53.1 GENERAL WEAKNESS: ICD-10-CM

## 2024-02-29 DIAGNOSIS — M17.10 ARTHRITIS OF KNEE: ICD-10-CM

## 2024-02-29 DIAGNOSIS — E53.8 VITAMIN B12 DEFICIENCY (NON ANEMIC): ICD-10-CM

## 2024-02-29 DIAGNOSIS — F03.90 DEMENTIA, UNSPECIFIED DEMENTIA SEVERITY, UNSPECIFIED DEMENTIA TYPE, UNSPECIFIED WHETHER BEHAVIORAL, PSYCHOTIC, OR MOOD DISTURBANCE OR ANXIETY (H): Primary | ICD-10-CM

## 2024-02-29 DIAGNOSIS — R26.89 BALANCE PROBLEMS: ICD-10-CM

## 2024-02-29 PROBLEM — T45.8X5S: Status: RESOLVED | Noted: 2020-10-15 | Resolved: 2024-02-29

## 2024-02-29 PROBLEM — R55 SYNCOPE, NEAR: Status: RESOLVED | Noted: 2018-12-10 | Resolved: 2024-02-29

## 2024-02-29 PROBLEM — I48.91 ATRIAL FIBRILLATION (H): Status: RESOLVED | Noted: 2019-01-20 | Resolved: 2024-02-29

## 2024-02-29 PROCEDURE — 36415 COLL VENOUS BLD VENIPUNCTURE: CPT | Performed by: FAMILY MEDICINE

## 2024-02-29 PROCEDURE — 99214 OFFICE O/P EST MOD 30 MIN: CPT | Performed by: FAMILY MEDICINE

## 2024-02-29 PROCEDURE — 82607 VITAMIN B-12: CPT | Performed by: FAMILY MEDICINE

## 2024-02-29 PROCEDURE — 80053 COMPREHEN METABOLIC PANEL: CPT | Performed by: FAMILY MEDICINE

## 2024-02-29 PROCEDURE — 84443 ASSAY THYROID STIM HORMONE: CPT | Performed by: FAMILY MEDICINE

## 2024-02-29 RX ORDER — CYANOCOBALAMIN 1000 UG/ML
1 INJECTION, SOLUTION INTRAMUSCULAR; SUBCUTANEOUS
Qty: 1 ML | Refills: 11 | Status: SHIPPED | OUTPATIENT
Start: 2024-02-29 | End: 2024-03-05

## 2024-02-29 ASSESSMENT — ANXIETY QUESTIONNAIRES
GAD7 TOTAL SCORE: 2
8. IF YOU CHECKED OFF ANY PROBLEMS, HOW DIFFICULT HAVE THESE MADE IT FOR YOU TO DO YOUR WORK, TAKE CARE OF THINGS AT HOME, OR GET ALONG WITH OTHER PEOPLE?: SOMEWHAT DIFFICULT
GAD7 TOTAL SCORE: 2
7. FEELING AFRAID AS IF SOMETHING AWFUL MIGHT HAPPEN: NOT AT ALL

## 2024-02-29 ASSESSMENT — PATIENT HEALTH QUESTIONNAIRE - PHQ9
SUM OF ALL RESPONSES TO PHQ QUESTIONS 1-9: 5
10. IF YOU CHECKED OFF ANY PROBLEMS, HOW DIFFICULT HAVE THESE PROBLEMS MADE IT FOR YOU TO DO YOUR WORK, TAKE CARE OF THINGS AT HOME, OR GET ALONG WITH OTHER PEOPLE: NOT DIFFICULT AT ALL

## 2024-02-29 NOTE — PROGRESS NOTES
Assessment & Plan   Dementia, unspecified dementia severity, unspecified dementia type, unspecified whether behavioral, psychotic, or mood disturbance or anxiety (H)  Ongoing symptoms, sees neurologist and medications.  Has good support of family and has help coming into the home as well  - Vitamin B12    Balance problems  General weakness  Arthritis of knee  Bilateral knee arthritis, could be contributing to some of her balance issues and weakness.  No substantial leg edema.  Has had previous steroid injection that could be repeated if needed.  Home physical therapy ordered and continue with walker for balance avoid falls.  - TSH with free T4 reflex  - Comprehensive metabolic panel (BMP + Alb, Alk Phos, ALT, AST, Total. Bili, TP)  - Home Care Referral    Paroxysmal atrial fibrillation (H)  In sinus rhythm currently, and blood thinner recently discontinued due to fall risk and seems reasonable especially considering she is in sinus rhythm.    Weight gain  Increased weight over the last couple years so this could be related to her Namenda and Seroquel.   reports she has a good appetite recommended eating healthy snacks such as vegetables and fruit and avoid excessive carbohydrates and sweets activity is difficult due to her balance but getting up and moving as much as possible.  - TSH with free T4 reflex    Vitamin B12 deficiency (non anemic)  Known history and they like to get the injections at home with one of the nurses that comes into the house.  Will send a prescription to the pharmacy.  - cyanocobalamin (CYANOCOBALAMIN) 1000 MCG/ML injection  Dispense: 1 mL; Refill: 11      No follow-ups on file.   Follow-up Visit   Expected date:  May 29, 2024 (Approximate)      Follow Up Appointment Details:     Follow-up with whom?: Me    Follow-Up for what?: Medicare Wellness    Any Additional Chronic Condition Management?: Hyperlipidemia    Welcome or Annual?: Annual Wellness    How?: In Person    Is this an  "as-needed follow-up?: No                         Chris Headley MD      [address] [address]  [email or web address]   Office: [phone]       Jacob Santos is a 78 year old, presenting for the following health issues:  Establish Care (Would like orders for physical therapy), Leg Swelling (Bilateral leg swelling above knees that is worse at night), and Weight Problem (Weight gain)    History of Present Illness       Reason for visit:  Get physical therapy    She eats 4 or more servings of fruits and vegetables daily.She consumes 1 sweetened beverage(s) daily.She exercises with enough effort to increase her heart rate 9 or less minutes per day.  She exercises with enough effort to increase her heart rate 3 or less days per week.   She is taking medications regularly.     Leg Swelling x couple months. ? Knee swelling too.  They would like PT to improve her walking - she can walk with a walker.  Discuss weight gain      Objective    BP 98/60   Pulse 104   Temp 97.8  F (36.6  C) (Tympanic)   Resp 18   Ht 1.575 m (5' 2\")   Wt 75.3 kg (165 lb 14.4 oz)   LMP  (LMP Unknown)   SpO2 96%   BMI 30.34 kg/m    Body mass index is 30.34 kg/m .  Physical Exam   GENERAL: alert and no distress oriented to person only,  answered most questions  HENT: ear canals and TM's normal, nose and mouth without ulcers or lesions  NECK: no adenopathy, no asymmetry, masses, or scars  RESP: lungs clear to auscultation - no rales, rhonchi or wheezes  CV: regular rate and rhythm, normal S1 S2, no S3 or S4, no murmur, click or rub, no peripheral edema  ABDOMEN: soft, nontender, no hepatosplenomegaly, no masses and bowel sounds normal  MS: Knees mild medial joint line tenderness bilaterally and mild effusion bilaterally otherwise no gross musculoskeletal defects noted, trace bilateral ankle edema  SKIN: no suspicious lesions or rashes  NEURO: Normal strength and tone, mentation " intact and speech normal  BACK: no CVA tenderness, no paralumbar tenderness  PSYCH: mentation appears normal, affect normal/bright          Signed Electronically by: Heriberto Headley MD

## 2024-03-01 LAB
ALBUMIN SERPL BCG-MCNC: 4 G/DL (ref 3.5–5.2)
ALP SERPL-CCNC: 60 U/L (ref 40–150)
ALT SERPL W P-5'-P-CCNC: 20 U/L (ref 0–50)
ANION GAP SERPL CALCULATED.3IONS-SCNC: 11 MMOL/L (ref 7–15)
AST SERPL W P-5'-P-CCNC: 23 U/L (ref 0–45)
BILIRUB SERPL-MCNC: 0.3 MG/DL
BUN SERPL-MCNC: 18.1 MG/DL (ref 8–23)
CALCIUM SERPL-MCNC: 9.2 MG/DL (ref 8.8–10.2)
CHLORIDE SERPL-SCNC: 107 MMOL/L (ref 98–107)
CREAT SERPL-MCNC: 1.08 MG/DL (ref 0.51–0.95)
DEPRECATED HCO3 PLAS-SCNC: 26 MMOL/L (ref 22–29)
EGFRCR SERPLBLD CKD-EPI 2021: 52 ML/MIN/1.73M2
GLUCOSE SERPL-MCNC: 84 MG/DL (ref 70–99)
POTASSIUM SERPL-SCNC: 4.8 MMOL/L (ref 3.4–5.3)
PROT SERPL-MCNC: 6.5 G/DL (ref 6.4–8.3)
SODIUM SERPL-SCNC: 144 MMOL/L (ref 135–145)
TSH SERPL DL<=0.005 MIU/L-ACNC: 2.11 UIU/ML (ref 0.3–4.2)
VIT B12 SERPL-MCNC: 590 PG/ML (ref 232–1245)

## 2024-03-01 NOTE — RESULT ENCOUNTER NOTE
Dear Soren,    Here is a summary of your recent test results:  -Liver and gallbladder tests (ALT,AST, Alk phos,bilirubin) are normal.  -Kidney function (GFR) is slightly decreased.  ADVISE: Staying well-hydrated, and rechecking this in 6-12 months  -Sodium is normal.  -Potassium is normal.  -Calcium is normal.  -Glucose (diabetic screening test) is normal.  -TSH (thyroid stimulating hormone) level is normal which indicates normal thyroid function.  -Normal B12 level.     For additional lab test information, www.[employer].com is a very good reference.           Thank you very much for trusting me and [facility].     Have a peaceful day.    Healthy regards,  Chris Headley MD

## 2024-03-05 ENCOUNTER — TELEPHONE (OUTPATIENT)
Dept: FAMILY MEDICINE | Facility: CLINIC | Age: 79
End: 2024-03-05
Payer: COMMERCIAL

## 2024-03-05 DIAGNOSIS — E53.8 VITAMIN B12 DEFICIENCY (NON ANEMIC): ICD-10-CM

## 2024-03-05 RX ORDER — CYANOCOBALAMIN 1000 UG/ML
1 INJECTION, SOLUTION INTRAMUSCULAR; SUBCUTANEOUS
Qty: 1 ML | Refills: 11 | Status: SHIPPED | OUTPATIENT
Start: 2024-03-05

## 2024-03-05 NOTE — TELEPHONE ENCOUNTER
Patient's  is calling to report that Willis had a system glitch and did not receive cyanocobalamin rx and syringes. Patient is requesting b12 and syringes to be resent to [facility] pharmacy. Please advise.    Patient's  reports that Soren has home care, and the nurses that come will do the b12 injections. Future b12 shots cancelled at clinic.

## 2024-03-07 ENCOUNTER — TELEPHONE (OUTPATIENT)
Dept: FAMILY MEDICINE | Facility: CLINIC | Age: 79
End: 2024-03-07
Payer: COMMERCIAL

## 2024-03-07 NOTE — TELEPHONE ENCOUNTER
Karl (patient) is calling back # given on referral to get himself scheduled.  Stated they called him but did not give him a time.  Advised to call back and ask time and talk to them about confirming he is on the schedule.  Patient kept saying that the rehab facility was saying that they only do neuropathy and this not his issue.  He was okay to call them back and advised to call us back if there are any issues.    Desire GALEANO

## 2024-03-07 NOTE — TELEPHONE ENCOUNTER
Patient Returning Call    Reason for call:  Soren was seen by pcp on 2/29 and pt was referred to see a home physical therapy once a week. Physical therapy has scheduled an appt with pt for 3/14, and were supposed to call them back for the time and they have not heard back since. Karl was hoping to get the phone number from pcp so they could reach out to them.    Information relayed to patient:  Someone from the care team will give you a call back when they receive this message.    Patient has additional questions:  No      Could we send this information to you in [facility] or would you prefer to receive a phone call?:   Patient would prefer a phone call   Okay to leave a detailed message?: Yes at Home number on file [phone] (home)

## 2024-03-14 ENCOUNTER — TELEPHONE (OUTPATIENT)
Dept: FAMILY MEDICINE | Facility: CLINIC | Age: 79
End: 2024-03-14
Payer: COMMERCIAL

## 2024-03-14 ENCOUNTER — MEDICAL CORRESPONDENCE (OUTPATIENT)
Dept: HEALTH INFORMATION MANAGEMENT | Facility: CLINIC | Age: 79
End: 2024-03-14

## 2024-03-14 NOTE — TELEPHONE ENCOUNTER
Home Care is calling regarding an established patient with [facility].       Requesting orders from: Heriberto Headley  Provider is following patient: Yes  Is this a 60-day recertification request?  No    Orders Requested    Physical Therapy  Request for initial certification (first set of orders)   Frequency: 6 visits over the next 8 weeks.     Add-on for OT eval.     Call back number [phone] - Quentin, PT, [facility]    Information was gathered and will be sent to provider for review.  RN will contact Home Care with information after provider review.  Confirmed ok to leave a detailed message with call back.  Contact information confirmed and updated as needed.    REMI CODY RN

## 2024-03-14 NOTE — TELEPHONE ENCOUNTER
OK for requested orders:       Physical Therapy  Request for initial certification (first set of orders)   Frequency: 6 visits over the next 8 weeks.      Add-on for OT eval.

## 2024-03-20 ENCOUNTER — PATIENT OUTREACH (OUTPATIENT)
Dept: CARE COORDINATION | Facility: CLINIC | Age: 79
End: 2024-03-20

## 2024-04-03 ENCOUNTER — PATIENT OUTREACH (OUTPATIENT)
Dept: CARE COORDINATION | Facility: CLINIC | Age: 79
End: 2024-04-03
Payer: COMMERCIAL

## 2024-04-05 ENCOUNTER — TRANSFERRED RECORDS (OUTPATIENT)
Dept: HEALTH INFORMATION MANAGEMENT | Facility: CLINIC | Age: 79
End: 2024-04-05
Payer: COMMERCIAL

## 2024-04-05 ENCOUNTER — TELEPHONE (OUTPATIENT)
Dept: FAMILY MEDICINE | Facility: CLINIC | Age: 79
End: 2024-04-05
Payer: COMMERCIAL

## 2024-04-05 NOTE — TELEPHONE ENCOUNTER
Home Health Care      Reason for call:  [facility] - Order #[id number] - Cert Period; 03/14/24 - 5/12/24 - PT and OT    Orders are needed for this patient.  PT: 1wk5, [id number]  OT: Effective 04/14/24 1wk1    Pt Provider: Dr. Headley    Phone Number Homecare Nurse can be reached at: Fax       Could we send this information to you in [employer] or would you prefer to receive a phone call?:   na    Put in providers In box    Included letter and med rec    Desire K

## 2024-04-08 DIAGNOSIS — Z53.9 DIAGNOSIS NOT YET DEFINED: Primary | ICD-10-CM

## 2024-04-08 PROCEDURE — G0180 MD CERTIFICATION HHA PATIENT: HCPCS | Performed by: FAMILY MEDICINE

## 2024-04-08 NOTE — TELEPHONE ENCOUNTER
Form signed by Dr Headley  And faxed to [phone]  Sent to [facility]  And filed in the [facility].

## 2024-04-23 ENCOUNTER — TRANSFERRED RECORDS (OUTPATIENT)
Dept: HEALTH INFORMATION MANAGEMENT | Facility: CLINIC | Age: 79
End: 2024-04-23
Payer: COMMERCIAL

## 2024-04-28 ENCOUNTER — APPOINTMENT (OUTPATIENT)
Dept: CT IMAGING | Facility: CLINIC | Age: 79
End: 2024-04-28
Attending: EMERGENCY MEDICINE
Payer: COMMERCIAL

## 2024-04-28 ENCOUNTER — HOSPITAL ENCOUNTER (EMERGENCY)
Facility: CLINIC | Age: 79
Discharge: HOME | End: 2024-04-28
Attending: EMERGENCY MEDICINE | Admitting: EMERGENCY MEDICINE
Payer: COMMERCIAL

## 2024-04-28 VITALS
BODY MASS INDEX: 30.55 KG/M2 | DIASTOLIC BLOOD PRESSURE: 69 MMHG | HEART RATE: 89 BPM | TEMPERATURE: 97.7 F | RESPIRATION RATE: 16 BRPM | SYSTOLIC BLOOD PRESSURE: 150 MMHG | OXYGEN SATURATION: 100 % | WEIGHT: 166 LBS | HEIGHT: 62 IN

## 2024-04-28 DIAGNOSIS — T88.7XXA MEDICATION SIDE EFFECTS: ICD-10-CM

## 2024-04-28 DIAGNOSIS — E04.1 THYROID NODULE: ICD-10-CM

## 2024-04-28 LAB
ALBUMIN UR-MCNC: NEGATIVE MG/DL
ANION GAP SERPL CALCULATED.3IONS-SCNC: 8 MMOL/L (ref 7–15)
APPEARANCE UR: CLEAR
BACTERIA #/AREA URNS HPF: ABNORMAL /HPF
BASOPHILS # BLD AUTO: 0 10E3/UL (ref 0–0.2)
BASOPHILS NFR BLD AUTO: 0 %
BILIRUB UR QL STRIP: NEGATIVE
BUN SERPL-MCNC: 18.8 MG/DL (ref 8–23)
CALCIUM SERPL-MCNC: 9 MG/DL (ref 8.8–10.2)
CHLORIDE SERPL-SCNC: 108 MMOL/L (ref 98–107)
COLOR UR AUTO: ABNORMAL
CREAT SERPL-MCNC: 1.01 MG/DL (ref 0.51–0.95)
DEPRECATED HCO3 PLAS-SCNC: 24 MMOL/L (ref 22–29)
EGFRCR SERPLBLD CKD-EPI 2021: 56 ML/MIN/1.73M2
EOSINOPHIL # BLD AUTO: 0.3 10E3/UL (ref 0–0.7)
EOSINOPHIL NFR BLD AUTO: 4 %
ERYTHROCYTE [DISTWIDTH] IN BLOOD BY AUTOMATED COUNT: 12.9 % (ref 10–15)
GLUCOSE BLDC GLUCOMTR-MCNC: 116 MG/DL (ref 70–99)
GLUCOSE SERPL-MCNC: 109 MG/DL (ref 70–99)
GLUCOSE UR STRIP-MCNC: NEGATIVE MG/DL
HCT VFR BLD AUTO: 37.2 % (ref 35–47)
HGB BLD-MCNC: 12.3 G/DL (ref 11.7–15.7)
HGB UR QL STRIP: NEGATIVE
HOLD SPECIMEN: NORMAL
HOLD SPECIMEN: NORMAL
IMM GRANULOCYTES # BLD: 0 10E3/UL
IMM GRANULOCYTES NFR BLD: 0 %
KETONES UR STRIP-MCNC: NEGATIVE MG/DL
LEUKOCYTE ESTERASE UR QL STRIP: NEGATIVE
LYMPHOCYTES # BLD AUTO: 1.1 10E3/UL (ref 0.8–5.3)
LYMPHOCYTES NFR BLD AUTO: 18 %
MCH RBC QN AUTO: 29.4 PG (ref 26.5–33)
MCHC RBC AUTO-ENTMCNC: 33.1 G/DL (ref 31.5–36.5)
MCV RBC AUTO: 89 FL (ref 78–100)
MONOCYTES # BLD AUTO: 0.6 10E3/UL (ref 0–1.3)
MONOCYTES NFR BLD AUTO: 10 %
MUCOUS THREADS #/AREA URNS LPF: PRESENT /LPF
NEUTROPHILS # BLD AUTO: 4.1 10E3/UL (ref 1.6–8.3)
NEUTROPHILS NFR BLD AUTO: 68 %
NITRATE UR QL: NEGATIVE
NRBC # BLD AUTO: 0 10E3/UL
NRBC BLD AUTO-RTO: 0 /100
PH UR STRIP: 8.5 [PH] (ref 5–7)
PLATELET # BLD AUTO: 265 10E3/UL (ref 150–450)
POTASSIUM SERPL-SCNC: 4.4 MMOL/L (ref 3.4–5.3)
RBC # BLD AUTO: 4.18 10E6/UL (ref 3.8–5.2)
RBC URINE: 0 /HPF
SODIUM SERPL-SCNC: 140 MMOL/L (ref 135–145)
SP GR UR STRIP: 1.02 (ref 1–1.03)
SQUAMOUS EPITHELIAL: <1 /HPF
TSH SERPL DL<=0.005 MIU/L-ACNC: 1.8 UIU/ML (ref 0.3–4.2)
UROBILINOGEN UR STRIP-MCNC: NORMAL MG/DL
WBC # BLD AUTO: 6 10E3/UL (ref 4–11)
WBC URINE: <1 /HPF

## 2024-04-28 PROCEDURE — 80048 BASIC METABOLIC PNL TOTAL CA: CPT | Performed by: EMERGENCY MEDICINE

## 2024-04-28 PROCEDURE — 96361 HYDRATE IV INFUSION ADD-ON: CPT

## 2024-04-28 PROCEDURE — 36415 COLL VENOUS BLD VENIPUNCTURE: CPT | Performed by: EMERGENCY MEDICINE

## 2024-04-28 PROCEDURE — 250N000011 HC RX IP 250 OP 636: Performed by: EMERGENCY MEDICINE

## 2024-04-28 PROCEDURE — 93005 ELECTROCARDIOGRAM TRACING: CPT

## 2024-04-28 PROCEDURE — 250N000009 HC RX 250: Performed by: EMERGENCY MEDICINE

## 2024-04-28 PROCEDURE — 258N000003 HC RX IP 258 OP 636: Performed by: EMERGENCY MEDICINE

## 2024-04-28 PROCEDURE — 96360 HYDRATION IV INFUSION INIT: CPT | Mod: 59

## 2024-04-28 PROCEDURE — 70491 CT SOFT TISSUE NECK W/DYE: CPT

## 2024-04-28 PROCEDURE — 84443 ASSAY THYROID STIM HORMONE: CPT | Performed by: EMERGENCY MEDICINE

## 2024-04-28 PROCEDURE — 81003 URINALYSIS AUTO W/O SCOPE: CPT | Performed by: EMERGENCY MEDICINE

## 2024-04-28 PROCEDURE — 82962 GLUCOSE BLOOD TEST: CPT

## 2024-04-28 PROCEDURE — 87086 URINE CULTURE/COLONY COUNT: CPT | Performed by: EMERGENCY MEDICINE

## 2024-04-28 PROCEDURE — 99285 EMERGENCY DEPT VISIT HI MDM: CPT | Mod: 25

## 2024-04-28 PROCEDURE — 85004 AUTOMATED DIFF WBC COUNT: CPT | Performed by: EMERGENCY MEDICINE

## 2024-04-28 RX ORDER — SODIUM CHLORIDE 9 MG/ML
INJECTION, SOLUTION INTRAVENOUS CONTINUOUS
Status: DISCONTINUED | OUTPATIENT
Start: 2024-04-28 | End: 2024-04-28 | Stop reason: HOSPADM

## 2024-04-28 RX ORDER — IOPAMIDOL 755 MG/ML
500 INJECTION, SOLUTION INTRAVASCULAR ONCE
Status: COMPLETED | OUTPATIENT
Start: 2024-04-28 | End: 2024-04-28

## 2024-04-28 RX ADMIN — SODIUM CHLORIDE 65 ML: 9 INJECTION, SOLUTION INTRAVENOUS at 12:42

## 2024-04-28 RX ADMIN — IOPAMIDOL 90 ML: 755 INJECTION, SOLUTION INTRAVENOUS at 12:42

## 2024-04-28 RX ADMIN — SODIUM CHLORIDE: 9 INJECTION, SOLUTION INTRAVENOUS at 11:54

## 2024-04-28 ASSESSMENT — ACTIVITIES OF DAILY LIVING (ADL)
ADLS_ACUITY_SCORE: 39

## 2024-04-28 ASSESSMENT — COLUMBIA-SUICIDE SEVERITY RATING SCALE - C-SSRS: IS THE PATIENT NOT ABLE TO COMPLETE C-SSRS: UNABLE TO VERBALIZE

## 2024-04-28 NOTE — ED TRIAGE NOTES
Pt arrives from home with  and a caregiver.  Per  pt awoke this morning but slept longer than usual and appears to be more lethargic than usual.  He states that he also noted some swelling on the left side of the neck.  He states that the caregivers who come in suggested that she would likely need a CT scan or a UA/UC.  Pt is unable to answer questions in triage, all information is given by      Triage Assessment (Adult)       Row Name 04/28/24 1055          Triage Assessment    Airway WDL WDL        Respiratory WDL    Respiratory WDL WDL        Cardiac WDL    Cardiac WDL WDL

## 2024-04-28 NOTE — ED PROVIDER NOTES
History     Chief Complaint:  Altered Mental Status and Lymphadenopathy     The history is provided by the spouse.      Danielle Bryan is a 79 year old female with history of a-fib, ischemic heat disease, TIA, hyperlipidemia, and dementia who presents to the ED with her  and caregiver for evaluation of altered mental status and lymphadenopathy.  reports that the patient normally sleeps from 8 pm to 8 am, but this morning she seemed fatigued and slept longer. Once he got the patient up and out of bed for breakfast, the patient had little to no appetite. He states this is also unusual for the patient as she normally eats a big breakfast.  also noticed the left side of her neck became swollen and sore today, and the patient has been urinating more than normal. Denies fever, cough, congestion, abdominal pain, vomiting, or diarrhea. Patient uses a walker to ambulate. Denies recent falls or injuries. Of note, patient is no longer on coumadin or other blood thinners.    The patient's  does note that she had her gabapentin dose increased 4 days ago.     Independent Historian:   The patient's  provides above history.  A friend is also present and contributes to the history.    Review of External Notes:   Discharge summary reviewed from 2023 when the patient had her Coumadin stopped.    Medications:    Aspirin 81 mg  Colace  Aricept  Lexapro  Gabapentin  Namenda  Seroquel  Senokot  Lopressor  Pravachol   Zyrtec   Zocor   Prempro     Past Medical History:    Alopecia areata  Atrial fibrillation  Cerebral infarction  Dysphagia  Hyperlipidemia  PONV  Recurrent UTI  Vitamin D deficiency   Allergic rhinitis  Transient cerebral ischemia  Vitamin B12 deficiency  Osteoporosis  Dupuytren's contracture  Esophageal motility disorder  Dementia  Arthritis  COVID-19  TIA  Chronic ischemic heart disease  Osteoarthritis  Angina pectoris    Past Surgical History:     section  "x2  Colonoscopy x2  Bilateral cataract surgery  Dilation and curettage  Thyroidectomy, partial right side  Right thumb, middle and ring trigger finger release  EGD x3    Physical Exam   Patient Vitals for the past 24 hrs:   BP Temp Temp src Pulse Resp SpO2 Height Weight   04/28/24 1424 (!) 150/69 -- -- 89 -- 100 % -- --   04/28/24 1359 (!) 167/79 -- -- 56 -- -- -- --   04/28/24 1327 (!) 163/85 -- -- -- -- -- -- --   04/28/24 1257 (!) 159/78 -- -- -- -- -- -- --   04/28/24 1154 (!) 142/77 -- -- 61 -- 100 % -- --   04/28/24 1114 -- -- -- -- -- -- 1.575 m (5' 2\") 75.3 kg (166 lb)   04/28/24 1109 (!) 108/98 -- -- 67 -- 99 % -- --   04/28/24 1100 119/69 97.7  F (36.5  C) Temporal 67 16 96 % -- --      Physical Exam  Constitutional:       General: She is not in acute distress.     Appearance: Normal appearance. She is not diaphoretic.   HENT:      Head: Atraumatic.      Right Ear: Tympanic membrane, ear canal and external ear normal.      Left Ear: Tympanic membrane, ear canal and external ear normal.      Mouth/Throat:      Mouth: Mucous membranes are dry.   Eyes:      General: No scleral icterus.     Conjunctiva/sclera: Conjunctivae normal.   Neck:      Comments: There is fullness over the anterior neck.  No erythema or induration.  No elevation of the floor the mouth.  No airway compromise.  Cardiovascular:      Rate and Rhythm: Normal rate and regular rhythm.      Heart sounds: Normal heart sounds.   Pulmonary:      Effort: No respiratory distress.      Breath sounds: Normal breath sounds.   Abdominal:      General: Abdomen is flat. There is no distension.      Tenderness: There is no abdominal tenderness.   Skin:     General: Skin is warm.      Capillary Refill: Capillary refill takes less than 2 seconds.      Findings: No rash.   Neurological:      Mental Status: She is alert.      Comments: Answers questions.  Disoriented.  Grossly moves all extremities.  No facial asymmetry.   Psychiatric:      Comments: Flat " affect.  Cooperative.           Emergency Department Course   ECG  ECG taken at 1132, ECG read at 1136  Normal sinus rhythm  Minimal voltage criteria for LVH, may be normal variant (R in aVL)   A-fib resolved as compared to prior, dated 1/20/19.  Rate 62 bpm. CA interval 138 ms. QRS duration 72 ms. QT/QTc 440/446 ms. P-R-T axes 19 -5 25.     Imaging:  Soft tissue neck CT w contrast   Final Result   IMPRESSION:    1.  No CT evidence of discrete mucosal mass lesion in the aerodigestive tract.   2.  No cervical lymphadenopathy.   3.  Multinodular right lobe of the thyroid gland.   4.  Moderate size chronic mucus retention cyst in the right maxillary sinus. Mild mucosal thickening of the ethmoid air cells.      Discussed the findings with Dr LOPEZ at 1:26 PM, 04/28/2024.         Laboratory:  Labs Ordered and Resulted from Time of ED Arrival to Time of ED Departure   BASIC METABOLIC PANEL - Abnormal       Result Value    Sodium 140      Potassium 4.4      Chloride 108 (*)     Carbon Dioxide (CO2) 24      Anion Gap 8      Urea Nitrogen 18.8      Creatinine 1.01 (*)     GFR Estimate 56 (*)     Calcium 9.0      Glucose 109 (*)    ROUTINE UA WITH MICROSCOPIC REFLEX TO CULTURE - Abnormal    Color Urine Straw      Appearance Urine Clear      Glucose Urine Negative      Bilirubin Urine Negative      Ketones Urine Negative      Specific Gravity Urine 1.017      Blood Urine Negative      pH Urine 8.5 (*)     Protein Albumin Urine Negative      Urobilinogen Urine Normal      Nitrite Urine Negative      Leukocyte Esterase Urine Negative      Bacteria Urine Few (*)     Mucus Urine Present (*)     RBC Urine 0      WBC Urine <1      Squamous Epithelials Urine <1     GLUCOSE BY METER - Abnormal    GLUCOSE BY METER POCT 116 (*)    TSH WITH FREE T4 REFLEX - Normal    TSH 1.80     CBC WITH PLATELETS AND DIFFERENTIAL    WBC Count 6.0      RBC Count 4.18      Hemoglobin 12.3      Hematocrit 37.2      MCV 89      MCH 29.4      MCHC 33.1       RDW 12.9      Platelet Count 265      % Neutrophils 68      % Lymphocytes 18      % Monocytes 10      % Eosinophils 4      % Basophils 0      % Immature Granulocytes 0      NRBCs per 100 WBC 0      Absolute Neutrophils 4.1      Absolute Lymphocytes 1.1      Absolute Monocytes 0.6      Absolute Eosinophils 0.3      Absolute Basophils 0.0      Absolute Immature Granulocytes 0.0      Absolute NRBCs 0.0     URINE CULTURE        Emergency Department Course & Assessments:  Interventions:  Medications   CT scan flush (65 mLs Intravenous $Given 4/28/24 1242)   iopamidol (ISOVUE-370) solution 500 mL (90 mLs Intravenous $Given 4/28/24 1242)      Assessments/Consultations/Discussion of Management or Tests:  ED Course as of 04/28/24 1658   Sun Apr 28, 2024   1110 I obtained history and examined the patient as noted above.    1326 I spoke with the radiologist regarding the patient's CT results.       Disposition:  The patient was discharged.     Impression & Plan       Medical Decision Making:  This patient is an 79-year-old who presents to the emergency department for evaluation of somnolence.  Her caretaker also noted some fullness in the anterior neck.  This is noted on exam but does not appear to be an infection.  CT appears to demonstrate a thyroid nodule.  Function of the thyroid is normal.  This was discussed with the patient and family.    The patient's  notes that she had her dose of gabapentin increased just 4 days ago given that she was slightly aggressive in the setting of her dementia.  This likely is contributing to her somnolence.  They will continue to follow with their primary care physician to review the medication regimen.  She may require longer to acclimate to a new dose.      Diagnosis:    ICD-10-CM    1. Medication side effects  T88.7XXA       2. Thyroid nodule  E04.1               Scribe Disclosure:  CUATE BRADLEY, am serving as a scribe at 11:08 AM on 4/28/2024 to document services  personally performed by Sukh Peng MD based on my observations and the provider's statements to me.     4/28/2024   Sukh Peng MD McRoberts, Sean Edward, MD  04/28/24 [id number]

## 2024-04-29 LAB
ATRIAL RATE - MUSE: 62 BPM
DIASTOLIC BLOOD PRESSURE - MUSE: NORMAL MMHG
INTERPRETATION ECG - MUSE: NORMAL
P AXIS - MUSE: 19 DEGREES
PR INTERVAL - MUSE: 138 MS
QRS DURATION - MUSE: 72 MS
QT - MUSE: 440 MS
QTC - MUSE: 446 MS
R AXIS - MUSE: -5 DEGREES
SYSTOLIC BLOOD PRESSURE - MUSE: NORMAL MMHG
T AXIS - MUSE: 25 DEGREES
VENTRICULAR RATE- MUSE: 62 BPM

## 2024-04-30 LAB — BACTERIA UR CULT: NO GROWTH

## 2024-05-02 NOTE — PROGRESS NOTES
"ASSESSMENT & PLAN  Patient Instructions     1. Primary osteoarthritis of right knee    2. Primary osteoarthritis of left knee      -Patient is following up for chronic bilateral knee pain due to arthritis  -Patient tolerated bilateral knee intra-articular cortisone injections today without complications.  Patient was given postprocedure instructions  -Patient will follow-up when pain returns  -Call direct clinic number [[phone]] at any time with questions or concerns.    Albert Yeo MD [facility]  [facility]        -----    SUBJECTIVE:  Danielle Bryan is a 79 year old female who is seen in follow-up for bilateral knee pain. They were last seen for this 01/11/23.     Since their last visit reports worsening pain. They indicate that their current pain level is 4/10. They have tried corticosteroid injection (most recent date: 12/06/22) that provided  1 years(s) and 2 months of relief, MRI 01/25/23, and voltaren gel, physical therapy (6 sessions), and a walker.    The patient is seen with their  and her PCA.    Patient's past medical, surgical, social, and family histories were reviewed today and no changes are noted.    REVIEW OF SYSTEMS:  Constitutional: NEGATIVE for fever, chills, change in weight  Skin: NEGATIVE for worrisome rashes, moles or lesions  GI/: NEGATIVE for bowel or bladder changes  Neuro: NEGATIVE for weakness, dizziness or paresthesias    OBJECTIVE:  /58   Ht 1.575 m (5' 2\")   Wt 74.8 kg (165 lb)   LMP  (LMP Unknown)   BMI 30.18 kg/m     General: healthy, alert and in no distress  HEENT: no scleral icterus or conjunctival erythema  Skin: no suspicious lesions or rash. No jaundice.  CV: regular rhythm by palpation, no pedal edema  Resp: normal respiratory effort without conversational dyspnea   Psych: normal mood and affect  Gait: slow unsteady gait with walker  Neuro: normal light touch sensory exam of the extremities.  "   MSK:  BILATERAL KNEE  Inspection:    Normal alignment; no edema, erythema, or ecchymosis present  Palpation:    Tender about the medial joint line. Remainder of bony and ligamentous landmarks are nontender.    Trace effusion is present    Patellofemoral crepitus is Present  Range of Motion:     00 extension to 1100 flexion  Strength:    Quadriceps grossly intact    Extensor mechanism intact  Special Tests:    Positive: none    Negative: MCL/valgus stress (0 & 30 deg), LCL/varus stress (0 & 30 deg), Lachman's, anterior drawer, posterior drawer, Cristobal's    Independent visualization of the below image:    Large Joint Injection/Arthocentesis: bilateral knee    Date/Time: 5/3/2024 9:20 AM    Performed by: Yeo, Albert, MD  Authorized by: Yeo, Albert, MD    Indications:  Pain and osteoarthritis  Needle Size:  22 G  Guidance: ultrasound    Approach:  Anterolateral  Location:  Knee  Laterality:  Bilateral      Medications (Right):  40 mg methylPREDNISolone 40 MG/ML; 4 mL ROPivacaine 5 MG/ML  Medications (Left):  40 mg methylPREDNISolone 40 MG/ML; 4 mL ROPivacaine 5 MG/ML  Outcome:  Tolerated well, no immediate complications  Procedure discussed: discussed risks, benefits, and alternatives    Consent Given by:  Patient  Timeout: timeout called immediately prior to procedure    Prep: patient was prepped and draped in usual sterile fashion     Ultrasound was used to ensure safe and accurate needle placement and injection. Ultrasound images of the procedure were permanently stored.            Albert Yeo MD, [facility]

## 2024-05-03 ENCOUNTER — OFFICE VISIT (OUTPATIENT)
Dept: ORTHOPEDICS | Facility: CLINIC | Age: 79
End: 2024-05-03
Payer: COMMERCIAL

## 2024-05-03 VITALS
HEIGHT: 62 IN | WEIGHT: 165 LBS | DIASTOLIC BLOOD PRESSURE: 58 MMHG | BODY MASS INDEX: 30.36 KG/M2 | SYSTOLIC BLOOD PRESSURE: 102 MMHG

## 2024-05-03 DIAGNOSIS — M17.11 PRIMARY OSTEOARTHRITIS OF RIGHT KNEE: Primary | ICD-10-CM

## 2024-05-03 DIAGNOSIS — M17.12 PRIMARY OSTEOARTHRITIS OF LEFT KNEE: ICD-10-CM

## 2024-05-03 PROCEDURE — 20611 DRAIN/INJ JOINT/BURSA W/US: CPT | Mod: 50 | Performed by: FAMILY MEDICINE

## 2024-05-03 RX ORDER — ROPIVACAINE HYDROCHLORIDE 5 MG/ML
4 INJECTION, SOLUTION EPIDURAL; INFILTRATION; PERINEURAL
Status: SHIPPED | OUTPATIENT
Start: 2024-05-03

## 2024-05-03 RX ORDER — METHYLPREDNISOLONE ACETATE 40 MG/ML
40 INJECTION, SUSPENSION INTRA-ARTICULAR; INTRALESIONAL; INTRAMUSCULAR; SOFT TISSUE
Status: SHIPPED | OUTPATIENT
Start: 2024-05-03

## 2024-05-03 RX ADMIN — METHYLPREDNISOLONE ACETATE 40 MG: 40 INJECTION, SUSPENSION INTRA-ARTICULAR; INTRALESIONAL; INTRAMUSCULAR; SOFT TISSUE at 09:20

## 2024-05-03 RX ADMIN — ROPIVACAINE HYDROCHLORIDE 4 ML: 5 INJECTION, SOLUTION EPIDURAL; INFILTRATION; PERINEURAL at 09:20

## 2024-05-03 NOTE — LETTER
"    5/3/2024         RE: Danielle Bryan  [address]  [place] [address]        Dear Colleague,    Thank you for referring your patient, Danielle Bryan, to the [facility] [place]. Please see a copy of my visit note below.    ASSESSMENT & PLAN  Patient Instructions     1. Primary osteoarthritis of right knee    2. Primary osteoarthritis of left knee      -Patient is following up for chronic bilateral knee pain due to arthritis  -Patient tolerated bilateral knee intra-articular cortisone injections today without complications.  Patient was given postprocedure instructions  -Patient will follow-up when pain returns  -Call direct clinic number [[phone]] at any time with questions or concerns.    Albert Yeo MD [facility] and Sports Medicine  [facility] [place]        -----    SUBJECTIVE:  Danielle Bryan is a 79 year old female who is seen in follow-up for bilateral knee pain. They were last seen for this 01/11/23.     Since their last visit reports worsening pain. They indicate that their current pain level is 4/10. They have tried corticosteroid injection (most recent date: 12/06/22) that provided  1 years(s) and 2 months of relief, MRI 01/25/23, and voltaren gel, physical therapy (6 sessions), and a walker.    The patient is seen with their  and her PCA.    Patient's past medical, surgical, social, and family histories were reviewed today and no changes are noted.    REVIEW OF SYSTEMS:  Constitutional: NEGATIVE for fever, chills, change in weight  Skin: NEGATIVE for worrisome rashes, moles or lesions  GI/: NEGATIVE for bowel or bladder changes  Neuro: NEGATIVE for weakness, dizziness or paresthesias    OBJECTIVE:  /58   Ht 1.575 m (5' 2\")   Wt 74.8 kg (165 lb)   LMP  (LMP Unknown)   BMI 30.18 kg/m     General: healthy, alert and in no distress  HEENT: no scleral icterus or conjunctival erythema  Skin: no suspicious " lesions or rash. No jaundice.  CV: regular rhythm by palpation, no pedal edema  Resp: normal respiratory effort without conversational dyspnea   Psych: normal mood and affect  Gait: slow unsteady gait with walker  Neuro: normal light touch sensory exam of the extremities.    MSK:  BILATERAL KNEE  Inspection:    Normal alignment; no edema, erythema, or ecchymosis present  Palpation:    Tender about the medial joint line. Remainder of bony and ligamentous landmarks are nontender.    Trace effusion is present    Patellofemoral crepitus is Present  Range of Motion:     00 extension to 1100 flexion  Strength:    Quadriceps grossly intact    Extensor mechanism intact  Special Tests:    Positive: none    Negative: MCL/valgus stress (0 & 30 deg), LCL/varus stress (0 & 30 deg), Lachman's, anterior drawer, posterior drawer, Cristobal's    Independent visualization of the below image:    Large Joint Injection/Arthocentesis: bilateral knee    Date/Time: 5/3/2024 9:20 AM    Performed by: Yeo, Albert, MD  Authorized by: Yeo, Albert, MD    Indications:  Pain and osteoarthritis  Needle Size:  22 G  Guidance: ultrasound    Approach:  Anterolateral  Location:  Knee  Laterality:  Bilateral      Medications (Right):  40 mg methylPREDNISolone 40 MG/ML; 4 mL ROPivacaine 5 MG/ML  Medications (Left):  40 mg methylPREDNISolone 40 MG/ML; 4 mL ROPivacaine 5 MG/ML  Outcome:  Tolerated well, no immediate complications  Procedure discussed: discussed risks, benefits, and alternatives    Consent Given by:  Patient  Timeout: timeout called immediately prior to procedure    Prep: patient was prepped and draped in usual sterile fashion     Ultrasound was used to ensure safe and accurate needle placement and injection. Ultrasound images of the procedure were permanently stored.            Albert Yeo MD, [facility]      Again, thank you for allowing me to participate in the care of your patient.         Sincerely,        Albert Yeo, MD

## 2024-05-03 NOTE — PATIENT INSTRUCTIONS
1. Primary osteoarthritis of right knee    2. Primary osteoarthritis of left knee      -Patient is following up for chronic bilateral knee pain due to arthritis  -Patient tolerated bilateral knee intra-articular cortisone injections today without complications.  Patient was given postprocedure instructions  -Patient will follow-up when pain returns  -Call direct clinic number [[phone]] at any time with questions or concerns.    Albert Yeo MD [facility] Orthopedics and Sports Medicine  [facility] [place]

## 2024-05-13 ENCOUNTER — TELEPHONE (OUTPATIENT)
Dept: FAMILY MEDICINE | Facility: CLINIC | Age: 79
End: 2024-05-13
Payer: COMMERCIAL

## 2024-05-14 ENCOUNTER — E-VISIT (OUTPATIENT)
Dept: URGENT CARE | Facility: CLINIC | Age: 79
End: 2024-05-14
Payer: COMMERCIAL

## 2024-05-14 DIAGNOSIS — R30.0 DYSURIA: Primary | ICD-10-CM

## 2024-05-14 PROCEDURE — 99421 OL DIG E/M SVC 5-10 MIN: CPT | Performed by: PHYSICIAN ASSISTANT

## 2024-05-15 NOTE — PATIENT INSTRUCTIONS
Dear Danielle Bryan,     After reviewing your responses, I would like you to come in for a urine test to make sure we treat you correctly. This urine test is to evaluate you for a possible urinary tract infection, and should be scheduled for today or tomorrow. Schedule a Lab Only appointment here.     Lab appointments are not available at most locations on the weekends. If no Lab Only appointment is available, you should be seen in any of our convenient Walk-in or Urgent Care Centers, which can be found on our website here.     You will receive instructions with your results in [employer] once they are available.     If your symptoms worsen, you develop pain in your back or stomach, develop fevers, or are not improving in 5 days, please contact your primary care provider for an appointment or visit a Walk-in or Urgent Care Center to be seen.     Thanks again for choosing us as your health care partner,     [place]io Melvin PA-C

## 2024-05-16 ENCOUNTER — LAB (OUTPATIENT)
Dept: LAB | Facility: CLINIC | Age: 79
End: 2024-05-16
Payer: COMMERCIAL

## 2024-05-16 DIAGNOSIS — R30.0 DYSURIA: ICD-10-CM

## 2024-05-16 LAB
ALBUMIN UR-MCNC: NEGATIVE MG/DL
APPEARANCE UR: CLEAR
BILIRUB UR QL STRIP: NEGATIVE
COLOR UR AUTO: YELLOW
GLUCOSE UR STRIP-MCNC: NEGATIVE MG/DL
HGB UR QL STRIP: NEGATIVE
KETONES UR STRIP-MCNC: NEGATIVE MG/DL
LEUKOCYTE ESTERASE UR QL STRIP: NEGATIVE
NITRATE UR QL: NEGATIVE
PH UR STRIP: 7 [PH] (ref 5–7)
SP GR UR STRIP: 1.02 (ref 1–1.03)
UROBILINOGEN UR STRIP-ACNC: 0.2 E.U./DL

## 2024-05-16 PROCEDURE — 81003 URINALYSIS AUTO W/O SCOPE: CPT

## 2024-06-01 ENCOUNTER — HEALTH MAINTENANCE LETTER (OUTPATIENT)
Age: 79
End: 2024-06-01

## 2024-06-03 ENCOUNTER — OFFICE VISIT (OUTPATIENT)
Dept: FAMILY MEDICINE | Facility: CLINIC | Age: 79
End: 2024-06-03
Payer: COMMERCIAL

## 2024-06-03 VITALS
WEIGHT: 171.5 LBS | HEART RATE: 75 BPM | TEMPERATURE: 98.5 F | HEIGHT: 62 IN | BODY MASS INDEX: 31.56 KG/M2 | DIASTOLIC BLOOD PRESSURE: 56 MMHG | SYSTOLIC BLOOD PRESSURE: 100 MMHG | OXYGEN SATURATION: 97 % | RESPIRATION RATE: 16 BRPM

## 2024-06-03 DIAGNOSIS — R53.1 GENERAL WEAKNESS: Primary | ICD-10-CM

## 2024-06-03 LAB
ERYTHROCYTE [DISTWIDTH] IN BLOOD BY AUTOMATED COUNT: 13.1 % (ref 10–15)
HCT VFR BLD AUTO: 35.2 % (ref 35–47)
HGB BLD-MCNC: 11.3 G/DL (ref 11.7–15.7)
MCH RBC QN AUTO: 29.5 PG (ref 26.5–33)
MCHC RBC AUTO-ENTMCNC: 32.1 G/DL (ref 31.5–36.5)
MCV RBC AUTO: 92 FL (ref 78–100)
PLATELET # BLD AUTO: 283 10E3/UL (ref 150–450)
RBC # BLD AUTO: 3.83 10E6/UL (ref 3.8–5.2)
WBC # BLD AUTO: 7 10E3/UL (ref 4–11)

## 2024-06-03 PROCEDURE — 36415 COLL VENOUS BLD VENIPUNCTURE: CPT | Performed by: FAMILY MEDICINE

## 2024-06-03 PROCEDURE — 80053 COMPREHEN METABOLIC PANEL: CPT | Performed by: FAMILY MEDICINE

## 2024-06-03 PROCEDURE — 85027 COMPLETE CBC AUTOMATED: CPT | Performed by: FAMILY MEDICINE

## 2024-06-03 PROCEDURE — 99214 OFFICE O/P EST MOD 30 MIN: CPT | Performed by: FAMILY MEDICINE

## 2024-06-03 RX ORDER — GABAPENTIN 100 MG/1
100 CAPSULE ORAL DAILY
COMMUNITY
Start: 2024-04-24

## 2024-06-03 NOTE — PROGRESS NOTES
"  Assessment & Plan     General weakness  Unclear etiology for recent afternoon weakness. She isn't really complaining of knee pain and her  who provides majority of history states it is more of a generalized weakness. Will check urinalysis to evaluate for urinary tract infection. Recheck baseline labs as well to assess kidney function, blood counts. Did have stable labs back in February. Afternoon gabapentin dose could be contributing so we are going to try holding this for the next 1-2 weeks and see if we notice any improvement. If not, could restart and try cutting out afternoon Seroquel. Certainly decreasing these doses may result in behavioral changes and they should watch for this.   - UA Macroscopic with reflex to Microscopic and Culture - Lab Collect; Future  - CBC with platelets; Future  - Comprehensive metabolic panel (BMP + Alb, Alk Phos, ALT, AST, Total. Bili, TP); Future  - UA Macroscopic with reflex to Microscopic and Culture - Lab Collect  - CBC with platelets  - Comprehensive metabolic panel (BMP + Alb, Alk Phos, ALT, AST, Total. Bili, TP)      BMI  Estimated body mass index is 31.37 kg/m  as calculated from the following:    Height as of this encounter: 1.575 m (5' 2\").    Weight as of this encounter: 77.8 kg (171 lb 8 oz).       Subjective   Soren is a 79 year old, presenting for the following health issues:  Joint Pain and RECHECK (urine)        6/3/2024    10:11 AM   Additional Questions   Roomed by Maya CHOW.     History of Present Illness       Reason for visit:  Difficulty in walking  Symptom onset:  3-4 weeks ago  Symptoms include:  At times cant walk  Symptom intensity:  Severe  Had these symptoms before:  No    She eats 4 or more servings of fruits and vegetables daily.She consumes 1 sweetened beverage(s) daily.She exercises with enough effort to increase her heart rate 10 to 19 minutes per day.  She exercises with enough effort to increase her heart rate 3 or less days per week.   She is " "taking medications regularly.    She does have history of chronic bilateral knee pain due to arthritis. On 5/3/2024 had bilateral intra-articular cortisone injections with Dr. Yeo. Denies any new injury or trauma. These did seem to help a bit but has complained of more pain in the right knee.     She has done home physical therapy. She has been having more trouble walking. Doesn't usually walk too much. Over the past month, does okay in the AM but as the day goes, she is weaker. Not complaining of much pain but her knees do seem to buckle and she lacks overall strength in the late afternoon/evening. Daily schedule is pretty routine     She did have some medication changes recently -- Gabapentin dose was increased by 100 mg in the afternoon  to help with aggression. The Seroquel was also increased in the afternoon. Currently taking 5 mg in the afternoon and 10 at night                                                                                                                                                                                                                                                                                                                          Would like to recheck urine while she is here as she has history of recurrent UTIs. Have noticed darker and malodorous urine lately.     Review of Systems  Constitutional, HEENT, cardiovascular, pulmonary, gi and gu systems are negative, except as otherwise noted.      Objective    /56   Pulse 75   Temp 98.5  F (36.9  C) (Tympanic)   Resp 16   Ht 1.575 m (5' 2\")   Wt 77.8 kg (171 lb 8 oz)   LMP  (LMP Unknown)   SpO2 97%   BMI 31.37 kg/m    Body mass index is 31.37 kg/m .    Physical Exam   GENERAL: alert and no distress  RESP: lungs clear to auscultation - no rales, rhonchi or wheezes  CV: regular rates and rhythm, normal S1 S2, no S3 or S4, and no murmur, click or rub  MS: Some anterior right leg tenderness and right medial joint " line tenderness.           Signed Electronically by: Baltazar Madden DO

## 2024-06-03 NOTE — PATIENT INSTRUCTIONS
Let's try cutting out afternoon dose of gabapentin 100 mg and see if this helps with weakness in the afternoon -- watch for the next 1-2 weeks.

## 2024-06-04 ENCOUNTER — LAB (OUTPATIENT)
Dept: LAB | Facility: CLINIC | Age: 79
End: 2024-06-04
Payer: COMMERCIAL

## 2024-06-04 DIAGNOSIS — D64.9 ANEMIA, UNSPECIFIED TYPE: Primary | ICD-10-CM

## 2024-06-04 DIAGNOSIS — R53.1 GENERAL WEAKNESS: ICD-10-CM

## 2024-06-04 LAB
ALBUMIN SERPL BCG-MCNC: 3.6 G/DL (ref 3.5–5.2)
ALBUMIN UR-MCNC: NEGATIVE MG/DL
ALP SERPL-CCNC: 82 U/L (ref 40–150)
ALT SERPL W P-5'-P-CCNC: 13 U/L (ref 0–50)
ANION GAP SERPL CALCULATED.3IONS-SCNC: 9 MMOL/L (ref 7–15)
APPEARANCE UR: CLEAR
AST SERPL W P-5'-P-CCNC: 19 U/L (ref 0–45)
BILIRUB SERPL-MCNC: 0.2 MG/DL
BILIRUB UR QL STRIP: NEGATIVE
BUN SERPL-MCNC: 18.8 MG/DL (ref 8–23)
CALCIUM SERPL-MCNC: 8.8 MG/DL (ref 8.8–10.2)
CHLORIDE SERPL-SCNC: 108 MMOL/L (ref 98–107)
COLOR UR AUTO: YELLOW
CREAT SERPL-MCNC: 1.13 MG/DL (ref 0.51–0.95)
DEPRECATED HCO3 PLAS-SCNC: 25 MMOL/L (ref 22–29)
EGFRCR SERPLBLD CKD-EPI 2021: 49 ML/MIN/1.73M2
GLUCOSE SERPL-MCNC: 113 MG/DL (ref 70–99)
GLUCOSE UR STRIP-MCNC: NEGATIVE MG/DL
HGB UR QL STRIP: NEGATIVE
KETONES UR STRIP-MCNC: NEGATIVE MG/DL
LEUKOCYTE ESTERASE UR QL STRIP: NEGATIVE
NITRATE UR QL: NEGATIVE
PH UR STRIP: 7.5 [PH] (ref 5–7)
POTASSIUM SERPL-SCNC: 4.6 MMOL/L (ref 3.4–5.3)
PROT SERPL-MCNC: 6.1 G/DL (ref 6.4–8.3)
SODIUM SERPL-SCNC: 142 MMOL/L (ref 135–145)
SP GR UR STRIP: 1.01 (ref 1–1.03)
UROBILINOGEN UR STRIP-ACNC: 0.2 E.U./DL

## 2024-06-04 PROCEDURE — 81003 URINALYSIS AUTO W/O SCOPE: CPT

## 2024-06-10 ENCOUNTER — OFFICE VISIT (OUTPATIENT)
Dept: ORTHOPEDICS | Facility: CLINIC | Age: 79
End: 2024-06-10
Payer: COMMERCIAL

## 2024-06-10 VITALS
BODY MASS INDEX: 31.47 KG/M2 | SYSTOLIC BLOOD PRESSURE: 108 MMHG | DIASTOLIC BLOOD PRESSURE: 70 MMHG | WEIGHT: 171 LBS | HEIGHT: 62 IN

## 2024-06-10 DIAGNOSIS — S83.412S COMPLETE TEAR OF MCL OF KNEE, LEFT, SEQUELA: ICD-10-CM

## 2024-06-10 DIAGNOSIS — M17.11 PRIMARY OSTEOARTHRITIS OF RIGHT KNEE: Primary | ICD-10-CM

## 2024-06-10 DIAGNOSIS — M17.12 PRIMARY OSTEOARTHRITIS OF LEFT KNEE: ICD-10-CM

## 2024-06-10 PROCEDURE — 99214 OFFICE O/P EST MOD 30 MIN: CPT | Performed by: FAMILY MEDICINE

## 2024-06-10 NOTE — PROGRESS NOTES
ASSESSMENT & PLAN  Patient Instructions     1. Primary osteoarthritis of right knee    2. Primary osteoarthritis of left knee    3. Complete tear of MCL of knee, left, sequela      -Patient is following up for chronic bilateral knee pain due to arthritis  -Patient had bilateral knee intra-articular cortisone injection 5 weeks ago which did initially alleviate her pain.  Pain has significantly increased a few weeks ago but then has improved recently  -It is too soon to repeat cortisone injections today.  -We discussed trying viscosupplementation injections.  Patient's  was educated on the difference between Synvisc 1 injection and steroid.  We discussed the pros and cons of both treatments.  Patient is in agreements to trying this medication.  Prior authorization for Synvisc 1 was ordered today  -I also advised trying over-the-counter hinged knee braces that can be put on with Velcro attachments and straps for ease of use.  Patient was shown multiple options on [employer].  -Follow-up in 4 weeks to administer the Synvisc 1 medication  -Call direct clinic number [[phone]] at any time with questions or concerns.    Albert Yeo MD [facility]  [facility]        -----    SUBJECTIVE:  Danielle Bryan is a 79 year old female who is seen in follow-up for bilateral knee pain.They were last seen 5/3/2024.     Patient is unable to verbalize their percent improvement since last visit. They indicate that their current pain level is 1/10. They have tried rest/activity avoidance, Tylenol, and corticosteroid injection (most recent date: 05/03/2024) that might be providing some relief.      The patient is seen with their  and care giver that help provide the history.     Patient's past medical, surgical, social, and family histories were reviewed today and no changes are noted.    REVIEW OF SYSTEMS:  Constitutional: NEGATIVE for fever, chills, change in  "weight  Skin: NEGATIVE for worrisome rashes, moles or lesions  GI/: NEGATIVE for bowel or bladder changes  Neuro: NEGATIVE for weakness, dizziness or paresthesias    OBJECTIVE:  /70   Ht 1.575 m (5' 2\")   Wt 77.6 kg (171 lb)   LMP  (LMP Unknown)   BMI 31.28 kg/m     General: healthy, alert and in no distress  HEENT: no scleral icterus or conjunctival erythema  Skin: no suspicious lesions or rash. No jaundice.  CV: regular rhythm by palpation, no pedal edema  Resp: normal respiratory effort without conversational dyspnea   Psych: normal mood and affect  Gait: normal steady gait with appropriate coordination and balance  Neuro: normal light touch sensory exam of the extremities.    MSK:  BILATERAL KNEE  Inspection:    Normal alignment; no edema, erythema, or ecchymosis present  Palpation:    Tender about the medial joint line. Remainder of bony and ligamentous landmarks are nontender.    Trace effusion is present    Patellofemoral crepitus is Present  Range of Motion:     00 extension to 1100 flexion  Strength:    Quadriceps grossly intact    Extensor mechanism intact  Special Tests:    Positive: none    Negative: MCL/valgus stress (0 & 30 deg), LCL/varus stress (0 & 30 deg), Lachman's, anterior drawer, posterior drawer, Cristobal's    Independent visualization of the below image:    Patient's conditions were thoroughly discussed during today's visit with total time spent face-to-face with the patient and documentation being 30 minutes.    Albert Yeo MD, [facility]    "

## 2024-06-10 NOTE — LETTER
6/10/2024      Danielle Bryan  [address]  [place] [address]      Dear Colleague,    Thank you for referring your patient, Danielle Bryan, to the [facility] [place]. Please see a copy of my visit note below.    ASSESSMENT & PLAN  Patient Instructions     1. Primary osteoarthritis of right knee    2. Primary osteoarthritis of left knee    3. Complete tear of MCL of knee, left, sequela      -Patient is following up for chronic bilateral knee pain due to arthritis  -Patient had bilateral knee intra-articular cortisone injection 5 weeks ago which did initially alleviate her pain.  Pain has significantly increased a few weeks ago but then has improved recently  -It is too soon to repeat cortisone injections today.  -We discussed trying viscosupplementation injections.  Patient's  was educated on the difference between Synvisc 1 injection and steroid.  We discussed the pros and cons of both treatments.  Patient is in agreements to trying this medication.  Prior authorization for Synvisc 1 was ordered today  -I also advised trying over-the-counter hinged knee braces that can be put on with Velcro attachments and straps for ease of use.  Patient was shown multiple options on [employer].  -Follow-up in 4 weeks to administer the Synvisc 1 medication  -Call direct clinic number [[phone]] at any time with questions or concerns.    Albert Yeo MD [facility] Orthopedics and Sports Medicine  [facility]        -----    SUBJECTIVE:  Danielle Bryan is a 79 year old female who is seen in follow-up for bilateral knee pain.They were last seen 5/3/2024.     Patient is unable to verbalize their percent improvement since last visit. They indicate that their current pain level is 1/10. They have tried rest/activity avoidance, Tylenol, and corticosteroid injection (most recent date: 05/03/2024) that might be providing some relief.      The patient is seen with  "their  and care giver that help provide the history.     Patient's past medical, surgical, social, and family histories were reviewed today and no changes are noted.    REVIEW OF SYSTEMS:  Constitutional: NEGATIVE for fever, chills, change in weight  Skin: NEGATIVE for worrisome rashes, moles or lesions  GI/: NEGATIVE for bowel or bladder changes  Neuro: NEGATIVE for weakness, dizziness or paresthesias    OBJECTIVE:  /70   Ht 1.575 m (5' 2\")   Wt 77.6 kg (171 lb)   LMP  (LMP Unknown)   BMI 31.28 kg/m     General: healthy, alert and in no distress  HEENT: no scleral icterus or conjunctival erythema  Skin: no suspicious lesions or rash. No jaundice.  CV: regular rhythm by palpation, no pedal edema  Resp: normal respiratory effort without conversational dyspnea   Psych: normal mood and affect  Gait: normal steady gait with appropriate coordination and balance  Neuro: normal light touch sensory exam of the extremities.    MSK:  BILATERAL KNEE  Inspection:    Normal alignment; no edema, erythema, or ecchymosis present  Palpation:    Tender about the medial joint line. Remainder of bony and ligamentous landmarks are nontender.    Trace effusion is present    Patellofemoral crepitus is Present  Range of Motion:     00 extension to 1100 flexion  Strength:    Quadriceps grossly intact    Extensor mechanism intact  Special Tests:    Positive: none    Negative: MCL/valgus stress (0 & 30 deg), LCL/varus stress (0 & 30 deg), Lachman's, anterior drawer, posterior drawer, Cristobal's    Independent visualization of the below image:    Patient's conditions were thoroughly discussed during today's visit with total time spent face-to-face with the patient and documentation being 30 minutes.    Albert Yeo MD, [facility]      Again, thank you for allowing me to participate in the care of your patient.        Sincerely,        Albert Yeo, MD  "

## 2024-06-10 NOTE — PATIENT INSTRUCTIONS
1. Primary osteoarthritis of right knee    2. Primary osteoarthritis of left knee    3. Complete tear of MCL of knee, left, sequela      -Patient is following up for chronic bilateral knee pain due to arthritis  -Patient had bilateral knee intra-articular cortisone injection 5 weeks ago which did initially alleviate her pain.  Pain has significantly increased a few weeks ago but then has improved recently  -It is too soon to repeat cortisone injections today.  -We discussed trying viscosupplementation injections.  Patient's  was educated on the difference between Synvisc 1 injection and steroid.  We discussed the pros and cons of both treatments.  Patient is in agreements to trying this medication.  Prior authorization for Synvisc 1 was ordered today  -I also advised trying over-the-counter hinged knee braces that can be put on with Velcro attachments and straps for ease of use.  Patient was shown multiple options on [employer].  -Follow-up in 4 weeks to administer the Synvisc 1 medication  -Call direct clinic number [[phone]] at any time with questions or concerns.    Albert Yeo MD [facility]  [facility] [place]

## 2024-06-11 ENCOUNTER — LAB (OUTPATIENT)
Dept: LAB | Facility: CLINIC | Age: 79
End: 2024-06-11
Payer: COMMERCIAL

## 2024-06-11 DIAGNOSIS — E78.5 HYPERLIPIDEMIA WITH TARGET LDL LESS THAN 100: Primary | ICD-10-CM

## 2024-06-11 DIAGNOSIS — D64.9 ANEMIA, UNSPECIFIED TYPE: ICD-10-CM

## 2024-06-11 LAB
BASOPHILS # BLD AUTO: 0 10E3/UL (ref 0–0.2)
BASOPHILS NFR BLD AUTO: 0 %
EOSINOPHIL # BLD AUTO: 0.2 10E3/UL (ref 0–0.7)
EOSINOPHIL NFR BLD AUTO: 3 %
ERYTHROCYTE [DISTWIDTH] IN BLOOD BY AUTOMATED COUNT: 12.9 % (ref 10–15)
FOLATE SERPL-MCNC: 7.4 NG/ML (ref 4.6–34.8)
HCT VFR BLD AUTO: 37.3 % (ref 35–47)
HGB BLD-MCNC: 12 G/DL (ref 11.7–15.7)
IMM GRANULOCYTES # BLD: 0 10E3/UL
IMM GRANULOCYTES NFR BLD: 1 %
LYMPHOCYTES # BLD AUTO: 0.9 10E3/UL (ref 0.8–5.3)
LYMPHOCYTES NFR BLD AUTO: 14 %
MCH RBC QN AUTO: 29.3 PG (ref 26.5–33)
MCHC RBC AUTO-ENTMCNC: 32.2 G/DL (ref 31.5–36.5)
MCV RBC AUTO: 91 FL (ref 78–100)
MONOCYTES # BLD AUTO: 0.4 10E3/UL (ref 0–1.3)
MONOCYTES NFR BLD AUTO: 7 %
NEUTROPHILS # BLD AUTO: 4.8 10E3/UL (ref 1.6–8.3)
NEUTROPHILS NFR BLD AUTO: 76 %
PLATELET # BLD AUTO: 255 10E3/UL (ref 150–450)
RBC # BLD AUTO: 4.1 10E6/UL (ref 3.8–5.2)
WBC # BLD AUTO: 6.4 10E3/UL (ref 4–11)

## 2024-06-11 PROCEDURE — 82728 ASSAY OF FERRITIN: CPT

## 2024-06-11 PROCEDURE — 82607 VITAMIN B-12: CPT

## 2024-06-11 PROCEDURE — 36415 COLL VENOUS BLD VENIPUNCTURE: CPT

## 2024-06-11 PROCEDURE — 83550 IRON BINDING TEST: CPT

## 2024-06-11 PROCEDURE — 85025 COMPLETE CBC W/AUTO DIFF WBC: CPT

## 2024-06-11 PROCEDURE — 82746 ASSAY OF FOLIC ACID SERUM: CPT

## 2024-06-11 PROCEDURE — 83540 ASSAY OF IRON: CPT

## 2024-06-12 LAB
FERRITIN SERPL-MCNC: 251 NG/ML (ref 11–328)
IRON BINDING CAPACITY (ROCHE): 251 UG/DL (ref 240–430)
IRON SATN MFR SERPL: 33 % (ref 15–46)
IRON SERPL-MCNC: 82 UG/DL (ref 37–145)
VIT B12 SERPL-MCNC: 500 PG/ML (ref 232–1245)

## 2024-07-16 NOTE — PHARMACY
Anticoagulation coverage check.  Patient has Medicare D through [employer] with $265 (of $265) unmet deductible.    Xarelto/Eliquis  January:  Upon receipt of RX, [facility] can provide 1 month free.  February: $311 (deductible + copay)  March-Sept: $46/mo  Oct-Dec: $106/mo (coverage gap)    Pradaxa is non-formulary and more expensive.    Jantoven (warfarin)  $2/mo      -CHRISTIANA Hutson, Pharmacy Technician/Liaison, [facility] [phone]       DM2 (diabetes mellitus, type 2) Benign essential HTN DM2 (diabetes mellitus, type 2) Benign essential HTN Benign essential HTN DM2 (diabetes mellitus, type 2) DM2 (diabetes mellitus, type 2) Benign essential HTN DM2 (diabetes mellitus, type 2) DM2 (diabetes mellitus, type 2)

## 2024-10-16 ENCOUNTER — PATIENT OUTREACH (OUTPATIENT)
Dept: CARE COORDINATION | Facility: CLINIC | Age: 79
End: 2024-10-16
Payer: COMMERCIAL

## 2024-10-24 NOTE — PROGRESS NOTES
Assessment & Plan     Danielle Bryan is a 75 year old female with the following diagnoses:   1. Ptosis of both eyelids    2. Anisocoria         Danielle Bryan is a 75 year old White female who presents to neuro-ophthalmology clinic today for consultation from Dr. Stevens for right ptosis and miotic pupil. Per patient, her left eyelid is droopy too but right is worse. This has been present for many years but the miotic pupil was found at last visit with Dr. Stevens on 8/10/2020. Patient was never aware of anisocoria. Pt believes that the ptosis worsens in the evening. She reports right temporal headaches that are increasing in frequency. She denies jaw pain, diplopia, but endorses intermittent blurry vision, anhydrosis on the right eye. She had Transient ischemic attack about 7 years ago. She denies cervical pain, history of lung cancer, lung surgery.     POH: refractive error, cataract surgery in both eyes about 10 years ago  PMH: TIA, thyroidectomy 40 years ago, hyperlipedemia, osteoporosis  FH: none  Meds: pravastatin, Xarelto, Metoprolol, injection for osteoporisis    Visual acuity is 20/25 in each eye. Intraocular pressure is within normal limit. Right pupil is 5mm in dark and constricts to 3mm in light. Left pupil is 5 mm in dark and constricts to 2 mm in light. There is no afferent pupillary defect. After the administration of Iopidine, her pupils are 4.5mm and 2.5 in bright light in both eyes. Color plates in 10/11 in each eye. Mariana's are symmetrical at 10mm in both eyes with a base of 95mm. MRD1 is 1 mm in right eye and 1.5 mm in left eye. It does not change after patient has been in upgaze for 1 minute. Confrontational visual fields are full. Slit lamp exam shows intraocular lens in both eyes. Fundus exam is normal.     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  I think it  "  ----------------------------------------------------------------------------------------------------------  [facility]   Psychiatric Progress Note  Hospital Day #8    Name: Yonatan Delgadillo   MRN#: [id number]  Age: 6 year old YOB: 2018  Date of Admission: 10/16/2024  Unit: 7ITC  Attending Physician: Violet Liu MD  Legal Status: Voluntary     Interim History:   The patient's care was discussed with the treatment team during the daily team meeting and/or staff's chart notes were reviewed.   Individualized Daily Interaction Plan:       Collateral/ Team reports:  Broset highest score in the past 24 hours:    Side effects to medication: denies  Sleep: slept through the night  Intake: eating/drinking without difficulty  Groups: appropriately participating and attending groups  Interactions & function: gets along well with peers  Safety: Patient has required locked seclusion or restraints in the past 24 hours to maintain safety.  Please refer to RN documentation for further details.    Per nursing report: last seclusion 10/21. Has been overall doing well on the unit. Had a point when he got frustrated last evening due to the schedule changing and he sat in front of the group room door before eventually requesting to brush his teeth. He did not get aggressive or lash out     Per clinical treatment coordinator: plan to connect with [facility]     HPI:   Yonatan \"Jero\" Leona is a 6 year old with a psychiatric history of Autism spectrum disorder who presented to the [facility] department on 10/15 with aggression and out of control behaviors. He was admitted to [facility] unit [facility] for psychiatric stabilization and monitoring.    On interview today, Jero is seen in his room with SIO staff present. He reports his  mood is \"good.\" He reports that his day has been going well and is commended for his good work on his psychological testing. He denies any " is reasonable for her to pursue ptosis repair.  I will have her follow-up with Dr. Stevens for this.  Follow-up with me as needed.         Attending Physician Attestation:  Complete documentation of historical and exam elements from today's encounter can be found in the full encounter summary report (not reduplicated in this progress note).  I personally obtained the chief complaint(s) and history of present illness.  I confirmed and edited as necessary the review of systems, past medical/surgical history, family history, social history, and examination findings as documented by others; and I examined the patient myself.  I personally reviewed the relevant tests, images, and reports as documented above.  I formulated and edited as necessary the assessment and plan and discussed the findings and management plan with the patient and family. - Aaron Mckeon MD  Ophthalmology PGY-3  [facility]   physical pain or discomfort. He has no questions or concerns.     Phone call to parents: provided update on psychological testing and summary of findings- high IQ with lows in processing speed and working memory (though these are still average range). Discussed recommendation to increase risperidone to BID- parents in agreement with plan.     The 10 point Review of Systems is negative other than noted above     Medications:   Scheduled Medications:  Current Facility-Administered Medications   Medication Dose Route Frequency Provider Last Rate Last Admin    cloNIDine 20 mcg/mL (CATAPRES) oral suspension 0.05 mg  0.05 mg Oral BID Rdaha Castro APRN CNP   0.05 mg at 10/24/24 0838    Or    cloNIDine (CATAPRES) half-tab 0.05 mg  0.05 mg Oral BID Radha Castro APRN CNP        hydrocortisone (CORTAID) 1 % cream   Topical BID Suzi Chairez APRN CNP   Given at 10/23/24 2026    risperiDONE (risperDAL) tablet 0.25 mg  0.25 mg Oral BID Radha Castro APRN CNP   0.25 mg at 10/24/24 0838       PRN Medications:  Current Facility-Administered Medications   Medication Dose Route Frequency Provider Last Rate Last Admin    acetaminophen (TYLENOL) tablet 325 mg  325 mg Oral Q6H PRN Char Houston APRN CNP        hydrOXYzine HCl (ATARAX) tablet 10 mg  10 mg Oral Q8H PRN Char Houston APRN CNP        ibuprofen (ADVIL/MOTRIN) tablet 200 mg  200 mg Oral Q6H PRN Char Houston APRN CNP        melatonin tablet 1 mg  1 mg Oral At Bedtime PRN Char Houston APRN CNP   1 mg at 10/18/24 1926    mineral oil-hydrophilic petrolatum (AQUAPHOR)   Topical Q1H PRN Suzi Chairez APRN CNP        OLANZapine zydis (zyPREXA) ODT half-tab 2.5 mg  2.5 mg Oral Daily PRN Radha Castro APRN CNP            Allergies:     Allergies   Allergen Reactions    Seasonal Allergies         Vitals and Labs:   /80 (BP Location: Right arm, Patient Position: Sitting)   Pulse 104   Temp 97.2  F (36.2  C) (Temporal)   Resp 16   Ht 1.245 m (4'  "1\")   Wt 20.8 kg (45 lb 12.8 oz)   SpO2 99%   BMI 13.41 kg/m    Weight is 45 lbs 12.8 oz  Body mass index is 13.41 kg/m .  Orthostatic Vitals       None          Labs have been personally reviewed. Please see below for details.      Mental Status Examination:     Appearance: awake, alert, adequately groomed, and dressed in hospital scrubs  Attitude:  cooperative  Eye Contact:  good  Mood: \"good\"  Affect:  appropriate and in normal range  Speech: mumbling  Psychomotor Behavior: normal  Thought Process: logical  Associations: none  Thought Content:  no evidence of suicidal ideation or homicidal ideation  Insight:  limited  Judgement: fair  Oriented to:  time, person, and place  Attention Span and Concentration: intact  Recent and Remote Memory: fair     Psychiatric Assessment and Plan:   Diagnoses:  # ASD  # Acute stress disorder  # r/o ADHD  # r/o mood disorder    Formulation and Course:  Yonatan \"Jero\" Leona is a 6 year old with a psychiatric history of Autism spectrum disorder who presented to the [facility] department on 10/15 with aggression and out of control behaviors. He was admitted to [facility] unit 7[facility] for psychiatric stabilization and monitoring. Significant symptoms include aggression, SIB, out of control behaviors, poor frustration tolerance, and trauma symptoms. Genetic loading is positive for mood, anxiety, and CD. Medical history is significant for recent concussion on 9/29/24. Substance use does not appear to be playing a contributing role in the patient's presentation.  Patient appears to cope with stress and emotional changes with acting out to self, acting out to others, and aggression. Stressors include trauma, school issues, peer issues, family dynamics, and chronic mental health concerns. Patient's support system includes family, county, and outpatient team. Based on patient's history and current symptoms, criteria are met for inpatient hospitalization.     Plan:  Today's " Changes: increase risperidone     Medications:   risperidone 0.25 mg po BID  cloNIDine 20 mcg/ml (CATAPRES) oral suspension 0.05 mg BID  Hydrocontisone (CORTID) 1% cream BID    Consults:  Did NOT Request substance use assessment or Rule 25 evaluation due to no concern about substance use.  - Family Assessment completed and reviewed   - functional behavioral assessment completed by Elba ESTRELLA on 10/20  - request sensory evaluation  - pediatric neurology consulted to r/o post concussive syndrome- recs below   - psychological testing completed 10/23 by Dr. Rodriguez: Pt was seen for psych eval. He presented cooperatively and with good effort. He did vocalize, but was very difficult to understand his responses. Observations seem to support his previous Dx of ASD. He performed average on the CPT. The results do not support an ADHD Dx. He appears to have superior intellect. His working memory and processing speed were relative deficits and may lead to frustration. Projectives suggest and unstable sense of self and his emotions may feel chaotic to him.     Interventions:  - Patient has been treated in therapeutic milieu with appropriate individual and group therapies as indicated and as able.  - Collateral information, ROIs, legal documentation, prior testing results, and other pertinent information has been requested within 24 hours of admission.    Precautions:  Behavioral Orders   Procedures    Assault precautions    Elopement precautions    Family Assessment    Routine Programming     As clinically indicated    Self Injury Precaution    Status Individual Observation     Patient SIO status reviewed with team/RN.  Please also refer to RN/team documentation for add'l detail.    -SIO staff to monitor following which have contributed to patient being on SIO: 1:!   Aggression, expressing SI and HI, volnerable due to young age. Does not need to be observed in bathroom and showers.  -Possible interventions SIO staff could use to  "support patient's treatment progress:  Redirection, environmental adjustment  -When following observed, team will review discontinuation of SIO:  No aggression and self harm     Order Specific Question:   CONTINUOUS 24 hours / day     Answer:   Other     Order Specific Question:   Specify distance     Answer:   10 feet     Order Specific Question:   Indications for SIO     Answer:   Assault risk     Order Specific Question:   Indications for SIO     Answer:   Self-injury risk    Suicide precautions: Suicide Risk: HIGH; Clinical rationale to override score: Exhibiting Suicidal/self-harm behaviors or thoughts     Order Specific Question:   Suicide Risk     Answer:   HIGH     Order Specific Question:   Clinical rationale to override score:     Answer:   Exhibiting Suicidal/self-harm behaviors or thoughts        Medical Assessment and Plan:   # # r/o post concussion syndrome   - No recommendations for additional work up at this time. Would suggest to continue to observe and document \"spells\" and reach out again if there are any acute findings, questions or focal neurologic deficits.  If persist after this admission, could also be evaluated outpatient through pediatric neurology clinic.  - Neurology signing off     Disposition:     Disposition Plan   Reason for ongoing admission: poses an imminent risk to self and poses an imminent risk to others  Discharge location/Disposition: home with family  Discharge Medications: not ordered  Follow-up Appointments: not scheduled  Medically Ready for Discharge: 2-4 days     Attestation:     This patient was seen and evaluated by me on October 23, 2024    Total time was 60 minutes spent on the date of the encounter doing chart review, history and exam, documentation  coordination of care,  further activities as noted above and discharge planning.    Radha Castro, DNP, APRN, CNP, PMHNP-BC     Laboratory Results:     Recent Results (from the past 240 hours)   Comprehensive metabolic " panel    Collection Time: 10/23/24  7:41 AM   Result Value Ref Range    Sodium 141 135 - 145 mmol/L    Potassium 3.5 3.4 - 5.3 mmol/L    Carbon Dioxide (CO2) 24 22 - 29 mmol/L    Anion Gap 12 7 - 15 mmol/L    Urea Nitrogen 13.9 5.0 - 18.0 mg/dL    Creatinine 0.39 0.34 - 0.53 mg/dL    GFR Estimate      Calcium 10.1 8.8 - 10.8 mg/dL    Chloride 105 98 - 107 mmol/L    Glucose 87 70 - 99 mg/dL    Alkaline Phosphatase 253 150 - 420 U/L    AST 29 0 - 50 U/L    ALT 12 0 - 50 U/L    Protein Total 7.1 6.2 - 7.5 g/dL    Albumin 4.6 3.8 - 5.4 g/dL    Bilirubin Total 0.3 <=1.0 mg/dL   Hemoglobin A1c    Collection Time: 10/23/24  7:41 AM   Result Value Ref Range    Estimated Average Glucose 94 <117 mg/dL    Hemoglobin A1C 4.9 <5.7 %   Lipid Profile    Collection Time: 10/23/24  7:41 AM   Result Value Ref Range    Cholesterol 139 <170 mg/dL    Triglycerides 60 <75 mg/dL    Direct Measure HDL 60 >45 mg/dL    LDL Cholesterol Calculated 67 <110 mg/dL    Non HDL Cholesterol 79 <120 mg/dL   TSH with free T4 reflex    Collection Time: 10/23/24  7:41 AM   Result Value Ref Range    TSH 2.08 0.60 - 4.80 uIU/mL   Vitamin D    Collection Time: 10/23/24  7:41 AM   Result Value Ref Range    Vitamin D, Total (25-Hydroxy) 23 20 - 50 ng/mL   CBC with platelets and differential    Collection Time: 10/23/24  7:41 AM   Result Value Ref Range    WBC Count 6.5 5.0 - 14.5 10e3/uL    RBC Count 4.93 3.70 - 5.30 10e6/uL    Hemoglobin 13.7 10.5 - 14.0 g/dL    Hematocrit 40.0 31.5 - 43.0 %    MCV 81 70 - 100 fL    MCH 27.8 26.5 - 33.0 pg    MCHC 34.3 31.5 - 36.5 g/dL    RDW 12.4 10.0 - 15.0 %    Platelet Count 375 150 - 450 10e3/uL    % Neutrophils 46 %    % Lymphocytes 40 %    % Monocytes 9 %    % Eosinophils 4 %    % Basophils 1 %    % Immature Granulocytes 0 %    NRBCs per 100 WBC 0 <1 /100    Absolute Neutrophils 3.0 1.3 - 8.1 10e3/uL    Absolute Lymphocytes 2.6 1.1 - 8.6 10e3/uL    Absolute Monocytes 0.6 0.0 - 1.1 10e3/uL    Absolute Eosinophils 0.3  0.0 - 0.7 10e3/uL    Absolute Basophils 0.1 0.0 - 0.2 10e3/uL    Absolute Immature Granulocytes 0.0 <=0.4 10e3/uL    Absolute NRBCs 0.0 10e3/uL

## 2024-11-01 ENCOUNTER — TRANSFERRED RECORDS (OUTPATIENT)
Dept: HEALTH INFORMATION MANAGEMENT | Facility: CLINIC | Age: 79
End: 2024-11-01
Payer: COMMERCIAL

## 2024-12-31 ENCOUNTER — APPOINTMENT (OUTPATIENT)
Dept: CT IMAGING | Facility: CLINIC | Age: 79
DRG: 157 | End: 2024-12-31
Attending: EMERGENCY MEDICINE
Payer: COMMERCIAL

## 2024-12-31 ENCOUNTER — HOSPITAL ENCOUNTER (INPATIENT)
Facility: CLINIC | Age: 79
DRG: 157 | End: 2024-12-31
Attending: EMERGENCY MEDICINE | Admitting: HOSPITALIST
Payer: COMMERCIAL

## 2024-12-31 ENCOUNTER — APPOINTMENT (OUTPATIENT)
Dept: GENERAL RADIOLOGY | Facility: CLINIC | Age: 79
DRG: 157 | End: 2024-12-31
Attending: EMERGENCY MEDICINE
Payer: COMMERCIAL

## 2024-12-31 DIAGNOSIS — K59.00 CONSTIPATION, UNSPECIFIED CONSTIPATION TYPE: Primary | ICD-10-CM

## 2024-12-31 DIAGNOSIS — S02.401A CLOSED FRACTURE OF MAXILLARY SINUS, INITIAL ENCOUNTER (H): ICD-10-CM

## 2024-12-31 DIAGNOSIS — S02.40FA CLOSED FRACTURE OF LEFT ZYGOMATIC ARCH, INITIAL ENCOUNTER (H): ICD-10-CM

## 2024-12-31 DIAGNOSIS — S52.501A CLOSED FRACTURE OF DISTAL END OF RIGHT RADIUS, UNSPECIFIED FRACTURE MORPHOLOGY, INITIAL ENCOUNTER: ICD-10-CM

## 2024-12-31 DIAGNOSIS — S09.90XA CLOSED HEAD INJURY, INITIAL ENCOUNTER: ICD-10-CM

## 2024-12-31 DIAGNOSIS — S02.85XA FRACTURE OF LEFT ORBITAL WALL (H): ICD-10-CM

## 2024-12-31 DIAGNOSIS — S02.32XA CLOSED FRACTURE OF LEFT ORBITAL FLOOR, INITIAL ENCOUNTER (H): ICD-10-CM

## 2024-12-31 LAB
ANION GAP SERPL CALCULATED.3IONS-SCNC: 12 MMOL/L (ref 7–15)
BASOPHILS # BLD AUTO: 0 10E3/UL (ref 0–0.2)
BASOPHILS NFR BLD AUTO: 0 %
BUN SERPL-MCNC: 21.9 MG/DL (ref 8–23)
CALCIUM SERPL-MCNC: 9.1 MG/DL (ref 8.8–10.4)
CHLORIDE SERPL-SCNC: 107 MMOL/L (ref 98–107)
CREAT SERPL-MCNC: 1.3 MG/DL (ref 0.51–0.95)
EGFRCR SERPLBLD CKD-EPI 2021: 41 ML/MIN/1.73M2
EOSINOPHIL # BLD AUTO: 0.1 10E3/UL (ref 0–0.7)
EOSINOPHIL NFR BLD AUTO: 1 %
ERYTHROCYTE [DISTWIDTH] IN BLOOD BY AUTOMATED COUNT: 13.2 % (ref 10–15)
GLUCOSE SERPL-MCNC: 136 MG/DL (ref 70–99)
HCO3 SERPL-SCNC: 24 MMOL/L (ref 22–29)
HCT VFR BLD AUTO: 36.3 % (ref 35–47)
HGB BLD-MCNC: 12 G/DL (ref 11.7–15.7)
HOLD SPECIMEN: NORMAL
HOLD SPECIMEN: NORMAL
IMM GRANULOCYTES # BLD: 0 10E3/UL
IMM GRANULOCYTES NFR BLD: 0 %
LYMPHOCYTES # BLD AUTO: 0.9 10E3/UL (ref 0.8–5.3)
LYMPHOCYTES NFR BLD AUTO: 10 %
MCH RBC QN AUTO: 29.9 PG (ref 26.5–33)
MCHC RBC AUTO-ENTMCNC: 33.1 G/DL (ref 31.5–36.5)
MCV RBC AUTO: 90 FL (ref 78–100)
MONOCYTES # BLD AUTO: 0.6 10E3/UL (ref 0–1.3)
MONOCYTES NFR BLD AUTO: 7 %
NEUTROPHILS # BLD AUTO: 7.4 10E3/UL (ref 1.6–8.3)
NEUTROPHILS NFR BLD AUTO: 81 %
NRBC # BLD AUTO: 0 10E3/UL
NRBC BLD AUTO-RTO: 0 /100
PLATELET # BLD AUTO: 263 10E3/UL (ref 150–450)
POTASSIUM SERPL-SCNC: 5.1 MMOL/L (ref 3.4–5.3)
RBC # BLD AUTO: 4.02 10E6/UL (ref 3.8–5.2)
SODIUM SERPL-SCNC: 143 MMOL/L (ref 135–145)
WBC # BLD AUTO: 9.2 10E3/UL (ref 4–11)

## 2024-12-31 PROCEDURE — 70450 CT HEAD/BRAIN W/O DYE: CPT

## 2024-12-31 PROCEDURE — 73130 X-RAY EXAM OF HAND: CPT | Mod: RT

## 2024-12-31 PROCEDURE — 36415 COLL VENOUS BLD VENIPUNCTURE: CPT | Performed by: EMERGENCY MEDICINE

## 2024-12-31 PROCEDURE — 99291 CRITICAL CARE FIRST HOUR: CPT | Mod: 25

## 2024-12-31 PROCEDURE — 250N000013 HC RX MED GY IP 250 OP 250 PS 637: Performed by: EMERGENCY MEDICINE

## 2024-12-31 PROCEDURE — 250N000013 HC RX MED GY IP 250 OP 250 PS 637

## 2024-12-31 PROCEDURE — 80048 BASIC METABOLIC PNL TOTAL CA: CPT | Performed by: EMERGENCY MEDICINE

## 2024-12-31 PROCEDURE — 73110 X-RAY EXAM OF WRIST: CPT | Mod: RT

## 2024-12-31 PROCEDURE — G0378 HOSPITAL OBSERVATION PER HR: HCPCS

## 2024-12-31 PROCEDURE — 72125 CT NECK SPINE W/O DYE: CPT

## 2024-12-31 PROCEDURE — 82435 ASSAY OF BLOOD CHLORIDE: CPT | Performed by: EMERGENCY MEDICINE

## 2024-12-31 PROCEDURE — 99222 1ST HOSP IP/OBS MODERATE 55: CPT

## 2024-12-31 PROCEDURE — 70486 CT MAXILLOFACIAL W/O DYE: CPT

## 2024-12-31 PROCEDURE — 85025 COMPLETE CBC W/AUTO DIFF WBC: CPT | Performed by: EMERGENCY MEDICINE

## 2024-12-31 RX ORDER — PROCHLORPERAZINE MALEATE 5 MG/1
5 TABLET ORAL EVERY 6 HOURS PRN
Status: DISCONTINUED | OUTPATIENT
Start: 2024-12-31 | End: 2025-01-04 | Stop reason: HOSPADM

## 2024-12-31 RX ORDER — GABAPENTIN 300 MG/1
300 CAPSULE ORAL EVERY EVENING
Status: DISCONTINUED | OUTPATIENT
Start: 2024-12-31 | End: 2025-01-04 | Stop reason: HOSPADM

## 2024-12-31 RX ORDER — NALOXONE HYDROCHLORIDE 0.4 MG/ML
0.2 INJECTION, SOLUTION INTRAMUSCULAR; INTRAVENOUS; SUBCUTANEOUS
Status: DISCONTINUED | OUTPATIENT
Start: 2024-12-31 | End: 2025-01-04 | Stop reason: HOSPADM

## 2024-12-31 RX ORDER — ASPIRIN 81 MG/1
81 TABLET ORAL AT BEDTIME
COMMUNITY

## 2024-12-31 RX ORDER — ACETAMINOPHEN 500 MG
500 TABLET ORAL AT BEDTIME
Status: ON HOLD | COMMUNITY
End: 2025-01-04

## 2024-12-31 RX ORDER — DONEPEZIL HYDROCHLORIDE 10 MG/1
10 TABLET, FILM COATED ORAL AT BEDTIME
Status: DISCONTINUED | OUTPATIENT
Start: 2024-12-31 | End: 2025-01-04 | Stop reason: HOSPADM

## 2024-12-31 RX ORDER — ESCITALOPRAM OXALATE 5 MG/1
20 TABLET ORAL EVERY EVENING
Status: DISCONTINUED | OUTPATIENT
Start: 2024-12-31 | End: 2025-01-04 | Stop reason: HOSPADM

## 2024-12-31 RX ORDER — LIDOCAINE 40 MG/G
CREAM TOPICAL
Status: DISCONTINUED | OUTPATIENT
Start: 2024-12-31 | End: 2025-01-04 | Stop reason: HOSPADM

## 2024-12-31 RX ORDER — ACETAMINOPHEN 325 MG/1
650 TABLET ORAL EVERY 4 HOURS PRN
Status: DISCONTINUED | OUTPATIENT
Start: 2024-12-31 | End: 2025-01-01

## 2024-12-31 RX ORDER — ASPIRIN 81 MG/1
81 TABLET ORAL AT BEDTIME
Status: DISCONTINUED | OUTPATIENT
Start: 2024-12-31 | End: 2025-01-04 | Stop reason: HOSPADM

## 2024-12-31 RX ORDER — ACETAMINOPHEN 500 MG
1000 TABLET ORAL
Status: COMPLETED | OUTPATIENT
Start: 2024-12-31 | End: 2024-12-31

## 2024-12-31 RX ORDER — NALOXONE HYDROCHLORIDE 0.4 MG/ML
0.4 INJECTION, SOLUTION INTRAMUSCULAR; INTRAVENOUS; SUBCUTANEOUS
Status: DISCONTINUED | OUTPATIENT
Start: 2024-12-31 | End: 2025-01-04 | Stop reason: HOSPADM

## 2024-12-31 RX ORDER — QUETIAPINE FUMARATE 50 MG/1
50 TABLET, FILM COATED ORAL AT BEDTIME
Status: DISCONTINUED | OUTPATIENT
Start: 2024-12-31 | End: 2025-01-04 | Stop reason: HOSPADM

## 2024-12-31 RX ORDER — DOCUSATE SODIUM 100 MG/1
100 CAPSULE, LIQUID FILLED ORAL AT BEDTIME
Status: DISCONTINUED | OUTPATIENT
Start: 2024-12-31 | End: 2025-01-04 | Stop reason: HOSPADM

## 2024-12-31 RX ORDER — AMOXICILLIN 250 MG
1 CAPSULE ORAL 2 TIMES DAILY PRN
Status: DISCONTINUED | OUTPATIENT
Start: 2024-12-31 | End: 2025-01-04 | Stop reason: HOSPADM

## 2024-12-31 RX ORDER — MEMANTINE HYDROCHLORIDE 5 MG/1
20 TABLET ORAL EVERY EVENING
Status: DISCONTINUED | OUTPATIENT
Start: 2024-12-31 | End: 2025-01-04 | Stop reason: HOSPADM

## 2024-12-31 RX ORDER — AMOXICILLIN 250 MG
2 CAPSULE ORAL 2 TIMES DAILY PRN
Status: DISCONTINUED | OUTPATIENT
Start: 2024-12-31 | End: 2025-01-04 | Stop reason: HOSPADM

## 2024-12-31 RX ORDER — ONDANSETRON 4 MG/1
4 TABLET, ORALLY DISINTEGRATING ORAL EVERY 6 HOURS PRN
Status: DISCONTINUED | OUTPATIENT
Start: 2024-12-31 | End: 2025-01-04 | Stop reason: HOSPADM

## 2024-12-31 RX ORDER — ACETAMINOPHEN 650 MG/1
650 SUPPOSITORY RECTAL EVERY 4 HOURS PRN
Status: DISCONTINUED | OUTPATIENT
Start: 2024-12-31 | End: 2025-01-01

## 2024-12-31 RX ORDER — ONDANSETRON 2 MG/ML
4 INJECTION INTRAMUSCULAR; INTRAVENOUS EVERY 6 HOURS PRN
Status: DISCONTINUED | OUTPATIENT
Start: 2024-12-31 | End: 2025-01-04 | Stop reason: HOSPADM

## 2024-12-31 RX ADMIN — QUETIAPINE FUMARATE 50 MG: 50 TABLET ORAL at 22:18

## 2024-12-31 RX ADMIN — GABAPENTIN 300 MG: 300 CAPSULE ORAL at 22:19

## 2024-12-31 RX ADMIN — DOCUSATE SODIUM 100 MG: 100 CAPSULE, LIQUID FILLED ORAL at 22:19

## 2024-12-31 RX ADMIN — ACETAMINOPHEN 1000 MG: 500 TABLET, FILM COATED ORAL at 14:28

## 2024-12-31 RX ADMIN — MEMANTINE 20 MG: 5 TABLET ORAL at 22:18

## 2024-12-31 RX ADMIN — ESCITALOPRAM OXALATE 20 MG: 5 TABLET, FILM COATED ORAL at 22:18

## 2024-12-31 RX ADMIN — DONEPEZIL HYDROCHLORIDE 10 MG: 10 TABLET ORAL at 22:18

## 2024-12-31 ASSESSMENT — ACTIVITIES OF DAILY LIVING (ADL)
ADLS_ACUITY_SCORE: 61
ADLS_ACUITY_SCORE: 70
ADLS_ACUITY_SCORE: 70
ADLS_ACUITY_SCORE: 61

## 2024-12-31 ASSESSMENT — COLUMBIA-SUICIDE SEVERITY RATING SCALE - C-SSRS: IS THE PATIENT NOT ABLE TO COMPLETE C-SSRS: UNABLE TO VERBALIZE

## 2024-12-31 NOTE — ED TRIAGE NOTES
Pt arrives via EMS from home with SO and Dtr. SO and Dtr heard a bang and found pt on the floor around 0800. EMS noticed x2 bumps on her head. No LOC, no thinners. Pt able to ambulate to stretcher but SO endorses significant weakness, new for pt. EMS also notes R hand/wrist deformity. Pt is acting at baseline cognitively per SO. VSS. C-collar in place. . Hx of dementia.

## 2024-12-31 NOTE — ED TRIAGE NOTES
Triage Assessment (Adult)       Row Name 12/31/24 1400 12/31/24 2815       Triage Assessment    Airway WDL WDL WDL       Respiratory WDL    Respiratory WDL WDL WDL       Skin Circulation/Temperature WDL    Skin Circulation/Temperature WDL X;all  right hand is visibly swollen and bruised. pt. unable to communicate pain WDL       Cardiac WDL    Cardiac WDL -- WDL       Cognitive/Neuro/Behavioral WDL    Cognitive/Neuro/Behavioral WDL X  dementia -  at bedside X    Level of Consciousness -- confused       Pupils (CN II)    Pupil PERRLA -- yes    Pupil Size Left -- 3 mm    Pupil Size Right -- 3 mm

## 2024-12-31 NOTE — ED PROVIDER NOTES
Emergency Department Note      History of Present Illness     Chief Complaint   Fall      HPI   Danielle Bryan is a right handed 79 year old female with history of dementia and paroxysmal atrial fibrillation who presents after an unwitnessed fall. The patient has dementia and her significant other reports she has trouble recalling events and he provides much of the history. The patient was helped to the restroom by a home aid around 0800. She was left in the restroom for privacy and then they herd her fall. She was found face down on the floor with 2 bumps on her head. The significant other reports she will fall asleep on the toilet and assumes this is why she fell. She was found awake on the floor. After the fall she could move all extremities and appeared at baseline to the significant other. The patient then ate breakfast and then at lunch complained of right wrist pain and had a severe bloody nose. The patient takes a baby aspirin a day but no other blood thinners. The patient complains of no other pain other than her wrist. They called the house aid who suggested she got checked up. The patient denies neck pain, headache, double vision, pain to clavicle, urinary symptoms and diarrhea. She typically ambulates with a walker or with assistance.      Independent Historian   Significant other as detailed above.    Review of External Notes   None    Past Medical History     Medical History and Problem List   Alopecia areata  Allergic rhinitis  Transient cerebral ischemia  Hld  Vit B12 deficiency  Osteoporosis  Vit D deficiency  Dysphagia  Dupuytren's contracture  Esophageal motility disorder  Reduced mobility  Paroxysmal afib  Failure to thrive  Dementia  Arthritis of knee  Balance problem    Medications   Aspirin  Cyanocobalamin  MOM  Zyrtec  Colace  Aricept  Lexapro  Neurontin  Namenda  Seroquel    Surgical History   C section x2  Colonoscopy x2  Bilateral cataract surgery  D&C  Thyroidectomy partial right  "side  Release trigger finger    Physical Exam     Patient Vitals for the past 24 hrs:   BP Temp Temp src Pulse Resp SpO2 Height Weight   12/31/24 2132 (!) 157/92 98.3  F (36.8  C) Oral 70 18 98 % 1.575 m (5' 2\") 81 kg (178 lb 9.2 oz)   12/31/24 2110 (!) 142/83 97.7  F (36.5  C) Oral 61 20 95 % -- --   12/31/24 1943 -- -- -- -- -- 97 % -- --   12/31/24 1941 -- -- -- -- -- 96 % -- --   12/31/24 1939 (!) 159/73 -- -- 67 -- -- -- --   12/31/24 1800 (!) 190/93 -- -- 69 -- -- -- --   12/31/24 1749 -- -- -- -- -- 99 % -- --   12/31/24 1748 -- -- -- -- -- 99 % -- --   12/31/24 1732 -- -- -- -- -- 99 % -- --   12/31/24 1731 -- -- -- -- -- 98 % -- --   12/31/24 1730 (!) 166/95 -- -- 63 -- 99 % -- --   12/31/24 1726 -- -- -- -- -- 99 % -- --   12/31/24 1725 (!) 159/86 -- -- 60 -- -- -- --   12/31/24 1615 -- -- -- -- -- 97 % -- --   12/31/24 1600 (!) 165/75 -- -- 64 -- 98 % -- --   12/31/24 1530 (!) 153/67 -- -- 60 -- 94 % -- --   12/31/24 1520 (!) 150/68 -- -- 62 -- 98 % -- --   12/31/24 1445 138/66 -- -- 65 -- -- -- --   12/31/24 1443 -- -- -- -- -- 96 % -- --   12/31/24 1430 -- -- -- -- -- 96 % -- --   12/31/24 1428 (!) 148/70 -- -- -- -- -- -- --   12/31/24 1400 134/79 -- -- 61 18 95 % -- --   12/31/24 1350 134/79 -- -- -- -- 96 % -- --   12/31/24 1347 134/79 -- Oral 65 20 95 % 1.575 m (5' 2\") 79.4 kg (175 lb)   12/31/24 1345 134/79 98.3  F (36.8  C) Oral 66 16 95 % -- --     Physical Exam  General: Elderly adult, sitting in stretcher  Eyes: PERRL, Conjunctive within normal limits.  EOMI.  HENT: Small hematoma over the top of the skull.  No palpable skull deformity.  Nontender.  Moist mucous membranes, oropharynx clear.   Neck: Maintained in rigid cervical collar.  No tenderness on palpation.  No step-off or crepitus.  CV: Normal S1S2, no murmur, rub or gallop. Regular rate and rhythm  Resp: Clear to auscultation bilaterally, no wheezes, rales or rhonchi. Normal respiratory effort.  GI: Abdomen is soft, nontender and " nondistended. No palpable masses. No rebound or guarding.  MSK: Tender somewhat diffusely in the right wrist and hand with palpable hematoma over the dorsum of the right hand and mild soft tissue swelling of the radial aspect of the right wrist.  No palpable crepitus.  Possible step-off noted at the distal radius of the right wrist.  Slow but normal active range of motion without pain elicited over the remainder of extremities and in the right elbow and right shoulder with passive range of motion..  Skin: Warm and dry.  Abrasions noted over the top of the scalp.  Ecchymosis right hand.  Neuro: Alert and oriented but only to person. Responds appropriately but slowly to all commands.  Sensation appears to be intact to the right hand and digits.  Normal muscle tone.  Psych: Good eye contact.  Flat affect.    Diagnostics     Lab Results   Labs Ordered and Resulted from Time of ED Arrival to Time of ED Departure   BASIC METABOLIC PANEL - Abnormal       Result Value    Sodium 143      Potassium 5.1      Chloride 107      Carbon Dioxide (CO2) 24      Anion Gap 12      Urea Nitrogen 21.9      Creatinine 1.30 (*)     GFR Estimate 41 (*)     Calcium 9.1      Glucose 136 (*)    CBC WITH PLATELETS AND DIFFERENTIAL    WBC Count 9.2      RBC Count 4.02      Hemoglobin 12.0      Hematocrit 36.3      MCV 90      MCH 29.9      MCHC 33.1      RDW 13.2      Platelet Count 263      % Neutrophils 81      % Lymphocytes 10      % Monocytes 7      % Eosinophils 1      % Basophils 0      % Immature Granulocytes 0      NRBCs per 100 WBC 0      Absolute Neutrophils 7.4      Absolute Lymphocytes 0.9      Absolute Monocytes 0.6      Absolute Eosinophils 0.1      Absolute Basophils 0.0      Absolute Immature Granulocytes 0.0      Absolute NRBCs 0.0         Imaging   CT Facial Bones without Contrast   Final Result   IMPRESSION:   1. Acute left zygomaticomaxillary (ZMC) complex fracture pattern, as   described.   2. Moderate layering blood  products in the left maxillary sinus.      EWA LANDIN MD            SYSTEM ID:  [id number]      XR Wrist Right G/E 3 Views   Final Result   IMPRESSION: Impacted fracture of the distal aspect of the right radius   with slight dorsal angulation. The fracture extends to the joint   margin without significant cortical step-off at the wrist joint.   Irregularity of the tip of the ulnar styloid but this may be more   chronic. No carpal fractures are identified. Normal carpal alignment.   Flexion contracture of the MCP joints and fingers reduces the   sensitivity of the examination. There is soft tissue swelling over the   dorsal aspect of the hand. No fractures are identified and there is no   dislocation.      ANNABEL CRUZ MD            SYSTEM ID:  [id number]      XR Hand Right G/E 3 Views   Final Result   IMPRESSION: Impacted fracture of the distal aspect of the right radius   with slight dorsal angulation. The fracture extends to the joint   margin without significant cortical step-off at the wrist joint.   Irregularity of the tip of the ulnar styloid but this may be more   chronic. No carpal fractures are identified. Normal carpal alignment.   Flexion contracture of the MCP joints and fingers reduces the   sensitivity of the examination. There is soft tissue swelling over the   dorsal aspect of the hand. No fractures are identified and there is no   dislocation.      ANNABEL CRUZ MD            SYSTEM ID:  [id number]      CT Cervical Spine w/o Contrast   Final Result   IMPRESSION:   1. No acute cervical spinal fracture or posttraumatic subluxation.   2. Degenerative changes, as described.      EWA LANDIN MD            SYSTEM ID:  [id number]      CT Head w/o Contrast   Final Result   IMPRESSION:   1. No acute intracranial hemorrhage, extra axial fluid collection, or   mass effect.   2. Brain atrophy and presumed chronic small vessel ischemic changes,   as described.   3. Findings concerning for acute left  zygomaticomaxillary complex   (ZMC) fracture pattern, as described. Consider dedicated facial CT for   further evaluation.      EWA LANDIN MD            SYSTEM ID:  [id number]          Independent Interpretation   CT Head: No intracranial hemorrhage.    ED Course      Medications Administered   Medications   acetaminophen (TYLENOL) tablet 1,000 mg (1,000 mg Oral $Given 12/31/24 1424)       Procedures   Procedures     Splint Placement     Procedure: Splint Placement     Indication: Fracture    Consent: Verbal     Location: Right Forearm    Preparation: Appropriate padding was placed prior to splint placement.    Procedure detail:   Splint was applied by Myself  Splint type: short arm dorsal splint  Splint materilal: Fiberglass  After placement I checked and adjusted the fit as needed to ensure proper positioning/fit   Sensation and circulation are intact after splint placement     Patient Status: The patient tolerated the procedure well: Yes. There were no complications.    Discussion of Management   Admitting HospitalistJaja PA-C    ED Course   ED Course as of 12/31/24 1853   Tue Dec 31, 2024   1404 I obtained history and examined the patient as noted above      I reassessed the patient and placed a splint.  I discussed findings so far with family members.   1835 I rechecked the patient and explained findings. Patient failed ambulation and has been does not feel able to care for the patient [place].     1853 I spoke with Jaja Byrd PA-C Hospitalist, who accepts the patient.       Additional Documentation  None    Medical Decision Making / Diagnosis     CMS Diagnoses: None    MIPS       None    MDM   Danielle Bryan is a 79 year old female with a history of dementia and paroxysmal atrial fibrillation as well as reduced mobility at baseline who presents emergency department after she fell from the toilet today.  This was not witnessed but seems likely it was a mechanical fall or she  fell asleep on the toilet seat.  She has evidence of head injury on examination and head CT was obtained but did not show evidence of intracranial hemorrhage.  She does have evidence of facial bone fracture at the zygomatic arch that is also maxillary sinus and inferior and lateral orbital wall.  She has no evidence of ocular entrapment.  No indication for surgery at this time but will benefit from outpatient ophthalmology/ENT evaluation.  She also had a right wrist and hand injury with evidence of a distal radius fracture but no obvious hand fracture.  She was placed in a splint as per the procedure note.  She is neurovascularly intact.  Basic laboratory evaluation was undertaken.  There were no symptoms or other signs that would suggest a trigger for her fall aside from her chronic problems.  Unfortunately, likely due to the head injury and her chronic [place] unwell status, she was not feeling well enough to be discharged home with inability to ambulate at baseline.  Will be admitted for ongoing care.    Disposition   The patient was admitted to the hospital.     Diagnosis     ICD-10-CM    1. Closed fracture of distal end of right radius, unspecified fracture morphology, initial encounter  S52.501A       2. Closed fracture of left zygomatic arch, initial encounter (H)  S02.40FA       3. Closed fracture of maxillary sinus, initial encounter (H)  S02.401A       4. Closed fracture of left orbital floor, initial encounter (H)  S02.32XA       5. Fracture of left orbital wall (H)  S02.85XA       6. Closed head injury, initial encounter  S09.90XA         Scribe Disclosure:  I, Isaias Sheppard, am serving as a scribe at 2:17 PM on 12/31/2024 to document services personally performed by Cortney Fierro MD based on my observations and the provider's statements to me.        Corteny Fierro MD  12/31/24 [id number]

## 2025-01-01 LAB
ANION GAP SERPL CALCULATED.3IONS-SCNC: 12 MMOL/L (ref 7–15)
BUN SERPL-MCNC: 17.2 MG/DL (ref 8–23)
CALCIUM SERPL-MCNC: 9.1 MG/DL (ref 8.8–10.4)
CHLORIDE SERPL-SCNC: 105 MMOL/L (ref 98–107)
CREAT SERPL-MCNC: 1.01 MG/DL (ref 0.51–0.95)
EGFRCR SERPLBLD CKD-EPI 2021: 56 ML/MIN/1.73M2
GLUCOSE SERPL-MCNC: 102 MG/DL (ref 70–99)
HCO3 SERPL-SCNC: 22 MMOL/L (ref 22–29)
POTASSIUM SERPL-SCNC: 4.6 MMOL/L (ref 3.4–5.3)
SODIUM SERPL-SCNC: 139 MMOL/L (ref 135–145)

## 2025-01-01 PROCEDURE — 250N000013 HC RX MED GY IP 250 OP 250 PS 637: Performed by: PHYSICIAN ASSISTANT

## 2025-01-01 PROCEDURE — 80048 BASIC METABOLIC PNL TOTAL CA: CPT

## 2025-01-01 PROCEDURE — 99233 SBSQ HOSP IP/OBS HIGH 50: CPT | Performed by: PHYSICIAN ASSISTANT

## 2025-01-01 PROCEDURE — G0378 HOSPITAL OBSERVATION PER HR: HCPCS

## 2025-01-01 PROCEDURE — 82310 ASSAY OF CALCIUM: CPT

## 2025-01-01 PROCEDURE — 36415 COLL VENOUS BLD VENIPUNCTURE: CPT

## 2025-01-01 PROCEDURE — 250N000013 HC RX MED GY IP 250 OP 250 PS 637

## 2025-01-01 RX ORDER — ACETAMINOPHEN 325 MG/1
975 TABLET ORAL EVERY 8 HOURS
Status: DISCONTINUED | OUTPATIENT
Start: 2025-01-01 | End: 2025-01-04 | Stop reason: HOSPADM

## 2025-01-01 RX ADMIN — ACETAMINOPHEN 975 MG: 325 TABLET, FILM COATED ORAL at 17:08

## 2025-01-01 RX ADMIN — GABAPENTIN 300 MG: 300 CAPSULE ORAL at 20:38

## 2025-01-01 RX ADMIN — QUETIAPINE FUMARATE 50 MG: 50 TABLET ORAL at 21:22

## 2025-01-01 RX ADMIN — DOCUSATE SODIUM 100 MG: 100 CAPSULE, LIQUID FILLED ORAL at 21:22

## 2025-01-01 RX ADMIN — MEMANTINE 20 MG: 5 TABLET ORAL at 20:38

## 2025-01-01 RX ADMIN — ESCITALOPRAM OXALATE 20 MG: 5 TABLET, FILM COATED ORAL at 20:38

## 2025-01-01 RX ADMIN — DONEPEZIL HYDROCHLORIDE 10 MG: 10 TABLET ORAL at 21:22

## 2025-01-01 RX ADMIN — ACETAMINOPHEN 975 MG: 325 TABLET, FILM COATED ORAL at 08:49

## 2025-01-01 ASSESSMENT — ACTIVITIES OF DAILY LIVING (ADL)
ADLS_ACUITY_SCORE: 65
ADLS_ACUITY_SCORE: 65
ADLS_ACUITY_SCORE: 72
ADLS_ACUITY_SCORE: 65
ADLS_ACUITY_SCORE: 71
ADLS_ACUITY_SCORE: 65
ADLS_ACUITY_SCORE: 66
ADLS_ACUITY_SCORE: 70
ADLS_ACUITY_SCORE: 65
ADLS_ACUITY_SCORE: 72
ADLS_ACUITY_SCORE: 70
ADLS_ACUITY_SCORE: 71
ADLS_ACUITY_SCORE: 71
ADLS_ACUITY_SCORE: 72
ADLS_ACUITY_SCORE: 71
ADLS_ACUITY_SCORE: 70
ADLS_ACUITY_SCORE: 71
ADLS_ACUITY_SCORE: 70
ADLS_ACUITY_SCORE: 71
ADLS_ACUITY_SCORE: 65
ADLS_ACUITY_SCORE: 66
ADLS_ACUITY_SCORE: 72
ADLS_ACUITY_SCORE: 65

## 2025-01-01 NOTE — H&P
[facility]    History and Physical - Hospitalist Service       Date of Admission:  12/31/2024    Assessment & Plan      Danielle Bryan is a 79 year old female with PMH atrial fibrillation, cerebral amyloid angiopathy, progressive dementia, dyslipidemia who presents after a fall.    Patient has dementia and lives with her significant other who provided most of the history.  They have a home health aide that comes to the home in the morning and evening to help with cares.  The home health aide assisted the patient to the restroom and stepped out of the room for privacy when they heard the fall.   reports that the patient will occasionally fall asleep while sitting on the toilet. They found the patient on the floor awake, moving all extremities and at her baseline per the .  The patient had breakfast and lunch at home before complaining of right wrist pain before eventually presenting to the emergency department. Patient denies any other concerns. Denies any chewing or difficulty opening her mouth.  No head or neck discomfort.  No arm or leg pain.    CT head shows no acute intracranial abnormality. CT facial shows complex fracture pattern left zygomatic maxillary with moderate layering blood products in the left maxillary sinus.  CT cervical negative for spinal fracture.  Right wrist shows impacted fracture at the distal radius with slight angulation, fracture extends to the joint margin without significant cortical step-off at the wrist joint.    Fall  Distal right radius fracture  Left zygomaticomaxillary complex fracture  - ortho consult for radius fracture   - Right wrist splinted   - CMS check q 4 hrs   - NPO at midnight in case ortho recommends intervention   - spoke with Oral/Maxillofacial Surgery at [facility] they reviewed imaging and discussed via phone, they will schedule follow-up in their clinic in 1 week. Their office will be contacting the patient's  to schedule  "the appointment. Need to follow strict sinus precautions as below.   - No sneezing or blowing nose. If she was to sneeze must do so with mouth open   - No bending over   - Cannot use a straw for 6 weeks   - No heavy lifting greater then a bag of groceries  - Pain regimen: tylenol PRN, oxycodone 2.5 mg PRN  - PT/ SW consult (lives with spouse, currently has home health visit twice daily)    Atrial fibrillation  Cerebral amyloid angiopathy  Was discontinued on anticoagulation due to cerebral amyloid angiopathy. Currently on aspirin 81 mg daily.  - hold aspirin 81 mg in AM until seen by ortho    Dementia  -Continue PTA Seroquel, memantine, aricept, gabapentin  -Continue PTA Lexapro    Dyslipidemia  - not on a statin           Diet:  regular diet   DVT Prophylaxis: Pneumatic Compression Devices  [place] Catheter: Not present  Lines: None     Cardiac Monitoring: None  Code Status:  full code     Clinically Significant Risk Factors Present on Admission                 # Drug Induced Platelet Defect: home medication list includes an antiplatelet medication       # Dementia: noted on problem list       # Obesity: Estimated body mass index is 32.01 kg/m  as calculated from the following:    Height as of this encounter: 1.575 m (5' 2\").    Weight as of this encounter: 79.4 kg (175 lb).              Disposition Plan     Medically Ready for Discharge: Anticipated Tomorrow       The patient's care was discussed with the Bedside Nurse, Patient, Patient's Family, Maxillary/facial  Team, and ED provider .    LOLY Byrd PA-C  Hospitalist Service  [facility]  Securely message with [place] (more info)  Text page via [employer] Paging/Directory     ______________________________________________________________________    Chief Complaint   fall    History is obtained from the patient and her      History of Present Illness   Danielle Bryan is a 79 year old female with PMH atrial fibrillation, cerebral " amyloid angiopathy, progressive dementia, dyslipidemia who presents after a fall.    Patient has dementia and lives with her significant other who provides most of the history.  They have a home health aide that comes to the home in the morning in the evening to help with cares.  The home health aide assisted the patient to the restroom and stepped out of the room for privacy when they heard the fall.   reports that the patient will occasionally fall asleep while sitting on the toilet.  They found the patient on the floor awake, moving all extremities and at her baseline per the .  The patient had breakfast and lunch at home before complaining of wrist pain and eventually presenting to the emergency department.  Patient denies any other concerns.  Denies any chewing or difficulty opening her mouth.  No head or neck discomfort.  No arm or leg pain.    In the ED, afebrile, blood pressure 159/86, heart rate 60, respirations 18, oxygen 99% on room air.  BMP notable for creatinine of 1.30, glucose 136.  CBC unremarkable.  CT head shows no acute intracranial abnormality.  CT head shows concerns for left zygomatic maxillary fracture.  CT facial shows complex fracture pattern left zygomatic maxillary with moderate layering blood products in the left maxillary sinus.  CT cervical negative for spinal fracture.  Right wrist shows impacted fracture at the distal radius with slight angulation, fracture extends to the joint margin without significant cortical step-off at the wrist joint.    Past Medical History    Past Medical History:   Diagnosis Date    Adverse effect of bisphosphonate, sequela     Alopecia areata     Requires a wig now    Atrial fibrillation (H) 01/20/2019    Cerebral infarction (H) 2014    TIA    Dysphagia     Botox/EGD dilation at [place], most recent 05/2013    Hyperlipemia     PONV (postoperative nausea and vomiting)     Recurrent UTI     Believed related in part to atrophic vaginitis    Vitamin  D deficiency 2018     Past Surgical History   Past Surgical History:   Procedure Laterality Date    ABDOMEN SURGERY  1975    [facility]    COLONOSCOPY  1/15/2014    Dr. Henry [facility]    COLONOSCOPY  1/15/2014    Normal, no further colonoscopy felt required. Procedure: COLONOSCOPY;  Colonoscopy ;  Surgeon: Esteban Henry MD;  Location:  GI    EYE SURGERY      bilateral cataract surgery    GYN SURGERY      TUBAL PREGNANCY, D&C,  X 2    HEAD & NECK SURGERY      THYROIDECTOMY - partial-right side    RELEASE TRIGGER FINGER  2012    Procedure: RELEASE TRIGGER FINGER;  RIGHT THUMB, MIDDLE AND RING TRIGGER RELEASES;  Surgeon: Deniz Velazquez MD;  Location: [facility]     Prior to Admission Medications   Prior to Admission Medications   Prescriptions Last Dose Informant Patient Reported? Taking?   QUEtiapine (SEROQUEL) 25 MG tablet   Yes No   aspirin 81 MG EC tablet   No No   Sig: Take 1 tablet (81 mg) by mouth daily   cetirizine (ZYRTEC) 10 MG tablet   Yes No   Sig: Take 10 mg by mouth daily as needed for allergies   cyanocobalamin (CYANOCOBALAMIN) 1000 MCG/ML injection   No No   Sig: Inject 1 mL (1,000 mcg) into the muscle every 30 days - with pharmacist recommended needles and syringes   diclofenac (VOLTAREN) 1 % topical gel   No No   Sig: Apply 4 g topically 3 times daily as needed for moderate pain (4-6)   docusate sodium (COLACE) 100 MG tablet   Yes No   Sig: Take 100 mg by mouth At Bedtime   donepezil (ARICEPT) 5 MG tablet   Yes No   Sig: Take 10 mg by mouth at bedtime   escitalopram (LEXAPRO) 10 MG tablet   Yes No   Sig: Take 20 mg by mouth every evening   gabapentin (NEURONTIN) 100 MG capsule   Yes No   Sig: Take 100 mg by mouth daily Around 1 PM   gabapentin (NEURONTIN) 300 MG capsule   Yes No   Sig: Take 300 mg by mouth every evening   magnesium hydroxide (MILK OF MAGNESIA) 400 MG/5ML suspension   No No   Sig: Take 30 mLs by mouth daily (with breakfast)   memantine (NAMENDA) 10 MG  tablet   Yes No   Sig: Take 20 mg by mouth every evening      Facility-Administered Medications Last Administration Doses Remaining   4 mL ropivacaine (NAROPIN) injection 5 mg/mL 5/3/2024  9:20 AM    4 mL ropivacaine (NAROPIN) injection 5 mg/mL 5/3/2024  9:20 AM    methylPREDNISolone (DEPO-Medrol) injection 40 mg 5/3/2024  9:20 AM    methylPREDNISolone (DEPO-Medrol) injection 40 mg 5/3/2024  9:20 AM            Social History   I have reviewed this patient's social history and updated it with pertinent information if needed.  Social History     Tobacco Use    Smoking status: Never    Smokeless tobacco: Never   Vaping Use    Vaping status: Never Used   Substance Use Topics    Alcohol use: No    Drug use: No     Allergies   Allergies   Allergen Reactions    Ciprofloxacin      Sores in mouth and throat felt funny    Reclast [Zoledronic Acid]      dizziness      Physical Exam   Vital Signs: Temp: 98.3  F (36.8  C) Temp src: Oral BP: (!) 159/86 Pulse: 60   Resp: 18 SpO2: 99 % O2 Device: None (Room air)    Weight: 175 lbs 0 oz    GENERAL: Elderly female, alert, Comfortable, No acute distress. Sitting up in bed  PSYCH: pleasant, flat affect   HEENT:  Normocephalic, PERRLA. EOMI, ecchymosis lateral left corner of eye, no scleral icterus or conjunctival injection, poor CHCF, no indentation over the left cheek bone   NECK:  Supple  HEART:  Normal S1, S2 with no murmur, RRR  LUNGS:  Normal Respiratory effort. Clear to auscultation bilaterally with no wheezing, rales or ronchi.  EXTREMITIES: Right wrist splinted CMS intact  SKIN:  Warm, dry to touch. No rash.  NEUROLOGIC: Alert and orientated to person, responds slowly to commands    Medical Decision Making       MANAGEMENT DISCUSSED with the following over the past 24 hours: patient, ED provider, nursing   NOTE(S)/MEDICAL RECORDS REVIEWED over the past 24 hours: labs, imaging, progress notes       Data     I have personally reviewed the following data over the past 24  hrs:    9.2  \   12.0   / 263     143 107 21.9 /  136 (H)   5.1 24 1.30 (H) \       Imaging results reviewed over the past 24 hrs:   Recent Results (from the past 24 hours)   CT Head w/o Contrast    Narrative    CT SCAN OF THE HEAD WITHOUT CONTRAST December 31, 2024 3:02 PM     HISTORY: Trauma, head injury, dementia.    TECHNIQUE: Axial images of the head and coronal reformations without  IV contrast material. Radiation dose for this scan was reduced using  automated exposure control, adjustment of the mA and/or kV according  to patient size, or iterative reconstruction technique.    COMPARISON: MRI of the brain dated 7/27/2023.    FINDINGS: The ventricles appear proportionate to the sulci. There is  mild to moderate generalized cerebral volume loss and mild patchy  nonspecific hypoattenuation in the cerebral white matter, likely due  to chronic small vessel ischemic disease. No large transcortical acute  or subacute infarct is identified. No hyperdense acute intracranial  hemorrhage, extra axial fluid collection, or mass effect identified  intracranially. Atherosclerotic calcifications of the carotid siphons.    Acute mildly displaced fractures involving the left orbital  floor/inferior orbital rim, the upper anterior wall of the left  maxillary sinus, the posterolateral wall of the left maxillary sinus,  and suspected fracture involving the left lateral orbital wall.  Segmental acute minimally displaced fractures involving the left  zygomatic arch with mild angulation of the fracture fragments. There  appears to be moderate layering blood products in the left maxillary  sinus. Probable retention cyst or polyp in the right maxillary sinus.  The mastoid and middle ear cavities appear clear.    Changes of bilateral cataract surgery noted. Within the limitations of  technique, no obvious postseptal space-occupying hematoma or evidence  for extra ocular muscle entrapment.      Impression    IMPRESSION:  1. No acute  intracranial hemorrhage, extra axial fluid collection, or  mass effect.  2. Brain atrophy and presumed chronic small vessel ischemic changes,  as described.  3. Findings concerning for acute left zygomaticomaxillary complex  (ZMC) fracture pattern, as described. Consider dedicated facial CT for  further evaluation.    EWA LANDIN MD         SYSTEM ID:  [id number]   CT Cervical Spine w/o Contrast    Narrative    CT CERVICAL SPINE WITHOUT CONTRAST   12/31/2024 3:03 PM     HISTORY: Trauma, neck injury, dementia.     TECHNIQUE: Axial images of the cervical spine were obtained without  intravenous contrast. Multiplanar reformations were performed.  Radiation dose for this scan was reduced using automated exposure  control, adjustment of the mA and/or kV according to patient size, or  iterative reconstruction technique.    COMPARISON: Soft tissue neck CT dated 4/28/2024.    FINDINGS: No acute cervical spine fracture is identified. Alignment  appears within normal limits. Mild/mild to moderate multilevel disc  space narrowing with marginal endplate osteophytes and scattered  uncovertebral spurring. Mild to moderate multilevel degenerative facet  disease. Multilevel disc bulges/disc osteophyte complexes contributing  to mild to moderate multilevel spinal canal stenosis. No definite  high-grade spinal canal narrowing is identified. No other moderate  multilevel neural foraminal narrowing is noted. No definite high-grade  bony neural foraminal stenosis.    Partial visualization of a calcified nodule in the posterior right  upper lung zone measuring 19 mm x 11 mm in axial plane, nonspecific,  unchanged from prior. The left thyroid lobe is markedly atrophic or  absent. The visualized paraspinous soft tissues otherwise appear  grossly unremarkable.      Impression    IMPRESSION:  1. No acute cervical spinal fracture or posttraumatic subluxation.  2. Degenerative changes, as described.    EWA LANDIN MD         SYSTEM ID:   [id number]   XR Hand Right G/E 3 Views    Narrative    XR WRIST RIGHT G/E 3 VIEWS, XR HAND RIGHT G/E 3 VIEWS 12/31/2024 3:20  PM     HISTORY: trauma, pain    COMPARISON: None.         Impression    IMPRESSION: Impacted fracture of the distal aspect of the right radius  with slight dorsal angulation. The fracture extends to the joint  margin without significant cortical step-off at the wrist joint.  Irregularity of the tip of the ulnar styloid but this may be more  chronic. No carpal fractures are identified. Normal carpal alignment.  Flexion contracture of the MCP joints and fingers reduces the  sensitivity of the examination. There is soft tissue swelling over the  dorsal aspect of the hand. No fractures are identified and there is no  dislocation.    ANNABEL CRUZ MD         SYSTEM ID:  [id number]   XR Wrist Right G/E 3 Views    Narrative    XR WRIST RIGHT G/E 3 VIEWS, XR HAND RIGHT G/E 3 VIEWS 12/31/2024 3:20  PM     HISTORY: trauma, pain    COMPARISON: None.         Impression    IMPRESSION: Impacted fracture of the distal aspect of the right radius  with slight dorsal angulation. The fracture extends to the joint  margin without significant cortical step-off at the wrist joint.  Irregularity of the tip of the ulnar styloid but this may be more  chronic. No carpal fractures are identified. Normal carpal alignment.  Flexion contracture of the MCP joints and fingers reduces the  sensitivity of the examination. There is soft tissue swelling over the  dorsal aspect of the hand. No fractures are identified and there is no  dislocation.    ANNABEL CRUZ MD         SYSTEM ID:  [id number]   CT Facial Bones without Contrast    Narrative    CT SCAN OF THE FACE WITHOUT CONTRAST 12/31/2024 4:29 PM     HISTORY: Trauma, possible facial fracture.     TECHNIQUE: Axial CT images of the facial bones were completed with  sagittal and coronal reformations. Radiation dose for this scan was  reduced using automated exposure control,  adjustment of the mA and/or  kV according to patient size, or iterative reconstruction technique.     COMPARISON: CT of the head dated 12/31/2024.     FINDINGS: Acute mildly displaced fractures are noted involving the  anterior and posterior lateral walls of the left maxillary sinus,  segmental fractures with mild angulation involving the left zygomatic  arch, minimally displaced fracture involving the left inferior orbital  rim/orbital floor, and minimally displaced fractures involving the  left lateral orbital wall. Fractures extend through the floor of the  left maxillary sinus. Findings altogether are in keeping with a left  zygomaticomaxillary complex (ZMC) type fracture pattern.    The pterygoid plates are intact. The walls of the right orbit and  right maxillary sinus are intact. No displaced nasal arch or nasal  septal fracture is identified. The anterior skull base appears intact.  The mandible appears intact. The temporomandibular joints are normally  located.    Changes of bilateral cataract surgery noted. Mild left periorbital  soft tissue swelling. No findings of extra ocular muscle entrapment  identified. Moderate layering blood products in the left maxillary  sinus. Small probable retention cyst or polyp in the right maxillary  sinus. The mastoid and middle ear cavities appear clear. Mild patchy  fat stranding in the left cheek subcutaneous fat. Partially visualized  degenerative changes in the cervical spine.      Impression    IMPRESSION:  1. Acute left zygomaticomaxillary (ZMC) complex fracture pattern, as  described.  2. Moderate layering blood products in the left maxillary sinus.    EWA LANDIN MD         SYSTEM ID:  [id number]

## 2025-01-01 NOTE — ED NOTES
[facility]  ED Nurse Handoff Report    ED Chief complaint: Fall  . ED Diagnosis:   Final diagnoses:   Closed fracture of distal end of right radius, unspecified fracture morphology, initial encounter       Allergies:   Allergies   Allergen Reactions    Ciprofloxacin      Sores in mouth and throat felt funny    Reclast [Zoledronic Acid]      dizziness       Code Status: Full Code-- see hospitalist orders for updated code status    Activity level - Baseline/Home:  standby.  Activity Level - Current:   assist of 2.   Lift room needed: No.   Bariatric: No   Needed: No   Isolation: No.   Infection: Not Applicable.     Respiratory status: Room air    Vital Signs (within 30 minutes):   Vitals:    12/31/24 1600 12/31/24 1615 12/31/24 1725 12/31/24 1726   BP: (!) 165/75  (!) 159/86    Pulse: 64  60    Resp:       Temp:       TempSrc:       SpO2: 98% 97%  99%   Weight:       Height:           Cardiac Rhythm:  ,      Pain level:    Patient confused: Yes.   Patient Falls Risk: bed/chair alarm on, nonskid shoes/slippers when out of bed, arm band in place, patient and family education, activity supervised, and toileting schedule implemented.   Elimination Status: Has voided     Patient Report - Initial Complaint: Fall.   Focused Assessment:   ED triage note:    Pt arrives via EMS from home with SO and Dtr. SO and Dtr heard a bang and found pt on the floor around 0800. EMS noticed x2 bumps on her head. No LOC, no thinners. Pt able to ambulate to stretcher but SO endorses significant weakness, new for pt. EMS also notes R hand/wrist deformity. Pt is acting at baseline cognitively per SO. VSS. C-collar in place. . Hx of dementia.   Neuro CognitiveCognitive/Neuro/Behavioral WDL: .WDL except; allLevel of Consciousness: confusedArousal Level: opens eyes spontaneouslyOrientation: disoriented x 4Speech: incoherentMood/Behavior: calm; cooperative   HEENTHead/Face WDL: .WDL except (2 abrasions to the  top of head, bleeding controlled; bruising under the L eye noted   MusculoskeletalMusculoskeletal WDL: .WDL except (pain to the L wrist, swelling noted color intact; unable to assess sensation and motion due to baseline cognitive deficits; capillary refill WNL)     Abnormal Results:   Labs Ordered and Resulted from Time of ED Arrival to Time of ED Departure   BASIC METABOLIC PANEL - Abnormal       Result Value    Sodium 143      Potassium 5.1      Chloride 107      Carbon Dioxide (CO2) 24      Anion Gap 12      Urea Nitrogen 21.9      Creatinine 1.30 (*)     GFR Estimate 41 (*)     Calcium 9.1      Glucose 136 (*)    CBC WITH PLATELETS AND DIFFERENTIAL    WBC Count 9.2      RBC Count 4.02      Hemoglobin 12.0      Hematocrit 36.3      MCV 90      MCH 29.9      MCHC 33.1      RDW 13.2      Platelet Count 263      % Neutrophils 81      % Lymphocytes 10      % Monocytes 7      % Eosinophils 1      % Basophils 0      % Immature Granulocytes 0      NRBCs per 100 WBC 0      Absolute Neutrophils 7.4      Absolute Lymphocytes 0.9      Absolute Monocytes 0.6      Absolute Eosinophils 0.1      Absolute Basophils 0.0      Absolute Immature Granulocytes 0.0      Absolute NRBCs 0.0          CT Facial Bones without Contrast   Final Result   IMPRESSION:   1. Acute left zygomaticomaxillary (ZMC) complex fracture pattern, as   described.   2. Moderate layering blood products in the left maxillary sinus.      EWA LANDIN MD            SYSTEM ID:  [id number]      XR Wrist Right G/E 3 Views   Final Result   IMPRESSION: Impacted fracture of the distal aspect of the right radius   with slight dorsal angulation. The fracture extends to the joint   margin without significant cortical step-off at the wrist joint.   Irregularity of the tip of the ulnar styloid but this may be more   chronic. No carpal fractures are identified. Normal carpal alignment.   Flexion contracture of the MCP joints and fingers reduces the   sensitivity of the  examination. There is soft tissue swelling over the   dorsal aspect of the hand. No fractures are identified and there is no   dislocation.      ANNABEL CRUZ MD            SYSTEM ID:  [id number]      XR Hand Right G/E 3 Views   Final Result   IMPRESSION: Impacted fracture of the distal aspect of the right radius   with slight dorsal angulation. The fracture extends to the joint   margin without significant cortical step-off at the wrist joint.   Irregularity of the tip of the ulnar styloid but this may be more   chronic. No carpal fractures are identified. Normal carpal alignment.   Flexion contracture of the MCP joints and fingers reduces the   sensitivity of the examination. There is soft tissue swelling over the   dorsal aspect of the hand. No fractures are identified and there is no   dislocation.      ANNABEL CRUZ MD            SYSTEM ID:  [id number]      CT Cervical Spine w/o Contrast   Final Result   IMPRESSION:   1. No acute cervical spinal fracture or posttraumatic subluxation.   2. Degenerative changes, as described.      EWA LANDIN MD            SYSTEM ID:  [id number]      CT Head w/o Contrast   Final Result   IMPRESSION:   1. No acute intracranial hemorrhage, extra axial fluid collection, or   mass effect.   2. Brain atrophy and presumed chronic small vessel ischemic changes,   as described.   3. Findings concerning for acute left zygomaticomaxillary complex   (ZMC) fracture pattern, as described. Consider dedicated facial CT for   further evaluation.      EWA LANDIN MD            SYSTEM ID:  [id number]          Treatments provided:  See MAR  Family Comments:  at bedside  OBS brochure/video discussed/provided to patient:  N/A  ED Medications:   Medications   acetaminophen (TYLENOL) tablet 1,000 mg (1,000 mg Oral $Given 12/31/24 2725)       Drips infusing:  No  For the majority of the shift this patient was Green.   Interventions performed were N/A.    Sepsis treatment initiated:  No    Cares/treatment/interventions/medications to be completed following ED care: N/A    ED Nurse Name: Khadra Abad RN  6:37 PM  RECEIVING UNIT ED HANDOFF REVIEW    Above ED Nurse Handoff Report was reviewed: Yes  Reviewed by: Tessa Cote RN on December 31, 2024 at 8:58 PM   KIRK Dorado called the ED to inform them the note was read: Yes

## 2025-01-01 NOTE — PLAN OF CARE
[facility]    ED Boarding Nurse Handoff Addendum Report:    Date/time: 12/31/2024, 9:20 PM    Neuro: Disorientated x4  Activity: have not been out of bed yet. Ax-2   Telemetry Monitoring: No  Pain: not showing any nonverbal signs of being in pain.  : incontinent cares provided. External cath recommended.   O2: RA  LDA's: Peripheral  Fluids: is Saline locked.  Diet: Regular  Consults: PT, Social Work or Care Coordination, and Ortho  Discharge Disposition:  TBD    Plan of Care:    Pain management    Vital signs (within last 30 minutes):    Vitals:    12/31/24 1939 12/31/24 1941 12/31/24 1943 12/31/24 2110   BP: (!) 159/73   (!) 142/83   BP Location:    Left arm   Pulse: 67   61   Resp:    20   Temp:    97.7  F (36.5  C)   TempSrc:    Oral   SpO2:  96% 97% 95%   Weight:       Height:           ED Boarding Nurse name: Silvia Greco RN

## 2025-01-01 NOTE — PLAN OF CARE
Goal Outcome Evaluation:      Plan of Care Reviewed With: spouse          Outcome Evaluation: Discharge home when medically ready and resume private duty care

## 2025-01-01 NOTE — CONSULTS
Care Management Initial Consult    General Information  Assessment completed with: Chart review, Spouse Karl  Type of CM/SW Visit: Initial Assessment    Primary Care Provider verified and updated as needed: Yes   Readmission within the last 30 days:           Advance Care Planning:            Communication Assessment  Patient's communication style: spoken language (English or Bilingual)    Hearing Difficulty or Deaf: no   Wear Glasses or Blind: yes    Cognitive  Cognitive/Neuro/Behavioral: .WDL except  Level of Consciousness: somnolent  Arousal Level: arouses to voice  Orientation: disoriented x 4  Mood/Behavior: calm     Speech: incoherent    Living Environment:   People in home: spouse     Current living Arrangements: house (Split entry- 6 stepsx2)      Able to return to prior arrangements: yes       Family/Social Support:  Care provided by:    Provides care for: no one  Marital Status:   Support system: Wife          Description of Support System: Supportive, Involved    Support Assessment: Adequate family and caregiver support, Patient communicates needs well met    Current Resources:   Patient receiving home care services: No        Community Resources: Other (see comment) (Private Duty)  Equipment currently used at home: walker, standard  Supplies currently used at home:      Employment/Financial:  Employment Status:          Financial Concerns:     Referral to Financial Worker: No       Does the patient's insurance plan have a 3 day qualifying hospital stay waiver?  Yes     Which insurance plan 3 day waiver is available? Alternative insurance waiver    Will the waiver be used for post-acute placement? No    Lifestyle & Psychosocial Needs:  Social Drivers of Health     Food Insecurity: No Food Insecurity (4/8/2022)    Hunger Vital Sign     Worried About Running Out of Food in the Last Year: Never true     Ran Out of Food in the Last Year: Never true   Depression: Not at risk (2/29/2024)    PHQ-2      PHQ-2 Score: 2   Housing Stability: Low Risk  (4/8/2022)    Housing Stability Vital Sign     Unable to Pay for Housing in the Last Year: No     Number of Places Lived in the Last Year: 1     Unstable Housing in the Last Year: No   Tobacco Use: Low Risk  (6/10/2024)    Patient History     Smoking Tobacco Use: Never     Smokeless Tobacco Use: Never     Passive Exposure: Not on file   Financial Resource Strain: Low Risk  (4/8/2022)    Overall Financial Resource Strain (CARDIA)     Difficulty of Paying Living Expenses: Not hard at all   Alcohol Use: Not At Risk (4/8/2022)    AUDIT-C     Frequency of Alcohol Consumption: Never     Average Number of Drinks: Not on file     Frequency of Binge Drinking: Not on file   Transportation Needs: No Transportation Needs (4/8/2022)    PRAPARE - Transportation     Lack of Transportation (Medical): No     Lack of Transportation (Non-Medical): No   Physical Activity: Not on file   Interpersonal Safety: Unknown (12/31/2024)    Interpersonal Safety     Do you feel physically and emotionally safe where you currently live?: Patient unable to answer     Within the past 12 months, have you been hit, slapped, kicked or otherwise physically hurt by someone?: Not on file     Within the past 12 months, have you been humiliated or emotionally abused in other ways by your partner or ex-partner?: Not on file   Stress: Not on file   Social Connections: Unknown (2/24/2021)    Social Connection and Isolation Panel [NHANES]     Frequency of Communication with Friends and Family: Not asked     Frequency of Social Gatherings with Friends and Family: Not asked     Attends Evangelical Services: Not asked     Active Member of Clubs or Organizations: Not asked     Attends Club or Organization Meetings: Not asked     Marital Status:    Health Literacy: Not on file       Functional Status:  Prior to admission patient needed assistance:   Dependent ADLs:: Ambulation-walker          Mental Health  Status:  Mental Health Status: No Current Concerns       Chemical Dependency Status:  Chemical Dependency Status: No Current Concerns             Values/Beliefs:  Spiritual, Cultural Beliefs, Presybeterian Practices, Values that affect care:                 Discussed  Partnership in Safe Discharge Planning  document with patient/family: No    Additional Information:  SW Spoke with spouse via phone for consult as patient has dementia. Patient lives in a split level home with her spouse. There are 2  sets of 6 steps. Patient's spouse is actively working to get a chair lift and declines needing assistance. Patient has private duty care 2 hours in the morning and 2 hours at night. Patient's spouse, Karl, states it is working out nicely. They also have a son nearby that assists as needed.     Patient is not getting any home care PT or OT at this time.     Spouse feels comfortable taking patient home and son will assist up the stairs.     Karl is working to move patient to an [facility]. He declines needing help at this time.     AAMIR Robert, [facility]  Emergency Room   Please contact the SW on the floor in which the patient is staying for any questions or concerns     Next Steps: therapy consults for recs    AAMIR Robert, [facility]  Emergency [facility]   Please contact the [facility] on the floor in which the patient is staying for any questions or concerns

## 2025-01-01 NOTE — PHARMACY-ADMISSION MEDICATION HISTORY
Pharmacy Intern Admission Medication History    Admission medication history is complete. The information provided in this note is only as accurate as the sources available at the time of the update.    Information Source(s): CareEverywhere/SureScripts and spouse  via in-person    Pertinent Information:  requested that all meds to be administered crushed with apple sauce. Confirmed that SEROQUEL is 2 tabs and not 3 tabs    Changes made to PTA medication list:  Added: Tylenol  Deleted: mary 100 mg  Changed: Added sig for SEROQUEL     Allergies reviewed with patient and updates made in EHR: yes    Medication History Completed By: Jose Lennon 12/31/2024 8:37 PM      PTA Med List   Medication Sig Note Last Dose/Taking    acetaminophen (TYLENOL) 500 MG tablet Take 500 mg by mouth at bedtime. 12/31/2024: CRUSH AND ADMINISTER WITH APPLE SAUCE 12/30/2024 Bedtime    aspirin 81 MG EC tablet Take 81 mg by mouth at bedtime. 12/31/2024: CRUSH AND ADMINISTER WITH APPLE SAUCE 12/30/2024 Bedtime    cetirizine (ZYRTEC) 10 MG tablet Take 10 mg by mouth daily as needed for allergies  Taking As Needed    cyanocobalamin (CYANOCOBALAMIN) 1000 MCG/ML injection Inject 1 mL (1,000 mcg) into the muscle every 30 days - with pharmacist recommended needles and syringes  12/14/2024    diclofenac (VOLTAREN) 1 % topical gel Apply 4 g topically 3 times daily as needed for moderate pain (4-6)  Taking As Needed    docusate sodium (COLACE) 100 MG tablet Take 100 mg by mouth At Bedtime 12/31/2024: CRUSH AND ADMINISTER WITH APPLE SAUCE 12/30/2024 Bedtime    donepezil (ARICEPT) 5 MG tablet Take 10 mg by mouth at bedtime 12/31/2024: CRUSH AND ADMINISTER WITH APPLE SAUCE 12/30/2024 Bedtime    escitalopram (LEXAPRO) 10 MG tablet Take 20 mg by mouth every evening 12/31/2024: CRUSH AND ADMINISTER WITH APPLE SAUCE 12/30/2024 Bedtime    gabapentin (NEURONTIN) 300 MG capsule Take 300 mg by mouth every evening 12/31/2024: CRUSH AND ADMINISTER WITH APPLE  SAUCE 12/30/2024 Bedtime    memantine (NAMENDA) 10 MG tablet Take 20 mg by mouth every evening 12/31/2024: CRUSH AND ADMINISTER WITH APPLE SAUCE 12/30/2024 Bedtime    QUEtiapine (SEROQUEL) 25 MG tablet Take 50 mg by mouth at bedtime. 12/31/2024: CRUSH AND ADMINISTER WITH APPLE SAUCE 12/30/2024 Bedtime

## 2025-01-01 NOTE — PLAN OF CARE
Goal Outcome Evaluation:  PRIMARY DIAGNOSIS: FALL-CLOSED FX  DISTAL END OF RIGHT RADIUS.  OUTPATIENT/OBSERVATION GOALS TO BE MET BEFORE DISCHARGE:  1. ADLs back to baseline: No     2. Activity and level of assistance: Up with maximum assistance. Consider SW and/or PT evaluation.      3. Pain status: Improved-controlled with oral pain medications.     4. Return to near baseline physical activity: No             Discharge Planner Nurse  Safe discharge environment identified: No  Barriers to discharge: Yes         Arouses to voice, disoriented x 4. VSS. Painad scale for pain, no indicators noted. Assist x 2 with cares. Unable to verbalize. Has not been oob. Splint to Right hand. CMS intact. Has remained NPO since midnight.Takes meds crushed in Apple sauce. Purewick in place. Ortho, PT & SW consults.

## 2025-01-01 NOTE — ED NOTES
Attempted to stand/ambulate patient. Pt barely able to stand at bedside with assist of 2, legs shaking and unsteady. MD Fierro notified.

## 2025-01-01 NOTE — PLAN OF CARE
ROOM # 207    Living Situation (if not independent, order SW consult):Home with Spouse  Facility name:  : Karl ( Spouse)    Activity level at baseline: SBA  Activity level on admit: Assist x 2    Who will be transporting you at discharge: Family    Patient registered to observation; given Patient Bill of Rights; given the opportunity to ask questions about observation status and their plan of care.  Patient has been oriented to the observation room, bathroom and call light is in place.    Discussed discharge goals and expectations with patient/family.         Goal Outcome Evaluation:

## 2025-01-01 NOTE — PLAN OF CARE
Goal Outcome Evaluation:  PRIMARY DIAGNOSIS: FALL-CLOSED FX  DISTAL END OF RIGHT RADIUS.  OUTPATIENT/OBSERVATION GOALS TO BE MET BEFORE DISCHARGE:  1.ADLs back to baseline: No     2.         Activity and level of assistance: Up with maximum assistance. Consider SW and/or PT evaluation.      3.Pain status: Improved-controlled with oral pain medications.     4.Return to near baseline physical activity: No             Discharge Planner Nurse  Safe discharge environment identified: No  Barriers to discharge: Yes         Arouses to voice, disoriented x 4. VSS. Painad scale for pain, no indicators noted. Assist x 2 with cares. Unable to verbalize. Has not been oob. Splint to Right hand. CMS intact. Has remained NPO since midnight.Takes meds crushed in Apple sauce. Purewick in place. Ortho and OT consult pending

## 2025-01-01 NOTE — PLAN OF CARE
"Goal Outcome Evaluation:  PRIMARY DIAGNOSIS: FALL-CLOSED FX  DISTAL END OF RIGHT RADIUS.  OUTPATIENT/OBSERVATION GOALS TO BE MET BEFORE DISCHARGE:  ADLs back to baseline: No    Activity and level of assistance: Up with maximum assistance. Consider SW and/or PT evaluation.     Pain status: Improved-controlled with oral pain medications.    Return to near baseline physical activity: No     Discharge Planner Nurse   Safe discharge environment identified: No  Barriers to discharge: Yes       Entered by: Tessa Cote RN 01/01/2025   BP (!) 149/70 (BP Location: Left arm)   Pulse 55   Temp 98  F (36.7  C) (Oral)   Resp 18   Ht 1.575 m (5' 2\")   Wt 81 kg (178 lb 9.2 oz)   LMP  (LMP Unknown)   SpO2 98%   BMI 32.66 kg/m     Arouses to voice, disoriented x 4. VSS. Painad scale for pain, no indicators noted.. Assist x 2 with cares.Unable to verbalize. Has not been oob. Splint to Right hand. CMS intact. Has remained NPO since midnight.Takes meds crushed in Apple sauce. Purewick in place. Ortho, PT & SW consults.     Please review provider order for any additional goals.   Nurse to notify provider when observation goals have been met and patient is ready for discharge.      Plan of Care Reviewed With: spouse    Problem: Adult Inpatient Plan of Care  Goal: Plan of Care Review  Description: The Plan of Care Review/Shift note should be completed every shift.  The Outcome Evaluation is a brief statement about your assessment that the patient is improving, declining, or no change.  This information will be displayed automatically on your shift  note.  Outcome: Progressing  Flowsheets (Taken 1/1/2025 0609)  Plan of Care Reviewed With: spouse  Overall Patient Progress: no change  Goal: Patient-Specific Goal (Individualized)  Description: You can add care plan individualizations to a care plan. Examples of Individualization might be:  \"Parent requests to be called daily at 9am for status\", \"I have a hard time hearing out of my " "right ear\", or \"Do not touch me to wake me up as it startles  me\".  Outcome: Progressing  Goal: Absence of Hospital-Acquired Illness or Injury  Outcome: Progressing  Intervention: Identify and Manage Fall Risk  Recent Flowsheet Documentation  Taken 12/31/2024 2219 by Tessa Cote RN  Safety Promotion/Fall Prevention: activity supervised  Intervention: Prevent Skin Injury  Recent Flowsheet Documentation  Taken 12/31/2024 2219 by Tessa Cote RN  Body Position:   turned   supine, legs elevated  Goal: Optimal Comfort and Wellbeing  Outcome: Progressing  Goal: Readiness for Transition of Care  Outcome: Progressing  Flowsheets  Taken 1/1/2025 0609  Anticipated Changes Related to Illness: none  Transportation Anticipated: family or friend will provide  Concerns to be Addressed: discharge planning  Taken 12/31/2024 2159  Transportation Anticipated: family or friend will provide  Intervention: Mutually Develop Transition Plan  Recent Flowsheet Documentation  Taken 1/1/2025 0609 by Tessa Cote RN  Anticipated Changes Related to Illness: none  Transportation Anticipated: family or friend will provide  Concerns to be Addressed: discharge planning  Taken 12/31/2024 2159 by Tessa Cote RN  Transportation Anticipated: family or friend will provide  Patient/Family Anticipates Transition to: home with family  Equipment Currently Used at Home: walker, standard       Overall Patient Progress: no changeOverall Patient Progress: no change           "

## 2025-01-01 NOTE — PROGRESS NOTES
[facility]    Medicine Progress Note - Hospitalist Service    Date of Admission:  12/31/2024    Assessment & Plan   Danielle Bryan is a 79 year old female with Pmhx of atrial fibrillation, cerebral amyloid angiopathy, progressive dementia, dyslipidemia, who was admitted on 12/31/2024 after a fall at home. Found to have complex fracture pattern left zygomatic maxillary with moderate layering blood products in the left maxillary sinus, right distal radius fracture. Wrist splinted in ED.   Admitted for further cares, Orthopedic Surgery consultation. Oral/Maxillofacial Surgery contacted from ED, no surgery advised.      Unwitnessed fall with Right Distal Radius Fracture, Left Facial fracture   Fall from toilet while caregivers reportedly briefly left restroom, awake and at baseline when fell. No neurologic symptoms. No signs of facial nerve or EOM injury. Wrist splinted.     -Orthopedic surgery consulted, if no surgery ok for regular diet  -continue wrist splint, nonweightbearing in right upper extremity  - spoke with Oral/Maxillofacial Surgery at [facility] they reviewed imaging and discussed via phone, they will schedule follow-up in their clinic in 1 week. Their office will be contacting the patient's  to schedule the appointment. Need to follow strict sinus precautions as below.              - No sneezing or blowing nose. If she was to sneeze must do so with mouth open              - No bending over              - Cannot use a straw for 6 weeks              - No heavy lifting greater then a bag of groceries    -PT/OT/SW consults for discharge planning (lives with spouse, currently has home health visit twice daily)     Acute Kidney Injury with Possible Underlying CKD   Cr up to 1.3 on admission, over past year .93-1.13 with GFR of 49-62.   - give 500 ml LR while still NPO  - encourage oral intake when no longer NPO  - follow BMP in AM   - avoid nephrotoxic medications       Atrial  "fibrillation  Cerebral amyloid angiopathy  Was discontinued on anticoagulation due to cerebral amyloid angiopathy. Currently on aspirin 81 mg daily.  - hold aspirin 81 mg in AM until seen by ortho     Dementia  -Continue PTA Seroquel, memantine, aricept, gabapentin  -Continue PTA Lexapro         Diet: NPO for Medical/Clinical Reasons Except for: Meds, Ice Chips    DVT Prophylaxis: Pneumatic Compression Devices  [place] Catheter: Not present  Lines: None     Cardiac Monitoring: None  Code Status: Full Code      Clinically Significant Risk Factors Present on Admission                 # Drug Induced Platelet Defect: home medication list includes an antiplatelet medication       # Dementia: noted on problem list       # Obesity: Estimated body mass index is 32.66 kg/m  as calculated from the following:    Height as of this encounter: 1.575 m (5' 2\").    Weight as of this encounter: 81 kg (178 lb 9.2 oz).              Disposition Plan      Expected Discharge Date: 01/01/2025      Destination: home with family         Medically Ready for Discharge: Anticipated Tomorrow        Susan Rosario PA-C  Hospitalist Service    Securely message with [place] (more info)  Text page via [facility] Paging/Directory   ______________________________________________________________________    Interval History     Assumed care. Pain in right wrist. Denies facial pain or headache.   is at bedside who assists of majority of evaluation as the patient has limited verbal interaction due to dementia.  Awaiting orthopedic surgery consult.  Patient was able to stand and walk with assistance yesterday, no pain in legs or back.  At baseline is assistive 1.    Physical Exam   Vital Signs: Temp: 98.1  F (36.7  C) Temp src: Oral BP: (!) 169/74 Pulse: 66   Resp: 18 SpO2: 99 % O2 Device: Nasal cannula    Weight: 178 lbs 9.16 oz    Constitutional: Awake, alert, no apparent distress  HEENT: Ecchymosis to left lateral maxillary region.  Mild left " periorbital  soft tissue swelling. Extraocular eye movements are intact. no scleral icterus or conjunctival injection, poor MCC, no indentation over the left cheek bone   Respiratory:  Normal work of breathing. Lungs clear to auscultation bilaterally, no crackles or wheezing.  Cardiovascular: Regular rate and regular rhythm, normal S1 and S2, and no murmur appreciated.   EXTREMITIES: Right wrist splinted. Due to swelling in right hand has difficulty making complete fist with right fingers. Fingers are ecchymotic, warm with intact sensation.   Skin/Integument: Warm, dry. no peripheral edema.  Neuro: No focal deficits. Moving all extremities with normal strength. Coordination and sensation grossly intact. Speech clear but limited verbal interaction. No focal deficits.   Psych: Appropriate affect.        Medical Decision Making       55 MINUTES SPENT BY ME on the date of service doing chart review, history, exam, documentation & further activities per the note.      Data   ------------------------- PAST 24 HR DATA REVIEWED -----------------------------------------------[id number]}

## 2025-01-02 ENCOUNTER — APPOINTMENT (OUTPATIENT)
Dept: OCCUPATIONAL THERAPY | Facility: CLINIC | Age: 80
DRG: 157 | End: 2025-01-02
Attending: PHYSICIAN ASSISTANT
Payer: COMMERCIAL

## 2025-01-02 VITALS
RESPIRATION RATE: 16 BRPM | OXYGEN SATURATION: 94 % | DIASTOLIC BLOOD PRESSURE: 50 MMHG | WEIGHT: 178.57 LBS | TEMPERATURE: 99 F | HEART RATE: 69 BPM | BODY MASS INDEX: 32.86 KG/M2 | HEIGHT: 62 IN | SYSTOLIC BLOOD PRESSURE: 112 MMHG

## 2025-01-02 LAB
ANION GAP SERPL CALCULATED.3IONS-SCNC: 8 MMOL/L (ref 7–15)
BUN SERPL-MCNC: 25.1 MG/DL (ref 8–23)
CALCIUM SERPL-MCNC: 8.5 MG/DL (ref 8.8–10.4)
CHLORIDE SERPL-SCNC: 108 MMOL/L (ref 98–107)
CREAT SERPL-MCNC: 1.24 MG/DL (ref 0.51–0.95)
EGFRCR SERPLBLD CKD-EPI 2021: 44 ML/MIN/1.73M2
GLUCOSE SERPL-MCNC: 94 MG/DL (ref 70–99)
HCO3 SERPL-SCNC: 23 MMOL/L (ref 22–29)
POTASSIUM SERPL-SCNC: 4.4 MMOL/L (ref 3.4–5.3)
SODIUM SERPL-SCNC: 139 MMOL/L (ref 135–145)

## 2025-01-02 PROCEDURE — 120N000001 HC R&B MED SURG/OB

## 2025-01-02 PROCEDURE — 250N000013 HC RX MED GY IP 250 OP 250 PS 637

## 2025-01-02 PROCEDURE — 250N000013 HC RX MED GY IP 250 OP 250 PS 637: Performed by: PHYSICIAN ASSISTANT

## 2025-01-02 PROCEDURE — 999N000147 HC STATISTIC PT IP EVAL DEFER: Performed by: PHYSICAL THERAPIST

## 2025-01-02 PROCEDURE — 82435 ASSAY OF BLOOD CHLORIDE: CPT | Performed by: PHYSICIAN ASSISTANT

## 2025-01-02 PROCEDURE — 99232 SBSQ HOSP IP/OBS MODERATE 35: CPT | Performed by: PHYSICIAN ASSISTANT

## 2025-01-02 PROCEDURE — 97165 OT EVAL LOW COMPLEX 30 MIN: CPT | Mod: GO

## 2025-01-02 PROCEDURE — 80048 BASIC METABOLIC PNL TOTAL CA: CPT | Performed by: PHYSICIAN ASSISTANT

## 2025-01-02 PROCEDURE — G0378 HOSPITAL OBSERVATION PER HR: HCPCS

## 2025-01-02 PROCEDURE — 97530 THERAPEUTIC ACTIVITIES: CPT | Mod: GO

## 2025-01-02 PROCEDURE — 36415 COLL VENOUS BLD VENIPUNCTURE: CPT | Performed by: PHYSICIAN ASSISTANT

## 2025-01-02 RX ORDER — BISACODYL 5 MG
5 TABLET, DELAYED RELEASE (ENTERIC COATED) ORAL DAILY PRN
Status: DISCONTINUED | OUTPATIENT
Start: 2025-01-02 | End: 2025-01-04 | Stop reason: HOSPADM

## 2025-01-02 RX ORDER — BISACODYL 10 MG
10 SUPPOSITORY, RECTAL RECTAL DAILY PRN
Status: DISCONTINUED | OUTPATIENT
Start: 2025-01-02 | End: 2025-01-02

## 2025-01-02 RX ORDER — CARBOXYMETHYLCELLULOSE SODIUM 5 MG/ML
1 SOLUTION/ DROPS OPHTHALMIC 4 TIMES DAILY PRN
Status: DISCONTINUED | OUTPATIENT
Start: 2025-01-02 | End: 2025-01-04 | Stop reason: HOSPADM

## 2025-01-02 RX ORDER — CARBOXYMETHYLCELLULOSE SODIUM 5 MG/ML
1 SOLUTION/ DROPS OPHTHALMIC 4 TIMES DAILY PRN
COMMUNITY

## 2025-01-02 RX ADMIN — OXYCODONE HYDROCHLORIDE 2.5 MG: 5 TABLET ORAL at 12:24

## 2025-01-02 RX ADMIN — QUETIAPINE FUMARATE 50 MG: 50 TABLET ORAL at 21:57

## 2025-01-02 RX ADMIN — DOCUSATE SODIUM 100 MG: 100 CAPSULE, LIQUID FILLED ORAL at 21:57

## 2025-01-02 RX ADMIN — ACETAMINOPHEN 975 MG: 325 TABLET, FILM COATED ORAL at 01:05

## 2025-01-02 RX ADMIN — OXYCODONE HYDROCHLORIDE 2.5 MG: 5 TABLET ORAL at 21:57

## 2025-01-02 RX ADMIN — CARBOXYMETHYLCELLULOSE SODIUM 1 DROP: 5 SOLUTION/ DROPS OPHTHALMIC at 09:52

## 2025-01-02 RX ADMIN — GABAPENTIN 300 MG: 300 CAPSULE ORAL at 19:43

## 2025-01-02 RX ADMIN — ACETAMINOPHEN 975 MG: 325 TABLET, FILM COATED ORAL at 17:26

## 2025-01-02 RX ADMIN — ESCITALOPRAM OXALATE 20 MG: 5 TABLET, FILM COATED ORAL at 19:43

## 2025-01-02 RX ADMIN — DONEPEZIL HYDROCHLORIDE 10 MG: 10 TABLET ORAL at 21:57

## 2025-01-02 RX ADMIN — MEMANTINE 20 MG: 5 TABLET ORAL at 19:43

## 2025-01-02 RX ADMIN — BISACODYL 5 MG: 5 TABLET, COATED ORAL at 17:35

## 2025-01-02 ASSESSMENT — ACTIVITIES OF DAILY LIVING (ADL)
ADLS_ACUITY_SCORE: 80
WALKING_OR_CLIMBING_STAIRS_DIFFICULTY: YES
ADLS_ACUITY_SCORE: 72
ADLS_ACUITY_SCORE: 72
ADLS_ACUITY_SCORE: 80
ADLS_ACUITY_SCORE: 80
CONCENTRATING,_REMEMBERING_OR_MAKING_DECISIONS_DIFFICULTY: YES
ADLS_ACUITY_SCORE: 72
ADLS_ACUITY_SCORE: 72
ADLS_ACUITY_SCORE: 80
DRESSING/BATHING: BATHING DIFFICULTY, DEPENDENT;DRESSING DIFFICULTY, DEPENDENT
ADLS_ACUITY_SCORE: 72
TOILETING_ASSISTANCE: TOILETING DIFFICULTY, DEPENDENT
ADLS_ACUITY_SCORE: 72
ADLS_ACUITY_SCORE: 72
DIFFICULTY_COMMUNICATING: YES
HEARING_DIFFICULTY_OR_DEAF: NO
WEAR_GLASSES_OR_BLIND: YES
ADLS_ACUITY_SCORE: 72
ADLS_ACUITY_SCORE: 80
ADLS_ACUITY_SCORE: 80
COMMUNICATION: UNABLE TO SPEAK
ADLS_ACUITY_SCORE: 80
ADLS_ACUITY_SCORE: 80
ADLS_ACUITY_SCORE: 88
DOING_ERRANDS_INDEPENDENTLY_DIFFICULTY: YES
WALKING_OR_CLIMBING_STAIRS: AMBULATION DIFFICULTY, DEPENDENT;STAIR CLIMBING DIFFICULTY, DEPENDENT;TRANSFERRING DIFFICULTY, DEPENDENT
ADLS_ACUITY_SCORE: 72
TOILETING_ISSUES: YES
VISION_MANAGEMENT: GLASSES
DRESSING/BATHING_DIFFICULTY: YES
DIFFICULTY_EATING/SWALLOWING: NO

## 2025-01-02 NOTE — PLAN OF CARE
"PRIMARY DIAGNOSIS: Fall/R Wrist/L Maxillary FXs   OUTPATIENT/OBSERVATION GOALS TO BE MET BEFORE DISCHARGE:  1. Pain Status: Improved-controlled with oral pain medications.    2. Return to near baseline physical activity: No    3. Cleared for discharge by consultants (if involved): No    Discharge Planner Nurse   Safe discharge environment identified: Yes  Barriers to discharge: Yes       Entered by: Airam Araujo RN 01/01/2025     Patient is Alert, JEOVANNY orientations as patient can hardly talk. Lung sounds CTA, BS active, LBM 12/29 per . Pt not OOB, pure wick in place, with clear adequate output. Pt turned and repositioned as needed. PIV to RUE SL.  has been in room, assisted pt with supper. Orthopedics called and said will see pt tomorrow. ACE wrap to RUE intact, CMS intact. Some bruising to left face intact. Pt NPO from midnight for possible medical procedures tomorrow. PT/OT & ortho following. Plan is to discharge home when medically ready and resume private duty care.    BP (!) 146/76 (BP Location: Left arm)   Pulse 74   Temp 98.9  F (37.2  C) (Oral)   Resp 18   Ht 1.575 m (5' 2\")   Wt 81 kg (178 lb 9.2 oz)   LMP  (LMP Unknown)   SpO2 95%   BMI 32.66 kg/m       Please review provider order for any additional goals.   Nurse to notify provider when observation goals have been met and patient is ready for discharge.Problem: Adult Inpatient Plan of Care  Goal: Plan of Care Review  Description: The Plan of Care Review/Shift note should be completed every shift.  The Outcome Evaluation is a brief statement about your assessment that the patient is improving, declining, or no change.  This information will be displayed automatically on your shift  note.  Outcome: Progressing  Goal: Patient-Specific Goal (Individualized)  Description: You can add care plan individualizations to a care plan. Examples of Individualization might be:  \"Parent requests to be called daily at 9am for status\", \"I have a " "hard time hearing out of my right ear\", or \"Do not touch me to wake me up as it startles  me\".  Outcome: Progressing  Goal: Absence of Hospital-Acquired Illness or Injury  Outcome: Progressing  Intervention: Identify and Manage Fall Risk  Recent Flowsheet Documentation  Taken 1/1/2025 1927 by Airam Araujo, RN  Safety Promotion/Fall Prevention:   activity supervised   lighting adjusted   mobility aid in reach   nonskid shoes/slippers when out of bed   clutter free environment maintained  Intervention: Prevent Skin Injury  Recent Flowsheet Documentation  Taken 1/1/2025 1927 by Airam Araujo, RN  Body Position: position changed independently  Intervention: Prevent Infection  Recent Flowsheet Documentation  Taken 1/1/2025 1927 by Airam Araujo, RN  Infection Prevention:   cohorting utilized   hand hygiene promoted   single patient room provided   rest/sleep promoted  Goal: Optimal Comfort and Wellbeing  Outcome: Progressing  Goal: Readiness for Transition of Care  Outcome: Progressing  "

## 2025-01-02 NOTE — PLAN OF CARE
"Goal Outcome Evaluation:  PRIMARY DIAGNOSIS: FALL--DISTAL RIGHT RADIUS FX  OUTPATIENT/OBSERVATION GOALS TO BE MET BEFORE DISCHARGE:  ADLs back to baseline: No    Activity and level of assistance: Up with maximum assistance. Consider SW and/or PT evaluation.     Pain status: Improved-controlled with oral pain medications.    Return to near baseline physical activity: No     Discharge Planner Nurse   Safe discharge environment identified: No  Barriers to discharge: Yes       Entered by: Tessa Cote RN 01/02/2025   /49 (BP Location: Left arm)   Pulse 74   Temp 98.4  F (36.9  C) (Oral)   Resp 16   Ht 1.575 m (5' 2\")   Wt 81 kg (178 lb 9.2 oz)   LMP  (LMP Unknown)   SpO2 96%   BMI 32.66 kg/m        Please review provider order for any additional goals.   Nurse to notify provider when observation goals have been met and patient is ready for discharge.      Plan of Care Reviewed With: spouse    Problem: Adult Inpatient Plan of Care  Goal: Plan of Care Review  Description: The Plan of Care Review/Shift note should be completed every shift.  The Outcome Evaluation is a brief statement about your assessment that the patient is improving, declining, or no change.  This information will be displayed automatically on your shift  note.  Outcome: Progressing  Flowsheets (Taken 1/2/2025 0142)  Plan of Care Reviewed With: spouse  Overall Patient Progress: improving  Goal: Patient-Specific Goal (Individualized)  Description: You can add care plan individualizations to a care plan. Examples of Individualization might be:  \"Parent requests to be called daily at 9am for status\", \"I have a hard time hearing out of my right ear\", or \"Do not touch me to wake me up as it startles  me\".  Outcome: Progressing  Goal: Absence of Hospital-Acquired Illness or Injury  Outcome: Progressing  Goal: Optimal Comfort and Wellbeing  Outcome: Progressing  Goal: Readiness for Transition of Care  Outcome: Progressing  Flowsheets (Taken " 1/2/2025 0142)  Anticipated Changes Related to Illness: none  Transportation Anticipated: family or friend will provide  Concerns to be Addressed: discharge planning  Intervention: Mutually Develop Transition Plan  Recent Flowsheet Documentation  Taken 1/2/2025 0142 by Tessa Cote RN  Anticipated Changes Related to Illness: none  Transportation Anticipated: family or friend will provide  Concerns to be Addressed: discharge planning       Overall Patient Progress: improvingOverall Patient Progress: improving

## 2025-01-02 NOTE — PROGRESS NOTES
"   01/02/25 1100   Appointment Info   Signing Clinician's Name / Credentials (OT) Areli Snowden, CHARANJITD, OTR/L   Rehab Comments (OT) gale NICOLE   Quick Adds   Quick Adds Certification   Living Environment   People in Home spouse   Current Living Arrangements house   Transportation Anticipated family or friend will provide   Living Environment Comments Per chart review and SW note: \"SW Spoke with spouse via phone for consult as patient has dementia. Patient lives in a split level home with her spouse. There are 2  sets of 6 steps. Patient's spouse is actively working to get a chair lift and declines needing assistance. Patient has private duty care 2 hours in the morning and 2 hours at night. Patient's spouse, Karl, states it is working out nicely. They also have a son nearby that assists as needed.      Patient is not getting any home care PT or OT at this time.      Spouse feels comfortable taking patient home and son will assist up the stairs.      Karl is working to move patient to an [facility]. He declines needing help at this time.   \"   Self-Care   Usual Activity Tolerance moderate   Current Activity Tolerance poor   Regular Exercise No   Equipment Currently Used at Home walker, standard   Fall history within last six months yes   Number of times patient has fallen within last six months 1   Activity/Exercise/Self-Care Comment Pt with Ax1 for all A/IADLs throughout the day from spouse or nursing aids   General Information   Onset of Illness/Injury or Date of Surgery 12/31/24   Referring Physician Susan Rosario PA-C   Patient/Family Therapy Goal Statement (OT) Per pts spouse. would like to return home   Additional Occupational Profile Info/Pertinent History of Current Problem Danielle Bryan is a 79 year old female with Pmhx of atrial fibrillation, cerebral amyloid angiopathy, progressive dementia, dyslipidemia, who was admitted on 12/31/2024 after a fall at home. Found to have complex fracture " pattern left zygomatic maxillary with moderate layering blood products in the left maxillary sinus, right distal radius fracture. Wrist splinted in ED.   Admitted for further cares, Orthopedic Surgery consultation. Oral/Maxillofacial Surgery contacted from ED, no surgery advised.   Existing Precautions/Restrictions fall;weight bearing   Right Upper Extremity (Weight-bearing Status) non weight-bearing (NWB)   Cognitive Status Examination   Orientation Status not oriented to person, place or time  (baseline dementia)   Cognitive Status Comments Per spouse, pt near baseline level of cognition.   Visual Perception   Visual Impairment/Limitations corrective lenses for reading   Sensory   Sensory Quick Adds sensation intact   Sensory Comments Does have increased swelling of right hand, redness and bruising noticable.   Pain Assessment   Patient Currently in Pain Yes, see Vital Sign flowsheet   Posture   Posture forward head position;protracted shoulders   Range of Motion Comprehensive   Comment, General Range of Motion BUE WFL; reach ROM on fingers right hand   Strength Comprehensive (MMT)   Comment, General Manual Muscle Testing (MMT) Assessment BUE WFL, limited on RUE due to NWB precautions   Bed Mobility   Comment (Bed Mobility) Max A   Transfers   Transfer Comments Mod Ax2   Balance   Balance Comments Generally unsteady, requries assist to support balance   Activities of Daily Living   BADL Assessment/Intervention bathing;lower body dressing;upper body dressing;grooming;toileting   Bathing Assessment/Intervention   Comment, (Bathing) Anticipate max A   Upper Body Dressing Assessment/Training   Comment, (Upper Body Dressing) Anticipate max A   Lower Body Dressing Assessment/Training   Comment, (Lower Body Dressing) Anticipate max A   Grooming Assessment/Training   Comment, (Grooming) Mod A   Toileting   Comment, (Toileting) Anticipate max A   Clinical Impression   Criteria for Skilled Therapeutic Interventions Met (OT)  Yes, treatment indicated   OT Diagnosis Impaired ADLs   Influenced by the following impairments distal radius fracture of RUE   OT Problem List-Impairments impacting ADL problems related to;activity tolerance impaired;cognition;mobility;strength;range of motion (ROM);pain;post-surgical precautions   Assessment of Occupational Performance 5 or more Performance Deficits   Identified Performance Deficits Bathing, dressing, g/h caresm all mobility and transfers   Planned Therapy Interventions (OT) ADL retraining;strengthening;transfer training;progressive activity/exercise;ROM   Clinical Decision Making Complexity (OT) problem focused assessment/low complexity   Risk & Benefits of therapy have been explained evaluation/treatment results reviewed;care plan/treatment goals reviewed;risks/benefits reviewed;current/potential barriers reviewed;participants voiced agreement with care plan;participants included;patient;spouse/significant other   OT Total Evaluation Time   OT Eval, Low Complexity Minutes (15108) 10   Therapy Certification   Medical Diagnosis Distal radius fx RUE   Start of Care Date 01/02/25   Certification date from 01/02/25   Certification date to 01/09/25   OT Goals   Therapy Frequency (OT) 5 times/week   OT Predicted Duration/Target Date for Goal Attainment 01/09/25   OT Goals Hygiene/Grooming;Lower Body Bathing;Toilet Transfer/Toileting;Upper Body Dressing   OT: Hygiene/Grooming minimal assist;using adaptive equipment;within precautions   OT: Upper Body Dressing Minimal assist   OT: Lower Body Bathing Minimal assist   OT: Toilet Transfer/Toileting Minimal assist;toilet transfer;cleaning and garment management   Interventions   Interventions Quick Adds Therapeutic Activity   Therapeutic Activities   Therapeutic Activity Minutes (65677) 40   Symptoms noted during/after treatment fatigue;increased pain   Treatment Detail/Skilled Intervention Pt greeted supine in bed, spouse present and provided detailed  living situation and PLOF. Pt with limited conversation, able to follow commands with 75% accuracy througout session. Pts spouse providing helpful support. Pt able to follow VCs to bring BLEs towards EOB with mod A, VCs to reach LUE towards bed rail and max A to sit upright. Mod Ax2 to bring pt towards EOB and maintain sitting upright balance. Pt able tolerate sitting EOB for extended period of time progressing to CGA. Pt able to maintain NWB RUE throughout session. Pt attempted x3 STS to progress functional transfers for future ADLs. Pt able to STS with max Ax2 and VCs for upright posture and body mechancis. Pt able to weight shift bilaterally with cueing but unable to complete any stepping due to increased weakness, balance, and fatigue. Pt progressing to mod Ax2 on last stand attempt only able to maintain standing ~30 seconds before sitting down. Pt with max Ax2 to return to supine and boost in bed. Pillows placed, situated, bed alarm on and spouse present at deptature. Discussed d/c locations as it pertains to therapy, spouse with appopriate questions.   OT Discharge Planning   OT Plan Progress standing tolerance, g/h cares seated, command following, progress toileting   OT Discharge Recommendation (DC Rec) Transitional Care Facility   OT Rationale for DC Rec Pt below functional baseline in ADLs and mobility. At baseline pt able to ambulate within home with Ax1 and hand held assist, does have walker for community. Pt now requiring increased assist x2 and has new NWB precautions which makes it difficult for mobility and ADLs at this time. Pt may benefit from TCU to address current impairemtns with ADLs and mobility prior to safely returning home with assist from spouse, family, and nursing aids, and home therapy. Will conitnue to follow while IP.   OT Brief overview of current status Max Ax2 STS   Total Session Time   Timed Code Treatment Minutes 40   Total Session Time (sum of timed and untimed services) 50   M  [facility]                                                                                   OUTPATIENT OCCUPATIONAL THERAPY    PLAN OF TREATMENT FOR OUTPATIENT REHABILITATION   Patient's Last Name, First Name, Danielle Barragan YOB: 1945   Provider's Name   [facility]   Medical Record No.  [id number]     Onset Date: 12/31/24 Start of Care Date: 01/02/25     Medical Diagnosis:  Distal radius fx RUE               OT Diagnosis:  Impaired ADLs Certification Dates:  From: 01/02/25  To: 01/09/25     See note for plan of treatment, functional goals, and certification details.    I CERTIFY THE NEED FOR THESE SERVICES FURNISHED UNDER        THIS PLAN OF TREATMENT AND WHILE UNDER MY CARE (Physician co-signature of this document indicates review and certification of the therapy plan).

## 2025-01-02 NOTE — PLAN OF CARE
PT: Orders received. Chart reviewed and discussed with care team, OT.  PT not indicated due to patient requiring maxA x2 for limited mobility, PT evaluation not needed for TCU authorization. OT to manage rehab needs in hospital.  Defer discharge recommendations to OT.  Will complete orders.

## 2025-01-02 NOTE — PLAN OF CARE
Goal Outcome Evaluation:      Plan of Care Reviewed With: patient, spouse    Overall Patient Progress: no changeOverall Patient Progress: no change    Outcome Evaluation: OT saw pt and rec TCU. Pt weak and with right hand fx unable to use walker. Nonsurgical. Oxy x 1 tried for wincing pain. Purewick removed as groin reddened. Turned and repostioned Q2 hours.  present and feeding pt. Requesting dulcolax pill as has not stooled in a few days. Nonverbal except calls out from time to time yes or no. Did swing at NA with turn.    Acute Traumatic Pain     1.  Pain controlled with oral analgesia: Yes      2.  Vital signs stable: Yes      3.  Diagnotic testing complete: Yes      4.  Cleared from consultants (if applicable): Yes      5. Return to near baseline physical activity: No

## 2025-01-02 NOTE — PLAN OF CARE
"Goal Outcome Evaluation:  PRIMARY DIAGNOSIS: FALL--DISTAL RIGHT RADIUS FX  OUTPATIENT/OBSERVATION GOALS TO BE MET BEFORE DISCHARGE:  ADLs back to baseline: No    Activity and level of assistance: Up with maximum assistance. Consider SW and/or PT evaluation.     Pain status: Improved-controlled with oral pain medications.    Return to near baseline physical activity: No     Discharge Planner Nurse   Safe discharge environment identified: No  Barriers to discharge: Yes       Entered by: Tessa Cote RN 01/02/2025   /51 (BP Location: Left arm)   Pulse 68   Temp 98.6  F (37  C) (Oral)   Resp 16   Ht 1.575 m (5' 2\")   Wt 81 kg (178 lb 9.2 oz)   LMP  (LMP Unknown)   SpO2 99%   BMI 32.66 kg/m      Disoriented x 4. VSS. Non verbal indicators of pain absent. Scheduled Tylenol administered. Splint remains to Right hand, elevated. CMS intact. Has remained NPO for Ortho consult. Assist x 2 with cares. Crush meds in Apple sauce.Ongoing plan of care.  Please review provider order for any additional goals.   Nurse to notify provider when observation goals have been met and patient is ready for discharge.      Plan of Care Reviewed With: spouse    Problem: Adult Inpatient Plan of Care  Goal: Plan of Care Review  Description: The Plan of Care Review/Shift note should be completed every shift.  The Outcome Evaluation is a brief statement about your assessment that the patient is improving, declining, or no change.  This information will be displayed automatically on your shift  note.  Outcome: Progressing  Flowsheets (Taken 1/2/2025 0142)  Plan of Care Reviewed With: spouse  Overall Patient Progress: improving  Goal: Patient-Specific Goal (Individualized)  Description: You can add care plan individualizations to a care plan. Examples of Individualization might be:  \"Parent requests to be called daily at 9am for status\", \"I have a hard time hearing out of my right ear\", or \"Do not touch me to wake me up as it " "startles  me\".  Outcome: Progressing  Goal: Absence of Hospital-Acquired Illness or Injury  Outcome: Progressing  Goal: Optimal Comfort and Wellbeing  Outcome: Progressing  Goal: Readiness for Transition of Care  Outcome: Progressing  Flowsheets (Taken 1/2/2025 0142)  Anticipated Changes Related to Illness: none  Transportation Anticipated: family or friend will provide  Concerns to be Addressed: discharge planning  Intervention: Mutually Develop Transition Plan  Recent Flowsheet Documentation  Taken 1/2/2025 0142 by Tessa Cote RN  Anticipated Changes Related to Illness: none  Transportation Anticipated: family or friend will provide  Concerns to be Addressed: discharge planning       Overall Patient Progress: improvingOverall Patient Progress: improving           "

## 2025-01-02 NOTE — PLAN OF CARE
"PRIMARY DIAGNOSIS: Fall/R Wrist/L Maxillary FXs   OUTPATIENT/OBSERVATION GOALS TO BE MET BEFORE DISCHARGE:  1. Pain Status: Improved-controlled with oral pain medications.    2. Return to near baseline physical activity: No    3. Cleared for discharge by consultants (if involved): No    Discharge Planner Nurse   Safe discharge environment identified: Yes  Barriers to discharge: Yes       Entered by: Airam Araujo RN 01/01/2025     Please review provider order for any additional goals.   Nurse to notify provider when observation goals have been met and patient is ready for discharge.Problem: Adult Inpatient Plan of Care  Goal: Plan of Care Review  Description: The Plan of Care Review/Shift note should be completed every shift.  The Outcome Evaluation is a brief statement about your assessment that the patient is improving, declining, or no change.  This information will be displayed automatically on your shift  note.  Outcome: Progressing  Goal: Patient-Specific Goal (Individualized)  Description: You can add care plan individualizations to a care plan. Examples of Individualization might be:  \"Parent requests to be called daily at 9am for status\", \"I have a hard time hearing out of my right ear\", or \"Do not touch me to wake me up as it startles  me\".  Outcome: Progressing  Goal: Absence of Hospital-Acquired Illness or Injury  Outcome: Progressing  Intervention: Identify and Manage Fall Risk  Recent Flowsheet Documentation  Taken 1/1/2025 1927 by Airam Araujo RN  Safety Promotion/Fall Prevention:   activity supervised   lighting adjusted   mobility aid in reach   nonskid shoes/slippers when out of bed   clutter free environment maintained  Intervention: Prevent Skin Injury  Recent Flowsheet Documentation  Taken 1/1/2025 1927 by Airam Araujo RN  Body Position: position changed independently  Intervention: Prevent Infection  Recent Flowsheet Documentation  Taken 1/1/2025 1927 by Airam Araujo, " RN  Infection Prevention:   cohorting utilized   hand hygiene promoted   single patient room provided   rest/sleep promoted  Goal: Optimal Comfort and Wellbeing  Outcome: Progressing  Goal: Readiness for Transition of Care  Outcome: Progressing

## 2025-01-02 NOTE — CONSULTS
[facility]    Orthopedic Consultation    Danielle Bryan MRN# [id number]   Age: 79 year old YOB: 1945     Date of Admission:   12/31/2024    Reason for consult: Right distal radius fracture        Requesting provider: MARYJO Byrd       Level of consult: One-time consult to assist in determining a diagnosis and to recommend an appropriate treatment plan           Assessment and Plan:   Assessment:   Right distal radius fracture       Plan:   Images were reviewed by our on call hand provider, Dr. Wong  Non-operative management recommended  Keep short arm splint applied   Elevate when at rest  NWB through the wrist but okay to use platform walker  Follow-up in 10-14 days for repeat xrays and splint change.  Clinic number: [phone]           Chief Complaint:   Right wrist pain          History of Present Illness:   Danielle Bryan is a 79 year old female with PMH atrial fibrillation, cerebral amyloid angiopathy, progressive dementia, dyslipidemia who presented to the ED after a fall.     Patient has dementia, her significant other is at bedside and provides most of the history.  They have a home health aide that comes to the home in the morning in the evening to help with cares.  The home health aide assisted the patient to the restroom and stepped out of the room for privacy when they heard the fall. They found the patient on the floor awake, moving all extremities and at her baseline per the .  She later began complaining of increasing wrist pain.  This was dorsally splinted in the ED.            Past Medical History:     Past Medical History:   Diagnosis Date    Adverse effect of bisphosphonate, sequela     Alopecia areata     Requires a wig now    Atrial fibrillation (H) 01/20/2019    Cerebral infarction (H) 2014    TIA    Dysphagia     Botox/EGD dilation at [facility], most recent 05/2013    Hyperlipemia     PONV (postoperative nausea and vomiting)     Recurrent UTI      Believed related in part to atrophic vaginitis    Vitamin D deficiency 2018             Past Surgical History:     Past Surgical History:   Procedure Laterality Date    ABDOMEN SURGERY  1975    [facility]    COLONOSCOPY  1/15/2014    Dr. Henry On license of [facility]    COLONOSCOPY  1/15/2014    Normal, no further colonoscopy felt required. Procedure: COLONOSCOPY;  Colonoscopy ;  Surgeon: Esteban Henry MD;  Location:  GI    EYE SURGERY      bilateral cataract surgery    GYN SURGERY      TUBAL PREGNANCY, D&C,  X 2    HEAD & NECK SURGERY      THYROIDECTOMY - partial-right side    RELEASE TRIGGER FINGER  2012    Procedure: RELEASE TRIGGER FINGER;  RIGHT THUMB, MIDDLE AND RING TRIGGER RELEASES;  Surgeon: Deniz Velazquez MD;  Location: [facility]             Social History:     Social History     Tobacco Use    Smoking status: Never    Smokeless tobacco: Never   Substance Use Topics    Alcohol use: No             Family History:     Family History   Problem Relation Age of Onset    Family History Negative Mother          age 85    C.A.D. Father          in his mid-80's of heart attack    Myocardial Infarction Brother     Coronary Artery Disease Brother         Has had stents/CABG    Other Cancer Brother     Other Cancer Sister              Immunizations:     VACCINE/DOSE   Diptheria   DPT   DTAP   HBIG   Hepatitis A   Hepatitis B   HIB   Influenza   Measles   Meningococcal   MMR   Mumps   Pneumococcal   Polio   Rubella   Small Pox   TDAP   Varicella   Zoster             Allergies:     Allergies   Allergen Reactions    Ciprofloxacin      Sores in mouth and throat felt funny    Reclast [Zoledronic Acid]      dizziness             Medications:     Current Facility-Administered Medications   Medication Dose Route Frequency Provider Last Rate Last Admin    acetaminophen (TYLENOL) tablet 975 mg  975 mg Oral Q8H Susan Rosario PA-C   975 mg at 25 0105    [Held by provider] aspirin EC tablet  81 mg  81 mg Oral At Bedtime Jaja Byrd PA-C        bisacodyl (DULCOLAX) suppository 10 mg  10 mg Rectal Daily PRN Rosa Maria Kong PA-C        carboxymethylcellulose PF (REFRESH PLUS) 0.5 % ophthalmic solution 1 drop  1 drop Both Eyes 4x Daily PRN Rosa Maria Kong PA-C   1 drop at 01/02/25 0952    docusate sodium (COLACE) capsule 100 mg  100 mg Oral At Bedtime Jaja Byrd PA-C   100 mg at 01/01/25 2122    donepezil (ARICEPT) tablet 10 mg  10 mg Oral At Bedtime Jaja Byrd PA-C   10 mg at 01/01/25 2122    escitalopram (LEXAPRO) tablet 20 mg  20 mg Oral QPM Jaja Byrd PA-C   20 mg at 01/01/25 2038    gabapentin (NEURONTIN) capsule 300 mg  300 mg Oral QPM Jaja Byrd PA-C   300 mg at 01/01/25 2038    lidocaine (LMX4) cream   Topical Q1H PRN Jaja Byrd PA-C        lidocaine 1 % 0.1-1 mL  0.1-1 mL Other Q1H PRN Jaja Byrd PA-C        magnesium hydroxide (MILK OF MAGNESIA) suspension 15-30 mL  15-30 mL Oral Every Other Day Susan Rosario PA-C        memantine (NAMENDA) tablet 20 mg  20 mg Oral QPM Jaja Byrd PA-C   20 mg at 01/01/25 2038    naloxone (NARCAN) injection 0.2 mg  0.2 mg Intravenous Q2 Min PRN Mark Estevez MD        Or    naloxone (NARCAN) injection 0.4 mg  0.4 mg Intravenous Q2 Min PRN Mark Estevez MD        Or    naloxone (NARCAN) injection 0.2 mg  0.2 mg Intramuscular Q2 Min PRN Mark Estevez MD        Or    naloxone (NARCAN) injection 0.4 mg  0.4 mg Intramuscular Q2 Min PRN Mark Estevez MD        ondansetron (ZOFRAN ODT) ODT tab 4 mg  4 mg Oral Q6H PRN Jaja Byrd PA-C        Or    ondansetron (ZOFRAN) injection 4 mg  4 mg Intravenous Q6H PRN Jaja Byrd PA-C        oxyCODONE IR (ROXICODONE) half-tab 2.5 mg  2.5 mg Oral Q4H PRN Jaja Byrd PA-C   2.5 mg at 01/02/25 1224    prochlorperazine (COMPAZINE) injection 5 mg  5 mg Intravenous Q6H PRN Jaja Byrd PA-C        Or    prochlorperazine (COMPAZINE) tablet 5 mg  5 mg Oral  Q6H PRN Jaja Byrd PA-C        QUEtiapine (SEROquel) tablet 50 mg  50 mg Oral At Bedtime Jaja Byrd PA-C   50 mg at 01/01/25 2122    senna-docusate (SENOKOT-S/PERICOLACE) 8.6-50 MG per tablet 1 tablet  1 tablet Oral BID PRN Jaja Byrd PA-C        Or    senna-docusate (SENOKOT-S/PERICOLACE) 8.6-50 MG per tablet 2 tablet  2 tablet Oral BID PRN Jaja Byrd PA-C        sodium chloride (PF) 0.9% PF flush 3 mL  3 mL Intracatheter q1 min prn Jaja Byrd PA-C        sodium chloride (PF) 0.9% PF flush 3 mL  3 mL Intracatheter Q8H Jaja Byrd PA-C   3 mL at 01/02/25 1215             Review of Systems:   ROS:  10 point ROS neg other than the symptoms noted above in the HPI.            Physical Exam:   All vitals have been reviewed  Patient Vitals for the past 24 hrs:   BP Temp Temp src Pulse Resp SpO2   01/02/25 1223 128/61 -- -- 73 -- 95 %   01/02/25 0806 115/53 98.4  F (36.9  C) Oral 69 16 93 %   01/02/25 0321 119/51 98.6  F (37  C) Oral 68 16 99 %   01/01/25 2341 119/49 98.4  F (36.9  C) Oral 74 16 96 %   01/01/25 1934 (!) 146/76 98.9  F (37.2  C) Oral 74 18 95 %   01/01/25 1548 (!) 143/56 98.3  F (36.8  C) Oral 73 18 97 %       Intake/Output Summary (Last 24 hours) at 1/2/2025 1317  Last data filed at 1/2/2025 1200  Gross per 24 hour   Intake --   Output 950 ml   Net -950 ml         Physical Exam   Temp: 98.4  F (36.9  C) Temp src: Oral BP: 128/61 Pulse: 73   Resp: 16 SpO2: 95 % O2 Device: None (Room air)    Vital Signs with Ranges  Temp:  [98.3  F (36.8  C)-98.9  F (37.2  C)] 98.4  F (36.9  C)  Pulse:  [68-74] 73  Resp:  [16-18] 16  BP: (115-146)/(49-76) 128/61  SpO2:  [93 %-99 %] 95 %  178 lbs 9.16 oz    Constitutional: Pleasant, alert, appropriate, following commands.  HEENT: Head atraumatic normocephalic. Pupils equal round and reactive to light.  Respiratory: Unlabored breathing no audible wheeze  Cardiovascular: Regular rate and rhythm per pulses  GI: Abdomen  non-distended.  Lymph/Hematologic: No lymphadenopathy in areas examined  Genitourinary:  No zavala  Skin: No rashes, no cyanosis, no edema.  Musculoskeletal: On exam of the right UE, a dorsal short arm splint is in place.  Swelling and ecchymosis present within the fingers.  She is able to flex/extend fingers with decreased ROM due to swelling.  Sensation intact.  Brisk cap refill   Neurologic: advancing dementia             Data:   All laboratory data reviewed  Results for orders placed or performed during the hospital encounter of 12/31/24   CT Head w/o Contrast     Status: None    Narrative    CT SCAN OF THE HEAD WITHOUT CONTRAST December 31, 2024 3:02 PM     HISTORY: Trauma, head injury, dementia.    TECHNIQUE: Axial images of the head and coronal reformations without  IV contrast material. Radiation dose for this scan was reduced using  automated exposure control, adjustment of the mA and/or kV according  to patient size, or iterative reconstruction technique.    COMPARISON: MRI of the brain dated 7/27/2023.    FINDINGS: The ventricles appear proportionate to the sulci. There is  mild to moderate generalized cerebral volume loss and mild patchy  nonspecific hypoattenuation in the cerebral white matter, likely due  to chronic small vessel ischemic disease. No large transcortical acute  or subacute infarct is identified. No hyperdense acute intracranial  hemorrhage, extra axial fluid collection, or mass effect identified  intracranially. Atherosclerotic calcifications of the carotid siphons.    Acute mildly displaced fractures involving the left orbital  floor/inferior orbital rim, the upper anterior wall of the left  maxillary sinus, the posterolateral wall of the left maxillary sinus,  and suspected fracture involving the left lateral orbital wall.  Segmental acute minimally displaced fractures involving the left  zygomatic arch with mild angulation of the fracture fragments. There  appears to be moderate  layering blood products in the left maxillary  sinus. Probable retention cyst or polyp in the right maxillary sinus.  The mastoid and middle ear cavities appear clear.    Changes of bilateral cataract surgery noted. Within the limitations of  technique, no obvious postseptal space-occupying hematoma or evidence  for extra ocular muscle entrapment.      Impression    IMPRESSION:  1. No acute intracranial hemorrhage, extra axial fluid collection, or  mass effect.  2. Brain atrophy and presumed chronic small vessel ischemic changes,  as described.  3. Findings concerning for acute left zygomaticomaxillary complex  (ZMC) fracture pattern, as described. Consider dedicated facial CT for  further evaluation.    EWA LANDIN MD         SYSTEM ID:  [id number]   CT Cervical Spine w/o Contrast     Status: None    Narrative    CT CERVICAL SPINE WITHOUT CONTRAST   12/31/2024 3:03 PM     HISTORY: Trauma, neck injury, dementia.     TECHNIQUE: Axial images of the cervical spine were obtained without  intravenous contrast. Multiplanar reformations were performed.  Radiation dose for this scan was reduced using automated exposure  control, adjustment of the mA and/or kV according to patient size, or  iterative reconstruction technique.    COMPARISON: Soft tissue neck CT dated 4/28/2024.    FINDINGS: No acute cervical spine fracture is identified. Alignment  appears within normal limits. Mild/mild to moderate multilevel disc  space narrowing with marginal endplate osteophytes and scattered  uncovertebral spurring. Mild to moderate multilevel degenerative facet  disease. Multilevel disc bulges/disc osteophyte complexes contributing  to mild to moderate multilevel spinal canal stenosis. No definite  high-grade spinal canal narrowing is identified. No other moderate  multilevel neural foraminal narrowing is noted. No definite high-grade  bony neural foraminal stenosis.    Partial visualization of a calcified nodule in the posterior  right  upper lung zone measuring 19 mm x 11 mm in axial plane, nonspecific,  unchanged from prior. The left thyroid lobe is markedly atrophic or  absent. The visualized paraspinous soft tissues otherwise appear  grossly unremarkable.      Impression    IMPRESSION:  1. No acute cervical spinal fracture or posttraumatic subluxation.  2. Degenerative changes, as described.    EWA LANDIN MD         SYSTEM ID:  [id number]   XR Wrist Right G/E 3 Views     Status: None    Narrative    XR WRIST RIGHT G/E 3 VIEWS, XR HAND RIGHT G/E 3 VIEWS 12/31/2024 3:20  PM     HISTORY: trauma, pain    COMPARISON: None.         Impression    IMPRESSION: Impacted fracture of the distal aspect of the right radius  with slight dorsal angulation. The fracture extends to the joint  margin without significant cortical step-off at the wrist joint.  Irregularity of the tip of the ulnar styloid but this may be more  chronic. No carpal fractures are identified. Normal carpal alignment.  Flexion contracture of the MCP joints and fingers reduces the  sensitivity of the examination. There is soft tissue swelling over the  dorsal aspect of the hand. No fractures are identified and there is no  dislocation.    ANNABEL CRUZ MD         SYSTEM ID:  [id number]   XR Hand Right G/E 3 Views     Status: None    Narrative    XR WRIST RIGHT G/E 3 VIEWS, XR HAND RIGHT G/E 3 VIEWS 12/31/2024 3:20  PM     HISTORY: trauma, pain    COMPARISON: None.         Impression    IMPRESSION: Impacted fracture of the distal aspect of the right radius  with slight dorsal angulation. The fracture extends to the joint  margin without significant cortical step-off at the wrist joint.  Irregularity of the tip of the ulnar styloid but this may be more  chronic. No carpal fractures are identified. Normal carpal alignment.  Flexion contracture of the MCP joints and fingers reduces the  sensitivity of the examination. There is soft tissue swelling over the  dorsal aspect of the hand.  No fractures are identified and there is no  dislocation.    ANNABEL CRUZ MD         SYSTEM ID:  [id number]   CT Facial Bones without Contrast     Status: None    Narrative    CT SCAN OF THE FACE WITHOUT CONTRAST 12/31/2024 4:29 PM     HISTORY: Trauma, possible facial fracture.     TECHNIQUE: Axial CT images of the facial bones were completed with  sagittal and coronal reformations. Radiation dose for this scan was  reduced using automated exposure control, adjustment of the mA and/or  kV according to patient size, or iterative reconstruction technique.     COMPARISON: CT of the head dated 12/31/2024.     FINDINGS: Acute mildly displaced fractures are noted involving the  anterior and posterior lateral walls of the left maxillary sinus,  segmental fractures with mild angulation involving the left zygomatic  arch, minimally displaced fracture involving the left inferior orbital  rim/orbital floor, and minimally displaced fractures involving the  left lateral orbital wall. Fractures extend through the floor of the  left maxillary sinus. Findings altogether are in keeping with a left  zygomaticomaxillary complex (ZMC) type fracture pattern.    The pterygoid plates are intact. The walls of the right orbit and  right maxillary sinus are intact. No displaced nasal arch or nasal  septal fracture is identified. The anterior skull base appears intact.  The mandible appears intact. The temporomandibular joints are normally  located.    Changes of bilateral cataract surgery noted. Mild left periorbital  soft tissue swelling. No findings of extra ocular muscle entrapment  identified. Moderate layering blood products in the left maxillary  sinus. Small probable retention cyst or polyp in the right maxillary  sinus. The mastoid and middle ear cavities appear clear. Mild patchy  fat stranding in the left cheek subcutaneous fat. Partially visualized  degenerative changes in the cervical spine.      Impression    IMPRESSION:  1.  Acute left zygomaticomaxillary (ZMC) complex fracture pattern, as  described.  2. Moderate layering blood products in the left maxillary sinus.    EWA LANDIN MD         SYSTEM ID:  [id number]   Basic metabolic panel     Status: Abnormal   Result Value Ref Range    Sodium 143 135 - 145 mmol/L    Potassium 5.1 3.4 - 5.3 mmol/L    Chloride 107 98 - 107 mmol/L    Carbon Dioxide (CO2) 24 22 - 29 mmol/L    Anion Gap 12 7 - 15 mmol/L    Urea Nitrogen 21.9 8.0 - 23.0 mg/dL    Creatinine 1.30 (H) 0.51 - 0.95 mg/dL    GFR Estimate 41 (L) >60 mL/min/1.73m2    Calcium 9.1 8.8 - 10.4 mg/dL    Glucose 136 (H) 70 - 99 mg/dL   [place] Draw     Status: None    Narrative    The following orders were created for panel order [place] Draw.  Procedure                               Abnormality         Status                     ---------                               -----------         ------                     Extra Blue Top Tube[[id number]]                              Final result               Extra Red Top Tube[[id number]]                               Final result                 Please view results for these tests on the individual orders.   CBC with platelets and differential     Status: None   Result Value Ref Range    WBC Count 9.2 4.0 - 11.0 10e3/uL    RBC Count 4.02 3.80 - 5.20 10e6/uL    Hemoglobin 12.0 11.7 - 15.7 g/dL    Hematocrit 36.3 35.0 - 47.0 %    MCV 90 78 - 100 fL    MCH 29.9 26.5 - 33.0 pg    MCHC 33.1 31.5 - 36.5 g/dL    RDW 13.2 10.0 - 15.0 %    Platelet Count 263 150 - 450 10e3/uL    % Neutrophils 81 %    % Lymphocytes 10 %    % Monocytes 7 %    % Eosinophils 1 %    % Basophils 0 %    % Immature Granulocytes 0 %    NRBCs per 100 WBC 0 <1 /100    Absolute Neutrophils 7.4 1.6 - 8.3 10e3/uL    Absolute Lymphocytes 0.9 0.8 - 5.3 10e3/uL    Absolute Monocytes 0.6 0.0 - 1.3 10e3/uL    Absolute Eosinophils 0.1 0.0 - 0.7 10e3/uL    Absolute Basophils 0.0 0.0 - 0.2 10e3/uL    Absolute Immature Granulocytes 0.0 <=0.4  10e3/uL    Absolute NRBCs 0.0 10e3/uL   Extra Blue Top Tube     Status: None   Result Value Ref Range    Hold Specimen JIC    Extra Red Top Tube     Status: None   Result Value Ref Range    Hold Specimen JIC    Basic metabolic panel     Status: Abnormal   Result Value Ref Range    Sodium 139 135 - 145 mmol/L    Potassium 4.6 3.4 - 5.3 mmol/L    Chloride 105 98 - 107 mmol/L    Carbon Dioxide (CO2) 22 22 - 29 mmol/L    Anion Gap 12 7 - 15 mmol/L    Urea Nitrogen 17.2 8.0 - 23.0 mg/dL    Creatinine 1.01 (H) 0.51 - 0.95 mg/dL    GFR Estimate 56 (L) >60 mL/min/1.73m2    Calcium 9.1 8.8 - 10.4 mg/dL    Glucose 102 (H) 70 - 99 mg/dL   Basic metabolic panel     Status: Abnormal   Result Value Ref Range    Sodium 139 135 - 145 mmol/L    Potassium 4.4 3.4 - 5.3 mmol/L    Chloride 108 (H) 98 - 107 mmol/L    Carbon Dioxide (CO2) 23 22 - 29 mmol/L    Anion Gap 8 7 - 15 mmol/L    Urea Nitrogen 25.1 (H) 8.0 - 23.0 mg/dL    Creatinine 1.24 (H) 0.51 - 0.95 mg/dL    GFR Estimate 44 (L) >60 mL/min/1.73m2    Calcium 8.5 (L) 8.8 - 10.4 mg/dL    Glucose 94 70 - 99 mg/dL   CBC with platelets differential     Status: None    Narrative    The following orders were created for panel order CBC with platelets differential.  Procedure                               Abnormality         Status                     ---------                               -----------         ------                     CBC with platelets and d...[[id number]]                      Final result                 Please view results for these tests on the individual orders.          Attestation:  I have reviewed today's vital signs, notes, medications, labs and imaging with Dr. Wong.  Amount of time performed on this consult: 50 minutes.    Geeta Fitzpatrick PA-C

## 2025-01-02 NOTE — PLAN OF CARE
"Goal Outcome Evaluation:      Plan of Care Reviewed With: patient, spouse    Overall Patient Progress: no changeOverall Patient Progress: no change    Outcome Evaluation: OT saw pt and rec TCU. Pt weak and with right hand fx unable to use walker. Nonsurgical. Oxy x 1 tried for wincing pain. Purewick removed as groin reddened. Turned and repostioned Q2 hours.  present and feeding pt. Requesting dulcolax pill as has not stooled in a few days. Nonverbal except calls out from time to time yes or no. Did swing at NA with turn.      Problem: Adult Inpatient Plan of Care  Goal: Plan of Care Review  Description: The Plan of Care Review/Shift note should be completed every shift.  The Outcome Evaluation is a brief statement about your assessment that the patient is improving, declining, or no change.  This information will be displayed automatically on your shift  note.  1/2/2025 1441 by Janelle Ho RN  Outcome: Progressing  1/2/2025 1240 by Janelle Ho RN  Outcome: Not Progressing  Flowsheets (Taken 1/2/2025 1240)  Outcome Evaluation: OT saw pt and rec TCU. Pt weak and with right hand fx unable to use walker. Nonsurgical. Oxy x 1 tried for wincing pain. Purewick removed as groin reddened. Turned and repostioned Q2 hours.  present and feeding pt. Requesting dulcolax pill as has not stooled in a few days. Nonverbal except calls out from time to time yes or no. Did swing at NA with turn.  Plan of Care Reviewed With:   patient   spouse  Overall Patient Progress: no change  1/2/2025 0734 by Janelle Ho, RN  Outcome: Progressing  Flowsheets (Taken 1/2/2025 0734)  Outcome Evaluation: Pt turned repositioned in bed, NPO for ortho consult today  Plan of Care Reviewed With:   spouse   patient  Overall Patient Progress: no change  Goal: Patient-Specific Goal (Individualized)  Description: You can add care plan individualizations to a care plan. Examples of Individualization might be:  \"Parent " "requests to be called daily at 9am for status\", \"I have a hard time hearing out of my right ear\", or \"Do not touch me to wake me up as it startles  me\".  1/2/2025 1441 by Janelle Ho RN  Outcome: Progressing  1/2/2025 1240 by Janelle Ho RN  Outcome: Not Progressing  1/2/2025 0734 by Janelle Ho RN  Outcome: Progressing  Goal: Absence of Hospital-Acquired Illness or Injury  1/2/2025 1441 by Janelle Ho RN  Outcome: Progressing  1/2/2025 1240 by Janelle Ho RN  Outcome: Not Progressing  1/2/2025 0734 by Janelle Ho RN  Outcome: Progressing  Intervention: Identify and Manage Fall Risk  Recent Flowsheet Documentation  Taken 1/2/2025 0827 by Janelle Ho RN  Safety Promotion/Fall Prevention:   activity supervised   lighting adjusted   mobility aid in reach   nonskid shoes/slippers when out of bed   clutter free environment maintained  Intervention: Prevent Skin Injury  Recent Flowsheet Documentation  Taken 1/2/2025 1224 by Janelle Ho RN  Body Position: turned  Taken 1/2/2025 1000 by Janelle Ho RN  Body Position:   turned   weight shifting   legs elevated   heels elevated   foot of bed elevated   neutral body alignment   supine, legs elevated   supine, head elevated  Taken 1/2/2025 0806 by Janelle Ho RN  Body Position:   turned   left  Intervention: Prevent Infection  Recent Flowsheet Documentation  Taken 1/2/2025 0827 by Janelle Ho RN  Infection Prevention:   cohorting utilized   hand hygiene promoted   single patient room provided   rest/sleep promoted  Goal: Optimal Comfort and Wellbeing  1/2/2025 1441 by Janelle Ho RN  Outcome: Progressing  1/2/2025 1240 by Janelle Ho RN  Outcome: Not Progressing  1/2/2025 0734 by Janelle Ho RN  Outcome: Progressing  Goal: Readiness for Transition of Care  1/2/2025 1441 by [place], Janelle S, RN  Outcome: Progressing  1/2/2025 1240 by Jaenlle Ho " S, RN  Outcome: Not Progressing  1/2/2025 0734 by Janelle Ho RN  Outcome: Progressing     Problem: Fall Injury Risk  Goal: Absence of Fall and Fall-Related Injury  Outcome: Progressing  Intervention: Identify and Manage Contributors  Recent Flowsheet Documentation  Taken 1/2/2025 0827 by Janelle Ho RN  Medication Review/Management: medications reviewed  Intervention: Promote Injury-Free Environment  Recent Flowsheet Documentation  Taken 1/2/2025 0827 by Janelle Ho RN  Safety Promotion/Fall Prevention:   activity supervised   lighting adjusted   mobility aid in reach   nonskid shoes/slippers when out of bed   clutter free environment maintained     Problem: Pain Acute  Goal: Optimal Pain Control and Function  Outcome: Progressing  Intervention: Prevent or Manage Pain  Recent Flowsheet Documentation  Taken 1/2/2025 0827 by Janelle Ho RN  Medication Review/Management: medications reviewed

## 2025-01-02 NOTE — PLAN OF CARE
Goal Outcome Evaluation:      Plan of Care Reviewed With: spouse, patient    Overall Patient Progress: no changeOverall Patient Progress: no change    Outcome Evaluation: Pt turned repositioned in bed, NPO for ortho consult today    PRIMARY DIAGNOSIS: GENERALIZED WEAKNESS/FALL    OUTPATIENT/OBSERVATION GOALS TO BE MET BEFORE DISCHARGE  1. Orthostatic performed: N/A    2. Tolerating PO medications: Yes    3. Return to near baseline physical activity: Yes    4. Cleared for discharge by consultants (if involved): No    Discharge Planner Nurse   Safe discharge environment identified: No  Barriers to discharge: Yes       Entered by: Janelle Ho RN 01/02/2025 7:35 AM     Please review provider order for any additional goals.   Nurse to notify provider when observation goals have been met and patient is ready for discharge.

## 2025-01-02 NOTE — PROGRESS NOTES
[facility]    Medicine Progress Note - Hospitalist Service    Date of Admission:  12/31/2024    Assessment & Plan   Danielle Bryan is a 79 year old female with Pmhx of atrial fibrillation, cerebral amyloid angiopathy, progressive dementia, and dyslipidemia, who was admitted on 12/31/2024 after a fall at home. Found to have complex fracture pattern left zygomatic maxillary with moderate layering blood products in the left maxillary sinus, right distal radius fracture. Wrist splinted in ED.     Admitted for further cares, since admission orthopedic surgery has seen patient and recommends nonoperative management.  Oral/maxillofacial surgery contacted by ER provider and recommended nonoperative management as well.  Patient continues to have issues with pain control which suspect will be difficult to manage due to her progressive dementia. Evaluated by OT on 1/2 with recommendation for TCU.      Unwitnessed fall with Right Distal Radius Fracture, Left Facial fracture   Fall from toilet while caregivers reportedly briefly left restroom, awake and at baseline when fell. No neurologic symptoms. No signs of facial nerve or EOM injury. Wrist splinted.   -ongoing pain in right wrist specifically, discussed with RN to trial low dose Oxycodone and see how patient tolerates with her progressive dementia    -Orthopedic surgery consulted, seen on 1/2, discussed with provider and plan for nonoperative management   -continue wrist splint, nonweightbearing in right upper extremity  -spoke with Oral/Maxillofacial Surgery at [facility] they reviewed imaging and discussed via phone, they will schedule follow-up in their clinic in 1 week. Their office will be contacting the patient's  to schedule the appointment. Need to follow strict sinus precautions as below.              - No sneezing or blowing nose. If she was to sneeze must do so with mouth open              - No bending over              - Cannot use a  "straw for 6 weeks              - No heavy lifting greater then a bag of groceries  -OT consulted, recommending TCU (at baseline lives with spouse, currently has home health visit twice daily)   -SW consult to assist with discharge planning     Acute Kidney Injury with Possible Underlying CKD   Cr up to 1.3 on admission, over past year .93-1.13 with GFR of 49-62.   - s/p 500 ml LR bolus while NPO on 1/1  - creatinine only slightly improved to 1.24 on 1/2, suspect due to being NPO all day on 1/1  - encourage oral intake now that patient has diet, hold off on further IVFs  - follow BMP  - avoid nephrotoxic medications      Atrial fibrillation  Cerebral amyloid angiopathy  Was discontinued on anticoagulation due to cerebral amyloid angiopathy. Currently on aspirin 81 mg daily.  - hold aspirin 81 mg in AM until seen by ortho     Dementia  At cognitive baseline per patient's  at bedside who is primary caregiver.   -Continue PTA Seroquel, memantine, aricept, gabapentin  -Continue PTA Lexapro           Diet: Regular Diet Adult    DVT Prophylaxis: Pneumatic Compression Devices  Morelos Catheter: Not present  Lines: None     Cardiac Monitoring: None  Code Status: Full Code      Clinically Significant Risk Factors Present on Admission          # Hyperchloremia: Highest Cl = 108 mmol/L in last 2 days, will monitor as appropriate            # Drug Induced Platelet Defect: home medication list includes an antiplatelet medication       # Dementia: noted on problem list       # Obesity: Estimated body mass index is 32.66 kg/m  as calculated from the following:    Height as of this encounter: 1.575 m (5' 2\").    Weight as of this encounter: 81 kg (178 lb 9.2 oz).              Social Drivers of Health    Food Insecurity: Unknown (1/2/2025)    Food Insecurity     Within the past 12 months, did you worry that your food would run out before you got money to buy more?: Patient unable to answer     Within the past 12 months, did the " food you bought just not last and you didn t have money to get more?: Patient unable to answer   Housing Stability: Unknown (1/2/2025)    Housing Stability     Do you have housing? : Patient unable to answer     Are you worried about losing your housing?: Patient unable to answer   Financial Resource Strain: Unknown (1/2/2025)    Financial Resource Strain     Within the past 12 months, have you or your family members you live with been unable to get utilities (heat, electricity) when it was really needed?: Patient unable to answer   Transportation Needs: Unknown (1/2/2025)    Transportation Needs     Within the past 12 months, has lack of transportation kept you from medical appointments, getting your medicines, non-medical meetings or appointments, work, or from getting things that you need?: Patient unable to answer   Interpersonal Safety: Unknown (12/31/2024)    Interpersonal Safety     Do you feel physically and emotionally safe where you currently live?: Patient unable to answer   Social Connections: Unknown (2/24/2021)    Social Connection and Isolation Panel [NHANES]     Frequency of Communication with Friends and Family: Not asked     Frequency of Social Gatherings with Friends and Family: Not asked     Attends Sabianism Services: Not asked     Active Member of Clubs or Organizations: Not asked     Attends Club or Organization Meetings: Not asked     Marital Status:           Disposition Plan     Medically Ready for Discharge: Anticipated Tomorrow         The patient's care was discussed with the Bedside Nurse, Patient, Patient's Family, and OT Team.    Rosa Maria Kong PA-C  Hospitalist Service  [facility]  Securely message with [facility] (more info)  Text page via [facility] Paging/Directory   ______________________________________________________________________    Interval History   Patient states that her face is sore if you touch it.  She notes most of her pain being in her  right wrist.  Patient's  reports that the patient is usually shaky when she attempts to  the morning which improves after short period of time but usually gets an assistance of 2 from the patient's  as well as home health aide. Patient and  agreeable to trying narcotics for pain control.     Physical Exam   Vital Signs: Temp: 98.4  F (36.9  C) Temp src: Oral BP: 128/61 Pulse: 73   Resp: 16 SpO2: 95 % O2 Device: None (Room air)    Weight: 178 lbs 9.16 oz    General Appearance: Sitting on side of bed, alert, interactive but minimally verbal, NAD  HEENT: ecchymosis to left lateral maxillary region with mild left periorbital soft tissue swelling, EOMIs, poor dentition  Respiratory: CTAB without wheezing or rales   Cardiovascular: RRR without murmur or rub   GI: soft, nontender, nondistended, normoactive bowel sounds   Skin: warm, dry   Ext: right UE - right wrist splinted, able to wiggle fingers with associated ecchymosis and swelling noted, sensation appears intact   Neuro: No new focal deficits appreciated, limited speech which is baseline, moving all extremities     Medical Decision Making       40 MINUTES SPENT BY ME on the date of service doing chart review, history, exam, documentation & further activities per the note.      Data   ------------------------- PAST 24 HR DATA REVIEWED -----------------------------------------------

## 2025-01-02 NOTE — PROGRESS NOTES
Care Management Follow Up    Expected Discharge Date: 01/04/2025     Concerns to be Addressed: Discharge planning     Patient plan of care discussed at interdisciplinary rounds: Yes    Anticipated Discharge Disposition: TCU      Anticipated Discharge Services: PT/OT  Anticipated Discharge DME: None    Patient/family educated on Medicare website which has current facility and service quality ratings: yes  Education Provided on the Discharge Plan: yes   Patient/Family in Agreement with the Plan: yes    Referrals Placed by CM/SW: skilled nursing facility   Private pay costs discussed: private room/amenity fees and transportation costs    Discussed  Partnership in Safe Discharge Planning  document with patient/family: Yes     Handoff Completed: No, handoff not indicated or clinically appropriate    Additional Information:  OT evaluated patient and recommend TCU. SW met with patient and spouse Karl at bedside. They are agreeable to TCU placement at discharge. Spouse is requesting that referrals be sent to [facility] and [facility]. He does not have a preference of semi-prvt vs private room. Spouse requesting that a referral not be sent to [facility] d/t previous experience. Spouse anticipates he can provide transportation pending pt's improvement in mobility. SW also discussed [facility] transport and associated cost. Spouse mentioned plans to install a stair chair and purchase a lift chair for patient at home. SW will continue to follow.     AAMIR Kumar, [facility]   Inpatient Care Coordination  [facility]   [phone]

## 2025-01-03 LAB
ANION GAP SERPL CALCULATED.3IONS-SCNC: 9 MMOL/L (ref 7–15)
BUN SERPL-MCNC: 30.5 MG/DL (ref 8–23)
CALCIUM SERPL-MCNC: 8.8 MG/DL (ref 8.8–10.4)
CHLORIDE SERPL-SCNC: 108 MMOL/L (ref 98–107)
CREAT SERPL-MCNC: 1.17 MG/DL (ref 0.51–0.95)
EGFRCR SERPLBLD CKD-EPI 2021: 47 ML/MIN/1.73M2
GLUCOSE SERPL-MCNC: 93 MG/DL (ref 70–99)
HCO3 SERPL-SCNC: 21 MMOL/L (ref 22–29)
POTASSIUM SERPL-SCNC: 4.3 MMOL/L (ref 3.4–5.3)
SODIUM SERPL-SCNC: 138 MMOL/L (ref 135–145)

## 2025-01-03 PROCEDURE — 250N000013 HC RX MED GY IP 250 OP 250 PS 637

## 2025-01-03 PROCEDURE — 120N000001 HC R&B MED SURG/OB

## 2025-01-03 PROCEDURE — 250N000013 HC RX MED GY IP 250 OP 250 PS 637: Performed by: PHYSICIAN ASSISTANT

## 2025-01-03 PROCEDURE — 99232 SBSQ HOSP IP/OBS MODERATE 35: CPT

## 2025-01-03 PROCEDURE — 80048 BASIC METABOLIC PNL TOTAL CA: CPT

## 2025-01-03 PROCEDURE — 36415 COLL VENOUS BLD VENIPUNCTURE: CPT

## 2025-01-03 RX ADMIN — DONEPEZIL HYDROCHLORIDE 10 MG: 10 TABLET ORAL at 20:31

## 2025-01-03 RX ADMIN — BISACODYL 5 MG: 5 TABLET, COATED ORAL at 10:10

## 2025-01-03 RX ADMIN — MAGNESIUM HYDROXIDE 30 ML: 2400 SUSPENSION ORAL at 10:01

## 2025-01-03 RX ADMIN — QUETIAPINE FUMARATE 50 MG: 50 TABLET ORAL at 20:31

## 2025-01-03 RX ADMIN — SENNOSIDES AND DOCUSATE SODIUM 2 TABLET: 50; 8.6 TABLET ORAL at 10:10

## 2025-01-03 RX ADMIN — ASPIRIN 81 MG: 81 TABLET, COATED ORAL at 20:31

## 2025-01-03 RX ADMIN — ESCITALOPRAM OXALATE 20 MG: 5 TABLET, FILM COATED ORAL at 20:31

## 2025-01-03 RX ADMIN — DOCUSATE SODIUM 100 MG: 100 CAPSULE, LIQUID FILLED ORAL at 20:31

## 2025-01-03 RX ADMIN — MEMANTINE 20 MG: 5 TABLET ORAL at 20:31

## 2025-01-03 RX ADMIN — SENNOSIDES AND DOCUSATE SODIUM 2 TABLET: 50; 8.6 TABLET ORAL at 20:31

## 2025-01-03 RX ADMIN — GABAPENTIN 300 MG: 300 CAPSULE ORAL at 20:31

## 2025-01-03 RX ADMIN — ACETAMINOPHEN 975 MG: 325 TABLET, FILM COATED ORAL at 10:10

## 2025-01-03 RX ADMIN — ACETAMINOPHEN 975 MG: 325 TABLET, FILM COATED ORAL at 00:22

## 2025-01-03 RX ADMIN — ACETAMINOPHEN 975 MG: 325 TABLET, FILM COATED ORAL at 18:48

## 2025-01-03 ASSESSMENT — ACTIVITIES OF DAILY LIVING (ADL)
ADLS_ACUITY_SCORE: 92
ADLS_ACUITY_SCORE: 88
ADLS_ACUITY_SCORE: 92
ADLS_ACUITY_SCORE: 88
ADLS_ACUITY_SCORE: 92
ADLS_ACUITY_SCORE: 88
ADLS_ACUITY_SCORE: 92
ADLS_ACUITY_SCORE: 88
ADLS_ACUITY_SCORE: 92
ADLS_ACUITY_SCORE: 88
ADLS_ACUITY_SCORE: 92
ADLS_ACUITY_SCORE: 88
ADLS_ACUITY_SCORE: 92
ADLS_ACUITY_SCORE: 92
ADLS_ACUITY_SCORE: 88
ADLS_ACUITY_SCORE: 92
ADLS_ACUITY_SCORE: 88

## 2025-01-03 NOTE — PLAN OF CARE
"Care from [id number]      Inpatient Progress Note:  For complete assessment see flow sheet documentation.       /49 (BP Location: Left arm)   Pulse 68   Temp 98.6  F (37  C) (Oral)   Resp 16   Ht 1.575 m (5' 2\")   Wt 81 kg (178 lb 9.2 oz)   LMP  (LMP Unknown)   SpO2 94%   BMI 32.66 kg/m         Neuro: Pt is unable to answer orientation questions. Mostly non verbal.   Vital Signs: Stable.   Pain/Comfort: Shows no signs of non verbal pain at rest, does with movement.   Diet/po intake: Pt is on a regular diet with no straws. Meds are given crushed in applesauce.   Output: Pt is incontinent at baseline. Purewick was removed due to redness in the perineum. Bowel regimen in place, patient has been constipated.   Activity/Ambulation: Pt is a Heavy assist of 2 to lift at this time.    Infusions: N/A  Major Shift Events: N/A  Plan (Upcoming Events): PT and OT are recommending TCU.    Discharge/Transfer Needs: SW is following for placement.     Will continue with POC.          Will continue to monitor and provide cares.    Goal Outcome Evaluation:      Plan of Care Reviewed With: patient, spouse    Overall Patient Progress: no changeOverall Patient Progress: no change      Problem: Adult Inpatient Plan of Care  Goal: Plan of Care Review  Description: The Plan of Care Review/Shift note should be completed every shift.  The Outcome Evaluation is a brief statement about your assessment that the patient is improving, declining, or no change.  This information will be displayed automatically on your shift  note.  Outcome: Not Progressing  Flowsheets (Taken 1/2/2025 1908)  Plan of Care Reviewed With:   patient   spouse  Overall Patient Progress: no change  Goal: Patient-Specific Goal (Individualized)  Description: You can add care plan individualizations to a care plan. Examples of Individualization might be:  \"Parent requests to be called daily at 9am for status\", \"I have a hard time hearing out of my right ear\", or " "\"Do not touch me to wake me up as it startles  me\".  Outcome: Not Progressing  Goal: Absence of Hospital-Acquired Illness or Injury  Outcome: Not Progressing  Goal: Optimal Comfort and Wellbeing  Outcome: Not Progressing  Goal: Readiness for Transition of Care  Outcome: Not Progressing     Problem: Fall Injury Risk  Goal: Absence of Fall and Fall-Related Injury  Outcome: Not Progressing     Problem: Pain Acute  Goal: Optimal Pain Control and Function  Outcome: Not Progressing     Problem: Comorbidity Management  Goal: Maintenance of Behavioral Health Symptom Control  Outcome: Not Progressing       "

## 2025-01-03 NOTE — PLAN OF CARE
"Goal Outcome Evaluation:      Plan of Care Reviewed With: patient    Overall Patient Progress: improvingOverall Patient Progress: improving    Outcome Evaluation: Pt is alert to self, quiet, occasionally interactive. Total care dependent with meals and cares. Turned and repositioned every 2 hrs with pillows. Meds crushed with apple sauce. Plan to discharge to TCU, placement pending. POC ongoing      Problem: Adult Inpatient Plan of Care  Goal: Plan of Care Review  Description: The Plan of Care Review/Shift note should be completed every shift.  The Outcome Evaluation is a brief statement about your assessment that the patient is improving, declining, or no change.  This information will be displayed automatically on your shift  note.  Outcome: Progressing  Flowsheets (Taken 1/3/2025 0322)  Outcome Evaluation: Pt is alert to self, quiet, occasionally interactive. Total care dependent with meals and cares. Turned and repositioned every 2 hrs with pillows. Meds crushed with apple sauce. Plan to discharge to TCU, placement pending. POC ongoing  Plan of Care Reviewed With: patient  Overall Patient Progress: improving  Goal: Patient-Specific Goal (Individualized)  Description: You can add care plan individualizations to a care plan. Examples of Individualization might be:  \"Parent requests to be called daily at 9am for status\", \"I have a hard time hearing out of my right ear\", or \"Do not touch me to wake me up as it startles  me\".  Outcome: Progressing  Goal: Absence of Hospital-Acquired Illness or Injury  Outcome: Progressing  Intervention: Identify and Manage Fall Risk  Recent Flowsheet Documentation  Taken 1/3/2025 0012 by Helga Kate, RN  Safety Promotion/Fall Prevention:   activity supervised   lighting adjusted   mobility aid in reach   nonskid shoes/slippers when out of bed   clutter free environment maintained  Intervention: Prevent Infection  Recent Flowsheet Documentation  Taken 1/3/2025 0012 by [place], " Helga ARTEAGA RN  Infection Prevention:   cohorting utilized   hand hygiene promoted   single patient room provided   rest/sleep promoted  Goal: Optimal Comfort and Wellbeing  Outcome: Progressing  Goal: Readiness for Transition of Care  Outcome: Progressing     Problem: Fall Injury Risk  Goal: Absence of Fall and Fall-Related Injury  Outcome: Progressing  Intervention: Identify and Manage Contributors  Recent Flowsheet Documentation  Taken 1/3/2025 0012 by Helga Kate, RN  Medication Review/Management: medications reviewed  Intervention: Promote Injury-Free Environment  Recent Flowsheet Documentation  Taken 1/3/2025 0012 by Helga Kate, RN  Safety Promotion/Fall Prevention:   activity supervised   lighting adjusted   mobility aid in reach   nonskid shoes/slippers when out of bed   clutter free environment maintained     Problem: Pain Acute  Goal: Optimal Pain Control and Function  Outcome: Progressing  Intervention: Prevent or Manage Pain  Recent Flowsheet Documentation  Taken 1/3/2025 0012 by Helga Kate, RN  Medication Review/Management: medications reviewed     Problem: Comorbidity Management  Goal: Maintenance of Behavioral Health Symptom Control  Outcome: Progressing  Intervention: Maintain Behavioral Health Symptom Control  Recent Flowsheet Documentation  Taken 1/3/2025 0012 by Helga Kate, RN  Medication Review/Management: medications reviewed

## 2025-01-03 NOTE — PROGRESS NOTES
[facility]    Medicine Progress Note - Hospitalist Service    Date of Admission:  12/31/2024    Assessment & Plan   Danielle Bryan is a 79 year old female with Pmhx of atrial fibrillation, cerebral amyloid angiopathy, progressive dementia, and dyslipidemia, who was admitted on 12/31/2024 after a fall at home. Found to have complex fracture pattern left zygomatic maxillary with moderate layering blood products in the left maxillary sinus, right distal radius fracture. Wrist splinted in ED.      Admitted for further cares, since admission orthopedic surgery has seen patient and recommends nonoperative management.  Oral/maxillofacial surgery contacted Hospitalist and recommended nonoperative management as well.  Patient continues to have issues with pain control which suspect will be difficult to manage due to her progressive dementia. Evaluated by OT on 1/2 with recommendation for TCU.       Unwitnessed fall with Right Distal Radius Fracture, Left Facial fracture   Metabolic encephalopathy   Fall from toilet while caregivers reportedly briefly left restroom, awake and at baseline when fell. No neurologic symptoms. No signs of facial nerve or EOM injury. Wrist splinted.   -Orthopedic surgery consulted  - nonoperative management     - continue wrist splint  - NWB right upper extremity  - holding oxycodone: Increased sedation with trial of low-dose oxycodone  -spoke with Oral/Maxillofacial Surgery at [facility] they reviewed imaging and discussed via phone, they will schedule follow-up in their clinic in 1 week. Their office will be contacting the patient's  to schedule the appointment. Need to follow strict sinus precautions as below.              - No sneezing or blowing nose. If she was to sneeze must do so with mouth open              - No bending over              - Cannot use a straw for 6 weeks              - No heavy lifting greater then a bag of groceries  -OT consulted, recommending  "TCU (at baseline lives with spouse, currently has home health visit twice daily)   -SW consult      Acute Kidney Injury with Possible Underlying CKD - resolved  Cr up to 1.3 on admission, over past year .93-1.13 with GFR of 49-62.   - s/p 500 ml LR bolus while NPO on 1/1  - creatinine only slightly improved to 1.24 on 1/2, suspect due to being NPO all day on 1/1  - encourage oral intake now that patient has diet  - avoid nephrotoxic medications      Atrial fibrillation  Cerebral amyloid angiopathy  Was discontinued on anticoagulation due to cerebral amyloid angiopathy. Currently on aspirin 81 mg daily.  - resume aspirin 81 mg      Dementia  At cognitive baseline per patient's  at bedside who is primary caregiver.   -Continue PTA Seroquel, memantine, aricept, gabapentin  -Continue PTA Lexapro           Diet: Regular Diet Adult    DVT Prophylaxis: Pneumatic Compression Devices  Morelos Catheter: Not present  Lines: None     Cardiac Monitoring: None  Code Status: Full Code      Clinically Significant Risk Factors Present on Admission          # Hyperchloremia: Highest Cl = 108 mmol/L in last 2 days, will monitor as appropriate            # Drug Induced Platelet Defect: home medication list includes an antiplatelet medication       # Dementia: noted on problem list       # Obesity: Estimated body mass index is 32.66 kg/m  as calculated from the following:    Height as of this encounter: 1.575 m (5' 2\").    Weight as of this encounter: 81 kg (178 lb 9.2 oz).              Social Drivers of Health    Food Insecurity: Unknown (1/2/2025)    Food Insecurity     Within the past 12 months, did you worry that your food would run out before you got money to buy more?: Patient unable to answer     Within the past 12 months, did the food you bought just not last and you didn t have money to get more?: Patient unable to answer   Housing Stability: Unknown (1/2/2025)    Housing Stability     Do you have housing? : Patient unable " to answer     Are you worried about losing your housing?: Patient unable to answer   Financial Resource Strain: Unknown (1/2/2025)    Financial Resource Strain     Within the past 12 months, have you or your family members you live with been unable to get utilities (heat, electricity) when it was really needed?: Patient unable to answer   Transportation Needs: Unknown (1/2/2025)    Transportation Needs     Within the past 12 months, has lack of transportation kept you from medical appointments, getting your medicines, non-medical meetings or appointments, work, or from getting things that you need?: Patient unable to answer   Interpersonal Safety: Unknown (1/2/2025)    Interpersonal Safety     Do you feel physically and emotionally safe where you currently live?: Patient unable to answer     Within the past 12 months, have you been hit, slapped, kicked or otherwise physically hurt by someone?: Patient unable to answer     Within the past 12 months, have you been humiliated or emotionally abused in other ways by your partner or ex-partner?: Patient unable to answer   Social Connections: Unknown (2/24/2021)    Social Connection and Isolation Panel [NHANES]     Frequency of Communication with Friends and Family: Not asked     Frequency of Social Gatherings with Friends and Family: Not asked     Attends Oriental orthodox Services: Not asked     Active Member of Clubs or Organizations: Not asked     Attends Club or Organization Meetings: Not asked     Marital Status:           Disposition Plan     Medically Ready for Discharge: Anticipated Tomorrow    The patient's care was discussed with the Bedside Nurse, Care Coordinator/, Patient, and Patient's Family.    LOLY Byrd PA-C  Hospitalist Service  [facility]  Securely message with [employer] (more info)  Text page via [facility] Paging/Directory   ______________________________________________________________________    Interval History    Sleepy during evaluation.  Ate some breakfast with  this morning.  Not complaining of any pain. More responsive this afternoon. Had a good lunch per .    Physical Exam   Vital Signs: Temp: 98.1  F (36.7  C) Temp src: Oral BP: 111/58 Pulse: 61   Resp: 16 SpO2: 94 % O2 Device: None (Room air)    Weight: 178 lbs 9.16 oz    GENERAL:  Sleepy, Comfortable, No acute distress. Laying in bed.   HEENT:  Normocephalic, bruising on top on head, left eye outer eye and cheek bone, No scleral icterus or conjunctival injection  HEART:  Normal S1, S2 with no murmur, RRR  LUNGS:  Normal Respiratory effort. Clear to auscultation anteriorly, bilaterally with no wheezing, rales or ronchi.  EXTREMITIES: Right hand splinted, hand with bruising, moving fingers, warm to touch  SKIN:  Warm, dry to touch. No rash.  NEUROLOGIC: Speech clear, alert & orientated x 4, no focal deficits.     Medical Decision Making       MANAGEMENT DISCUSSED with the following over the past 24 hours: patient, , nursing, SW   NOTE(S)/MEDICAL RECORDS REVIEWED over the past 24 hours: labs, imaging, progress notes       Data         Imaging results reviewed over the past 24 hrs:   No results found for this or any previous visit (from the past 24 hours).

## 2025-01-03 NOTE — PROGRESS NOTES
Care Management Follow Up    Length of Stay (days): 1    Expected Discharge Date: 01/04/2025     Concerns to be Addressed: discharge planning     Patient plan of care discussed at interdisciplinary rounds: Yes    Anticipated Discharge Disposition: Transitional Care    Anticipated Discharge Services: None  Anticipated Discharge DME: None    Patient/family educated on Medicare website which has current facility and service quality ratings: yes  Education Provided on the Discharge Plan: Yes  Patient/Family in Agreement with the Plan: yes    Referrals Placed by CM/SW: Post Acute Facilities  Private pay costs discussed: transportation costs    Additional Information:  Per discussion with provider, pt likely to be medically ready on 1/4 for TCU. She has been accepted to [facility], updated  who is in agreement.     Reviewed out of pocket cost for [facility] transport, $90.70 base and $6.08 per mile to the destination. Spoke with  Karl, they expressed understanding and are agreeable to this.  [place]  ride scheduled for [phone] on 1/4. TCU admissions aware.     Patito Fonseca RN BSN   Inpatient Care Coordination  [facility]   Phone [phone]

## 2025-01-03 NOTE — PLAN OF CARE
"Vitals are Temp: 97.5  F (36.4  C) Temp src: Oral BP: 124/58 Pulse: 67   Resp: 16 SpO2: 97 %.  Patient is  confused . Pt pretty lethargic this morning, they are 2 person assist and has not gotten out bed on this shift .  Pt is on a Regular diet.  They are showing nonverbal signs of pain with activity  Tylenol given for pain. Stool softeners given for constipation.  Patient is Saline locked. spouse at the bedside. Plan to discharge to [facility] tomorrow.        Problem: Adult Inpatient Plan of Care  Goal: Plan of Care Review  Description: The Plan of Care Review/Shift note should be completed every shift.  The Outcome Evaluation is a brief statement about your assessment that the patient is improving, declining, or no change.  This information will be displayed automatically on your shift  note.  Outcome: Progressing  Goal: Patient-Specific Goal (Individualized)  Description: You can add care plan individualizations to a care plan. Examples of Individualization might be:  \"Parent requests to be called daily at 9am for status\", \"I have a hard time hearing out of my right ear\", or \"Do not touch me to wake me up as it startles  me\".  Outcome: Progressing  Goal: Absence of Hospital-Acquired Illness or Injury  Outcome: Progressing  Intervention: Identify and Manage Fall Risk  Recent Flowsheet Documentation  Taken 1/3/2025 1044 by Amy Sol RN  Safety Promotion/Fall Prevention: activity supervised  Intervention: Prevent Skin Injury  Recent Flowsheet Documentation  Taken 1/3/2025 1044 by Amy Sol, RN  Body Position:   turned   left  Goal: Optimal Comfort and Wellbeing  Outcome: Progressing  Goal: Readiness for Transition of Care  Outcome: Progressing     Problem: Fall Injury Risk  Goal: Absence of Fall and Fall-Related Injury  Outcome: Progressing  Intervention: Promote Injury-Free Environment  Recent Flowsheet Documentation  Taken 1/3/2025 1044 by Amy Sol, RN  Safety Promotion/Fall Prevention: " activity supervised     Problem: Pain Acute  Goal: Optimal Pain Control and Function  Outcome: Progressing     Problem: Comorbidity Management  Goal: Maintenance of Behavioral Health Symptom Control  Outcome: Progressing

## 2025-01-04 VITALS
BODY MASS INDEX: 32.86 KG/M2 | HEIGHT: 62 IN | OXYGEN SATURATION: 95 % | DIASTOLIC BLOOD PRESSURE: 58 MMHG | TEMPERATURE: 98.2 F | HEART RATE: 60 BPM | RESPIRATION RATE: 18 BRPM | SYSTOLIC BLOOD PRESSURE: 130 MMHG | WEIGHT: 178.57 LBS

## 2025-01-04 PROCEDURE — 99238 HOSP IP/OBS DSCHRG MGMT 30/<: CPT

## 2025-01-04 PROCEDURE — 250N000013 HC RX MED GY IP 250 OP 250 PS 637: Performed by: PHYSICIAN ASSISTANT

## 2025-01-04 PROCEDURE — 250N000013 HC RX MED GY IP 250 OP 250 PS 637

## 2025-01-04 RX ORDER — ACETAMINOPHEN 325 MG/1
975 TABLET ORAL EVERY 8 HOURS
DISCHARGE
Start: 2025-01-04

## 2025-01-04 RX ORDER — AMOXICILLIN 250 MG
1 CAPSULE ORAL 2 TIMES DAILY PRN
DISCHARGE
Start: 2025-01-04

## 2025-01-04 RX ADMIN — ACETAMINOPHEN 975 MG: 325 TABLET, FILM COATED ORAL at 10:57

## 2025-01-04 RX ADMIN — BISACODYL 5 MG: 5 TABLET, COATED ORAL at 10:58

## 2025-01-04 RX ADMIN — ACETAMINOPHEN 975 MG: 325 TABLET, FILM COATED ORAL at 03:19

## 2025-01-04 RX ADMIN — SENNOSIDES AND DOCUSATE SODIUM 2 TABLET: 50; 8.6 TABLET ORAL at 10:57

## 2025-01-04 ASSESSMENT — ACTIVITIES OF DAILY LIVING (ADL)
ADLS_ACUITY_SCORE: 84
ADLS_ACUITY_SCORE: 92
ADLS_ACUITY_SCORE: 88
ADLS_ACUITY_SCORE: 92
ADLS_ACUITY_SCORE: 88
ADLS_ACUITY_SCORE: 84
ADLS_ACUITY_SCORE: 92
ADLS_ACUITY_SCORE: 84
ADLS_ACUITY_SCORE: 92
ADLS_ACUITY_SCORE: 88

## 2025-01-04 NOTE — DISCHARGE SUMMARY
"[facility]  Hospitalist Discharge Summary      Date of Admission:  12/31/2024  Date of Discharge:  1/4/2025  Discharging Provider: LOLY Byrd PA-C  Discharge Service: Hospitalist Service    Discharge Diagnoses   Unwitnessed fall with Right Distal Radius Fracture, Left Facial fracture   Metabolic encephalopathy - resolved   Acute Kidney Injury with Possible Underlying CKD - resolved     Clinically Significant Risk Factors     # Obesity: Estimated body mass index is 32.66 kg/m  as calculated from the following:    Height as of this encounter: 1.575 m (5' 2\").    Weight as of this encounter: 81 kg (178 lb 9.2 oz).       Follow-ups Needed After Discharge   Follow-up Appointments       Follow Up and recommended labs and tests      Follow up with specialist, Oral/Maxillofacial Surgery Team, in 1 weeks (1/7-1/10). Their office should be contacting you to schedule this appointment. If they do not contact you, the number is [phone]    Follow-up with [facility] in 10-14 days for repeat xrays and splint change.  To schedule the appointment the clinic number: [phone].              Discharge Disposition   Discharged to rehabilitation facility  Condition at discharge: Good    Hospital Course   Danielle Bryan is a 79 year old female with Pmhx of atrial fibrillation, cerebral amyloid angiopathy, progressive dementia, and dyslipidemia, who was admitted on 12/31/2024 after a fall at home. Found to have complex fracture pattern left zygomatic maxillary with moderate layering blood products in the left maxillary sinus, right distal radius fracture. Wrist splinted in ED.      Admitted for further cares, since admission orthopedic surgery has seen patient and recommends nonoperative management.  Oral/maxillofacial surgery contacted Hospitalist and recommended nonoperative management as well.  Patient continues to have issues with pain control which suspect will be difficult to manage " due to her progressive dementia. Evaluated by OT on 1/2 with recommendation for TCU.      At time of discharge patient's encephalopathy has resolved.  Suspect was secondary to trialing low-dose oxycodone for pain management.  Patient's pain has been well-controlled with Tylenol.  Oral maxillofacial surgery has not called patient's  to schedule follow-up appointment.  I did call there office and left a voicemail to contact the patient about scheduling a 1 week follow-up.  At time of discharge patient was alert, denying pain or any other medical concerns.  Patient will be transferred via stretcher to TCU.  Educated patient and  on return precautions.  Answered all questions prior to discharge.  Patient was discharged to TCU.     Unwitnessed fall with Right Distal Radius Fracture, Left Facial fracture   Metabolic encephalopathy   Fall from toilet while caregivers reportedly briefly left restroom, awake and at baseline when fell. No neurologic symptoms. No signs of facial nerve or EOM injury. Wrist splinted.   -Orthopedic surgery consulted  - nonoperative management               - continue wrist splint  - NWB right upper extremity, must use platform walker  - holding oxycodone: Increased sedation with trial of low-dose oxycodone  -spoke with Oral/Maxillofacial Surgery at [facility] they reviewed imaging and discussed via phone, they will schedule follow-up in their clinic in 1 week. Their office will be contacting the patient's  to schedule the appointment. Need to follow strict sinus precautions as below.              - No sneezing or blowing nose. If she was to sneeze must do so with mouth open              - No bending over              - Cannot use a straw for 6 weeks              - No heavy lifting greater then a bag of groceries  -OT consulted, recommending TCU (at baseline lives with spouse, currently has home health visit twice daily)   -SW consult      Acute Kidney Injury with Possible  Underlying CKD - resolved  Cr up to 1.3 on admission, over past year .93-1.13 with GFR of 49-62.   - s/p 500 ml LR bolus while NPO on 1/1  - creatinine only slightly improved to 1.24 on 1/2, suspect due to being NPO all day on 1/1  - encourage oral intake now that patient has diet  - avoid nephrotoxic medications      Atrial fibrillation  Cerebral amyloid angiopathy  Was discontinued on anticoagulation due to cerebral amyloid angiopathy. Currently on aspirin 81 mg daily.  - resume aspirin 81 mg      Dementia  At cognitive baseline per patient's  at bedside who is primary caregiver.   -Continue PTA Seroquel, memantine, aricept, gabapentin  -Continue PTA Lexapro    Consultations This Hospital Stay   PHYSICAL THERAPY ADULT IP CONSULT  CARE MANAGEMENT / SOCIAL WORK IP CONSULT  ORTHOPEDIC SURGERY IP CONSULT  OCCUPATIONAL THERAPY ADULT IP CONSULT  PHYSICAL THERAPY ADULT IP CONSULT    Code Status   Full Code    Time Spent on this Encounter   LOLY BRADLEY PA-C, personally saw the patient today and spent less than or equal to 30 minutes discharging this patient.       LOLY Byrd PA-C  [facility] OBSERVATION DEPT  [address] [place] [address]  Phone: [phone]  ______________________________________________________________________    Physical Exam   Vital Signs: Temp: 97.5  F (36.4  C) Temp src: Oral BP: 134/64 Pulse: 64   Resp: 18 SpO2: 98 % O2 Device: None (Room air)    Weight: 178 lbs 9.16 oz    GENERAL:  Alert, Comfortable, No acute distress. Laying in bed.   HEENT:  Normocephalic, bruising on top on head, left eye outer eye and cheek bone, No scleral icterus or conjunctival injection  HEART:  Normal S1, S2 with no murmur, RRR  LUNGS:  Normal Respiratory effort. Clear to auscultation anteriorly, bilaterally with no wheezing, rales or ronchi.  EXTREMITIES: Right hand splinted, hand with bruising, moving fingers, warm to touch  SKIN:  Warm, dry to touch. No  rash.  NEUROLOGIC: Speech clear, alert & orientated x 4, no focal deficits.     Primary Care Physician   Heriberto Headley    Discharge Orders      Primary Care - Care Coordination Referral      General info for SNF    Length of Stay Estimate: Short Term Care: Estimated # of Days <30  Condition at Discharge: Stable  Level of care:skilled   Rehabilitation Potential: Fair  Admission H&P remains valid and up-to-date: Yes  Recent Chemotherapy: N/A  Use Nursing Home Standing Orders: Yes     Mantoux instructions    Give two-step Mantoux (PPD) Per Facility Policy Yes     Follow Up and recommended labs and tests    Follow up with specialist, Oral/Maxillofacial Surgery Team, in 1 weeks (1/7-1/10). Their office should be contacting you to schedule this appointment. If they do not contact you, the number is [phone]    Follow-up with [facility] in 10-14 days for repeat xrays and splint change.  To schedule the appointment the clinic number: [phone].     Reason for your hospital stay    You were admitted to the hospital after a fall and found to have a right forearm fracture and left facial fracture.  You are seen by orthopedic surgery who recommended nonoperative management.  You will need to follow-up with the oral maxillofacial surgery team at [facility] in 1 week.  Their office should be contacting you to schedule this appointment.  Please follow-up with your primary care provider in 1 to 2 weeks or after discharging from rehab for hospital follow-up.     Additional Discharge Instructions    Need to follow strict sinus precautions as below.              - No sneezing or blowing nose. If she was to sneeze must do so with mouth open              - No bending over              - Cannot use a straw for 6 weeks              - No heavy lifting greater then a bag of groceries     Activity - Up with assistive device    Platform walker with the right arm     Activity - Up with nursing assistance     Encourage PO  fluids     Weight bearing status    Keep short arm splint applied   Elevate when at rest  NWB through the wrist but okay to use platform walker     Full Code     Physical Therapy Adult Consult    Evaluate and treat as clinically indicated.    Reason:  fall, right hand fracture, facial fracture     Fall precautions     Pneumatic Compression Device     Bilateral calf. Remove 30 mins BID.     Diet    Follow this diet upon discharge: Regular       Significant Results and Procedures   Results for orders placed or performed during the hospital encounter of 12/31/24   CT Head w/o Contrast    Narrative    CT SCAN OF THE HEAD WITHOUT CONTRAST December 31, 2024 3:02 PM     HISTORY: Trauma, head injury, dementia.    TECHNIQUE: Axial images of the head and coronal reformations without  IV contrast material. Radiation dose for this scan was reduced using  automated exposure control, adjustment of the mA and/or kV according  to patient size, or iterative reconstruction technique.    COMPARISON: MRI of the brain dated 7/27/2023.    FINDINGS: The ventricles appear proportionate to the sulci. There is  mild to moderate generalized cerebral volume loss and mild patchy  nonspecific hypoattenuation in the cerebral white matter, likely due  to chronic small vessel ischemic disease. No large transcortical acute  or subacute infarct is identified. No hyperdense acute intracranial  hemorrhage, extra axial fluid collection, or mass effect identified  intracranially. Atherosclerotic calcifications of the carotid siphons.    Acute mildly displaced fractures involving the left orbital  floor/inferior orbital rim, the upper anterior wall of the left  maxillary sinus, the posterolateral wall of the left maxillary sinus,  and suspected fracture involving the left lateral orbital wall.  Segmental acute minimally displaced fractures involving the left  zygomatic arch with mild angulation of the fracture fragments. There  appears to be moderate  layering blood products in the left maxillary  sinus. Probable retention cyst or polyp in the right maxillary sinus.  The mastoid and middle ear cavities appear clear.    Changes of bilateral cataract surgery noted. Within the limitations of  technique, no obvious postseptal space-occupying hematoma or evidence  for extra ocular muscle entrapment.      Impression    IMPRESSION:  1. No acute intracranial hemorrhage, extra axial fluid collection, or  mass effect.  2. Brain atrophy and presumed chronic small vessel ischemic changes,  as described.  3. Findings concerning for acute left zygomaticomaxillary complex  (ZMC) fracture pattern, as described. Consider dedicated facial CT for  further evaluation.    EWA LANDIN MD         SYSTEM ID:  [id number]   CT Cervical Spine w/o Contrast    Narrative    CT CERVICAL SPINE WITHOUT CONTRAST   12/31/2024 3:03 PM     HISTORY: Trauma, neck injury, dementia.     TECHNIQUE: Axial images of the cervical spine were obtained without  intravenous contrast. Multiplanar reformations were performed.  Radiation dose for this scan was reduced using automated exposure  control, adjustment of the mA and/or kV according to patient size, or  iterative reconstruction technique.    COMPARISON: Soft tissue neck CT dated 4/28/2024.    FINDINGS: No acute cervical spine fracture is identified. Alignment  appears within normal limits. Mild/mild to moderate multilevel disc  space narrowing with marginal endplate osteophytes and scattered  uncovertebral spurring. Mild to moderate multilevel degenerative facet  disease. Multilevel disc bulges/disc osteophyte complexes contributing  to mild to moderate multilevel spinal canal stenosis. No definite  high-grade spinal canal narrowing is identified. No other moderate  multilevel neural foraminal narrowing is noted. No definite high-grade  bony neural foraminal stenosis.    Partial visualization of a calcified nodule in the posterior right  upper lung  zone measuring 19 mm x 11 mm in axial plane, nonspecific,  unchanged from prior. The left thyroid lobe is markedly atrophic or  absent. The visualized paraspinous soft tissues otherwise appear  grossly unremarkable.      Impression    IMPRESSION:  1. No acute cervical spinal fracture or posttraumatic subluxation.  2. Degenerative changes, as described.    EWA LANDIN MD         SYSTEM ID:  [id number]   XR Wrist Right G/E 3 Views    Narrative    XR WRIST RIGHT G/E 3 VIEWS, XR HAND RIGHT G/E 3 VIEWS 12/31/2024 3:20  PM     HISTORY: trauma, pain    COMPARISON: None.         Impression    IMPRESSION: Impacted fracture of the distal aspect of the right radius  with slight dorsal angulation. The fracture extends to the joint  margin without significant cortical step-off at the wrist joint.  Irregularity of the tip of the ulnar styloid but this may be more  chronic. No carpal fractures are identified. Normal carpal alignment.  Flexion contracture of the MCP joints and fingers reduces the  sensitivity of the examination. There is soft tissue swelling over the  dorsal aspect of the hand. No fractures are identified and there is no  dislocation.    ANNABEL CRUZ MD         SYSTEM ID:  [id number]   XR Hand Right G/E 3 Views    Narrative    XR WRIST RIGHT G/E 3 VIEWS, XR HAND RIGHT G/E 3 VIEWS 12/31/2024 3:20  PM     HISTORY: trauma, pain    COMPARISON: None.         Impression    IMPRESSION: Impacted fracture of the distal aspect of the right radius  with slight dorsal angulation. The fracture extends to the joint  margin without significant cortical step-off at the wrist joint.  Irregularity of the tip of the ulnar styloid but this may be more  chronic. No carpal fractures are identified. Normal carpal alignment.  Flexion contracture of the MCP joints and fingers reduces the  sensitivity of the examination. There is soft tissue swelling over the  dorsal aspect of the hand. No fractures are identified and there is  no  dislocation.    ANNABEL CRUZ MD         SYSTEM ID:  [id number]   CT Facial Bones without Contrast    Narrative    CT SCAN OF THE FACE WITHOUT CONTRAST 12/31/2024 4:29 PM     HISTORY: Trauma, possible facial fracture.     TECHNIQUE: Axial CT images of the facial bones were completed with  sagittal and coronal reformations. Radiation dose for this scan was  reduced using automated exposure control, adjustment of the mA and/or  kV according to patient size, or iterative reconstruction technique.     COMPARISON: CT of the head dated 12/31/2024.     FINDINGS: Acute mildly displaced fractures are noted involving the  anterior and posterior lateral walls of the left maxillary sinus,  segmental fractures with mild angulation involving the left zygomatic  arch, minimally displaced fracture involving the left inferior orbital  rim/orbital floor, and minimally displaced fractures involving the  left lateral orbital wall. Fractures extend through the floor of the  left maxillary sinus. Findings altogether are in keeping with a left  zygomaticomaxillary complex (ZMC) type fracture pattern.    The pterygoid plates are intact. The walls of the right orbit and  right maxillary sinus are intact. No displaced nasal arch or nasal  septal fracture is identified. The anterior skull base appears intact.  The mandible appears intact. The temporomandibular joints are normally  located.    Changes of bilateral cataract surgery noted. Mild left periorbital  soft tissue swelling. No findings of extra ocular muscle entrapment  identified. Moderate layering blood products in the left maxillary  sinus. Small probable retention cyst or polyp in the right maxillary  sinus. The mastoid and middle ear cavities appear clear. Mild patchy  fat stranding in the left cheek subcutaneous fat. Partially visualized  degenerative changes in the cervical spine.      Impression    IMPRESSION:  1. Acute left zygomaticomaxillary (ZMC) complex fracture  pattern, as  described.  2. Moderate layering blood products in the left maxillary sinus.    EWA LANDIN MD         SYSTEM ID:  [id number]       Discharge Medications   Current Discharge Medication List        START taking these medications    Details   magnesium hydroxide (MILK OF MAGNESIA) 400 MG/5ML suspension Take 15-30 mLs by mouth every other day.    Associated Diagnoses: Constipation, unspecified constipation type      senna-docusate (SENOKOT-S/PERICOLACE) 8.6-50 MG tablet Take 1 tablet by mouth 2 times daily as needed for constipation.    Associated Diagnoses: Constipation, unspecified constipation type           CONTINUE these medications which have CHANGED    Details   acetaminophen (TYLENOL) 325 MG tablet Take 3 tablets (975 mg) by mouth every 8 hours.    Associated Diagnoses: Closed fracture of distal end of right radius, unspecified fracture morphology, initial encounter           CONTINUE these medications which have NOT CHANGED    Details   aspirin 81 MG EC tablet Take 81 mg by mouth at bedtime.      carboxymethylcellulose PF (REFRESH PLUS) 0.5 % ophthalmic solution Place 1 drop into both eyes 4 times daily as needed.      cetirizine (ZYRTEC) 10 MG tablet Take 10 mg by mouth daily as needed for allergies      cyanocobalamin (CYANOCOBALAMIN) 1000 MCG/ML injection Inject 1 mL (1,000 mcg) into the muscle every 30 days - with pharmacist recommended needles and syringes  Qty: 1 mL, Refills: 11    Associated Diagnoses: Vitamin B12 deficiency (non anemic)      diclofenac (VOLTAREN) 1 % topical gel Apply 4 g topically 3 times daily as needed for moderate pain (4-6)  Qty: 100 g, Refills: 0    Associated Diagnoses: Primary osteoarthritis of left knee; Primary osteoarthritis of right knee      docusate sodium (COLACE) 100 MG tablet Take 100 mg by mouth At Bedtime      donepezil (ARICEPT) 5 MG tablet Take 10 mg by mouth at bedtime      escitalopram (LEXAPRO) 10 MG tablet Take 20 mg by mouth every evening       gabapentin (NEURONTIN) 300 MG capsule Take 300 mg by mouth every evening      memantine (NAMENDA) 10 MG tablet Take 20 mg by mouth every evening      QUEtiapine (SEROQUEL) 25 MG tablet Take 50 mg by mouth at bedtime.           Allergies   Allergies   Allergen Reactions    Ciprofloxacin      Sores in mouth and throat felt funny    Reclast [Zoledronic Acid]      dizziness

## 2025-01-04 NOTE — PLAN OF CARE
"Vitals are Temp: 98.2  F (36.8  C) Temp src: Oral BP: 130/58 Pulse: 60   Resp: 18 SpO2: 95 %.  Patient is Disorientated to, Time, Place, and Situation. They are 2 assist with Lift.  Pt is a Regular diet.  They are denying pain.  Patient is Saline locked. Plan to discharge back to TCU today.        Problem: Adult Inpatient Plan of Care  Goal: Plan of Care Review  Description: The Plan of Care Review/Shift note should be completed every shift.  The Outcome Evaluation is a brief statement about your assessment that the patient is improving, declining, or no change.  This information will be displayed automatically on your shift  note.  Outcome: Progressing  Goal: Patient-Specific Goal (Individualized)  Description: You can add care plan individualizations to a care plan. Examples of Individualization might be:  \"Parent requests to be called daily at 9am for status\", \"I have a hard time hearing out of my right ear\", or \"Do not touch me to wake me up as it startles  me\".  Outcome: Progressing  Goal: Absence of Hospital-Acquired Illness or Injury  Outcome: Progressing  Intervention: Identify and Manage Fall Risk  Recent Flowsheet Documentation  Taken 1/4/2025 1057 by Amy Sol, RN  Safety Promotion/Fall Prevention: activity supervised  Goal: Optimal Comfort and Wellbeing  Outcome: Progressing  Goal: Readiness for Transition of Care  Outcome: Progressing     Problem: Fall Injury Risk  Goal: Absence of Fall and Fall-Related Injury  Outcome: Progressing  Intervention: Identify and Manage Contributors  Recent Flowsheet Documentation  Taken 1/4/2025 1057 by Amy Sol RN  Medication Review/Management: medications reviewed  Intervention: Promote Injury-Free Environment  Recent Flowsheet Documentation  Taken 1/4/2025 1057 by Amy Sol, RN  Safety Promotion/Fall Prevention: activity supervised     Problem: Pain Acute  Goal: Optimal Pain Control and Function  Outcome: Progressing  Intervention: Prevent or " Manage Pain  Recent Flowsheet Documentation  Taken 1/4/2025 1057 by Amy Sol, RN  Medication Review/Management: medications reviewed     Problem: Comorbidity Management  Goal: Maintenance of Behavioral Health Symptom Control  Outcome: Progressing  Intervention: Maintain Behavioral Health Symptom Control  Recent Flowsheet Documentation  Taken 1/4/2025 1057 by Amy Sol, RN  Medication Review/Management: medications reviewed

## 2025-01-04 NOTE — PLAN OF CARE
Patient's After Visit Summary was reviewed with patient.   Patient verbalized understanding of After Visit Summary, recommended follow up and was given an opportunity to ask questions.   Discharge medications sent home with patient/family: No   Discharged with transport tech

## 2025-01-04 NOTE — PLAN OF CARE
"Temp: 98.2  F (36.8  C) Temp src: Oral BP: (!) 145/72 Pulse: 66   Resp: 16 SpO2: 95 % O2 Device: None (Room air)       JEOVANNY orientation- intermittently responds to questions.  is very involved and supportive. Incontinent of both urine and bowel. Prn senna given for constipation along with scheduled medications. Scheduled tylenol given for pain. Turning and repositioning. Plan- ortho following- non- op/splinted/elevate/NWB RUE, follow-up with oral/maxillofacial surgery, 1pm [facility] [id number].     Problem: Adult Inpatient Plan of Care  Goal: Plan of Care Review  Description: The Plan of Care Review/Shift note should be completed every shift.  The Outcome Evaluation is a brief statement about your assessment that the patient is improving, declining, or no change.  This information will be displayed automatically on your shift  note.  Outcome: Progressing  Goal: Patient-Specific Goal (Individualized)  Description: You can add care plan individualizations to a care plan. Examples of Individualization might be:  \"Parent requests to be called daily at 9am for status\", \"I have a hard time hearing out of my right ear\", or \"Do not touch me to wake me up as it startles  me\".  Outcome: Progressing  Goal: Absence of Hospital-Acquired Illness or Injury  Outcome: Progressing  Intervention: Identify and Manage Fall Risk  Recent Flowsheet Documentation  Taken 1/3/2025 1847 by Kassi López, RN  Safety Promotion/Fall Prevention:   safety round/check completed   nonskid shoes/slippers when out of bed  Intervention: Prevent Skin Injury  Recent Flowsheet Documentation  Taken 1/3/2025 1847 by Kassi López, RN  Body Position:   turned   left  Goal: Optimal Comfort and Wellbeing  Outcome: Progressing  Goal: Readiness for Transition of Care  Outcome: Progressing     Problem: Fall Injury Risk  Goal: Absence of Fall and Fall-Related Injury  Outcome: Progressing  Intervention: Identify and Manage " Contributors  Recent Flowsheet Documentation  Taken 1/3/2025 1847 by Kassi López RN  Medication Review/Management: medications reviewed  Intervention: Promote Injury-Free Environment  Recent Flowsheet Documentation  Taken 1/3/2025 1847 by Kassi López RN  Safety Promotion/Fall Prevention:   safety round/check completed   nonskid shoes/slippers when out of bed     Problem: Pain Acute  Goal: Optimal Pain Control and Function  Outcome: Progressing  Intervention: Prevent or Manage Pain  Recent Flowsheet Documentation  Taken 1/3/2025 1847 by Kassi López RN  Medication Review/Management: medications reviewed     Problem: Comorbidity Management  Goal: Maintenance of Behavioral Health Symptom Control  Outcome: Progressing  Intervention: Maintain Behavioral Health Symptom Control  Recent Flowsheet Documentation  Taken 1/3/2025 1847 by Kassi López RN  Medication Review/Management: medications reviewed

## 2025-01-04 NOTE — PROGRESS NOTES
Care Management Discharge Note    Discharge Date: 01/04/2025       Discharge Disposition: Transitional Care    Discharge Services: None    Discharge DME: None    Discharge Transportation: agency    Private pay costs discussed: transportation costs    Does the patient's insurance plan have a 3 day qualifying hospital stay waiver?  No    PAS Confirmation Code: [id number]  Patient/family educated on Medicare website which has current facility and service quality ratings: yes    Education Provided on the Discharge Plan: Yes  Persons Notified of Discharge Plans: Karl,   Patient/Family in Agreement with the Plan: yes    Handoff Referral Completed: No, handoff not indicated or clinically appropriate    Additional Information:  Met with patient's , care team is recommending stretcher transport, discussed cost of $1228.70 and $28.67/mile,  is agreeable if that is what is recommended.  Spoke with [employer] transport and changed to stretcher transport between [phone].  Will fax discharge orders once signed.    QASIM Perez  [phone]

## 2025-01-04 NOTE — PLAN OF CARE
"Goal Outcome Evaluation:      Plan of Care Reviewed With: patient    Overall Patient Progress: improvingOverall Patient Progress: improving    Outcome Evaluation: Pt is oriented to self. Repsonds to simple yes and no questions. Very limited mobility, turned and repositioned q2hrs. Total care dependent. R forearm splinted, CMS intact. RUE elevated with pillows. Plan to discharge to [facility] this PM.  Blood pressure 119/68, pulse 74, temperature 98  F (36.7  C), temperature source Oral, resp. rate 18, height 1.575 m (5' 2\"), weight 81 kg (178 lb 9.2 oz), SpO2 95%, not currently breastfeeding.     Problem: Adult Inpatient Plan of Care  Goal: Plan of Care Review  Description: The Plan of Care Review/Shift note should be completed every shift.  The Outcome Evaluation is a brief statement about your assessment that the patient is improving, declining, or no change.  This information will be displayed automatically on your shift  note.  1/4/2025 0554 by Helga Kate RN  Outcome: Progressing  Flowsheets (Taken 1/4/2025 0554)  Plan of Care Reviewed With: patient  Overall Patient Progress: improving  1/4/2025 0546 by Helga Kate, RN  Outcome: Progressing  Flowsheets (Taken 1/4/2025 0546)  Outcome Evaluation: Pt is oriented to self. Repsonds to simple yes and no questions. Very limited mobility, turned and repositioned q2hrs. Total care dependent. R forearm splinted, CMS intact. RUE elevated with pillows. Plan to discharge to [facility] this PM.  Plan of Care Reviewed With: patient  Overall Patient Progress: improving  1/4/2025 0545 by Helga Kate, RN  Outcome: Progressing  Flowsheets (Taken 1/4/2025 0545)  Plan of Care Reviewed With: patient  Overall Patient Progress: improving  Goal: Patient-Specific Goal (Individualized)  Description: You can add care plan individualizations to a care plan. Examples of Individualization might be:  \"Parent requests to be called daily at 9am for " "status\", \"I have a hard time hearing out of my right ear\", or \"Do not touch me to wake me up as it startles  me\".  1/4/2025 0554 by Helga Kate RN  Outcome: Progressing  1/4/2025 0546 by Helga Kate RN  Outcome: Progressing  1/4/2025 0545 by Helga Kate RN  Outcome: Progressing  Goal: Absence of Hospital-Acquired Illness or Injury  1/4/2025 0554 by Helga Kate RN  Outcome: Progressing  1/4/2025 0546 by Helga Kate RN  Outcome: Progressing  1/4/2025 0545 by Helga Kate RN  Outcome: Progressing  Goal: Optimal Comfort and Wellbeing  1/4/2025 0554 by Helga Kate RN  Outcome: Progressing  1/4/2025 0546 by Helga Kate RN  Outcome: Progressing  1/4/2025 0545 by Helga Kate RN  Outcome: Progressing  Goal: Readiness for Transition of Care  1/4/2025 0554 by Helga Kate RN  Outcome: Progressing  1/4/2025 0546 by Helga Kate RN  Outcome: Progressing  1/4/2025 0545 by Helga Kate RN  Outcome: Progressing     Problem: Fall Injury Risk  Goal: Absence of Fall and Fall-Related Injury  1/4/2025 0554 by Helga Kate RN  Outcome: Progressing  1/4/2025 0546 by Helga Kate RN  Outcome: Progressing  1/4/2025 0545 by Helga Kate RN  Outcome: Progressing     Problem: Pain Acute  Goal: Optimal Pain Control and Function  1/4/2025 0554 by Helga Kate RN  Outcome: Progressing  1/4/2025 0546 by Helga Kate RN  Outcome: Progressing  1/4/2025 0545 by Helga Kate RN  Outcome: Progressing     Problem: Comorbidity Management  Goal: Maintenance of Behavioral Health Symptom Control  1/4/2025 0554 by Helga Kate, RN  Outcome: Progressing  1/4/2025 0546 by Helga Kate, RN  Outcome: Progressing  1/4/2025 0545 by Helga Kate, RN  Outcome: Progressing     "

## 2025-01-05 ENCOUNTER — LAB REQUISITION (OUTPATIENT)
Dept: LAB | Facility: CLINIC | Age: 80
End: 2025-01-05
Payer: COMMERCIAL

## 2025-01-05 DIAGNOSIS — Z11.1 ENCOUNTER FOR SCREENING FOR RESPIRATORY TUBERCULOSIS: ICD-10-CM

## 2025-01-05 NOTE — PROGRESS NOTES
[facility] GERIATRICS    PRIMARY CARE PROVIDER AND CLINIC:  Heriberto Headley MD, [address] [facility] [address]  Chief Complaint   Patient presents with    Hospital F/U      [place] Medical Record Number:  [id number]  Place of Service where encounter took place:  [facility]  ([facility]) [[id number]]    Danielle Bryan  is a 79 year old  (1945), admitted to the above facility from  [facility]. Hospital stay 12/31/2024 through 1/5/2025.  HPI:    Past medical history significant for paroxysmal atrial fibrillation, dyslipidemia, type 2 diabetes, dementia, GERD, vitamin D deficiency, vitamin B12 deficiency    Summary of recent hospitalization:  Patient was hospitalized at [facility] [place] from 12/31/2024 to 1/4/2025 for fall.  Patient presented to the emergency department for evaluation after an unwitnessed fall, patient found facedown on the floor awake in the bathroom by nursing staff.  Laboratory evaluation in the emergency department significant for potassium 5.1, creatinine 1.3, normal WBC, hemoglobin 12.0, platelet count 263.  CT facial bones reveals acute left zygomaticomaxillary complex fracture pattern, moderate layering blood products in the left maxillary sinus.  Right wrist x-ray revealed impacted fracture of the distal aspect of the right radius with slight dorsal angulation, fracture extends to the joint margin without significant cortical step-off at the wrist joint, irregularity at the tip of the ulnar styloid but this may be more chronic, flexion contracture of the MCP joints and fingers reduces the sensitivity of the examination, soft tissue swelling over dorsal aspect of the hand.  CT cervical spine negative for acute cervical spinal fracture or posttraumatic subluxation.  CT head negative for acute intracranial hemorrhage, extra-axial fluid collection or mass effect, findings concerning for acute left zygomaticomaxillary complex fracture  "pattern.  Ortho was consulted and recommended nonoperative management with short arm splint applied.  Nonweightbearing through the wrist, but okay to use platform walker, follow-up in 10 to 14 days.  Oral/maxillofacial surgery contacted by hospitalist and recommended nonoperative management.  Prior to discharge patient's encephalopathy improved, pain was well-managed on Tylenol and it was felt that the oxycodone may have contributed to encephalopathy.  Patient to follow-up with oral maxillofacial surgery in 1 week.  Patient received IV fluids, creatinine improved prior to discharge. Discharged to TCU for physical rehabilitation and medical management.     Reviewed ER note, H&P, labs and imaging from recent hospital stay today.    Today patient was seen for admission visit in the TCU.  Patient oriented x 1 this morning.  Very slow to respond to my questions, and answered only that she is \"good\" this morning. She did follow my commands. Nursing staff with no specific concerns. She received a suppository for constipation at spouses request yesterday. She appears comfortable.    Spoke to patient's spouse Karl, when he arrived later in the morning.  He tells me that she does not speak much, she has been in a daze, since the accident.  He tells me that she has been doing overall better.  He feels her pain is pretty well-managed, she does seem to have a little bit in her wrist.  He is also requesting that the Voltaren be scheduled to her knees.  We reviewed that she was constipated and had suppository yesterday.  Will continue home bowel regimen.  I answered his questions today.    Reviewed facility EMR including medications, recent nursing progress notes, vital signs.  Discussed plan of care with nursing.      CODE STATUS/ADVANCE DIRECTIVES DISCUSSION:  Full Code    ALLERGIES:   Allergies   Allergen Reactions    Ciprofloxacin      Sores in mouth and throat felt funny    Reclast [Zoledronic Acid]      dizziness      PAST " MEDICAL HISTORY:   Past Medical History:   Diagnosis Date    Adverse effect of bisphosphonate, sequela     Alopecia areata     Requires a wig now    Atrial fibrillation (H) 01/20/2019    Cerebral infarction (H) 2014    TIA    Dysphagia     Botox/EGD dilation at [facility], most recent 05/2013    Hyperlipemia     PONV (postoperative nausea and vomiting)     Recurrent UTI     Believed related in part to atrophic vaginitis    Vitamin D deficiency 11/29/2018      PAST SURGICAL HISTORY:   has a past surgical history that includes GYN surgery; Release trigger finger (7/12/2012); colonoscopy (1/15/2014); Head and neck surgery (1983); Eye surgery (2013); Colonoscopy (1/15/2014); and Abdomen surgery (1975 1982).  FAMILY HISTORY: family history includes C.A.D. in her father; Coronary Artery Disease in her brother; Family History Negative in her mother; Myocardial Infarction in her brother; Other Cancer in her brother and sister.  SOCIAL HISTORY:   reports that she has never smoked. She has never used smokeless tobacco. She reports that she does not drink alcohol and does not use drugs.    Post Discharge Medication Reconciliation Status:   MED REC REQUIRED  Post Medication Reconciliation Status:  Discharge medications reconciled and changed, see notes/orders     Current Outpatient Medications   Medication Sig Dispense Refill    acetaminophen (TYLENOL) 325 MG tablet Take 3 tablets (975 mg) by mouth every 8 hours.      aspirin 81 MG EC tablet Take 81 mg by mouth at bedtime.      carboxymethylcellulose PF (REFRESH PLUS) 0.5 % ophthalmic solution Place 1 drop into both eyes 4 times daily as needed.      cetirizine (ZYRTEC) 10 MG tablet Take 10 mg by mouth daily as needed for allergies      cyanocobalamin (CYANOCOBALAMIN) 1000 MCG/ML injection Inject 1 mL (1,000 mcg) into the muscle every 30 days - with pharmacist recommended needles and syringes 1 mL 11    diclofenac (VOLTAREN) 1 % topical gel Apply 4 g topically 2 times daily.       "docusate sodium (COLACE) 100 MG tablet Take 100 mg by mouth At Bedtime      donepezil (ARICEPT) 5 MG tablet Take 10 mg by mouth at bedtime      escitalopram (LEXAPRO) 10 MG tablet Take 20 mg by mouth every evening      gabapentin (NEURONTIN) 300 MG capsule Take 300 mg by mouth every evening      magnesium hydroxide (MILK OF MAGNESIA) 400 MG/5ML suspension Take 30 mLs by mouth every other day.      memantine (NAMENDA) 10 MG tablet Take 20 mg by mouth every evening      QUEtiapine (SEROQUEL) 25 MG tablet Take 50 mg by mouth at bedtime.      senna-docusate (SENOKOT-S/PERICOLACE) 8.6-50 MG tablet Take 1 tablet by mouth 2 times daily as needed for constipation.       Current Facility-Administered Medications   Medication Dose Route Frequency Provider Last Rate Last Admin    4 mL ropivacaine (NAROPIN) injection 5 mg/mL  4 mL      4 mL at 05/03/24 0920    methylPREDNISolone (DEPO-Medrol) injection 40 mg  40 mg      40 mg at 05/03/24 0920       ROS:  Unobtainable secondary to cognitive impairment.     Vitals:  BP (!) 148/86   Pulse 75   Temp 98.2  F (36.8  C)   Resp 18   Ht 1.575 m (5' 2\")   Wt (!) 0.907 kg (2 lb)   LMP  (LMP Unknown)   SpO2 94%   BMI 0.37 kg/m    Exam:  GENERAL APPEARANCE:  Alert, in NAD  HEENT: normocephalic, moist mucous membranes, nose without drainage or crusting  RESP:  respiratory effort normal, no respiratory distress, Lung sounds clear, patient is on RA  CV: auscultation of heart done, rate and rhythm regular. no murmur, no rub or gallop.   ABDOMEN: + bowel sounds, soft, nontender, no grimacing or guarding with palpation.  M/S:   no lower extremity edema, splint to right arm  SKIN: facial bruising  NEURO: slow to respond to questions, follows commands  PSYCH: oriented x 1      Lab/Diagnostic data:  Labs done in SNF are in [facility]. Please refer to them using [employer] Everywhere. and Recent labs in EPIC reviewed by me today.     ASSESSMENT/PLAN:  Right distal radius fracture  Acute " left zygomaticomaxillary complex fracture  Unwitnessed fall, subsequent encounter  Ortho and oral/maxillofacial surgery recommended non operative management  Pain managed, appears comfortable  Plan: Continue pain management with Tylenol 975 mg every 8 hours scheduled.  Continue sinus precautions.  Follow-up with oral maxillofacial surgery in 1 week. Nonweightbearing through the wrist, but okay to use platform walker, follow-up in 10 to 14 days.  Continue therapy as below. CBC 1/7.    Acute kidney injury, resolved  Suspected chronic kidney disease  Creatinine peaked at 1.30 on admission, received IV hydration  Baseline creatinine appears to be around 1-1.2 during recent hospital stay  Creatinine 1.17 on 1/3/2025  Plan: BMP 1/7. Avoid nephrotoxins. Renally dose medications as indicated.    Paroxysmal atrial fibrillation  HR 60s  Plan: Continue aspirin 81 mg daily.  Not on rate controlling medication, not on anticoagulation due to cerebral amyloid angiopathy.  Monitor heart rate.    Cerebral amyloid angiopathy  Per chart review  Plan: Follow-up with specialist per recommendations.    Acute encephalopathy, improved  Dementia  Depression  With encephalopathy inpatient, suspected to be worsened due to oxycodone which was discontinued and symptoms improved, now today oriented x1, which seems decline from when she left the hospital- suspect some delirium contributing  Oriented x 1, very slow to respond to questions  Plan: Continue donepezil 10 mg at bedtime, escitalopram 20 mg daily, memantine 20 mg daily, Seroquel 50 mg at bedtime.  Monitor mood and symptoms.  Consider ACP referral as needed. OCCUPATIONAL THERAPY to complete cognitive testing for safe discharge planning. Nursing to assist with cares, meals, medication assistance, activities.    Vitamin B12 deficiency  Plan: Continue vitamin B12 injections monthly.    Slow transit constipation  Bowels moving now, received suppository  Plan: Continue senna S1 tab twice daily  as needed, docusate 100 mg at bedtime, magnesium hydroxyzine 15 mL every other day.  Monitor bowels.    Obesity, BMI 32.66  Complicates care  Plan: Encourage weight loss. Dietician follows in the TCU.    Physical deconditioning  Secondary to recent hospitalization, medical conditions as above  Plan: Encourage participation in physical therapy/occupational therapy for strengthening and deconditioning. Discharge planning per their recommendation. Social work to assist with d/c planning.      Disclaimer: This note may contain text created using speech-recognition software and may contain unintended word substitutions.        Total time spent with patient visit at the skilled nursing facility was 47 minutes including patient visit,review of past records, discussion with nursing staff regarding plan of care, medication reconciliation.      Electronically signed by:  MARTHA San CNP

## 2025-01-05 NOTE — PLAN OF CARE
"Occupational Therapy Discharge Summary    Reason for therapy discharge:    Discharged to transitional care facility.    Progress towards therapy goal(s). See goals on Care Plan in [facility] electronic health record for goal details.  Goals not met.  Barriers to achieving goals:   discharge from facility.    Therapy recommendation(s):    Continued therapy is recommended.  Rationale/Recommendations:  per treating OT \"Pt below functional baseline in ADLs and mobility. At baseline pt able to ambulate within home with Ax1 and hand held assist, does have walker for community. Pt now requiring increased assist x2 and has new NWB precautions which makes it difficult for mobility and ADLs at this time. Pt may benefit from TCU to address current impairemtns with ADLs and mobility prior to safely returning home with assist from spouse, family, and nursing aids, and home therapy. Will conitnue to follow while IP\".    **This patient was not seen by writing OT, information for discharge summary taken from treating OT's notes.**    "

## 2025-01-06 ENCOUNTER — PATIENT OUTREACH (OUTPATIENT)
Dept: CARE COORDINATION | Facility: CLINIC | Age: 80
End: 2025-01-06

## 2025-01-06 ENCOUNTER — PATIENT OUTREACH (OUTPATIENT)
Dept: FAMILY MEDICINE | Facility: CLINIC | Age: 80
End: 2025-01-06

## 2025-01-06 ENCOUNTER — TRANSITIONAL CARE UNIT VISIT (OUTPATIENT)
Dept: GERIATRICS | Facility: CLINIC | Age: 80
End: 2025-01-06
Payer: COMMERCIAL

## 2025-01-06 VITALS
HEIGHT: 62 IN | WEIGHT: 2 LBS | TEMPERATURE: 98.2 F | BODY MASS INDEX: 0.37 KG/M2 | HEART RATE: 75 BPM | OXYGEN SATURATION: 94 % | RESPIRATION RATE: 18 BRPM | DIASTOLIC BLOOD PRESSURE: 86 MMHG | SYSTOLIC BLOOD PRESSURE: 148 MMHG

## 2025-01-06 DIAGNOSIS — R53.81 PHYSICAL DECONDITIONING: ICD-10-CM

## 2025-01-06 DIAGNOSIS — M17.12 PRIMARY OSTEOARTHRITIS OF LEFT KNEE: ICD-10-CM

## 2025-01-06 DIAGNOSIS — N18.9 CHRONIC KIDNEY DISEASE, UNSPECIFIED CKD STAGE: ICD-10-CM

## 2025-01-06 DIAGNOSIS — I68.0 CEREBRAL AMYLOID ANGIOPATHY (CODE): ICD-10-CM

## 2025-01-06 DIAGNOSIS — K59.00 CONSTIPATION, UNSPECIFIED CONSTIPATION TYPE: ICD-10-CM

## 2025-01-06 DIAGNOSIS — S52.501D CLOSED FRACTURE OF DISTAL END OF RIGHT RADIUS WITH ROUTINE HEALING, UNSPECIFIED FRACTURE MORPHOLOGY, SUBSEQUENT ENCOUNTER: Primary | ICD-10-CM

## 2025-01-06 DIAGNOSIS — E53.8 VITAMIN B12 DEFICIENCY (NON ANEMIC): ICD-10-CM

## 2025-01-06 DIAGNOSIS — F03.93 DEMENTIA WITH MOOD DISTURBANCE, UNSPECIFIED DEMENTIA SEVERITY, UNSPECIFIED DEMENTIA TYPE (H): ICD-10-CM

## 2025-01-06 DIAGNOSIS — K59.01 SLOW TRANSIT CONSTIPATION: ICD-10-CM

## 2025-01-06 DIAGNOSIS — E66.811 CLASS 1 OBESITY WITH SERIOUS COMORBIDITY AND BODY MASS INDEX (BMI) OF 32.0 TO 32.9 IN ADULT, UNSPECIFIED OBESITY TYPE: ICD-10-CM

## 2025-01-06 DIAGNOSIS — M17.11 PRIMARY OSTEOARTHRITIS OF RIGHT KNEE: ICD-10-CM

## 2025-01-06 DIAGNOSIS — I48.0 PAROXYSMAL ATRIAL FIBRILLATION (H): ICD-10-CM

## 2025-01-06 PROCEDURE — 99310 SBSQ NF CARE HIGH MDM 45: CPT | Performed by: NURSE PRACTITIONER

## 2025-01-06 PROCEDURE — 86481 TB AG RESPONSE T-CELL SUSP: CPT | Mod: ORL | Performed by: NURSE PRACTITIONER

## 2025-01-06 PROCEDURE — P9604 ONE-WAY ALLOW PRORATED TRIP: HCPCS | Mod: ORL | Performed by: NURSE PRACTITIONER

## 2025-01-06 PROCEDURE — 36415 COLL VENOUS BLD VENIPUNCTURE: CPT | Mod: ORL | Performed by: NURSE PRACTITIONER

## 2025-01-06 NOTE — LETTER
1/6/2025      Danielle Bryan  [address]  [place] [address]        [facility] GERIATRICS    PRIMARY CARE PROVIDER AND CLINIC:  Heriberto Headley MD, [address] [facility] [id number]  Chief Complaint   Patient presents with     Hospital F/U      [facility] Medical Record Number:  [id number]  Place of Service where encounter took place:  [facility]  ([facility]) [[id number]]    Danielle Bryan  is a 79 year old  (1945), admitted to the above facility from  [facility]. Hospital stay 12/31/2024 through 1/5/2025.  HPI:    Past medical history significant for paroxysmal atrial fibrillation, dyslipidemia, type 2 diabetes, dementia, GERD, vitamin D deficiency, vitamin B12 deficiency    Summary of recent hospitalization:  Patient was hospitalized at [facility] from 12/31/2024 to 1/4/2025 for fall.  Patient presented to the emergency department for evaluation after an unwitnessed fall, patient found facedown on the floor awake in the bathroom by nursing staff.  Laboratory evaluation in the emergency department significant for potassium 5.1, creatinine 1.3, normal WBC, hemoglobin 12.0, platelet count 263.  CT facial bones reveals acute left zygomaticomaxillary complex fracture pattern, moderate layering blood products in the left maxillary sinus.  Right wrist x-ray revealed impacted fracture of the distal aspect of the right radius with slight dorsal angulation, fracture extends to the joint margin without significant cortical step-off at the wrist joint, irregularity at the tip of the ulnar styloid but this may be more chronic, flexion contracture of the MCP joints and fingers reduces the sensitivity of the examination, soft tissue swelling over dorsal aspect of the hand.  CT cervical spine negative for acute cervical spinal fracture or posttraumatic subluxation.  CT head negative for acute intracranial hemorrhage, extra-axial fluid collection or mass  "effect, findings concerning for acute left zygomaticomaxillary complex fracture pattern.  Ortho was consulted and recommended nonoperative management with short arm splint applied.  Nonweightbearing through the wrist, but okay to use platform walker, follow-up in 10 to 14 days.  Oral/maxillofacial surgery contacted by hospitalist and recommended nonoperative management.  Prior to discharge patient's encephalopathy improved, pain was well-managed on Tylenol and it was felt that the oxycodone may have contributed to encephalopathy.  Patient to follow-up with oral maxillofacial surgery in 1 week.  Patient received IV fluids, creatinine improved prior to discharge. Discharged to TCU for physical rehabilitation and medical management.     Reviewed ER note, H&P, labs and imaging from recent hospital stay today.    Today patient was seen for admission visit in the TCU.  Patient oriented x 1 this morning.  Very slow to respond to my questions, and answered only that she is \"good\" this morning. She did follow my commands. Nursing staff with no specific concerns. She received a suppository for constipation at spouses request yesterday. She appears comfortable.    Spoke to patient's spouse Karl, when he arrived later in the morning.  He tells me that she does not speak much, she has been in a daze, since the accident.  He tells me that she has been doing overall better.  He feels her pain is pretty well-managed, she does seem to have a little bit in her wrist.  He is also requesting that the Voltaren be scheduled to her knees.  We reviewed that she was constipated and had suppository yesterday.  Will continue home bowel regimen.  I answered his questions today.    Reviewed facility EMR including medications, recent nursing progress notes, vital signs.  Discussed plan of care with nursing.      CODE STATUS/ADVANCE DIRECTIVES DISCUSSION:  Full Code    ALLERGIES:   Allergies   Allergen Reactions     Ciprofloxacin      Sores in " mouth and throat felt funny     Reclast [Zoledronic Acid]      dizziness      PAST MEDICAL HISTORY:   Past Medical History:   Diagnosis Date     Adverse effect of bisphosphonate, sequela      Alopecia areata     Requires a wig now     Atrial fibrillation (H) 01/20/2019     Cerebral infarction (H) 2014    TIA     Dysphagia     Botox/EGD dilation at [place], most recent 05/2013     Hyperlipemia      PONV (postoperative nausea and vomiting)      Recurrent UTI     Believed related in part to atrophic vaginitis     Vitamin D deficiency 11/29/2018      PAST SURGICAL HISTORY:   has a past surgical history that includes GYN surgery; Release trigger finger (7/12/2012); colonoscopy (1/15/2014); Head and neck surgery (1983); Eye surgery (2013); Colonoscopy (1/15/2014); and Abdomen surgery (1975 1982).  FAMILY HISTORY: family history includes C.A.D. in her father; Coronary Artery Disease in her brother; Family History Negative in her mother; Myocardial Infarction in her brother; Other Cancer in her brother and sister.  SOCIAL HISTORY:   reports that she has never smoked. She has never used smokeless tobacco. She reports that she does not drink alcohol and does not use drugs.    Post Discharge Medication Reconciliation Status:   MED REC REQUIRED  Post Medication Reconciliation Status:  Discharge medications reconciled and changed, see notes/orders     Current Outpatient Medications   Medication Sig Dispense Refill     acetaminophen (TYLENOL) 325 MG tablet Take 3 tablets (975 mg) by mouth every 8 hours.       aspirin 81 MG EC tablet Take 81 mg by mouth at bedtime.       carboxymethylcellulose PF (REFRESH PLUS) 0.5 % ophthalmic solution Place 1 drop into both eyes 4 times daily as needed.       cetirizine (ZYRTEC) 10 MG tablet Take 10 mg by mouth daily as needed for allergies       cyanocobalamin (CYANOCOBALAMIN) 1000 MCG/ML injection Inject 1 mL (1,000 mcg) into the muscle every 30 days - with pharmacist recommended needles and  "syringes 1 mL 11     diclofenac (VOLTAREN) 1 % topical gel Apply 4 g topically 2 times daily.       docusate sodium (COLACE) 100 MG tablet Take 100 mg by mouth At Bedtime       donepezil (ARICEPT) 5 MG tablet Take 10 mg by mouth at bedtime       escitalopram (LEXAPRO) 10 MG tablet Take 20 mg by mouth every evening       gabapentin (NEURONTIN) 300 MG capsule Take 300 mg by mouth every evening       magnesium hydroxide (MILK OF MAGNESIA) 400 MG/5ML suspension Take 30 mLs by mouth every other day.       memantine (NAMENDA) 10 MG tablet Take 20 mg by mouth every evening       QUEtiapine (SEROQUEL) 25 MG tablet Take 50 mg by mouth at bedtime.       senna-docusate (SENOKOT-S/PERICOLACE) 8.6-50 MG tablet Take 1 tablet by mouth 2 times daily as needed for constipation.       Current Facility-Administered Medications   Medication Dose Route Frequency Provider Last Rate Last Admin     4 mL ropivacaine (NAROPIN) injection 5 mg/mL  4 mL      4 mL at 05/03/24 0920     methylPREDNISolone (DEPO-Medrol) injection 40 mg  40 mg      40 mg at 05/03/24 0920       ROS:  Unobtainable secondary to cognitive impairment.     Vitals:  BP (!) 148/86   Pulse 75   Temp 98.2  F (36.8  C)   Resp 18   Ht 1.575 m (5' 2\")   Wt (!) 0.907 kg (2 lb)   LMP  (LMP Unknown)   SpO2 94%   BMI 0.37 kg/m    Exam:  GENERAL APPEARANCE:  Alert, in NAD  HEENT: normocephalic, moist mucous membranes, nose without drainage or crusting  RESP:  respiratory effort normal, no respiratory distress, Lung sounds clear, patient is on RA  CV: auscultation of heart done, rate and rhythm regular. no murmur, no rub or gallop.   ABDOMEN: + bowel sounds, soft, nontender, no grimacing or guarding with palpation.  M/S:   no lower extremity edema, splint to right arm  SKIN: facial bruising  NEURO: slow to respond to questions, follows commands  PSYCH: oriented x 1      Lab/Diagnostic data:  Labs done in SNF are in [place] EPIC. Please refer to them using EPIC/Care " Everywhere. and Recent labs in [facility] reviewed by me today.     ASSESSMENT/PLAN:  Right distal radius fracture  Acute left zygomaticomaxillary complex fracture  Unwitnessed fall, subsequent encounter  Ortho and oral/maxillofacial surgery recommended non operative management  Pain managed, appears comfortable  Plan: Continue pain management with Tylenol 975 mg every 8 hours scheduled.  Continue sinus precautions.  Follow-up with oral maxillofacial surgery in 1 week. Nonweightbearing through the wrist, but okay to use platform walker, follow-up in 10 to 14 days.  Continue therapy as below. CBC 1/7.    Acute kidney injury, resolved  Suspected chronic kidney disease  Creatinine peaked at 1.30 on admission, received IV hydration  Baseline creatinine appears to be around 1-1.2 during recent hospital stay  Creatinine 1.17 on 1/3/2025  Plan: BMP 1/7. Avoid nephrotoxins. Renally dose medications as indicated.    Paroxysmal atrial fibrillation  HR 60s  Plan: Continue aspirin 81 mg daily.  Not on rate controlling medication, not on anticoagulation due to cerebral amyloid angiopathy.  Monitor heart rate.    Cerebral amyloid angiopathy  Per chart review  Plan: Follow-up with specialist per recommendations.    Acute encephalopathy, improved  Dementia  Depression  With encephalopathy inpatient, suspected to be worsened due to oxycodone which was discontinued and symptoms improved, now today oriented x1, which seems decline from when she left the hospital- suspect some delirium contributing  Oriented x 1, very slow to respond to questions  Plan: Continue donepezil 10 mg at bedtime, escitalopram 20 mg daily, memantine 20 mg daily, Seroquel 50 mg at bedtime.  Monitor mood and symptoms.  Consider ACP referral as needed. OCCUPATIONAL THERAPY to complete cognitive testing for safe discharge planning. Nursing to assist with cares, meals, medication assistance, activities.    Vitamin B12 deficiency  Plan: Continue vitamin B12 injections  monthly.    Slow transit constipation  Bowels moving now, received suppository  Plan: Continue senna S1 tab twice daily as needed, docusate 100 mg at bedtime, magnesium hydroxyzine 15 mL every other day.  Monitor bowels.    Obesity, BMI 32.66  Complicates care  Plan: Encourage weight loss. Dietician follows in the TCU.    Physical deconditioning  Secondary to recent hospitalization, medical conditions as above  Plan: Encourage participation in physical therapy/occupational therapy for strengthening and deconditioning. Discharge planning per their recommendation. Social work to assist with d/c planning.      Disclaimer: This note may contain text created using speech-recognition software and may contain unintended word substitutions.        Total time spent with patient visit at the skilled nursing facility was 47 minutes including patient visit,review of past records, discussion with nursing staff regarding plan of care, medication reconciliation.      Electronically signed by:  MARTHA San CNP             Sincerely,        MARTHA San CNP    Electronically signed

## 2025-01-06 NOTE — PATIENT INSTRUCTIONS
Orders.  Danielle Bryan  1945  1) CBC and BMP 1/7. Diagnosis: anemia, CKD  2) Change voltaren gel to 2 gram to each knee BID scheduled for pain  MARHTA San CNP on 1/6/2025 at 9:32 AM

## 2025-01-06 NOTE — TELEPHONE ENCOUNTER
Hospital Problem List      Closed head injury, initial encounter    Closed fracture of maxillary sinus, initial encounter (H)    Closed fracture of distal end of right radius, unspecified fracture morphology, initial encounter    Closed fracture of left zygomatic arch, initial encounter (H)    Closed fracture of left orbital floor, initial encounter (H)    Fracture of left orbital wall (H)

## 2025-01-06 NOTE — PROGRESS NOTES
Clinic Care Coordination Contact  Care Coordination Transition Communication    Clinical Data: Patient was hospitalized at [facility] from 12/31/24 to 01/04/25 with diagnosis of:    Danilele Bryan is a 79 year old female with Pmhx of atrial fibrillation, cerebral amyloid angiopathy, progressive dementia, and dyslipidemia, who was admitted on 12/31/2024 after a fall at home. Found to have complex fracture pattern left zygomatic maxillary with moderate layering blood products in the left maxillary sinus, right distal radius fracture. Wrist splinted in ED.      Assessment: Patient has transitioned to TCU/ARU for short term rehabilitation:    Facility Name: [facility]   Transition Communication:  Notified facility of Primary Care- Care Coordination support via TCU hand-in e-mail.    Plan: Care Coordinator will await notification from facility staff informing of patient's discharge plans/needs. Care Coordinator will review chart and outreach to facility staff every 4 weeks and as needed.     AMELIA Camacho  [facility]   Primary Care Social Work Care Coordinator  [facility] [place]   Phone: [phone]

## 2025-01-07 ENCOUNTER — LAB REQUISITION (OUTPATIENT)
Dept: LAB | Facility: CLINIC | Age: 80
End: 2025-01-07
Payer: COMMERCIAL

## 2025-01-07 DIAGNOSIS — D64.9 ANEMIA, UNSPECIFIED: ICD-10-CM

## 2025-01-07 DIAGNOSIS — N18.9 CHRONIC KIDNEY DISEASE, UNSPECIFIED: ICD-10-CM

## 2025-01-07 LAB
QUANTIFERON MITOGEN: 9.96 IU/ML
QUANTIFERON NIL TUBE: 0.03 IU/ML
QUANTIFERON TB1 TUBE: 0.02 IU/ML
QUANTIFERON TB2 TUBE: 0.01

## 2025-01-08 LAB
ANION GAP SERPL CALCULATED.3IONS-SCNC: 11 MMOL/L (ref 7–15)
BUN SERPL-MCNC: 37.3 MG/DL (ref 8–23)
CALCIUM SERPL-MCNC: 9.1 MG/DL (ref 8.8–10.4)
CHLORIDE SERPL-SCNC: 107 MMOL/L (ref 98–107)
CREAT SERPL-MCNC: 1.21 MG/DL (ref 0.51–0.95)
EGFRCR SERPLBLD CKD-EPI 2021: 45 ML/MIN/1.73M2
ERYTHROCYTE [DISTWIDTH] IN BLOOD BY AUTOMATED COUNT: 13.5 % (ref 10–15)
GAMMA INTERFERON BACKGROUND BLD IA-ACNC: 0.03 IU/ML
GLUCOSE SERPL-MCNC: 87 MG/DL (ref 70–99)
HCO3 SERPL-SCNC: 24 MMOL/L (ref 22–29)
HCT VFR BLD AUTO: 35.6 % (ref 35–47)
HGB BLD-MCNC: 11.3 G/DL (ref 11.7–15.7)
M TB IFN-G BLD-IMP: NEGATIVE
M TB IFN-G CD4+ BCKGRND COR BLD-ACNC: 9.93 IU/ML
MCH RBC QN AUTO: 29.3 PG (ref 26.5–33)
MCHC RBC AUTO-ENTMCNC: 31.7 G/DL (ref 31.5–36.5)
MCV RBC AUTO: 92 FL (ref 78–100)
MITOGEN IGNF BCKGRD COR BLD-ACNC: -0.01 IU/ML
MITOGEN IGNF BCKGRD COR BLD-ACNC: -0.02 IU/ML
PLATELET # BLD AUTO: 322 10E3/UL (ref 150–450)
POTASSIUM SERPL-SCNC: 4.7 MMOL/L (ref 3.4–5.3)
RBC # BLD AUTO: 3.86 10E6/UL (ref 3.8–5.2)
SODIUM SERPL-SCNC: 142 MMOL/L (ref 135–145)
WBC # BLD AUTO: 7.7 10E3/UL (ref 4–11)

## 2025-01-08 PROCEDURE — 36415 COLL VENOUS BLD VENIPUNCTURE: CPT | Mod: ORL | Performed by: NURSE PRACTITIONER

## 2025-01-08 PROCEDURE — 85027 COMPLETE CBC AUTOMATED: CPT | Mod: ORL | Performed by: NURSE PRACTITIONER

## 2025-01-08 PROCEDURE — 80048 BASIC METABOLIC PNL TOTAL CA: CPT | Mod: ORL | Performed by: NURSE PRACTITIONER

## 2025-01-08 PROCEDURE — P9604 ONE-WAY ALLOW PRORATED TRIP: HCPCS | Mod: ORL | Performed by: NURSE PRACTITIONER

## 2025-01-10 ENCOUNTER — TRANSITIONAL CARE UNIT VISIT (OUTPATIENT)
Dept: GERIATRICS | Facility: CLINIC | Age: 80
End: 2025-01-10
Payer: COMMERCIAL

## 2025-01-10 VITALS
RESPIRATION RATE: 15 BRPM | HEIGHT: 62 IN | TEMPERATURE: 97.6 F | BODY MASS INDEX: 32.2 KG/M2 | DIASTOLIC BLOOD PRESSURE: 82 MMHG | HEART RATE: 68 BPM | OXYGEN SATURATION: 95 % | WEIGHT: 175 LBS | SYSTOLIC BLOOD PRESSURE: 141 MMHG

## 2025-01-10 DIAGNOSIS — S02.82XD CLOSED FRACTURE OF LEFT ZYGOMATICOMAXILLARY COMPLEX WITH ROUTINE HEALING, SUBSEQUENT ENCOUNTER: ICD-10-CM

## 2025-01-10 DIAGNOSIS — F32.A DEPRESSION, UNSPECIFIED DEPRESSION TYPE: ICD-10-CM

## 2025-01-10 DIAGNOSIS — F03.93 DEMENTIA WITH MOOD DISTURBANCE, UNSPECIFIED DEMENTIA SEVERITY, UNSPECIFIED DEMENTIA TYPE (H): ICD-10-CM

## 2025-01-10 DIAGNOSIS — R53.81 PHYSICAL DECONDITIONING: ICD-10-CM

## 2025-01-10 DIAGNOSIS — S02.40DD CLOSED FRACTURE OF LEFT ZYGOMATICOMAXILLARY COMPLEX WITH ROUTINE HEALING, SUBSEQUENT ENCOUNTER: ICD-10-CM

## 2025-01-10 DIAGNOSIS — N18.9 CHRONIC KIDNEY DISEASE, UNSPECIFIED CKD STAGE: ICD-10-CM

## 2025-01-10 DIAGNOSIS — S02.32XD CLOSED FRACTURE OF LEFT ZYGOMATICOMAXILLARY COMPLEX WITH ROUTINE HEALING, SUBSEQUENT ENCOUNTER: ICD-10-CM

## 2025-01-10 DIAGNOSIS — E66.811 CLASS 1 OBESITY WITH SERIOUS COMORBIDITY AND BODY MASS INDEX (BMI) OF 32.0 TO 32.9 IN ADULT, UNSPECIFIED OBESITY TYPE: ICD-10-CM

## 2025-01-10 DIAGNOSIS — S52.501D CLOSED FRACTURE OF DISTAL END OF RIGHT RADIUS WITH ROUTINE HEALING, UNSPECIFIED FRACTURE MORPHOLOGY, SUBSEQUENT ENCOUNTER: Primary | ICD-10-CM

## 2025-01-10 DIAGNOSIS — I68.0 CEREBRAL AMYLOID ANGIOPATHY (CODE): ICD-10-CM

## 2025-01-10 DIAGNOSIS — I48.0 PAROXYSMAL ATRIAL FIBRILLATION (H): ICD-10-CM

## 2025-01-10 DIAGNOSIS — S02.40FD CLOSED FRACTURE OF LEFT ZYGOMATICOMAXILLARY COMPLEX WITH ROUTINE HEALING, SUBSEQUENT ENCOUNTER: ICD-10-CM

## 2025-01-10 DIAGNOSIS — E53.8 VITAMIN B12 DEFICIENCY (NON ANEMIC): ICD-10-CM

## 2025-01-10 PROCEDURE — 99309 SBSQ NF CARE MODERATE MDM 30: CPT | Performed by: NURSE PRACTITIONER

## 2025-01-10 NOTE — PATIENT INSTRUCTIONS
Orders  Danielle Bryan  1945  1) BMP 1/13. Diagnosis: CKD  2) Encourage fluids.   MARTHA San CNP on 1/10/2025 at 12:25 PM

## 2025-01-10 NOTE — PROGRESS NOTES
[facility] GERIATRICS    Chief Complaint   Patient presents with    RECHECK     HPI:  Danielle Bryan is a 79 year old  (1945), who is being seen today for an episodic care visit at: [facility]  ([facility]) [[id number]].     Past medical history significant for paroxysmal atrial fibrillation, dyslipidemia, type 2 diabetes, dementia, GERD, vitamin D deficiency, vitamin B12 deficiency     Summary of recent hospitalization:  Patient was hospitalized at [facility]note 3[facility] from 12/31/2024 to 1/4/2025 for fall.  Patient presented to the emergency department for evaluation after an unwitnessed fall, patient found facedown on the floor awake in the bathroom by nursing staff.  Laboratory evaluation in the emergency department significant for potassium 5.1, creatinine 1.3, normal WBC, hemoglobin 12.0, platelet count 263.  CT facial bones reveals acute left zygomaticomaxillary complex fracture pattern, moderate layering blood products in the left maxillary sinus.  Right wrist x-ray revealed impacted fracture of the distal aspect of the right radius with slight dorsal angulation, fracture extends to the joint margin without significant cortical step-off at the wrist joint, irregularity at the tip of the ulnar styloid but this may be more chronic, flexion contracture of the MCP joints and fingers reduces the sensitivity of the examination, soft tissue swelling over dorsal aspect of the hand.  CT cervical spine negative for acute cervical spinal fracture or posttraumatic subluxation.  CT head negative for acute intracranial hemorrhage, extra-axial fluid collection or mass effect, findings concerning for acute left zygomaticomaxillary complex fracture pattern.  Ortho was consulted and recommended nonoperative management with short arm splint applied.  Nonweightbearing through the wrist, but okay to use platform walker, follow-up in 10 to 14 days.  Oral/maxillofacial surgery contacted by hospitalist and  "recommended nonoperative management.  Prior to discharge patient's encephalopathy improved, pain was well-managed on Tylenol and it was felt that the oxycodone may have contributed to encephalopathy.  Patient to follow-up with oral maxillofacial surgery in 1 week.  Patient received IV fluids, creatinine improved prior to discharge. Discharged to TCU for physical rehabilitation and medical management.        Today patient was seen today with spouse present for visit. Spouse provides most of history today. Patient does not answer any questions for me, but does respond that she has no pain with asked by her spouse.  He has not noticed any shortness of breath.  She is eating okay.  Bowels are moving regularly per nursing documentation.  Patient continues to work with therapy. Is currently marisa lift, was previously living with spouse who was caring for her at home and also has staff that comes in 2 times per day to help with her cares.    Reviewed facility EMR including medications, recent nursing progress notes, vital signs.  Discussed plan of care with nursing.    Allergies, and PMH/PSH reviewed in EPIC today.  REVIEW OF SYSTEMS:  Unobtainable secondary to cognitive impairment.     Objective:   /82   Pulse 68   Temp 97.6  F (36.4  C)   Resp 15   Ht 1.575 m (5' 2\")   Wt 79.4 kg (175 lb)   LMP  (LMP Unknown)   SpO2 95%   BMI 32.01 kg/m    GENERAL APPEARANCE:  Alert, in NAD  HEENT: normocephalic, moist mucous membranes, nose without drainage or crusting  RESP:  respiratory effort normal, no respiratory distress, Lung sounds clear, patient is on RA  CV: auscultation of heart done, rate and rhythm regular.   ABDOMEN: + bowel sounds  M/S:  no lower extremity edema, splint to right wrist- fingers with bruising and edema  SKIN:  Inspection and palpation of skin and subcutaneous tissue: skin warm, dry without rashes  NEURO: speaks softly with response to questions  PSYCH: affect and mood normal      [facility] in " SNF [facility]. Please refer to them using EPIC/Care Everywhere. and Recent labs in [facility] reviewed by me today.     Assessment/Plan:  Right distal radius fracture  Acute left zygomaticomaxillary complex fracture  Unwitnessed fall, subsequent encounter  Ortho and oral/maxillofacial surgery recommended non operative management  Pain is managed, appears comfortable  Plan: Continue pain management with Tylenol 975 mg every 8 hours scheduled.  Continue sinus precautions. Nonweightbearing through the wrist, but okay to use platform walker, follow-up with ortho and maxillofacial surgery as scheduled.  Continue therapy as below.      Acute kidney injury, resolved  Suspected chronic kidney disease  Creatinine peaked at 1.30 on admission, received IV hydration  Baseline creatinine appears to be around 1-1.2 during recent hospital stay  Creatinine 1.21 on 1/8/2025  Plan: [facility] 1/13. Encourage fluids. Avoid nephrotoxins. Renally dose medications as indicated.     Paroxysmal atrial fibrillation  HR 60s  Plan: Continue aspirin 81 mg daily.  Not on rate controlling medication, not on anticoagulation due to cerebral amyloid angiopathy.  Monitor heart rate.     Cerebral amyloid angiopathy  Per chart review  Plan: Follow-up with specialist per recommendations.     Acute encephalopathy, improved  Dementia  Depression  With encephalopathy inpatient, suspected to be worsened due to oxycodone which was discontinued and symptoms improved  Only responds today to some questions that spouse asks her  Plan: Continue donepezil 10 mg at bedtime, escitalopram 20 mg daily, memantine 20 mg daily, Seroquel 50 mg at bedtime.  Monitor mood and symptoms.  Consider ACP referral as needed. OCCUPATIONAL THERAPY to complete cognitive testing for safe discharge planning. Nursing to assist with cares, meals, medication assistance, activities.     Vitamin B12 deficiency  Plan: Continue vitamin B12 injections monthly.     Slow transit  constipation  Bowels moving regularly per nursing notes  Plan: Continue senna S1 tab twice daily as needed, docusate 100 mg at bedtime, magnesium hydroxyzine 15 mL every other day.  Monitor bowels.     Obesity, BMI 32.01  Complicates care  Plan: Encourage weight loss. Dietician follows in the TCU.     Physical deconditioning  Secondary to recent hospitalization, medical conditions as above  Patient continues to work with therapy  Plan: Encourage participation in physical therapy/occupational therapy for strengthening and deconditioning. Discharge planning per their recommendation. Social work to assist with d/c planning.         MED REC REQUIRED  Post Medication Reconciliation Status:  Medication reconciliation previously completed during another office visit      Disclaimer: This note may contain text created using speech-recognition software and may contain unintended word substitutions.       Electronically signed by: MARTHA San CNP

## 2025-01-10 NOTE — LETTER
1/10/2025      Danielle Bryan  [address] [address]guiC[facility] [address]        [facility] GERIATRICS    Chief Complaint   Patient presents with     RECHECK     HPI:  Danielle Bryan is a 79 year old  (1945), who is being seen today for an episodic care visit at: [facility]  ([facility]) [[id number]].     Past medical history significant for paroxysmal atrial fibrillation, dyslipidemia, type 2 diabetes, dementia, GERD, vitamin D deficiency, vitamin B12 deficiency     Summary of recent hospitalization:  Patient was hospitalized at [facility] from 12/31/2024 to 1/4/2025 for fall.  Patient presented to the emergency department for evaluation after an unwitnessed fall, patient found facedown on the floor awake in the bathroom by nursing staff.  Laboratory evaluation in the emergency department significant for potassium 5.1, creatinine 1.3, normal WBC, hemoglobin 12.0, platelet count 263.  CT facial bones reveals acute left zygomaticomaxillary complex fracture pattern, moderate layering blood products in the left maxillary sinus.  Right wrist x-ray revealed impacted fracture of the distal aspect of the right radius with slight dorsal angulation, fracture extends to the joint margin without significant cortical step-off at the wrist joint, irregularity at the tip of the ulnar styloid but this may be more chronic, flexion contracture of the MCP joints and fingers reduces the sensitivity of the examination, soft tissue swelling over dorsal aspect of the hand.  CT cervical spine negative for acute cervical spinal fracture or posttraumatic subluxation.  CT head negative for acute intracranial hemorrhage, extra-axial fluid collection or mass effect, findings concerning for acute left zygomaticomaxillary complex fracture pattern.  Ortho was consulted and recommended nonoperative management with short arm splint applied.  Nonweightbearing through the wrist, but okay to use platform walker, follow-up  "in 10 to 14 days.  Oral/maxillofacial surgery contacted by hospitalist and recommended nonoperative management.  Prior to discharge patient's encephalopathy improved, pain was well-managed on Tylenol and it was felt that the oxycodone may have contributed to encephalopathy.  Patient to follow-up with oral maxillofacial surgery in 1 week.  Patient received IV fluids, creatinine improved prior to discharge. Discharged to TCU for physical rehabilitation and medical management.        Today patient was seen today with spouse present for visit. Spouse provides most of history today. Patient does not answer any questions for me, but does respond that she has no pain with asked by her spouse.  He has not noticed any shortness of breath.  She is eating okay.  Bowels are moving regularly per nursing documentation.  Patient continues to work with therapy. Is currently marisa lift, was previously living with spouse who was caring for her at home and also has staff that comes in 2 times per day to help with her cares.    Reviewed facility EMR including medications, recent nursing progress notes, vital signs.  Discussed plan of care with nursing.    Allergies, and PMH/PSH reviewed in EPIC today.  REVIEW OF SYSTEMS:  Unobtainable secondary to cognitive impairment.     Objective:   /82   Pulse 68   Temp 97.6  F (36.4  C)   Resp 15   Ht 1.575 m (5' 2\")   Wt 79.4 kg (175 lb)   LMP  (LMP Unknown)   SpO2 95%   BMI 32.01 kg/m    GENERAL APPEARANCE:  Alert, in NAD  HEENT: normocephalic, moist mucous membranes, nose without drainage or crusting  RESP:  respiratory effort normal, no respiratory distress, Lung sounds clear, patient is on RA  CV: auscultation of heart done, rate and rhythm regular.   ABDOMEN: + bowel sounds  M/S:  no lower extremity edema, splint to right wrist- fingers with bruising and edema  SKIN:  Inspection and palpation of skin and subcutaneous tissue: skin warm, dry without rashes  NEURO: speaks softly " with response to questions  PSYCH: affect and mood normal      Labs done in SNF are in [place] EPIC. Please refer to them using EPIC/Care Everywhere. and Recent labs in EPIC reviewed by me today.     Assessment/Plan:  Right distal radius fracture  Acute left zygomaticomaxillary complex fracture  Unwitnessed fall, subsequent encounter  Ortho and oral/maxillofacial surgery recommended non operative management  Pain is managed, appears comfortable  Plan: Continue pain management with Tylenol 975 mg every 8 hours scheduled.  Continue sinus precautions. Nonweightbearing through the wrist, but okay to use platform walker, follow-up with ortho and maxillofacial surgery as scheduled.  Continue therapy as below.      Acute kidney injury, resolved  Suspected chronic kidney disease  Creatinine peaked at 1.30 on admission, received IV hydration  Baseline creatinine appears to be around 1-1.2 during recent hospital stay  Creatinine 1.21 on 1/8/2025  Plan: [facility] 1/13. Encourage fluids. Avoid nephrotoxins. Renally dose medications as indicated.     Paroxysmal atrial fibrillation  HR 60s  Plan: Continue aspirin 81 mg daily.  Not on rate controlling medication, not on anticoagulation due to cerebral amyloid angiopathy.  Monitor heart rate.     Cerebral amyloid angiopathy  Per chart review  Plan: Follow-up with specialist per recommendations.     Acute encephalopathy, improved  Dementia  Depression  With encephalopathy inpatient, suspected to be worsened due to oxycodone which was discontinued and symptoms improved  Only responds today to some questions that spouse asks her  Plan: Continue donepezil 10 mg at bedtime, escitalopram 20 mg daily, memantine 20 mg daily, Seroquel 50 mg at bedtime.  Monitor mood and symptoms.  Consider ACP referral as needed. OCCUPATIONAL THERAPY to complete cognitive testing for safe discharge planning. Nursing to assist with cares, meals, medication assistance, activities.     Vitamin B12 deficiency  Plan:  Continue vitamin B12 injections monthly.     Slow transit constipation  Bowels moving regularly per nursing notes  Plan: Continue senna S1 tab twice daily as needed, docusate 100 mg at bedtime, magnesium hydroxyzine 15 mL every other day.  Monitor bowels.     Obesity, BMI 32.01  Complicates care  Plan: Encourage weight loss. Dietician follows in the TCU.     Physical deconditioning  Secondary to recent hospitalization, medical conditions as above  Patient continues to work with therapy  Plan: Encourage participation in physical therapy/occupational therapy for strengthening and deconditioning. Discharge planning per their recommendation. Social work to assist with d/c planning.         MED REC REQUIRED  Post Medication Reconciliation Status:  Medication reconciliation previously completed during another office visit      Disclaimer: This note may contain text created using speech-recognition software and may contain unintended word substitutions.       Electronically signed by: MARTHA San CNP         Sincerely,        MARTHA San CNP    Electronically signed

## 2025-01-12 ENCOUNTER — LAB REQUISITION (OUTPATIENT)
Dept: LAB | Facility: CLINIC | Age: 80
End: 2025-01-12
Payer: COMMERCIAL

## 2025-01-12 DIAGNOSIS — N18.1 CHRONIC KIDNEY DISEASE, STAGE 1: ICD-10-CM

## 2025-01-13 LAB
ANION GAP SERPL CALCULATED.3IONS-SCNC: 10 MMOL/L (ref 7–15)
BUN SERPL-MCNC: 28.2 MG/DL (ref 8–23)
CALCIUM SERPL-MCNC: 8.8 MG/DL (ref 8.8–10.4)
CHLORIDE SERPL-SCNC: 111 MMOL/L (ref 98–107)
CREAT SERPL-MCNC: 0.97 MG/DL (ref 0.51–0.95)
EGFRCR SERPLBLD CKD-EPI 2021: 59 ML/MIN/1.73M2
GLUCOSE SERPL-MCNC: 83 MG/DL (ref 70–99)
HCO3 SERPL-SCNC: 23 MMOL/L (ref 22–29)
POTASSIUM SERPL-SCNC: 4.3 MMOL/L (ref 3.4–5.3)
SODIUM SERPL-SCNC: 144 MMOL/L (ref 135–145)

## 2025-01-13 PROCEDURE — 36415 COLL VENOUS BLD VENIPUNCTURE: CPT | Mod: ORL | Performed by: NURSE PRACTITIONER

## 2025-01-13 PROCEDURE — P9604 ONE-WAY ALLOW PRORATED TRIP: HCPCS | Mod: ORL | Performed by: NURSE PRACTITIONER

## 2025-01-13 PROCEDURE — 80048 BASIC METABOLIC PNL TOTAL CA: CPT | Mod: ORL | Performed by: NURSE PRACTITIONER

## 2025-01-14 NOTE — PROGRESS NOTES
[facility] GERIATRICS    Chief Complaint   Patient presents with    RECHECK     HPI:  Danielle Bryan is a 79 year old  (1945), who is being seen today for an episodic care visit at: [facility]  ([facility]) [[id number]].     Past medical history significant for paroxysmal atrial fibrillation, dyslipidemia, type 2 diabetes, dementia, GERD, vitamin D deficiency, vitamin B12 deficiency     Summary of recent hospitalization:  Patient was hospitalized at [facility] from 12/31/2024 to 1/4/2025 for fall.  Patient presented to the emergency department for evaluation after an unwitnessed fall, patient found facedown on the floor awake in the bathroom by nursing staff.  Laboratory evaluation in the emergency department significant for potassium 5.1, creatinine 1.3, normal WBC, hemoglobin 12.0, platelet count 263.  CT facial bones reveals acute left zygomaticomaxillary complex fracture pattern, moderate layering blood products in the left maxillary sinus.  Right wrist x-ray revealed impacted fracture of the distal aspect of the right radius with slight dorsal angulation, fracture extends to the joint margin without significant cortical step-off at the wrist joint, irregularity at the tip of the ulnar styloid but this may be more chronic, flexion contracture of the MCP joints and fingers reduces the sensitivity of the examination, soft tissue swelling over dorsal aspect of the hand.  CT cervical spine negative for acute cervical spinal fracture or posttraumatic subluxation.  CT head negative for acute intracranial hemorrhage, extra-axial fluid collection or mass effect, findings concerning for acute left zygomaticomaxillary complex fracture pattern.  Ortho was consulted and recommended nonoperative management with short arm splint applied.  Nonweightbearing through the wrist, but okay to use platform walker, follow-up in 10 to 14 days.  Oral/maxillofacial surgery contacted by hospitalist and  recommended nonoperative management.  Prior to discharge patient's encephalopathy improved, pain was well-managed on Tylenol and it was felt that the oxycodone may have contributed to encephalopathy.  Patient to follow-up with oral maxillofacial surgery in 1 week.  Patient received IV fluids, creatinine improved prior to discharge. Discharged to TCU for physical rehabilitation and medical management.     Today patient is seen for follow-up in the TCU.  She is much more verbal today, answers more questions than I have seen her at previous visits.  She is slow to respond at times.  She tells me this morning she is feeling well.  She denies any pain.  She denies any trouble breathing.  She denies any dysuria/trouble urinating.  Per discussion with therapy, Occupational Therapy is no longer working with the patient, as patient's spouse has been providing all cares at home already, and she will continue to require max assistance, which is her baseline.  Physical therapy continues to work on transfers.  With goal to still be able to discharge her back home with spouse to care for her.  Per nursing documentation bowels are moving regularly.  Nursing staff with no concerns regarding patient today.    Reviewed facility EMR including, recent nursing progress notes, vital signs.  Discussed plan of care with nursing.    Allergies, and PMH/PSH reviewed in EPIC today.  REVIEW OF SYSTEMS:  Limited secondary to cognitive impairment but today pt reports as above in HPI    Objective:   /62   Pulse 72   Temp 97.2  F (36.2  C)   Resp 17   Wt 78.8 kg (173 lb 12.8 oz)   LMP  (LMP Unknown)   SpO2 93%   BMI 31.79 kg/m    GENERAL APPEARANCE:  Alert, in NAD  HEENT: normocephalic, moist mucous membranes, nose without drainage or crusting  RESP:  respiratory effort normal, no respiratory distress, Lung sounds clear, patient is on RA  CV: auscultation of heart done, rate and rhythm regular.  ABDOMEN: + bowel sounds  M/S:   no lower  extremity edema, right wrist in splint  NEURO: Speech is slow to respond, moves extremities freely  PSYCH:insight and judgement impaired, memory impaired, affect and mood normal      Labs done in SNF are in [place] EPIC. Please refer to them using EPIC/Care Everywhere. and Recent labs in EPIC reviewed by me today.     Assessment/Plan:  Right distal radius fracture  Acute left zygomaticomaxillary complex fracture  Unwitnessed fall, subsequent encounter  Ortho and oral/maxillofacial surgery recommended non operative management  Pain is managed  Plan: Continue pain management with Tylenol 975 mg every 8 hours scheduled.  Continue sinus precautions. Nonweightbearing through the wrist, but okay to use platform walker, follow-up with ortho today and maxillofacial surgery as scheduled on 1/24/2025.  Continue therapy as below.      Acute kidney injury, resolved  Suspected chronic kidney disease  Creatinine peaked at 1.30 on admission, received IV hydration  Baseline creatinine appears to be around 1-1.2 during recent hospital stay  Creatinine 0.97 on 1/13/2025  Plan: BMP PRN. Encourage fluids. Avoid nephrotoxins. Renally dose medications as indicated.     Paroxysmal atrial fibrillation  HR 60-70s  Plan: Continue aspirin 81 mg daily.  Not on rate controlling medication, not on anticoagulation due to cerebral amyloid angiopathy.  Monitor heart rate.     Cerebral amyloid angiopathy  Per chart review  Plan: Follow-up with specialist per recommendations.     Acute encephalopathy, improved  Dementia  Depression  With encephalopathy inpatient, suspected to be worsened due to oxycodone which was discontinued and symptoms improved  Patient answering more questions today than she has previously during my visits  Plan: Continue donepezil 10 mg at bedtime, escitalopram 20 mg daily, memantine 20 mg daily, Seroquel 50 mg at bedtime.  Monitor mood and symptoms.  Consider ACP referral as needed. OCCUPATIONAL THERAPY to complete cognitive  testing for safe discharge planning. Nursing to assist with cares, meals, medication assistance, activities.     Vitamin B12 deficiency  Plan: Continue vitamin B12 injections monthly.     Slow transit constipation, resolved  With constipation on admission, now bowels moving regularly per nursing  Plan: Continue senna S1 tab twice daily as needed, docusate 100 mg at bedtime, magnesium hydroxyzine 15 mL every other day.  Monitor bowels.     Obesity, BMI 32.01  Complicates care  Plan: Encourage weight loss. Dietician follows in the TCU.     Physical deconditioning  Secondary to recent hospitalization, medical conditions as above  Patient continues to work with therapy  Plan: Encourage participation in physical therapy/occupational therapy for strengthening and deconditioning. Discharge planning per their recommendation. Social work to assist with d/c planning.    MED REC REQUIRED  Post Medication Reconciliation Status:  Medication reconciliation previously completed during another office visit      Disclaimer: This note may contain text created using speech-recognition software and may contain unintended word substitutions.       Electronically signed by: MARTHA San CNP

## 2025-01-15 ENCOUNTER — TRANSFERRED RECORDS (OUTPATIENT)
Dept: HEALTH INFORMATION MANAGEMENT | Facility: CLINIC | Age: 80
End: 2025-01-15

## 2025-01-15 ENCOUNTER — TRANSITIONAL CARE UNIT VISIT (OUTPATIENT)
Dept: GERIATRICS | Facility: CLINIC | Age: 80
End: 2025-01-15
Payer: COMMERCIAL

## 2025-01-15 VITALS
OXYGEN SATURATION: 93 % | RESPIRATION RATE: 17 BRPM | TEMPERATURE: 97.2 F | WEIGHT: 173.8 LBS | HEART RATE: 72 BPM | BODY MASS INDEX: 31.79 KG/M2 | SYSTOLIC BLOOD PRESSURE: 132 MMHG | DIASTOLIC BLOOD PRESSURE: 62 MMHG

## 2025-01-15 DIAGNOSIS — I48.0 PAROXYSMAL ATRIAL FIBRILLATION (H): ICD-10-CM

## 2025-01-15 DIAGNOSIS — S02.82XD CLOSED FRACTURE OF LEFT ZYGOMATICOMAXILLARY COMPLEX WITH ROUTINE HEALING, SUBSEQUENT ENCOUNTER: ICD-10-CM

## 2025-01-15 DIAGNOSIS — N18.9 CHRONIC KIDNEY DISEASE, UNSPECIFIED CKD STAGE: ICD-10-CM

## 2025-01-15 DIAGNOSIS — E66.811 CLASS 1 OBESITY WITH SERIOUS COMORBIDITY AND BODY MASS INDEX (BMI) OF 32.0 TO 32.9 IN ADULT, UNSPECIFIED OBESITY TYPE: ICD-10-CM

## 2025-01-15 DIAGNOSIS — R53.81 PHYSICAL DECONDITIONING: ICD-10-CM

## 2025-01-15 DIAGNOSIS — S02.40FD CLOSED FRACTURE OF LEFT ZYGOMATICOMAXILLARY COMPLEX WITH ROUTINE HEALING, SUBSEQUENT ENCOUNTER: ICD-10-CM

## 2025-01-15 DIAGNOSIS — S02.32XD CLOSED FRACTURE OF LEFT ZYGOMATICOMAXILLARY COMPLEX WITH ROUTINE HEALING, SUBSEQUENT ENCOUNTER: ICD-10-CM

## 2025-01-15 DIAGNOSIS — F03.93 DEMENTIA WITH MOOD DISTURBANCE, UNSPECIFIED DEMENTIA SEVERITY, UNSPECIFIED DEMENTIA TYPE (H): ICD-10-CM

## 2025-01-15 DIAGNOSIS — S52.501D CLOSED FRACTURE OF DISTAL END OF RIGHT RADIUS WITH ROUTINE HEALING, UNSPECIFIED FRACTURE MORPHOLOGY, SUBSEQUENT ENCOUNTER: Primary | ICD-10-CM

## 2025-01-15 DIAGNOSIS — E53.8 VITAMIN B12 DEFICIENCY (NON ANEMIC): ICD-10-CM

## 2025-01-15 DIAGNOSIS — I68.0 CEREBRAL AMYLOID ANGIOPATHY (CODE): ICD-10-CM

## 2025-01-15 DIAGNOSIS — F32.A DEPRESSION, UNSPECIFIED DEPRESSION TYPE: ICD-10-CM

## 2025-01-15 DIAGNOSIS — S02.40DD CLOSED FRACTURE OF LEFT ZYGOMATICOMAXILLARY COMPLEX WITH ROUTINE HEALING, SUBSEQUENT ENCOUNTER: ICD-10-CM

## 2025-01-15 PROCEDURE — 99309 SBSQ NF CARE MODERATE MDM 30: CPT | Performed by: NURSE PRACTITIONER

## 2025-01-15 NOTE — LETTER
1/15/2025      Danielle Bryan  [address] [address] [facility] [address] [facility] [address]        [facility] GERIATRICS    Chief Complaint   Patient presents with     RECHECK     HPI:  Danielle Bryan is a 79 year old  (1945), who is being seen today for an episodic care visit at: [facility]  ([facility]) [[id number]].     Past medical history significant for paroxysmal atrial fibrillation, dyslipidemia, type 2 diabetes, dementia, GERD, vitamin D deficiency, vitamin B12 deficiency     Summary of recent hospitalization:  Patient was hospitalized at [facility] from 12/31/2024 to 1/4/2025 for fall.  Patient presented to the emergency department for evaluation after an unwitnessed fall, patient found facedown on the floor awake in the bathroom by nursing staff.  Laboratory evaluation in the emergency department significant for potassium 5.1, creatinine 1.3, normal WBC, hemoglobin 12.0, platelet count 263.  CT facial bones reveals acute left zygomaticomaxillary complex fracture pattern, moderate layering blood products in the left maxillary sinus.  Right wrist x-ray revealed impacted fracture of the distal aspect of the right radius with slight dorsal angulation, fracture extends to the joint margin without significant cortical step-off at the wrist joint, irregularity at the tip of the ulnar styloid but this may be more chronic, flexion contracture of the MCP joints and fingers reduces the sensitivity of the examination, soft tissue swelling over dorsal aspect of the hand.  CT cervical spine negative for acute cervical spinal fracture or posttraumatic subluxation.  CT head negative for acute intracranial hemorrhage, extra-axial fluid collection or mass effect, findings concerning for acute left zygomaticomaxillary complex fracture pattern.  Ortho was consulted and recommended nonoperative management with short arm splint applied.  Nonweightbearing through the wrist, but okay to use platform walker, follow-up  in 10 to 14 days.  Oral/maxillofacial surgery contacted by hospitalist and recommended nonoperative management.  Prior to discharge patient's encephalopathy improved, pain was well-managed on Tylenol and it was felt that the oxycodone may have contributed to encephalopathy.  Patient to follow-up with oral maxillofacial surgery in 1 week.  Patient received IV fluids, creatinine improved prior to discharge. Discharged to TCU for physical rehabilitation and medical management.     Today patient is seen for follow-up in the TCU.  She is much more verbal today, answers more questions than I have seen her at previous visits.  She is slow to respond at times.  She tells me this morning she is feeling well.  She denies any pain.  She denies any trouble breathing.  She denies any dysuria/trouble urinating.  Per discussion with therapy, Occupational Therapy is no longer working with the patient, as patient's spouse has been providing all cares at home already, and she will continue to require max assistance, which is her baseline.  Physical therapy continues to work on transfers.  With goal to still be able to discharge her back home with spouse to care for her.  Per nursing documentation bowels are moving regularly.  Nursing staff with no concerns regarding patient today.    Reviewed facility EMR including, recent nursing progress notes, vital signs.  Discussed plan of care with nursing.    Allergies, and PMH/PSH reviewed in EPIC today.  REVIEW OF SYSTEMS:  Limited secondary to cognitive impairment but today pt reports as above in HPI    Objective:   LMP  (LMP Unknown)   GENERAL APPEARANCE:  Alert, in NAD  HEENT: normocephalic, moist mucous membranes, nose without drainage or crusting  RESP:  respiratory effort normal, no respiratory distress, Lung sounds clear, patient is on RA  CV: auscultation of heart done, rate and rhythm regular.  ABDOMEN: + bowel sounds  M/S:   no lower extremity edema, right wrist in splint  NEURO:  Speech is slow to respond, moves extremities freely  PSYCH:insight and judgement impaired, memory impaired, affect and mood normal      Labs done in SNF are in [place] EPIC. Please refer to them using EPIC/Care Everywhere. and Recent labs in EPIC reviewed by me today.     Assessment/Plan:  Right distal radius fracture  Acute left zygomaticomaxillary complex fracture  Unwitnessed fall, subsequent encounter  Ortho and oral/maxillofacial surgery recommended non operative management  Pain is managed  Plan: Continue pain management with Tylenol 975 mg every 8 hours scheduled.  Continue sinus precautions. Nonweightbearing through the wrist, but okay to use platform walker, follow-up with ortho today and maxillofacial surgery as scheduled on 1/24/2025.  Continue therapy as below.      Acute kidney injury, resolved  Suspected chronic kidney disease  Creatinine peaked at 1.30 on admission, received IV hydration  Baseline creatinine appears to be around 1-1.2 during recent hospital stay  Creatinine 0.97 on 1/13/2025  Plan: BMP PRN. Encourage fluids. Avoid nephrotoxins. Renally dose medications as indicated.     Paroxysmal atrial fibrillation  HR 60-70s  Plan: Continue aspirin 81 mg daily.  Not on rate controlling medication, not on anticoagulation due to cerebral amyloid angiopathy.  Monitor heart rate.     Cerebral amyloid angiopathy  Per chart review  Plan: Follow-up with specialist per recommendations.     Acute encephalopathy, improved  Dementia  Depression  With encephalopathy inpatient, suspected to be worsened due to oxycodone which was discontinued and symptoms improved  Patient answering more questions today than she has previously during my visits  Plan: Continue donepezil 10 mg at bedtime, escitalopram 20 mg daily, memantine 20 mg daily, Seroquel 50 mg at bedtime.  Monitor mood and symptoms.  Consider ACP referral as needed. OCCUPATIONAL THERAPY to complete cognitive testing for safe discharge planning. Nursing to  assist with cares, meals, medication assistance, activities.     Vitamin B12 deficiency  Plan: Continue vitamin B12 injections monthly.     Slow transit constipation, resolved  With constipation on admission, now bowels moving regularly per nursing  Plan: Continue senna S1 tab twice daily as needed, docusate 100 mg at bedtime, magnesium hydroxyzine 15 mL every other day.  Monitor bowels.     Obesity, BMI 32.01  Complicates care  Plan: Encourage weight loss. Dietician follows in the TCU.     Physical deconditioning  Secondary to recent hospitalization, medical conditions as above  Patient continues to work with therapy  Plan: Encourage participation in physical therapy/occupational therapy for strengthening and deconditioning. Discharge planning per their recommendation. Social work to assist with d/c planning.    MED REC REQUIRED  Post Medication Reconciliation Status:  Medication reconciliation previously completed during another office visit      Disclaimer: This note may contain text created using speech-recognition software and may contain unintended word substitutions.       Electronically signed by: MARTHA San CNP         Sincerely,        MARTHA San CNP    Electronically signed

## 2025-01-16 ENCOUNTER — OFFICE VISIT (OUTPATIENT)
Dept: ORTHOPEDICS | Facility: CLINIC | Age: 80
End: 2025-01-16
Payer: COMMERCIAL

## 2025-01-16 ENCOUNTER — MEDICAL CORRESPONDENCE (OUTPATIENT)
Dept: HEALTH INFORMATION MANAGEMENT | Facility: CLINIC | Age: 80
End: 2025-01-16

## 2025-01-16 DIAGNOSIS — M17.12 PRIMARY OSTEOARTHRITIS OF LEFT KNEE: ICD-10-CM

## 2025-01-16 DIAGNOSIS — M17.11 PRIMARY OSTEOARTHRITIS OF RIGHT KNEE: Primary | ICD-10-CM

## 2025-01-16 RX ORDER — ROPIVACAINE HYDROCHLORIDE 5 MG/ML
4 INJECTION, SOLUTION EPIDURAL; INFILTRATION; PERINEURAL
Status: COMPLETED | OUTPATIENT
Start: 2025-01-16 | End: 2025-01-16

## 2025-01-16 RX ORDER — METHYLPREDNISOLONE ACETATE 40 MG/ML
40 INJECTION, SUSPENSION INTRA-ARTICULAR; INTRALESIONAL; INTRAMUSCULAR; SOFT TISSUE
Status: COMPLETED | OUTPATIENT
Start: 2025-01-16 | End: 2025-01-16

## 2025-01-16 RX ADMIN — METHYLPREDNISOLONE ACETATE 40 MG: 40 INJECTION, SUSPENSION INTRA-ARTICULAR; INTRALESIONAL; INTRAMUSCULAR; SOFT TISSUE at 08:16

## 2025-01-16 RX ADMIN — ROPIVACAINE HYDROCHLORIDE 4 ML: 5 INJECTION, SOLUTION EPIDURAL; INFILTRATION; PERINEURAL at 08:16

## 2025-01-16 NOTE — LETTER
1/16/2025      Danielle Bryan  [address]  [place] [address]      Dear Colleague,    Thank you for referring your patient, Danielle Bryan, to the [facility] [place]. Please see a copy of my visit note below.    ASSESSMENT & PLAN  Patient Instructions     1. Primary osteoarthritis of right knee    2. Primary osteoarthritis of left knee      -Patient is following up for chronic bilateral knee pain due to arthritis  -Patient tolerated bilateral knee intra-articular cortisone injection today without complications.  Patient was given postprocedure instruction  -Patient will follow-up when pain returns  -Call direct clinic number [[phone]] at any time with questions or concerns.    Albert Yeo MD [facility]  [facility]        -----    SUBJECTIVE:  Danielle Bryan is a 79 year old female who is seen in follow-up for bilateral knee pain.They were last seen 06/10/2024.     Since their last visit reports worsening pain. They indicate that their current pain level is 7/10. They have tried rest/activity avoidance, Tylenol, and corticosteroid injection (most recent date: 05/03/2024) that provided some relief.      The patient is seen with their .    Patient's past medical, surgical, social, and family histories were reviewed today and no changes are noted.    REVIEW OF SYSTEMS:  Constitutional: NEGATIVE for fever, chills, change in weight  Skin: NEGATIVE for worrisome rashes, moles or lesions  GI/: NEGATIVE for bowel or bladder changes  Neuro: NEGATIVE for weakness, dizziness or paresthesias    OBJECTIVE:  LMP  (LMP Unknown)    General: healthy, alert and in no distress  HEENT: no scleral icterus or conjunctival erythema  Skin: no suspicious lesions or rash. No jaundice.  CV: regular rhythm by palpation, no pedal edema  Resp: normal respiratory effort without conversational dyspnea   Psych: normal mood and  affect  Gait: normal steady gait with appropriate coordination and balance  Neuro: normal light touch sensory exam of the extremities.    MSK:  BILATERAL KNEE  Inspection:    Normal alignment; no edema, erythema, or ecchymosis present  Palpation:    Tender about the medial joint line. Remainder of bony and ligamentous landmarks are nontender.    Trace effusion is present    Patellofemoral crepitus is Present  Range of Motion:     00 extension to 1100 flexion  Strength:    Quadriceps grossly intact    Extensor mechanism intact  Special Tests:    Positive: none    Negative: MCL/valgus stress (0 & 30 deg), LCL/varus stress (0 & 30 deg), Lachman's, anterior drawer, posterior drawer, Cristobal's    Independent visualization of the below image:        Large Joint Injection/Arthocentesis: bilateral knee    Date/Time: 1/16/2025 8:16 AM    Performed by: Yeo, Albert, MD  Authorized by: Yeo, Albert, MD    Indications:  Pain and osteoarthritis  Needle Size:  22 G  Guidance: ultrasound    Approach:  Anterolateral  Location:  Knee   Location comment:  Bilateral knee  Laterality:  Bilateral      Medications (Right):  40 mg methylPREDNISolone 40 MG/ML; 4 mL ROPivacaine 5 MG/ML  Medications (Left):  40 mg methylPREDNISolone 40 MG/ML; 4 mL ROPivacaine 5 MG/ML  Procedure discussed: discussed risks, benefits, and alternatives    Consent Given by:  Patient  Timeout: timeout called immediately prior to procedure    Prep: patient was prepped and draped in usual sterile fashion     Ultrasound was used to ensure safe and accurate needle placement and injection. Ultrasound images of the procedure were permanently stored.        Albert Yeo MD, [facility]      Again, thank you for allowing me to participate in the care of your patient.        Sincerely,        Albert Yeo, MD    Electronically signed

## 2025-01-16 NOTE — PATIENT INSTRUCTIONS
1. Primary osteoarthritis of right knee    2. Primary osteoarthritis of left knee      -Patient is following up for chronic bilateral knee pain due to arthritis  -Patient tolerated bilateral knee intra-articular cortisone injection today without complications.  Patient was given postprocedure instruction  -Patient will follow-up when pain returns  -Call direct clinic number [[phone]] at any time with questions or concerns.    Albert Yeo MD [facility]  [facility] [place]

## 2025-01-16 NOTE — PROGRESS NOTES
ASSESSMENT & PLAN  Patient Instructions     1. Primary osteoarthritis of right knee    2. Primary osteoarthritis of left knee      -Patient is following up for chronic bilateral knee pain due to arthritis  -Patient tolerated bilateral knee intra-articular cortisone injection today without complications.  Patient was given postprocedure instruction  -Patient will follow-up when pain returns  -Call direct clinic number [[phone]] at any time with questions or concerns.    Albert Yeo MD [facility]  [facility]        -----    SUBJECTIVE:  Danielle Bryan is a 79 year old female who is seen in follow-up for bilateral knee pain.They were last seen 06/10/2024.     Since their last visit reports worsening pain. They indicate that their current pain level is 7/10. They have tried rest/activity avoidance, Tylenol, and corticosteroid injection (most recent date: 05/03/2024) that provided some relief.      The patient is seen with their .    Patient's past medical, surgical, social, and family histories were reviewed today and no changes are noted.    REVIEW OF SYSTEMS:  Constitutional: NEGATIVE for fever, chills, change in weight  Skin: NEGATIVE for worrisome rashes, moles or lesions  GI/: NEGATIVE for bowel or bladder changes  Neuro: NEGATIVE for weakness, dizziness or paresthesias    OBJECTIVE:  LMP  (LMP Unknown)    General: healthy, alert and in no distress  HEENT: no scleral icterus or conjunctival erythema  Skin: no suspicious lesions or rash. No jaundice.  CV: regular rhythm by palpation, no pedal edema  Resp: normal respiratory effort without conversational dyspnea   Psych: normal mood and affect  Gait: normal steady gait with appropriate coordination and balance  Neuro: normal light touch sensory exam of the extremities.    MSK:  BILATERAL KNEE  Inspection:    Normal alignment; no edema, erythema, or ecchymosis present  Palpation:    Tender about  the medial joint line. Remainder of bony and ligamentous landmarks are nontender.    Trace effusion is present    Patellofemoral crepitus is Present  Range of Motion:     00 extension to 1100 flexion  Strength:    Quadriceps grossly intact    Extensor mechanism intact  Special Tests:    Positive: none    Negative: MCL/valgus stress (0 & 30 deg), LCL/varus stress (0 & 30 deg), Lachman's, anterior drawer, posterior drawer, Cristobal's    Independent visualization of the below image:        Large Joint Injection/Arthocentesis: bilateral knee    Date/Time: 1/16/2025 8:16 AM    Performed by: Yeo, Albert, MD  Authorized by: Yeo, Albert, MD    Indications:  Pain and osteoarthritis  Needle Size:  22 G  Guidance: ultrasound    Approach:  Anterolateral  Location:  Knee   Location comment:  Bilateral knee  Laterality:  Bilateral      Medications (Right):  40 mg methylPREDNISolone 40 MG/ML; 4 mL ROPivacaine 5 MG/ML  Medications (Left):  40 mg methylPREDNISolone 40 MG/ML; 4 mL ROPivacaine 5 MG/ML  Procedure discussed: discussed risks, benefits, and alternatives    Consent Given by:  Patient  Timeout: timeout called immediately prior to procedure    Prep: patient was prepped and draped in usual sterile fashion     Ultrasound was used to ensure safe and accurate needle placement and injection. Ultrasound images of the procedure were permanently stored.        Albert Yeo MD, [facility]

## 2025-01-29 ENCOUNTER — DISCHARGE SUMMARY NURSING HOME (OUTPATIENT)
Dept: GERIATRICS | Facility: CLINIC | Age: 80
End: 2025-01-29
Payer: COMMERCIAL

## 2025-01-29 VITALS
BODY MASS INDEX: 32.76 KG/M2 | DIASTOLIC BLOOD PRESSURE: 67 MMHG | OXYGEN SATURATION: 93 % | WEIGHT: 178 LBS | HEIGHT: 62 IN | RESPIRATION RATE: 17 BRPM | HEART RATE: 66 BPM | TEMPERATURE: 97.7 F | SYSTOLIC BLOOD PRESSURE: 141 MMHG

## 2025-01-29 DIAGNOSIS — E53.8 VITAMIN B12 DEFICIENCY (NON ANEMIC): ICD-10-CM

## 2025-01-29 DIAGNOSIS — S52.501D CLOSED FRACTURE OF DISTAL END OF RIGHT RADIUS WITH ROUTINE HEALING, UNSPECIFIED FRACTURE MORPHOLOGY, SUBSEQUENT ENCOUNTER: Primary | ICD-10-CM

## 2025-01-29 DIAGNOSIS — N18.9 CHRONIC KIDNEY DISEASE, UNSPECIFIED CKD STAGE: ICD-10-CM

## 2025-01-29 DIAGNOSIS — S02.82XD CLOSED FRACTURE OF LEFT ZYGOMATICOMAXILLARY COMPLEX WITH ROUTINE HEALING, SUBSEQUENT ENCOUNTER: ICD-10-CM

## 2025-01-29 DIAGNOSIS — R53.81 PHYSICAL DECONDITIONING: ICD-10-CM

## 2025-01-29 DIAGNOSIS — I48.0 PAROXYSMAL ATRIAL FIBRILLATION (H): ICD-10-CM

## 2025-01-29 DIAGNOSIS — E66.811 CLASS 1 OBESITY WITH SERIOUS COMORBIDITY AND BODY MASS INDEX (BMI) OF 32.0 TO 32.9 IN ADULT, UNSPECIFIED OBESITY TYPE: ICD-10-CM

## 2025-01-29 DIAGNOSIS — F32.A DEPRESSION, UNSPECIFIED DEPRESSION TYPE: ICD-10-CM

## 2025-01-29 DIAGNOSIS — F03.93 DEMENTIA WITH MOOD DISTURBANCE, UNSPECIFIED DEMENTIA SEVERITY, UNSPECIFIED DEMENTIA TYPE (H): ICD-10-CM

## 2025-01-29 DIAGNOSIS — S02.32XD CLOSED FRACTURE OF LEFT ZYGOMATICOMAXILLARY COMPLEX WITH ROUTINE HEALING, SUBSEQUENT ENCOUNTER: ICD-10-CM

## 2025-01-29 DIAGNOSIS — S02.40FD CLOSED FRACTURE OF LEFT ZYGOMATICOMAXILLARY COMPLEX WITH ROUTINE HEALING, SUBSEQUENT ENCOUNTER: ICD-10-CM

## 2025-01-29 DIAGNOSIS — I68.0 CEREBRAL AMYLOID ANGIOPATHY (CODE): ICD-10-CM

## 2025-01-29 DIAGNOSIS — S02.40DD CLOSED FRACTURE OF LEFT ZYGOMATICOMAXILLARY COMPLEX WITH ROUTINE HEALING, SUBSEQUENT ENCOUNTER: ICD-10-CM

## 2025-01-29 PROCEDURE — 99316 NF DSCHRG MGMT 30 MIN+: CPT | Performed by: NURSE PRACTITIONER

## 2025-01-29 NOTE — PROGRESS NOTES
[facility] GERIATRICS DISCHARGE SUMMARY  PATIENT'S NAME: Danielle Bryan  YOB: 1945  MEDICAL RECORD NUMBER:  [id number]  Place of Service where encounter took place:  [facility]  (U) [[id number]]    PRIMARY CARE PROVIDER AND CLINIC RESPONSIBLE AFTER TRANSFER:   Heriberto Headley MD, [address] [facility] [id number]        Transferring providers: Alejandra Malhotra, MARTHA VARGAS, Lucy Ferrer MD  Recent Hospitalization/ED:  [facility] stay 12/31/24 to 1/4/24.  Date of SNF Admission: January 04, 2025  Date of SNF (anticipated) Discharge: January 30, 2025  Discharged to: home with spouse  Cognitive Scores: not completed in the TCU  Physical Function: max/ TD with upper and lower body dressing, standing tolerance 15 seconds, transfers maxA of 2 with marisa, max A of 2 with bed mobility      CODE STATUS/ADVANCE DIRECTIVES DISCUSSION:  Full Code   ALLERGIES: Ciprofloxacin and Reclast [zoledronic acid]    NURSING FACILITY COURSE   Medication Changes/Rationale:   Docusate discontinued in the TCU, as unable to crush and patient difficulty swallowing, bowels were moving ok without it      Past medical history significant for paroxysmal atrial fibrillation, dyslipidemia, type 2 diabetes, dementia, GERD, vitamin D deficiency, vitamin B12 deficiency     Summary of recent hospitalization:  Patient was hospitalized at [facility] from 12/31/2024 to 1/4/2025 for fall.  Patient presented to the emergency department for evaluation after an unwitnessed fall, patient found facedown on the floor awake in the bathroom by nursing staff.  Laboratory evaluation in the emergency department significant for potassium 5.1, creatinine 1.3, normal WBC, hemoglobin 12.0, platelet count 263.  CT facial bones reveals acute left zygomaticomaxillary complex fracture pattern, moderate layering blood products in the left maxillary sinus.  Right wrist x-ray revealed impacted  fracture of the distal aspect of the right radius with slight dorsal angulation, fracture extends to the joint margin without significant cortical step-off at the wrist joint, irregularity at the tip of the ulnar styloid but this may be more chronic, flexion contracture of the MCP joints and fingers reduces the sensitivity of the examination, soft tissue swelling over dorsal aspect of the hand.  CT cervical spine negative for acute cervical spinal fracture or posttraumatic subluxation.  CT head negative for acute intracranial hemorrhage, extra-axial fluid collection or mass effect, findings concerning for acute left zygomaticomaxillary complex fracture pattern.  Ortho was consulted and recommended nonoperative management with short arm splint applied.  Nonweightbearing through the wrist, but okay to use platform walker, follow-up in 10 to 14 days.  Oral/maxillofacial surgery contacted by hospitalist and recommended nonoperative management.  Prior to discharge patient's encephalopathy improved, pain was well-managed on Tylenol and it was felt that the oxycodone may have contributed to encephalopathy.  Patient to follow-up with oral maxillofacial surgery in 1 week.  Patient received IV fluids, creatinine improved prior to discharge. Discharged to TCU for physical rehabilitation and medical management.     Summary of nursing facility stay:   Today patient was seen for discharge evaluation.  She reports good morning.  She otherwise does look at me, but responses to my questions are very limited.  She appears comfortable. She should follow up with PCP and specialists, will continue therapy with home care along with private PCA services twice daily    Specific problems addressed in the TCU:  Right distal radius fracture  Acute left zygomaticomaxillary complex fracture  Unwitnessed fall, subsequent encounter  Ortho and oral/maxillofacial surgery recommended non operative management  Patient had follow up with dental clinic  on 1/27 and recommended continuing sinus precautions x1 week, ok to use straw with follow up only PRN  Had follow up with [facility] on 1/16 for knee pain and received cortisone injections for OA  Pain is managed  Plan: Continue pain management with Tylenol 975 mg every 8 hours scheduled.  Continue sinus precautions. Nonweightbearing through the wrist, but okay to use platform walker, follow-up with ortho is due.  Continue therapy as below.      Acute kidney injury, resolved  Suspected chronic kidney disease  Creatinine peaked at 1.30 on admission, received IV hydration  Baseline creatinine appears to be around 1-1.2 during recent hospital stay  Creatinine 0.97 on 1/13/2025  Plan: BMP PRN. Encourage fluids. Avoid nephrotoxins. Renally dose medications as indicated.     Paroxysmal atrial fibrillation  HR mostly 60-70s  Plan: Continue aspirin 81 mg daily.  Not on rate controlling medication, not on anticoagulation due to cerebral amyloid angiopathy.  Monitor heart rate.     Cerebral amyloid angiopathy  Per chart review  Plan: Follow-up with specialist per recommendations.     Acute encephalopathy, improved  Dementia  Depression  With encephalopathy inpatient, suspected to be worsened due to oxycodone which was discontinued and symptoms improved  Patient's cognition is at baseline, cognitive testing not completed in the TCU  Plan: Continue donepezil 10 mg at bedtime, escitalopram 20 mg daily, memantine 20 mg daily, Seroquel 50 mg at bedtime.  Monitor mood and symptoms.  Consider ACP referral as needed. Family/ PCA to assist with cares, meals, medication assistance, activities.     Vitamin B12 deficiency  Plan: Continue vitamin B12 injections monthly.     Slow transit constipation, resolved  PTA docusate discontinued as patient having trouble swallowing, unable to crush  Bowels are moving regularly per nursing documentation  Plan: Continue senna S1 tab twice daily as needed,  magnesium hydroxyzine 15 mL every other day.   Monitor bowels.     Obesity, BMI 32.56  Complicates care  Plan: Encourage weight loss. Dietician follows in the TCU.     Physical deconditioning  Secondary to recent hospitalization, medical conditions as above  Completed course of therapy in the TCU  Plan: Continue therapy through home care      Discharge Medications:  MED REC REQUIRED  Post Medication Reconciliation Status:  Discharge medications reconciled and changed, see notes/orders    Current Outpatient Medications   Medication Sig Dispense Refill    acetaminophen (TYLENOL) 325 MG tablet Take 3 tablets (975 mg) by mouth every 8 hours.      aspirin 81 MG EC tablet Take 81 mg by mouth at bedtime.      carboxymethylcellulose PF (REFRESH PLUS) 0.5 % ophthalmic solution Place 1 drop into both eyes 4 times daily as needed.      cetirizine (ZYRTEC) 10 MG tablet Take 10 mg by mouth daily as needed for allergies      cyanocobalamin (CYANOCOBALAMIN) 1000 MCG/ML injection Inject 1 mL (1,000 mcg) into the muscle every 30 days - with pharmacist recommended needles and syringes 1 mL 11    diclofenac (VOLTAREN) 1 % topical gel Apply 4 g topically 2 times daily.      donepezil (ARICEPT) 5 MG tablet Take 10 mg by mouth at bedtime      escitalopram (LEXAPRO) 10 MG tablet Take 20 mg by mouth every evening      gabapentin (NEURONTIN) 300 MG capsule Take 300 mg by mouth every evening      magnesium hydroxide (MILK OF MAGNESIA) 400 MG/5ML suspension Take 30 mLs by mouth every other day.      memantine (NAMENDA) 10 MG tablet Take 20 mg by mouth every evening      QUEtiapine (SEROQUEL) 25 MG tablet Take 50 mg by mouth at bedtime.      senna-docusate (SENOKOT-S/PERICOLACE) 8.6-50 MG tablet Take 1 tablet by mouth 2 times daily as needed for constipation.          Controlled medications:   Medication: seroquel , ok to send remaining tabs with patient at the time of discharge to take home     Past Medical History:   Past Medical History:   Diagnosis Date    Adverse effect of  "bisphosphonate, sequela     Alopecia areata     Requires a wig now    Atrial fibrillation (H) 01/20/2019    Cerebral infarction (H) 2014    TIA    Dysphagia     Botox/EGD dilation at [facility], most recent 05/2013    Hyperlipemia     PONV (postoperative nausea and vomiting)     Recurrent UTI     Believed related in part to atrophic vaginitis    Vitamin D deficiency 11/29/2018     Physical Exam:   Vitals: BP (!) 141/67   Pulse 66   Temp 97.7  F (36.5  C)   Resp 17   Ht 1.575 m (5' 2\")   Wt 80.7 kg (178 lb)   LMP  (LMP Unknown)   SpO2 93%   BMI 32.56 kg/m    BMI: Body mass index is 32.56 kg/m .  GENERAL APPEARANCE:  Alert, in NAD  HEENT: normocephalic, moist mucous membranes, nose without drainage or crusting  RESP:  respiratory effort normal, no respiratory distress, Lung sounds clear, patient is on RA  CV: auscultation of heart done, rate and rhythm regular.   ABDOMEN: + bowel sounds  M/S:   no lower extremity edema, cast to right wrist  PSYCH: affect flat      SNF labs: Most Recent 3 CBC's:  Recent Labs   Lab Test 01/08/25  0912 12/31/24  1425 06/11/24  0954   WBC 7.7 9.2 6.4   HGB 11.3* 12.0 12.0   MCV 92 90 91    263 255     Most Recent 3 BMP's:  Recent Labs   Lab Test 01/13/25  0720 01/08/25  0912 01/03/25  0935    142 138   POTASSIUM 4.3 4.7 4.3   CHLORIDE 111* 107 108*   CO2 23 24 21*   BUN 28.2* 37.3* 30.5*   CR 0.97* 1.21* 1.17*   ANIONGAP 10 11 9   TYSON 8.8 9.1 8.8   GLC 83 87 93       DISCHARGE PLAN:  Medical Follow Up:     Follow up with primary care provider in 1 week  Follow up with ortho per recommendations  Discharge Services: Home Care:  Occupational Therapy, Physical Therapy, Registered Nurse, and Home Health Aide  Discharge Instructions:   Weight bearing restrictions:  Non weightbearing through wrist, ok to use platform walker  Continue sinus precautions x1 week through 2/3: -No sneezing or blowing nose. If she was to sneeze do so with mouth open - No Bending over -no heavy lifting " greater than a bag of groceries   Do not get cast wet    TOTAL DISCHARGE TIME:   Greater than 30 minutes  Electronically signed by:  MARTHA San CNP     Documentation of Face to Face and Certification for Home Health Services    I certify that patient: Danielle Bryan is under my care and that I, or a nurse practitioner or physician's assistant working with me, had a face-to-face encounter that meets the physician face-to-face encounter requirements with this patient on: 1/29/2025.    This encounter with the patient was in whole, or in part, for the following medical condition, which is the primary reason for home health care:   1. Closed fracture of distal end of right radius with routine healing, unspecified fracture morphology, subsequent encounter    2. Closed fracture of left zygomaticomaxillary complex with routine healing, subsequent encounter    3. Chronic kidney disease, unspecified CKD stage    4. Paroxysmal atrial fibrillation (H)    5. Cerebral amyloid angiopathy (CODE)    6. Dementia with mood disturbance, unspecified dementia severity, unspecified dementia type (H)    7. Depression, unspecified depression type    8. Vitamin B12 deficiency (non anemic)    9. Class 1 obesity with serious comorbidity and body mass index (BMI) of 32.0 to 32.9 in adult, unspecified obesity type    10. Physical deconditioning      .    I certify that, based on my findings, the following services are medically necessary home health services: Nursing, Occupational Therapy, Physical Therapy, and HHA .    My clinical findings support the need for the above services because: Nurse is needed: to provide medication management, Occupational Therapy Services are needed to assess and treat cognitive ability and address ADL safety due to impairment in completing ADLs safely and independently as he returns home from TCU stay. and Physical Therapy Services are needed to assess and treat the following functional impairments:  generalized weakness and deconditioning.      Further, I certify that my clinical findings support that this patient is homebound (i.e. absences from home require considerable and taxing effort and are for medical reasons or [employer] services or infrequently or of short duration when for other reasons) because: Requires assistance of another person or specialized equipment to access medical services because patient: Requires assistance with marisa transfer and dementia    Based on the above findings. I certify that this patient is confined to the home and needs intermittent skilled nursing care, physical therapy and/or speech therapy.  The patient is under my care, and I have initiated the establishment of the plan of care.  This patient will be followed by a physician who will periodically review the plan of care.  Physician/Provider to provide follow up care: Heriberto Headley, MARTHA CNP on 1/29/2025 at 9:13 AM

## 2025-01-29 NOTE — PATIENT INSTRUCTIONS
[facility] Discharge Orders    Name: Danielle Bryan  : 1945  Planned Discharge Date: 2025  Discharged to: home with spouse    MEDICAL FOLLOW UP  Follow up with primary care provider in 1 week  Follow up with ortho per recommendations    ORDER CHANGES:  Docusate discontinued in the TCU, as unable to crush and patient difficulty swallowing, bowels were moving ok without it    DISCHARGE MEDICATIONS:  The patient s pharmacy is authorized to dispense a 30-day supply of medications. Refill requests should be directed to the primary provider, Heirberto Headley  Medication: seroquel , ok to send remaining tabs with patient at the time of discharge to take home  Current Outpatient Medications   Medication Sig Dispense Refill    acetaminophen (TYLENOL) 325 MG tablet Take 3 tablets (975 mg) by mouth every 8 hours.      aspirin 81 MG EC tablet Take 81 mg by mouth at bedtime.      carboxymethylcellulose PF (REFRESH PLUS) 0.5 % ophthalmic solution Place 1 drop into both eyes 4 times daily as needed.      cetirizine (ZYRTEC) 10 MG tablet Take 10 mg by mouth daily as needed for allergies      cyanocobalamin (CYANOCOBALAMIN) 1000 MCG/ML injection Inject 1 mL (1,000 mcg) into the muscle every 30 days - with pharmacist recommended needles and syringes 1 mL 11    diclofenac (VOLTAREN) 1 % topical gel Apply 4 g topically 2 times daily.      donepezil (ARICEPT) 5 MG tablet Take 10 mg by mouth at bedtime      escitalopram (LEXAPRO) 10 MG tablet Take 20 mg by mouth every evening      gabapentin (NEURONTIN) 300 MG capsule Take 300 mg by mouth every evening      magnesium hydroxide (MILK OF MAGNESIA) 400 MG/5ML suspension Take 30 mLs by mouth every other day.      memantine (NAMENDA) 10 MG tablet Take 20 mg by mouth every evening      QUEtiapine (SEROQUEL) 25 MG tablet Take 50 mg by mouth at bedtime.      senna-docusate (SENOKOT-S/PERICOLACE) 8.6-50 MG tablet Take 1 tablet by mouth 2 times daily as needed for  constipation.         SERVICES:  Home Care:  Occupational Therapy, Physical Therapy, Registered Nurse, and Home Health Aide    ADDITIONAL INSTRUCTIONS:  Weight bearing restrictions:  Non weightbearing through wrist, ok to use platform walker  Continue sinus precautions x1 week through 2/3: -No sneezing or blowing nose. If she was to sneeze do so with mouth open - No Bending over -no heavy lifting greater than a bag of groceries   Do not get cast wet    Alejandra Malhotra, MARTHA CNP  This document was electronically signed on January 29, 2025

## 2025-01-29 NOTE — LETTER
1/29/2025      Danielle Bryan  [address]  [place] [id number] [place] [address] GERIATRICS DISCHARGE SUMMARY  PATIENT'S NAME: Danielle Bryan  YOB: 1945  MEDICAL RECORD NUMBER:  [id number]  Place of Service where encounter took place:  [facility]  (U) [[id number]]    PRIMARY CARE PROVIDER AND CLINIC RESPONSIBLE AFTER TRANSFER:   Heriberto Headley MD, [address] [facility] [address]        Transferring providers: MARTHA San CNP, Lucy Ferrer MD  Recent Hospitalization/ED:  [facility] stay 12/31/24 to 1/4/24.  Date of SNF Admission: January 04, 2025  Date of SNF (anticipated) Discharge: January 30, 2025  Discharged to: home with spouse  Cognitive Scores: not completed in the TCU  Physical Function: max/ TD with upper and lower body dressing, standing tolerance 15 seconds, transfers maxA of 2 with marisa, max A of 2 with bed mobility      CODE STATUS/ADVANCE DIRECTIVES DISCUSSION:  Full Code   ALLERGIES: Ciprofloxacin and Reclast [zoledronic acid]    NURSING FACILITY COURSE   Medication Changes/Rationale:   Docusate discontinued in the TCU, as unable to crush and patient difficulty swallowing, bowels were moving ok without it      Past medical history significant for paroxysmal atrial fibrillation, dyslipidemia, type 2 diabetes, dementia, GERD, vitamin D deficiency, vitamin B12 deficiency     Summary of recent hospitalization:  Patient was hospitalized at [facility] from 12/31/2024 to 1/4/2025 for fall.  Patient presented to the emergency department for evaluation after an unwitnessed fall, patient found facedown on the floor awake in the bathroom by nursing staff.  Laboratory evaluation in the emergency department significant for potassium 5.1, creatinine 1.3, normal WBC, hemoglobin 12.0, platelet count 263.  CT facial bones reveals acute left zygomaticomaxillary complex fracture pattern, moderate layering  blood products in the left maxillary sinus.  Right wrist x-ray revealed impacted fracture of the distal aspect of the right radius with slight dorsal angulation, fracture extends to the joint margin without significant cortical step-off at the wrist joint, irregularity at the tip of the ulnar styloid but this may be more chronic, flexion contracture of the MCP joints and fingers reduces the sensitivity of the examination, soft tissue swelling over dorsal aspect of the hand.  CT cervical spine negative for acute cervical spinal fracture or posttraumatic subluxation.  CT head negative for acute intracranial hemorrhage, extra-axial fluid collection or mass effect, findings concerning for acute left zygomaticomaxillary complex fracture pattern.  Ortho was consulted and recommended nonoperative management with short arm splint applied.  Nonweightbearing through the wrist, but okay to use platform walker, follow-up in 10 to 14 days.  Oral/maxillofacial surgery contacted by hospitalist and recommended nonoperative management.  Prior to discharge patient's encephalopathy improved, pain was well-managed on Tylenol and it was felt that the oxycodone may have contributed to encephalopathy.  Patient to follow-up with oral maxillofacial surgery in 1 week.  Patient received IV fluids, creatinine improved prior to discharge. Discharged to TCU for physical rehabilitation and medical management.     Summary of nursing facility stay:   Today patient was seen for discharge evaluation.  She reports good morning.  She otherwise does look at me, but responses to my questions are very limited.  She appears comfortable. She should follow up with PCP and specialists, will continue therapy with home care along with private PCA services twice daily    Specific problems addressed in the TCU:  Right distal radius fracture  Acute left zygomaticomaxillary complex fracture  Unwitnessed fall, subsequent encounter  Ortho and oral/maxillofacial  surgery recommended non operative management  Patient had follow up with dental clinic on 1/27 and recommended continuing sinus precautions x1 week, ok to use straw with follow up only PRN  Had follow up with [facility] on 1/16 for knee pain and received cortisone injections for OA  Pain is managed  Plan: Continue pain management with Tylenol 975 mg every 8 hours scheduled.  Continue sinus precautions. Nonweightbearing through the wrist, but okay to use platform walker, follow-up with ortho is due.  Continue therapy as below.      Acute kidney injury, resolved  Suspected chronic kidney disease  Creatinine peaked at 1.30 on admission, received IV hydration  Baseline creatinine appears to be around 1-1.2 during recent hospital stay  Creatinine 0.97 on 1/13/2025  Plan: BMP PRN. Encourage fluids. Avoid nephrotoxins. Renally dose medications as indicated.     Paroxysmal atrial fibrillation  HR mostly 60-70s  Plan: Continue aspirin 81 mg daily.  Not on rate controlling medication, not on anticoagulation due to cerebral amyloid angiopathy.  Monitor heart rate.     Cerebral amyloid angiopathy  Per chart review  Plan: Follow-up with specialist per recommendations.     Acute encephalopathy, improved  Dementia  Depression  With encephalopathy inpatient, suspected to be worsened due to oxycodone which was discontinued and symptoms improved  Patient's cognition is at baseline, cognitive testing not completed in the TCU  Plan: Continue donepezil 10 mg at bedtime, escitalopram 20 mg daily, memantine 20 mg daily, Seroquel 50 mg at bedtime.  Monitor mood and symptoms.  Consider ACP referral as needed. Family/ PCA to assist with cares, meals, medication assistance, activities.     Vitamin B12 deficiency  Plan: Continue vitamin B12 injections monthly.     Slow transit constipation, resolved  PTA docusate discontinued as patient having trouble swallowing, unable to crush  Bowels are moving regularly per nursing documentation  Plan: Continue  senna S1 tab twice daily as needed,  magnesium hydroxyzine 15 mL every other day.  Monitor bowels.     Obesity, BMI 32.56  Complicates care  Plan: Encourage weight loss. Dietician follows in the TCU.     Physical deconditioning  Secondary to recent hospitalization, medical conditions as above  Completed course of therapy in the TCU  Plan: Continue therapy through home care      Discharge Medications:  MED REC REQUIRED  Post Medication Reconciliation Status:  Discharge medications reconciled and changed, see notes/orders    Current Outpatient Medications   Medication Sig Dispense Refill     acetaminophen (TYLENOL) 325 MG tablet Take 3 tablets (975 mg) by mouth every 8 hours.       aspirin 81 MG EC tablet Take 81 mg by mouth at bedtime.       carboxymethylcellulose PF (REFRESH PLUS) 0.5 % ophthalmic solution Place 1 drop into both eyes 4 times daily as needed.       cetirizine (ZYRTEC) 10 MG tablet Take 10 mg by mouth daily as needed for allergies       cyanocobalamin (CYANOCOBALAMIN) 1000 MCG/ML injection Inject 1 mL (1,000 mcg) into the muscle every 30 days - with pharmacist recommended needles and syringes 1 mL 11     diclofenac (VOLTAREN) 1 % topical gel Apply 4 g topically 2 times daily.       donepezil (ARICEPT) 5 MG tablet Take 10 mg by mouth at bedtime       escitalopram (LEXAPRO) 10 MG tablet Take 20 mg by mouth every evening       gabapentin (NEURONTIN) 300 MG capsule Take 300 mg by mouth every evening       magnesium hydroxide (MILK OF MAGNESIA) 400 MG/5ML suspension Take 30 mLs by mouth every other day.       memantine (NAMENDA) 10 MG tablet Take 20 mg by mouth every evening       QUEtiapine (SEROQUEL) 25 MG tablet Take 50 mg by mouth at bedtime.       senna-docusate (SENOKOT-S/PERICOLACE) 8.6-50 MG tablet Take 1 tablet by mouth 2 times daily as needed for constipation.          Controlled medications:   Medication: seroquel , ok to send remaining tabs with patient at the time of discharge to take  "home     Past Medical History:   Past Medical History:   Diagnosis Date     Adverse effect of bisphosphonate, sequela      Alopecia areata     Requires a wig now     Atrial fibrillation (H) 01/20/2019     Cerebral infarction (H) 2014    TIA     Dysphagia     Botox/EGD dilation at [facility], most recent 05/2013     Hyperlipemia      PONV (postoperative nausea and vomiting)      Recurrent UTI     Believed related in part to atrophic vaginitis     Vitamin D deficiency 11/29/2018     Physical Exam:   Vitals: BP (!) 141/67   Pulse 66   Temp 97.7  F (36.5  C)   Resp 17   Ht 1.575 m (5' 2\")   Wt 80.7 kg (178 lb)   LMP  (LMP Unknown)   SpO2 93%   BMI 32.56 kg/m    BMI: Body mass index is 32.56 kg/m .  GENERAL APPEARANCE:  Alert, in NAD  HEENT: normocephalic, moist mucous membranes, nose without drainage or crusting  RESP:  respiratory effort normal, no respiratory distress, Lung sounds clear, patient is on RA  CV: auscultation of heart done, rate and rhythm regular.   ABDOMEN: + bowel sounds  M/S:   no lower extremity edema, cast to right wrist  PSYCH: affect flat      SNF labs: Most Recent 3 CBC's:  Recent Labs   Lab Test 01/08/25  0912 12/31/24  1425 06/11/24  0954   WBC 7.7 9.2 6.4   HGB 11.3* 12.0 12.0   MCV 92 90 91    263 255     Most Recent 3 BMP's:  Recent Labs   Lab Test 01/13/25  0720 01/08/25  0912 01/03/25  0935    142 138   POTASSIUM 4.3 4.7 4.3   CHLORIDE 111* 107 108*   CO2 23 24 21*   BUN 28.2* 37.3* 30.5*   CR 0.97* 1.21* 1.17*   ANIONGAP 10 11 9   TYSON 8.8 9.1 8.8   GLC 83 87 93       DISCHARGE PLAN:  Medical Follow Up:     Follow up with primary care provider in 1 week  Follow up with ortho per recommendations  Discharge Services: Home Care:  Occupational Therapy, Physical Therapy, Registered Nurse, and Home Health Aide  Discharge Instructions:   Weight bearing restrictions:  Non weightbearing through wrist, ok to use platform walker  Continue sinus precautions x1 week through 2/3: -No " sneezing or blowing nose. If she was to sneeze do so with mouth open - No Bending over -no heavy lifting greater than a bag of groceries   Do not get cast wet    TOTAL DISCHARGE TIME:   Greater than 30 minutes  Electronically signed by:  MARTHA San CNP     Documentation of Face to Face and Certification for Home Health Services    I certify that patient: Danielle Bryan is under my care and that I, or a nurse practitioner or physician's assistant working with me, had a face-to-face encounter that meets the physician face-to-face encounter requirements with this patient on: 1/29/2025.    This encounter with the patient was in whole, or in part, for the following medical condition, which is the primary reason for home health care:   1. Closed fracture of distal end of right radius with routine healing, unspecified fracture morphology, subsequent encounter    2. Closed fracture of left zygomaticomaxillary complex with routine healing, subsequent encounter    3. Chronic kidney disease, unspecified CKD stage    4. Paroxysmal atrial fibrillation (H)    5. Cerebral amyloid angiopathy (CODE)    6. Dementia with mood disturbance, unspecified dementia severity, unspecified dementia type (H)    7. Depression, unspecified depression type    8. Vitamin B12 deficiency (non anemic)    9. Class 1 obesity with serious comorbidity and body mass index (BMI) of 32.0 to 32.9 in adult, unspecified obesity type    10. Physical deconditioning      .    I certify that, based on my findings, the following services are medically necessary home health services: Nursing, Occupational Therapy, Physical Therapy, and HHA .    My clinical findings support the need for the above services because: Nurse is needed: to provide medication management, Occupational Therapy Services are needed to assess and treat cognitive ability and address ADL safety due to impairment in completing ADLs safely and independently as he returns home from [facility]  stay. and Physical Therapy Services are needed to assess and treat the following functional impairments: generalized weakness and deconditioning.      Further, I certify that my clinical findings support that this patient is homebound (i.e. absences from home require considerable and taxing effort and are for medical reasons or Christian services or infrequently or of short duration when for other reasons) because: Requires assistance of another person or specialized equipment to access medical services because patient: Requires assistance with marisa transfer and dementia    Based on the above findings. I certify that this patient is confined to the home and needs intermittent skilled nursing care, physical therapy and/or speech therapy.  The patient is under my care, and I have initiated the establishment of the plan of care.  This patient will be followed by a physician who will periodically review the plan of care.  Physician/Provider to provide follow up care: Heriberto Headley APRN CNP on 1/29/2025 at 9:13 AM                Sincerely,        MARTHA San CNP    Electronically signed

## 2025-01-31 ENCOUNTER — TELEPHONE (OUTPATIENT)
Dept: FAMILY MEDICINE | Facility: CLINIC | Age: 80
End: 2025-01-31
Payer: COMMERCIAL

## 2025-01-31 NOTE — TELEPHONE ENCOUNTER
Leida, [facility], Home care nurse calls for Delay in start of care, due to Capacity.     To start on 2/3/25.     Leida is asking if Dr Headley is OK to follow pt by fax and phone.     Advised Leida that pts last OV with Dr Headley was on 2/23/24. Last Clinic visit was on 6/23/24 with Dr Madden.   Pt has been in Hospital and in TCU the last month.     She states she will inform the Nurse that starts Home care, to advise pt to schedule a Hospital follow up visit.     Please advise    #[phone] Ext [phone]

## 2025-02-03 ENCOUNTER — MEDICAL CORRESPONDENCE (OUTPATIENT)
Dept: HEALTH INFORMATION MANAGEMENT | Facility: CLINIC | Age: 80
End: 2025-02-03
Payer: COMMERCIAL

## 2025-02-03 NOTE — TELEPHONE ENCOUNTER
Note reviewed, okay for delay in start of care.  Also okay to follow patient by fax and phone per request.  Would recommend video visit or in person visit as have not seen her since June of last year-please schedule.

## 2025-02-03 NOTE — TELEPHONE ENCOUNTER
Called and left message with approved orders/message     TC - please assist in scheduling- routing to clinic pool.   Would recommend video visit or in person visit as have not seen her since June of last year-please schedule.

## 2025-02-04 ENCOUNTER — TELEPHONE (OUTPATIENT)
Dept: FAMILY MEDICINE | Facility: CLINIC | Age: 80
End: 2025-02-04
Payer: COMMERCIAL

## 2025-02-04 NOTE — TELEPHONE ENCOUNTER
Home Health Care      Reason:  [facility] - Initial Plan of Care - [facility] 1xw3, PT & OT Eval and treat    Orders are needed for this patient.  above    Pt Provider: Dr. Headley    Phone Number Homecare Nurse can be reached at: Fax       Could we send this information to you in [employer] or would you prefer to receive a phone call?:   NA    Put in providers in box    Desire K

## 2025-02-05 ENCOUNTER — TRANSFERRED RECORDS (OUTPATIENT)
Dept: HEALTH INFORMATION MANAGEMENT | Facility: CLINIC | Age: 80
End: 2025-02-05

## 2025-02-06 ENCOUNTER — PATIENT OUTREACH (OUTPATIENT)
Dept: CARE COORDINATION | Facility: CLINIC | Age: 80
End: 2025-02-06
Payer: COMMERCIAL

## 2025-02-06 NOTE — LETTER
[facility] CARE COORDINATION  [address] [facility] [address] [address]    February 6, 2025    Danielle Bryan  [address]  [place] [address]      Dear Soren Barajas,    I am a clinic care coordinator who works with Heriberto Headley MD with the [facility]. I wanted to thank you for spending the time to talk with me.  Below is a description of clinic care coordination and how I can further assist you.       The clinic care coordination team is made up of a registered nurse, , financial resource worker and community health worker who understand the health care system. The goal of clinic care coordination is to help you manage your health and improve access to the health care system. Our team works alongside your provider to assist you in determining your health and social needs. We can help you obtain health care and community resources, providing you with necessary information and education. We can work with you through any barriers and develop a care plan that helps coordinate and strengthen the communication between you and your care team.  Our services are voluntary and are offered without charge to you personally.    Please feel free to contact me with any questions or concerns regarding care coordination and what we can offer.      We are focused on providing you with the highest-quality healthcare experience possible.    Sincerely,     Elisabeth Menezes [facility]   Primary Care Social Work Care Coordinator  [place] [place] [facility]  [place]   Phone: [phone]

## 2025-02-06 NOTE — PROGRESS NOTES
"Clinic Care Coordination Contact  Transitions of Care Outreach  Chief Complaint   Patient presents with    Clinic Care Coordination - Post Hospital     Most Recent Admission Date: 12/31/2024   Most Recent Admission Diagnosis: Closed head injury, initial encounter - S09.90XA  Closed fracture of maxillary sinus, initial encounter (H) - S02.401A  Closed fracture of distal end of right radius, unspecified fracture morphology, initial encounter - S52.501A  Closed fracture of left zygomatic arch, initial encounter (H) - S02.40FA  Closed fracture of left orbital floor, initial encounter (H) - S02.32XA  Fracture of left orbital wall (H) - S02.85XA     Most Recent Discharge Date: 1/4/2025   Most Recent Discharge Diagnosis: Closed fracture of distal end of right radius, unspecified fracture morphology, initial encounter - S52.501A  Closed fracture of left zygomatic arch, initial encounter (H) - S02.40FA  Closed fracture of maxillary sinus, initial encounter (H) - S02.401A  Closed fracture of left orbital floor, initial encounter (H) - S02.32XA  Fracture of left orbital wall (H) - S02.85XA  Closed head injury, initial encounter - S09.90XA  Constipation, unspecified constipation type - K59.00     Transitions of Care Assessment    Discharge Assessment  How are you doing now that you are home?: \"Doing really well\" per pt   How are your symptoms? (Red Flag symptoms escalate to triage hotline per guidelines): Improved  Do you know how to contact your clinic care team if you have future questions or changes to your health status? : Yes  Does the patient have their discharge instructions? : Yes  Does the patient have questions regarding their discharge instructions? : No  Were you started on any new medications or were there changes to any of your previous medications? : No  Does the patient have all of their medications?: Yes  Do you have questions regarding any of your medications? : No  Do you have all of your needed " medical supplies or equipment (DME)?  (i.e. oxygen tank, CPAP, cane, etc.): Yes  Post-op (Clinicians Only)  Fever: No  Chills: No  Eating & Drinking: eating and drinking without complaints/concerns  Bowel Function: normal  Date of last BM: 02/06/25  Urinary Status: voiding without complaint/concerns    Pt discharged from [facility] TCU to home with  assistance and HC. Post outreach call completed today with pt . C2C on file. Denied questions, concerns, or needs. HC coming out and VV with PCP tomorrow, 02/07/25. Provided pt  with the number to the clinic if needed and sent CC intro letter via [employer]. No further needs or outreaches planned at this time.     Follow up Plan     Discharge Follow-Up  Discharge follow up appointment scheduled in alignment with recommended follow up timeframe or Transitions of Risk Category? (Low = within 30 days; Moderate= within 14 days; High= within 7 days): Yes  Discharge Follow Up Appointment Date: 02/07/25  Discharge Follow Up Appointment Scheduled with?: Primary Care Provider    Future Appointments   Date Time Provider Department Center   2/7/2025 11:40 AM Heriberto Headley MD [facility]       Outpatient Plan as outlined on AVS reviewed with patient.    For any urgent concerns, please contact our 24 hour nurse triage line: [phone] ([phone])       AMELIA Boykin

## 2025-02-07 PROBLEM — S02.85XA: Status: RESOLVED | Noted: 2024-12-31 | Resolved: 2025-02-07

## 2025-02-07 PROBLEM — S02.40FA CLOSED FRACTURE OF LEFT ZYGOMATIC ARCH, INITIAL ENCOUNTER (H): Status: RESOLVED | Noted: 2024-12-31 | Resolved: 2025-02-07

## 2025-02-07 PROBLEM — S02.401A CLOSED FRACTURE OF MAXILLARY SINUS, INITIAL ENCOUNTER (H): Status: RESOLVED | Noted: 2024-12-31 | Resolved: 2025-02-07

## 2025-02-07 PROBLEM — S02.32XA CLOSED FRACTURE OF LEFT ORBITAL FLOOR, INITIAL ENCOUNTER (H): Status: RESOLVED | Noted: 2024-12-31 | Resolved: 2025-02-07

## 2025-02-09 DIAGNOSIS — E53.8 VITAMIN B12 DEFICIENCY (NON ANEMIC): ICD-10-CM

## 2025-02-10 ENCOUNTER — MEDICAL CORRESPONDENCE (OUTPATIENT)
Dept: HEALTH INFORMATION MANAGEMENT | Facility: CLINIC | Age: 80
End: 2025-02-10
Payer: COMMERCIAL

## 2025-02-10 ENCOUNTER — TELEPHONE (OUTPATIENT)
Dept: FAMILY MEDICINE | Facility: CLINIC | Age: 80
End: 2025-02-10
Payer: COMMERCIAL

## 2025-02-10 RX ORDER — CYANOCOBALAMIN 1000 UG/ML
1 INJECTION, SOLUTION INTRAMUSCULAR; SUBCUTANEOUS
Qty: 1 ML | Refills: 0 | Status: SHIPPED | OUTPATIENT
Start: 2025-02-10

## 2025-02-10 NOTE — TELEPHONE ENCOUNTER
Medication passed protocol, however, refill RN could not approve because needing provider review. Should therapy be completed at this time or continued? No recent Vit B12 labs Provider, please approve or deny the prescription.

## 2025-02-10 NOTE — TELEPHONE ENCOUNTER
Faxed signed forms to [facility] #    Order #[id number]    Copied into HIMS / filed in [place]

## 2025-02-10 NOTE — TELEPHONE ENCOUNTER
Home Health Care      Reason for call:      [facility] - Order #[id number] - Cert Period 02/03/25 - 04/03/25     OT Angela moved to next Medicare week 2/9/25- scheduling conflict    Orders are needed for this patient.  above    Pt Provider: Dr. Headley    Phone Number Homecare Nurse can be reached at: Fax       Could we send this information to you in [employer] or would you prefer to receive a phone call?:   na    Put in providers in box    Desire K

## 2025-02-11 ENCOUNTER — TELEPHONE (OUTPATIENT)
Dept: FAMILY MEDICINE | Facility: CLINIC | Age: 80
End: 2025-02-11
Payer: COMMERCIAL

## 2025-02-11 NOTE — TELEPHONE ENCOUNTER
Lorena OT with [facility] care calls for orders.     5 visits within 8 weeks.     For ADLs. Caregiver educ, and cognition     Per Dr Headley's note from previous enc, OK for OT orders.     Verbal order given to approve visits until certification received for MD signature.

## 2025-02-13 ENCOUNTER — TELEPHONE (OUTPATIENT)
Dept: FAMILY MEDICINE | Facility: CLINIC | Age: 80
End: 2025-02-13
Payer: COMMERCIAL

## 2025-02-13 NOTE — TELEPHONE ENCOUNTER
Home Care is calling regarding an established patient with [facility].  Requesting orders from: Heriberto Headley RN APPROVED: RN able to provide verbal orders.  Home Care will send orders for signature.  RN will close encounter.  Is this a request for a temporary pause in the home care episode?  No    Orders Requested    Physical Therapy  Request for initial certification (first set of orders)   Frequency: 1/week for 4 weeks then 1 e/o week for 4 weeks  RN gave verbal order: Yes        Caller: Demetris  Home Care Agency: [facility]  Phone number Home Care can be reached at: [phone]  Okay to leave a detailed message?: Yes    REMI CODY RN

## 2025-02-18 ENCOUNTER — TELEPHONE (OUTPATIENT)
Dept: FAMILY MEDICINE | Facility: CLINIC | Age: 80
End: 2025-02-18
Payer: COMMERCIAL

## 2025-02-18 ENCOUNTER — MEDICAL CORRESPONDENCE (OUTPATIENT)
Dept: HEALTH INFORMATION MANAGEMENT | Facility: CLINIC | Age: 80
End: 2025-02-18

## 2025-02-18 DIAGNOSIS — Z53.9 DIAGNOSIS NOT YET DEFINED: Primary | ICD-10-CM

## 2025-02-18 PROCEDURE — G0180 MD CERTIFICATION HHA PATIENT: HCPCS | Performed by: FAMILY MEDICINE

## 2025-02-18 NOTE — TELEPHONE ENCOUNTER
Faxed signed forms to [facility] (in lieu of [facility]) Dr. Madden signed and faxed to [facility] #[id number]    Copied into HIMS / filed [facility]

## 2025-02-18 NOTE — TELEPHONE ENCOUNTER
Faxed signed forms to [facility] #    Order #[id number]    Copied into HIMS / filed in [place]

## 2025-02-20 ENCOUNTER — TELEPHONE (OUTPATIENT)
Dept: FAMILY MEDICINE | Facility: CLINIC | Age: 80
End: 2025-02-20

## 2025-02-20 NOTE — TELEPHONE ENCOUNTER
Form was signed by Dr aMdden, which [facility] cannot except.  New form given to Dr Headley to sign.     And fax back to [phone].

## 2025-03-03 ENCOUNTER — NURSE TRIAGE (OUTPATIENT)
Dept: FAMILY MEDICINE | Facility: CLINIC | Age: 80
End: 2025-03-03
Payer: COMMERCIAL

## 2025-03-03 DIAGNOSIS — H10.33 ACUTE CONJUNCTIVITIS OF BOTH EYES, UNSPECIFIED ACUTE CONJUNCTIVITIS TYPE: Primary | ICD-10-CM

## 2025-03-03 RX ORDER — POLYMYXIN B SULFATE AND TRIMETHOPRIM 1; 10000 MG/ML; [USP'U]/ML
SOLUTION OPHTHALMIC
Qty: 10 ML | Refills: 0 | Status: SHIPPED | OUTPATIENT
Start: 2025-03-03 | End: 2025-03-10

## 2025-03-03 NOTE — PATIENT INSTRUCTIONS
Thank you for choosing us for your care. I have placed an order for a prescription so that you can start treatment. View your full visit summary for details by clicking on the link below. Your pharmacist will able to address any questions you may have about the medication.     If you re not feeling better within 2-3 days, please schedule an appointment.  You can schedule an appointment right here in [facility], or call [phone]  If the visit is for the same symptoms as your eVisit, we ll refund the cost of your eVisit if seen within seven days.

## 2025-03-03 NOTE — TELEPHONE ENCOUNTER
Called and spoke with patient.     Advised of providers recommendation.  Patient stated an understanding and agreed with plan.     REMI CODY RN on 3/3/2025 at 10:57 AM   [facility]

## 2025-03-03 NOTE — TELEPHONE ENCOUNTER
Will send in a prescription Provider Response to 2nd Level Triage Request    I have reviewed the RN documentation. My recommendation is:  Antibiotic drop course

## 2025-03-03 NOTE — TELEPHONE ENCOUNTER
"Rn sent E visit information but Pts  is stating he had just done this with the phone and would like to get an antibiotic     Routing PCP to review     Thank you     Best # [phone] ok      Pharmacy: [facility] in [place]     Rosy Bales RN, BSN  [facility] Triage    Reason for Disposition   Yellow or green discharge (pus) in the eye   Bacterial conjunctivitis suspected (e.g., white, yellow or green discharge nearly all day long; or eyelids stuck shut from discharge after sleep), but NO doctor standing order to call in antibiotic eye drops  (Exception: [place]; continue triage.)    Additional Information   Negative: Unresponsive, passed out or very weak   Negative: Difficulty breathing or wheezing   Negative: Difficulty swallowing or slurred speech and sudden onset   Negative: Sounds like a life-threatening emergency to the triager   Negative: SEVERE eyelid swelling (i.e., shut or almost) and fever   Negative: Eyelid (outer) is very red and fever   Negative: Pregnant 20 or more weeks and sudden weight gain (e.g., more than 3 lbs or 1.4 kg in one week)   Negative: Postpartum (from 0 to 6 weeks after delivery) and sudden weight gain (e.g., more than 3 lbs or 1.4 kg in one week)   Negative: Patient sounds very sick or weak to the triager   Negative: SEVERE eyelid swelling (i.e., shut or almost) and involves both eyes   Negative: SEVERE eyelid swelling and taking an ACE Inhibitor medication (e.g., benazepril/LOTENSIN, captopril/CAPOTEN, enalapril/VASOTEC, lisinopril/ZESTRIL)   Negative: SEVERE eyelid swelling on one side that is red and painful (or tender to touch)   Negative: SEVERE eyelid swelling on one side and sinus pain or pressure   Negative: Fever   Negative: Painful rash and multiple small blisters grouped together (i.e., dermatomal distribution or \"band\" or \"stripe\")   Negative: MODERATE to SEVERE eyelid swelling on one side  (Exception: Due to a mosquito bite.)   Negative: Eyelid " "is red and painful (or tender to touch)   Negative: Swelling of both lower legs (i.e., bilateral pedal edema)   Negative: MILD eyelid swelling (puffiness) and sinus pain or pressure   Negative: Patient wants to be seen   Negative: MILD eyelid swelling (puffiness) and persists > 3 days   Negative: Eyelid swelling is a chronic problem (recurrent or ongoing AND present > 4 weeks)   Negative: Suspected allergic conjunctivitis (e.g., eye itching and tearing caused by pet dander, pollen or other allergic substance)   Negative: Suspected chemical conjunctivitis (e.g., eye burning and tearing caused by exposure to chemical, fumes, or smoke)   Negative: Redness of white of eye (sclera) is main symptom (minimal to no discharge or pus)   Negative: SEVERE pain (e.g., excruciating)   Negative: Patient sounds very sick or weak to the triager   Negative: MODERATE eye pain (e.g., interferes with normal activities)   Negative: Cloudy spot or sore seen on the cornea (clear part of the eye)   Negative: Blurred vision   Negative: Eyelids are very swollen (shut or almost)   Negative: Fever > 103 F (39.4 C)   Negative: MILD eye pain or discomfort and wears contacts   Negative: Eyelid (outer) is very red and painful (or tender to touch)   Negative: Discharge from penis   Negative: New or abnormal vaginal discharge   Negative: Using antibiotic eyedrops > 3 days but pus persists   Negative: Lots of yellow or green nasal discharge   Negative: Weak immune system (e.g., HIV positive, cancer chemo, splenectomy, organ transplant, chronic steroids)   Negative: Patient wants to be seen   Negative: Fever present > 3 days (72 hours)   Negative: Bleeding on white of the eye    Answer Assessment - Initial Assessment Questions  1. ONSET: \"When did the swelling start?\" (e.g., minutes, hours, days)      3-4 weeks of discharge - that is yellow and white   2. LOCATION: \"What part of the eyelids is swollen?\"      Both eyes but the left eye is worse   3. " "SEVERITY: \"How swollen is it?\"      Not very   4. ITCHING: \"Is there any itching?\" If Yes, ask: \"How much?\"   (Scale 1-10; mild, moderate or severe)      Yes very much so - she is rubbing eyes constantly   5. PAIN: \"Is the swelling painful to touch?\" If Yes, ask: \"How painful is it?\"   (Scale 1-10; mild, moderate or severe)      None     6. FEVER: \"Do you have a fever?\" If Yes, ask: \"What is it, how was it measured, and when did it start?\"       None     7. CAUSE: \"What do you think is causing the swelling?\"      Possible pink eye     8. RECURRENT SYMPTOM: \"Have you had eyelid swelling before?\" If Yes, ask: \"When was the last time?\" \"What happened that time?\"      None     9. OTHER SYMPTOMS: \"Do you have any other symptoms?\" (e.g., blurred vision, eye discharge, rash, runny nose)      Yellow white discharge, runny nose     Denies: blurry vision, rash, fevers, chills, cough, CP, SOB    10. PREGNANCY: \"Is there any chance you are pregnant?\" \"When was your last menstrual period?\"        NA    Protocols used: Eye - Swelling-A-OH, Eye - Pus or Kfmijmxed-Y-IY    "

## 2025-03-26 ENCOUNTER — TELEPHONE (OUTPATIENT)
Dept: FAMILY MEDICINE | Facility: CLINIC | Age: 80
End: 2025-03-26
Payer: COMMERCIAL

## 2025-03-26 NOTE — TELEPHONE ENCOUNTER
Home Health Care      Reason for call:  [facility] - Order #[id number] - cert period: 02/03/25 - 04/03/25 -     Pt Effective 03/23/25 1wk2    Orders are needed for this patient.  above    Pt Provider: Dr. Headley    Phone Number Homecare Nurse can be reached at: fax       Could we send this information to you in [employer] or would you prefer to receive a phone call?:   na    Put In providers in box    Desire K

## 2025-03-27 ENCOUNTER — MEDICAL CORRESPONDENCE (OUTPATIENT)
Dept: HEALTH INFORMATION MANAGEMENT | Facility: CLINIC | Age: 80
End: 2025-03-27
Payer: COMMERCIAL

## 2025-03-27 NOTE — TELEPHONE ENCOUNTER
Faxed back signed form to [phone]  Sent to [facility]  And filed in [facility].

## 2025-03-31 ENCOUNTER — VIRTUAL VISIT (OUTPATIENT)
Dept: FAMILY MEDICINE | Facility: CLINIC | Age: 80
End: 2025-03-31
Payer: COMMERCIAL

## 2025-03-31 DIAGNOSIS — H57.89 EYE DRAINAGE: Primary | ICD-10-CM

## 2025-03-31 DIAGNOSIS — H01.00B BLEPHARITIS OF UPPER AND LOWER EYELIDS OF BOTH EYES, UNSPECIFIED TYPE: ICD-10-CM

## 2025-03-31 DIAGNOSIS — H01.00A BLEPHARITIS OF UPPER AND LOWER EYELIDS OF BOTH EYES, UNSPECIFIED TYPE: ICD-10-CM

## 2025-03-31 PROCEDURE — 98013 SYNCH AUDIO-ONLY EST LOW 20: CPT | Performed by: NURSE PRACTITIONER

## 2025-03-31 PROCEDURE — 99207 PR NO CHARGE LOS: CPT | Mod: 95 | Performed by: NURSE PRACTITIONER

## 2025-03-31 RX ORDER — ERYTHROMYCIN 5 MG/G
0.5 OINTMENT OPHTHALMIC AT BEDTIME
Qty: 3.5 G | Refills: 1 | Status: SHIPPED | OUTPATIENT
Start: 2025-03-31

## 2025-03-31 NOTE — PROGRESS NOTES
Soren is a 80 year old who is being evaluated via a billable telephone visit.    What phone number would you like to be contacted at? [phone]   How would you like to obtain your AVS? MyChart  Originating Location (pt. Location): Home  Distant Location (provider location):  On-site  Telephone visit completed due to the patient did not have access to video, while the distant provider did.    Assessment & Plan     Eye drainage    - Home Care Referral  - erythromycin (ROMYCIN) 5 MG/GM ophthalmic ointment  Dispense: 3.5 g; Refill: 1    Blepharitis of upper and lower eyelids of both eyes, unspecified type    - Home Care Referral  - erythromycin (ROMYCIN) 5 MG/GM ophthalmic ointment  Dispense: 3.5 g; Refill: 1    Blepharitis  Blepharitis is inflammation of your eyelids, particularly the undersides and the margins by your eyelashes. It may be related to a skin condition, mites at the margins of your eyelids or a clogged oil gland in your eyelid. Blepharitis makes your eyelids red, swollen and itchy. Your eye produces discharge to clear out the irritants. The discharge might be foamy and white or pus-like and yellow or green. It might make your eyelids stick together when you wake up in the morning or leave a crusty residue.      Assessment & Plan  Eye drainage:  - Likely blepharitis, characterized by inflammation and irritation of the eyelids, with crusting and discharge.  - Discontinue antibiotic drops and start antibiotic ointment applied along the eyelid edges. Use baby shampoo to gently wash eyelids daily and apply warm compresses to massage eyelids. Consider nursing visit to assess the condition further and determine if an eye doctor consultation is needed. Instructions to be provided in [employer].      Return in about 2 weeks (around 4/14/2025) for Recheck, if symptoms persist or worsening please be seen.         Subjective   Soren is a 80 year old, presenting for the following health issues:  Eye Discharge Both  Eyes    HPI - Experiencing discharge in the eyes.  - Antibiotic drops were restarted on Friday, but symptoms persist.  - Discharge appears to be more on the eyelids rather than in the actual eye.  - Eyelids sometimes crusted shut upon waking in the morning.  - No redness or irritation in the white parts of the eyes reported.  - No sores or wounds elsewhere on the body mentioned.  - Receiving assistance from physical therapy and occupational therapy, but no nursing care currently in place.  Has dementia and is home bound.      History of Present Illness       Reason for visit:  [place]    She eats 2-3 servings of fruits and vegetables daily.She consumes 1 sweetened beverage(s) daily.She exercises with enough effort to increase her heart rate 10 to 19 minutes per day.  She exercises with enough effort to increase her heart rate 3 or less days per week.   She is taking medications regularly.        X3 weeks - Both eyes - Poyltrim - still getting 1 drop in both eyes Q3H.   states eyes are better - not as goopy.  Stopped - after 1 weeks treatment, came back and started again about 1 week - better again.      Spoke with  - Karl on 03/27/2025 -   Called # [phone] and spoke with      Eyes are not discolored at all. Does not look like pink eye per .     Eyes are crusted shut when she wakes up in the morning.       states that there aren't nurses coming into the home. So unable to complete cultures at home.     Huddled with Amarilis Mtz.      Per Amarilis Mtz, plan on seeing patient on Monday and treating with eye drops ordered by Dr. Headley over the weekend. Will reassess Monday and make plan for next steps with VV.           stated understanding and had no further questions.      Lyssa Anders, RN on 3/28/2025 at 5:02 PM   [facility] [place]              Review of Systems  Constitutional, neuro, ENT, endocrine, pulmonary, cardiac, gastrointestinal, genitourinary,  musculoskeletal, integument and psychiatric systems are negative, except as otherwise noted.      Objective         Vitals:  No vitals were obtained today due to virtual visit.    Physical Exam   General: Alert and no distress //Respiratory: No audible wheeze, cough, or shortness of breath // Psychiatric:  Appropriate affect, tone, and pace of words    MyChart pictures since although looks pretty good at this time can visualize some minimal crusty drainage around eyelids/lashes              Phone call duration: 10  minutes   36 seconds       Signed Electronically by: MARTHA Cisneros CNP

## 2025-03-31 NOTE — PROGRESS NOTES
Soren is a 80 year old who is being evaluated via a billable video visit.    How would you like to obtain your AVS? MyChart  If the video visit is dropped, the invitation should be resent by: Text to cell phone: [phone] - husbands phone - please send link to join video  Will anyone else be joining your video visit? No    Assessment & Plan     Eye drainage    Patient's  to be called to set up this afternoon to retry visit need to see her eyes  with a Picture Sent in or Video if absolutely unable to do anything telephone call can be completed with referral sent for ophthalmology    Return in about 2 weeks (around 4/14/2025) for Recheck.     Subjective   Soren is a 80 year old, presenting for the following health issues:  Eye Problem        3/31/2025     9:27 AM   Additional Questions   Roomed by Bryan TRAYLOR   Accompanied by Self     Video Start Time:     Attempted [place] x 4 sending links to 's cell phone      History of Present Illness       Reason for visit:  [place]    She eats 2-3 servings of fruits and vegetables daily.She consumes 1 sweetened beverage(s) daily.She exercises with enough effort to increase her heart rate 10 to 19 minutes per day.  She exercises with enough effort to increase her heart rate 3 or less days per week.   She is taking medications regularly.        X3 weeks - Both eyes - Poyltrim - still getting 1 drop in both eyes Q3H.   states eyes are better - not as goopy.  Stopped - after 1 weeks treatment, came back and started again about 1 week - better again.      Spoke with  - Karl on 03/27/2025 -   Called # [phone] and spoke with      Eyes are not discolored at all. Does not look like pink eye per .     Eyes are crusted shut when she wakes up in the morning.       states that there aren't nurses coming into the home. So unable to complete cultures at home.     Huddled with Amarilis Mtz.      Per Amarilis Mtz, plan on seeing patient on Monday  and treating with eye drops ordered by Dr. Headley over the weekend. Will reassess Monday and make plan for next steps with VV.           stated understanding and had no further questions.      Lyssa Anders RN on 3/28/2025 at 5:02 PM   [facility] [place]                Review of Systems  Constitutional, neuro, ENT, endocrine, pulmonary, cardiac, gastrointestinal, genitourinary, musculoskeletal, integument and psychiatric systems are negative, except as otherwise noted.      Objective           Vitals:  No vitals were obtained today due to virtual visit.    Physical Exam        Signed Electronically by: MARTHA Cisneros CNP

## 2025-03-31 NOTE — PATIENT INSTRUCTIONS
Based on our discussion, I have outlined the following instructions for you:    - Stop using the antibiotic drops for her eyes.  - Start using the antibiotic ointment. Apply it along the edges of  eyelids.  - Use baby shampoo to gently wash eyelids every day.  - Apply warm compresses to your eyelids and gently massage them.  - Will see if we can get some home nursing out to the home.  - Check [facility] for more detailed instructions.    Thank you again for your visit, and we look forward to supporting you in your journey to better health.    Patients with accumulation of debris on the eyelashes may benefit from gentle washing of the eyelid margins following the use of a warm compress. Warm water or lid hygiene solution can be placed on a washcloth, gauze pad, or cotton swab. The patient should then be advised to gently clean along the lashes and lid margin to remove the accumulated material with care to avoid contacting the ocular surface. Vigorous washing should be avoided, as it may cause more irritation.

## 2025-04-01 ENCOUNTER — TELEPHONE (OUTPATIENT)
Dept: FAMILY MEDICINE | Facility: CLINIC | Age: 80
End: 2025-04-01
Payer: COMMERCIAL

## 2025-04-01 NOTE — TELEPHONE ENCOUNTER
Called # [phone] and spoke with      Has rx for Erythromycin Ophthalmic ointment  Both Amarilis Mtz and pharmacist instructed patient to put the antibiotic on the outside of the eye lid, but the actual instructions on the medication say to place directly into eye.     Confirmed with Amarilis Mtz that medication should be placed on the lash line and not directly into eye, but it is okay if some of the medication ends up in the patient's eye.     Patient's  stated understanding and had no further questions.     Advised to call [phone] and ask to speak to a triage nurse with any new questions, concerns, or symptoms.   Lyssa Anders RN on 4/1/2025 at 2:56 PM   [facility]

## 2025-04-01 NOTE — TELEPHONE ENCOUNTER
RV triage. I am full in clinic seeing patients until the end of the day and have to leave early today.  I am not sure if I can get him call today please call and see when he needs so I can get it fixed before I go.  Please make him aware you work very closely with me and can relay the message.  Please also note the patient has only been seen virtually and not in person so really limits our exam for her.     Healthy regards,            Amarilis Mtz, FNP-BC

## 2025-04-01 NOTE — TELEPHONE ENCOUNTER
Pt  calling to speak with Amarilis Mtz directly regarding Soren's new medication for her eyes.    Karl declined to speak with a RN, or tell writer more information regarding concerns - preferred to only speak with Amarilis.    Advised provider is seeing patients the rest of the day, and offered to have a RN assist with medication questions - Karl declined.    Please advise medication concerns     Please call Karl

## 2025-04-03 ENCOUNTER — TELEPHONE (OUTPATIENT)
Dept: FAMILY MEDICINE | Facility: CLINIC | Age: 80
End: 2025-04-03
Payer: COMMERCIAL

## 2025-04-03 NOTE — TELEPHONE ENCOUNTER
Faxed signed forms to [facility] #[phone]    Initial plan of care - 1x2 1every other week x 6    Copied into HIMS / Filed in [place]

## 2025-04-03 NOTE — TELEPHONE ENCOUNTER
Home Care is calling regarding an established patient with [facility].  Requesting orders from: Heriberto Headley RN APPROVED: RN able to provide verbal orders.  Home Care will send orders for signature.  RN will close encounter.  Is this a request for a temporary pause in the home care episode?  No    Orders Requested    Occupational Therapy  Request for continuation of care with no increase or decrease in frequency  Frequency: 5 visits within 8 weeks  RN gave verbal order: Yes          Contacts       Contact Date/Time Type Contact Phone/Fax    04/03/2025 12:21 PM CDT Phone (Incoming) Lorena DONOHUE [phone]     [facility] - secure           Baylee Linton RN

## 2025-04-16 DIAGNOSIS — E53.8 VITAMIN B12 DEFICIENCY (NON ANEMIC): ICD-10-CM

## 2025-04-16 RX ORDER — CYANOCOBALAMIN 1000 UG/ML
1 INJECTION, SOLUTION INTRAMUSCULAR; SUBCUTANEOUS
Qty: 1 ML | Refills: 0 | Status: SHIPPED | OUTPATIENT
Start: 2025-04-16

## 2025-04-21 ENCOUNTER — NURSE TRIAGE (OUTPATIENT)
Dept: FAMILY MEDICINE | Facility: CLINIC | Age: 80
End: 2025-04-21
Payer: COMMERCIAL

## 2025-04-21 DIAGNOSIS — B35.9 TINEA: Primary | ICD-10-CM

## 2025-04-21 RX ORDER — PRENATAL VIT 91/IRON/FOLIC/DHA 28-975-200
COMBINATION PACKAGE (EA) ORAL 2 TIMES DAILY
COMMUNITY
Start: 2025-04-21 | End: 2025-05-05

## 2025-04-21 NOTE — TELEPHONE ENCOUNTER
Called spouse back.     Advised of providers recommendation. Patient stated an understanding and agreed with plan.    REMI CODY RN on 4/21/2025 at 3:37 PM   [facility]

## 2025-04-21 NOTE — TELEPHONE ENCOUNTER
Sounds like a fungal infection and would recommend Lamisil (terbinafine) cream (over-the-counter athlete's foot cream) apply twice daily for 10 to 14 days until cleared and then go down to once daily for another 5 days.

## 2025-04-21 NOTE — TELEPHONE ENCOUNTER
states this is simple and doesn't feel an apt is necessary. Routing to pcp      Situation   Requesting nystatin     Called  to explain how to do an evisit or to assist in scheduling for rash under breasts for 3 weeks       Redness has been present along under the length of both breasts   Redness is about 1/2 inch wide    She does not complain of pain but winces with wiping/cleansing. .     Recommendations   Explained evisit virtual or in person. States can't get e visit to work.   Open virtual's do not work   states this is simple and doesn't feel an apt is necessary.   Explained will send to pcp to review and call him back.   Keep area clean and dry   T- shirt or gauze under breasts to try and wick moisture away.       Reason for Disposition   Localized rash is very painful (no fever)     When cleansed or touched    Additional Information   Negative: Sounds like a life-threatening emergency to the triager   Negative: Athlete's Foot suspected (i.e., itchy rash between the toes)   Negative: Insect bite(s) suspected   Negative: Jock Itch suspected (i.e., itchy rash on inner thighs near genital area)   Negative: Localized lump (or swelling) without redness or rash   Negative: MPOX SUSPECTED (e.g., direct skin contact such as sex, recent travel to [place] or [place]) and any SYMPTOMS OF MPOX (e.g., rash, fever, muscle aches, or swollen lymph nodes)   Negative: AT RISK FOR MPOX (men-who-have-sex-with-men) and POSSIBLE EXPOSURE (e.g., multiple sex partners in past 21 days) and ANY SYMPTOMS OF MPOX (e.g., rash, fever, muscle aches, or swollen lymph nodes)   Negative: Poison ivy, oak, or sumac rash suspected (e.g., itchy rash after contact with poison ivy)   Negative: Rash of female genital area (e.g., labia, vagina, vulva)   Negative: Rash of male genital area (e.g., penis, scrotum)   Negative: Redness of immunization site   Negative: Shingles suspected (i.e., painful rash, multiple small  blisters grouped together in one area of body; dermatomal distribution)   Negative: Small spot, skin growth, or mole   Negative: Fever and localized purple or blood-colored spots or dots that are not from injury or friction   Negative: Fever and localized rash is very painful   Negative: Patient sounds very sick or weak to the triager   Negative: Painful rash with multiple small blisters grouped together (i.e., dermatomal distribution or 'band' or 'stripe')   Negative: Localized purple or blood-colored spots or dots that are not from injury or friction (no fever)   Negative: Lyme disease suspected (e.g., bull's-eye rash or tick bite / exposure)   Negative: Patient wants to be seen   Commented on: Looks like a boil, infected sore, deep ulcer, or other infected rash (spreading redness, pus)     Redness for the past 3 weeks    Protocols used: Rash or Redness - Mrmhibomz-F-DS

## 2025-04-28 ENCOUNTER — TELEPHONE (OUTPATIENT)
Dept: FAMILY MEDICINE | Facility: CLINIC | Age: 80
End: 2025-04-28
Payer: COMMERCIAL

## 2025-04-28 NOTE — TELEPHONE ENCOUNTER
Home Health Care      Reason for call:  [facility] - Order #[id number] - Cert Period 04/04/25 - 06/02/25 -     Pt Effect 04/06/25 - 1w2, [id number],   OT effect 04/06/25 1wk1, 1every 2wk6 1wk1    Orders are needed for this patient.  above    Pt Provider: Dr. Headley    Phone Number Homecare Nurse can be reached at: Fax       Could we send this information to you in [employer] or would you prefer to receive a phone call?:   na    Put in providers in box    Desire K

## 2025-04-29 NOTE — TELEPHONE ENCOUNTER
Faxed signed forms to [facility] #    Order #[id number]    Copied into [facility] / Filed in [place]

## 2025-05-06 ENCOUNTER — TELEPHONE (OUTPATIENT)
Dept: FAMILY MEDICINE | Facility: CLINIC | Age: 80
End: 2025-05-06
Payer: COMMERCIAL

## 2025-05-06 NOTE — TELEPHONE ENCOUNTER
Home Health Care      Reason for call:  [facility] - order #[id number] - cert period 04/04/25 - 06/02/25 -     OT Effective 05/04/25 1wk1    Orders are needed for this patient.  above    Pt Provider: Dr. Headley    Phone Number Homecare Nurse can be reached at: fax       Could we send this information to you in [employer] or would you prefer to receive a phone call?:   na    Put in providers in box    Desire K

## 2025-05-07 NOTE — TELEPHONE ENCOUNTER
Faxed signed form to [facility] #    Order #[id number]    Copied into [employer] / Filed in [place]

## 2025-05-15 ENCOUNTER — TRANSFERRED RECORDS (OUTPATIENT)
Dept: HEALTH INFORMATION MANAGEMENT | Facility: CLINIC | Age: 80
End: 2025-05-15
Payer: COMMERCIAL

## 2025-06-10 ENCOUNTER — NURSE TRIAGE (OUTPATIENT)
Dept: FAMILY MEDICINE | Facility: CLINIC | Age: 80
End: 2025-06-10
Payer: COMMERCIAL

## 2025-06-10 DIAGNOSIS — R21 RASH: Primary | ICD-10-CM

## 2025-06-10 NOTE — TELEPHONE ENCOUNTER
Patient's  calling to report rash is under Soren's breast again.     Writer advised to schedule VV or at least e-visit, so PCP can see rash and provide recommendations.     declined to schedule anything, reports people around him recommended to have Nystatin prescribed - either cream or powder.     Please advise if rx can be sent to pharmacy, or if you want pt to sent e-visit or schedule VV. Pharmacy desired attached.

## 2025-06-11 RX ORDER — NYSTATIN 100000 U/G
CREAM TOPICAL 2 TIMES DAILY
Qty: 30 G | Refills: 5 | Status: SHIPPED | OUTPATIENT
Start: 2025-06-11

## 2025-06-11 NOTE — TELEPHONE ENCOUNTER
Pts  calling - [facility] on file     LOV 3/31/2025:   Rash started a few months ago     They have been using an antifungal cream and powder - lotrium      SEE IN OFFICE WITHIN 2 WEEKS:  * You need to be examined.   * Let me give you an appointment.    Pt's  stated that he cannot get  to do a VV or in person - he just wants the nystatin cream to help with this - he has people helping him at home for care and they are telling him he needs the nystatin cream    Best # [phone] ok LM     He would like to use [facility] [place] - Rn advised on the [facility] are having issues - pt's  stated that they are up and running and its okay to fax - ( he reports he was there this morning )     Routing to PCP to review and advise     Thank you     Rosy Bales RN, BSN  [facility] [place] Triage         Reason for Disposition   Red, moist, irritated area between skin folds (or under larger breasts)    Additional Information   Negative: Sounds like a life-threatening emergency to the triager   Negative: Athlete's Foot suspected (i.e., itchy rash between the toes)   Negative: Insect bite(s) suspected   Negative: Jock Itch suspected (i.e., itchy rash on inner thighs near genital area)   Negative: Localized lump (or swelling) without redness or rash   Negative: MPOX SUSPECTED (e.g., direct skin contact such as sex, recent travel to [place]) and any SYMPTOMS OF MPOX (e.g., rash, fever, muscle aches, or swollen lymph nodes)   Negative: AT RISK FOR MPOX (men-who-have-sex-with-men) and POSSIBLE EXPOSURE (e.g., multiple sex partners in past 21 days) and ANY SYMPTOMS OF MPOX (e.g., rash, fever, muscle aches, or swollen lymph nodes)   Negative: Poison ivy, oak, or sumac rash suspected (e.g., itchy rash after contact with poison ivy)   Negative: Rash of female genital area (e.g., labia, vagina, vulva)   Negative: Rash of male genital area (e.g., penis, scrotum)   Negative: Redness of immunization site    "Negative: Shingles suspected (i.e., painful rash, multiple small blisters grouped together in one area of body; dermatomal distribution)   Negative: Small spot, skin growth, or mole   Negative: Wound infection suspected (i.e., pain, spreading redness, or pus; in a cut, puncture, scrape or sutured wound)   Negative: Fever and localized purple or blood-colored spots or dots that are not from injury or friction   Negative: Fever and localized rash is very painful   Negative: Patient sounds very sick or weak to the triager   Negative: Looks like a boil, infected sore, deep ulcer, or other infected rash (spreading redness, pus)   Negative: Painful rash with multiple small blisters grouped together (i.e., dermatomal distribution or 'band' or 'stripe')   Negative: Localized rash is very painful (no fever)   Negative: Localized purple or blood-colored spots or dots that are not from injury or friction (no fever)   Negative: Lyme disease suspected (e.g., bull's-eye rash or tick bite / exposure)   Negative: Patient wants to be seen   Negative: Tender bumps in armpits   Negative: Pimples (localized) and no improvement after using CARE ADVICE   Negative: SEVERE local itching persists after 2 days of steroid cream   Negative: Medication patch causing local rash or itching    Answer Assessment - Initial Assessment Questions  1. APPEARANCE of RASH: \"Describe the rash.\"       Very red     2. LOCATION: \"Where is the rash located?\"       Under the breasts     3. NUMBER: \"How many spots are there?\"       Very large red area under both breast     4. SIZE: \"How big are the spots?\" (Inches, centimeters or compare to size of a coin)       2-3 inches under the breast and on the underside of the breast slightly   5. ONSET: \"When did the rash start?\"       A few months ago     6. ITCHING: \"Does the rash itch?\" If Yes, ask: \"How bad is the itch?\"  (Scale 0-10; or none, mild, moderate, severe)      Sometimes     7. PAIN: \"Does the rash hurt?\" " "If Yes, ask: \"How bad is the pain?\"  (Scale 0-10; or none, mild, moderate, severe)      It burns - wife cannot verbalized the pain     8. OTHER SYMPTOMS: \"Do you have any other symptoms?\" (e.g., fever)  The skin is peeling under the breasts      Denies: discharge, fevers, chills sweats    9. PREGNANCY: \"Is there any chance you are pregnant?\" \"When was your last menstrual period?\"      NA    Protocols used: Rash or Redness - [id number]    "

## 2025-06-14 ENCOUNTER — HEALTH MAINTENANCE LETTER (OUTPATIENT)
Age: 80
End: 2025-06-14

## 2025-07-14 DIAGNOSIS — G47.01 INSOMNIA DUE TO MEDICAL CONDITION: ICD-10-CM

## 2025-07-14 NOTE — TELEPHONE ENCOUNTER
Clinic RN: Please investigate patient's chart or contact patient if the information cannot be found because the medication is listed as historical or discontinued. Confirm patient is taking this medication. Document findings and route refill encounter to provider for approval or denial.    Mildred Castaneda RN on 7/14/2025 at 9:53 AM

## 2025-07-14 NOTE — TELEPHONE ENCOUNTER
Called spouse    Takes Gabapentin 300 mg capsules, 1 day for dementia    Routing to provider to review and advise.     REMI CODY RN on 7/14/2025 at 1:31 PM   [facility]

## 2025-07-14 NOTE — TELEPHONE ENCOUNTER
Patient has 1 pill remaining. The refill was sent by [facility] to a different provider that the patient spouse is not familiar with . They are asking for a stat refill pls.

## 2025-07-15 RX ORDER — GABAPENTIN 300 MG/1
300 CAPSULE ORAL EVERY EVENING
Qty: 90 CAPSULE | Refills: 1 | Status: SHIPPED | OUTPATIENT
Start: 2025-07-15

## 2025-08-03 DIAGNOSIS — E53.8 VITAMIN B12 DEFICIENCY (NON ANEMIC): ICD-10-CM

## 2025-08-04 RX ORDER — CYANOCOBALAMIN 1000 UG/ML
1 INJECTION, SOLUTION INTRAMUSCULAR; SUBCUTANEOUS
Qty: 1 ML | Refills: 5 | Status: SHIPPED | OUTPATIENT
Start: 2025-08-04